# Patient Record
Sex: FEMALE | Race: WHITE | NOT HISPANIC OR LATINO | Employment: OTHER | URBAN - METROPOLITAN AREA
[De-identification: names, ages, dates, MRNs, and addresses within clinical notes are randomized per-mention and may not be internally consistent; named-entity substitution may affect disease eponyms.]

---

## 2017-10-25 ENCOUNTER — COMMUNICATION - HEALTHEAST (OUTPATIENT)
Dept: INTERNAL MEDICINE | Facility: CLINIC | Age: 77
End: 2017-10-25

## 2017-10-25 DIAGNOSIS — E87.6 HYPOPOTASSEMIA: ICD-10-CM

## 2017-10-25 DIAGNOSIS — I10 ESSENTIAL HYPERTENSION: ICD-10-CM

## 2017-11-02 ENCOUNTER — COMMUNICATION - HEALTHEAST (OUTPATIENT)
Dept: INTERNAL MEDICINE | Facility: CLINIC | Age: 77
End: 2017-11-02

## 2017-11-02 DIAGNOSIS — I10 ESSENTIAL HYPERTENSION: ICD-10-CM

## 2017-11-02 DIAGNOSIS — E87.6 HYPOPOTASSEMIA: ICD-10-CM

## 2017-11-02 DIAGNOSIS — K44.9 DIAPHRAGMATIC HERNIA: ICD-10-CM

## 2017-11-29 ENCOUNTER — OFFICE VISIT - HEALTHEAST (OUTPATIENT)
Dept: INTERNAL MEDICINE | Facility: CLINIC | Age: 77
End: 2017-11-29

## 2017-11-29 DIAGNOSIS — Z66 DNR (DO NOT RESUSCITATE): ICD-10-CM

## 2017-11-29 DIAGNOSIS — E87.6 HYPOPOTASSEMIA: ICD-10-CM

## 2017-11-29 DIAGNOSIS — Z12.31 VISIT FOR SCREENING MAMMOGRAM: ICD-10-CM

## 2017-11-29 DIAGNOSIS — Z23 NEED FOR IMMUNIZATION AGAINST INFLUENZA: ICD-10-CM

## 2017-11-29 DIAGNOSIS — I10 ESSENTIAL HYPERTENSION WITH GOAL BLOOD PRESSURE LESS THAN 140/90: ICD-10-CM

## 2017-11-29 DIAGNOSIS — D64.9 ANEMIA: ICD-10-CM

## 2017-11-29 DIAGNOSIS — E55.9 VITAMIN D DEFICIENCY: ICD-10-CM

## 2017-11-29 DIAGNOSIS — Z00.00 ANNUAL PHYSICAL EXAM: ICD-10-CM

## 2017-11-29 LAB
CHOLEST SERPL-MCNC: 213 MG/DL
FASTING STATUS PATIENT QL REPORTED: NO
HDLC SERPL-MCNC: 35 MG/DL
LDLC SERPL CALC-MCNC: 142 MG/DL
TRIGL SERPL-MCNC: 179 MG/DL

## 2017-11-29 ASSESSMENT — MIFFLIN-ST. JEOR: SCORE: 1429.88

## 2017-11-30 ENCOUNTER — COMMUNICATION - HEALTHEAST (OUTPATIENT)
Dept: INTERNAL MEDICINE | Facility: CLINIC | Age: 77
End: 2017-11-30

## 2018-11-30 ENCOUNTER — OFFICE VISIT - HEALTHEAST (OUTPATIENT)
Dept: INTERNAL MEDICINE | Facility: CLINIC | Age: 78
End: 2018-11-30

## 2018-11-30 DIAGNOSIS — I10 ESSENTIAL HYPERTENSION WITH GOAL BLOOD PRESSURE LESS THAN 140/90: ICD-10-CM

## 2018-11-30 DIAGNOSIS — Z12.31 VISIT FOR SCREENING MAMMOGRAM: ICD-10-CM

## 2018-11-30 DIAGNOSIS — E55.9 VITAMIN D DEFICIENCY: ICD-10-CM

## 2018-11-30 DIAGNOSIS — E87.6 HYPOPOTASSEMIA: ICD-10-CM

## 2018-11-30 DIAGNOSIS — Z00.00 MEDICARE ANNUAL WELLNESS VISIT, SUBSEQUENT: ICD-10-CM

## 2018-11-30 DIAGNOSIS — Z23 FLU VACCINE NEED: ICD-10-CM

## 2018-11-30 DIAGNOSIS — K51.00 ULCERATIVE PANCOLITIS WITHOUT COMPLICATION (H): ICD-10-CM

## 2018-11-30 DIAGNOSIS — Z78.0 ASYMPTOMATIC MENOPAUSAL STATE: ICD-10-CM

## 2018-11-30 LAB
ALBUMIN SERPL-MCNC: 3.4 G/DL (ref 3.5–5)
ALP SERPL-CCNC: 129 U/L (ref 45–120)
ALT SERPL W P-5'-P-CCNC: 12 U/L (ref 0–45)
ANION GAP SERPL CALCULATED.3IONS-SCNC: 12 MMOL/L (ref 5–18)
AST SERPL W P-5'-P-CCNC: 17 U/L (ref 0–40)
BILIRUB SERPL-MCNC: 0.6 MG/DL (ref 0–1)
BUN SERPL-MCNC: 26 MG/DL (ref 8–28)
CALCIUM SERPL-MCNC: 9.3 MG/DL (ref 8.5–10.5)
CHLORIDE BLD-SCNC: 112 MMOL/L (ref 98–107)
CHOLEST SERPL-MCNC: 202 MG/DL
CO2 SERPL-SCNC: 19 MMOL/L (ref 22–31)
CREAT SERPL-MCNC: 1.59 MG/DL (ref 0.6–1.1)
ERYTHROCYTE [DISTWIDTH] IN BLOOD BY AUTOMATED COUNT: 11.6 % (ref 11–14.5)
FASTING STATUS PATIENT QL REPORTED: NO
GFR SERPL CREATININE-BSD FRML MDRD: 31 ML/MIN/1.73M2
GLUCOSE BLD-MCNC: 86 MG/DL (ref 70–125)
HCT VFR BLD AUTO: 40.1 % (ref 35–47)
HDLC SERPL-MCNC: 36 MG/DL
HGB BLD-MCNC: 13.4 G/DL (ref 12–16)
LDLC SERPL CALC-MCNC: 133 MG/DL
MCH RBC QN AUTO: 29.3 PG (ref 27–34)
MCHC RBC AUTO-ENTMCNC: 33.5 G/DL (ref 32–36)
MCV RBC AUTO: 88 FL (ref 80–100)
PLATELET # BLD AUTO: 253 THOU/UL (ref 140–440)
PMV BLD AUTO: 6.7 FL (ref 7–10)
POTASSIUM BLD-SCNC: 4.1 MMOL/L (ref 3.5–5)
PROT SERPL-MCNC: 8 G/DL (ref 6–8)
RBC # BLD AUTO: 4.58 MILL/UL (ref 3.8–5.4)
SODIUM SERPL-SCNC: 143 MMOL/L (ref 136–145)
TRIGL SERPL-MCNC: 166 MG/DL
WBC: 10.4 THOU/UL (ref 4–11)

## 2018-11-30 ASSESSMENT — MIFFLIN-ST. JEOR: SCORE: 1418.66

## 2018-12-03 ENCOUNTER — COMMUNICATION - HEALTHEAST (OUTPATIENT)
Dept: INTERNAL MEDICINE | Facility: CLINIC | Age: 78
End: 2018-12-03

## 2018-12-03 LAB — 25(OH)D3 SERPL-MCNC: 30.5 NG/ML (ref 30–80)

## 2019-01-15 ENCOUNTER — COMMUNICATION - HEALTHEAST (OUTPATIENT)
Dept: INTERNAL MEDICINE | Facility: CLINIC | Age: 79
End: 2019-01-15

## 2019-01-15 DIAGNOSIS — I10 ESSENTIAL HYPERTENSION WITH GOAL BLOOD PRESSURE LESS THAN 140/90: ICD-10-CM

## 2019-02-04 ENCOUNTER — COMMUNICATION - HEALTHEAST (OUTPATIENT)
Dept: INTERNAL MEDICINE | Facility: CLINIC | Age: 79
End: 2019-02-04

## 2019-02-04 DIAGNOSIS — E87.6 HYPOPOTASSEMIA: ICD-10-CM

## 2019-06-24 ENCOUNTER — COMMUNICATION - HEALTHEAST (OUTPATIENT)
Dept: INTERNAL MEDICINE | Facility: CLINIC | Age: 79
End: 2019-06-24

## 2019-06-24 DIAGNOSIS — Z01.818 ENCOUNTER FOR PREOPERATIVE DENTAL EXAMINATION: ICD-10-CM

## 2019-10-18 ENCOUNTER — COMMUNICATION - HEALTHEAST (OUTPATIENT)
Dept: INTERNAL MEDICINE | Facility: CLINIC | Age: 79
End: 2019-10-18

## 2019-10-18 DIAGNOSIS — I10 ESSENTIAL HYPERTENSION WITH GOAL BLOOD PRESSURE LESS THAN 140/90: ICD-10-CM

## 2019-12-03 ENCOUNTER — OFFICE VISIT - HEALTHEAST (OUTPATIENT)
Dept: INTERNAL MEDICINE | Facility: CLINIC | Age: 79
End: 2019-12-03

## 2019-12-03 DIAGNOSIS — E55.9 VITAMIN D DEFICIENCY: ICD-10-CM

## 2019-12-03 DIAGNOSIS — M25.562 ACUTE PAIN OF LEFT KNEE: ICD-10-CM

## 2019-12-03 DIAGNOSIS — I10 ESSENTIAL HYPERTENSION WITH GOAL BLOOD PRESSURE LESS THAN 140/90: ICD-10-CM

## 2019-12-03 DIAGNOSIS — K51.00 ULCERATIVE PANCOLITIS WITHOUT COMPLICATION (H): ICD-10-CM

## 2019-12-03 DIAGNOSIS — K44.9 DIAPHRAGMATIC HERNIA WITHOUT OBSTRUCTION AND WITHOUT GANGRENE: ICD-10-CM

## 2019-12-03 DIAGNOSIS — Z23 FLU VACCINE NEED: ICD-10-CM

## 2019-12-03 DIAGNOSIS — E87.6 HYPOPOTASSEMIA: ICD-10-CM

## 2019-12-03 DIAGNOSIS — Z00.00 MEDICARE ANNUAL WELLNESS VISIT, SUBSEQUENT: ICD-10-CM

## 2019-12-03 LAB
ALBUMIN SERPL-MCNC: 3.5 G/DL (ref 3.5–5)
ALP SERPL-CCNC: 129 U/L (ref 45–120)
ALT SERPL W P-5'-P-CCNC: 12 U/L (ref 0–45)
ANION GAP SERPL CALCULATED.3IONS-SCNC: 14 MMOL/L (ref 5–18)
AST SERPL W P-5'-P-CCNC: 18 U/L (ref 0–40)
BILIRUB SERPL-MCNC: 0.5 MG/DL (ref 0–1)
BUN SERPL-MCNC: 33 MG/DL (ref 8–28)
CALCIUM SERPL-MCNC: 9.2 MG/DL (ref 8.5–10.5)
CHLORIDE BLD-SCNC: 111 MMOL/L (ref 98–107)
CHOLEST SERPL-MCNC: 214 MG/DL
CO2 SERPL-SCNC: 17 MMOL/L (ref 22–31)
CREAT SERPL-MCNC: 1.88 MG/DL (ref 0.6–1.1)
FASTING STATUS PATIENT QL REPORTED: YES
GFR SERPL CREATININE-BSD FRML MDRD: 26 ML/MIN/1.73M2
GLUCOSE BLD-MCNC: 85 MG/DL (ref 70–125)
HDLC SERPL-MCNC: 40 MG/DL
LDLC SERPL CALC-MCNC: 140 MG/DL
POTASSIUM BLD-SCNC: 3.8 MMOL/L (ref 3.5–5)
PROT SERPL-MCNC: 8.3 G/DL (ref 6–8)
SODIUM SERPL-SCNC: 142 MMOL/L (ref 136–145)
TRIGL SERPL-MCNC: 169 MG/DL
URATE SERPL-MCNC: 8.2 MG/DL (ref 2–7.5)

## 2019-12-03 ASSESSMENT — MIFFLIN-ST. JEOR: SCORE: 1423.25

## 2019-12-04 ENCOUNTER — COMMUNICATION - HEALTHEAST (OUTPATIENT)
Dept: INTERNAL MEDICINE | Facility: CLINIC | Age: 79
End: 2019-12-04

## 2019-12-04 DIAGNOSIS — E79.0 ELEVATED URIC ACID IN BLOOD: ICD-10-CM

## 2019-12-04 LAB — 25(OH)D3 SERPL-MCNC: 28 NG/ML (ref 30–80)

## 2020-01-31 ENCOUNTER — COMMUNICATION - HEALTHEAST (OUTPATIENT)
Dept: INTERNAL MEDICINE | Facility: CLINIC | Age: 80
End: 2020-01-31

## 2020-01-31 DIAGNOSIS — E87.6 HYPOPOTASSEMIA: ICD-10-CM

## 2020-01-31 DIAGNOSIS — K44.9 DIAPHRAGMATIC HERNIA WITHOUT OBSTRUCTION AND WITHOUT GANGRENE: ICD-10-CM

## 2020-10-13 ENCOUNTER — COMMUNICATION - HEALTHEAST (OUTPATIENT)
Dept: INTERNAL MEDICINE | Facility: CLINIC | Age: 80
End: 2020-10-13

## 2020-10-13 DIAGNOSIS — I10 ESSENTIAL HYPERTENSION WITH GOAL BLOOD PRESSURE LESS THAN 140/90: ICD-10-CM

## 2020-10-17 ENCOUNTER — COMMUNICATION - HEALTHEAST (OUTPATIENT)
Dept: INTERNAL MEDICINE | Facility: CLINIC | Age: 80
End: 2020-10-17

## 2020-10-17 DIAGNOSIS — I10 ESSENTIAL HYPERTENSION WITH GOAL BLOOD PRESSURE LESS THAN 140/90: ICD-10-CM

## 2020-12-03 ENCOUNTER — OFFICE VISIT - HEALTHEAST (OUTPATIENT)
Dept: INTERNAL MEDICINE | Facility: CLINIC | Age: 80
End: 2020-12-03

## 2020-12-03 DIAGNOSIS — K51.00 ULCERATIVE PANCOLITIS WITHOUT COMPLICATION (H): ICD-10-CM

## 2020-12-03 DIAGNOSIS — M25.562 CHRONIC PAIN OF LEFT KNEE: ICD-10-CM

## 2020-12-03 DIAGNOSIS — K44.9 DIAPHRAGMATIC HERNIA WITHOUT OBSTRUCTION AND WITHOUT GANGRENE: ICD-10-CM

## 2020-12-03 DIAGNOSIS — E55.9 VITAMIN D DEFICIENCY: ICD-10-CM

## 2020-12-03 DIAGNOSIS — N18.4 STAGE 4 CHRONIC KIDNEY DISEASE (H): ICD-10-CM

## 2020-12-03 DIAGNOSIS — Z01.818 ENCOUNTER FOR PREOPERATIVE DENTAL EXAMINATION: ICD-10-CM

## 2020-12-03 DIAGNOSIS — I10 ESSENTIAL HYPERTENSION WITH GOAL BLOOD PRESSURE LESS THAN 140/90: ICD-10-CM

## 2020-12-03 DIAGNOSIS — E87.6 HYPOPOTASSEMIA: ICD-10-CM

## 2020-12-03 DIAGNOSIS — G89.29 CHRONIC PAIN OF LEFT KNEE: ICD-10-CM

## 2020-12-03 DIAGNOSIS — E79.0 ELEVATED URIC ACID IN BLOOD: ICD-10-CM

## 2020-12-03 RX ORDER — HYDROCHLOROTHIAZIDE 25 MG/1
25 TABLET ORAL DAILY
Qty: 90 TABLET | Refills: 3 | Status: SHIPPED | OUTPATIENT
Start: 2020-12-03 | End: 2021-07-22

## 2020-12-03 RX ORDER — LOSARTAN POTASSIUM 100 MG/1
100 TABLET ORAL DAILY
Qty: 90 TABLET | Refills: 3 | Status: SHIPPED | OUTPATIENT
Start: 2020-12-03 | End: 2021-10-13

## 2020-12-03 RX ORDER — FEBUXOSTAT 80 MG/1
80 TABLET, FILM COATED ORAL DAILY
Qty: 90 TABLET | Refills: 3 | Status: SHIPPED | OUTPATIENT
Start: 2020-12-03 | End: 2021-07-22

## 2020-12-03 ASSESSMENT — PATIENT HEALTH QUESTIONNAIRE - PHQ9: SUM OF ALL RESPONSES TO PHQ QUESTIONS 1-9: 0

## 2020-12-10 ENCOUNTER — AMBULATORY - HEALTHEAST (OUTPATIENT)
Dept: NURSING | Facility: CLINIC | Age: 80
End: 2020-12-10

## 2020-12-10 ENCOUNTER — AMBULATORY - HEALTHEAST (OUTPATIENT)
Dept: LAB | Facility: CLINIC | Age: 80
End: 2020-12-10

## 2020-12-10 DIAGNOSIS — I10 ESSENTIAL HYPERTENSION WITH GOAL BLOOD PRESSURE LESS THAN 140/90: ICD-10-CM

## 2020-12-10 DIAGNOSIS — E55.9 VITAMIN D DEFICIENCY: ICD-10-CM

## 2020-12-10 LAB
ALBUMIN SERPL-MCNC: 3.6 G/DL (ref 3.5–5)
ALP SERPL-CCNC: 117 U/L (ref 45–120)
ALT SERPL W P-5'-P-CCNC: <9 U/L (ref 0–45)
ANION GAP SERPL CALCULATED.3IONS-SCNC: 13 MMOL/L (ref 5–18)
AST SERPL W P-5'-P-CCNC: 11 U/L (ref 0–40)
BILIRUB SERPL-MCNC: 0.5 MG/DL (ref 0–1)
BUN SERPL-MCNC: 35 MG/DL (ref 8–28)
CALCIUM SERPL-MCNC: 8.8 MG/DL (ref 8.5–10.5)
CHLORIDE BLD-SCNC: 111 MMOL/L (ref 98–107)
CHOLEST SERPL-MCNC: 184 MG/DL
CO2 SERPL-SCNC: 17 MMOL/L (ref 22–31)
CREAT SERPL-MCNC: 1.92 MG/DL (ref 0.6–1.1)
FASTING STATUS PATIENT QL REPORTED: ABNORMAL
GFR SERPL CREATININE-BSD FRML MDRD: 25 ML/MIN/1.73M2
GLUCOSE BLD-MCNC: 125 MG/DL (ref 70–125)
HDLC SERPL-MCNC: 44 MG/DL
LDLC SERPL CALC-MCNC: 116 MG/DL
POTASSIUM BLD-SCNC: 4.1 MMOL/L (ref 3.5–5)
PROT SERPL-MCNC: 8.1 G/DL (ref 6–8)
SODIUM SERPL-SCNC: 141 MMOL/L (ref 136–145)
TRIGL SERPL-MCNC: 119 MG/DL

## 2020-12-11 ENCOUNTER — COMMUNICATION - HEALTHEAST (OUTPATIENT)
Dept: INTERNAL MEDICINE | Facility: CLINIC | Age: 80
End: 2020-12-11

## 2020-12-11 LAB — 25(OH)D3 SERPL-MCNC: 25 NG/ML (ref 30–80)

## 2021-01-30 ENCOUNTER — COMMUNICATION - HEALTHEAST (OUTPATIENT)
Dept: INTERNAL MEDICINE | Facility: CLINIC | Age: 81
End: 2021-01-30

## 2021-01-30 DIAGNOSIS — K44.9 DIAPHRAGMATIC HERNIA WITHOUT OBSTRUCTION AND WITHOUT GANGRENE: ICD-10-CM

## 2021-01-30 DIAGNOSIS — E87.6 HYPOPOTASSEMIA: ICD-10-CM

## 2021-01-31 RX ORDER — POTASSIUM CHLORIDE 1500 MG/1
TABLET, EXTENDED RELEASE ORAL
Qty: 90 TABLET | Refills: 3 | Status: SHIPPED | OUTPATIENT
Start: 2021-01-31 | End: 2021-10-13

## 2021-05-26 ASSESSMENT — PATIENT HEALTH QUESTIONNAIRE - PHQ9: SUM OF ALL RESPONSES TO PHQ QUESTIONS 1-9: 0

## 2021-05-28 ENCOUNTER — RECORDS - HEALTHEAST (OUTPATIENT)
Dept: ADMINISTRATIVE | Facility: CLINIC | Age: 81
End: 2021-05-28

## 2021-05-29 NOTE — TELEPHONE ENCOUNTER
Medication Request  Medication name: Amoxicillin  500 mg   Pharmacy Name and Location: [id number] - [place] [address] [address] AT [facility] [address]  [phone]   Reason for request: Dental appointment 6/27/19 - requesting to antibiotic(patient had total hip replaced 8 years ago)  When did you use medication last ?:  Unsure    Okay to leave a detailed message: yes

## 2021-05-31 VITALS — HEIGHT: 62 IN | WEIGHT: 223.9 LBS | BODY MASS INDEX: 41.2 KG/M2

## 2021-06-02 VITALS — WEIGHT: 222.3 LBS | HEIGHT: 62 IN | BODY MASS INDEX: 40.91 KG/M2

## 2021-06-02 NOTE — TELEPHONE ENCOUNTER
Medication Question or Clarification  Who is calling: Patient   What medication are you calling about? (include dose and sig) losartan-hydrochlorothiazide 100-25 mg daily  Who prescribed the medication?: Oscar Hopper MD  What is your question/concern?: The pharmacy is unable to get this medication combo in at this time and are requesting separate prescriptions. The patient is completely fout of medication at this time.  Pharmacy: [facility] #[id number]  Okay to leave a detailed message?: No  Site CMT - Please call the pharmacy to obtain any additional needed information.

## 2021-06-03 NOTE — PROGRESS NOTES
Assessment and Plan:       1. Medicare annual wellness visit, subsequent  Doing very well.  Update Pneumovax  - Lipid Cascade  - Pneumococcal polysaccharide vaccine 23-valent greater than or equal to 3yo subcutaneous/IM    2. Essential hypertension with goal blood pressure less than 140/90  Stable  - Comprehensive Metabolic Panel    3. Ulcerative pancolitis without complication (H)  Stable without symptoms    4. Vitamin D deficiency  - Vitamin D, Total (25-Hydroxy)    5. Adult BMI 40.0-44.9 kg/sq m (H)  Encouraged to exercise    6. Acute pain of left knee  Evaluate possible uric acid component especially with hydrochlorothiazide  - Uric Acid    7. Hypokalemia  - potassium chloride (K-DUR,KLOR-CON) 20 MEQ tablet; TAKE ONE TABLET BY MOUTH EVERY DAY  Dispense: 90 tablet; Refill: 3    8. Diaphragmatic hernia without obstruction and without gangrene  Stable  - omeprazole (PRILOSEC) 20 MG capsule; TAKE ONE CAPSULE BY MOUTH EVERY DAY  Dispense: 90 capsule; Refill: 3    Check for a high uric acid level and treat if it is for a month.  If that helps the knee, continue    Update pneumonia booster    Update flu shot    New shingles shot better than the old one you had, you get at the pharmacy    The patient's current medical problems were reviewed.    The following health maintenance schedule was reviewed with the patient and provided in printed form in the after visit summary:   Health Maintenance   Topic Date Due     DXA SCAN  01/02/2005     ZOSTER VACCINES (2 of 3) 12/01/2010     MEDICARE ANNUAL WELLNESS VISIT  12/03/2020     FALL RISK ASSESSMENT  12/03/2020     TD 18+ HE  11/06/2024     LIPID  12/03/2024     ADVANCE CARE PLANNING  12/03/2024     PNEUMOCOCCAL IMMUNIZATION 65+ LOW/MEDIUM RISK  Completed     INFLUENZA VACCINE RULE BASED  Completed        Subjective:   HPI: Chief Complaint: Mely Alegria is an 79 y.o. female here for an Annual Wellness visit.     Mely was last seen in clinic 11/20/2018 for her annual  wellness visit. At that time Mely was healing from a case of Ulcerative colitis. She underwent colon resection surgery and used at colostomy bag for 11 months before it was reconnected. Then her bowels were moving fine and she denies changes within the year.     Hypertension: Mely states her blood pressures have been good though she does not check them at home. Within the year she missed one or two days of her Losartan due to a medication recall. During those days Mely states she felt fine and confirms she takes her Losartan and HCTZ separately.     Left Knee Pain: Mely has been experiencing pain in her left knee for some time. At this point she is debating having surgery and thinks it is a viable option because she has had a successful left hip surgery 8-10 years ago. At [facility] Mely has received cortisone shots with relief. The pain Mely experiences is noted to be intermittent and most felt in the night before bed or when the weather is changing.    Health Maintenance: Pnuemo 23 today.  Review of Systems:  Denies swelling, shortness of breath, and hearing loss.   Admits to knee pain.  Please see above.  The rest of the review of systems are negative for all systems.    Patient Care Team:  Oscar Hopper MD as PCP - General  Berhane Varghese MD as Physician (Orthopedic Surgery)  Oscar Hopper MD as Assigned PCP     Patient Active Problem List   Diagnosis     Vitamin D Deficiency     Hypokalemia     Anemia     Hyperlipidemia     Gastritis Due To H. Pylori     Diaphragmatic hernia without obstruction and without gangrene     Ulcerative pancolitis without complication (H)     Acute pain of left knee     Essential hypertension with goal blood pressure less than 140/90     DNR (do not resuscitate)     Adult BMI 40.0-44.9 kg/sq m (H)     No past medical history on file.   Past Surgical History:   Procedure Laterality Date     [place] COLOSTOMY      Description: Colostomy;  Recorded:  11/05/2012;     NC DILATION/CURETTAGE,DIAGNOSTIC      Description: Dilation And Curettage;  Recorded: 01/18/2010;     NC PART REMOVAL COLON W ANASTOMOSIS      Description: Partial Colectomy;  Recorded: 11/05/2012;  Comments: with colostomy     NC TOTAL HIP ARTHROPLASTY      Description: Total Hip Replacement;  Recorded: 01/18/2010;      Family History   Problem Relation Age of Onset     Diabetes Mother       Social History     Socioeconomic History     Marital status:      Spouse name: Not on file     Number of children: Not on file     Years of education: Not on file     Highest education level: Not on file   Occupational History     Not on file   Social Needs     Financial resource strain: Not on file     Food insecurity:     Worry: Not on file     Inability: Not on file     Transportation needs:     Medical: Not on file     Non-medical: Not on file   Tobacco Use     Smoking status: Never Smoker     Smokeless tobacco: Never Used   Substance and Sexual Activity     Alcohol use: Not on file     Drug use: Not on file     Sexual activity: Not on file   Lifestyle     Physical activity:     Days per week: Not on file     Minutes per session: Not on file     Stress: Not on file   Relationships     Social connections:     Talks on phone: Not on file     Gets together: Not on file     Attends Mandaeism service: Not on file     Active member of club or organization: Not on file     Attends meetings of clubs or organizations: Not on file     Relationship status: Not on file     Intimate partner violence:     Fear of current or ex partner: Not on file     Emotionally abused: Not on file     Physically abused: Not on file     Forced sexual activity: Not on file   Other Topics Concern     Not on file   Social History Narrative     Not on file      Current Outpatient Medications   Medication Sig Dispense Refill     amoxicillin (AMOXIL) 500 MG capsule Take 4 capsules (2,000 mg total) one hour prior to dental procedure 4  "capsule 3     CALCIUM CARBONATE (CALCIUM 500 ORAL) Take by mouth.       cholecalciferol, vitamin D3, 1,000 unit tablet Take 1,000 Units by mouth daily.       hydroCHLOROthiazide (HYDRODIURIL) 25 MG tablet Take 1 tablet (25 mg total) by mouth daily. 90 tablet 3     losartan (COZAAR) 100 MG tablet Take 1 tablet (100 mg total) by mouth daily. 90 tablet 3     omeprazole (PRILOSEC) 20 MG capsule TAKE ONE CAPSULE BY MOUTH EVERY DAY 90 capsule 3     potassium chloride (K-DUR,KLOR-CON) 20 MEQ tablet TAKE ONE TABLET BY MOUTH EVERY DAY 90 tablet 3     allopurinol (ZYLOPRIM) 300 MG tablet Take 1 tablet (300 mg total) by mouth daily. 90 tablet 3     No current facility-administered medications for this visit.       Objective:   Vital Signs:   Visit Vitals  /76 (Patient Site: Left Arm, Patient Position: Sitting, Cuff Size: Adult Large)   Pulse 66   Resp 16   Ht 5' 1.75\" (1.568 m)   Wt (!) 222 lb 7 oz (100.9 kg)   SpO2 98%   BMI 41.01 kg/m         VisionScreening:  No exam data present     PHYSICAL EXAM  Constitutional:  Reveals alert, talkative woman.  Vitals:  Per nursing notes.  Ears:  Clear.  Oropharynx:  Without posterior nasal drainage or thrush. Mallampati 3 score.   Neck:  Supple, thyroid not palpable.  Cardiac:  Regular rate and rhythm without murmurs, rubs, or gallops. Carotids without bruits. Legs without edema. Palpation of the radial pulse regular.  Lungs: Clear.  Respiratory effort normal.  Abdomen:   Bowel sounds positive, nontender, nondistended.  Neither liver nor spleen palpable.  Skin:   Without rash, bruise, or palpable lesions.  Psychiatric:  Mood appropriate, memory intact.      Assessment Results 12/3/2019   Activities of Daily Living No help needed   Instrumental Activities of Daily Living No help needed   Mini Cog Total Score 5   Some recent data might be hidden     A Mini-Cog score of 0-2 suggests the possibility of dementia, score of 3-5 suggests no dementia    Identified Health Risks: "     Patient's advanced directive was discussed and I am comfortable with the patient's wishes.    ADDITIONAL HISTORY SUMMARIZED (2): Reviewed 10/18/2019 and 6/24/2019 Telephone encounter for medication changes.  DECISION TO OBTAIN EXTRA INFORMATION (1): None.   RADIOLOGY TESTS (1): None.  LABS (1): ordered and reviewed.  MEDICINE TESTS (1): None.  INDEPENDENT REVIEW (2 each): None.     The visit lasted a total of 14 minutes face to face with the patient. Over 50% of the time was spent counseling and educating the patient about annual wellness.    I, Neville Jenkins, am scribing for and in the presence of, Dr. Hopper    I, Dr. Hopper, personally performed the services described in this documentation, as scribed by Neville Jenkins in my presence, and it is both accurate and complete.    Total data points: 3

## 2021-06-03 NOTE — PATIENT INSTRUCTIONS - HE
Advance Directive  Patient s advance directive was discussed and I am comfortable with the patient s wishes.  Patient Education   Personalized Prevention Plan  You are due for the preventive services outlined below.  Your care team is available to assist you in scheduling these services.  If you have already completed any of these items, please share that information with your care team to update in your medical record.  Health Maintenance   Topic Date Due     DXA SCAN  01/02/2005     ZOSTER VACCINES (2 of 3) 12/01/2010     INFLUENZA VACCINE RULE BASED (1) 08/01/2019     MEDICARE ANNUAL WELLNESS VISIT  11/30/2019     FALL RISK ASSESSMENT  12/03/2020     LIPID  11/30/2023     ADVANCE CARE PLANNING  11/30/2023     TD 18+ HE  11/06/2024     PNEUMOCOCCAL IMMUNIZATION 65+ LOW/MEDIUM RISK  Completed           Check for a high uric acid level and treat if it is for a month.  If that helps the knee, continue    Update pneumonia booster    Update flu shot    New shingles shot better than the old one you had, you get at the pharmacy

## 2021-06-04 VITALS
DIASTOLIC BLOOD PRESSURE: 76 MMHG | RESPIRATION RATE: 16 BRPM | OXYGEN SATURATION: 98 % | SYSTOLIC BLOOD PRESSURE: 136 MMHG | HEIGHT: 62 IN | BODY MASS INDEX: 40.93 KG/M2 | WEIGHT: 222.44 LBS | HEART RATE: 66 BPM

## 2021-06-05 NOTE — TELEPHONE ENCOUNTER
Refills Approved x 2     Rx renewed per Medication Renewal Policy. Medications were both last renewed on 12/3/2019 with 3 refills each..  Last office visit: 12/3/2019 with PCP Dr JULIÁN Hopper  Order retransmitted.   Millie Hill, [facility] Triage/Med Refill 2/1/2020     Requested Prescriptions   Pending Prescriptions Disp Refills     potassium chloride (K-DUR,KLOR-CON) 20 MEQ tablet [Pharmacy Med Name: POTASSIUM CL 20MEQ ER TABLETS] 90 tablet 3     Sig: TAKE 1 TABLET BY MOUTH EVERY DAY       Potassium Supplements Refill Protocol Passed - 1/31/2020 10:42 AM        Passed - PCP or prescribing provider visit in past 12 months       Last office visit with prescriber/PCP: Visit date not found OR same dept: Visit date not found OR same specialty: Visit date not found  Last physical: 12/3/2019 Last MTM visit: Visit date not found   Next visit within 3 mo: Visit date not found  Next physical within 3 mo: Visit date not found  Prescriber OR PCP: Oscar Hopper MD  Last diagnosis associated with med order: 1. Hypokalemia  - potassium chloride (K-DUR,KLOR-CON) 20 MEQ tablet [Pharmacy Med Name: POTASSIUM CL 20MEQ ER TABLETS]; TAKE 1 TABLET BY MOUTH EVERY DAY  Dispense: 90 tablet; Refill: 3    2. Diaphragmatic hernia without obstruction and without gangrene  - omeprazole (PRILOSEC) 20 MG capsule [Pharmacy Med Name: OMEPRAZOLE 20MG CAPSULES]; TAKE 1 CAPSULE BY MOUTH EVERY DAY  Dispense: 90 capsule; Refill: 3    If protocol passes may refill for 12 months if within 3 months of last provider visit (or a total of 15 months).             Passed - Potassium level in last 12 months     Lab Results   Component Value Date    Potassium 3.8 12/03/2019             omeprazole (PRILOSEC) 20 MG capsule [Pharmacy Med Name: OMEPRAZOLE 20MG CAPSULES] 90 capsule 3     Sig: TAKE 1 CAPSULE BY MOUTH EVERY DAY       GI Medications Refill Protocol Passed - 1/31/2020 10:42 AM        Passed - PCP or prescribing provider visit in last 12 or  next 3 months.     Last office visit with prescriber/PCP: Visit date not found OR same dept: Visit date not found OR same specialty: Visit date not found  Last physical: 12/3/2019 Last MTM visit: Visit date not found   Next visit within 3 mo: Visit date not found  Next physical within 3 mo: Visit date not found  Prescriber OR PCP: Oscar Hopper MD  Last diagnosis associated with med order: 1. Hypokalemia  - potassium chloride (K-DUR,KLOR-CON) 20 MEQ tablet [Pharmacy Med Name: POTASSIUM CL 20MEQ ER TABLETS]; TAKE 1 TABLET BY MOUTH EVERY DAY  Dispense: 90 tablet; Refill: 3    2. Diaphragmatic hernia without obstruction and without gangrene  - omeprazole (PRILOSEC) 20 MG capsule [Pharmacy Med Name: OMEPRAZOLE 20MG CAPSULES]; TAKE 1 CAPSULE BY MOUTH EVERY DAY  Dispense: 90 capsule; Refill: 3    If protocol passes may refill for 12 months if within 3 months of last provider visit (or a total of 15 months).

## 2021-06-09 ENCOUNTER — COMMUNICATION - HEALTHEAST (OUTPATIENT)
Dept: INTERNAL MEDICINE | Facility: CLINIC | Age: 81
End: 2021-06-09

## 2021-06-12 NOTE — TELEPHONE ENCOUNTER
Refill Approved    Rx renewed per Medication Renewal Policy. Medication was last renewed on 10/18/19.    Day Collins, [facility] Connection Triage/Med Refill 10/19/2020     Requested Prescriptions   Pending Prescriptions Disp Refills     hydroCHLOROthiazide (HYDRODIURIL) 25 MG tablet [Pharmacy Med Name: HYDROCHLOROTHIAZIDE 25MG TABLETS] 90 tablet 3     Sig: TAKE 1 TABLET(25 MG) BY MOUTH DAILY       Diuretics/Combination Diuretics Refill Protocol  Passed - 10/19/2020 11:09 AM        Passed - Visit with PCP or prescribing provider visit in past 12 months     Last office visit with prescriber/PCP: Visit date not found OR same dept: Visit date not found OR same specialty: Visit date not found  Last physical: 12/3/2019 Last MTM visit: Visit date not found   Next visit within 3 mo: Visit date not found  Next physical within 3 mo: Visit date not found  Prescriber OR PCP: Oscar Hopper MD  Last diagnosis associated with med order: 1. Essential hypertension with goal blood pressure less than 140/90  - hydroCHLOROthiazide (HYDRODIURIL) 25 MG tablet [Pharmacy Med Name: HYDROCHLOROTHIAZIDE 25MG TABLETS]; TAKE 1 TABLET(25 MG) BY MOUTH DAILY  Dispense: 90 tablet; Refill: 3    If protocol passes may refill for 12 months if within 3 months of last provider visit (or a total of 15 months).             Passed - Serum Potassium in past 12 months      Lab Results   Component Value Date    Potassium 3.8 12/03/2019             Passed - Serum Sodium in past 12 months      Lab Results   Component Value Date    Sodium 142 12/03/2019             Passed - Blood pressure on file in past 12 months     BP Readings from Last 1 Encounters:   12/03/19 136/76             Passed - Serum Creatinine in past 12 months      Creatinine   Date Value Ref Range Status   12/03/2019 1.88 (H) 0.60 - 1.10 mg/dL Final

## 2021-06-12 NOTE — TELEPHONE ENCOUNTER
Who is calling:  Patient   Reason for Call:  Caller stated that she is out and is needing refills sent over as soon as possible.   Date of last appointment with primary care:   Okay to leave a detailed message: Yes

## 2021-06-12 NOTE — TELEPHONE ENCOUNTER
Refill Approved    Rx renewed per Medication Renewal Policy. Medication was last renewed on 10/18/2019.  Last office visit was 12/03/2019 with PCP.    Francisca Flores, [facility] Triage/Med Refill 10/16/2020     Requested Prescriptions   Pending Prescriptions Disp Refills     losartan (COZAAR) 100 MG tablet [Pharmacy Med Name: LOSARTAN 100MG TABLETS] 90 tablet 3     Sig: TAKE 1 TABLET(100 MG) BY MOUTH DAILY       Angiotensin Receptor Blocker Protocol Passed - 10/16/2020  3:01 PM        Passed - PCP or prescribing provider visit in past 12 months       Last office visit with prescriber/PCP: Visit date not found OR same dept: Visit date not found OR same specialty: Visit date not found  Last physical: 12/3/2019 Last MTM visit: Visit date not found   Next visit within 3 mo: Visit date not found  Next physical within 3 mo: Visit date not found  Prescriber OR PCP: Oscar Hopper MD  Last diagnosis associated with med order: 1. Essential hypertension with goal blood pressure less than 140/90  - losartan (COZAAR) 100 MG tablet [Pharmacy Med Name: LOSARTAN 100MG TABLETS]; TAKE 1 TABLET(100 MG) BY MOUTH DAILY  Dispense: 90 tablet; Refill: 3    If protocol passes may refill for 12 months if within 3 months of last provider visit (or a total of 15 months).             Passed - Serum potassium within the past 12 months     Lab Results   Component Value Date    Potassium 3.8 12/03/2019             Passed - Blood pressure filed in past 12 months     BP Readings from Last 1 Encounters:   12/03/19 136/76             Passed - Serum creatinine within the past 12 months     Creatinine   Date Value Ref Range Status   12/03/2019 1.88 (H) 0.60 - 1.10 mg/dL Final

## 2021-06-12 NOTE — TELEPHONE ENCOUNTER
FYI - Status Update  Who is Calling: Patient  Update: Questioning if refill can be expedited due to being out of medication.  Okay to leave a detailed message?:  No return call needed

## 2021-06-13 NOTE — PATIENT INSTRUCTIONS - HE
Refill medicine    No further need for amoxicillin for joint prophylaxis    Update labs    Document intolerance to allopurinol    Uloric 80 mg daily    Consider memantine    Prednisone 20 mg daily to overlap Uloric for 5 days    Flu shot    Refuses mammograms and bone density

## 2021-06-13 NOTE — PROGRESS NOTES
Patient would like to be contacted via the following phone number      ASSESSMENT:  1. Chronic pain of left knee  Elevated uric acid last year but side effects on allopurinol.  Repeat trial of uric acid lowering with Uloric overlapped with prednisone.  Consider probenecid but less effective with renal insufficiency    If no effect consider memantine    2. Elevated uric acid in blood  Repeat trial of uric acid lowering.  Side effects on allopurinol  - febuxostat 80 mg Tab; Take 80 mg by mouth daily.  Dispense: 90 tablet; Refill: 3  - predniSONE (DELTASONE) 20 MG tablet; Take 20 mg by mouth daily for 5 days.  Dispense: 5 tablet; Refill: 0    3. Essential hypertension with goal blood pressure less than 140/90  Stable.  Refill meds.  Hydrochlorothiazide component of uric acid noted  - hydroCHLOROthiazide (HYDRODIURIL) 25 MG tablet; Take 1 tablet (25 mg total) by mouth daily.  Dispense: 90 tablet; Refill: 3  - losartan (COZAAR) 100 MG tablet; Take 1 tablet (100 mg total) by mouth daily.  Dispense: 90 tablet; Refill: 3  - Comprehensive Metabolic Panel; Future  - Lipid Cascade; Future    4. Diaphragmatic hernia without obstruction and without gangrene  Stable on omeprazole  - omeprazole (PRILOSEC) 20 MG capsule; TAKE 1 CAPSULE BY MOUTH EVERY DAY  Dispense: 90 capsule; Refill: 3    5. Hypokalemia  - potassium chloride (K-DUR,KLOR-CON) 20 MEQ tablet; TAKE 1 TABLET BY MOUTH EVERY DAY  Dispense: 90 tablet; Refill: 3    6. Encounter for preoperative dental examination  No longer needs to take amoxicillin by 2015 joint guidelines    7. Vitamin D deficiency  Advised again  - Vitamin D, Total (25-Hydroxy); Future  - cholecalciferol, vitamin D3, 50 mcg (2,000 unit) Tab; Take 1 tablet (2,000 Units total) by mouth daily.  Dispense: 100 each; Refill: 3    8. Adult BMI 40.0-44.9 kg/sq m (H)  Encouraged to exercise    9. Ulcerative pancolitis without complication (H)  Frequent diarrhea but otherwise untreated    10. Stage 4  chronic kidney disease (H)  Slightly worsened last year.  Recheck again this year    Preventive Health Care: Update labs and provide flu shot.  Refuses mammograms and bone density.  Aged out of other cancer screening    PLAN:  Patient Instructions   Refill medicine    No further need for amoxicillin for joint prophylaxis    Update labs    Document intolerance to allopurinol    Uloric 80 mg daily    Consider memantine    Prednisone 20 mg daily to overlap Uloric for 5 days    Flu shot    Refuses mammograms and bone density    Orders Placed This Encounter   Procedures     Comprehensive Metabolic Panel     Standing Status:   Future     Standing Expiration Date:   12/3/2021     Lipid Cascade     Standing Status:   Future     Standing Expiration Date:   12/3/2021     Order Specific Question:   Fasting is required?     Answer:   Unknown     Vitamin D, Total (25-Hydroxy)     Standing Status:   Future     Standing Expiration Date:   12/3/2021     Return in about 1 year (around 12/3/2021) for a phone/video follow up visit.      CHIEF COMPLAINT:  Chief Complaint   Patient presents with     Medication Refill     Knee Pain     left       HISTORY OF PRESENT ILLNESS:  Mely is a 80 y.o. female contacting the clinic today via phone for yearly review.  Her predominant complaint is left knee pain.  Last year uric acid level was elevated greater than 7 and she was given allopurinol to try.  She took this for approximately 2 weeks and does not recall of her pain improved.  She does recall that she had severe diarrhea and slight nausea.  She stopped it and did not contact us    Blood pressure has been stable and she otherwise feels well    Left knee pain fluctuates with the weather    REVIEW OF SYSTEMS:   No chest pain or shortness of breath.  Good mood.  Good energy.  Occasional toe pain.  All other systems are negative.    PFSH:  Social History     Social History Narrative     Not on file     Lives by herself    TOBACCO USE:  Social  History     Tobacco Use   Smoking Status Never Smoker   Smokeless Tobacco Never Used       VITALS:  There were no vitals filed for this visit.  Wt Readings from Last 3 Encounters:   12/03/19 (!) 222 lb 7 oz (100.9 kg)   11/30/18 (!) 222 lb 4.8 oz (100.8 kg)   11/29/17 (!) 223 lb 14.4 oz (101.6 kg)       PHYSICAL EXAM:  (observations via Phone)  Alert and oriented.  Good humor.  No shortness of breath      ADDITIONAL HISTORY SUMMARIZED (2): Reviewed physical exam last year  CARE EVERYWHERE/ EXTRA INFORMATION (1):   RADIOLOGY TESTS (1): Refuses mammograms  LABS (1): Old labs reviewed from last year and new labs ordered  CARDIOLOGY/MEDICINE TESTS (1):   INDEPENDENT REVIEW (2 each):     Total data points: 2    Phone Start time: 2:53 PM  Phone End time: 3:21 PM    The visit lasted a total of 28 minutes     CA Intake Time: 5 minutes    I have reviewed and updated the patient's Past Medical History, Social History, Family History as appropriate.    MEDICATIONS: Reviewed and updated per CA and MD  Current Outpatient Medications   Medication Sig Dispense Refill     CALCIUM CARBONATE (CALCIUM 500 ORAL) Take by mouth.       cholecalciferol, vitamin D3, 1,000 unit tablet Take 1,000 Units by mouth daily.       cholecalciferol, vitamin D3, 50 mcg (2,000 unit) Tab Take 1 tablet (2,000 Units total) by mouth daily. 100 each 3     febuxostat 80 mg Tab Take 80 mg by mouth daily. 90 tablet 3     hydroCHLOROthiazide (HYDRODIURIL) 25 MG tablet Take 1 tablet (25 mg total) by mouth daily. 90 tablet 3     losartan (COZAAR) 100 MG tablet Take 1 tablet (100 mg total) by mouth daily. 90 tablet 3     omeprazole (PRILOSEC) 20 MG capsule TAKE 1 CAPSULE BY MOUTH EVERY DAY 90 capsule 3     potassium chloride (K-DUR,KLOR-CON) 20 MEQ tablet TAKE 1 TABLET BY MOUTH EVERY DAY 90 tablet 3     predniSONE (DELTASONE) 20 MG tablet Take 20 mg by mouth daily for 5 days. 5 tablet 0     No current facility-administered medications for this visit.        The  "patient has been notified of following:     \"This telephone visit will be conducted via a call between you and your physician/provider. We have found that certain health care needs can be provided without the need for a physical exam.  This service lets us provide the care you need with a short phone conversation.  If a prescription is necessary we can send it directly to your pharmacy.  If lab work is needed we can place an order for that and you can then stop by our lab to have the test done at a later time.    Telephone visits are billed at different rates depending on your insurance coverage. During this emergency period, for some insurers they may be billed the same as an in-person visit.  Please reach out to your insurance provider with any questions.    If during the course of the call the physician/provider feels a telephone visit is not appropriate, you will not be charged for this service.\"    Patient has given verbal consent to a Telephone visit? Yes    Patient would like to receive their AVS by AVS Preference: Mail a copy.         "

## 2021-06-14 NOTE — TELEPHONE ENCOUNTER
Refill Approved    Rx renewed per Medication Renewal Policy. Medication was last renewed on 12/3/20, last OV 12/3/20.    [facility], [facility] Triage/Med Refill 1/31/2021     Requested Prescriptions   Pending Prescriptions Disp Refills     potassium chloride (K-DUR,KLOR-CON) 20 MEQ tablet [Pharmacy Med Name: POTASSIUM CL 20MEQ ER TABLETS] 90 tablet 3     Sig: TAKE 1 TABLET BY MOUTH EVERY DAY       Potassium Supplements Refill Protocol Passed - 1/30/2021  4:21 PM        Passed - PCP or prescribing provider visit in past 12 months       Last office visit with prescriber/PCP: Visit date not found OR same dept: Visit date not found OR same specialty: Visit date not found  Last physical: 12/3/2019 Last MTM visit: Visit date not found   Next visit within 3 mo: Visit date not found  Next physical within 3 mo: Visit date not found  Prescriber OR PCP: Oscar Hopper MD  Last diagnosis associated with med order: 1. Hypokalemia  - potassium chloride (K-DUR,KLOR-CON) 20 MEQ tablet [Pharmacy Med Name: POTASSIUM CL 20MEQ ER TABLETS]; TAKE 1 TABLET BY MOUTH EVERY DAY  Dispense: 90 tablet; Refill: 3    2. Diaphragmatic hernia without obstruction and without gangrene  - omeprazole (PRILOSEC) 20 MG capsule [Pharmacy Med Name: OMEPRAZOLE 20MG CAPSULES]; TAKE 1 CAPSULE BY MOUTH EVERY DAY  Dispense: 90 capsule; Refill: 3    If protocol passes may refill for 12 months if within 3 months of last provider visit (or a total of 15 months).             Passed - Potassium level in last 12 months     Lab Results   Component Value Date    Potassium 4.1 12/10/2020                omeprazole (PRILOSEC) 20 MG capsule [Pharmacy Med Name: OMEPRAZOLE 20MG CAPSULES] 90 capsule 3     Sig: TAKE 1 CAPSULE BY MOUTH EVERY DAY       GI Medications Refill Protocol Passed - 1/30/2021  4:21 PM        Passed - PCP or prescribing provider visit in last 12 or next 3 months.     Last office visit with prescriber/PCP: Visit date not found OR same  dept: Visit date not found OR same specialty: Visit date not found  Last physical: 12/3/2019 Last MTM visit: Visit date not found   Next visit within 3 mo: Visit date not found  Next physical within 3 mo: Visit date not found  Prescriber OR PCP: Oscar Hopper MD  Last diagnosis associated with med order: 1. Hypokalemia  - potassium chloride (K-DUR,KLOR-CON) 20 MEQ tablet [Pharmacy Med Name: POTASSIUM CL 20MEQ ER TABLETS]; TAKE 1 TABLET BY MOUTH EVERY DAY  Dispense: 90 tablet; Refill: 3    2. Diaphragmatic hernia without obstruction and without gangrene  - omeprazole (PRILOSEC) 20 MG capsule [Pharmacy Med Name: OMEPRAZOLE 20MG CAPSULES]; TAKE 1 CAPSULE BY MOUTH EVERY DAY  Dispense: 90 capsule; Refill: 3    If protocol passes may refill for 12 months if within 3 months of last provider visit (or a total of 15 months).

## 2021-06-14 NOTE — PROGRESS NOTES
Assessment and Plan:   ASSESSMENT:  1. Annual physical exam  Stable.  Shots up-to-date.  Refuses mammogram and colonoscopy    Living will discussed.  Son Tatyana primary decision maker.  She clearly expresses that she does not wish any extraordinary measures  - Lipid Cascade  - DNR (Do Not Resuscitate, Includes DNI)    2. Visit for screening mammogram  Refuses    3. Need for immunization against influenza  Agrees  - Influenza High Dose, Seasonal 65+ yrs    4. Hypokalemia  Continue chronic potassium  - potassium chloride SA (K-DUR,KLOR-CON) 20 MEQ tablet; TAKE ONE TABLET BY MOUTH EVERY DAY  Dispense: 90 tablet; Refill: 3    5. Vitamin D deficiency  Recheck on supplements  - Vitamin D, Total (25-Hydroxy)    6. Essential hypertension with goal blood pressure less than 140/90  Stable  - losartan-hydrochlorothiazide (HYZAAR) 100-25 mg per tablet; TAKE ONE TABLET BY MOUTH EVERY DAY  Dispense: 90 tablet; Refill: 3  - Comprehensive Metabolic Panel    7. Anemia  With history of ulcerative colitis.  Monitor  - HM2(CBC w/o Differential)    8. DNR (do not resuscitate)  Living well discussed    PLAN:    Patient Instructions     Refilled meds    Update labs    Recommend living will, but do not resusitate for now    Shots are up to date    You do not want mammograms or colon        The patient's current medical problems were reviewed.    I have had an Advance Directives discussion with the patient.  The following health maintenance schedule was reviewed with the patient and provided in printed form in the after visit summary:   Health Maintenance   Topic Date Due     ADVANCE DIRECTIVES DISCUSSED WITH PATIENT  01/02/1958     DXA SCAN  01/02/2005     FALL RISK ASSESSMENT  12/02/2016     INFLUENZA VACCINE RULE BASED (1) 08/01/2017     MAMMOGRAM  12/30/2017 (Originally 1940)     TD 18+ HE  11/06/2024     COLONOSCOPY  12/02/2025     PNEUMOCOCCAL POLYSACCHARIDE VACCINE AGE 65 AND OVER  Completed     PNEUMOCOCCAL CONJUGATE VACCINE FOR  ADULTS (PCV13 OR PREVNAR)  Completed     ZOSTER VACCINE  Completed        Subjective:   Chief Complaint: Mely Alegria is an 77 y.o. female here for an Annual Wellness visit.   HPI:  She does not have any acute concerns today.     Hypertension: She continues to take 100-25 mg of losartan-HCTZ daily. Her blood pressure is in a good range today.     Ulcerative Colitis: Her ulcerative colitis has not been bothering her. She is very careful about watching her diet.     GERD: She continues to take omeprazole for acid reflux and has managed well on this.     Health Maintenance: She got a flu shot today. She does not do mammograms or colonoscopies. She does not have a healthcare directive on file. Her son, Tatyana, would be the first to speak for her if she became unable. She would like to be DNR.     Review of Systems: She has been feeling well, in general. She takes vitamin D, vitamin B12, and calcium. She takes all of her medications in the morning. She has been evaluated at [facility] for her left knee pain and was given a cortisone injection. The injection has worked fairly well. She was told she could have surgery, but she does not think she needs it. She goes up and down stairs without a problem. She is not taking pain medications. She denies trouble breathing and sleeps well.   Please see above.  The rest of the review of systems are negative for all systems.    PFSH:  She finally retired this past year, but she does not like it so far. She has been somewhat bored. She did a lot of gardening this summer to stay busy. She is . She underwent partial colectomy and had a colostomy for eleven months in the past.     Patient Care Team:  Oscar Hopper MD as PCP - General  Berhane Varghese MD as Physician (Orthopedic Surgery)     Patient Active Problem List   Diagnosis     Vitamin D Deficiency     Hypokalemia     Anemia     Hyperlipidemia     Obesity     Gastritis Due To H. Pylori     Hiatal Hernia      Ulcerative Colitis     Acute pain of left knee     Essential hypertension with goal blood pressure less than 140/90     DNR (do not resuscitate)     No past medical history on file.   Past Surgical History:   Procedure Laterality Date     AR COLOSTOMY      Description: Colostomy;  Recorded: 11/05/2012;     AR DILATION/CURETTAGE,DIAGNOSTIC      Description: Dilation And Curettage;  Recorded: 01/18/2010;     AR PART REMOVAL COLON W ANASTOMOSIS      Description: Partial Colectomy;  Recorded: 11/05/2012;  Comments: with colostomy     AR TOTAL HIP ARTHROPLASTY      Description: Total Hip Replacement;  Recorded: 01/18/2010;      Family History   Problem Relation Age of Onset     Diabetes Mother       Social History     Social History     Marital status:      Spouse name: N/A     Number of children: N/A     Years of education: N/A     Occupational History     Not on file.     Social History Main Topics     Smoking status: Never Smoker     Smokeless tobacco: Not on file     Alcohol use Not on file     Drug use: Not on file     Sexual activity: Not on file     Other Topics Concern     Not on file     Social History Narrative      Current Outpatient Prescriptions   Medication Sig Dispense Refill     CALCIUM CARBONATE (CALCIUM 500 ORAL) Take by mouth.       cholecalciferol, vitamin D3, 1,000 unit tablet Take 1,000 Units by mouth daily.       cyanocobalamin 1000 MCG tablet Take 1,000 mcg by mouth daily.       losartan-hydrochlorothiazide (HYZAAR) 100-25 mg per tablet TAKE ONE TABLET BY MOUTH EVERY DAY 90 tablet 3     omeprazole (PRILOSEC) 20 MG capsule TAKE ONE CAPSULE BY MOUTH EVERY DAY 90 capsule 3     potassium chloride SA (K-DUR,KLOR-CON) 20 MEQ tablet TAKE ONE TABLET BY MOUTH EVERY DAY 90 tablet 3     No current facility-administered medications for this visit.       Objective:   Vital Signs:   Visit Vitals     /72 (Patient Site: Left Arm, Patient Position: Sitting, Cuff Size: Adult Regular)     Pulse 80      "Ht 5' 1.75\" (1.568 m)     Wt (!) 223 lb 14.4 oz (101.6 kg)     BMI 41.28 kg/m2        VisionScreening:  No exam data present     PHYSICAL EXAM  Constitutional:  Reveals an alert, pleasant, talkative woman. Affect appropriate.  Vitals:  Per nursing notes.  Ears:  Clear.  Oropharynx:  Without posterior nasal drainage or thrush.  Neck:  Supple, thyroid not palpable.  Cardiac:  Regular rate and rhythm without murmurs, rubs, or gallops. Carotids without bruits. Legs without edema. Palpation of the radial pulse regular.  Lungs: Clear.  Respiratory effort normal.  Abdomen:   Bowel sounds positive, nontender, nondistended.  Neither liver nor spleen palpable.  Skin:   Without rash, bruise, or palpable lesions.  Rheumatologic: Normal joints and nails of the hands.  Neurologic:  Cranial nerves II-XII intact.     Psychiatric:  Mood appropriate, memory intact.    Assessment Results 11/29/2017   Activities of Daily Living No help needed   Instrumental Activities of Daily Living No help needed   Mini Cog Total Score 5   Some recent data might be hidden     A Mini-Cog score of 0-2 suggests the possibility of dementia, score of 3-5 suggests no dementia    Identified Health Risks:     She is at risk for lack of exercise and has been provided with information to increase physical activity for the benefit of her well-being.  The patient was counseled and encouraged to consider modifying their diet and eating habits. She was provided with information on recommended healthy diet options.  The patient reports that she does not have all recommended working emergency equipment available. She was provided with information about emergency preparedness, including smoke detectors.  Information regarding advance directives (living white), including where she can download the appropriate form, was provided to the patient via the AVS.     ADDITIONAL HISTORY SUMMARIZED (2): None.  DECISION TO OBTAIN EXTRA INFORMATION (1): None.   RADIOLOGY TESTS " (1): None.  LABS (1): Labs from 11/10/2016 reviewed. Labs ordered.   MEDICINE TESTS (1): None.  INDEPENDENT REVIEW (2 each): None.     The visit lasted a total of 15 minutes face to face with the patient. Over 50% of the time was spent counseling and educating the patient about general health maintenance.    IDarvin, am scribing for and in the presence of, Dr. Hopper.    I, Dr. Hopper, personally performed the services described in this documentation, as scribed by Darvin Castano in my presence, and it is both accurate and complete.    Total data points: 1

## 2021-06-16 PROBLEM — N18.30 CHRONIC KIDNEY DISEASE, STAGE 3 (H): Status: ACTIVE | Noted: 2020-12-03

## 2021-06-16 PROBLEM — I10 ESSENTIAL HYPERTENSION WITH GOAL BLOOD PRESSURE LESS THAN 140/90: Status: ACTIVE | Noted: 2017-11-29

## 2021-06-16 PROBLEM — Z66 DNR (DO NOT RESUSCITATE): Status: ACTIVE | Noted: 2017-11-29

## 2021-06-19 NOTE — LETTER
Letter by Oscar Hopper MD at      Author: Oscar Hopper MD Service: -- Author Type: --    Filed:  Encounter Date: 12/4/2019 Status: Signed         Mely Alegria  [address] [facility] [address]             December 4, 2019         Dear Ms. Alegria,    Below are the results from your recent visit:    Resulted Orders   Comprehensive Metabolic Panel   Result Value Ref Range    Sodium 142 136 - 145 mmol/L    Potassium 3.8 3.5 - 5.0 mmol/L    Chloride 111 (H) 98 - 107 mmol/L    CO2 17 (L) 22 - 31 mmol/L    Anion Gap, Calculation 14 5 - 18 mmol/L    Glucose 85 70 - 125 mg/dL    BUN 33 (H) 8 - 28 mg/dL    Creatinine 1.88 (H) 0.60 - 1.10 mg/dL    GFR MDRD Af Amer 31 (L) >60 mL/min/1.73m2    GFR MDRD Non Af Amer 26 (L) >60 mL/min/1.73m2    Bilirubin, Total 0.5 0.0 - 1.0 mg/dL    Calcium 9.2 8.5 - 10.5 mg/dL    Protein, Total 8.3 (H) 6.0 - 8.0 g/dL    Albumin 3.5 3.5 - 5.0 g/dL    Alkaline Phosphatase 129 (H) 45 - 120 U/L    AST 18 0 - 40 U/L    ALT 12 0 - 45 U/L    Narrative    Fasting Glucose reference range is 70-99 mg/dL per  American Diabetes Association (ADA) guidelines.   Lipid Cascade   Result Value Ref Range    Cholesterol 214 (H) <=199 mg/dL    Triglycerides 169 (H) <=149 mg/dL    HDL Cholesterol 40 (L) >=50 mg/dL    LDL Calculated 140 (H) <=129 mg/dL    Patient Fasting > 8hrs? Yes    Vitamin D, Total (25-Hydroxy)   Result Value Ref Range    Vitamin D, Total (25-Hydroxy) 28.0 (L) 30.0 - 80.0 ng/mL    Narrative    Deficiency <10.0 ng/mL  Insufficiency 10.0-29.9 ng/mL  Sufficiency 30.0-80.0 ng/mL  Toxicity (possible) >100.0 ng/mL   Uric Acid   Result Value Ref Range    Uric Acid 8.2 (H) 2.0 - 7.5 mg/dL       Your uric acid level is high enough to cause knee pain.  I recommend a one-month trial of allopurinol 300 mg daily to lower the uric acid level.  If you feel that this helps your joint pain please continue.  If you do not feel the changes your pain at all you may stop it    Everything  else looks stable for you    Please call with questions or contact us using [employer].    Sincerely,        Electronically signed by Oscar Hopper MD

## 2021-06-21 NOTE — LETTER
Letter by Oscar Hopper MD at      Author: Oscar Hopper MD Service: -- Author Type: --    Filed:  Encounter Date: 12/11/2020 Status: (Other)         Mely Alegria  [address] [facility] [address]             December 11, 2020         Dear Ms. Alegria,    Below are the results from your recent visit:    Resulted Orders   Comprehensive Metabolic Panel   Result Value Ref Range    Sodium 141 136 - 145 mmol/L    Potassium 4.1 3.5 - 5.0 mmol/L    Chloride 111 (H) 98 - 107 mmol/L    CO2 17 (L) 22 - 31 mmol/L    Anion Gap, Calculation 13 5 - 18 mmol/L    Glucose 125 70 - 125 mg/dL    BUN 35 (H) 8 - 28 mg/dL    Creatinine 1.92 (H) 0.60 - 1.10 mg/dL    GFR MDRD Af Amer 30 (L) >60 mL/min/1.73m2    GFR MDRD Non Af Amer 25 (L) >60 mL/min/1.73m2    Bilirubin, Total 0.5 0.0 - 1.0 mg/dL    Calcium 8.8 8.5 - 10.5 mg/dL    Protein, Total 8.1 (H) 6.0 - 8.0 g/dL    Albumin 3.6 3.5 - 5.0 g/dL    Alkaline Phosphatase 117 45 - 120 U/L    AST 11 0 - 40 U/L    ALT <9 0 - 45 U/L    Narrative    Fasting Glucose reference range is 70-99 mg/dL per  American Diabetes Association (ADA) guidelines.   Lipid Cascade   Result Value Ref Range    Cholesterol 184 <=199 mg/dL    Triglycerides 119 <=149 mg/dL    HDL Cholesterol 44 (L) >=50 mg/dL    LDL Calculated 116 <=129 mg/dL    Patient Fasting > 8hrs? Unknown    Vitamin D, Total (25-Hydroxy)   Result Value Ref Range    Vitamin D, Total (25-Hydroxy) 25.0 (L) 30.0 - 80.0 ng/mL    Narrative    Deficiency <10.0 ng/mL  Insufficiency 10.0-29.9 ng/mL  Sufficiency 30.0-80.0 ng/mL  Toxicity (possible) >100.0 ng/mL       Your vitamin D level is a bit low.  Please add an additional 1000 units of vitamin D to your daily supplements    Everything else looks stable for you    Please call with questions or contact us using MyChart.    Sincerely,        Electronically signed by Oscar Hopper MD

## 2021-06-22 NOTE — PROGRESS NOTES
Assessment and Plan:     1. Medicare annual wellness visit, subsequent  Doing very well.  Minimal list.  Refuses mammograms and bone density  - Lipid Cascade  - Varicella Zoster, Recombinant Vaccine IM; Future    2. Hypokalemia  - potassium chloride (K-DUR,KLOR-CON) 20 MEQ tablet; TAKE ONE TABLET BY MOUTH EVERY DAY.  Dispense: 90 tablet; Refill: 3    3. Essential hypertension with goal blood pressure less than 140/90  Stable and doing well  - losartan-hydrochlorothiazide (HYZAAR) 100-25 mg per tablet; TAKE ONE TABLET BY MOUTH EVERY DAY.  Dispense: 90 tablet; Refill: 3    4. Flu vaccine need  - Influenza High Dose, Seasonal    5. Adult BMI 40.0-44.9 kg/sq m (H)  Encouraged to exercise.  Knee is stable but do bother her    6. Vitamin D deficiency  - Vitamin D, Total (25-Hydroxy)    7. Ulcerative pancolitis without complication (H)  Reviewed history of colitis, surgical resection, colostomy and takedown many years ago.  Monitor electrolytes  - HM2(CBC w/o Differential)  - Comprehensive Metabolic Panel  - DXA Bone Density Scan; Future    8. Visit for screening mammogram  She refuses  - Mammo Screening Bilateral; Future    9. Asymptomatic menopausal state   - DXA Bone Density Scan; Future      The patient's current medical problems were reviewed.    The following health maintenance schedule was reviewed with the patient and provided in printed form in the after visit summary:   Health Maintenance   Topic Date Due     MAMMOGRAM  1940     DXA SCAN  01/02/2005     ZOSTER VACCINES (2 of 3) 12/01/2010     FALL RISK ASSESSMENT  11/30/2019     ADVANCE DIRECTIVES DISCUSSED WITH PATIENT  11/30/2023     TD 18+ HE  11/06/2024     COLONOSCOPY  12/02/2025     PNEUMOCOCCAL POLYSACCHARIDE VACCINE AGE 65 AND OVER  Completed     INFLUENZA VACCINE RULE BASED  Completed     PNEUMOCOCCAL CONJUGATE VACCINE FOR ADULTS (PCV13 OR PREVNAR)  Completed        Subjective:   Chief Complaint: Mely Alegria is an 78 y.o. female here for an  Annual Wellness visit.   HPI:      Ulcerative Colitis: She has been diagnosed with ulcerative colitis and had half of her colon resected. She had an ostomy bag for eleven months before she had surgery to reconnect the colon. She does not think she had any diverticulitis. Her bowels move fine now, but she watches what she eats.     Hypertension: Her blood pressure has been in a good range lately. She continues to take losartan-HCTZ 100-25 mg daily.     GERD: Her acid reflux has been well controlled with omeprazole.     Health Maintenance: The Shingrix vaccine was discussed today. She has never had a DXA scan. She does not get mammograms. She would like a flu shot today. She has an up to date advanced care directive.     Review of Systems:    She has been healthy this past year. She continues to have intermittent pain in her left knee, and it seems to fluctuate with changes in the weather. She sleeps better now than she used to. Her breathing has been fine. Denies lower extremity swelling.   Please see above.  The rest of the review of systems are negative for all systems.    PFSH:  She is enjoying group home. She does not have a family history of osteoporosis.     Patient Care Team:  Oscar Hopper MD as PCP - General  Berhane Varghese MD as Physician (Orthopedic Surgery)     Patient Active Problem List   Diagnosis     Vitamin D Deficiency     Hypokalemia     Anemia     Hyperlipidemia     Gastritis Due To H. Pylori     Hiatal Hernia     Ulcerative pancolitis without complication (H)     Acute pain of left knee     Essential hypertension with goal blood pressure less than 140/90     DNR (do not resuscitate)     Adult BMI 40.0-44.9 kg/sq m (H)     No past medical history on file.   Past Surgical History:   Procedure Laterality Date     MT COLOSTOMY      Description: Colostomy;  Recorded: 11/05/2012;     MT DILATION/CURETTAGE,DIAGNOSTIC      Description: Dilation And Curettage;  Recorded: 01/18/2010;     MT PART  "REMOVAL COLON W ANASTOMOSIS      Description: Partial Colectomy;  Recorded: 11/05/2012;  Comments: with colostomy     MI TOTAL HIP ARTHROPLASTY      Description: Total Hip Replacement;  Recorded: 01/18/2010;      Family History   Problem Relation Age of Onset     Diabetes Mother       Social History     Socioeconomic History     Marital status:      Spouse name: Not on file     Number of children: Not on file     Years of education: Not on file     Highest education level: Not on file   Social Needs     Financial resource strain: Not on file     Food insecurity - worry: Not on file     Food insecurity - inability: Not on file     Transportation needs - medical: Not on file     Transportation needs - non-medical: Not on file   Occupational History     Not on file   Tobacco Use     Smoking status: Never Smoker     Smokeless tobacco: Never Used   Substance and Sexual Activity     Alcohol use: Not on file     Drug use: Not on file     Sexual activity: Not on file   Other Topics Concern     Not on file   Social History Narrative     Not on file      Current Outpatient Medications   Medication Sig Dispense Refill     CALCIUM CARBONATE (CALCIUM 500 ORAL) Take by mouth.       cholecalciferol, vitamin D3, 1,000 unit tablet Take 1,000 Units by mouth daily.       losartan-hydrochlorothiazide (HYZAAR) 100-25 mg per tablet TAKE ONE TABLET BY MOUTH EVERY DAY. 90 tablet 3     omeprazole (PRILOSEC) 20 MG capsule TAKE ONE CAPSULE BY MOUTH EVERY DAY. 90 capsule 3     potassium chloride (K-DUR,KLOR-CON) 20 MEQ tablet TAKE ONE TABLET BY MOUTH EVERY DAY. 90 tablet 3     No current facility-administered medications for this visit.       Objective:   Vital Signs:   Visit Vitals  /80 (Patient Site: Left Arm, Patient Position: Sitting, Cuff Size: Adult Large)   Pulse 78   Resp 16   Ht 5' 1.5\" (1.562 m)   Wt (!) 222 lb 4.8 oz (100.8 kg)   SpO2 97%   BMI 41.32 kg/m         VisionScreening:  No exam data present     PHYSICAL " EXAM  Constitutional:  Reveals an alert, pleasant, talkative, obese woman. Affect appropriate.  Vitals:  Per nursing notes.  Ears:  Clear.  Oropharynx:  Without posterior nasal drainage or thrush.  Neck:  Supple. Thyroid a littler diaz on the right side, no lumps.   Cardiac:  Regular rate and rhythm without murmurs, rubs, or gallops. Carotids without bruits. Legs without edema. Palpation of the radial pulse regular.  Lungs: Clear.  Respiratory effort normal.  Abdomen:   Bowel sounds positive, nontender, nondistended.  Neither liver nor spleen palpable.  Skin:   Without rash, bruise, or palpable lesions.  Rheumatologic: Normal joints and nails of the hands.  Neurologic:  Cranial nerves II-XII intact.     Psychiatric:  Mood appropriate, memory intact.    Assessment Results 11/30/2018   Activities of Daily Living No help needed   Instrumental Activities of Daily Living No help needed   Mini Cog Total Score 3   Some recent data might be hidden     A Mini-Cog score of 0-2 suggests the possibility of dementia, score of 3-5 suggests no dementia    Identified Health Risks:     She is at risk for lack of exercise and has been provided with information to increase physical activity for the benefit of her well-being.  The patient was counseled and encouraged to consider modifying their diet and eating habits. She was provided with information on recommended healthy diet options.  Patient's advanced directive was discussed and I am comfortable with the patient's wishes.    ADDITIONAL HISTORY SUMMARIZED (2): None.  DECISION TO OBTAIN EXTRA INFORMATION (1): None.   RADIOLOGY TESTS (1): DXA ordered.   LABS (1): Labs from 11/29/2017 reviewed. Labs ordered.   MEDICINE TESTS (1): None.  INDEPENDENT REVIEW (2 each): None.     The visit lasted a total of 15 minutes face to face with the patient. Over 50% of the time was spent counseling and educating the patient about general health maintenance.    IDarvin, am scribing for  and in the presence of, Dr. Hopper.    I, Dr. Hopper, personally performed the services described in this documentation, as scribed by Darvin Castano in my presence, and it is both accurate and complete.    Total data points: 2

## 2021-06-25 ENCOUNTER — OFFICE VISIT - HEALTHEAST (OUTPATIENT)
Dept: INTERNAL MEDICINE | Facility: CLINIC | Age: 81
End: 2021-06-25

## 2021-06-25 DIAGNOSIS — E55.9 VITAMIN D DEFICIENCY: ICD-10-CM

## 2021-06-25 DIAGNOSIS — I10 ESSENTIAL HYPERTENSION WITH GOAL BLOOD PRESSURE LESS THAN 140/90: ICD-10-CM

## 2021-06-25 DIAGNOSIS — K51.00 ULCERATIVE PANCOLITIS WITHOUT COMPLICATION (H): ICD-10-CM

## 2021-06-25 DIAGNOSIS — N18.4 STAGE 4 CHRONIC KIDNEY DISEASE (H): ICD-10-CM

## 2021-06-25 DIAGNOSIS — Z96.642 HISTORY OF LEFT HIP REPLACEMENT: ICD-10-CM

## 2021-06-25 DIAGNOSIS — I48.19 PERSISTENT ATRIAL FIBRILLATION (H): ICD-10-CM

## 2021-06-25 DIAGNOSIS — H25.9 AGE-RELATED CATARACT OF BOTH EYES, UNSPECIFIED AGE-RELATED CATARACT TYPE: ICD-10-CM

## 2021-06-25 DIAGNOSIS — R00.2 PALPITATIONS: ICD-10-CM

## 2021-06-25 DIAGNOSIS — Z01.818 PRE-OPERATIVE EXAMINATION FOR INTERNAL MEDICINE: ICD-10-CM

## 2021-06-25 LAB
ANION GAP SERPL CALCULATED.3IONS-SCNC: 13 MMOL/L (ref 5–18)
BUN SERPL-MCNC: 34 MG/DL (ref 8–28)
CALCIUM SERPL-MCNC: 9 MG/DL (ref 8.5–10.5)
CHLORIDE BLD-SCNC: 108 MMOL/L (ref 98–107)
CO2 SERPL-SCNC: 17 MMOL/L (ref 22–31)
CREAT SERPL-MCNC: 2 MG/DL (ref 0.6–1.1)
ERYTHROCYTE [DISTWIDTH] IN BLOOD BY AUTOMATED COUNT: 12.8 % (ref 11–14.5)
GFR SERPL CREATININE-BSD FRML MDRD: 24 ML/MIN/1.73M2
GLUCOSE BLD-MCNC: 90 MG/DL (ref 70–125)
HCT VFR BLD AUTO: 39.2 % (ref 35–47)
HGB BLD-MCNC: 12.9 G/DL (ref 12–16)
MCH RBC QN AUTO: 29.6 PG (ref 27–34)
MCHC RBC AUTO-ENTMCNC: 32.9 G/DL (ref 32–36)
MCV RBC AUTO: 90 FL (ref 80–100)
PLATELET # BLD AUTO: 232 THOU/UL (ref 140–440)
PMV BLD AUTO: 8.9 FL (ref 7–10)
POTASSIUM BLD-SCNC: 4 MMOL/L (ref 3.5–5)
RBC # BLD AUTO: 4.36 MILL/UL (ref 3.8–5.4)
SODIUM SERPL-SCNC: 138 MMOL/L (ref 136–145)
TSH SERPL DL<=0.005 MIU/L-ACNC: 0.48 UIU/ML (ref 0.3–5)
WBC: 12.2 THOU/UL (ref 4–11)

## 2021-06-25 RX ORDER — METOPROLOL SUCCINATE 25 MG/1
25 TABLET, EXTENDED RELEASE ORAL DAILY
Qty: 90 TABLET | Refills: 3 | Status: SHIPPED | OUTPATIENT
Start: 2021-06-25 | End: 2021-10-13

## 2021-06-25 ASSESSMENT — MIFFLIN-ST. JEOR: SCORE: 1395.86

## 2021-06-25 NOTE — TELEPHONE ENCOUNTER
New Appointment Needed  What is the reason for the visit:    Pre-Op Appt Request  When is the surgery? :  7/1  Where is the surgery?:   [facility]  Who is the surgeon? :  Dr. Vigil  What type of surgery is being done?: Eye surgery  Provider Preference: PCP only  How soon do you need to be seen?: Before 7/1  Waitlist offered?: No  Okay to leave a detailed message:  Yes, she is gone the rest of today

## 2021-06-26 LAB
ATRIAL RATE - MUSE: 131 BPM
DIASTOLIC BLOOD PRESSURE - MUSE: NORMAL
INTERPRETATION ECG - MUSE: NORMAL
P AXIS - MUSE: NORMAL
PR INTERVAL - MUSE: NORMAL
QRS DURATION - MUSE: 80 MS
QT - MUSE: 290 MS
QTC - MUSE: 418 MS
R AXIS - MUSE: 26 DEGREES
SYSTOLIC BLOOD PRESSURE - MUSE: NORMAL
T AXIS - MUSE: -38 DEGREES
VENTRICULAR RATE- MUSE: 125 BPM

## 2021-06-29 ENCOUNTER — COMMUNICATION - HEALTHEAST (OUTPATIENT)
Dept: INTERNAL MEDICINE | Facility: CLINIC | Age: 81
End: 2021-06-29

## 2021-06-29 LAB — 25(OH)D3 SERPL-MCNC: 29.8 NG/ML (ref 30–80)

## 2021-06-30 ENCOUNTER — COMMUNICATION - HEALTHEAST (OUTPATIENT)
Dept: INTERNAL MEDICINE | Facility: CLINIC | Age: 81
End: 2021-06-30

## 2021-07-04 NOTE — TELEPHONE ENCOUNTER
Telephone Encounter by Nikko José, PharmD at 6/30/2021  4:20 PM     Author: Nikko José, PharmD Service: -- Author Type: Pharmacist    Filed: 6/30/2021  4:20 PM Encounter Date: 6/30/2021 Status: Signed    : Nikko José, PharmD (Pharmacist)       I called the pharmacy and confirmed that she did  the 1 month supply of Eliquis with the voucher.  However for subsequent treatment she will need to switch to Xarelto if appropriate.

## 2021-07-04 NOTE — TELEPHONE ENCOUNTER
Telephone Encounter by Suma Nelson at 6/30/2021  2:30 PM     Author: Suma Nelson Service: -- Author Type: --    Filed: 6/30/2021  2:30 PM Encounter Date: 6/30/2021 Status: Signed    : Suma Nelson       [facility] PA team  [phone]  Pool: [facility] MED ([id number])          PA has been initiated.       PA form completed and faxed insurance via Cover My Meds     Key:  [id number]     Medication:  ELIQUIS 2.5MG    Insurance:  [employer] PART D        Response will be received via fax and may take up to 5-10 business days depending on plan

## 2021-07-04 NOTE — TELEPHONE ENCOUNTER
Telephone Encounter by Karen Oconnell LPN at 6/30/2021  2:22 PM     Author: Karen Oconnell LPN Service: -- Author Type: Licensed Nurse    Filed: 6/30/2021  2:23 PM Encounter Date: 6/30/2021 Status: Signed    : Karen Oconnell LPN (Licensed Nurse)       Eliquis requires PA     Plan # [phone]  ID [id number]

## 2021-07-04 NOTE — TELEPHONE ENCOUNTER
Telephone Encounter by Suma Nelson at 6/30/2021  4:12 PM     Author: Suma Nelson Service: -- Author Type: --    Filed: 6/30/2021  4:12 PM Encounter Date: 6/30/2021 Status: Signed    : Suma Nelson       PRIOR AUTHORIZATION DENIED    Denial Rational: Insurance requiring trial/failure of Xarelto          Appeal Information: This medication was denied. If physician would like to appeal because patient has contraindication or allergy to covered medication please write letter of medical necessity and route back to PA team to initiate.  If no further action is needed please close encounter thank you.

## 2021-07-04 NOTE — TELEPHONE ENCOUNTER
Telephone Encounter by Oscar Hopper MD at 6/30/2021  4:17 PM     Author: Oscar Hpoper MD Service: -- Author Type: Physician    Filed: 6/30/2021  4:17 PM Encounter Date: 6/30/2021 Status: Signed    : Oscar Hopper MD (Physician)       Does this mean that she did not use the coupon for the starter pack that we gave her on Friday during the office visit

## 2021-07-05 PROBLEM — N18.4 STAGE 4 CHRONIC KIDNEY DISEASE (H): Status: ACTIVE | Noted: 2021-06-25

## 2021-07-06 VITALS
SYSTOLIC BLOOD PRESSURE: 138 MMHG | OXYGEN SATURATION: 98 % | DIASTOLIC BLOOD PRESSURE: 80 MMHG | HEIGHT: 62 IN | WEIGHT: 216.4 LBS | BODY MASS INDEX: 39.82 KG/M2 | HEART RATE: 102 BPM

## 2021-07-07 NOTE — PATIENT INSTRUCTIONS - HE
Update labs    Update EKG    Hip surgery was 2006    Risk of blood clot and stroke is 2-4 % per year, and therefore I recommend blood thinners     At same time we try to get your heart back to normal with some medications    Add metoprolol 25 mgs once a day to slow and regulate the heart rhythm    Begin eliquis blood thinner 2.5 mgs twice a day, that is adjusted for your age and size and kidney function    Stop aspirin    After your surgeries, we will decide whether to continue or change to the cheaper blood thinner    Stop hydrochlorothiazide because it worsens your kidney function    If you develop leg swelling because we stopped the hydrochlorothiazide, go back on it for now

## 2021-07-07 NOTE — LETTER
Letter by Oscar Hopper MD at      Author: Oscar Hopper MD Service: -- Author Type: --    Filed:  Encounter Date: 6/29/2021 Status: (Other)         Mely Alegria  [address] [facility] [address]             June 29, 2021         Dear Ms. Alegria,    Below are the results from your recent visit:    Resulted Orders   Vitamin D, Total (25-Hydroxy)   Result Value Ref Range    Vitamin D, Total (25-Hydroxy) 29.8 (L) 30.0 - 80.0 ng/mL    Narrative    Deficiency <10.0 ng/mL  Insufficiency 10.0-29.9 ng/mL  Sufficiency 30.0-80.0 ng/mL  Toxicity (possible) >100.0 ng/mL   HM2(CBC w/o Differential)   Result Value Ref Range    WBC 12.2 (H) 4.0 - 11.0 thou/uL    RBC 4.36 3.80 - 5.40 mill/uL    Hemoglobin 12.9 12.0 - 16.0 g/dL    Hematocrit 39.2 35.0 - 47.0 %    MCV 90 80 - 100 fL    MCH 29.6 27.0 - 34.0 pg    MCHC 32.9 32.0 - 36.0 g/dL    RDW 12.8 11.0 - 14.5 %    Platelets 232 140 - 440 thou/uL    MPV 8.9 7.0 - 10.0 fL   Basic Metabolic Panel   Result Value Ref Range    Sodium 138 136 - 145 mmol/L    Potassium 4.0 3.5 - 5.0 mmol/L    Chloride 108 (H) 98 - 107 mmol/L    CO2 17 (L) 22 - 31 mmol/L    Anion Gap, Calculation 13 5 - 18 mmol/L    Glucose 90 70 - 125 mg/dL    Calcium 9.0 8.5 - 10.5 mg/dL    BUN 34 (H) 8 - 28 mg/dL    Creatinine 2.00 (H) 0.60 - 1.10 mg/dL    GFR MDRD Af Amer 29 (L) >60 mL/min/1.73m2    GFR MDRD Non Af Amer 24 (L) >60 mL/min/1.73m2    Narrative    Fasting Glucose reference range is 70-99 mg/dL per  American Diabetes Association (ADA) guidelines.   Thyroid Stimulating Hormone (TSH)   Result Value Ref Range    TSH 0.48 0.30 - 5.00 uIU/mL       Your kidney tests are abnormal but I think these will improve after we stopped the hydrochlorothiazide.  We should recheck them in approximately 1 month    Your vitamin D level is low.  Please take 1000 units of vitamin D every day    Please call with questions or contact us using [employer].    Sincerely,        Electronically signed by Oscar  JAVI Hopper MD

## 2021-07-07 NOTE — PROGRESS NOTES
Preoperative Exam    Scheduled Procedure: Cataract   Surgery Date:  7/1/21  Surgery Location: [facility], [place], fax [phone]    Surgeon:  Dr Vigil     Assessment/Plan:     1. Pre-operative examination for internal medicine  Stable for surgery    2. Age-related cataract of both eyes, unspecified age-related cataract type  Stable for cataract surgery    3. Ulcerative pancolitis without complication (H)  Flared while taking allopurinol and Uloric but now back to baseline    4. Stage 4 chronic kidney disease (H)  Stable.  Possible contribution of hydrochlorothiazide.  Discontinue hydrochlorothiazide  - HM2(CBC w/o Differential)  - Basic Metabolic Panel  - Electrocardiogram Perform - Clinic    5. Adult BMI 40.0-44.9 kg/sq m (H)  - Electrocardiogram Perform - Clinic    6. Vitamin D deficiency  - Vitamin D, Total (25-Hydroxy)    7. Essential hypertension with goal blood pressure less than 140/90  Stable.  Add metoprolol.  Discontinue hydrochlorothiazide    8. History of left hip replacement  [place] and 6.  Tolerated surgery    9.  Atrial fibrillation.  New today by EKG.  Chads 2 score 3-4 with yearly risk 2 to 4%.  Initiate anticoagulants.  Add metoprolol.  Check electrolytes.  Rule out thyroid disease.  Initiate Eliquis with coupon and consider warfarin versus Eliquis later.  Adjust for renal insufficiency but attempt to improve renal insufficiency at the same time    Patient Instructions   Update labs    Update EKG    Hip surgery was 2006    Risk of blood clot and stroke is 2-4 % per year, and therefore I recommend blood thinners     At same time we try to get your heart back to normal with some medications    Add metoprolol 25 mgs once a day to slow and regulate the heart rhythm    Begin eliquis blood thinner 2.5 mgs twice a day, that is adjusted for your age and size and kidney function    Stop aspirin    After your surgeries, we will decide whether to continue or change to the cheaper blood  thinner    Stop hydrochlorothiazide because it worsens your kidney function    If you develop leg swelling because we stopped the hydrochlorothiazide, go back on it for now      Surgical Procedure Risk: Low (reported cardiac risk generally < 1%)  Have you had prior anesthesia?: Yes  Have you or any family members had a previous anesthesia reaction:  No  Do you or any family members have a history of a clotting or bleeding disorder?: No  Cardiac Risk Assessment: no increased risk for major cardiac complications    APPROVAL GIVEN to proceed with proposed procedure, without further diagnostic evaluation    Functional Status: Independent  Patient plans to recover at home with family.     Subjective:      Mely Alegria is a 81 y.o. female who presents for a preoperative consultation.     HPI related to upcoming procedure: Slowly worsening cataracts now so severe that she is unable to drive    Poorly monitored blood pressure    No chest pain, shortness of breath, or palpitations.  Recent worsening of peripheral edema.  EKG today shows new atrial fibrillation.  May contribute to slightly worsening shortness of breath.  Anticoagulants to be initiated but should not interfere with plans for cataract surgery    Preop Questions 6/25/2021   Have you ever had a heart attack or stroke? No   Have you ever had surgery on your heart or blood vessels, such as a stent placement, a coronary artery bypass, or surgery on an artery in your head, neck, heart, or legs? No   Do you have chest pain with activity? No   Do you have a history of  heart failure? No   Do you currently have a cold, bronchitis or symptoms of other infection? No   Do you have a cough, shortness of breath, or wheezing? No   Do you or anyone in your family have previous history of blood clots? No   Do you or does anyone in your family have a serious bleeding problem such as prolonged bleeding following surgeries or cuts? No   Have you ever had problems with anemia  or been told to take iron pills? YES -    Have you had any abnormal blood loss such as black, tarry or bloody stools, or abnormal vaginal bleeding? No   Have you ever had a blood transfusion? No   Are you willing to have a blood transfusion if it is medically needed before, during, or after your surgery? NO -   Have you or any of your relatives ever had problems with anesthesia? No   Do you have sleep apnea, excessive snoring or daytime drowsiness? No   Do you have any artifical heart valves or other implanted medical devices like a pacemaker, defibrillator, or continuous glucose monitor? No   Do you have artificial joints? YES - left hip   Are you allergic to latex? No         Slightly worsened edema today.    ROS:  Good energy.  Chronic right knee pain      Current Outpatient Medications   Medication Sig Dispense Refill     CALCIUM CARBONATE (CALCIUM 500 ORAL) Take by mouth.       cholecalciferol, vitamin D3, 50 mcg (2,000 unit) Tab Take 1 tablet (2,000 Units total) by mouth daily. 100 each 3     losartan (COZAAR) 100 MG tablet Take 1 tablet (100 mg total) by mouth daily. 90 tablet 3     omeprazole (PRILOSEC) 20 MG capsule TAKE 1 CAPSULE BY MOUTH EVERY DAY 90 capsule 3     potassium chloride (K-DUR,KLOR-CON) 20 MEQ tablet TAKE 1 TABLET BY MOUTH EVERY DAY 90 tablet 3     apixaban ANTICOAGULANT (ELIQUIS) 2.5 mg Tab tablet Take 1 tablet (2.5 mg total) by mouth 2 (two) times a day. 60 tablet 11     metoprolol succinate (TOPROL-XL) 25 MG Take 1 tablet (25 mg total) by mouth daily. 90 tablet 3     No current facility-administered medications for this visit.         Allergies   Allergen Reactions     Allopurinol Diarrhea     Lisinopril Cough     initiated 5/14/1996 and stopped         Patient Active Problem List   Diagnosis     Vitamin D Deficiency     Hypokalemia     Pure hypercholesterolemia     History of Helicobacter pylori gastritis     Diaphragmatic hernia without obstruction and without gangrene     Ulcerative  pancolitis without complication (H)     Chronic pain of left knee     Essential hypertension with goal blood pressure less than 140/90     DNR (do not resuscitate)     Adult BMI 40.0-44.9 kg/sq m (H)     Chronic kidney disease, stage 3     Stage 4 chronic kidney disease (H)     History of left hip replacement       No past medical history on file.    Past Surgical History:   Procedure Laterality Date     MN COLOSTOMY      Description: Colostomy;  Recorded: 11/05/2012;     MN DILATION/CURETTAGE,DIAGNOSTIC      Description: Dilation And Curettage;  Recorded: 01/18/2010;     MN PART REMOVAL COLON W ANASTOMOSIS      Description: Partial Colectomy;  Recorded: 11/05/2012;  Comments: with colostomy     MN TOTAL HIP ARTHROPLASTY      Description: Total Hip Replacement;  Recorded: 01/18/2010;       Social History     Socioeconomic History     Marital status:      Spouse name: Not on file     Number of children: Not on file     Years of education: Not on file     Highest education level: Not on file   Occupational History     Not on file   Social Needs     Financial resource strain: Not on file     Food insecurity     Worry: Not on file     Inability: Not on file     Transportation needs     Medical: Not on file     Non-medical: Not on file   Tobacco Use     Smoking status: Never Smoker     Smokeless tobacco: Never Used   Substance and Sexual Activity     Alcohol use: Not on file     Drug use: Not on file     Sexual activity: Not on file   Lifestyle     Physical activity     Days per week: Not on file     Minutes per session: Not on file     Stress: Not on file   Relationships     Social connections     Talks on phone: Not on file     Gets together: Not on file     Attends Judaism service: Not on file     Active member of club or organization: Not on file     Attends meetings of clubs or organizations: Not on file     Relationship status: Not on file     Intimate partner violence     Fear of current or ex partner:  "Not on file     Emotionally abused: Not on file     Physically abused: Not on file     Forced sexual activity: Not on file   Other Topics Concern     Not on file   Social History Narrative     Not on file       Patient Care Team:  Oscar Hopper MD as PCP - Berhane Haynes MD as Physician (Orthopedic Surgery)  Oscar Hopper MD as Assigned PCP        Objective:     Vitals:    06/25/21 1527   BP: 138/80   Pulse: (!) 102   SpO2: 98%   Weight: 216 lb 6.4 oz (98.2 kg)   Height: 5' 1.75\" (1.568 m)         Physical Exam:  Thyroid not palpable.  Heart irregularly irregular.  Lungs clear.  Legs trace edema on the left    EKG: Atrial fibrillation with rate 120-new    Labs:  Labs pending at this time.  Results will be reviewed when available.    Start Time: 3:42 PM  End time:  3:55 PM  Conversation plus orders:  43 minutes  Dictation time:  3 minutes    The visit lasted a total of 46 minutes     Immunization History   Administered Date(s) Administered     COVID-19,PF,Moderna 03/18/2021, 04/15/2021     DT (pediatric) 11/30/2004     INFLUENZA,SEASONAL QUAD, PF, =/> 6months 10/19/2016     Influenza [id number], 12/04/2009     Influenza high dose,seasonal,PF, 65+ yrs 11/29/2017, 11/30/2018, 12/03/2019     Influenza, Seasonal, Inj PF IIV3 09/17/2013     Influenza, inj, historic,unspecified 09/01/2012, 10/06/2014, 10/07/2015, 10/11/2016     Influenza,quad,high Dose,PF, 65yr + 12/10/2020     Influenza,seasonal, Inj IIV3 09/01/2012, 10/06/2014     Pneumo Conj 13-V (2010&after) 12/02/2015     Pneumo Polysac 23-V 01/10/2006, 12/03/2019     Td, Adult, Absorbed 11/30/2004     Td,adult,historic,unspecified 11/30/2004     Tdap 11/06/2014     ZOSTER, LIVE 10/06/2010         Electronically signed by Oscar Hopper MD 06/25/21 3:32 PM  "

## 2021-07-22 ENCOUNTER — VIRTUAL VISIT (OUTPATIENT)
Dept: INTERNAL MEDICINE | Facility: CLINIC | Age: 81
End: 2021-07-22
Payer: MEDICARE

## 2021-07-22 DIAGNOSIS — I48.19 PERSISTENT ATRIAL FIBRILLATION (H): Primary | ICD-10-CM

## 2021-07-22 DIAGNOSIS — I10 ESSENTIAL HYPERTENSION WITH GOAL BLOOD PRESSURE LESS THAN 140/90: ICD-10-CM

## 2021-07-22 DIAGNOSIS — D72.829 LEUKOCYTOSIS, UNSPECIFIED TYPE: ICD-10-CM

## 2021-07-22 DIAGNOSIS — N18.4 STAGE 4 CHRONIC KIDNEY DISEASE (H): ICD-10-CM

## 2021-07-22 PROCEDURE — 99443 PR PHYSICIAN TELEPHONE EVALUATION 21-30 MIN: CPT | Mod: 95 | Performed by: INTERNAL MEDICINE

## 2021-07-22 NOTE — PROGRESS NOTES
Mely is a 81 year old female being evaluated via a billable phone visit, and would like to be contacted via the following  Home number on file [phone] (home)    ASSESSMENT:  1. Persistent atrial fibrillation (H)  Continue metoprolol.  Convert Eliquis to Xarelto due to insurance.  Resume long-term  - rivaroxaban ANTICOAGULANT (XARELTO ANTICOAGULANT) 15 MG TABS tablet; Take 1 tablet (15 mg) by mouth daily (with dinner)  Dispense: 90 tablet; Refill: 3    2. Stage 4 chronic kidney disease (H)  Recheck off hydrochlorothiazide  - Basic metabolic panel; Future    3. Essential hypertension with goal blood pressure less than 140/90  Advised increased monitoring  - Basic metabolic panel; Future    4. Leukocytosis, unspecified type  Unclear etiology.  Recheck  - CBC with platelets; Future    PLAN:  Patient Instructions   Patient Instructions   Complete Eliquis then stop in 4 days    Begin Xarelto 15 mg daily in 4 days    Update CBC recheck white count recheck renal function off hydrochlorothiazide    Okay for second cataract surgery without repeat preop pending results     Return in about 4 months (around 11/22/2021) for using a phone visit.      CHIEF COMPLAINT:  Chief Complaint   Patient presents with     Follow Up       HISTORY OF PRESENT ILLNESS:  Mely is a 81 year old female contacting the clinic today via phone for 1 month follow-up.  When seen in the clinic last month she had new atrial fibrillation.  She was placed on Eliquis with a coupon.  Toprol was also added for improved rate control.  In retrospect she does think she is having more trouble breathing compared to a year ago, but no change since the medicine adjustments last month    Hydrochlorothiazide was discontinued due to her kidney disease.  She has not noticed significant increase in edema but has not checked her blood pressure    Her cataract surgery has not gone well.  The problem was not due to the blood thinner.  She is scheduled to undergo her  second eye next month and we discussed whether she will need a repeat preop or not    REVIEW OF SYSTEMS:  Slight peripheral edema    PFSH:  Social History     Social History Narrative     Not on file     Lives by herself    TOBACCO USE:  History   Smoking Status     Never Smoker   Smokeless Tobacco     Never Used       VITALS:  There were no vitals filed for this visit.  Wt Readings from Last 3 Encounters:   06/25/21 98.2 kg (216 lb 6.4 oz)   12/03/19 100.9 kg (222 lb 7 oz)   11/30/18 100.8 kg (222 lb 4.8 oz)       PHYSICAL EXAM:  (observations via Phone)  Alert and oriented.  No shortness of breath    MEDICATIONS  Current Outpatient Medications   Medication Sig Dispense Refill     CALCIUM CARBONATE (CALCIUM 500 ORAL) [CALCIUM CARBONATE (CALCIUM 500 ORAL)] Take by mouth.       cholecalciferol, vitamin D3, 1,000 unit tablet [CHOLECALCIFEROL, VITAMIN D3, 1,000 UNIT TABLET] Take 1,000 Units by mouth daily.       cholecalciferol, vitamin D3, 50 mcg (2,000 unit) Tab [CHOLECALCIFEROL, VITAMIN D3, 50 MCG (2,000 UNIT) TAB] Take 1 tablet (2,000 Units total) by mouth daily. 100 each 3     losartan (COZAAR) 100 MG tablet [LOSARTAN (COZAAR) 100 MG TABLET] Take 1 tablet (100 mg total) by mouth daily. 90 tablet 3     metoprolol succinate (TOPROL-XL) 25 MG [METOPROLOL SUCCINATE (TOPROL-XL) 25 MG] Take 1 tablet (25 mg total) by mouth daily. 90 tablet 3     omeprazole (PRILOSEC) 20 MG capsule [OMEPRAZOLE (PRILOSEC) 20 MG CAPSULE] TAKE 1 CAPSULE BY MOUTH EVERY DAY 90 capsule 3     potassium chloride (K-DUR,KLOR-CON) 20 MEQ tablet [POTASSIUM CHLORIDE (K-DUR,KLOR-CON) 20 MEQ TABLET] TAKE 1 TABLET BY MOUTH EVERY DAY 90 tablet 3     rivaroxaban ANTICOAGULANT (XARELTO ANTICOAGULANT) 15 MG TABS tablet Take 1 tablet (15 mg) by mouth daily (with dinner) 90 tablet 3       Notes summarized:   Labs, x-rays, cardiology, GI tests reviewed: Labs from last month  New orders:   Orders Placed This Encounter   Procedures     Basic metabolic panel      CBC with platelets       Independent review of:  Supplemental history by:      Phone Start Time: 12:20 PM  Phone End time:  12:39 PM  Conversation plus orders: 19 minutes  Dictation time:  3 minutes    The visit lasted a total of 22 minutes     Patient would like to receive their AVS by Mail a copy    Oscar Hopper MD  [facility]

## 2021-07-22 NOTE — PATIENT INSTRUCTIONS
Complete Eliquis then stop in 4 days    Begin Xarelto 15 mg daily in 4 days    Update CBC recheck white count recheck renal function off hydrochlorothiazide    Okay for second cataract surgery without repeat preop pending results

## 2021-07-27 ENCOUNTER — LAB (OUTPATIENT)
Dept: LAB | Facility: CLINIC | Age: 81
End: 2021-07-27
Payer: MEDICARE

## 2021-07-27 DIAGNOSIS — D72.829 LEUKOCYTOSIS, UNSPECIFIED TYPE: ICD-10-CM

## 2021-07-27 DIAGNOSIS — N18.4 STAGE 4 CHRONIC KIDNEY DISEASE (H): ICD-10-CM

## 2021-07-27 DIAGNOSIS — I10 ESSENTIAL HYPERTENSION WITH GOAL BLOOD PRESSURE LESS THAN 140/90: ICD-10-CM

## 2021-07-27 LAB
ANION GAP SERPL CALCULATED.3IONS-SCNC: 12 MMOL/L (ref 5–18)
BUN SERPL-MCNC: 18 MG/DL (ref 8–28)
CALCIUM SERPL-MCNC: 9.5 MG/DL (ref 8.5–10.5)
CHLORIDE BLD-SCNC: 111 MMOL/L (ref 98–107)
CO2 SERPL-SCNC: 18 MMOL/L (ref 22–31)
CREAT SERPL-MCNC: 1.65 MG/DL (ref 0.6–1.1)
ERYTHROCYTE [DISTWIDTH] IN BLOOD BY AUTOMATED COUNT: 13.4 % (ref 10–15)
GFR SERPL CREATININE-BSD FRML MDRD: 29 ML/MIN/1.73M2
GLUCOSE BLD-MCNC: 93 MG/DL (ref 70–125)
HCT VFR BLD AUTO: 40.2 % (ref 35–47)
HGB BLD-MCNC: 12.8 G/DL (ref 11.7–15.7)
MCH RBC QN AUTO: 29.7 PG (ref 26.5–33)
MCHC RBC AUTO-ENTMCNC: 31.8 G/DL (ref 31.5–36.5)
MCV RBC AUTO: 93 FL (ref 78–100)
PLATELET # BLD AUTO: 197 10E3/UL (ref 150–450)
POTASSIUM BLD-SCNC: 4.4 MMOL/L (ref 3.5–5)
RBC # BLD AUTO: 4.31 10E6/UL (ref 3.8–5.2)
SODIUM SERPL-SCNC: 141 MMOL/L (ref 136–145)
WBC # BLD AUTO: 8.4 10E3/UL (ref 4–11)

## 2021-07-27 PROCEDURE — 85027 COMPLETE CBC AUTOMATED: CPT

## 2021-07-27 PROCEDURE — 80048 BASIC METABOLIC PNL TOTAL CA: CPT

## 2021-07-27 PROCEDURE — 36415 COLL VENOUS BLD VENIPUNCTURE: CPT

## 2021-10-12 ENCOUNTER — TELEPHONE (OUTPATIENT)
Dept: INTERNAL MEDICINE | Facility: CLINIC | Age: 81
End: 2021-10-12

## 2021-10-12 NOTE — TELEPHONE ENCOUNTER
Reason for Call:  Other appointment     Detailed comments: Son is calling in as his Mom is stubborn and wont call us..  He would like us to call her but NOT stating that he called in, rather than Dr Hopper would like to talk to her.  She can do video visit per son.    She has not been feeling well, wound not healing for a long time, a fib med, all her meds might be affecting her in the wrong way.    Son is very concerned.      Phone Number Patient can be reached at: Home number on file [phone] (home)    Best Time: any    Can we leave a detailed message on this number? NO    Call taken on 10/12/2021 at 12:49 PM by Pam J. Behr     normal... Well appearing, well nourished, awake, alert, oriented to person, place, time/situation and in no apparent distress.

## 2021-10-13 ENCOUNTER — VIRTUAL VISIT (OUTPATIENT)
Dept: INTERNAL MEDICINE | Facility: CLINIC | Age: 81
End: 2021-10-13
Payer: MEDICARE

## 2021-10-13 DIAGNOSIS — K44.9 DIAPHRAGMATIC HERNIA WITHOUT OBSTRUCTION AND WITHOUT GANGRENE: ICD-10-CM

## 2021-10-13 DIAGNOSIS — G89.29 CHRONIC PAIN OF LEFT KNEE: ICD-10-CM

## 2021-10-13 DIAGNOSIS — I10 ESSENTIAL HYPERTENSION WITH GOAL BLOOD PRESSURE LESS THAN 140/90: ICD-10-CM

## 2021-10-13 DIAGNOSIS — I48.19 PERSISTENT ATRIAL FIBRILLATION (H): Primary | ICD-10-CM

## 2021-10-13 DIAGNOSIS — M25.562 CHRONIC PAIN OF LEFT KNEE: ICD-10-CM

## 2021-10-13 DIAGNOSIS — E87.6 HYPOPOTASSEMIA: ICD-10-CM

## 2021-10-13 PROCEDURE — 99443 PR PHYSICIAN TELEPHONE EVALUATION 21-30 MIN: CPT | Mod: 95 | Performed by: INTERNAL MEDICINE

## 2021-10-13 RX ORDER — LOSARTAN POTASSIUM 100 MG/1
100 TABLET ORAL DAILY
Qty: 90 TABLET | Refills: 3 | Status: SHIPPED | OUTPATIENT
Start: 2021-10-13 | End: 2022-03-07

## 2021-10-13 RX ORDER — METOPROLOL SUCCINATE 25 MG/1
25 TABLET, EXTENDED RELEASE ORAL DAILY
Qty: 90 TABLET | Refills: 3 | Status: SHIPPED | OUTPATIENT
Start: 2021-10-13 | End: 2022-03-07

## 2021-10-13 RX ORDER — TRAMADOL HYDROCHLORIDE 50 MG/1
50 TABLET ORAL EVERY 8 HOURS PRN
Qty: 20 TABLET | Refills: 0 | Status: SHIPPED | OUTPATIENT
Start: 2021-10-13 | End: 2022-04-15

## 2021-10-13 RX ORDER — POTASSIUM CHLORIDE 1500 MG/1
TABLET, EXTENDED RELEASE ORAL
Qty: 90 TABLET | Refills: 3 | Status: SHIPPED | OUTPATIENT
Start: 2021-10-13 | End: 2022-03-07

## 2021-10-13 NOTE — PROGRESS NOTES
Mely is a 81 year old female being evaluated via a billable phone visit, and would like to be contacted via the following  Home number on file [phone] (home)     ASSESSMENT:  DX: 1. Persistent atrial fibrillation (H)  Pharm.D. evaluation to discuss lower dose or cheaper alternatives and cost comparison including visits for INR.  Definitive cure with watchman could be preferable long-term.  Would also establish that she remains in atrial fibrillation  - Med Therapy Management Referral  - Cardiology PARKER Occluder Referral; Future  - metoprolol succinate ER (TOPROL-XL) 25 MG 24 hr tablet; Take 1 tablet (25 mg) by mouth daily  Dispense: 90 tablet; Refill: 3  - rivaroxaban ANTICOAGULANT (XARELTO ANTICOAGULANT) 15 MG TABS tablet; Take 1 tablet (15 mg) by mouth daily (with dinner)  Dispense: 90 tablet; Refill: 3    2. Essential hypertension with goal blood pressure less than 140/90  Stable on current meds  - metoprolol succinate ER (TOPROL-XL) 25 MG 24 hr tablet; Take 1 tablet (25 mg) by mouth daily  Dispense: 90 tablet; Refill: 3  - losartan (COZAAR) 100 MG tablet; Take 1 tablet (100 mg) by mouth daily  Dispense: 90 tablet; Refill: 3    3. Chronic pain of left knee  Previous trials of uric acid lowering medication ineffective.  Provide tramadol to use as needed  - traMADol (ULTRAM) 50 MG tablet; Take 1 tablet (50 mg) by mouth every 8 hours as needed for severe pain (knee pain)  Dispense: 20 tablet; Refill: 0    4. Diaphragmatic hernia without obstruction and without gangrene  - omeprazole (PRILOSEC) 20 MG DR capsule; [OMEPRAZOLE (PRILOSEC) 20 MG CAPSULE] TAKE 1 CAPSULE BY MOUTH EVERY DAY  Dispense: 90 capsule; Refill: 3    5. Hypopotassemia  - potassium chloride ER (KLOR-CON M) 20 MEQ CR tablet; [POTASSIUM CHLORIDE (K-DUR,KLOR-CON) 20 MEQ TABLET] TAKE 1 TABLET BY MOUTH EVERY DAY  Dispense: 90 tablet; Refill: 3     Preventive Care Assessed: Up-to-date    PLAN:  Patient Instructions   MDM referral to discuss cost  "options such as 20 mg of Xarelto broken in half daily, or 10 mg daily, or 15 mg every other day, or alternative medicine versus warfarin    Referral for watchman procedure    Tramadol as needed for knee pain    Refill meds    Flu shot     Return in about 4 months (around 2/13/2022) for using a phone visit.    CHIEF COMPLAINT:  Chief Complaint   Patient presents with     Follow Up     Blood Thinners       HISTORY OF PRESENT ILLNESS:  Mely is a 81 year old female contacting the clinic today via phone for discussion of her anticoagulation.  Earlier this year she was noted to be in atrial fibrillation during a preoperative exam.  She was started on Xarelto adjusted for her renal insufficiency.  Hydrochlorothiazide was discontinued which resulted in improvement in her renal function.  She is having no bleeding or bruising but is horrified at the cost    She complains of knee pain.  Most of the time this does not hurt but every once in a while is quite severe.  She would like a stronger pain medication to take as needed    REVIEW OF SYSTEMS:  No peripheral edema    PFSH:  Social History     Social History Narrative     Not on file     Single and lives by herself    TOBACCO USE:  History   Smoking Status     Never Smoker   Smokeless Tobacco     Never Used       VITALS:  There were no vitals filed for this visit.  There were no vitals taken for this visit. Estimated body mass index is 39.9 kg/m  as calculated from the following:    Height as of 6/25/21: 1.568 m (5' 1.75\").    Weight as of 6/25/21: 98.2 kg (216 lb 6.4 oz).    PHYSICAL EXAM:  (observations via Phone)  Alert and oriented    MEDICATIONS  Current Outpatient Medications   Medication Sig Dispense Refill     CALCIUM CARBONATE (CALCIUM 500 ORAL) [CALCIUM CARBONATE (CALCIUM 500 ORAL)] Take by mouth.       cholecalciferol, vitamin D3, 50 mcg (2,000 unit) Tab [CHOLECALCIFEROL, VITAMIN D3, 50 MCG (2,000 UNIT) TAB] Take 1 tablet (2,000 Units total) by mouth daily. 100 " each 3     losartan (COZAAR) 100 MG tablet Take 1 tablet (100 mg) by mouth daily 90 tablet 3     metoprolol succinate ER (TOPROL-XL) 25 MG 24 hr tablet Take 1 tablet (25 mg) by mouth daily 90 tablet 3     omeprazole (PRILOSEC) 20 MG DR capsule [OMEPRAZOLE (PRILOSEC) 20 MG CAPSULE] TAKE 1 CAPSULE BY MOUTH EVERY DAY 90 capsule 3     potassium chloride ER (KLOR-CON M) 20 MEQ CR tablet [POTASSIUM CHLORIDE (K-DUR,KLOR-CON) 20 MEQ TABLET] TAKE 1 TABLET BY MOUTH EVERY DAY 90 tablet 3     rivaroxaban ANTICOAGULANT (XARELTO ANTICOAGULANT) 15 MG TABS tablet Take 1 tablet (15 mg) by mouth daily (with dinner) 90 tablet 3     traMADol (ULTRAM) 50 MG tablet Take 1 tablet (50 mg) by mouth every 8 hours as needed for severe pain (knee pain) 20 tablet 0       Notes summarized:   Labs, x-rays, cardiology, GI tests reviewed: Improved creatinine  Recent Labs   Lab Test 07/27/21  1420 06/25/21  1619 06/25/21  1619   HGB 12.8 12.9  --      --  138   POTASSIUM 4.4  --  4.0   CR 1.65*  --  2.00*   TSH  --   --  0.48     New orders:   Orders Placed This Encounter   Procedures     Med Therapy Management Referral     Cardiology PARKER Occluder Referral       Independent review of:  Supplemental history by:      Patient would like to receive their AVS by Mail a copy    Oscar Hopper MD     [facility]    Phone Start Time: 3:32 PM  Phone End time:  4:00 PM  Conversation plus orders: 28 minutes  Dictation time:  3 minutes    The visit lasted a total of 31 minutes

## 2021-10-13 NOTE — PATIENT INSTRUCTIONS
MDM referral to discuss cost options such as 20 mg of Xarelto broken in half daily, or 10 mg daily, or 15 mg every other day, or alternative medicine versus warfarin    Referral for watchman procedure    Tramadol as needed for knee pain    Refill meds    Flu shot

## 2021-10-19 ENCOUNTER — VIRTUAL VISIT (OUTPATIENT)
Dept: PHARMACY | Facility: CLINIC | Age: 81
End: 2021-10-19
Attending: INTERNAL MEDICINE

## 2021-10-19 DIAGNOSIS — I48.19 PERSISTENT ATRIAL FIBRILLATION (H): Primary | ICD-10-CM

## 2021-10-19 DIAGNOSIS — K44.9 DIAPHRAGMATIC HERNIA WITHOUT OBSTRUCTION AND WITHOUT GANGRENE: ICD-10-CM

## 2021-10-19 DIAGNOSIS — G89.29 CHRONIC PAIN OF LEFT KNEE: ICD-10-CM

## 2021-10-19 DIAGNOSIS — M25.562 CHRONIC PAIN OF LEFT KNEE: ICD-10-CM

## 2021-10-19 DIAGNOSIS — E87.6 HYPOPOTASSEMIA: ICD-10-CM

## 2021-10-19 DIAGNOSIS — I10 ESSENTIAL HYPERTENSION WITH GOAL BLOOD PRESSURE LESS THAN 140/90: ICD-10-CM

## 2021-10-19 DIAGNOSIS — E55.9 VITAMIN D DEFICIENCY: ICD-10-CM

## 2021-10-19 PROCEDURE — 99605 MTMS BY PHARM NP 15 MIN: CPT | Performed by: PHARMACIST

## 2021-10-19 PROCEDURE — 99607 MTMS BY PHARM ADDL 15 MIN: CPT | Performed by: PHARMACIST

## 2021-10-19 NOTE — PATIENT INSTRUCTIONS
PLAN:                            1.  1 month free trial coupon for Xarelto to offset cost, and education on deductible and how this impacts ongoing cost    Follow-up: Return in about 6 months (around 4/19/2022) for Follow up, with me, using a phone visit.

## 2021-10-19 NOTE — PROGRESS NOTES
Medication Therapy Management (MTM) Encounter    ASSESSMENT:                            Medication Adherence/Access: See below for considerations related to cost     1. Persistent atrial fibrillation (H)  She remains in atrial fibrillation as recently discussed with PCP.  Continues on beta-blocker and chronic anticoagulation with Xarelto.  Reviewed cost which will significantly improve once her deductible is paid down.  I will provide her with a 1 month trial coupon of Xarelto to offset her out-of-pocket cost.  She is awaiting contact from the cardiology clinic considering LAAO device.  Recommend to continue current regimen.    2. Essential hypertension with goal blood pressure less than 140/90  At goal blood pressure less than 140/90 per JNC 8, labs stable, recommend to continue current regimen.    3. Chronic pain of left knee  Reviewed recent suggestion by PCP, to utilize tramadol as needed for severe pain, provided additional education on potential adverse effects of tramadol, as well as benefits for pain control.  She will try this medication and upcoming storm, and follow-up on effects with PCP.    4. Diaphragmatic hernia without obstruction and without gangrene  Stable, without breakthrough reflux symptoms while on daily PPI, recommend to continue current regimen.    5. Hypokalemia  Stable potassium level, recommend to continue current regimen.    6. Vitamin D deficiency  Vitamin D level is slightly subtherapeutic, however continues on vitamin D daily between her vitamin D supplement and vitamin D and her calcium supplement.  She would likely benefit from slight increase in vitamin D, particularly now that winter season is here.  Not discussed in depth today, however consider doubling vitamin D supplement to 2 capsules daily, she may benefit from increased calcium intake as well.  She is appropriately taking calcium citrate to enhance absorption due to chronic PPI.  I do not see any bone scan on file.      PLAN:                            1.  1 month free trial coupon for Xarelto to offset cost, and education on deductible and how this impacts ongoing cost    Follow-up: Return in about 6 months (around 4/19/2022) for Follow up, with me, using a phone visit.    SUBJECTIVE/OBJECTIVE:                          Mely Alegria is a 81 year old female called for an initial visit. She was referred to me from Dr. Hopper.      Reason for visit: Medication management initial    Allergies/ADRs: Reviewed in chart  Past Medical History: Reviewed in chart  Tobacco: She reports that she has never smoked. She has never used smokeless tobacco.  Alcohol: Not discussed today, will reevaluate at follow-up.    Medication Adherence/Access: Medication barriers: affording medications, particularly Xarelto.     Persistent atrial fibrillation:  Discussed recent appointment with PCP, she continues on metoprolol daily in addition to rivaroxaban.  She is taking rivaroxaban 15 mg by mouth daily.  When she initially was diagnosed she had been placed on Eliquis with a 1 month free trial coupon and then transitioned to Xarelto thinking that her insurance would provide her with a better cost.  Unfortunately she had to pay almost $500 out-of-pocket for a 90-day supply of Xarelto.  She has about 12 days left of this medication and needs to find out if she will be able to afford this going forward.  We had initially discussed yesterday, and I encouraged her to call her insurance as it is likely that she was still spending down her annual deductible.  She did confirm today that that was her deductible, her next refill for Xarelto will be $120 for 3-month supply.  She understands that her deductible will start over at the beginning of the year, but the ongoing cost will be reasonable for her.  Denies signs or symptoms of adverse effects from her current regimen.  CrCl: 40.9ml/min     Hypertension:  She continues on losartan and metoprolol daily,  denies signs or symptoms of adverse effects.     Chronic left knee pain:  Notes that her pain is significantly worse when there are fluctuations in weather, such as a storm that will be coming tomorrow.  She has not yet tried tramadol but had failed previous medications in the past.  She notes that she will only use this for severe pain.    Diaphragmatic hernia without obstruction or gangrene:  Continues on daily PPI, notes that this is very effective in preventing reflux symptoms.     Hypopotassemia:  Continues on potassium supplement daily.  Potassium   Date Value Ref Range Status   07/27/2021 4.4 3.5 - 5.0 mmol/L Final     Vitamin D deficiency: She continues on 2000 units of vitamin D daily.  Notes that historically she has had a bone scan but does not remember when this occurred.  She is also taking calcium citrate 1 tablet daily.  Component      Latest Ref Rng & Units 6/25/2021   Vitamin D, Total (25-Hydroxy)      30.0 - 80.0 ng/mL 29.8 (L)       Today's Vitals:   BP Readings from Last 3 Encounters:   06/25/21 138/80   12/03/19 136/76   ----------------    I spent 20 minutes with this patient today. All changes were made via collaborative practice agreement with Oscar Hopper MD. A copy of the visit note was provided to the patient's primary care provider.    The patient declined a summary of these recommendations.     Nikko José, PharmD, BCACP  Medication Management (MTM) Pharmacist  [facility]    Telemedicine Visit Details  Type of service:  Telephone visit  Start Time: 2:30PM  End Time: 2:50PM  Originating Location (patient location): Home  Distant Location (provider location):  [facility]     Medication Therapy Recommendations  Persistent atrial fibrillation (H)    Current Medication: rivaroxaban ANTICOAGULANT (XARELTO ANTICOAGULANT) 15 MG TABS tablet   Rationale: Cannot afford medication product - Cost - Adherence   Recommendation: Referral to  Service  - Provided 1 month free trial coupon, and education on deductible   Status: Patient Agreed - Adherence/Education

## 2022-01-12 DIAGNOSIS — I10 ESSENTIAL HYPERTENSION WITH GOAL BLOOD PRESSURE LESS THAN 140/90: ICD-10-CM

## 2022-01-16 RX ORDER — LOSARTAN POTASSIUM 100 MG/1
100 TABLET ORAL DAILY
Qty: 90 TABLET | Refills: 3 | OUTPATIENT
Start: 2022-01-16

## 2022-01-27 ENCOUNTER — TELEPHONE (OUTPATIENT)
Dept: CARDIOLOGY | Facility: CLINIC | Age: 82
End: 2022-01-27
Payer: MEDICARE

## 2022-01-27 NOTE — TELEPHONE ENCOUNTER
Called pt to see if she should be interested in learning more about the LAAC procedure. Informed pt the next step would be having her meet with an LEROY to be evaluated to make sure she is a candidate and to learn more about options.     Pt would like to discuss with her children. Gave pt direct phone number to call if she would like to schedule a clinic visit. Patient verbalizes understanding and agrees with plan. No further questions or concerns at this time.

## 2022-01-27 NOTE — TELEPHONE ENCOUNTER
----- Message from Rica Jiang sent at 1/26/2022  3:41 PM CST -----  Dr. Ruchi Washington referred this patient in October 2021. It doesn't look like we got the referral.    Thanks,  Rica

## 2022-03-07 ENCOUNTER — VIRTUAL VISIT (OUTPATIENT)
Dept: INTERNAL MEDICINE | Facility: CLINIC | Age: 82
End: 2022-03-07
Payer: MEDICARE

## 2022-03-07 DIAGNOSIS — I48.19 PERSISTENT ATRIAL FIBRILLATION (H): ICD-10-CM

## 2022-03-07 DIAGNOSIS — Z00.00 PREVENTATIVE HEALTH CARE: ICD-10-CM

## 2022-03-07 DIAGNOSIS — K44.9 DIAPHRAGMATIC HERNIA WITHOUT OBSTRUCTION AND WITHOUT GANGRENE: ICD-10-CM

## 2022-03-07 DIAGNOSIS — R53.83 FATIGUE, UNSPECIFIED TYPE: ICD-10-CM

## 2022-03-07 DIAGNOSIS — R63.5 WEIGHT GAIN: ICD-10-CM

## 2022-03-07 DIAGNOSIS — R06.02 SOB (SHORTNESS OF BREATH): Primary | ICD-10-CM

## 2022-03-07 DIAGNOSIS — N18.4 STAGE 4 CHRONIC KIDNEY DISEASE (H): ICD-10-CM

## 2022-03-07 DIAGNOSIS — E87.6 HYPOPOTASSEMIA: ICD-10-CM

## 2022-03-07 DIAGNOSIS — I10 ESSENTIAL HYPERTENSION WITH GOAL BLOOD PRESSURE LESS THAN 140/90: ICD-10-CM

## 2022-03-07 PROCEDURE — 99443 PR PHYSICIAN TELEPHONE EVALUATION 21-30 MIN: CPT | Mod: 95 | Performed by: INTERNAL MEDICINE

## 2022-03-07 RX ORDER — LOSARTAN POTASSIUM 100 MG/1
100 TABLET ORAL DAILY
Qty: 90 TABLET | Refills: 3 | Status: SHIPPED | OUTPATIENT
Start: 2022-03-07 | End: 2023-03-01

## 2022-03-07 RX ORDER — FUROSEMIDE 40 MG
40 TABLET ORAL
Qty: 90 TABLET | Refills: 3 | Status: SHIPPED | OUTPATIENT
Start: 2022-03-07 | End: 2023-03-01

## 2022-03-07 RX ORDER — POTASSIUM CHLORIDE 1500 MG/1
TABLET, EXTENDED RELEASE ORAL
Qty: 90 TABLET | Refills: 3 | Status: SHIPPED | OUTPATIENT
Start: 2022-03-07 | End: 2023-03-01

## 2022-03-07 RX ORDER — METOPROLOL SUCCINATE 25 MG/1
25 TABLET, EXTENDED RELEASE ORAL DAILY
Qty: 90 TABLET | Refills: 3 | Status: SHIPPED | OUTPATIENT
Start: 2022-03-07 | End: 2022-03-17

## 2022-03-07 NOTE — PROGRESS NOTES
"Mely is a 82 year old who is being evaluated via a billable telephone visit.      What phone number would you like to be contacted at? [phone]  How would you like to obtain your AVS? MyChart    {PROVIDER CHARTING PREFERENCE:562060}    Subjective   Mely is a 82 year old who presents for the following health issues {ACCOMPANIED BY STATEMENT (Optional):867576}    HPI     {SUPERLIST (Optional):840039}  {additonal problems for provider to add (Optional):774036}    Review of Systems   {ROS COMP (Optional):456127}      Objective           Vitals:  No vitals were obtained today due to virtual visit.    Physical Exam   {GENERAL APPEARANCE:50::\"healthy\",\"alert\",\"no distress\"}  PSYCH: Alert and oriented times 3; coherent speech, normal   rate and volume, able to articulate logical thoughts, able   to abstract reason, no tangential thoughts, no hallucinations   or delusions  Her affect is { :0186384::\"normal\"}  RESP: No cough, no audible wheezing, able to talk in full sentences  Remainder of exam unable to be completed due to telephone visits    {Diagnostic Test Results (Optional):500329}    {AMBULATORY ATTESTATION (Optional):420491}        Phone call duration: *** minutes  "

## 2022-03-07 NOTE — PATIENT INSTRUCTIONS
Labs for fatigue and shortness of breath    EKG    Echocardiogram    Furosemide 40 mg each afternoon    Consider stopping metoprolol    Refill meds    Update routine labs    Follow-up based on results

## 2022-03-07 NOTE — PROGRESS NOTES
Mely is a 82 year old female being evaluated via a billable phone visit, and would like to be contacted via the following  Home number on file [phone] (home)     ASSESSMENT and PLAN:  1. SOB (shortness of breath)  Abrupt change 1 to 2 months after being in atrial fibrillation for 9 months.  Explore anemia, worsening heart failure.  Hydrochlorothiazide discontinued in June with concomitant weight increase.  Begin work-up and empiric addition of furosemide.  Consider stopping metoprolol  - CBC with Platelets & Differential; Future  - Comprehensive metabolic panel; Future  - CRP inflammation; Future  - Erythrocyte sedimentation rate auto; Future  - EKG 12-lead, tracing only; Future  - B-Type Natriuretic Peptide ( [facility] Only); Future  - Echocardiogram Complete; Future  - furosemide (LASIX) 40 MG tablet; Take 1 tablet (40 mg) by mouth daily (with dinner)  Dispense: 90 tablet; Refill: 3    2. Fatigue, unspecified type  Check labs.  Consider stopping metoprolol  - CBC with Platelets & Differential; Future  - Comprehensive metabolic panel; Future  - Vitamin D Deficiency; Future  - Vitamin B12; Future  - TSH; Future    3. Essential hypertension with goal blood pressure less than 140/90  Stable but poorly monitored.  Consider stopping metoprolol  - Lipid panel reflex to direct LDL Fasting; Future  - losartan (COZAAR) 100 MG tablet; Take 1 tablet (100 mg) by mouth daily  Dispense: 90 tablet; Refill: 3  - metoprolol succinate ER (TOPROL-XL) 25 MG 24 hr tablet; Take 1 tablet (25 mg) by mouth daily  Dispense: 90 tablet; Refill: 3    4. Persistent atrial fibrillation (H)  Update labs.  Continue anticoagulants  - EKG 12-lead, tracing only; Future  - B-Type Natriuretic Peptide ( [facility] Only); Future  - Echocardiogram Complete; Future  - rivaroxaban ANTICOAGULANT (XARELTO ANTICOAGULANT) 15 MG TABS tablet; Take 1 tablet (15 mg) by mouth daily (with dinner)  Dispense: 90 tablet; Refill: 3  - metoprolol succinate ER (TOPROL-XL)  25 MG 24 hr tablet; Take 1 tablet (25 mg) by mouth daily  Dispense: 90 tablet; Refill: 3    5. Weight gain  Possible fluid with cessation of hydrochlorothiazide in June  - CBC with Platelets & Differential; Future  - Comprehensive metabolic panel; Future  - TSH; Future  - B-Type Natriuretic Peptide ([facility] Only); Future  - furosemide (LASIX) 40 MG tablet; Take 1 tablet (40 mg) by mouth daily (with dinner)  Dispense: 90 tablet; Refill: 3    6. Diaphragmatic hernia without obstruction and without gangrene  - omeprazole (PRILOSEC) 20 MG DR capsule; [OMEPRAZOLE (PRILOSEC) 20 MG CAPSULE] TAKE 1 CAPSULE BY MOUTH EVERY DAY  Dispense: 90 capsule; Refill: 3    7. Hypopotassemia  - potassium chloride ER (KLOR-CON M) 20 MEQ CR tablet; [POTASSIUM CHLORIDE (K-DUR,KLOR-CON) 20 MEQ TABLET] TAKE 1 TABLET BY MOUTH EVERY DAY  Dispense: 90 tablet; Refill: 3    8. Stage 4 chronic kidney disease (H)  Improved after cessation of hydrochlorothiazide but still limiting.  Furosemide to replace hydrochlorothiazide.  Other labs as below  - Albumin Random Urine Quantitative with Creat Ratio; Future  - Phosphorus; Future    9. Preventative health care  Refuses bone density.  Aged out of colon and breast cancer screening  - REVIEW OF HEALTH MAINTENANCE PROTOCOL ORDERS    10. Adult BMI 40.0-44.9 kg/sq m (H)  - Vitamin D Deficiency; Future     Preventive Care Assessed: As above.  Shots up-to-date     Patient Instructions   Labs for fatigue and shortness of breath    EKG    Echocardiogram    Furosemide 40 mg each afternoon    Consider stopping metoprolol    Refill meds    Update routine labs    Follow-up based on results        Medication list carefully reviewed and corrected  Epic Merger Problem list addressed and updated     Return in about 4 months (around 7/7/2022) for using a video visit.    CHIEF COMPLAINT:  Chief Complaint   Patient presents with     Atrial Fib     diagnosed with afib       HISTORY OF PRESENT ILLNESS:  Mely is a 82 year  "old female contacting the clinic today via phone for complaints of fatigue and shortness of breath.  She was diagnosed with atrial fibrillation in June 2021.  Metoprolol was added.  Hydrochlorothiazide was discontinued.  She felt well for the next 6 months.  Approximately 6 weeks ago she experienced marked worsening of dyspnea with exertion and general fatigue.  She has gained 15 pounds and has increased swelling in her legs.  She is not short of breath lying supine nor sitting.  No fever or chills.  No cough.  Feels unable to complete her daily tasks.  Metoprolol added in June 2021.  Does not monitor weight, blood pressure or pulse    REVIEW OF SYSTEMS:  15 pound weight gain    PFSH:  Social History     Social History Narrative     Not on file     Lives by herself    TOBACCO USE:  History   Smoking Status     Never Smoker   Smokeless Tobacco     Never Used       VITALS:  There were no vitals filed for this visit.  There were no vitals taken for this visit. Estimated body mass index is 39.9 kg/m  as calculated from the following:    Height as of 6/25/21: 1.568 m (5' 1.75\").    Weight as of 6/25/21: 98.2 kg (216 lb 6.4 oz).    PHYSICAL EXAM:  (observations via Phone)  Alert and oriented    MEDICATIONS  Current Outpatient Medications   Medication Sig Dispense Refill     calcium citrate-vitamin D (CITRACAL) 315-200 MG-UNIT TABS per tablet Take 1 tablet by mouth daily       cholecalciferol, vitamin D3, 50 mcg (2,000 unit) Tab [CHOLECALCIFEROL, VITAMIN D3, 50 MCG (2,000 UNIT) TAB] Take 1 tablet (2,000 Units total) by mouth daily. 100 each 3     furosemide (LASIX) 40 MG tablet Take 1 tablet (40 mg) by mouth daily (with dinner) 90 tablet 3     losartan (COZAAR) 100 MG tablet Take 1 tablet (100 mg) by mouth daily 90 tablet 3     metoprolol succinate ER (TOPROL-XL) 25 MG 24 hr tablet Take 1 tablet (25 mg) by mouth daily 90 tablet 3     omeprazole (PRILOSEC) 20 MG DR capsule [OMEPRAZOLE (PRILOSEC) 20 MG CAPSULE] TAKE 1 " CAPSULE BY MOUTH EVERY DAY 90 capsule 3     potassium chloride ER (KLOR-CON M) 20 MEQ CR tablet [POTASSIUM CHLORIDE (K-DUR,KLOR-CON) 20 MEQ TABLET] TAKE 1 TABLET BY MOUTH EVERY DAY 90 tablet 3     rivaroxaban ANTICOAGULANT (XARELTO ANTICOAGULANT) 15 MG TABS tablet Take 1 tablet (15 mg) by mouth daily (with dinner) 90 tablet 3     traMADol (ULTRAM) 50 MG tablet Take 1 tablet (50 mg) by mouth every 8 hours as needed for severe pain (knee pain) 20 tablet 0       Notes summarized:   Labs, x-rays, cardiology, GI tests reviewed: Improved renal function.  New labs ordered  Recent Labs   Lab Test 07/27/21  1420 06/25/21  1619 06/25/21  1619   HGB 12.8 12.9  --      --  138   POTASSIUM 4.4  --  4.0   CR 1.65*  --  2.00*   TSH  --   --  0.48     No results found for: [id number]  No components found for: VITD  Lab Results   Component Value Date    CHOL 184 12/10/2020     New orders:   Orders Placed This Encounter   Procedures     REVIEW OF HEALTH MAINTENANCE PROTOCOL ORDERS     Comprehensive metabolic panel     CRP inflammation     Erythrocyte sedimentation rate auto     Vitamin D Deficiency     Vitamin B12     TSH     Lipid panel reflex to direct LDL Fasting     B-Type Natriuretic Peptide ( [facility] Only)     Albumin Random Urine Quantitative with Creat Ratio     Phosphorus     EKG 12-lead, tracing only     Echocardiogram Complete     CBC with Platelets & Differential       Independent review of:  Supplemental history by:      Patient would like to receive their AVS by Mail a copy    Oscar Hopper MD  [facility]    Phone Start Time: 11:38 AM  Phone End time:  12:09 PM  Conversation plus orders: 31 minutes  Dictation time:  3 minutes    The visit lasted a total of 33 minutes

## 2022-03-11 ENCOUNTER — ALLIED HEALTH/NURSE VISIT (OUTPATIENT)
Dept: FAMILY MEDICINE | Facility: CLINIC | Age: 82
End: 2022-03-11
Payer: MEDICARE

## 2022-03-11 ENCOUNTER — LAB (OUTPATIENT)
Dept: LAB | Facility: CLINIC | Age: 82
End: 2022-03-11

## 2022-03-11 DIAGNOSIS — R06.02 SOB (SHORTNESS OF BREATH): ICD-10-CM

## 2022-03-11 DIAGNOSIS — I10 ESSENTIAL HYPERTENSION WITH GOAL BLOOD PRESSURE LESS THAN 140/90: ICD-10-CM

## 2022-03-11 DIAGNOSIS — R53.83 FATIGUE, UNSPECIFIED TYPE: ICD-10-CM

## 2022-03-11 DIAGNOSIS — I48.19 PERSISTENT ATRIAL FIBRILLATION (H): ICD-10-CM

## 2022-03-11 DIAGNOSIS — R63.5 WEIGHT GAIN: ICD-10-CM

## 2022-03-11 DIAGNOSIS — N18.4 STAGE 4 CHRONIC KIDNEY DISEASE (H): Primary | ICD-10-CM

## 2022-03-11 LAB
ALBUMIN SERPL-MCNC: 3.5 G/DL (ref 3.5–5)
ALP SERPL-CCNC: 142 U/L (ref 45–120)
ALT SERPL W P-5'-P-CCNC: 13 U/L (ref 0–45)
ANION GAP SERPL CALCULATED.3IONS-SCNC: 14 MMOL/L (ref 5–18)
AST SERPL W P-5'-P-CCNC: 19 U/L (ref 0–40)
BASOPHILS # BLD AUTO: 0 10E3/UL (ref 0–0.2)
BASOPHILS NFR BLD AUTO: 0 %
BILIRUB SERPL-MCNC: 1.3 MG/DL (ref 0–1)
BNP SERPL-MCNC: 572 PG/ML (ref 0–163)
BUN SERPL-MCNC: 20 MG/DL (ref 8–28)
C REACTIVE PROTEIN LHE: 1.5 MG/DL (ref 0–0.8)
CALCIUM SERPL-MCNC: 10.3 MG/DL (ref 8.5–10.5)
CHLORIDE BLD-SCNC: 110 MMOL/L (ref 98–107)
CHOLEST SERPL-MCNC: 127 MG/DL
CO2 SERPL-SCNC: 19 MMOL/L (ref 22–31)
CREAT SERPL-MCNC: 1.7 MG/DL (ref 0.6–1.1)
CREAT UR-MCNC: 166 MG/DL
EOSINOPHIL # BLD AUTO: 0.1 10E3/UL (ref 0–0.7)
EOSINOPHIL NFR BLD AUTO: 1 %
ERYTHROCYTE [DISTWIDTH] IN BLOOD BY AUTOMATED COUNT: 14.8 % (ref 10–15)
ERYTHROCYTE [SEDIMENTATION RATE] IN BLOOD BY WESTERGREN METHOD: 14 MM/HR (ref 0–20)
FASTING STATUS PATIENT QL REPORTED: NO
GFR SERPL CREATININE-BSD FRML MDRD: 30 ML/MIN/1.73M2
GLUCOSE BLD-MCNC: 106 MG/DL (ref 70–125)
HCT VFR BLD AUTO: 44.8 % (ref 35–47)
HDLC SERPL-MCNC: 39 MG/DL
HGB BLD-MCNC: 13.5 G/DL (ref 11.7–15.7)
IMM GRANULOCYTES # BLD: 0 10E3/UL
IMM GRANULOCYTES NFR BLD: 0 %
LDLC SERPL CALC-MCNC: 68 MG/DL
LYMPHOCYTES # BLD AUTO: 1.8 10E3/UL (ref 0.8–5.3)
LYMPHOCYTES NFR BLD AUTO: 18 %
MCH RBC QN AUTO: 29.3 PG (ref 26.5–33)
MCHC RBC AUTO-ENTMCNC: 30.1 G/DL (ref 31.5–36.5)
MCV RBC AUTO: 97 FL (ref 78–100)
MICROALBUMIN UR-MCNC: 32.34 MG/DL (ref 0–1.99)
MICROALBUMIN/CREAT UR: 194.8 MG/G CR
MONOCYTES # BLD AUTO: 0.8 10E3/UL (ref 0–1.3)
MONOCYTES NFR BLD AUTO: 8 %
NEUTROPHILS # BLD AUTO: 7.4 10E3/UL (ref 1.6–8.3)
NEUTROPHILS NFR BLD AUTO: 74 %
PHOSPHATE SERPL-MCNC: 2.7 MG/DL (ref 2.5–4.5)
PLATELET # BLD AUTO: 218 10E3/UL (ref 150–450)
POTASSIUM BLD-SCNC: 4.8 MMOL/L (ref 3.5–5)
PROT SERPL-MCNC: 7.9 G/DL (ref 6–8)
PTH-INTACT SERPL-MCNC: 230 PG/ML (ref 10–86)
RBC # BLD AUTO: 4.61 10E6/UL (ref 3.8–5.2)
SODIUM SERPL-SCNC: 143 MMOL/L (ref 136–145)
TRIGL SERPL-MCNC: 102 MG/DL
TSH SERPL DL<=0.005 MIU/L-ACNC: 1.74 UIU/ML (ref 0.3–5)
VIT B12 SERPL-MCNC: 526 PG/ML (ref 213–816)
WBC # BLD AUTO: 10 10E3/UL (ref 4–11)

## 2022-03-11 PROCEDURE — 85025 COMPLETE CBC W/AUTO DIFF WBC: CPT

## 2022-03-11 PROCEDURE — 80061 LIPID PANEL: CPT

## 2022-03-11 PROCEDURE — 83880 ASSAY OF NATRIURETIC PEPTIDE: CPT

## 2022-03-11 PROCEDURE — 93005 ELECTROCARDIOGRAM TRACING: CPT

## 2022-03-11 PROCEDURE — 80053 COMPREHEN METABOLIC PANEL: CPT

## 2022-03-11 PROCEDURE — 82043 UR ALBUMIN QUANTITATIVE: CPT

## 2022-03-11 PROCEDURE — 86140 C-REACTIVE PROTEIN: CPT

## 2022-03-11 PROCEDURE — 84443 ASSAY THYROID STIM HORMONE: CPT

## 2022-03-11 PROCEDURE — 82607 VITAMIN B-12: CPT

## 2022-03-11 PROCEDURE — 82306 VITAMIN D 25 HYDROXY: CPT

## 2022-03-11 PROCEDURE — 83970 ASSAY OF PARATHORMONE: CPT

## 2022-03-11 PROCEDURE — 99207 PR NO CHARGE NURSE ONLY: CPT

## 2022-03-11 PROCEDURE — 36415 COLL VENOUS BLD VENIPUNCTURE: CPT

## 2022-03-11 PROCEDURE — 84100 ASSAY OF PHOSPHORUS: CPT

## 2022-03-11 PROCEDURE — 85652 RBC SED RATE AUTOMATED: CPT

## 2022-03-13 LAB — DEPRECATED CALCIDIOL+CALCIFEROL SERPL-MC: 39 UG/L (ref 30–80)

## 2022-03-16 ENCOUNTER — TELEPHONE (OUTPATIENT)
Dept: INTERNAL MEDICINE | Facility: CLINIC | Age: 82
End: 2022-03-16

## 2022-03-16 DIAGNOSIS — I48.19 PERSISTENT ATRIAL FIBRILLATION (H): ICD-10-CM

## 2022-03-16 DIAGNOSIS — I10 ESSENTIAL HYPERTENSION WITH GOAL BLOOD PRESSURE LESS THAN 140/90: ICD-10-CM

## 2022-03-17 RX ORDER — METOPROLOL SUCCINATE 50 MG/1
50 TABLET, EXTENDED RELEASE ORAL DAILY
Qty: 90 TABLET | Refills: 3 | Status: SHIPPED | OUTPATIENT
Start: 2022-03-17 | End: 2023-03-01

## 2022-03-18 NOTE — TELEPHONE ENCOUNTER
Please call back, and also note results of lab tests    Urine shows protein in the urine which suggest poorly controlled high blood pressure.  I recommend she monitor her blood pressure and let us know if it is persistently over 140    Parathyroid hormone is elevated, which can interfere with calcium metabolism but calcium level is normal.  This can be caused by kidney problems    CRP marker of inflammation is elevated, but ESR marker of inflammation is normal.  This is probably from arthritis    Vitamin B12, vitamin D and thyroid normal.  Cholesterol looks great    BNP test of excess fluid is elevated so I agree with stopping the hydrochlorothiazide and adding the furosemide to remove excess fluid.  Did this help?  I recommend we adjust this until she loses at least 10 pounds    My plan was to stop the metoprolol if her heart rate was low and increase the metoprolol if her heart rate was high.  Her heart rate was high so I do not recommend stopping the metoprolol.  I recommend increasing it from 25 mg daily 50 mg daily    I will also send this in a letter  
Reason for Call:  Other call back    Detailed comments: pt stating PCP mentioned the she could stop Metoprolol   Pt is calling to confirm    Phone Number Patient can be reached at: Home number on file [phone] (home)    Best Time: anytime    Can we leave a detailed message on this number? YES    Call taken on 3/16/2022 at 3:31 PM by Dee Dee Bates    
Spoke with patients son and relayed message below. CTC on file. Son verbalized understanding of message and stated he will relay the information to the patient.   
Statement Selected

## 2022-03-28 ENCOUNTER — HOSPITAL ENCOUNTER (OUTPATIENT)
Dept: CARDIOLOGY | Facility: CLINIC | Age: 82
Discharge: HOME | End: 2022-03-28
Attending: INTERNAL MEDICINE | Admitting: INTERNAL MEDICINE
Payer: MEDICARE

## 2022-03-28 DIAGNOSIS — R06.02 SOB (SHORTNESS OF BREATH): ICD-10-CM

## 2022-03-28 DIAGNOSIS — I48.19 PERSISTENT ATRIAL FIBRILLATION (H): ICD-10-CM

## 2022-03-28 LAB — LVEF ECHO: NORMAL

## 2022-03-28 PROCEDURE — 93306 TTE W/DOPPLER COMPLETE: CPT

## 2022-03-28 PROCEDURE — 93306 TTE W/DOPPLER COMPLETE: CPT | Mod: 26 | Performed by: INTERNAL MEDICINE

## 2022-04-08 ENCOUNTER — TELEPHONE (OUTPATIENT)
Dept: INTERNAL MEDICINE | Facility: CLINIC | Age: 82
End: 2022-04-08

## 2022-04-08 NOTE — TELEPHONE ENCOUNTER
Reason for Call:  Other call back    Detailed comments: Pt received letter regarding her cardiology test, asking for a call back to help her understand what it is saying    Please advise    Phone Number Patient can be reached at: Home number on file [phone] (home)    Best Time: anytime    Can we leave a detailed message on this number? YES    Call taken on 4/8/2022 at 9:47 AM by Dee Dee Bates

## 2022-04-15 DIAGNOSIS — M25.562 CHRONIC PAIN OF LEFT KNEE: ICD-10-CM

## 2022-04-15 DIAGNOSIS — G89.29 CHRONIC PAIN OF LEFT KNEE: ICD-10-CM

## 2022-04-15 RX ORDER — TRAMADOL HYDROCHLORIDE 50 MG/1
50 TABLET ORAL EVERY 8 HOURS PRN
Qty: 20 TABLET | Refills: 0 | Status: SHIPPED | OUTPATIENT
Start: 2022-04-15 | End: 2023-04-04

## 2022-04-15 NOTE — TELEPHONE ENCOUNTER
Medication: Pending Prescriptions:                       Disp   Refills    traMADol (ULTRAM) 50 MG tablet            20 tab*0            Sig: Take 1 tablet (50 mg) by mouth every 8 hours as           needed for severe pain (knee pain)      CSA in last year: NO    Random Utox in last year: NO  (if any of the above answer NO - schedule with PCP)     On opioids in addition to benzodiazepines? NO  (if the above answer YES - schedule with PCP every 6 months)           PROVIDER TO PULL THE FOLLOWING FROM  :    1. Last date filled?  2.   Only PCP Prescribing?  3.   Taken as prescribed from physician notes?

## 2022-08-10 LAB
ATRIAL RATE - MUSE: 357 BPM
DIASTOLIC BLOOD PRESSURE - MUSE: NORMAL MMHG
INTERPRETATION ECG - MUSE: NORMAL
P AXIS - MUSE: NORMAL DEGREES
PR INTERVAL - MUSE: NORMAL MS
QRS DURATION - MUSE: 86 MS
QT - MUSE: 318 MS
QTC - MUSE: 449 MS
R AXIS - MUSE: 74 DEGREES
SYSTOLIC BLOOD PRESSURE - MUSE: NORMAL MMHG
T AXIS - MUSE: 6 DEGREES
VENTRICULAR RATE- MUSE: 120 BPM

## 2022-10-21 DIAGNOSIS — K44.9 DIAPHRAGMATIC HERNIA WITHOUT OBSTRUCTION AND WITHOUT GANGRENE: ICD-10-CM

## 2022-10-21 DIAGNOSIS — E87.6 HYPOPOTASSEMIA: ICD-10-CM

## 2022-10-24 RX ORDER — POTASSIUM CHLORIDE 1500 MG/1
TABLET, EXTENDED RELEASE ORAL
Qty: 90 TABLET | Refills: 3 | OUTPATIENT
Start: 2022-10-24

## 2022-10-24 NOTE — TELEPHONE ENCOUNTER
"Should have refills through 03/07/2023  Same pharmacy.    Last Written Prescription Date:  03/07/2022  Last Fill Quantity: 90,  # refills: 3   Last office visit provider:   03/07/2022    Requested Prescriptions   Pending Prescriptions Disp Refills     omeprazole (PRILOSEC) 20 MG DR capsule 90 capsule 3     Sig: [OMEPRAZOLE (PRILOSEC) 20 MG CAPSULE] TAKE 1 CAPSULE BY MOUTH EVERY DAY       PPI Protocol Passed - 10/21/2022 11:42 AM        Passed - Not on Clopidogrel (unless Pantoprazole ordered)        Passed - No diagnosis of osteoporosis on record        Passed - Recent (12 mo) or future (30 days) visit within the authorizing provider's specialty     Patient has had an office visit with the authorizing provider or a provider within the authorizing providers department within the previous 12 mos or has a future within next 30 days. See \"Patient Info\" tab in inbasket, or \"Choose Columns\" in Meds & Orders section of the refill encounter.              Passed - Medication is active on med list        Passed - Patient is age 18 or older        Passed - No active pregnacy on record        Passed - No positive pregnancy test in past 12 months             Francisca Flores 10/24/22 9:44 AM  "

## 2023-01-01 ENCOUNTER — APPOINTMENT (OUTPATIENT)
Dept: CT IMAGING | Facility: HOSPITAL | Age: 83
DRG: 871 | End: 2023-01-01
Attending: INTERNAL MEDICINE
Payer: MEDICARE

## 2023-01-01 ENCOUNTER — APPOINTMENT (OUTPATIENT)
Dept: PHYSICAL THERAPY | Facility: HOSPITAL | Age: 83
DRG: 871 | End: 2023-01-01
Payer: MEDICARE

## 2023-01-01 ENCOUNTER — APPOINTMENT (OUTPATIENT)
Dept: PHYSICAL THERAPY | Facility: HOSPITAL | Age: 83
DRG: 871 | End: 2023-01-01
Attending: INTERNAL MEDICINE
Payer: MEDICARE

## 2023-01-01 ENCOUNTER — VIRTUAL VISIT (OUTPATIENT)
Dept: INTERNAL MEDICINE | Facility: CLINIC | Age: 83
End: 2023-01-01
Payer: MEDICARE

## 2023-01-01 ENCOUNTER — APPOINTMENT (OUTPATIENT)
Dept: OCCUPATIONAL THERAPY | Facility: HOSPITAL | Age: 83
DRG: 871 | End: 2023-01-01
Payer: MEDICARE

## 2023-01-01 ENCOUNTER — APPOINTMENT (OUTPATIENT)
Dept: ULTRASOUND IMAGING | Facility: HOSPITAL | Age: 83
DRG: 871 | End: 2023-01-01
Attending: INTERNAL MEDICINE
Payer: MEDICARE

## 2023-01-01 ENCOUNTER — HOSPITAL ENCOUNTER (INPATIENT)
Facility: HOSPITAL | Age: 83
LOS: 10 days | Discharge: NURSING, REHAB OR LONG-TERM CARE | DRG: 871 | End: 2023-11-09
Attending: EMERGENCY MEDICINE | Admitting: FAMILY MEDICINE
Payer: MEDICARE

## 2023-01-01 ENCOUNTER — TRANSITIONAL CARE UNIT VISIT (OUTPATIENT)
Dept: GERIATRICS | Facility: CLINIC | Age: 83
End: 2023-01-01
Payer: MEDICARE

## 2023-01-01 ENCOUNTER — APPOINTMENT (OUTPATIENT)
Dept: INTERVENTIONAL RADIOLOGY/VASCULAR | Facility: HOSPITAL | Age: 83
DRG: 871 | End: 2023-01-01
Attending: NURSE PRACTITIONER
Payer: MEDICARE

## 2023-01-01 ENCOUNTER — APPOINTMENT (OUTPATIENT)
Dept: RADIOLOGY | Facility: HOSPITAL | Age: 83
DRG: 871 | End: 2023-01-01
Attending: HOSPITALIST
Payer: MEDICARE

## 2023-01-01 ENCOUNTER — MYC MEDICAL ADVICE (OUTPATIENT)
Dept: INTERNAL MEDICINE | Facility: CLINIC | Age: 83
End: 2023-01-01
Payer: COMMERCIAL

## 2023-01-01 ENCOUNTER — MYC MEDICAL ADVICE (OUTPATIENT)
Dept: INTERNAL MEDICINE | Facility: CLINIC | Age: 83
End: 2023-01-01

## 2023-01-01 ENCOUNTER — APPOINTMENT (OUTPATIENT)
Dept: RADIOLOGY | Facility: HOSPITAL | Age: 83
DRG: 871 | End: 2023-01-01
Attending: INTERNAL MEDICINE
Payer: MEDICARE

## 2023-01-01 ENCOUNTER — APPOINTMENT (OUTPATIENT)
Dept: CARDIOLOGY | Facility: HOSPITAL | Age: 83
DRG: 871 | End: 2023-01-01
Attending: INTERNAL MEDICINE
Payer: MEDICARE

## 2023-01-01 ENCOUNTER — HOSPITAL ENCOUNTER (OUTPATIENT)
Dept: ULTRASOUND IMAGING | Facility: HOSPITAL | Age: 83
Discharge: HOME | End: 2023-10-12
Attending: INTERNAL MEDICINE
Payer: MEDICARE

## 2023-01-01 ENCOUNTER — TELEPHONE (OUTPATIENT)
Dept: GERIATRICS | Facility: CLINIC | Age: 83
End: 2023-01-01
Payer: MEDICARE

## 2023-01-01 ENCOUNTER — OFFICE VISIT (OUTPATIENT)
Dept: VASCULAR SURGERY | Facility: CLINIC | Age: 83
End: 2023-01-01
Attending: PODIATRIST
Payer: COMMERCIAL

## 2023-01-01 ENCOUNTER — APPOINTMENT (OUTPATIENT)
Dept: OCCUPATIONAL THERAPY | Facility: HOSPITAL | Age: 83
DRG: 871 | End: 2023-01-01
Attending: INTERNAL MEDICINE
Payer: MEDICARE

## 2023-01-01 ENCOUNTER — APPOINTMENT (OUTPATIENT)
Dept: CT IMAGING | Facility: HOSPITAL | Age: 83
DRG: 871 | End: 2023-01-01
Attending: EMERGENCY MEDICINE
Payer: MEDICARE

## 2023-01-01 ENCOUNTER — PATIENT OUTREACH (OUTPATIENT)
Dept: CARE COORDINATION | Facility: CLINIC | Age: 83
End: 2023-01-01
Payer: COMMERCIAL

## 2023-01-01 ENCOUNTER — TELEPHONE (OUTPATIENT)
Dept: INTERNAL MEDICINE | Facility: CLINIC | Age: 83
End: 2023-01-01
Payer: COMMERCIAL

## 2023-01-01 ENCOUNTER — APPOINTMENT (OUTPATIENT)
Dept: SPEECH THERAPY | Facility: HOSPITAL | Age: 83
DRG: 871 | End: 2023-01-01
Attending: INTERNAL MEDICINE
Payer: MEDICARE

## 2023-01-01 ENCOUNTER — LAB REQUISITION (OUTPATIENT)
Dept: LAB | Facility: CLINIC | Age: 83
End: 2023-01-01
Payer: MEDICARE

## 2023-01-01 ENCOUNTER — APPOINTMENT (OUTPATIENT)
Dept: ULTRASOUND IMAGING | Facility: HOSPITAL | Age: 83
DRG: 871 | End: 2023-01-01
Attending: FAMILY MEDICINE
Payer: MEDICARE

## 2023-01-01 ENCOUNTER — TELEPHONE (OUTPATIENT)
Dept: VASCULAR SURGERY | Facility: CLINIC | Age: 83
End: 2023-01-01
Payer: COMMERCIAL

## 2023-01-01 ENCOUNTER — VIRTUAL VISIT (OUTPATIENT)
Dept: INTERNAL MEDICINE | Facility: CLINIC | Age: 83
End: 2023-01-01
Payer: COMMERCIAL

## 2023-01-01 ENCOUNTER — APPOINTMENT (OUTPATIENT)
Dept: SPEECH THERAPY | Facility: HOSPITAL | Age: 83
DRG: 871 | End: 2023-01-01
Payer: MEDICARE

## 2023-01-01 ENCOUNTER — PATIENT OUTREACH (OUTPATIENT)
Dept: CARE COORDINATION | Facility: CLINIC | Age: 83
End: 2023-01-01

## 2023-01-01 ENCOUNTER — APPOINTMENT (OUTPATIENT)
Dept: RADIOLOGY | Facility: HOSPITAL | Age: 83
DRG: 871 | End: 2023-01-01
Attending: EMERGENCY MEDICINE
Payer: MEDICARE

## 2023-01-01 ENCOUNTER — HOSPITAL ENCOUNTER (OUTPATIENT)
Dept: MRI IMAGING | Facility: HOSPITAL | Age: 83
Discharge: HOME | End: 2023-10-12
Attending: INTERNAL MEDICINE
Payer: MEDICARE

## 2023-01-01 ENCOUNTER — DISCHARGE SUMMARY NURSING HOME (OUTPATIENT)
Dept: GERIATRICS | Facility: CLINIC | Age: 83
End: 2023-01-01
Payer: MEDICARE

## 2023-01-01 VITALS
TEMPERATURE: 97.7 F | DIASTOLIC BLOOD PRESSURE: 65 MMHG | RESPIRATION RATE: 18 BRPM | SYSTOLIC BLOOD PRESSURE: 100 MMHG | WEIGHT: 219 LBS | BODY MASS INDEX: 40.3 KG/M2 | HEART RATE: 82 BPM | HEIGHT: 62 IN | OXYGEN SATURATION: 97 %

## 2023-01-01 VITALS
HEART RATE: 82 BPM | BODY MASS INDEX: 40.3 KG/M2 | HEIGHT: 62 IN | DIASTOLIC BLOOD PRESSURE: 65 MMHG | SYSTOLIC BLOOD PRESSURE: 100 MMHG | TEMPERATURE: 97.7 F | OXYGEN SATURATION: 97 % | WEIGHT: 219 LBS | RESPIRATION RATE: 18 BRPM

## 2023-01-01 VITALS
RESPIRATION RATE: 18 BRPM | HEART RATE: 82 BPM | WEIGHT: 219.2 LBS | TEMPERATURE: 97.7 F | DIASTOLIC BLOOD PRESSURE: 65 MMHG | OXYGEN SATURATION: 97 % | SYSTOLIC BLOOD PRESSURE: 100 MMHG | BODY MASS INDEX: 40.34 KG/M2 | HEIGHT: 62 IN

## 2023-01-01 VITALS
DIASTOLIC BLOOD PRESSURE: 56 MMHG | RESPIRATION RATE: 18 BRPM | HEIGHT: 62 IN | OXYGEN SATURATION: 98 % | TEMPERATURE: 97.5 F | BODY MASS INDEX: 40.89 KG/M2 | WEIGHT: 222.22 LBS | SYSTOLIC BLOOD PRESSURE: 113 MMHG | HEART RATE: 80 BPM

## 2023-01-01 VITALS
SYSTOLIC BLOOD PRESSURE: 140 MMHG | OXYGEN SATURATION: 94 % | TEMPERATURE: 97.5 F | DIASTOLIC BLOOD PRESSURE: 70 MMHG | RESPIRATION RATE: 18 BRPM | HEART RATE: 52 BPM

## 2023-01-01 VITALS
DIASTOLIC BLOOD PRESSURE: 68 MMHG | HEIGHT: 62 IN | SYSTOLIC BLOOD PRESSURE: 130 MMHG | WEIGHT: 219 LBS | HEART RATE: 72 BPM | BODY MASS INDEX: 40.3 KG/M2

## 2023-01-01 DIAGNOSIS — R63.4 WEIGHT LOSS: ICD-10-CM

## 2023-01-01 DIAGNOSIS — I48.91 ATRIAL FIBRILLATION, UNSPECIFIED TYPE (H): ICD-10-CM

## 2023-01-01 DIAGNOSIS — L97.521 ULCER OF LEFT FOOT, LIMITED TO BREAKDOWN OF SKIN (H): Primary | ICD-10-CM

## 2023-01-01 DIAGNOSIS — M54.50 ACUTE MIDLINE LOW BACK PAIN WITHOUT SCIATICA: Primary | ICD-10-CM

## 2023-01-01 DIAGNOSIS — L97.521 ULCER OF LEFT FOOT, LIMITED TO BREAKDOWN OF SKIN (H): ICD-10-CM

## 2023-01-01 DIAGNOSIS — M54.42 ACUTE LEFT-SIDED LOW BACK PAIN WITH LEFT-SIDED SCIATICA: Primary | ICD-10-CM

## 2023-01-01 DIAGNOSIS — I10 ESSENTIAL HYPERTENSION WITH GOAL BLOOD PRESSURE LESS THAN 140/90: ICD-10-CM

## 2023-01-01 DIAGNOSIS — I48.19 PERSISTENT ATRIAL FIBRILLATION (H): ICD-10-CM

## 2023-01-01 DIAGNOSIS — Z99.2 DIALYSIS PATIENT (H): ICD-10-CM

## 2023-01-01 DIAGNOSIS — I73.9 PAD (PERIPHERAL ARTERY DISEASE) (H): ICD-10-CM

## 2023-01-01 DIAGNOSIS — G89.29 CHRONIC PAIN OF LEFT KNEE: ICD-10-CM

## 2023-01-01 DIAGNOSIS — T14.8XXA BLISTER: Primary | ICD-10-CM

## 2023-01-01 DIAGNOSIS — L03.116 CELLULITIS OF LEFT FOOT: ICD-10-CM

## 2023-01-01 DIAGNOSIS — M10.9 URIC ACID ARTHROPATHY: ICD-10-CM

## 2023-01-01 DIAGNOSIS — L03.90 CELLULITIS, UNSPECIFIED CELLULITIS SITE: ICD-10-CM

## 2023-01-01 DIAGNOSIS — E87.5 HYPERKALEMIA: ICD-10-CM

## 2023-01-01 DIAGNOSIS — D63.1 ANEMIA DUE TO CHRONIC KIDNEY DISEASE, ON CHRONIC DIALYSIS (H): ICD-10-CM

## 2023-01-01 DIAGNOSIS — N18.9 ACUTE RENAL FAILURE SUPERIMPOSED ON CHRONIC KIDNEY DISEASE, UNSPECIFIED ACUTE RENAL FAILURE TYPE, UNSPECIFIED CKD STAGE (H): ICD-10-CM

## 2023-01-01 DIAGNOSIS — Z99.2 ANEMIA DUE TO CHRONIC KIDNEY DISEASE, ON CHRONIC DIALYSIS (H): ICD-10-CM

## 2023-01-01 DIAGNOSIS — Z99.2 ESRD (END STAGE RENAL DISEASE) ON DIALYSIS (H): ICD-10-CM

## 2023-01-01 DIAGNOSIS — E03.4 HYPOTHYROIDISM DUE TO ACQUIRED ATROPHY OF THYROID: ICD-10-CM

## 2023-01-01 DIAGNOSIS — I50.9 CHRONIC CONGESTIVE HEART FAILURE, UNSPECIFIED HEART FAILURE TYPE (H): ICD-10-CM

## 2023-01-01 DIAGNOSIS — L03.90 CELLULITIS, UNSPECIFIED CELLULITIS SITE: Primary | ICD-10-CM

## 2023-01-01 DIAGNOSIS — N18.6 ESRD (END STAGE RENAL DISEASE) ON DIALYSIS (H): Primary | ICD-10-CM

## 2023-01-01 DIAGNOSIS — R53.81 PHYSICAL DECONDITIONING: ICD-10-CM

## 2023-01-01 DIAGNOSIS — I50.9 CHRONIC CONGESTIVE HEART FAILURE, UNSPECIFIED HEART FAILURE TYPE (H): Primary | ICD-10-CM

## 2023-01-01 DIAGNOSIS — N18.9 CHRONIC KIDNEY DISEASE, UNSPECIFIED: ICD-10-CM

## 2023-01-01 DIAGNOSIS — N18.6 ANEMIA DUE TO CHRONIC KIDNEY DISEASE, ON CHRONIC DIALYSIS (H): ICD-10-CM

## 2023-01-01 DIAGNOSIS — R06.02 SOB (SHORTNESS OF BREATH): ICD-10-CM

## 2023-01-01 DIAGNOSIS — R53.1 WEAKNESS: ICD-10-CM

## 2023-01-01 DIAGNOSIS — R41.89 COGNITIVE IMPAIRMENT: ICD-10-CM

## 2023-01-01 DIAGNOSIS — N18.6 ESRD (END STAGE RENAL DISEASE) ON DIALYSIS (H): ICD-10-CM

## 2023-01-01 DIAGNOSIS — M25.562 CHRONIC PAIN OF LEFT KNEE: ICD-10-CM

## 2023-01-01 DIAGNOSIS — D47.2 MGUS (MONOCLONAL GAMMOPATHY OF UNKNOWN SIGNIFICANCE): ICD-10-CM

## 2023-01-01 DIAGNOSIS — T14.8XXA BLISTER: ICD-10-CM

## 2023-01-01 DIAGNOSIS — S91.339A PENETRATING WOUND OF FOOT, UNSPECIFIED LATERALITY, INITIAL ENCOUNTER: Primary | ICD-10-CM

## 2023-01-01 DIAGNOSIS — M54.50 ACUTE MIDLINE LOW BACK PAIN WITHOUT SCIATICA: ICD-10-CM

## 2023-01-01 DIAGNOSIS — Z99.2 ESRD (END STAGE RENAL DISEASE) ON DIALYSIS (H): Primary | ICD-10-CM

## 2023-01-01 DIAGNOSIS — N17.9 ACUTE RENAL FAILURE SUPERIMPOSED ON CHRONIC KIDNEY DISEASE, UNSPECIFIED ACUTE RENAL FAILURE TYPE, UNSPECIFIED CKD STAGE (H): ICD-10-CM

## 2023-01-01 DIAGNOSIS — N18.4 STAGE 4 CHRONIC KIDNEY DISEASE (H): ICD-10-CM

## 2023-01-01 DIAGNOSIS — N39.0 URINARY TRACT INFECTION WITHOUT HEMATURIA, SITE UNSPECIFIED: ICD-10-CM

## 2023-01-01 DIAGNOSIS — M54.50 ACUTE RIGHT-SIDED LOW BACK PAIN WITHOUT SCIATICA: ICD-10-CM

## 2023-01-01 LAB
ALBUMIN SERPL BCG-MCNC: 2.9 G/DL (ref 3.5–5.2)
ALBUMIN SERPL BCG-MCNC: 3.1 G/DL (ref 3.5–5.2)
ALBUMIN SERPL BCG-MCNC: 3.3 G/DL (ref 3.5–5.2)
ALBUMIN SERPL BCG-MCNC: 3.4 G/DL (ref 3.5–5.2)
ALBUMIN SERPL BCG-MCNC: 3.4 G/DL (ref 3.5–5.2)
ALBUMIN SERPL ELPH-MCNC: 3.6 G/DL (ref 3.7–5.1)
ALBUMIN UR-MCNC: 200 MG/DL
ALBUMIN UR-MCNC: 300 MG/DL
ALP SERPL-CCNC: 100 U/L (ref 35–104)
ALP SERPL-CCNC: 110 U/L (ref 35–104)
ALP SERPL-CCNC: 114 U/L (ref 35–104)
ALP SERPL-CCNC: 118 U/L (ref 35–104)
ALP SERPL-CCNC: 123 U/L (ref 35–104)
ALP SERPL-CCNC: 124 U/L (ref 35–104)
ALP SERPL-CCNC: 129 U/L (ref 35–104)
ALP SERPL-CCNC: 130 U/L (ref 35–104)
ALPHA1 GLOB SERPL ELPH-MCNC: 0.4 G/DL (ref 0.2–0.4)
ALPHA2 GLOB SERPL ELPH-MCNC: 0.5 G/DL (ref 0.5–0.9)
ALT SERPL W P-5'-P-CCNC: 10 U/L (ref 0–50)
ALT SERPL W P-5'-P-CCNC: 10 U/L (ref 0–50)
ALT SERPL W P-5'-P-CCNC: 11 U/L (ref 0–50)
ALT SERPL W P-5'-P-CCNC: 12 U/L (ref 0–50)
ALT SERPL W P-5'-P-CCNC: 12 U/L (ref 0–50)
ALT SERPL W P-5'-P-CCNC: 13 U/L (ref 0–50)
ALT SERPL W P-5'-P-CCNC: 14 U/L (ref 0–50)
ALT SERPL W P-5'-P-CCNC: 9 U/L (ref 0–50)
AMMONIA PLAS-SCNC: 16 UMOL/L (ref 11–51)
ANA PAT SER IF-IMP: ABNORMAL
ANA SER QL IF: ABNORMAL
ANA TITR SER IF: ABNORMAL {TITER}
ANCA AB PATTERN SER IF-IMP: NORMAL
ANION GAP SERPL CALCULATED.3IONS-SCNC: 12 MMOL/L (ref 7–15)
ANION GAP SERPL CALCULATED.3IONS-SCNC: 12 MMOL/L (ref 7–15)
ANION GAP SERPL CALCULATED.3IONS-SCNC: 13 MMOL/L (ref 7–15)
ANION GAP SERPL CALCULATED.3IONS-SCNC: 14 MMOL/L (ref 7–15)
ANION GAP SERPL CALCULATED.3IONS-SCNC: 16 MMOL/L (ref 7–15)
ANION GAP SERPL CALCULATED.3IONS-SCNC: 16 MMOL/L (ref 7–15)
ANION GAP SERPL CALCULATED.3IONS-SCNC: 17 MMOL/L (ref 7–15)
ANION GAP SERPL CALCULATED.3IONS-SCNC: 18 MMOL/L (ref 7–15)
ANION GAP SERPL CALCULATED.3IONS-SCNC: 18 MMOL/L (ref 7–15)
ANION GAP SERPL CALCULATED.3IONS-SCNC: 20 MMOL/L (ref 7–15)
ANION GAP SERPL CALCULATED.3IONS-SCNC: 23 MMOL/L (ref 7–15)
ANION GAP SERPL CALCULATED.3IONS-SCNC: 24 MMOL/L (ref 7–15)
APPEARANCE UR: ABNORMAL
APPEARANCE UR: ABNORMAL
APTT PPP: 33 SECONDS (ref 22–38)
APTT PPP: 37 SECONDS (ref 22–38)
APTT PPP: >300 SECONDS (ref 22–38)
AST SERPL W P-5'-P-CCNC: 16 U/L (ref 0–45)
AST SERPL W P-5'-P-CCNC: 21 U/L (ref 0–45)
AST SERPL W P-5'-P-CCNC: 23 U/L (ref 0–45)
AST SERPL W P-5'-P-CCNC: 24 U/L (ref 0–45)
AST SERPL W P-5'-P-CCNC: 24 U/L (ref 0–45)
AST SERPL W P-5'-P-CCNC: 30 U/L (ref 0–45)
AST SERPL W P-5'-P-CCNC: 32 U/L (ref 0–45)
AST SERPL W P-5'-P-CCNC: 36 U/L (ref 0–45)
ATRIAL RATE - MUSE: 100 BPM
ATRIAL RATE - MUSE: 81 BPM
ATRIAL RATE - MUSE: 85 BPM
B-GLOBULIN SERPL ELPH-MCNC: 1.9 G/DL (ref 0.6–1)
BACTERIA #/AREA URNS HPF: ABNORMAL /HPF
BACTERIA #/AREA URNS HPF: ABNORMAL /HPF
BACTERIA BLD CULT: NO GROWTH
BACTERIA SPT CULT: ABNORMAL
BACTERIA SPT CULT: ABNORMAL
BACTERIA UR CULT: ABNORMAL
BACTERIA WND CULT: ABNORMAL
BACTERIA WND CULT: ABNORMAL
BASE EXCESS BLDA CALC-SCNC: -7.5 MMOL/L
BASE EXCESS BLDA CALC-SCNC: -9.9 MMOL/L
BASE EXCESS BLDV CALC-SCNC: 2.4 MMOL/L
BASOPHILS # BLD AUTO: 0 10E3/UL (ref 0–0.2)
BASOPHILS NFR BLD AUTO: 0 %
BILIRUB DIRECT SERPL-MCNC: 0.22 MG/DL (ref 0–0.3)
BILIRUB DIRECT SERPL-MCNC: 0.36 MG/DL (ref 0–0.3)
BILIRUB SERPL-MCNC: 0.3 MG/DL
BILIRUB SERPL-MCNC: 0.4 MG/DL
BILIRUB SERPL-MCNC: 0.5 MG/DL
BILIRUB SERPL-MCNC: 0.5 MG/DL
BILIRUB SERPL-MCNC: 0.6 MG/DL
BILIRUB SERPL-MCNC: 0.7 MG/DL
BILIRUB UR QL STRIP: NEGATIVE
BILIRUB UR QL STRIP: NEGATIVE
BM IGG SER IF-ACNC: 0 AU/ML
BUN SERPL-MCNC: 10.6 MG/DL (ref 8–23)
BUN SERPL-MCNC: 100.1 MG/DL (ref 8–23)
BUN SERPL-MCNC: 16.8 MG/DL (ref 8–23)
BUN SERPL-MCNC: 18.5 MG/DL (ref 8–23)
BUN SERPL-MCNC: 23.3 MG/DL (ref 8–23)
BUN SERPL-MCNC: 23.3 MG/DL (ref 8–23)
BUN SERPL-MCNC: 28.3 MG/DL (ref 8–23)
BUN SERPL-MCNC: 30.2 MG/DL (ref 8–23)
BUN SERPL-MCNC: 31.8 MG/DL (ref 8–23)
BUN SERPL-MCNC: 33.6 MG/DL (ref 8–23)
BUN SERPL-MCNC: 40.5 MG/DL (ref 8–23)
BUN SERPL-MCNC: 55 MG/DL (ref 8–23)
BUN SERPL-MCNC: 56.4 MG/DL (ref 8–23)
BUN SERPL-MCNC: 60.6 MG/DL (ref 8–23)
BUN SERPL-MCNC: 89.2 MG/DL (ref 8–23)
BUN SERPL-MCNC: 90.5 MG/DL (ref 8–23)
BUN SERPL-MCNC: 92.6 MG/DL (ref 8–23)
BUN SERPL-MCNC: 92.8 MG/DL (ref 8–23)
C DIFF TOX B STL QL: NEGATIVE
C-ANCA TITR SER IF: NORMAL {TITER}
C3 SERPL-MCNC: 87 MG/DL (ref 81–157)
C4 SERPL-MCNC: 22 MG/DL (ref 13–39)
CALCIUM SERPL-MCNC: 7.7 MG/DL (ref 8.8–10.2)
CALCIUM SERPL-MCNC: 8.1 MG/DL (ref 8.8–10.2)
CALCIUM SERPL-MCNC: 8.1 MG/DL (ref 8.8–10.2)
CALCIUM SERPL-MCNC: 8.6 MG/DL (ref 8.8–10.2)
CALCIUM SERPL-MCNC: 8.8 MG/DL (ref 8.8–10.2)
CALCIUM SERPL-MCNC: 8.9 MG/DL (ref 8.8–10.2)
CALCIUM SERPL-MCNC: 8.9 MG/DL (ref 8.8–10.2)
CALCIUM SERPL-MCNC: 9 MG/DL (ref 8.8–10.2)
CALCIUM SERPL-MCNC: 9 MG/DL (ref 8.8–10.2)
CALCIUM SERPL-MCNC: 9.2 MG/DL (ref 8.8–10.2)
CALCIUM SERPL-MCNC: 9.3 MG/DL (ref 8.8–10.2)
CALCIUM SERPL-MCNC: 9.3 MG/DL (ref 8.8–10.2)
CALCIUM SERPL-MCNC: 9.4 MG/DL (ref 8.8–10.2)
CH50 SERPL-ACNC: 54.4 U/ML
CHLORIDE SERPL-SCNC: 101 MMOL/L (ref 98–107)
CHLORIDE SERPL-SCNC: 102 MMOL/L (ref 98–107)
CHLORIDE SERPL-SCNC: 103 MMOL/L (ref 98–107)
CHLORIDE SERPL-SCNC: 104 MMOL/L (ref 98–107)
CHLORIDE SERPL-SCNC: 105 MMOL/L (ref 98–107)
CHLORIDE SERPL-SCNC: 105 MMOL/L (ref 98–107)
CHLORIDE SERPL-SCNC: 96 MMOL/L (ref 98–107)
CHLORIDE SERPL-SCNC: 96 MMOL/L (ref 98–107)
CHLORIDE SERPL-SCNC: 97 MMOL/L (ref 98–107)
CHLORIDE SERPL-SCNC: 97 MMOL/L (ref 98–107)
CHLORIDE SERPL-SCNC: 98 MMOL/L (ref 98–107)
CHLORIDE SERPL-SCNC: 99 MMOL/L (ref 98–107)
CHLORIDE SERPL-SCNC: 99 MMOL/L (ref 98–107)
CK SERPL-CCNC: 27 U/L (ref 26–192)
CK SERPL-CCNC: 30 U/L (ref 26–192)
COHGB MFR BLD: 96.8 % (ref 95–96)
COHGB MFR BLD: 98.3 % (ref 95–96)
COLOR UR AUTO: ABNORMAL
COLOR UR AUTO: YELLOW
COPPER SERPL-MCNC: 126.4 UG/DL
CORTIS SERPL-MCNC: 15.4 UG/DL
CORTIS SERPL-MCNC: 30.5 UG/DL
CREAT SERPL-MCNC: 2.38 MG/DL (ref 0.51–0.95)
CREAT SERPL-MCNC: 2.55 MG/DL (ref 0.51–0.95)
CREAT SERPL-MCNC: 2.62 MG/DL (ref 0.51–0.95)
CREAT SERPL-MCNC: 2.71 MG/DL (ref 0.51–0.95)
CREAT SERPL-MCNC: 2.71 MG/DL (ref 0.51–0.95)
CREAT SERPL-MCNC: 2.97 MG/DL (ref 0.51–0.95)
CREAT SERPL-MCNC: 3.18 MG/DL (ref 0.51–0.95)
CREAT SERPL-MCNC: 3.26 MG/DL (ref 0.51–0.95)
CREAT SERPL-MCNC: 3.62 MG/DL (ref 0.51–0.95)
CREAT SERPL-MCNC: 3.7 MG/DL (ref 0.51–0.95)
CREAT SERPL-MCNC: 3.86 MG/DL (ref 0.51–0.95)
CREAT SERPL-MCNC: 4.18 MG/DL (ref 0.51–0.95)
CREAT SERPL-MCNC: 4.42 MG/DL (ref 0.51–0.95)
CREAT SERPL-MCNC: 5.54 MG/DL (ref 0.51–0.95)
CREAT SERPL-MCNC: 5.75 MG/DL (ref 0.51–0.95)
CREAT SERPL-MCNC: 5.78 MG/DL (ref 0.51–0.95)
CREAT SERPL-MCNC: 6.07 MG/DL (ref 0.51–0.95)
CREAT SERPL-MCNC: 6.12 MG/DL (ref 0.51–0.95)
CRP SERPL-MCNC: 57.4 MG/L
CRP SERPL-MCNC: 58.4 MG/L
CRP SERPL-MCNC: 61 MG/L
D DIMER PPP FEU-MCNC: 1.19 UG/ML FEU (ref 0–0.5)
DAT POLY: NORMAL
DAT, ANTI-IGG: NORMAL
DEPRECATED HCO3 PLAS-SCNC: 11 MMOL/L (ref 22–29)
DEPRECATED HCO3 PLAS-SCNC: 11 MMOL/L (ref 22–29)
DEPRECATED HCO3 PLAS-SCNC: 18 MMOL/L (ref 22–29)
DEPRECATED HCO3 PLAS-SCNC: 18 MMOL/L (ref 22–29)
DEPRECATED HCO3 PLAS-SCNC: 19 MMOL/L (ref 22–29)
DEPRECATED HCO3 PLAS-SCNC: 20 MMOL/L (ref 22–29)
DEPRECATED HCO3 PLAS-SCNC: 22 MMOL/L (ref 22–29)
DEPRECATED HCO3 PLAS-SCNC: 23 MMOL/L (ref 22–29)
DEPRECATED HCO3 PLAS-SCNC: 24 MMOL/L (ref 22–29)
DEPRECATED HCO3 PLAS-SCNC: 25 MMOL/L (ref 22–29)
DEPRECATED HCO3 PLAS-SCNC: 27 MMOL/L (ref 22–29)
DEPRECATED HCO3 PLAS-SCNC: 28 MMOL/L (ref 22–29)
DEPRECATED HCO3 PLAS-SCNC: 29 MMOL/L (ref 22–29)
DIASTOLIC BLOOD PRESSURE - MUSE: 51 MMHG
DIASTOLIC BLOOD PRESSURE - MUSE: 74 MMHG
DIASTOLIC BLOOD PRESSURE - MUSE: NORMAL MMHG
EGFRCR SERPLBLD CKD-EPI 2021: 10 ML/MIN/1.73M2
EGFRCR SERPLBLD CKD-EPI 2021: 11 ML/MIN/1.73M2
EGFRCR SERPLBLD CKD-EPI 2021: 12 ML/MIN/1.73M2
EGFRCR SERPLBLD CKD-EPI 2021: 12 ML/MIN/1.73M2
EGFRCR SERPLBLD CKD-EPI 2021: 13 ML/MIN/1.73M2
EGFRCR SERPLBLD CKD-EPI 2021: 14 ML/MIN/1.73M2
EGFRCR SERPLBLD CKD-EPI 2021: 15 ML/MIN/1.73M2
EGFRCR SERPLBLD CKD-EPI 2021: 17 ML/MIN/1.73M2
EGFRCR SERPLBLD CKD-EPI 2021: 17 ML/MIN/1.73M2
EGFRCR SERPLBLD CKD-EPI 2021: 18 ML/MIN/1.73M2
EGFRCR SERPLBLD CKD-EPI 2021: 18 ML/MIN/1.73M2
EGFRCR SERPLBLD CKD-EPI 2021: 20 ML/MIN/1.73M2
EGFRCR SERPLBLD CKD-EPI 2021: 6 ML/MIN/1.73M2
EGFRCR SERPLBLD CKD-EPI 2021: 6 ML/MIN/1.73M2
EGFRCR SERPLBLD CKD-EPI 2021: 7 ML/MIN/1.73M2
EGFRCR SERPLBLD CKD-EPI 2021: 9 ML/MIN/1.73M2
EOSINOPHIL # BLD AUTO: 0 10E3/UL (ref 0–0.7)
EOSINOPHIL # BLD AUTO: 0.1 10E3/UL (ref 0–0.7)
EOSINOPHIL NFR BLD AUTO: 0 %
EOSINOPHIL NFR BLD AUTO: 1 %
ERYTHROCYTE [DISTWIDTH] IN BLOOD BY AUTOMATED COUNT: 19.7 % (ref 10–15)
ERYTHROCYTE [DISTWIDTH] IN BLOOD BY AUTOMATED COUNT: 19.9 % (ref 10–15)
ERYTHROCYTE [DISTWIDTH] IN BLOOD BY AUTOMATED COUNT: 20 % (ref 10–15)
ERYTHROCYTE [DISTWIDTH] IN BLOOD BY AUTOMATED COUNT: 20 % (ref 10–15)
ERYTHROCYTE [DISTWIDTH] IN BLOOD BY AUTOMATED COUNT: 20.2 % (ref 10–15)
ERYTHROCYTE [DISTWIDTH] IN BLOOD BY AUTOMATED COUNT: 20.2 % (ref 10–15)
ERYTHROCYTE [DISTWIDTH] IN BLOOD BY AUTOMATED COUNT: 20.3 % (ref 10–15)
ERYTHROCYTE [DISTWIDTH] IN BLOOD BY AUTOMATED COUNT: 20.8 % (ref 10–15)
ERYTHROCYTE [DISTWIDTH] IN BLOOD BY AUTOMATED COUNT: 21.3 % (ref 10–15)
ERYTHROCYTE [DISTWIDTH] IN BLOOD BY AUTOMATED COUNT: 21.3 % (ref 10–15)
ERYTHROCYTE [DISTWIDTH] IN BLOOD BY AUTOMATED COUNT: 21.8 % (ref 10–15)
FOLATE SERPL-MCNC: 3.4 NG/ML (ref 4.6–34.8)
GAMMA GLOB SERPL ELPH-MCNC: 0.3 G/DL (ref 0.7–1.6)
GAMMA INTERFERON BACKGROUND BLD IA-ACNC: 0 IU/ML
GLUCOSE BLDC GLUCOMTR-MCNC: 100 MG/DL (ref 70–99)
GLUCOSE BLDC GLUCOMTR-MCNC: 100 MG/DL (ref 70–99)
GLUCOSE BLDC GLUCOMTR-MCNC: 101 MG/DL (ref 70–99)
GLUCOSE BLDC GLUCOMTR-MCNC: 102 MG/DL (ref 70–99)
GLUCOSE BLDC GLUCOMTR-MCNC: 108 MG/DL (ref 70–99)
GLUCOSE BLDC GLUCOMTR-MCNC: 109 MG/DL (ref 70–99)
GLUCOSE BLDC GLUCOMTR-MCNC: 119 MG/DL (ref 70–99)
GLUCOSE BLDC GLUCOMTR-MCNC: 123 MG/DL (ref 70–99)
GLUCOSE BLDC GLUCOMTR-MCNC: 126 MG/DL (ref 70–99)
GLUCOSE BLDC GLUCOMTR-MCNC: 143 MG/DL (ref 70–99)
GLUCOSE BLDC GLUCOMTR-MCNC: 150 MG/DL (ref 70–99)
GLUCOSE BLDC GLUCOMTR-MCNC: 62 MG/DL (ref 70–99)
GLUCOSE BLDC GLUCOMTR-MCNC: 63 MG/DL (ref 70–99)
GLUCOSE BLDC GLUCOMTR-MCNC: 64 MG/DL (ref 70–99)
GLUCOSE BLDC GLUCOMTR-MCNC: 67 MG/DL (ref 70–99)
GLUCOSE BLDC GLUCOMTR-MCNC: 67 MG/DL (ref 70–99)
GLUCOSE BLDC GLUCOMTR-MCNC: 69 MG/DL (ref 70–99)
GLUCOSE BLDC GLUCOMTR-MCNC: 69 MG/DL (ref 70–99)
GLUCOSE BLDC GLUCOMTR-MCNC: 70 MG/DL (ref 70–99)
GLUCOSE BLDC GLUCOMTR-MCNC: 71 MG/DL (ref 70–99)
GLUCOSE BLDC GLUCOMTR-MCNC: 72 MG/DL (ref 70–99)
GLUCOSE BLDC GLUCOMTR-MCNC: 72 MG/DL (ref 70–99)
GLUCOSE BLDC GLUCOMTR-MCNC: 75 MG/DL (ref 70–99)
GLUCOSE BLDC GLUCOMTR-MCNC: 75 MG/DL (ref 70–99)
GLUCOSE BLDC GLUCOMTR-MCNC: 76 MG/DL (ref 70–99)
GLUCOSE BLDC GLUCOMTR-MCNC: 79 MG/DL (ref 70–99)
GLUCOSE BLDC GLUCOMTR-MCNC: 80 MG/DL (ref 70–99)
GLUCOSE BLDC GLUCOMTR-MCNC: 81 MG/DL (ref 70–99)
GLUCOSE BLDC GLUCOMTR-MCNC: 82 MG/DL (ref 70–99)
GLUCOSE BLDC GLUCOMTR-MCNC: 84 MG/DL (ref 70–99)
GLUCOSE BLDC GLUCOMTR-MCNC: 85 MG/DL (ref 70–99)
GLUCOSE BLDC GLUCOMTR-MCNC: 88 MG/DL (ref 70–99)
GLUCOSE BLDC GLUCOMTR-MCNC: 89 MG/DL (ref 70–99)
GLUCOSE BLDC GLUCOMTR-MCNC: 89 MG/DL (ref 70–99)
GLUCOSE BLDC GLUCOMTR-MCNC: 90 MG/DL (ref 70–99)
GLUCOSE BLDC GLUCOMTR-MCNC: 93 MG/DL (ref 70–99)
GLUCOSE BLDC GLUCOMTR-MCNC: 94 MG/DL (ref 70–99)
GLUCOSE BLDC GLUCOMTR-MCNC: 96 MG/DL (ref 70–99)
GLUCOSE BLDC GLUCOMTR-MCNC: 97 MG/DL (ref 70–99)
GLUCOSE BLDC GLUCOMTR-MCNC: 97 MG/DL (ref 70–99)
GLUCOSE BLDC GLUCOMTR-MCNC: 98 MG/DL (ref 70–99)
GLUCOSE BLDC GLUCOMTR-MCNC: 99 MG/DL (ref 70–99)
GLUCOSE BLDC GLUCOMTR-MCNC: 99 MG/DL (ref 70–99)
GLUCOSE SERPL-MCNC: 117 MG/DL (ref 70–99)
GLUCOSE SERPL-MCNC: 120 MG/DL (ref 70–99)
GLUCOSE SERPL-MCNC: 121 MG/DL (ref 70–99)
GLUCOSE SERPL-MCNC: 131 MG/DL (ref 70–99)
GLUCOSE SERPL-MCNC: 171 MG/DL (ref 70–99)
GLUCOSE SERPL-MCNC: 67 MG/DL (ref 70–99)
GLUCOSE SERPL-MCNC: 73 MG/DL (ref 70–99)
GLUCOSE SERPL-MCNC: 74 MG/DL (ref 70–99)
GLUCOSE SERPL-MCNC: 77 MG/DL (ref 70–99)
GLUCOSE SERPL-MCNC: 79 MG/DL (ref 70–99)
GLUCOSE SERPL-MCNC: 80 MG/DL (ref 70–99)
GLUCOSE SERPL-MCNC: 81 MG/DL (ref 70–99)
GLUCOSE SERPL-MCNC: 82 MG/DL (ref 70–99)
GLUCOSE SERPL-MCNC: 85 MG/DL (ref 70–99)
GLUCOSE SERPL-MCNC: 86 MG/DL (ref 70–99)
GLUCOSE SERPL-MCNC: 88 MG/DL (ref 70–99)
GLUCOSE SERPL-MCNC: 92 MG/DL (ref 70–99)
GLUCOSE SERPL-MCNC: 97 MG/DL (ref 70–99)
GLUCOSE UR STRIP-MCNC: NEGATIVE MG/DL
GLUCOSE UR STRIP-MCNC: NEGATIVE MG/DL
GRAM STAIN RESULT: ABNORMAL
GRAM STAIN RESULT: ABNORMAL
HAPTOGLOB SERPL-MCNC: 74 MG/DL (ref 32–197)
HBA1C MFR BLD: 5 %
HBV CORE AB SERPL QL IA: NONREACTIVE
HBV SURFACE AB SERPL IA-ACNC: 0.32 M[IU]/ML
HBV SURFACE AB SERPL IA-ACNC: NONREACTIVE M[IU]/ML
HBV SURFACE AG SERPL QL IA: NONREACTIVE
HCO3 BLD-SCNC: 17 MMOL/L (ref 23–29)
HCO3 BLD-SCNC: 19 MMOL/L (ref 23–29)
HCO3 BLDV-SCNC: 28 MMOL/L (ref 24–30)
HCT VFR BLD AUTO: 25.4 % (ref 35–47)
HCT VFR BLD AUTO: 26.3 % (ref 35–47)
HCT VFR BLD AUTO: 26.6 % (ref 35–47)
HCT VFR BLD AUTO: 27.3 % (ref 35–47)
HCT VFR BLD AUTO: 27.4 % (ref 35–47)
HCT VFR BLD AUTO: 27.7 % (ref 35–47)
HCT VFR BLD AUTO: 27.7 % (ref 35–47)
HCT VFR BLD AUTO: 27.9 % (ref 35–47)
HCT VFR BLD AUTO: 28 % (ref 35–47)
HCT VFR BLD AUTO: 28.1 % (ref 35–47)
HCT VFR BLD AUTO: 28.5 % (ref 35–47)
HCT VFR BLD AUTO: 28.9 % (ref 35–47)
HCT VFR BLD AUTO: 31.8 % (ref 35–47)
HCT VFR BLD AUTO: 33 % (ref 35–47)
HCV AB SERPL QL IA: NONREACTIVE
HGB BLD-MCNC: 10.1 G/DL (ref 11.7–15.7)
HGB BLD-MCNC: 11.6 G/DL (ref 11.7–15.7)
HGB BLD-MCNC: 8.6 G/DL (ref 11.7–15.7)
HGB BLD-MCNC: 8.8 G/DL (ref 11.7–15.7)
HGB BLD-MCNC: 9.4 G/DL (ref 11.7–15.7)
HGB BLD-MCNC: 9.5 G/DL (ref 11.7–15.7)
HGB BLD-MCNC: 9.6 G/DL (ref 11.7–15.7)
HGB BLD-MCNC: 9.6 G/DL (ref 11.7–15.7)
HGB BLD-MCNC: 9.7 G/DL (ref 11.7–15.7)
HGB BLD-MCNC: 9.7 G/DL (ref 11.7–15.7)
HGB BLD-MCNC: 9.8 G/DL (ref 11.7–15.7)
HGB BLD-MCNC: 9.9 G/DL (ref 11.7–15.7)
HGB UR QL STRIP: ABNORMAL
HGB UR QL STRIP: ABNORMAL
HOLD SPECIMEN: NORMAL
HYALINE CASTS: 61 /LPF
IMM GRANULOCYTES # BLD: 0 10E3/UL
IMM GRANULOCYTES # BLD: 0.1 10E3/UL
IMM GRANULOCYTES NFR BLD: 0 %
IMM GRANULOCYTES NFR BLD: 1 %
INTERPRETATION ECG - MUSE: NORMAL
IRON BINDING CAPACITY (ROCHE): 189 UG/DL (ref 240–430)
IRON BINDING CAPACITY (ROCHE): 193 UG/DL (ref 240–430)
IRON SATN MFR SERPL: 26 % (ref 15–46)
IRON SATN MFR SERPL: 27 % (ref 15–46)
IRON SERPL-MCNC: 49 UG/DL (ref 37–145)
IRON SERPL-MCNC: 52 UG/DL (ref 37–145)
KAPPA LC FREE SER-MCNC: 21.96 MG/DL (ref 0.33–1.94)
KAPPA LC FREE/LAMBDA FREE SER NEPH: 12.92 {RATIO} (ref 0.26–1.65)
KETONES UR STRIP-MCNC: NEGATIVE MG/DL
KETONES UR STRIP-MCNC: NEGATIVE MG/DL
LACTATE SERPL-SCNC: 1.1 MMOL/L (ref 0.7–2)
LACTATE SERPL-SCNC: 1.1 MMOL/L (ref 0.7–2)
LACTATE SERPL-SCNC: 1.2 MMOL/L (ref 0.7–2)
LACTATE SERPL-SCNC: 1.2 MMOL/L (ref 0.7–2)
LACTATE SERPL-SCNC: 1.3 MMOL/L (ref 0.7–2)
LACTATE SERPL-SCNC: 1.4 MMOL/L (ref 0.7–2)
LACTATE SERPL-SCNC: 1.4 MMOL/L (ref 0.7–2)
LACTATE SERPL-SCNC: 1.6 MMOL/L (ref 0.7–2)
LAMBDA LC FREE SERPL-MCNC: 1.7 MG/DL (ref 0.57–2.63)
LDH SERPL L TO P-CCNC: 219 U/L (ref 0–250)
LEUKOCYTE ESTERASE UR QL STRIP: ABNORMAL
LEUKOCYTE ESTERASE UR QL STRIP: ABNORMAL
LVEF ECHO: NORMAL
LYMPHOCYTES # BLD AUTO: 0.6 10E3/UL (ref 0.8–5.3)
LYMPHOCYTES # BLD AUTO: 0.6 10E3/UL (ref 0.8–5.3)
LYMPHOCYTES # BLD AUTO: 0.8 10E3/UL (ref 0.8–5.3)
LYMPHOCYTES # BLD AUTO: 0.9 10E3/UL (ref 0.8–5.3)
LYMPHOCYTES # BLD AUTO: 0.9 10E3/UL (ref 0.8–5.3)
LYMPHOCYTES # BLD AUTO: 1 10E3/UL (ref 0.8–5.3)
LYMPHOCYTES # BLD AUTO: 1.1 10E3/UL (ref 0.8–5.3)
LYMPHOCYTES # BLD AUTO: 1.1 10E3/UL (ref 0.8–5.3)
LYMPHOCYTES # BLD AUTO: 1.2 10E3/UL (ref 0.8–5.3)
LYMPHOCYTES # BLD AUTO: 1.3 10E3/UL (ref 0.8–5.3)
LYMPHOCYTES NFR BLD AUTO: 10 %
LYMPHOCYTES NFR BLD AUTO: 10 %
LYMPHOCYTES NFR BLD AUTO: 11 %
LYMPHOCYTES NFR BLD AUTO: 11 %
LYMPHOCYTES NFR BLD AUTO: 12 %
LYMPHOCYTES NFR BLD AUTO: 13 %
LYMPHOCYTES NFR BLD AUTO: 14 %
LYMPHOCYTES NFR BLD AUTO: 14 %
LYMPHOCYTES NFR BLD AUTO: 15 %
LYMPHOCYTES NFR BLD AUTO: 7 %
LYMPHOCYTES NFR BLD AUTO: 8 %
LYMPHOCYTES NFR BLD AUTO: 9 %
M PROTEIN SERPL ELPH-MCNC: 0.9 G/DL
M TB IFN-G BLD-IMP: NEGATIVE
M TB IFN-G CD4+ BCKGRND COR BLD-ACNC: 1.41 IU/ML
MAGNESIUM SERPL-MCNC: 1.4 MG/DL (ref 1.7–2.3)
MAGNESIUM SERPL-MCNC: 1.5 MG/DL (ref 1.7–2.3)
MAGNESIUM SERPL-MCNC: 1.6 MG/DL (ref 1.7–2.3)
MAGNESIUM SERPL-MCNC: 1.9 MG/DL (ref 1.7–2.3)
MAGNESIUM SERPL-MCNC: 2.3 MG/DL (ref 1.7–2.3)
MCH RBC QN AUTO: 31.4 PG (ref 26.5–33)
MCH RBC QN AUTO: 31.4 PG (ref 26.5–33)
MCH RBC QN AUTO: 31.5 PG (ref 26.5–33)
MCH RBC QN AUTO: 31.6 PG (ref 26.5–33)
MCH RBC QN AUTO: 31.6 PG (ref 26.5–33)
MCH RBC QN AUTO: 31.7 PG (ref 26.5–33)
MCH RBC QN AUTO: 31.9 PG (ref 26.5–33)
MCH RBC QN AUTO: 32 PG (ref 26.5–33)
MCH RBC QN AUTO: 32 PG (ref 26.5–33)
MCH RBC QN AUTO: 32.1 PG (ref 26.5–33)
MCH RBC QN AUTO: 32.1 PG (ref 26.5–33)
MCH RBC QN AUTO: 32.2 PG (ref 26.5–33)
MCHC RBC AUTO-ENTMCNC: 31.8 G/DL (ref 31.5–36.5)
MCHC RBC AUTO-ENTMCNC: 33.5 G/DL (ref 31.5–36.5)
MCHC RBC AUTO-ENTMCNC: 33.9 G/DL (ref 31.5–36.5)
MCHC RBC AUTO-ENTMCNC: 33.9 G/DL (ref 31.5–36.5)
MCHC RBC AUTO-ENTMCNC: 34 G/DL (ref 31.5–36.5)
MCHC RBC AUTO-ENTMCNC: 34.3 G/DL (ref 31.5–36.5)
MCHC RBC AUTO-ENTMCNC: 34.3 G/DL (ref 31.5–36.5)
MCHC RBC AUTO-ENTMCNC: 34.4 G/DL (ref 31.5–36.5)
MCHC RBC AUTO-ENTMCNC: 34.7 G/DL (ref 31.5–36.5)
MCHC RBC AUTO-ENTMCNC: 34.9 G/DL (ref 31.5–36.5)
MCHC RBC AUTO-ENTMCNC: 35 G/DL (ref 31.5–36.5)
MCHC RBC AUTO-ENTMCNC: 35.2 G/DL (ref 31.5–36.5)
MCHC RBC AUTO-ENTMCNC: 35.2 G/DL (ref 31.5–36.5)
MCHC RBC AUTO-ENTMCNC: 35.3 G/DL (ref 31.5–36.5)
MCV RBC AUTO: 101 FL (ref 78–100)
MCV RBC AUTO: 90 FL (ref 78–100)
MCV RBC AUTO: 90 FL (ref 78–100)
MCV RBC AUTO: 91 FL (ref 78–100)
MCV RBC AUTO: 91 FL (ref 78–100)
MCV RBC AUTO: 92 FL (ref 78–100)
MCV RBC AUTO: 93 FL (ref 78–100)
MCV RBC AUTO: 95 FL (ref 78–100)
MITOGEN IGNF BCKGRD COR BLD-ACNC: 0 IU/ML
MITOGEN IGNF BCKGRD COR BLD-ACNC: 0 IU/ML
MONOCYTES # BLD AUTO: 0.5 10E3/UL (ref 0–1.3)
MONOCYTES # BLD AUTO: 0.6 10E3/UL (ref 0–1.3)
MONOCYTES # BLD AUTO: 0.7 10E3/UL (ref 0–1.3)
MONOCYTES # BLD AUTO: 0.8 10E3/UL (ref 0–1.3)
MONOCYTES NFR BLD AUTO: 6 %
MONOCYTES NFR BLD AUTO: 6 %
MONOCYTES NFR BLD AUTO: 7 %
MONOCYTES NFR BLD AUTO: 7 %
MONOCYTES NFR BLD AUTO: 8 %
MONOCYTES NFR BLD AUTO: 9 %
MRSA DNA SPEC QL NAA+PROBE: NEGATIVE
NEUTROPHILS # BLD AUTO: 5.6 10E3/UL (ref 1.6–8.3)
NEUTROPHILS # BLD AUTO: 5.9 10E3/UL (ref 1.6–8.3)
NEUTROPHILS # BLD AUTO: 6.1 10E3/UL (ref 1.6–8.3)
NEUTROPHILS # BLD AUTO: 6.4 10E3/UL (ref 1.6–8.3)
NEUTROPHILS # BLD AUTO: 6.7 10E3/UL (ref 1.6–8.3)
NEUTROPHILS # BLD AUTO: 6.8 10E3/UL (ref 1.6–8.3)
NEUTROPHILS # BLD AUTO: 7.1 10E3/UL (ref 1.6–8.3)
NEUTROPHILS # BLD AUTO: 7.5 10E3/UL (ref 1.6–8.3)
NEUTROPHILS # BLD AUTO: 7.6 10E3/UL (ref 1.6–8.3)
NEUTROPHILS # BLD AUTO: 7.8 10E3/UL (ref 1.6–8.3)
NEUTROPHILS # BLD AUTO: 8 10E3/UL (ref 1.6–8.3)
NEUTROPHILS # BLD AUTO: 8.1 10E3/UL (ref 1.6–8.3)
NEUTROPHILS NFR BLD AUTO: 74 %
NEUTROPHILS NFR BLD AUTO: 75 %
NEUTROPHILS NFR BLD AUTO: 78 %
NEUTROPHILS NFR BLD AUTO: 80 %
NEUTROPHILS NFR BLD AUTO: 80 %
NEUTROPHILS NFR BLD AUTO: 81 %
NEUTROPHILS NFR BLD AUTO: 82 %
NEUTROPHILS NFR BLD AUTO: 82 %
NEUTROPHILS NFR BLD AUTO: 87 %
NITRATE UR QL: NEGATIVE
NITRATE UR QL: NEGATIVE
NRBC # BLD AUTO: 0 10E3/UL
NRBC BLD AUTO-RTO: 0 /100
NT-PROBNP SERPL-MCNC: ABNORMAL PG/ML (ref 0–1800)
OXYHGB MFR BLD: 95.5 % (ref 95–96)
OXYHGB MFR BLD: 96.9 % (ref 95–96)
OXYHGB MFR BLDV: 62.8 % (ref 70–75)
P AXIS - MUSE: NORMAL DEGREES
PATH REPORT.COMMENTS IMP SPEC: NORMAL
PATH REPORT.COMMENTS IMP SPEC: NORMAL
PATH REPORT.FINAL DX SPEC: NORMAL
PATH REPORT.MICROSCOPIC SPEC OTHER STN: NORMAL
PATH REPORT.MICROSCOPIC SPEC OTHER STN: NORMAL
PATH REPORT.RELEVANT HX SPEC: NORMAL
PCO2 BLD: 35 MM HG (ref 35–45)
PCO2 BLD: 39 MM HG (ref 35–45)
PCO2 BLDV: 49 MM HG (ref 35–50)
PH BLD: 7.29 [PH] (ref 7.37–7.44)
PH BLD: 7.3 [PH] (ref 7.37–7.44)
PH BLDV: 7.36 [PH] (ref 7.35–7.45)
PH UR STRIP: 5.5 [PH] (ref 5–7)
PH UR STRIP: 5.5 [PH] (ref 5–7)
PHOSPHATE SERPL-MCNC: 2.8 MG/DL (ref 2.5–4.5)
PHOSPHATE SERPL-MCNC: 3.2 MG/DL (ref 2.5–4.5)
PHOSPHATE SERPL-MCNC: 4.4 MG/DL (ref 2.5–4.5)
PLATELET # BLD AUTO: 101 10E3/UL (ref 150–450)
PLATELET # BLD AUTO: 109 10E3/UL (ref 150–450)
PLATELET # BLD AUTO: 110 10E3/UL (ref 150–450)
PLATELET # BLD AUTO: 110 10E3/UL (ref 150–450)
PLATELET # BLD AUTO: 111 10E3/UL (ref 150–450)
PLATELET # BLD AUTO: 111 10E3/UL (ref 150–450)
PLATELET # BLD AUTO: 112 10E3/UL (ref 150–450)
PLATELET # BLD AUTO: 114 10E3/UL (ref 150–450)
PLATELET # BLD AUTO: 119 10E3/UL (ref 150–450)
PLATELET # BLD AUTO: 127 10E3/UL (ref 150–450)
PLATELET # BLD AUTO: 133 10E3/UL (ref 150–450)
PLATELET # BLD AUTO: 182 10E3/UL (ref 150–450)
PLATELET # BLD AUTO: 205 10E3/UL (ref 150–450)
PLATELET # BLD AUTO: 96 10E3/UL (ref 150–450)
PO2 BLD: 80 MM HG (ref 75–85)
PO2 BLD: 96 MM HG (ref 75–85)
PO2 BLDV: 35 MM HG (ref 25–47)
POTASSIUM SERPL-SCNC: 3.2 MMOL/L (ref 3.4–5.3)
POTASSIUM SERPL-SCNC: 3.4 MMOL/L (ref 3.4–5.3)
POTASSIUM SERPL-SCNC: 3.5 MMOL/L (ref 3.4–5.3)
POTASSIUM SERPL-SCNC: 3.6 MMOL/L (ref 3.4–5.3)
POTASSIUM SERPL-SCNC: 3.7 MMOL/L (ref 3.4–5.3)
POTASSIUM SERPL-SCNC: 3.7 MMOL/L (ref 3.4–5.3)
POTASSIUM SERPL-SCNC: 3.8 MMOL/L (ref 3.4–5.3)
POTASSIUM SERPL-SCNC: 3.9 MMOL/L (ref 3.4–5.3)
POTASSIUM SERPL-SCNC: 3.9 MMOL/L (ref 3.4–5.3)
POTASSIUM SERPL-SCNC: 4.1 MMOL/L (ref 3.4–5.3)
POTASSIUM SERPL-SCNC: 4.6 MMOL/L (ref 3.4–5.3)
POTASSIUM SERPL-SCNC: 4.9 MMOL/L (ref 3.4–5.3)
POTASSIUM SERPL-SCNC: 5.2 MMOL/L (ref 3.4–5.3)
POTASSIUM SERPL-SCNC: 5.4 MMOL/L (ref 3.4–5.3)
POTASSIUM SERPL-SCNC: 5.6 MMOL/L (ref 3.4–5.3)
POTASSIUM SERPL-SCNC: 5.8 MMOL/L (ref 3.4–5.3)
POTASSIUM SERPL-SCNC: 5.8 MMOL/L (ref 3.4–5.3)
POTASSIUM SERPL-SCNC: 5.9 MMOL/L (ref 3.4–5.3)
POTASSIUM SERPL-SCNC: 6.2 MMOL/L (ref 3.4–5.3)
POTASSIUM SERPL-SCNC: 6.8 MMOL/L (ref 3.4–5.3)
POTASSIUM SERPL-SCNC: 6.9 MMOL/L (ref 3.4–5.3)
PR INTERVAL - MUSE: NORMAL MS
PROCALCITONIN SERPL IA-MCNC: 0.17 NG/ML
PROCALCITONIN SERPL IA-MCNC: 0.51 NG/ML
PROCALCITONIN SERPL IA-MCNC: 0.56 NG/ML
PROT PATTERN SERPL ELPH-IMP: ABNORMAL
PROT PATTERN SERPL IFE-IMP: NORMAL
PROT SERPL-MCNC: 6 G/DL (ref 6.4–8.3)
PROT SERPL-MCNC: 6.1 G/DL (ref 6.4–8.3)
PROT SERPL-MCNC: 6.4 G/DL (ref 6.4–8.3)
PROT SERPL-MCNC: 6.6 G/DL (ref 6.4–8.3)
PROT SERPL-MCNC: 6.6 G/DL (ref 6.4–8.3)
PROT SERPL-MCNC: 7 G/DL (ref 6.4–8.3)
PROT SERPL-MCNC: 7.1 G/DL (ref 6.4–8.3)
PROT SERPL-MCNC: 7.7 G/DL (ref 6.4–8.3)
QRS DURATION - MUSE: 100 MS
QRS DURATION - MUSE: 88 MS
QRS DURATION - MUSE: 88 MS
QT - MUSE: 340 MS
QT - MUSE: 354 MS
QT - MUSE: 392 MS
QTC - MUSE: 420 MS
QTC - MUSE: 437 MS
QTC - MUSE: 451 MS
QUANTIFERON MITOGEN: 1.41 IU/ML
QUANTIFERON NIL TUBE: 0 IU/ML
QUANTIFERON TB1 TUBE: 0 IU/ML
QUANTIFERON TB2 TUBE: 0
R AXIS - MUSE: 100 DEGREES
R AXIS - MUSE: 122 DEGREES
R AXIS - MUSE: 63 DEGREES
RBC # BLD AUTO: 2.72 10E6/UL (ref 3.8–5.2)
RBC # BLD AUTO: 2.78 10E6/UL (ref 3.8–5.2)
RBC # BLD AUTO: 2.93 10E6/UL (ref 3.8–5.2)
RBC # BLD AUTO: 2.98 10E6/UL (ref 3.8–5.2)
RBC # BLD AUTO: 3 10E6/UL (ref 3.8–5.2)
RBC # BLD AUTO: 3.02 10E6/UL (ref 3.8–5.2)
RBC # BLD AUTO: 3.03 10E6/UL (ref 3.8–5.2)
RBC # BLD AUTO: 3.03 10E6/UL (ref 3.8–5.2)
RBC # BLD AUTO: 3.06 10E6/UL (ref 3.8–5.2)
RBC # BLD AUTO: 3.06 10E6/UL (ref 3.8–5.2)
RBC # BLD AUTO: 3.08 10E6/UL (ref 3.8–5.2)
RBC # BLD AUTO: 3.13 10E6/UL (ref 3.8–5.2)
RBC # BLD AUTO: 3.16 10E6/UL (ref 3.8–5.2)
RBC # BLD AUTO: 3.6 10E6/UL (ref 3.8–5.2)
RBC URINE: 132 /HPF
RBC URINE: >182 /HPF
RETICS # AUTO: 0.05 10E6/UL (ref 0.01–0.11)
RETICS # AUTO: 0.06 10E6/UL (ref 0.01–0.11)
RETICS/RBC NFR AUTO: 1.6 % (ref 0.8–2.7)
RETICS/RBC NFR AUTO: 2.1 % (ref 0.8–2.7)
SA TARGET DNA: POSITIVE
SAO2 % BLDV: 64 % (ref 70–75)
SODIUM SERPL-SCNC: 135 MMOL/L (ref 135–145)
SODIUM SERPL-SCNC: 137 MMOL/L (ref 135–145)
SODIUM SERPL-SCNC: 138 MMOL/L (ref 135–145)
SODIUM SERPL-SCNC: 139 MMOL/L (ref 135–145)
SODIUM SERPL-SCNC: 140 MMOL/L (ref 135–145)
SODIUM SERPL-SCNC: 141 MMOL/L (ref 135–145)
SP GR UR STRIP: 1.01 (ref 1–1.03)
SP GR UR STRIP: 1.02 (ref 1–1.03)
SPECIMEN EXPIRATION DATE: NORMAL
SPECIMEN EXPIRATION DATE: NORMAL
SQUAMOUS EPITHELIAL: 4 /HPF
SYSTOLIC BLOOD PRESSURE - MUSE: 118 MMHG
SYSTOLIC BLOOD PRESSURE - MUSE: 91 MMHG
SYSTOLIC BLOOD PRESSURE - MUSE: NORMAL MMHG
T AXIS - MUSE: -1 DEGREES
T AXIS - MUSE: -2 DEGREES
T AXIS - MUSE: 241 DEGREES
T4 FREE SERPL-MCNC: 0.5 NG/DL (ref 0.9–1.7)
T4 FREE SERPL-MCNC: 0.59 NG/DL (ref 0.9–1.7)
TEMPERATURE: 37 DEGREES C
TEMPERATURE: 37 DEGREES C
THYROPEROXIDASE AB SERPL-ACNC: <10 IU/ML
TOTAL PROTEIN SERUM FOR ELP: 6.7 G/DL (ref 6.4–8.3)
TROPONIN T SERPL HS-MCNC: 79 NG/L
TROPONIN T SERPL HS-MCNC: 81 NG/L
TROPONIN T SERPL HS-MCNC: 82 NG/L
TROPONIN T SERPL HS-MCNC: 84 NG/L
TSH SERPL DL<=0.005 MIU/L-ACNC: 5.41 UIU/ML (ref 0.3–4.2)
TSH SERPL DL<=0.005 MIU/L-ACNC: 5.43 UIU/ML (ref 0.3–4.2)
UFH PPP CHRO-ACNC: 0.42 IU/ML
UROBILINOGEN UR STRIP-MCNC: <2 MG/DL
UROBILINOGEN UR STRIP-MCNC: <2 MG/DL
VANCOMYCIN SERPL-MCNC: 16.8 UG/ML
VANCOMYCIN SERPL-MCNC: 20.2 UG/ML
VANCOMYCIN SERPL-MCNC: 20.3 UG/ML
VANCOMYCIN SERPL-MCNC: 21.4 UG/ML
VENTRICULAR RATE- MUSE: 75 BPM
VENTRICULAR RATE- MUSE: 92 BPM
VENTRICULAR RATE- MUSE: 98 BPM
VIT B1 PYROPHOSHATE BLD-SCNC: 87 NMOL/L
VIT B12 SERPL-MCNC: 814 PG/ML (ref 232–1245)
WBC # BLD AUTO: 10.3 10E3/UL (ref 4–11)
WBC # BLD AUTO: 10.7 10E3/UL (ref 4–11)
WBC # BLD AUTO: 5.9 10E3/UL (ref 4–11)
WBC # BLD AUTO: 7.5 10E3/UL (ref 4–11)
WBC # BLD AUTO: 7.9 10E3/UL (ref 4–11)
WBC # BLD AUTO: 7.9 10E3/UL (ref 4–11)
WBC # BLD AUTO: 8.2 10E3/UL (ref 4–11)
WBC # BLD AUTO: 8.2 10E3/UL (ref 4–11)
WBC # BLD AUTO: 8.5 10E3/UL (ref 4–11)
WBC # BLD AUTO: 9 10E3/UL (ref 4–11)
WBC # BLD AUTO: 9 10E3/UL (ref 4–11)
WBC # BLD AUTO: 9.3 10E3/UL (ref 4–11)
WBC # BLD AUTO: 9.3 10E3/UL (ref 4–11)
WBC # BLD AUTO: 9.9 10E3/UL (ref 4–11)
WBC CLUMPS #/AREA URNS HPF: PRESENT /HPF
WBC URINE: >182 /HPF
WBC URINE: >182 /HPF
YEAST #/AREA URNS HPF: ABNORMAL /HPF

## 2023-01-01 PROCEDURE — 258N000003 HC RX IP 258 OP 636: Performed by: INTERNAL MEDICINE

## 2023-01-01 PROCEDURE — 250N000013 HC RX MED GY IP 250 OP 250 PS 637: Performed by: INTERNAL MEDICINE

## 2023-01-01 PROCEDURE — 200N000001 HC R&B ICU

## 2023-01-01 PROCEDURE — 250N000013 HC RX MED GY IP 250 OP 250 PS 637: Performed by: FAMILY MEDICINE

## 2023-01-01 PROCEDURE — 80202 ASSAY OF VANCOMYCIN: CPT | Performed by: INTERNAL MEDICINE

## 2023-01-01 PROCEDURE — 94660 CPAP INITIATION&MGMT: CPT

## 2023-01-01 PROCEDURE — 99207 PR NO BILLABLE SERVICE THIS VISIT: CPT | Performed by: INTERNAL MEDICINE

## 2023-01-01 PROCEDURE — 97530 THERAPEUTIC ACTIVITIES: CPT | Mod: GP

## 2023-01-01 PROCEDURE — 999N000156 HC STATISTIC RCP CONSULT EA 30 MIN

## 2023-01-01 PROCEDURE — 99291 CRITICAL CARE FIRST HOUR: CPT | Performed by: INTERNAL MEDICINE

## 2023-01-01 PROCEDURE — 999N000157 HC STATISTIC RCP TIME EA 10 MIN

## 2023-01-01 PROCEDURE — 86162 COMPLEMENT TOTAL (CH50): CPT | Performed by: INTERNAL MEDICINE

## 2023-01-01 PROCEDURE — 210N000001 HC R&B IMCU HEART CARE

## 2023-01-01 PROCEDURE — 36415 COLL VENOUS BLD VENIPUNCTURE: CPT | Performed by: FAMILY MEDICINE

## 2023-01-01 PROCEDURE — 93010 ELECTROCARDIOGRAM REPORT: CPT | Mod: HIP | Performed by: INTERNAL MEDICINE

## 2023-01-01 PROCEDURE — 97535 SELF CARE MNGMENT TRAINING: CPT | Mod: GO

## 2023-01-01 PROCEDURE — 86160 COMPLEMENT ANTIGEN: CPT | Performed by: INTERNAL MEDICINE

## 2023-01-01 PROCEDURE — G0463 HOSPITAL OUTPT CLINIC VISIT: HCPCS | Performed by: PODIATRIST

## 2023-01-01 PROCEDURE — 255N000002 HC RX 255 OP 636: Performed by: INTERNAL MEDICINE

## 2023-01-01 PROCEDURE — 99231 SBSQ HOSP IP/OBS SF/LOW 25: CPT | Performed by: INTERNAL MEDICINE

## 2023-01-01 PROCEDURE — 250N000011 HC RX IP 250 OP 636: Performed by: INTERNAL MEDICINE

## 2023-01-01 PROCEDURE — 258N000001 HC RX 258: Performed by: INTERNAL MEDICINE

## 2023-01-01 PROCEDURE — 250N000013 HC RX MED GY IP 250 OP 250 PS 637

## 2023-01-01 PROCEDURE — 85027 COMPLETE CBC AUTOMATED: CPT | Performed by: INTERNAL MEDICINE

## 2023-01-01 PROCEDURE — 85025 COMPLETE CBC W/AUTO DIFF WBC: CPT | Performed by: FAMILY MEDICINE

## 2023-01-01 PROCEDURE — 80069 RENAL FUNCTION PANEL: CPT | Performed by: INTERNAL MEDICINE

## 2023-01-01 PROCEDURE — P9604 ONE-WAY ALLOW PRORATED TRIP: HCPCS | Performed by: FAMILY MEDICINE

## 2023-01-01 PROCEDURE — 85730 THROMBOPLASTIN TIME PARTIAL: CPT | Performed by: INTERNAL MEDICINE

## 2023-01-01 PROCEDURE — 82805 BLOOD GASES W/O2 SATURATION: CPT | Performed by: INTERNAL MEDICINE

## 2023-01-01 PROCEDURE — 94640 AIRWAY INHALATION TREATMENT: CPT | Mod: 76

## 2023-01-01 PROCEDURE — 85045 AUTOMATED RETICULOCYTE COUNT: CPT | Performed by: INTERNAL MEDICINE

## 2023-01-01 PROCEDURE — G1010 CDSM STANSON: HCPCS

## 2023-01-01 PROCEDURE — C9113 INJ PANTOPRAZOLE SODIUM, VIA: HCPCS | Mod: JZ | Performed by: INTERNAL MEDICINE

## 2023-01-01 PROCEDURE — 5A1D70Z PERFORMANCE OF URINARY FILTRATION, INTERMITTENT, LESS THAN 6 HOURS PER DAY: ICD-10-PCS | Performed by: NURSE PRACTITIONER

## 2023-01-01 PROCEDURE — 86334 IMMUNOFIX E-PHORESIS SERUM: CPT | Mod: 26 | Performed by: PATHOLOGY

## 2023-01-01 PROCEDURE — 82533 TOTAL CORTISOL: CPT | Performed by: INTERNAL MEDICINE

## 2023-01-01 PROCEDURE — 86481 TB AG RESPONSE T-CELL SUSP: CPT

## 2023-01-01 PROCEDURE — 93005 ELECTROCARDIOGRAM TRACING: CPT

## 2023-01-01 PROCEDURE — 94640 AIRWAY INHALATION TREATMENT: CPT

## 2023-01-01 PROCEDURE — 87077 CULTURE AEROBIC IDENTIFY: CPT | Performed by: INTERNAL MEDICINE

## 2023-01-01 PROCEDURE — 11042 DBRDMT SUBQ TIS 1ST 20SQCM/<: CPT | Performed by: PODIATRIST

## 2023-01-01 PROCEDURE — 90935 HEMODIALYSIS ONE EVALUATION: CPT

## 2023-01-01 PROCEDURE — 83516 IMMUNOASSAY NONANTIBODY: CPT | Performed by: INTERNAL MEDICINE

## 2023-01-01 PROCEDURE — 80048 BASIC METABOLIC PNL TOTAL CA: CPT | Performed by: INTERNAL MEDICINE

## 2023-01-01 PROCEDURE — 99213 OFFICE O/P EST LOW 20 MIN: CPT | Mod: 25 | Performed by: PODIATRIST

## 2023-01-01 PROCEDURE — 87641 MR-STAPH DNA AMP PROBE: CPT | Performed by: INTERNAL MEDICINE

## 2023-01-01 PROCEDURE — 84132 ASSAY OF SERUM POTASSIUM: CPT | Performed by: FAMILY MEDICINE

## 2023-01-01 PROCEDURE — 250N000011 HC RX IP 250 OP 636: Mod: JZ | Performed by: INTERNAL MEDICINE

## 2023-01-01 PROCEDURE — 85520 HEPARIN ASSAY: CPT | Performed by: INTERNAL MEDICINE

## 2023-01-01 PROCEDURE — 93970 EXTREMITY STUDY: CPT | Mod: XS

## 2023-01-01 PROCEDURE — 272N000452 HC KIT SHRLOCK 5FR POWER PICC TRIPLE LUMEN

## 2023-01-01 PROCEDURE — 94799 UNLISTED PULMONARY SVC/PX: CPT

## 2023-01-01 PROCEDURE — 250N000009 HC RX 250: Performed by: INTERNAL MEDICINE

## 2023-01-01 PROCEDURE — 80053 COMPREHEN METABOLIC PANEL: CPT | Performed by: INTERNAL MEDICINE

## 2023-01-01 PROCEDURE — 84145 PROCALCITONIN (PCT): CPT | Performed by: INTERNAL MEDICINE

## 2023-01-01 PROCEDURE — 85025 COMPLETE CBC W/AUTO DIFF WBC: CPT | Performed by: INTERNAL MEDICINE

## 2023-01-01 PROCEDURE — 99222 1ST HOSP IP/OBS MODERATE 55: CPT | Performed by: FAMILY MEDICINE

## 2023-01-01 PROCEDURE — 83735 ASSAY OF MAGNESIUM: CPT | Performed by: INTERNAL MEDICINE

## 2023-01-01 PROCEDURE — 120N000004 HC R&B MS OVERFLOW

## 2023-01-01 PROCEDURE — 99214 OFFICE O/P EST MOD 30 MIN: CPT | Mod: VID | Performed by: INTERNAL MEDICINE

## 2023-01-01 PROCEDURE — 999N000287 HC ICU ADULT ROUNDING, EACH 10 MINS

## 2023-01-01 PROCEDURE — 258N000001 HC RX 258: Performed by: FAMILY MEDICINE

## 2023-01-01 PROCEDURE — 258N000001 HC RX 258: Performed by: EMERGENCY MEDICINE

## 2023-01-01 PROCEDURE — 99309 SBSQ NF CARE MODERATE MDM 30: CPT | Performed by: NURSE PRACTITIONER

## 2023-01-01 PROCEDURE — 82525 ASSAY OF COPPER: CPT | Performed by: INTERNAL MEDICINE

## 2023-01-01 PROCEDURE — 99232 SBSQ HOSP IP/OBS MODERATE 35: CPT | Performed by: INTERNAL MEDICINE

## 2023-01-01 PROCEDURE — 36558 INSERT TUNNELED CV CATH: CPT

## 2023-01-01 PROCEDURE — 71045 X-RAY EXAM CHEST 1 VIEW: CPT

## 2023-01-01 PROCEDURE — 99233 SBSQ HOSP IP/OBS HIGH 50: CPT | Performed by: INTERNAL MEDICINE

## 2023-01-01 PROCEDURE — 92526 ORAL FUNCTION THERAPY: CPT | Mod: GN

## 2023-01-01 PROCEDURE — 84132 ASSAY OF SERUM POTASSIUM: CPT | Performed by: EMERGENCY MEDICINE

## 2023-01-01 PROCEDURE — 82140 ASSAY OF AMMONIA: CPT | Performed by: EMERGENCY MEDICINE

## 2023-01-01 PROCEDURE — 83605 ASSAY OF LACTIC ACID: CPT | Performed by: INTERNAL MEDICINE

## 2023-01-01 PROCEDURE — 99215 OFFICE O/P EST HI 40 MIN: CPT | Mod: VID | Performed by: INTERNAL MEDICINE

## 2023-01-01 PROCEDURE — 90935 HEMODIALYSIS ONE EVALUATION: CPT | Performed by: INTERNAL MEDICINE

## 2023-01-01 PROCEDURE — 250N000013 HC RX MED GY IP 250 OP 250 PS 637: Performed by: HOSPITALIST

## 2023-01-01 PROCEDURE — 84100 ASSAY OF PHOSPHORUS: CPT | Performed by: INTERNAL MEDICINE

## 2023-01-01 PROCEDURE — 82248 BILIRUBIN DIRECT: CPT | Performed by: INTERNAL MEDICINE

## 2023-01-01 PROCEDURE — C1769 GUIDE WIRE: HCPCS

## 2023-01-01 PROCEDURE — 96365 THER/PROPH/DIAG IV INF INIT: CPT | Mod: 59

## 2023-01-01 PROCEDURE — 83615 LACTATE (LD) (LDH) ENZYME: CPT | Performed by: INTERNAL MEDICINE

## 2023-01-01 PROCEDURE — 36415 COLL VENOUS BLD VENIPUNCTURE: CPT | Performed by: INTERNAL MEDICINE

## 2023-01-01 PROCEDURE — 76536 US EXAM OF HEAD AND NECK: CPT

## 2023-01-01 PROCEDURE — 99207 PR NO CHARGE LOS: CPT | Performed by: INTERNAL MEDICINE

## 2023-01-01 PROCEDURE — 86140 C-REACTIVE PROTEIN: CPT | Performed by: EMERGENCY MEDICINE

## 2023-01-01 PROCEDURE — 84155 ASSAY OF PROTEIN SERUM: CPT | Performed by: INTERNAL MEDICINE

## 2023-01-01 PROCEDURE — 84145 PROCALCITONIN (PCT): CPT | Performed by: EMERGENCY MEDICINE

## 2023-01-01 PROCEDURE — C8924 2D TTE W OR W/O FOL W/CON,FU: HCPCS

## 2023-01-01 PROCEDURE — 83605 ASSAY OF LACTIC ACID: CPT | Performed by: HOSPITALIST

## 2023-01-01 PROCEDURE — 84484 ASSAY OF TROPONIN QUANT: CPT | Performed by: EMERGENCY MEDICINE

## 2023-01-01 PROCEDURE — 0JH63XZ INSERTION OF TUNNELED VASCULAR ACCESS DEVICE INTO CHEST SUBCUTANEOUS TISSUE AND FASCIA, PERCUTANEOUS APPROACH: ICD-10-PCS | Performed by: RADIOLOGY

## 2023-01-01 PROCEDURE — 85025 COMPLETE CBC W/AUTO DIFF WBC: CPT | Performed by: STUDENT IN AN ORGANIZED HEALTH CARE EDUCATION/TRAINING PROGRAM

## 2023-01-01 PROCEDURE — P9047 ALBUMIN (HUMAN), 25%, 50ML: HCPCS | Performed by: INTERNAL MEDICINE

## 2023-01-01 PROCEDURE — 84484 ASSAY OF TROPONIN QUANT: CPT | Performed by: FAMILY MEDICINE

## 2023-01-01 PROCEDURE — 86140 C-REACTIVE PROTEIN: CPT | Performed by: INTERNAL MEDICINE

## 2023-01-01 PROCEDURE — 85027 COMPLETE CBC AUTOMATED: CPT | Performed by: FAMILY MEDICINE

## 2023-01-01 PROCEDURE — 83010 ASSAY OF HAPTOGLOBIN QUANT: CPT | Performed by: INTERNAL MEDICINE

## 2023-01-01 PROCEDURE — 84443 ASSAY THYROID STIM HORMONE: CPT | Performed by: INTERNAL MEDICINE

## 2023-01-01 PROCEDURE — 81001 URINALYSIS AUTO W/SCOPE: CPT | Performed by: EMERGENCY MEDICINE

## 2023-01-01 PROCEDURE — 99232 SBSQ HOSP IP/OBS MODERATE 35: CPT

## 2023-01-01 PROCEDURE — 83605 ASSAY OF LACTIC ACID: CPT | Performed by: STUDENT IN AN ORGANIZED HEALTH CARE EDUCATION/TRAINING PROGRAM

## 2023-01-01 PROCEDURE — 97110 THERAPEUTIC EXERCISES: CPT | Mod: GP

## 2023-01-01 PROCEDURE — 86036 ANCA SCREEN EACH ANTIBODY: CPT | Performed by: INTERNAL MEDICINE

## 2023-01-01 PROCEDURE — 74176 CT ABD & PELVIS W/O CONTRAST: CPT | Mod: MA

## 2023-01-01 PROCEDURE — 83550 IRON BINDING TEST: CPT | Performed by: INTERNAL MEDICINE

## 2023-01-01 PROCEDURE — 84439 ASSAY OF FREE THYROXINE: CPT | Performed by: INTERNAL MEDICINE

## 2023-01-01 PROCEDURE — 250N000009 HC RX 250: Performed by: STUDENT IN AN ORGANIZED HEALTH CARE EDUCATION/TRAINING PROGRAM

## 2023-01-01 PROCEDURE — 71250 CT THORAX DX C-: CPT | Mod: MG

## 2023-01-01 PROCEDURE — 90937 HEMODIALYSIS REPEATED EVAL: CPT

## 2023-01-01 PROCEDURE — 82550 ASSAY OF CK (CPK): CPT | Performed by: INTERNAL MEDICINE

## 2023-01-01 PROCEDURE — 92610 EVALUATE SWALLOWING FUNCTION: CPT | Mod: GN

## 2023-01-01 PROCEDURE — 87493 C DIFF AMPLIFIED PROBE: CPT | Performed by: INTERNAL MEDICINE

## 2023-01-01 PROCEDURE — 86376 MICROSOMAL ANTIBODY EACH: CPT | Performed by: INTERNAL MEDICINE

## 2023-01-01 PROCEDURE — 86706 HEP B SURFACE ANTIBODY: CPT | Performed by: INTERNAL MEDICINE

## 2023-01-01 PROCEDURE — 93005 ELECTROCARDIOGRAM TRACING: CPT | Performed by: INTERNAL MEDICINE

## 2023-01-01 PROCEDURE — 84132 ASSAY OF SERUM POTASSIUM: CPT | Performed by: HOSPITALIST

## 2023-01-01 PROCEDURE — 76770 US EXAM ABDO BACK WALL COMP: CPT

## 2023-01-01 PROCEDURE — 87340 HEPATITIS B SURFACE AG IA: CPT | Performed by: INTERNAL MEDICINE

## 2023-01-01 PROCEDURE — 86880 COOMBS TEST DIRECT: CPT | Performed by: INTERNAL MEDICINE

## 2023-01-01 PROCEDURE — 84425 ASSAY OF VITAMIN B-1: CPT | Performed by: INTERNAL MEDICINE

## 2023-01-01 PROCEDURE — 250N000009 HC RX 250: Performed by: EMERGENCY MEDICINE

## 2023-01-01 PROCEDURE — 250N000011 HC RX IP 250 OP 636: Mod: JZ | Performed by: EMERGENCY MEDICINE

## 2023-01-01 PROCEDURE — 250N000011 HC RX IP 250 OP 636: Performed by: RADIOLOGY

## 2023-01-01 PROCEDURE — 85379 FIBRIN DEGRADATION QUANT: CPT | Performed by: INTERNAL MEDICINE

## 2023-01-01 PROCEDURE — 250N000012 HC RX MED GY IP 250 OP 636 PS 637: Performed by: EMERGENCY MEDICINE

## 2023-01-01 PROCEDURE — 87205 SMEAR GRAM STAIN: CPT | Performed by: INTERNAL MEDICINE

## 2023-01-01 PROCEDURE — 87106 FUNGI IDENTIFICATION YEAST: CPT | Performed by: INTERNAL MEDICINE

## 2023-01-01 PROCEDURE — 83880 ASSAY OF NATRIURETIC PEPTIDE: CPT | Performed by: HOSPITALIST

## 2023-01-01 PROCEDURE — 99291 CRITICAL CARE FIRST HOUR: CPT | Mod: 25

## 2023-01-01 PROCEDURE — 83735 ASSAY OF MAGNESIUM: CPT | Performed by: HOSPITALIST

## 2023-01-01 PROCEDURE — 82962 GLUCOSE BLOOD TEST: CPT

## 2023-01-01 PROCEDURE — G0463 HOSPITAL OUTPT CLINIC VISIT: HCPCS | Mod: 25

## 2023-01-01 PROCEDURE — 87077 CULTURE AEROBIC IDENTIFY: CPT | Performed by: PODIATRIST

## 2023-01-01 PROCEDURE — 258N000003 HC RX IP 258 OP 636: Performed by: EMERGENCY MEDICINE

## 2023-01-01 PROCEDURE — 36600 WITHDRAWAL OF ARTERIAL BLOOD: CPT

## 2023-01-01 PROCEDURE — 73560 X-RAY EXAM OF KNEE 1 OR 2: CPT | Mod: LT

## 2023-01-01 PROCEDURE — 85025 COMPLETE CBC W/AUTO DIFF WBC: CPT | Performed by: EMERGENCY MEDICINE

## 2023-01-01 PROCEDURE — 84165 PROTEIN E-PHORESIS SERUM: CPT | Mod: 26 | Performed by: PATHOLOGY

## 2023-01-01 PROCEDURE — 86038 ANTINUCLEAR ANTIBODIES: CPT | Performed by: INTERNAL MEDICINE

## 2023-01-01 PROCEDURE — 87040 BLOOD CULTURE FOR BACTERIA: CPT | Performed by: NURSE PRACTITIONER

## 2023-01-01 PROCEDURE — 36415 COLL VENOUS BLD VENIPUNCTURE: CPT | Performed by: HOSPITALIST

## 2023-01-01 PROCEDURE — 87040 BLOOD CULTURE FOR BACTERIA: CPT | Performed by: INTERNAL MEDICINE

## 2023-01-01 PROCEDURE — 83521 IG LIGHT CHAINS FREE EACH: CPT | Performed by: INTERNAL MEDICINE

## 2023-01-01 PROCEDURE — 90662 IIV NO PRSV INCREASED AG IM: CPT | Performed by: INTERNAL MEDICINE

## 2023-01-01 PROCEDURE — 73552 X-RAY EXAM OF FEMUR 2/>: CPT | Mod: LT

## 2023-01-01 PROCEDURE — 36569 INSJ PICC 5 YR+ W/O IMAGING: CPT

## 2023-01-01 PROCEDURE — 82310 ASSAY OF CALCIUM: CPT | Performed by: INTERNAL MEDICINE

## 2023-01-01 PROCEDURE — 02H633Z INSERTION OF INFUSION DEVICE INTO RIGHT ATRIUM, PERCUTANEOUS APPROACH: ICD-10-PCS | Performed by: RADIOLOGY

## 2023-01-01 PROCEDURE — C1750 CATH, HEMODIALYSIS,LONG-TERM: HCPCS

## 2023-01-01 PROCEDURE — 85060 BLOOD SMEAR INTERPRETATION: CPT | Performed by: PATHOLOGY

## 2023-01-01 PROCEDURE — 86803 HEPATITIS C AB TEST: CPT | Performed by: INTERNAL MEDICINE

## 2023-01-01 PROCEDURE — 84484 ASSAY OF TROPONIN QUANT: CPT | Performed by: HOSPITALIST

## 2023-01-01 PROCEDURE — 36415 COLL VENOUS BLD VENIPUNCTURE: CPT | Performed by: EMERGENCY MEDICINE

## 2023-01-01 PROCEDURE — 258N000003 HC RX IP 258 OP 636: Performed by: FAMILY MEDICINE

## 2023-01-01 PROCEDURE — 86334 IMMUNOFIX E-PHORESIS SERUM: CPT | Performed by: PATHOLOGY

## 2023-01-01 PROCEDURE — 82746 ASSAY OF FOLIC ACID SERUM: CPT | Performed by: INTERNAL MEDICINE

## 2023-01-01 PROCEDURE — 93321 DOPPLER ECHO F-UP/LMTD STD: CPT | Mod: 26 | Performed by: INTERNAL MEDICINE

## 2023-01-01 PROCEDURE — 84443 ASSAY THYROID STIM HORMONE: CPT | Performed by: EMERGENCY MEDICINE

## 2023-01-01 PROCEDURE — 999N000285 HC STATISTIC VASC ACCESS LAB DRAW WITH PIV START

## 2023-01-01 PROCEDURE — 93005 ELECTROCARDIOGRAM TRACING: CPT | Performed by: HOSPITALIST

## 2023-01-01 PROCEDURE — 87086 URINE CULTURE/COLONY COUNT: CPT | Performed by: INTERNAL MEDICINE

## 2023-01-01 PROCEDURE — 99203 OFFICE O/P NEW LOW 30 MIN: CPT | Mod: 25 | Performed by: PODIATRIST

## 2023-01-01 PROCEDURE — 999N000065 XR CHEST PORT 1 VIEW

## 2023-01-01 PROCEDURE — 82607 VITAMIN B-12: CPT | Performed by: INTERNAL MEDICINE

## 2023-01-01 PROCEDURE — 272N000500 HC NEEDLE CR2

## 2023-01-01 PROCEDURE — 99239 HOSP IP/OBS DSCHRG MGMT >30: CPT | Performed by: INTERNAL MEDICINE

## 2023-01-01 PROCEDURE — 86704 HEP B CORE ANTIBODY TOTAL: CPT | Performed by: INTERNAL MEDICINE

## 2023-01-01 PROCEDURE — 86037 ANCA TITER EACH ANTIBODY: CPT | Performed by: INTERNAL MEDICINE

## 2023-01-01 PROCEDURE — P9045 ALBUMIN (HUMAN), 5%, 250 ML: HCPCS | Mod: JZ | Performed by: EMERGENCY MEDICINE

## 2023-01-01 PROCEDURE — P9045 ALBUMIN (HUMAN), 5%, 250 ML: HCPCS | Mod: JZ | Performed by: INTERNAL MEDICINE

## 2023-01-01 PROCEDURE — 82248 BILIRUBIN DIRECT: CPT | Performed by: EMERGENCY MEDICINE

## 2023-01-01 PROCEDURE — 99223 1ST HOSP IP/OBS HIGH 75: CPT | Performed by: INTERNAL MEDICINE

## 2023-01-01 PROCEDURE — 3E043XZ INTRODUCTION OF VASOPRESSOR INTO CENTRAL VEIN, PERCUTANEOUS APPROACH: ICD-10-PCS | Performed by: INTERNAL MEDICINE

## 2023-01-01 PROCEDURE — 97110 THERAPEUTIC EXERCISES: CPT | Mod: GO

## 2023-01-01 PROCEDURE — 93005 ELECTROCARDIOGRAM TRACING: CPT | Performed by: EMERGENCY MEDICINE

## 2023-01-01 PROCEDURE — 74220 X-RAY XM ESOPHAGUS 1CNTRST: CPT

## 2023-01-01 PROCEDURE — 250N000011 HC RX IP 250 OP 636: Mod: JZ | Performed by: FAMILY MEDICINE

## 2023-01-01 PROCEDURE — 80048 BASIC METABOLIC PNL TOTAL CA: CPT | Performed by: FAMILY MEDICINE

## 2023-01-01 PROCEDURE — 999N000197 HC STATISTIC WOC PT EDUCATION, 0-15 MIN

## 2023-01-01 PROCEDURE — 97166 OT EVAL MOD COMPLEX 45 MIN: CPT | Mod: GO

## 2023-01-01 PROCEDURE — 84165 PROTEIN E-PHORESIS SERUM: CPT | Mod: TC | Performed by: PATHOLOGY

## 2023-01-01 PROCEDURE — 97162 PT EVAL MOD COMPLEX 30 MIN: CPT | Mod: GP

## 2023-01-01 PROCEDURE — 87086 URINE CULTURE/COLONY COUNT: CPT | Performed by: EMERGENCY MEDICINE

## 2023-01-01 PROCEDURE — 93325 DOPPLER ECHO COLOR FLOW MAPG: CPT | Mod: 26 | Performed by: INTERNAL MEDICINE

## 2023-01-01 PROCEDURE — 93306 TTE W/DOPPLER COMPLETE: CPT | Mod: 26 | Performed by: INTERNAL MEDICINE

## 2023-01-01 PROCEDURE — 99305 1ST NF CARE MODERATE MDM 35: CPT | Performed by: FAMILY MEDICINE

## 2023-01-01 PROCEDURE — 93308 TTE F-UP OR LMTD: CPT | Mod: 26 | Performed by: INTERNAL MEDICINE

## 2023-01-01 PROCEDURE — 83605 ASSAY OF LACTIC ACID: CPT | Performed by: EMERGENCY MEDICINE

## 2023-01-01 PROCEDURE — 93970 EXTREMITY STUDY: CPT

## 2023-01-01 PROCEDURE — 99222 1ST HOSP IP/OBS MODERATE 55: CPT | Mod: 25 | Performed by: INTERNAL MEDICINE

## 2023-01-01 PROCEDURE — 250N000012 HC RX MED GY IP 250 OP 636 PS 637: Performed by: FAMILY MEDICINE

## 2023-01-01 PROCEDURE — G0008 ADMIN INFLUENZA VIRUS VAC: HCPCS | Performed by: INTERNAL MEDICINE

## 2023-01-01 PROCEDURE — 99152 MOD SED SAME PHYS/QHP 5/>YRS: CPT

## 2023-01-01 PROCEDURE — 81001 URINALYSIS AUTO W/SCOPE: CPT | Performed by: INTERNAL MEDICINE

## 2023-01-01 PROCEDURE — 83036 HEMOGLOBIN GLYCOSYLATED A1C: CPT | Performed by: INTERNAL MEDICINE

## 2023-01-01 PROCEDURE — 36415 COLL VENOUS BLD VENIPUNCTURE: CPT | Performed by: NURSE PRACTITIONER

## 2023-01-01 PROCEDURE — 250N000011 HC RX IP 250 OP 636: Mod: JZ | Performed by: NURSE PRACTITIONER

## 2023-01-01 PROCEDURE — 84439 ASSAY OF FREE THYROXINE: CPT | Performed by: EMERGENCY MEDICINE

## 2023-01-01 RX ORDER — MIDODRINE HYDROCHLORIDE 10 MG/1
10 TABLET ORAL
DISCHARGE
Start: 2023-01-01 | End: 2023-01-01

## 2023-01-01 RX ORDER — FUROSEMIDE 40 MG
40 TABLET ORAL DAILY PRN
COMMUNITY
Start: 2023-01-01 | End: 2024-01-01

## 2023-01-01 RX ORDER — ACETAMINOPHEN 325 MG/1
650 TABLET ORAL EVERY 4 HOURS PRN
Status: DISCONTINUED | OUTPATIENT
Start: 2023-01-01 | End: 2023-01-01 | Stop reason: HOSPADM

## 2023-01-01 RX ORDER — NALOXONE HYDROCHLORIDE 0.4 MG/ML
0.2 INJECTION, SOLUTION INTRAMUSCULAR; INTRAVENOUS; SUBCUTANEOUS
Status: DISCONTINUED | OUTPATIENT
Start: 2023-01-01 | End: 2023-01-01 | Stop reason: HOSPADM

## 2023-01-01 RX ORDER — MIDODRINE HYDROCHLORIDE 5 MG/1
5 TABLET ORAL
Status: DISCONTINUED | OUTPATIENT
Start: 2023-01-01 | End: 2023-01-01 | Stop reason: HOSPADM

## 2023-01-01 RX ORDER — DEXTROSE MONOHYDRATE 100 MG/ML
INJECTION, SOLUTION INTRAVENOUS ONCE
Status: DISCONTINUED | OUTPATIENT
Start: 2023-01-01 | End: 2023-01-01

## 2023-01-01 RX ORDER — CEFTRIAXONE 1 G/1
1 INJECTION, POWDER, FOR SOLUTION INTRAMUSCULAR; INTRAVENOUS EVERY 24 HOURS
Status: DISCONTINUED | OUTPATIENT
Start: 2023-01-01 | End: 2023-01-01

## 2023-01-01 RX ORDER — NOREPINEPHRINE BITARTRATE 0.02 MG/ML
.01-.6 INJECTION, SOLUTION INTRAVENOUS CONTINUOUS
Status: DISCONTINUED | OUTPATIENT
Start: 2023-01-01 | End: 2023-01-01

## 2023-01-01 RX ORDER — MIDODRINE HYDROCHLORIDE 10 MG/1
10 TABLET ORAL
Qty: 36 TABLET | Refills: 3 | Status: SHIPPED | OUTPATIENT
Start: 2023-01-01 | End: 2024-01-01

## 2023-01-01 RX ORDER — PIPERACILLIN SODIUM, TAZOBACTAM SODIUM 3; .375 G/15ML; G/15ML
3.38 INJECTION, POWDER, LYOPHILIZED, FOR SOLUTION INTRAVENOUS ONCE
Status: COMPLETED | OUTPATIENT
Start: 2023-01-01 | End: 2023-01-01

## 2023-01-01 RX ORDER — ALBUMIN (HUMAN) 12.5 G/50ML
50 SOLUTION INTRAVENOUS
Status: DISCONTINUED | OUTPATIENT
Start: 2023-01-01 | End: 2023-01-01

## 2023-01-01 RX ORDER — IODINE/SODIUM IODIDE 2 %
TINCTURE TOPICAL
Qty: 177 ML | Refills: 11 | Status: SHIPPED | OUTPATIENT
Start: 2023-01-01 | End: 2023-01-01

## 2023-01-01 RX ORDER — PIPERACILLIN SODIUM, TAZOBACTAM SODIUM 3; .375 G/15ML; G/15ML
3.38 INJECTION, POWDER, LYOPHILIZED, FOR SOLUTION INTRAVENOUS ONCE
Qty: 15 ML | Refills: 0 | Status: COMPLETED | OUTPATIENT
Start: 2023-01-01 | End: 2023-01-01

## 2023-01-01 RX ORDER — NICOTINE POLACRILEX 4 MG
15-30 LOZENGE BUCCAL
Status: DISCONTINUED | OUTPATIENT
Start: 2023-01-01 | End: 2023-01-01

## 2023-01-01 RX ORDER — FOLIC ACID 1 MG/1
1 TABLET ORAL DAILY
Qty: 90 TABLET | Refills: 3 | Status: ON HOLD | OUTPATIENT
Start: 2023-01-01

## 2023-01-01 RX ORDER — MENTHOL AND ZINC OXIDE .44; 20.625 G/100G; G/100G
OINTMENT TOPICAL 4 TIMES DAILY PRN
Qty: 113 G | Refills: 11 | Status: SHIPPED | OUTPATIENT
Start: 2023-01-01 | End: 2024-01-01

## 2023-01-01 RX ORDER — DIGOXIN 0.25 MG/ML
125 INJECTION INTRAMUSCULAR; INTRAVENOUS ONCE
Status: COMPLETED | OUTPATIENT
Start: 2023-01-01 | End: 2023-01-01

## 2023-01-01 RX ORDER — BACLOFEN 10 MG/1
10 TABLET ORAL 3 TIMES DAILY PRN
Status: DISCONTINUED | OUTPATIENT
Start: 2023-01-01 | End: 2023-01-01

## 2023-01-01 RX ORDER — ASPIRIN 81 MG/1
324 TABLET, CHEWABLE ORAL ONCE
Status: COMPLETED | OUTPATIENT
Start: 2023-01-01 | End: 2023-01-01

## 2023-01-01 RX ORDER — FOLIC ACID 5 MG/ML
1 INJECTION, SOLUTION INTRAMUSCULAR; INTRAVENOUS; SUBCUTANEOUS DAILY
Status: DISCONTINUED | OUTPATIENT
Start: 2023-01-01 | End: 2023-01-01

## 2023-01-01 RX ORDER — FOLIC ACID 1 MG/1
1 TABLET ORAL DAILY
Qty: 90 TABLET | Refills: 3 | Status: SHIPPED | OUTPATIENT
Start: 2023-01-01 | End: 2023-01-01

## 2023-01-01 RX ORDER — LOPERAMIDE HCL 2 MG
2 CAPSULE ORAL 4 TIMES DAILY PRN
Status: DISCONTINUED | OUTPATIENT
Start: 2023-01-01 | End: 2023-01-01

## 2023-01-01 RX ORDER — FENTANYL CITRATE 50 UG/ML
25-50 INJECTION, SOLUTION INTRAMUSCULAR; INTRAVENOUS EVERY 5 MIN PRN
Status: DISCONTINUED | OUTPATIENT
Start: 2023-01-01 | End: 2023-01-01 | Stop reason: HOSPADM

## 2023-01-01 RX ORDER — FOLIC ACID 1 MG/1
1 TABLET ORAL DAILY
DISCHARGE
Start: 2023-01-01 | End: 2023-01-01

## 2023-01-01 RX ORDER — AMOXICILLIN AND CLAVULANATE POTASSIUM 500; 125 MG/1; MG/1
1 TABLET, FILM COATED ORAL EVERY 12 HOURS SCHEDULED
Qty: 8 TABLET | Refills: 0 | Status: DISCONTINUED | OUTPATIENT
Start: 2023-01-01 | End: 2023-01-01

## 2023-01-01 RX ORDER — IOPAMIDOL 755 MG/ML
80 INJECTION, SOLUTION INTRAVASCULAR ONCE
Status: DISCONTINUED | OUTPATIENT
Start: 2023-01-01 | End: 2023-01-01

## 2023-01-01 RX ORDER — HYDROMORPHONE HCL IN WATER/PF 6 MG/30 ML
0.2 PATIENT CONTROLLED ANALGESIA SYRINGE INTRAVENOUS
Status: DISCONTINUED | OUTPATIENT
Start: 2023-01-01 | End: 2023-01-01

## 2023-01-01 RX ORDER — FEBUXOSTAT 40 MG/1
40 TABLET, FILM COATED ORAL DAILY
Qty: 30 TABLET | Refills: 11 | Status: SHIPPED | OUTPATIENT
Start: 2023-01-01 | End: 2024-01-01

## 2023-01-01 RX ORDER — HEPARIN SODIUM 10000 [USP'U]/100ML
0-5000 INJECTION, SOLUTION INTRAVENOUS CONTINUOUS
Status: DISCONTINUED | OUTPATIENT
Start: 2023-01-01 | End: 2023-01-01

## 2023-01-01 RX ORDER — LOPERAMIDE HCL 2 MG
2 CAPSULE ORAL 4 TIMES DAILY PRN
Status: DISCONTINUED | OUTPATIENT
Start: 2023-01-01 | End: 2023-01-01 | Stop reason: HOSPADM

## 2023-01-01 RX ORDER — MIDODRINE HYDROCHLORIDE 5 MG/1
5 TABLET ORAL ONCE
Status: COMPLETED | OUTPATIENT
Start: 2023-01-01 | End: 2023-01-01

## 2023-01-01 RX ORDER — DEXTROSE MONOHYDRATE 100 MG/ML
INJECTION, SOLUTION INTRAVENOUS CONTINUOUS
Status: DISCONTINUED | OUTPATIENT
Start: 2023-01-01 | End: 2023-01-01

## 2023-01-01 RX ORDER — CALCIUM GLUCONATE 94 MG/ML
1 INJECTION, SOLUTION INTRAVENOUS ONCE
Status: COMPLETED | OUTPATIENT
Start: 2023-01-01 | End: 2023-01-01

## 2023-01-01 RX ORDER — AMOXICILLIN AND CLAVULANATE POTASSIUM 500; 125 MG/1; MG/1
1 TABLET, FILM COATED ORAL EVERY 24 HOURS
DISCHARGE
Start: 2023-01-01 | End: 2023-01-01

## 2023-01-01 RX ORDER — LANOLIN ALCOHOL/MO/W.PET/CERES
3 CREAM (GRAM) TOPICAL
Status: DISCONTINUED | OUTPATIENT
Start: 2023-01-01 | End: 2023-01-01 | Stop reason: HOSPADM

## 2023-01-01 RX ORDER — AMOXICILLIN AND CLAVULANATE POTASSIUM 500; 125 MG/1; MG/1
1 TABLET, FILM COATED ORAL EVERY 8 HOURS SCHEDULED
Status: DISCONTINUED | OUTPATIENT
Start: 2023-01-01 | End: 2023-01-01 | Stop reason: HOSPADM

## 2023-01-01 RX ORDER — COSYNTROPIN 0.25 MG/ML
250 INJECTION, POWDER, FOR SOLUTION INTRAMUSCULAR; INTRAVENOUS ONCE
Qty: 2 ML | Refills: 0 | Status: COMPLETED | OUTPATIENT
Start: 2023-01-01 | End: 2023-01-01

## 2023-01-01 RX ORDER — MAGNESIUM SULFATE HEPTAHYDRATE 40 MG/ML
2 INJECTION, SOLUTION INTRAVENOUS ONCE
Qty: 50 ML | Refills: 0 | Status: COMPLETED | OUTPATIENT
Start: 2023-01-01 | End: 2023-01-01

## 2023-01-01 RX ORDER — POTASSIUM CHLORIDE 1500 MG/1
40 TABLET, EXTENDED RELEASE ORAL ONCE
Status: COMPLETED | OUTPATIENT
Start: 2023-01-01 | End: 2023-01-01

## 2023-01-01 RX ORDER — MIRTAZAPINE 7.5 MG/1
15 TABLET, FILM COATED ORAL AT BEDTIME
COMMUNITY
Start: 2023-01-01 | End: 2024-01-01

## 2023-01-01 RX ORDER — DEXTROSE MONOHYDRATE 25 G/50ML
25 INJECTION, SOLUTION INTRAVENOUS ONCE
Status: DISCONTINUED | OUTPATIENT
Start: 2023-01-01 | End: 2023-01-01

## 2023-01-01 RX ORDER — FUROSEMIDE 40 MG
40 TABLET ORAL DAILY
Qty: 90 TABLET | Refills: 3 | Status: SHIPPED | OUTPATIENT
Start: 2023-01-01 | End: 2023-01-01

## 2023-01-01 RX ORDER — LEVOTHYROXINE SODIUM 25 UG/1
25 TABLET ORAL
Qty: 90 TABLET | Refills: 3 | Status: SHIPPED | OUTPATIENT
Start: 2023-01-01 | End: 2024-01-01

## 2023-01-01 RX ORDER — LEVOTHYROXINE SODIUM 25 UG/1
25 TABLET ORAL
Qty: 90 TABLET | Refills: 3 | Status: SHIPPED | OUTPATIENT
Start: 2023-01-01 | End: 2023-01-01

## 2023-01-01 RX ORDER — DEXTROSE MONOHYDRATE 25 G/50ML
25-50 INJECTION, SOLUTION INTRAVENOUS
Status: DISCONTINUED | OUTPATIENT
Start: 2023-01-01 | End: 2023-01-01 | Stop reason: HOSPADM

## 2023-01-01 RX ORDER — DEXTROSE MONOHYDRATE 25 G/50ML
25-50 INJECTION, SOLUTION INTRAVENOUS
Status: DISCONTINUED | OUTPATIENT
Start: 2023-01-01 | End: 2023-01-01

## 2023-01-01 RX ORDER — ONDANSETRON 2 MG/ML
4 INJECTION INTRAMUSCULAR; INTRAVENOUS EVERY 6 HOURS PRN
Status: DISCONTINUED | OUTPATIENT
Start: 2023-01-01 | End: 2023-01-01 | Stop reason: HOSPADM

## 2023-01-01 RX ORDER — NICOTINE POLACRILEX 4 MG
15-30 LOZENGE BUCCAL
Status: DISCONTINUED | OUTPATIENT
Start: 2023-01-01 | End: 2023-01-01 | Stop reason: HOSPADM

## 2023-01-01 RX ORDER — DEXTROSE MONOHYDRATE 25 G/50ML
25 INJECTION, SOLUTION INTRAVENOUS ONCE
Status: COMPLETED | OUTPATIENT
Start: 2023-01-01 | End: 2023-01-01

## 2023-01-01 RX ORDER — MIDODRINE HYDROCHLORIDE 5 MG/1
5 TABLET ORAL
DISCHARGE
Start: 2023-01-01 | End: 2024-01-01

## 2023-01-01 RX ORDER — HYDROCODONE BITARTRATE AND ACETAMINOPHEN 5; 325 MG/1; MG/1
1 TABLET ORAL EVERY 8 HOURS PRN
Qty: 15 TABLET | Refills: 0 | Status: ON HOLD | OUTPATIENT
Start: 2023-01-01 | End: 2023-01-01

## 2023-01-01 RX ORDER — APIXABAN 2.5 MG/1
2.5 TABLET, FILM COATED ORAL 2 TIMES DAILY
Status: ON HOLD | COMMUNITY
Start: 2023-01-01

## 2023-01-01 RX ORDER — PIPERACILLIN SODIUM, TAZOBACTAM SODIUM 3; .375 G/15ML; G/15ML
3.38 INJECTION, POWDER, LYOPHILIZED, FOR SOLUTION INTRAVENOUS EVERY 12 HOURS
Status: DISCONTINUED | OUTPATIENT
Start: 2023-01-01 | End: 2023-01-01

## 2023-01-01 RX ORDER — LACTOBACILLUS RHAMNOSUS GG 10B CELL
1 CAPSULE ORAL 2 TIMES DAILY
DISCHARGE
Start: 2023-01-01 | End: 2024-01-01

## 2023-01-01 RX ORDER — FUROSEMIDE 10 MG/ML
40 INJECTION INTRAMUSCULAR; INTRAVENOUS ONCE
Status: COMPLETED | OUTPATIENT
Start: 2023-01-01 | End: 2023-01-01

## 2023-01-01 RX ORDER — IODINE/SODIUM IODIDE 2 %
TINCTURE TOPICAL
Qty: 177 ML | Refills: 11 | Status: SHIPPED | OUTPATIENT
Start: 2023-01-01 | End: 2024-01-01

## 2023-01-01 RX ORDER — NALOXONE HYDROCHLORIDE 0.4 MG/ML
0.4 INJECTION, SOLUTION INTRAMUSCULAR; INTRAVENOUS; SUBCUTANEOUS
Status: DISCONTINUED | OUTPATIENT
Start: 2023-01-01 | End: 2023-01-01 | Stop reason: HOSPADM

## 2023-01-01 RX ORDER — FLUMAZENIL 0.1 MG/ML
0.2 INJECTION, SOLUTION INTRAVENOUS
Status: DISCONTINUED | OUTPATIENT
Start: 2023-01-01 | End: 2023-01-01 | Stop reason: HOSPADM

## 2023-01-01 RX ORDER — IODINE/SODIUM IODIDE 2 %
TINCTURE TOPICAL
DISCHARGE
Start: 2023-01-01 | End: 2023-01-01

## 2023-01-01 RX ORDER — LANOLIN ALCOHOL/MO/W.PET/CERES
100 CREAM (GRAM) TOPICAL DAILY
Qty: 30 TABLET | Refills: 0 | Status: SHIPPED | OUTPATIENT
Start: 2023-01-01 | End: 2023-01-01

## 2023-01-01 RX ORDER — CEFAZOLIN SODIUM 2 G/100ML
2 INJECTION, SOLUTION INTRAVENOUS ONCE
Status: DISCONTINUED | OUTPATIENT
Start: 2023-01-01 | End: 2023-01-01

## 2023-01-01 RX ORDER — AMOXICILLIN AND CLAVULANATE POTASSIUM 500; 125 MG/1; MG/1
1 TABLET, FILM COATED ORAL
Status: DISCONTINUED | OUTPATIENT
Start: 2023-01-01 | End: 2023-01-01 | Stop reason: HOSPADM

## 2023-01-01 RX ORDER — CEFAZOLIN SODIUM 1 G/50ML
2500 SOLUTION INTRAVENOUS ONCE
Status: COMPLETED | OUTPATIENT
Start: 2023-01-01 | End: 2023-01-01

## 2023-01-01 RX ORDER — FOLIC ACID 1 MG/1
1 TABLET ORAL DAILY
Status: DISCONTINUED | OUTPATIENT
Start: 2023-01-01 | End: 2023-01-01 | Stop reason: HOSPADM

## 2023-01-01 RX ORDER — PIPERACILLIN SODIUM, TAZOBACTAM SODIUM 2; .25 G/10ML; G/10ML
2.25 INJECTION, POWDER, LYOPHILIZED, FOR SOLUTION INTRAVENOUS EVERY 8 HOURS
Status: DISCONTINUED | OUTPATIENT
Start: 2023-01-01 | End: 2023-01-01

## 2023-01-01 RX ORDER — HEPARIN SODIUM 1000 [USP'U]/ML
4100 INJECTION, SOLUTION INTRAVENOUS; SUBCUTANEOUS ONCE
Status: COMPLETED | OUTPATIENT
Start: 2023-01-01 | End: 2023-01-01

## 2023-01-01 RX ORDER — PANTOPRAZOLE SODIUM 40 MG/1
40 TABLET, DELAYED RELEASE ORAL
Status: DISCONTINUED | OUTPATIENT
Start: 2023-01-01 | End: 2023-01-01 | Stop reason: HOSPADM

## 2023-01-01 RX ORDER — CEFTRIAXONE 1 G/1
1 INJECTION, POWDER, FOR SOLUTION INTRAMUSCULAR; INTRAVENOUS ONCE
Status: COMPLETED | OUTPATIENT
Start: 2023-01-01 | End: 2023-01-01

## 2023-01-01 RX ORDER — TOPIRAMATE 25 MG/1
25 TABLET, FILM COATED ORAL 2 TIMES DAILY
Status: DISCONTINUED | OUTPATIENT
Start: 2023-01-01 | End: 2023-01-01 | Stop reason: HOSPADM

## 2023-01-01 RX ORDER — ALBUMIN (HUMAN) 12.5 G/50ML
50 SOLUTION INTRAVENOUS
Status: DISCONTINUED | OUTPATIENT
Start: 2023-01-01 | End: 2023-01-01 | Stop reason: HOSPADM

## 2023-01-01 RX ORDER — IODINE/SODIUM IODIDE 2 %
TINCTURE TOPICAL
Status: DISCONTINUED | OUTPATIENT
Start: 2023-01-01 | End: 2023-01-01 | Stop reason: HOSPADM

## 2023-01-01 RX ORDER — SULFAMETHOXAZOLE/TRIMETHOPRIM 800-160 MG
1 TABLET ORAL 2 TIMES DAILY
Qty: 20 TABLET | Refills: 0 | Status: SHIPPED | OUTPATIENT
Start: 2023-01-01 | End: 2024-01-01

## 2023-01-01 RX ORDER — DEXTROSE MONOHYDRATE 100 MG/ML
INJECTION, SOLUTION INTRAVENOUS ONCE
Status: COMPLETED | OUTPATIENT
Start: 2023-01-01 | End: 2023-01-01

## 2023-01-01 RX ORDER — IPRATROPIUM BROMIDE AND ALBUTEROL SULFATE 2.5; .5 MG/3ML; MG/3ML
3 SOLUTION RESPIRATORY (INHALATION) EVERY 4 HOURS PRN
Status: DISCONTINUED | OUTPATIENT
Start: 2023-01-01 | End: 2023-01-01 | Stop reason: HOSPADM

## 2023-01-01 RX ORDER — CEFAZOLIN SODIUM 1 G/50ML
1250 SOLUTION INTRAVENOUS ONCE
Status: COMPLETED | OUTPATIENT
Start: 2023-01-01 | End: 2023-01-01

## 2023-01-01 RX ORDER — MIDODRINE HYDROCHLORIDE 5 MG/1
10 TABLET ORAL
Status: DISCONTINUED | OUTPATIENT
Start: 2023-01-01 | End: 2023-01-01 | Stop reason: HOSPADM

## 2023-01-01 RX ORDER — LOPERAMIDE HCL 2 MG
2 CAPSULE ORAL 4 TIMES DAILY PRN
DISCHARGE
Start: 2023-01-01 | End: 2024-01-01

## 2023-01-01 RX ORDER — BACLOFEN 10 MG/1
10 TABLET ORAL 3 TIMES DAILY PRN
Qty: 20 TABLET | Refills: 1 | Status: SHIPPED | OUTPATIENT
Start: 2023-01-01 | End: 2024-01-01

## 2023-01-01 RX ORDER — DEXTROSE MONOHYDRATE 50 MG/ML
INJECTION, SOLUTION INTRAVENOUS CONTINUOUS
Status: DISCONTINUED | OUTPATIENT
Start: 2023-01-01 | End: 2023-01-01

## 2023-01-01 RX ORDER — MENTHOL AND ZINC OXIDE .44; 20.625 G/100G; G/100G
OINTMENT TOPICAL 4 TIMES DAILY PRN
Qty: 113 G | Refills: 11 | Status: SHIPPED | OUTPATIENT
Start: 2023-01-01 | End: 2023-01-01

## 2023-01-01 RX ORDER — ONDANSETRON 4 MG/1
4 TABLET, ORALLY DISINTEGRATING ORAL EVERY 6 HOURS PRN
Status: DISCONTINUED | OUTPATIENT
Start: 2023-01-01 | End: 2023-01-01 | Stop reason: HOSPADM

## 2023-01-01 RX ORDER — LEVOTHYROXINE SODIUM 25 UG/1
25 TABLET ORAL
DISCHARGE
Start: 2023-01-01 | End: 2023-01-01

## 2023-01-01 RX ORDER — FUROSEMIDE 40 MG
60 TABLET ORAL DAILY
Status: ON HOLD | COMMUNITY
End: 2023-01-01

## 2023-01-01 RX ORDER — CHLOROTHIAZIDE SODIUM 500 MG/1
250 INJECTION INTRAVENOUS ONCE
Qty: 10 ML | Refills: 0 | Status: COMPLETED | OUTPATIENT
Start: 2023-01-01 | End: 2023-01-01

## 2023-01-01 RX ORDER — CEFAZOLIN SODIUM/WATER 2 G/20 ML
2 SYRINGE (ML) INTRAVENOUS ONCE
Status: COMPLETED | OUTPATIENT
Start: 2023-01-01 | End: 2023-01-01

## 2023-01-01 RX ORDER — SODIUM POLYSTYRENE SULFONATE 15 G/60ML
15 SUSPENSION ORAL; RECTAL ONCE
Status: COMPLETED | OUTPATIENT
Start: 2023-01-01 | End: 2023-01-01

## 2023-01-01 RX ORDER — TOPIRAMATE 25 MG/1
25 TABLET, FILM COATED ORAL 2 TIMES DAILY
Qty: 180 TABLET | Refills: 3 | Status: SHIPPED | OUTPATIENT
Start: 2023-01-01 | End: 2023-01-01

## 2023-01-01 RX ORDER — PANTOPRAZOLE SODIUM 40 MG/1
40 TABLET, DELAYED RELEASE ORAL
DISCHARGE
Start: 2023-01-01 | End: 2023-01-01

## 2023-01-01 RX ORDER — LACTOBACILLUS RHAMNOSUS GG 10B CELL
1 CAPSULE ORAL 2 TIMES DAILY
Status: DISCONTINUED | OUTPATIENT
Start: 2023-01-01 | End: 2023-01-01 | Stop reason: HOSPADM

## 2023-01-01 RX ORDER — PHENYLEPHRINE HCL IN 0.9% NACL 50MG/250ML
.1-6 PLASTIC BAG, INJECTION (ML) INTRAVENOUS CONTINUOUS
Status: DISCONTINUED | OUTPATIENT
Start: 2023-01-01 | End: 2023-01-01

## 2023-01-01 RX ORDER — GAUZE BANDAGE 2" X 2"
1 BANDAGE TOPICAL
COMMUNITY
Start: 2023-01-01 | End: 2024-01-01

## 2023-01-01 RX ORDER — BACLOFEN 10 MG/1
10 TABLET ORAL 3 TIMES DAILY PRN
Status: DISCONTINUED | OUTPATIENT
Start: 2023-01-01 | End: 2023-01-01 | Stop reason: HOSPADM

## 2023-01-01 RX ORDER — LIDOCAINE 40 MG/G
CREAM TOPICAL
Status: DISCONTINUED | OUTPATIENT
Start: 2023-01-01 | End: 2023-01-01

## 2023-01-01 RX ORDER — ALBUTEROL SULFATE 5 MG/ML
10 SOLUTION RESPIRATORY (INHALATION) ONCE
Status: COMPLETED | OUTPATIENT
Start: 2023-01-01 | End: 2023-01-01

## 2023-01-01 RX ORDER — LEVOTHYROXINE SODIUM 25 UG/1
25 TABLET ORAL
Status: DISCONTINUED | OUTPATIENT
Start: 2023-01-01 | End: 2023-01-01 | Stop reason: HOSPADM

## 2023-01-01 RX ORDER — VANCOMYCIN HYDROCHLORIDE 1 G/200ML
1000 INJECTION, SOLUTION INTRAVENOUS ONCE
Status: COMPLETED | OUTPATIENT
Start: 2023-01-01 | End: 2023-01-01

## 2023-01-01 RX ORDER — BISMUTH TRIBROMOPH/PETROLATUM 5"X9"
1 BANDAGE TOPICAL DAILY PRN
Qty: 50 EACH | Refills: 4 | Status: SHIPPED | OUTPATIENT
Start: 2023-01-01 | End: 2024-01-01

## 2023-01-01 RX ORDER — FAMOTIDINE 20 MG/1
20 TABLET, FILM COATED ORAL DAILY
Qty: 90 TABLET | Refills: 3 | Status: SHIPPED | OUTPATIENT
Start: 2023-01-01 | End: 2024-01-01

## 2023-01-01 RX ORDER — FEBUXOSTAT 40 MG/1
40 TABLET, FILM COATED ORAL DAILY
Status: DISCONTINUED | OUTPATIENT
Start: 2023-01-01 | End: 2023-01-01 | Stop reason: HOSPADM

## 2023-01-01 RX ADMIN — MIDAZOLAM HYDROCHLORIDE 0.5 MG: 1 INJECTION, SOLUTION INTRAMUSCULAR; INTRAVENOUS at 15:39

## 2023-01-01 RX ADMIN — APIXABAN 2.5 MG: 2.5 TABLET, FILM COATED ORAL at 12:14

## 2023-01-01 RX ADMIN — APIXABAN 2.5 MG: 2.5 TABLET, FILM COATED ORAL at 10:37

## 2023-01-01 RX ADMIN — DEXTROSE MONOHYDRATE 25 ML: 25 INJECTION, SOLUTION INTRAVENOUS at 21:41

## 2023-01-01 RX ADMIN — IPRATROPIUM BROMIDE AND ALBUTEROL SULFATE 3 ML: .5; 3 SOLUTION RESPIRATORY (INHALATION) at 09:02

## 2023-01-01 RX ADMIN — APIXABAN 2.5 MG: 2.5 TABLET, FILM COATED ORAL at 21:22

## 2023-01-01 RX ADMIN — FUROSEMIDE 10 MG/HR: 10 INJECTION, SOLUTION INTRAVENOUS at 06:06

## 2023-01-01 RX ADMIN — PIPERACILLIN AND TAZOBACTAM 3.38 G: 3; .375 INJECTION, POWDER, FOR SOLUTION INTRAVENOUS at 05:55

## 2023-01-01 RX ADMIN — DIGOXIN 125 MCG: 0.25 INJECTION INTRAMUSCULAR; INTRAVENOUS at 06:29

## 2023-01-01 RX ADMIN — IPRATROPIUM BROMIDE AND ALBUTEROL SULFATE 3 ML: .5; 3 SOLUTION RESPIRATORY (INHALATION) at 20:46

## 2023-01-01 RX ADMIN — IPRATROPIUM BROMIDE AND ALBUTEROL SULFATE 3 ML: .5; 3 SOLUTION RESPIRATORY (INHALATION) at 00:35

## 2023-01-01 RX ADMIN — FUROSEMIDE 10 MG/HR: 10 INJECTION, SOLUTION INTRAVENOUS at 20:23

## 2023-01-01 RX ADMIN — FERRIC OXIDE RED: 8; 8 LOTION TOPICAL at 18:31

## 2023-01-01 RX ADMIN — PIPERACILLIN AND TAZOBACTAM 3.38 G: 3; .375 INJECTION, POWDER, LYOPHILIZED, FOR SOLUTION INTRAVENOUS at 07:52

## 2023-01-01 RX ADMIN — PIPERACILLIN AND TAZOBACTAM 3.38 G: 3; .375 INJECTION, POWDER, LYOPHILIZED, FOR SOLUTION INTRAVENOUS at 14:59

## 2023-01-01 RX ADMIN — ALBUMIN HUMAN 50 ML: 0.25 SOLUTION INTRAVENOUS at 08:35

## 2023-01-01 RX ADMIN — HEPARIN SODIUM 1800 UNITS/HR: 10000 INJECTION, SOLUTION INTRAVENOUS at 07:06

## 2023-01-01 RX ADMIN — COSYNTROPIN 250 MCG: 0.25 INJECTION, POWDER, LYOPHILIZED, FOR SOLUTION INTRAVENOUS at 16:32

## 2023-01-01 RX ADMIN — PANTOPRAZOLE SODIUM 40 MG: 40 TABLET, DELAYED RELEASE ORAL at 06:43

## 2023-01-01 RX ADMIN — Medication 0.5 MCG/KG/MIN: at 08:02

## 2023-01-01 RX ADMIN — MICONAZOLE NITRATE: 20 POWDER TOPICAL at 09:31

## 2023-01-01 RX ADMIN — FEBUXOSTAT 40 MG: 40 TABLET, FILM COATED ORAL at 16:52

## 2023-01-01 RX ADMIN — Medication: at 12:45

## 2023-01-01 RX ADMIN — THIAMINE HCL TAB 100 MG 100 MG: 100 TAB at 08:43

## 2023-01-01 RX ADMIN — SODIUM CHLORIDE 250 ML: 9 INJECTION, SOLUTION INTRAVENOUS at 12:44

## 2023-01-01 RX ADMIN — VANCOMYCIN HYDROCHLORIDE 1000 MG: 1 INJECTION, SOLUTION INTRAVENOUS at 16:33

## 2023-01-01 RX ADMIN — LEVOTHYROXINE SODIUM 25 MCG: 0.03 TABLET ORAL at 06:12

## 2023-01-01 RX ADMIN — LEVOTHYROXINE SODIUM 25 MCG: 0.03 TABLET ORAL at 08:58

## 2023-01-01 RX ADMIN — LEVOTHYROXINE SODIUM 25 MCG: 0.03 TABLET ORAL at 06:19

## 2023-01-01 RX ADMIN — CEFTRIAXONE SODIUM 1 G: 1 INJECTION, POWDER, FOR SOLUTION INTRAMUSCULAR; INTRAVENOUS at 10:37

## 2023-01-01 RX ADMIN — Medication 0.7 MCG/KG/MIN: at 06:40

## 2023-01-01 RX ADMIN — PIPERACILLIN AND TAZOBACTAM 3.38 G: 3; .375 INJECTION, POWDER, FOR SOLUTION INTRAVENOUS at 18:17

## 2023-01-01 RX ADMIN — LEVOTHYROXINE SODIUM 25 MCG: 0.03 TABLET ORAL at 06:40

## 2023-01-01 RX ADMIN — PANTOPRAZOLE SODIUM 40 MG: 40 INJECTION, POWDER, FOR SOLUTION INTRAVENOUS at 12:42

## 2023-01-01 RX ADMIN — MICONAZOLE NITRATE 1 APPLICATOR: 20 POWDER TOPICAL at 19:28

## 2023-01-01 RX ADMIN — DEXTROSE MONOHYDRATE: 50 INJECTION, SOLUTION INTRAVENOUS at 20:55

## 2023-01-01 RX ADMIN — AMIODARONE HYDROCHLORIDE 0.5 MG/MIN: 1.8 INJECTION, SOLUTION INTRAVENOUS at 01:11

## 2023-01-01 RX ADMIN — PANTOPRAZOLE SODIUM 40 MG: 40 TABLET, DELAYED RELEASE ORAL at 06:05

## 2023-01-01 RX ADMIN — ANORECTAL OINTMENT: 15.7; .44; 24; 20.6 OINTMENT TOPICAL at 00:39

## 2023-01-01 RX ADMIN — FEBUXOSTAT 40 MG: 40 TABLET, FILM COATED ORAL at 08:30

## 2023-01-01 RX ADMIN — Medication 2 G: at 15:52

## 2023-01-01 RX ADMIN — FEBUXOSTAT 40 MG: 40 TABLET, FILM COATED ORAL at 12:30

## 2023-01-01 RX ADMIN — MICONAZOLE NITRATE: 20 POWDER TOPICAL at 20:00

## 2023-01-01 RX ADMIN — HEPARIN SODIUM 1600 UNITS/HR: 10000 INJECTION, SOLUTION INTRAVENOUS at 09:44

## 2023-01-01 RX ADMIN — APIXABAN 2.5 MG: 2.5 TABLET, FILM COATED ORAL at 20:17

## 2023-01-01 RX ADMIN — MICONAZOLE NITRATE: 20 POWDER TOPICAL at 20:33

## 2023-01-01 RX ADMIN — IPRATROPIUM BROMIDE AND ALBUTEROL SULFATE 3 ML: .5; 3 SOLUTION RESPIRATORY (INHALATION) at 15:56

## 2023-01-01 RX ADMIN — SODIUM BICARBONATE: 84 INJECTION, SOLUTION INTRAVENOUS at 10:43

## 2023-01-01 RX ADMIN — LOPERAMIDE HYDROCHLORIDE 2 MG: 2 CAPSULE ORAL at 11:53

## 2023-01-01 RX ADMIN — PANTOPRAZOLE SODIUM 40 MG: 40 INJECTION, POWDER, FOR SOLUTION INTRAVENOUS at 07:46

## 2023-01-01 RX ADMIN — AMIODARONE HYDROCHLORIDE 0.5 MG/MIN: 1.8 INJECTION, SOLUTION INTRAVENOUS at 14:17

## 2023-01-01 RX ADMIN — MICAFUNGIN SODIUM 100 MG: 50 INJECTION, POWDER, LYOPHILIZED, FOR SOLUTION INTRAVENOUS at 20:09

## 2023-01-01 RX ADMIN — FUROSEMIDE 10 MG/HR: 10 INJECTION, SOLUTION INTRAVENOUS at 03:46

## 2023-01-01 RX ADMIN — PANTOPRAZOLE SODIUM 40 MG: 40 INJECTION, POWDER, FOR SOLUTION INTRAVENOUS at 10:37

## 2023-01-01 RX ADMIN — FEBUXOSTAT 40 MG: 40 TABLET, FILM COATED ORAL at 08:16

## 2023-01-01 RX ADMIN — DEXTROSE 15 G: 15 GEL ORAL at 02:36

## 2023-01-01 RX ADMIN — Medication 1 CAPSULE: at 21:22

## 2023-01-01 RX ADMIN — ANORECTAL OINTMENT: 15.7; .44; 24; 20.6 OINTMENT TOPICAL at 07:03

## 2023-01-01 RX ADMIN — SODIUM BICARBONATE 50 MEQ: 84 INJECTION, SOLUTION INTRAVENOUS at 01:18

## 2023-01-01 RX ADMIN — MICONAZOLE NITRATE: 20 POWDER TOPICAL at 07:41

## 2023-01-01 RX ADMIN — PANTOPRAZOLE SODIUM 40 MG: 40 INJECTION, POWDER, FOR SOLUTION INTRAVENOUS at 08:15

## 2023-01-01 RX ADMIN — Medication: at 12:44

## 2023-01-01 RX ADMIN — ALBUMIN HUMAN 25 G: 0.05 INJECTION, SOLUTION INTRAVENOUS at 01:48

## 2023-01-01 RX ADMIN — ALBUTEROL SULFATE 10 MG: 2.5 SOLUTION RESPIRATORY (INHALATION) at 01:14

## 2023-01-01 RX ADMIN — MICONAZOLE NITRATE: 20 POWDER TOPICAL at 21:36

## 2023-01-01 RX ADMIN — DEXTROSE MONOHYDRATE 25 G: 25 INJECTION, SOLUTION INTRAVENOUS at 03:42

## 2023-01-01 RX ADMIN — INFLUENZA A VIRUS A/VICTORIA/4897/2022 IVR-238 (H1N1) ANTIGEN (FORMALDEHYDE INACTIVATED), INFLUENZA A VIRUS A/DARWIN/9/2021 SAN-010 (H3N2) ANTIGEN (FORMALDEHYDE INACTIVATED), INFLUENZA B VIRUS B/PHUKET/3073/2013 ANTIGEN (FORMALDEHYDE INACTIVATED), AND INFLUENZA B VIRUS B/MICHIGAN/01/2021 ANTIGEN (FORMALDEHYDE INACTIVATED) 0.7 ML: 60; 60; 60; 60 INJECTION, SUSPENSION INTRAMUSCULAR at 10:37

## 2023-01-01 RX ADMIN — SODIUM CHLORIDE 250 ML: 9 INJECTION, SOLUTION INTRAVENOUS at 14:56

## 2023-01-01 RX ADMIN — DEXTROSE MONOHYDRATE 25 ML: 25 INJECTION, SOLUTION INTRAVENOUS at 05:14

## 2023-01-01 RX ADMIN — MICONAZOLE NITRATE: 20 POWDER TOPICAL at 12:43

## 2023-01-01 RX ADMIN — DEXTROSE MONOHYDRATE 50 ML: 25 INJECTION, SOLUTION INTRAVENOUS at 06:50

## 2023-01-01 RX ADMIN — PIPERACILLIN AND TAZOBACTAM 3.38 G: 3; .375 INJECTION, POWDER, LYOPHILIZED, FOR SOLUTION INTRAVENOUS at 00:23

## 2023-01-01 RX ADMIN — DEXTROSE MONOHYDRATE 25 ML: 25 INJECTION, SOLUTION INTRAVENOUS at 07:52

## 2023-01-01 RX ADMIN — LEVOTHYROXINE SODIUM 25 MCG: 0.03 TABLET ORAL at 05:41

## 2023-01-01 RX ADMIN — HEPARIN SODIUM 2200 UNITS: 1000 INJECTION INTRAVENOUS; SUBCUTANEOUS at 12:43

## 2023-01-01 RX ADMIN — DEXTROSE MONOHYDRATE 25 ML: 25 INJECTION, SOLUTION INTRAVENOUS at 08:42

## 2023-01-01 RX ADMIN — THIAMINE HCL TAB 100 MG 100 MG: 100 TAB at 08:08

## 2023-01-01 RX ADMIN — MICONAZOLE NITRATE: 20 POWDER TOPICAL at 07:53

## 2023-01-01 RX ADMIN — FUROSEMIDE 10 MG/HR: 10 INJECTION, SOLUTION INTRAVENOUS at 16:33

## 2023-01-01 RX ADMIN — DEXTROSE MONOHYDRATE 300 ML: 100 INJECTION, SOLUTION INTRAVENOUS at 01:19

## 2023-01-01 RX ADMIN — FENTANYL CITRATE 25 MCG: 50 INJECTION, SOLUTION INTRAMUSCULAR; INTRAVENOUS at 15:36

## 2023-01-01 RX ADMIN — MICONAZOLE NITRATE: 20 POWDER TOPICAL at 08:16

## 2023-01-01 RX ADMIN — APIXABAN 2.5 MG: 2.5 TABLET, FILM COATED ORAL at 12:42

## 2023-01-01 RX ADMIN — APIXABAN 2.5 MG: 2.5 TABLET, FILM COATED ORAL at 08:16

## 2023-01-01 RX ADMIN — OLANZAPINE 2.5 MG: 5 TABLET, ORALLY DISINTEGRATING ORAL at 21:48

## 2023-01-01 RX ADMIN — IPRATROPIUM BROMIDE AND ALBUTEROL SULFATE 3 ML: .5; 3 SOLUTION RESPIRATORY (INHALATION) at 08:05

## 2023-01-01 RX ADMIN — PIPERACILLIN AND TAZOBACTAM 3.38 G: 3; .375 INJECTION, POWDER, LYOPHILIZED, FOR SOLUTION INTRAVENOUS at 14:56

## 2023-01-01 RX ADMIN — DEXTROSE MONOHYDRATE 25 ML: 25 INJECTION, SOLUTION INTRAVENOUS at 07:20

## 2023-01-01 RX ADMIN — APIXABAN 2.5 MG: 2.5 TABLET, FILM COATED ORAL at 23:02

## 2023-01-01 RX ADMIN — MICONAZOLE NITRATE: 20 POWDER TOPICAL at 07:46

## 2023-01-01 RX ADMIN — THIAMINE HCL TAB 100 MG 100 MG: 100 TAB at 08:30

## 2023-01-01 RX ADMIN — PIPERACILLIN AND TAZOBACTAM 3.38 G: 3; .375 INJECTION, POWDER, FOR SOLUTION INTRAVENOUS at 20:17

## 2023-01-01 RX ADMIN — FEBUXOSTAT 40 MG: 40 TABLET, FILM COATED ORAL at 08:08

## 2023-01-01 RX ADMIN — SODIUM POLYSTYRENE SULFONATE 15 G: 15 SUSPENSION ORAL; RECTAL at 06:26

## 2023-01-01 RX ADMIN — FOLIC ACID 1 MG: 1 TABLET ORAL at 08:08

## 2023-01-01 RX ADMIN — PIPERACILLIN AND TAZOBACTAM 3.38 G: 3; .375 INJECTION, POWDER, LYOPHILIZED, FOR SOLUTION INTRAVENOUS at 00:45

## 2023-01-01 RX ADMIN — OLANZAPINE 2.5 MG: 5 TABLET, ORALLY DISINTEGRATING ORAL at 09:46

## 2023-01-01 RX ADMIN — APIXABAN 2.5 MG: 2.5 TABLET, FILM COATED ORAL at 08:43

## 2023-01-01 RX ADMIN — PIPERACILLIN AND TAZOBACTAM 3.38 G: 3; .375 INJECTION, POWDER, FOR SOLUTION INTRAVENOUS at 18:16

## 2023-01-01 RX ADMIN — DEXTROSE MONOHYDRATE 25 ML: 25 INJECTION, SOLUTION INTRAVENOUS at 09:28

## 2023-01-01 RX ADMIN — DEXTROSE MONOHYDRATE: 100 INJECTION, SOLUTION INTRAVENOUS at 09:58

## 2023-01-01 RX ADMIN — HEPARIN SODIUM 2000 UNITS: 1000 INJECTION INTRAVENOUS; SUBCUTANEOUS at 14:57

## 2023-01-01 RX ADMIN — MAGNESIUM SULFATE HEPTAHYDRATE 2 G: 40 INJECTION, SOLUTION INTRAVENOUS at 17:12

## 2023-01-01 RX ADMIN — CEFTRIAXONE SODIUM 1 G: 1 INJECTION, POWDER, FOR SOLUTION INTRAMUSCULAR; INTRAVENOUS at 22:23

## 2023-01-01 RX ADMIN — AMOXICILLIN AND CLAVULANATE POTASSIUM 1 TABLET: 500; 125 TABLET, FILM COATED ORAL at 08:16

## 2023-01-01 RX ADMIN — MICONAZOLE NITRATE: 20 POWDER TOPICAL at 20:32

## 2023-01-01 RX ADMIN — MIDODRINE HYDROCHLORIDE 10 MG: 5 TABLET ORAL at 07:41

## 2023-01-01 RX ADMIN — Medication 0.08 MCG/KG/MIN: at 06:10

## 2023-01-01 RX ADMIN — ALBUMIN HUMAN 250 ML: 0.05 INJECTION, SOLUTION INTRAVENOUS at 12:41

## 2023-01-01 RX ADMIN — POTASSIUM CHLORIDE 40 MEQ: 1500 TABLET, EXTENDED RELEASE ORAL at 12:50

## 2023-01-01 RX ADMIN — LEVOTHYROXINE SODIUM 25 MCG: 0.03 TABLET ORAL at 06:05

## 2023-01-01 RX ADMIN — FEBUXOSTAT 40 MG: 40 TABLET, FILM COATED ORAL at 08:43

## 2023-01-01 RX ADMIN — SODIUM CHLORIDE 9.8 UNITS: 9 INJECTION, SOLUTION INTRAVENOUS at 01:22

## 2023-01-01 RX ADMIN — PANTOPRAZOLE SODIUM 40 MG: 40 TABLET, DELAYED RELEASE ORAL at 06:57

## 2023-01-01 RX ADMIN — IPRATROPIUM BROMIDE AND ALBUTEROL SULFATE 3 ML: .5; 3 SOLUTION RESPIRATORY (INHALATION) at 20:26

## 2023-01-01 RX ADMIN — FUROSEMIDE 40 MG: 10 INJECTION, SOLUTION INTRAMUSCULAR; INTRAVENOUS at 11:04

## 2023-01-01 RX ADMIN — MICONAZOLE NITRATE: 20 POWDER TOPICAL at 20:30

## 2023-01-01 RX ADMIN — MICONAZOLE NITRATE: 20 POWDER TOPICAL at 21:19

## 2023-01-01 RX ADMIN — CALCIUM GLUCONATE 1 G: 98 INJECTION, SOLUTION INTRAVENOUS at 01:10

## 2023-01-01 RX ADMIN — SODIUM BICARBONATE 100 MEQ: 84 INJECTION, SOLUTION INTRAVENOUS at 08:35

## 2023-01-01 RX ADMIN — Medication 0.03 MCG/KG/MIN: at 05:20

## 2023-01-01 RX ADMIN — SODIUM BICARBONATE: 84 INJECTION, SOLUTION INTRAVENOUS at 00:24

## 2023-01-01 RX ADMIN — PIPERACILLIN AND TAZOBACTAM 3.38 G: 3; .375 INJECTION, POWDER, FOR SOLUTION INTRAVENOUS at 17:15

## 2023-01-01 RX ADMIN — DIGOXIN 125 MCG: 0.25 INJECTION INTRAMUSCULAR; INTRAVENOUS at 03:07

## 2023-01-01 RX ADMIN — MICONAZOLE NITRATE: 20 POWDER TOPICAL at 08:38

## 2023-01-01 RX ADMIN — DEXTROSE MONOHYDRATE 25 G: 25 INJECTION, SOLUTION INTRAVENOUS at 01:14

## 2023-01-01 RX ADMIN — THIAMINE HCL TAB 100 MG 100 MG: 100 TAB at 08:16

## 2023-01-01 RX ADMIN — APIXABAN 2.5 MG: 2.5 TABLET, FILM COATED ORAL at 20:30

## 2023-01-01 RX ADMIN — MICONAZOLE NITRATE: 20 POWDER TOPICAL at 08:31

## 2023-01-01 RX ADMIN — DEXTROSE MONOHYDRATE 25 ML: 25 INJECTION, SOLUTION INTRAVENOUS at 10:23

## 2023-01-01 RX ADMIN — HEPARIN SODIUM 1000 UNITS/HR: 10000 INJECTION, SOLUTION INTRAVENOUS at 06:05

## 2023-01-01 RX ADMIN — LIDOCAINE HYDROCHLORIDE 1.5 ML: 10 INJECTION, SOLUTION EPIDURAL; INFILTRATION; INTRACAUDAL; PERINEURAL at 20:35

## 2023-01-01 RX ADMIN — IPRATROPIUM BROMIDE AND ALBUTEROL SULFATE 3 ML: .5; 3 SOLUTION RESPIRATORY (INHALATION) at 20:20

## 2023-01-01 RX ADMIN — FOLIC ACID 1 MG: 5 INJECTION, SOLUTION INTRAMUSCULAR; INTRAVENOUS; SUBCUTANEOUS at 08:43

## 2023-01-01 RX ADMIN — Medication 1 MCG/KG/MIN: at 20:57

## 2023-01-01 RX ADMIN — Medication 0.5 MCG/KG/MIN: at 01:08

## 2023-01-01 RX ADMIN — DEXTROSE 15 G: 15 GEL ORAL at 03:15

## 2023-01-01 RX ADMIN — DEXTROSE MONOHYDRATE: 100 INJECTION, SOLUTION INTRAVENOUS at 02:10

## 2023-01-01 RX ADMIN — AMIODARONE HYDROCHLORIDE 150 MG: 1.5 INJECTION, SOLUTION INTRAVENOUS at 07:49

## 2023-01-01 RX ADMIN — THIAMINE HCL TAB 100 MG 100 MG: 100 TAB at 09:27

## 2023-01-01 RX ADMIN — AMIODARONE HYDROCHLORIDE 1 MG/MIN: 1.8 INJECTION, SOLUTION INTRAVENOUS at 08:53

## 2023-01-01 RX ADMIN — Medication 1 CAPSULE: at 13:27

## 2023-01-01 RX ADMIN — MICAFUNGIN SODIUM 100 MG: 50 INJECTION, POWDER, LYOPHILIZED, FOR SOLUTION INTRAVENOUS at 19:26

## 2023-01-01 RX ADMIN — HEPARIN SODIUM 4100 UNITS: 1000 INJECTION INTRAVENOUS; SUBCUTANEOUS at 15:50

## 2023-01-01 RX ADMIN — ALBUMIN HUMAN 50 ML: 0.25 SOLUTION INTRAVENOUS at 11:38

## 2023-01-01 RX ADMIN — HEPARIN SODIUM 2300 UNITS: 1000 INJECTION INTRAVENOUS; SUBCUTANEOUS at 12:44

## 2023-01-01 RX ADMIN — SODIUM CHLORIDE 500 ML: 9 INJECTION, SOLUTION INTRAVENOUS at 01:03

## 2023-01-01 RX ADMIN — Medication 1 CAPSULE: at 08:16

## 2023-01-01 RX ADMIN — PERFLUTREN 2 ML: 6.52 INJECTION, SUSPENSION INTRAVENOUS at 08:56

## 2023-01-01 RX ADMIN — IPRATROPIUM BROMIDE AND ALBUTEROL SULFATE 3 ML: .5; 3 SOLUTION RESPIRATORY (INHALATION) at 11:38

## 2023-01-01 RX ADMIN — ANORECTAL OINTMENT: 15.7; .44; 24; 20.6 OINTMENT TOPICAL at 07:46

## 2023-01-01 RX ADMIN — AMIODARONE HYDROCHLORIDE 0.5 MG/MIN: 1.8 INJECTION, SOLUTION INTRAVENOUS at 13:20

## 2023-01-01 RX ADMIN — PANTOPRAZOLE SODIUM 40 MG: 40 TABLET, DELAYED RELEASE ORAL at 05:41

## 2023-01-01 RX ADMIN — DEXTROSE MONOHYDRATE 25 ML: 25 INJECTION, SOLUTION INTRAVENOUS at 11:34

## 2023-01-01 RX ADMIN — CEFTRIAXONE SODIUM 1 G: 1 INJECTION, POWDER, FOR SOLUTION INTRAMUSCULAR; INTRAVENOUS at 12:42

## 2023-01-01 RX ADMIN — ASPIRIN 81 MG CHEWABLE TABLET 324 MG: 81 TABLET CHEWABLE at 02:53

## 2023-01-01 RX ADMIN — SODIUM CHLORIDE 9.8 UNITS: 9 INJECTION, SOLUTION INTRAVENOUS at 03:51

## 2023-01-01 RX ADMIN — FOLIC ACID 1 MG: 5 INJECTION, SOLUTION INTRAMUSCULAR; INTRAVENOUS; SUBCUTANEOUS at 16:38

## 2023-01-01 RX ADMIN — CHLOROTHIAZIDE SODIUM 250 MG: 500 INJECTION, POWDER, LYOPHILIZED, FOR SOLUTION INTRAVENOUS at 10:54

## 2023-01-01 RX ADMIN — PANTOPRAZOLE SODIUM 40 MG: 40 INJECTION, POWDER, FOR SOLUTION INTRAVENOUS at 07:52

## 2023-01-01 RX ADMIN — MICONAZOLE NITRATE: 20 POWDER TOPICAL at 08:19

## 2023-01-01 RX ADMIN — FEBUXOSTAT 40 MG: 40 TABLET, FILM COATED ORAL at 08:39

## 2023-01-01 RX ADMIN — PANTOPRAZOLE SODIUM 40 MG: 40 TABLET, DELAYED RELEASE ORAL at 06:40

## 2023-01-01 RX ADMIN — VANCOMYCIN HYDROCHLORIDE 1250 MG: 5 INJECTION, POWDER, LYOPHILIZED, FOR SOLUTION INTRAVENOUS at 16:50

## 2023-01-01 RX ADMIN — MICONAZOLE NITRATE: 20 POWDER TOPICAL at 20:10

## 2023-01-01 RX ADMIN — SODIUM CHLORIDE 300 ML: 9 INJECTION, SOLUTION INTRAVENOUS at 14:55

## 2023-01-01 RX ADMIN — PIPERACILLIN AND TAZOBACTAM 3.38 G: 3; .375 INJECTION, POWDER, FOR SOLUTION INTRAVENOUS at 06:40

## 2023-01-01 RX ADMIN — FOLIC ACID 1 MG: 1 TABLET ORAL at 08:16

## 2023-01-01 RX ADMIN — MIDODRINE HYDROCHLORIDE 10 MG: 5 TABLET ORAL at 11:53

## 2023-01-01 RX ADMIN — IPRATROPIUM BROMIDE AND ALBUTEROL SULFATE 3 ML: .5; 3 SOLUTION RESPIRATORY (INHALATION) at 19:49

## 2023-01-01 RX ADMIN — DEXTROSE MONOHYDRATE: 50 INJECTION, SOLUTION INTRAVENOUS at 18:23

## 2023-01-01 RX ADMIN — MICONAZOLE NITRATE: 20 POWDER TOPICAL at 10:36

## 2023-01-01 RX ADMIN — MIDODRINE HYDROCHLORIDE 5 MG: 5 TABLET ORAL at 10:18

## 2023-01-01 RX ADMIN — MIDAZOLAM HYDROCHLORIDE 0.5 MG: 1 INJECTION, SOLUTION INTRAMUSCULAR; INTRAVENOUS at 15:31

## 2023-01-01 RX ADMIN — HEPARIN SODIUM 2100 UNITS: 1000 INJECTION INTRAVENOUS; SUBCUTANEOUS at 14:56

## 2023-01-01 RX ADMIN — DEXTROSE MONOHYDRATE 25 ML: 25 INJECTION, SOLUTION INTRAVENOUS at 05:41

## 2023-01-01 RX ADMIN — MICONAZOLE NITRATE: 20 POWDER TOPICAL at 19:15

## 2023-01-01 RX ADMIN — CEFTRIAXONE SODIUM 1 G: 1 INJECTION, POWDER, FOR SOLUTION INTRAMUSCULAR; INTRAVENOUS at 00:13

## 2023-01-01 RX ADMIN — AMIODARONE HYDROCHLORIDE 0.5 MG/MIN: 1.8 INJECTION, SOLUTION INTRAVENOUS at 00:28

## 2023-01-01 RX ADMIN — DEXTROSE MONOHYDRATE: 100 INJECTION, SOLUTION INTRAVENOUS at 11:24

## 2023-01-01 RX ADMIN — PERFLUTREN 3 ML: 6.52 INJECTION, SUSPENSION INTRAVENOUS at 11:23

## 2023-01-01 RX ADMIN — FUROSEMIDE 10 MG/HR: 10 INJECTION, SOLUTION INTRAVENOUS at 10:57

## 2023-01-01 RX ADMIN — LEVOTHYROXINE SODIUM 25 MCG: 0.03 TABLET ORAL at 06:43

## 2023-01-01 RX ADMIN — FOLIC ACID 1 MG: 5 INJECTION, SOLUTION INTRAMUSCULAR; INTRAVENOUS; SUBCUTANEOUS at 08:31

## 2023-01-01 RX ADMIN — LOPERAMIDE HYDROCHLORIDE 2 MG: 2 CAPSULE ORAL at 16:09

## 2023-01-01 RX ADMIN — VANCOMYCIN HYDROCHLORIDE 2500 MG: 5 INJECTION, POWDER, LYOPHILIZED, FOR SOLUTION INTRAVENOUS at 09:47

## 2023-01-01 RX ADMIN — ANORECTAL OINTMENT: 15.7; .44; 24; 20.6 OINTMENT TOPICAL at 05:20

## 2023-01-01 RX ADMIN — HEPARIN SODIUM 1500 UNITS/HR: 10000 INJECTION, SOLUTION INTRAVENOUS at 17:56

## 2023-01-01 RX ADMIN — Medication 1.5 MCG/KG/MIN: at 14:21

## 2023-01-01 RX ADMIN — LEVOTHYROXINE SODIUM 25 MCG: 0.03 TABLET ORAL at 06:44

## 2023-01-01 RX ADMIN — MICONAZOLE NITRATE: 20 POWDER TOPICAL at 04:49

## 2023-01-01 RX ADMIN — IPRATROPIUM BROMIDE AND ALBUTEROL SULFATE 3 ML: .5; 3 SOLUTION RESPIRATORY (INHALATION) at 08:21

## 2023-01-01 RX ADMIN — APIXABAN 2.5 MG: 2.5 TABLET, FILM COATED ORAL at 21:19

## 2023-01-01 RX ADMIN — ANORECTAL OINTMENT: 15.7; .44; 24; 20.6 OINTMENT TOPICAL at 01:24

## 2023-01-01 RX ADMIN — CEFTRIAXONE SODIUM 1 G: 1 INJECTION, POWDER, FOR SOLUTION INTRAMUSCULAR; INTRAVENOUS at 10:12

## 2023-01-01 RX ADMIN — FEBUXOSTAT 40 MG: 40 TABLET, FILM COATED ORAL at 10:37

## 2023-01-01 RX ADMIN — APIXABAN 2.5 MG: 2.5 TABLET, FILM COATED ORAL at 08:08

## 2023-01-01 RX ADMIN — TOPIRAMATE 25 MG: 25 TABLET, FILM COATED ORAL at 09:36

## 2023-01-01 RX ADMIN — AMOXICILLIN AND CLAVULANATE POTASSIUM 1 TABLET: 500; 125 TABLET, FILM COATED ORAL at 07:41

## 2023-01-01 RX ADMIN — MAGNESIUM SULFATE HEPTAHYDRATE 2 G: 40 INJECTION, SOLUTION INTRAVENOUS at 20:17

## 2023-01-01 RX ADMIN — THIAMINE HCL TAB 100 MG 100 MG: 100 TAB at 17:12

## 2023-01-01 RX ADMIN — MICAFUNGIN SODIUM 100 MG: 50 INJECTION, POWDER, LYOPHILIZED, FOR SOLUTION INTRAVENOUS at 19:15

## 2023-01-01 RX ADMIN — MIDODRINE HYDROCHLORIDE 10 MG: 5 TABLET ORAL at 08:13

## 2023-01-01 ASSESSMENT — ACTIVITIES OF DAILY LIVING (ADL)
ADLS_ACUITY_SCORE: 35
ADLS_ACUITY_SCORE: 42
ADLS_ACUITY_SCORE: 44
ADLS_ACUITY_SCORE: 37
ADLS_ACUITY_SCORE: 44
ADLS_ACUITY_SCORE: 49
ADLS_ACUITY_SCORE: 41
ADLS_ACUITY_SCORE: 47
ADLS_ACUITY_SCORE: 44
ADLS_ACUITY_SCORE: 46
ADLS_ACUITY_SCORE: 43
ADLS_ACUITY_SCORE: 47
ADLS_ACUITY_SCORE: 49
ADLS_ACUITY_SCORE: 35
ADLS_ACUITY_SCORE: 43
ADLS_ACUITY_SCORE: 49
ADLS_ACUITY_SCORE: 41
ADLS_ACUITY_SCORE: 44
ADLS_ACUITY_SCORE: 35
ADLS_ACUITY_SCORE: 44
ADLS_ACUITY_SCORE: 42
ADLS_ACUITY_SCORE: 41
ADLS_ACUITY_SCORE: 37
ADLS_ACUITY_SCORE: 44
ADLS_ACUITY_SCORE: 43
ADLS_ACUITY_SCORE: 37
ADLS_ACUITY_SCORE: 43
ADLS_ACUITY_SCORE: 49
ADLS_ACUITY_SCORE: 47
ADLS_ACUITY_SCORE: 49
ADLS_ACUITY_SCORE: 46
ADLS_ACUITY_SCORE: 40
ADLS_ACUITY_SCORE: 43
ADLS_ACUITY_SCORE: 45
ADLS_ACUITY_SCORE: 35
ADLS_ACUITY_SCORE: 45
ADLS_ACUITY_SCORE: 43
ADLS_ACUITY_SCORE: 44
ADLS_ACUITY_SCORE: 49
ADLS_ACUITY_SCORE: 46
ADLS_ACUITY_SCORE: 44
ADLS_ACUITY_SCORE: 46
ADLS_ACUITY_SCORE: 41
ADLS_ACUITY_SCORE: 49
ADLS_ACUITY_SCORE: 47
ADLS_ACUITY_SCORE: 40
ADLS_ACUITY_SCORE: 42
ADLS_ACUITY_SCORE: 40
ADLS_ACUITY_SCORE: 49
ADLS_ACUITY_SCORE: 49
ADLS_ACUITY_SCORE: 37
ADLS_ACUITY_SCORE: 46
ADLS_ACUITY_SCORE: 46
ADLS_ACUITY_SCORE: 49
ADLS_ACUITY_SCORE: 37
ADLS_ACUITY_SCORE: 46
ADLS_ACUITY_SCORE: 47
ADLS_ACUITY_SCORE: 46
ADLS_ACUITY_SCORE: 42
ADLS_ACUITY_SCORE: 46
ADLS_ACUITY_SCORE: 47
ADLS_ACUITY_SCORE: 41
ADLS_ACUITY_SCORE: 37
ADLS_ACUITY_SCORE: 37
ADLS_ACUITY_SCORE: 35
ADLS_ACUITY_SCORE: 43
ADLS_ACUITY_SCORE: 49
ADLS_ACUITY_SCORE: 42
ADLS_ACUITY_SCORE: 46
ADLS_ACUITY_SCORE: 49
ADLS_ACUITY_SCORE: 46
ADLS_ACUITY_SCORE: 42
ADLS_ACUITY_SCORE: 43
ADLS_ACUITY_SCORE: 43
ADLS_ACUITY_SCORE: 46
ADLS_ACUITY_SCORE: 46
ADLS_ACUITY_SCORE: 53
ADLS_ACUITY_SCORE: 46
ADLS_ACUITY_SCORE: 35
ADLS_ACUITY_SCORE: 49
ADLS_ACUITY_SCORE: 46
ADLS_ACUITY_SCORE: 43
ADLS_ACUITY_SCORE: 45
ADLS_ACUITY_SCORE: 44
ADLS_ACUITY_SCORE: 45
ADLS_ACUITY_SCORE: 35
ADLS_ACUITY_SCORE: 47
ADLS_ACUITY_SCORE: 42
ADLS_ACUITY_SCORE: 45
ADLS_ACUITY_SCORE: 43
ADLS_ACUITY_SCORE: 49
ADLS_ACUITY_SCORE: 37
ADLS_ACUITY_SCORE: 46
ADLS_ACUITY_SCORE: 49
ADLS_ACUITY_SCORE: 46
ADLS_ACUITY_SCORE: 37
ADLS_ACUITY_SCORE: 47
ADLS_ACUITY_SCORE: 46
ADLS_ACUITY_SCORE: 46
ADLS_ACUITY_SCORE: 49
ADLS_ACUITY_SCORE: 48
ADLS_ACUITY_SCORE: 46
ADLS_ACUITY_SCORE: 46
ADLS_ACUITY_SCORE: 41
ADLS_ACUITY_SCORE: 40
ADLS_ACUITY_SCORE: 46
ADLS_ACUITY_SCORE: 43
ADLS_ACUITY_SCORE: 44
ADLS_ACUITY_SCORE: 49
ADLS_ACUITY_SCORE: 37
ADLS_ACUITY_SCORE: 49
ADLS_ACUITY_SCORE: 42
ADLS_ACUITY_SCORE: 36
ADLS_ACUITY_SCORE: 46
ADLS_ACUITY_SCORE: 36
ADLS_ACUITY_SCORE: 37
ADLS_ACUITY_SCORE: 43
ADLS_ACUITY_SCORE: 40
ADLS_ACUITY_SCORE: 42
ADLS_ACUITY_SCORE: 46
ADLS_ACUITY_SCORE: 53
ADLS_ACUITY_SCORE: 53
ADLS_ACUITY_SCORE: 42
ADLS_ACUITY_SCORE: 47

## 2023-01-01 ASSESSMENT — PAIN SCALES - GENERAL: PAINLEVEL: EXTREME PAIN (9)

## 2023-01-01 ASSESSMENT — ENCOUNTER SYMPTOMS: BACK PAIN: 1

## 2023-02-28 DIAGNOSIS — E87.6 HYPOPOTASSEMIA: ICD-10-CM

## 2023-02-28 DIAGNOSIS — I10 ESSENTIAL HYPERTENSION WITH GOAL BLOOD PRESSURE LESS THAN 140/90: ICD-10-CM

## 2023-02-28 DIAGNOSIS — R63.5 WEIGHT GAIN: ICD-10-CM

## 2023-02-28 DIAGNOSIS — I48.19 PERSISTENT ATRIAL FIBRILLATION (H): ICD-10-CM

## 2023-02-28 DIAGNOSIS — R06.02 SOB (SHORTNESS OF BREATH): ICD-10-CM

## 2023-02-28 DIAGNOSIS — K44.9 DIAPHRAGMATIC HERNIA WITHOUT OBSTRUCTION AND WITHOUT GANGRENE: ICD-10-CM

## 2023-02-28 NOTE — TELEPHONE ENCOUNTER
Reason for Call:  Other prescription    Detailed comments: patient called and re scheduled her appointment for Annual Wellness in April at [facility]    Will need medications in 1 month or prior to this appointment.    1)  Xaralto  2)  Larsartan  3)  Metoprolol  4)  Omeprazole  5)  Potassium  6)  Water pill    Would like to pickup at [facility].    Please contact patient.  Thank you.    Phone Number Patient can be reached at: Home number on file [phone] (home)    Best Time: any    Can we leave a detailed message on this number? YES    Call taken on 2/28/2023 at 9:14 AM by Lisandra Man

## 2023-03-01 DIAGNOSIS — R06.02 SOB (SHORTNESS OF BREATH): ICD-10-CM

## 2023-03-01 DIAGNOSIS — I10 ESSENTIAL HYPERTENSION WITH GOAL BLOOD PRESSURE LESS THAN 140/90: ICD-10-CM

## 2023-03-01 DIAGNOSIS — R63.5 WEIGHT GAIN: ICD-10-CM

## 2023-03-01 DIAGNOSIS — E87.6 HYPOPOTASSEMIA: ICD-10-CM

## 2023-03-01 DIAGNOSIS — K44.9 DIAPHRAGMATIC HERNIA WITHOUT OBSTRUCTION AND WITHOUT GANGRENE: ICD-10-CM

## 2023-03-01 DIAGNOSIS — I48.19 PERSISTENT ATRIAL FIBRILLATION (H): ICD-10-CM

## 2023-03-01 RX ORDER — LOSARTAN POTASSIUM 100 MG/1
100 TABLET ORAL DAILY
Qty: 90 TABLET | Refills: 0 | Status: CANCELLED | OUTPATIENT
Start: 2023-03-01

## 2023-03-01 RX ORDER — POTASSIUM CHLORIDE 1500 MG/1
TABLET, EXTENDED RELEASE ORAL
Qty: 90 TABLET | Refills: 0 | Status: SHIPPED | OUTPATIENT
Start: 2023-03-01 | End: 2023-04-04

## 2023-03-01 RX ORDER — METOPROLOL SUCCINATE 50 MG/1
50 TABLET, EXTENDED RELEASE ORAL DAILY
Qty: 90 TABLET | Refills: 0 | Status: SHIPPED | OUTPATIENT
Start: 2023-03-01 | End: 2023-04-04

## 2023-03-01 RX ORDER — POTASSIUM CHLORIDE 1500 MG/1
TABLET, EXTENDED RELEASE ORAL
Qty: 90 TABLET | Refills: 0 | Status: CANCELLED | OUTPATIENT
Start: 2023-03-01

## 2023-03-01 RX ORDER — LOSARTAN POTASSIUM 100 MG/1
100 TABLET ORAL DAILY
Qty: 90 TABLET | Refills: 0 | Status: SHIPPED | OUTPATIENT
Start: 2023-03-01 | End: 2023-04-04

## 2023-03-01 RX ORDER — FUROSEMIDE 40 MG
40 TABLET ORAL
Qty: 90 TABLET | Refills: 0 | Status: SHIPPED | OUTPATIENT
Start: 2023-03-01 | End: 2023-04-04

## 2023-03-01 RX ORDER — FUROSEMIDE 40 MG
40 TABLET ORAL
Qty: 90 TABLET | Refills: 0 | Status: CANCELLED | OUTPATIENT
Start: 2023-03-01

## 2023-03-01 RX ORDER — METOPROLOL SUCCINATE 50 MG/1
50 TABLET, EXTENDED RELEASE ORAL DAILY
Qty: 90 TABLET | Refills: 0 | Status: CANCELLED | OUTPATIENT
Start: 2023-03-01

## 2023-03-01 NOTE — TELEPHONE ENCOUNTER
Patient calling stating she will run out of her meds before her teresa in March. She is requesting refills prior to her appointment.     I have pended the medications she is requesting for your approval.      mother, sister

## 2023-04-04 ENCOUNTER — OFFICE VISIT (OUTPATIENT)
Dept: INTERNAL MEDICINE | Facility: CLINIC | Age: 83
End: 2023-04-04
Payer: MEDICARE

## 2023-04-04 ENCOUNTER — DOCUMENTATION ONLY (OUTPATIENT)
Dept: OTHER | Facility: CLINIC | Age: 83
End: 2023-04-04

## 2023-04-04 VITALS
RESPIRATION RATE: 18 BRPM | WEIGHT: 206 LBS | OXYGEN SATURATION: 97 % | TEMPERATURE: 98.6 F | HEART RATE: 71 BPM | BODY MASS INDEX: 37.98 KG/M2 | SYSTOLIC BLOOD PRESSURE: 104 MMHG | DIASTOLIC BLOOD PRESSURE: 72 MMHG

## 2023-04-04 DIAGNOSIS — N18.4 STAGE 4 CHRONIC KIDNEY DISEASE (H): ICD-10-CM

## 2023-04-04 DIAGNOSIS — Z00.00 PREVENTATIVE HEALTH CARE: Primary | ICD-10-CM

## 2023-04-04 DIAGNOSIS — E55.9 VITAMIN D DEFICIENCY: ICD-10-CM

## 2023-04-04 DIAGNOSIS — Z78.0 ASYMPTOMATIC MENOPAUSAL STATE: ICD-10-CM

## 2023-04-04 DIAGNOSIS — M25.562 CHRONIC PAIN OF LEFT KNEE: ICD-10-CM

## 2023-04-04 DIAGNOSIS — I10 ESSENTIAL HYPERTENSION WITH GOAL BLOOD PRESSURE LESS THAN 140/90: ICD-10-CM

## 2023-04-04 DIAGNOSIS — R53.83 FATIGUE, UNSPECIFIED TYPE: ICD-10-CM

## 2023-04-04 DIAGNOSIS — K44.9 DIAPHRAGMATIC HERNIA WITHOUT OBSTRUCTION AND WITHOUT GANGRENE: ICD-10-CM

## 2023-04-04 DIAGNOSIS — R06.02 SOB (SHORTNESS OF BREATH): ICD-10-CM

## 2023-04-04 DIAGNOSIS — E78.00 PURE HYPERCHOLESTEROLEMIA: ICD-10-CM

## 2023-04-04 DIAGNOSIS — Z23 NEED FOR SHINGLES VACCINE: ICD-10-CM

## 2023-04-04 DIAGNOSIS — G89.29 CHRONIC PAIN OF LEFT KNEE: ICD-10-CM

## 2023-04-04 DIAGNOSIS — E87.6 HYPOPOTASSEMIA: ICD-10-CM

## 2023-04-04 DIAGNOSIS — K51.00 ULCERATIVE PANCOLITIS WITHOUT COMPLICATION (H): ICD-10-CM

## 2023-04-04 DIAGNOSIS — I48.19 PERSISTENT ATRIAL FIBRILLATION (H): ICD-10-CM

## 2023-04-04 LAB
ALBUMIN SERPL BCG-MCNC: 3.9 G/DL (ref 3.5–5.2)
ALP SERPL-CCNC: 131 U/L (ref 35–104)
ALT SERPL W P-5'-P-CCNC: 10 U/L (ref 10–35)
ANION GAP SERPL CALCULATED.3IONS-SCNC: 17 MMOL/L (ref 7–15)
AST SERPL W P-5'-P-CCNC: 22 U/L (ref 10–35)
BASOPHILS # BLD AUTO: 0 10E3/UL (ref 0–0.2)
BASOPHILS NFR BLD AUTO: 0 %
BILIRUB SERPL-MCNC: 0.4 MG/DL
BUN SERPL-MCNC: 72.6 MG/DL (ref 8–23)
CALCIUM SERPL-MCNC: 10.7 MG/DL (ref 8.8–10.2)
CHLORIDE SERPL-SCNC: 106 MMOL/L (ref 98–107)
CHOLEST SERPL-MCNC: 166 MG/DL
CREAT SERPL-MCNC: 3.73 MG/DL (ref 0.51–0.95)
CREAT UR-MCNC: 133 MG/DL
DEPRECATED HCO3 PLAS-SCNC: 20 MMOL/L (ref 22–29)
EOSINOPHIL # BLD AUTO: 0.2 10E3/UL (ref 0–0.7)
EOSINOPHIL NFR BLD AUTO: 2 %
ERYTHROCYTE [DISTWIDTH] IN BLOOD BY AUTOMATED COUNT: 14.2 % (ref 10–15)
GFR SERPL CREATININE-BSD FRML MDRD: 11 ML/MIN/1.73M2
GLUCOSE SERPL-MCNC: 90 MG/DL (ref 70–99)
HCT VFR BLD AUTO: 37.9 % (ref 35–47)
HDLC SERPL-MCNC: 41 MG/DL
HGB BLD-MCNC: 12.2 G/DL (ref 11.7–15.7)
IMM GRANULOCYTES # BLD: 0 10E3/UL
IMM GRANULOCYTES NFR BLD: 0 %
LDLC SERPL CALC-MCNC: 101 MG/DL
LYMPHOCYTES # BLD AUTO: 2.4 10E3/UL (ref 0.8–5.3)
LYMPHOCYTES NFR BLD AUTO: 22 %
MCH RBC QN AUTO: 30.3 PG (ref 26.5–33)
MCHC RBC AUTO-ENTMCNC: 32.2 G/DL (ref 31.5–36.5)
MCV RBC AUTO: 94 FL (ref 78–100)
MICROALBUMIN UR-MCNC: 183 MG/L
MICROALBUMIN/CREAT UR: 137.59 MG/G CR (ref 0–25)
MONOCYTES # BLD AUTO: 0.7 10E3/UL (ref 0–1.3)
MONOCYTES NFR BLD AUTO: 7 %
NEUTROPHILS # BLD AUTO: 7.4 10E3/UL (ref 1.6–8.3)
NEUTROPHILS NFR BLD AUTO: 69 %
NONHDLC SERPL-MCNC: 125 MG/DL
NT-PROBNP SERPL-MCNC: 9157 PG/ML (ref 0–1800)
PLATELET # BLD AUTO: 180 10E3/UL (ref 150–450)
POTASSIUM SERPL-SCNC: 5.6 MMOL/L (ref 3.4–5.3)
PROT SERPL-MCNC: 8.7 G/DL (ref 6.4–8.3)
RBC # BLD AUTO: 4.03 10E6/UL (ref 3.8–5.2)
SODIUM SERPL-SCNC: 143 MMOL/L (ref 136–145)
TRIGL SERPL-MCNC: 119 MG/DL
TSH SERPL DL<=0.005 MIU/L-ACNC: 1.44 UIU/ML (ref 0.3–4.2)
URATE SERPL-MCNC: 10.6 MG/DL (ref 2.4–5.7)
WBC # BLD AUTO: 10.7 10E3/UL (ref 4–11)

## 2023-04-04 PROCEDURE — 80061 LIPID PANEL: CPT | Performed by: INTERNAL MEDICINE

## 2023-04-04 PROCEDURE — G0439 PPPS, SUBSEQ VISIT: HCPCS | Performed by: INTERNAL MEDICINE

## 2023-04-04 PROCEDURE — 82570 ASSAY OF URINE CREATININE: CPT | Performed by: INTERNAL MEDICINE

## 2023-04-04 PROCEDURE — 80050 GENERAL HEALTH PANEL: CPT | Performed by: INTERNAL MEDICINE

## 2023-04-04 PROCEDURE — 99214 OFFICE O/P EST MOD 30 MIN: CPT | Mod: 25 | Performed by: INTERNAL MEDICINE

## 2023-04-04 PROCEDURE — 83880 ASSAY OF NATRIURETIC PEPTIDE: CPT | Performed by: INTERNAL MEDICINE

## 2023-04-04 PROCEDURE — 36415 COLL VENOUS BLD VENIPUNCTURE: CPT | Performed by: INTERNAL MEDICINE

## 2023-04-04 PROCEDURE — 82043 UR ALBUMIN QUANTITATIVE: CPT | Performed by: INTERNAL MEDICINE

## 2023-04-04 PROCEDURE — 84550 ASSAY OF BLOOD/URIC ACID: CPT | Performed by: INTERNAL MEDICINE

## 2023-04-04 RX ORDER — LOSARTAN POTASSIUM 100 MG/1
100 TABLET ORAL DAILY
Qty: 90 TABLET | Refills: 3 | Status: SHIPPED | OUTPATIENT
Start: 2023-04-04 | End: 2023-05-05 | Stop reason: SINTOL

## 2023-04-04 RX ORDER — FUROSEMIDE 40 MG
40 TABLET ORAL 2 TIMES DAILY
Qty: 180 TABLET | Refills: 3 | Status: SHIPPED | OUTPATIENT
Start: 2023-04-04 | End: 2023-05-05

## 2023-04-04 RX ORDER — POTASSIUM CHLORIDE 1500 MG/1
20 TABLET, EXTENDED RELEASE ORAL 2 TIMES DAILY
Qty: 180 TABLET | Refills: 3 | Status: SHIPPED | OUTPATIENT
Start: 2023-04-04 | End: 2023-05-05

## 2023-04-04 ASSESSMENT — ENCOUNTER SYMPTOMS
PALPITATIONS: 0
CONSTIPATION: 0
NAUSEA: 0
SHORTNESS OF BREATH: 1
ARTHRALGIAS: 0
DYSURIA: 0
PARESTHESIAS: 0
FEVER: 0
DIZZINESS: 0
NERVOUS/ANXIOUS: 1
EYE PAIN: 0
HEADACHES: 0
DIARRHEA: 0
ABDOMINAL PAIN: 0
CHILLS: 0
FREQUENCY: 1
HEMATURIA: 0
HEMATOCHEZIA: 0
COUGH: 0
WEAKNESS: 1
MYALGIAS: 0
JOINT SWELLING: 0
SORE THROAT: 0
HEARTBURN: 0

## 2023-04-04 ASSESSMENT — ACTIVITIES OF DAILY LIVING (ADL): CURRENT_FUNCTION: NO ASSISTANCE NEEDED

## 2023-04-04 NOTE — PATIENT INSTRUCTIONS
Shots are up to date    After age 80 no more mammograms or colon testing    Refill your meds to [facility]    Decrease metoprolol to one half pill daily for 4 days then stop metoprolol    Off metoprolol, see if breathing, energy, coldness improve    Increase furosemide and potassium to twice a day, morning on empty stomach and evening on empty stomach ideally before breakfast and before supper    Take the potassium with the furosemide    You can try a half pill of the furosemide but if the half dose does not work go back up    Weigh yourself first thing in the morning and last before bed, and let me know how much weight you gain during the day    Monitor pulse and bp    Bp goal is 120-145, no lower than 100, and only rarely above 150    Pulse between 60 and 100    Stable weight    Improved breathing and energy and warmth    Sign up for lupe

## 2023-04-04 NOTE — LETTER
April 6, 2023      Mely Alegria  [address]  [facility] [address]        Dear ,    We are writing to inform you of your test results.    Your lab results point in different directions    Your elevated BNP suggests you have too much fluid in your system and your elevated creatinine suggest you do not have enough    Your potassium is too high.  Stop the potassium supplement    Stay off the metoprolol    Decrease the furosemide back to just 1 tablet daily    Plan to repeat the labs after you have done these things for several days    We will address the elevated uric acid level later    Resulted Orders   Albumin Random Urine Quantitative with Creat Ratio   Result Value Ref Range    Creatinine Urine mg/dL 133.0 mg/dL      Comment:      The reference ranges have not been established in urine creatinine. The results should be integrated into the clinical context for interpretation.    Albumin Urine mg/L 183.0 mg/L      Comment:      The reference ranges have not been established in urine albumin. The results should be integrated into the clinical context for interpretation.    Albumin Urine mg/g Cr 137.59 (H) 0.00 - 25.00 mg/g Cr      Comment:      Microalbuminuria is defined as an albumin:creatinine ratio of 17 to 299 for males and 25 to 299 for females. A ratio of albumin:creatinine of 300 or higher is indicative of overt proteinuria.  Due to biologic variability, positive results should be confirmed by a second, first-morning random or 24-hour timed urine specimen. If there is discrepancy, a third specimen is recommended. When 2 out of 3 results are in the microalbuminuria range, this is evidence for incipient nephropathy and warrants increased efforts at glucose control, blood pressure control, and institution of therapy with an angiotensin-converting-enzyme (ACE) inhibitor (if the patient can tolerate it).     Lipid panel reflex to direct LDL Non-fasting   Result Value Ref Range    Cholesterol  166 <200 mg/dL    Triglycerides 119 <150 mg/dL    Direct Measure HDL 41 (L) >=50 mg/dL    LDL Cholesterol Calculated 101 (H) <=100 mg/dL    Non HDL Cholesterol 125 <130 mg/dL    Narrative    Cholesterol  Desirable:  <200 mg/dL    Triglycerides  Normal:  Less than 150 mg/dL  Borderline High:  150-199 mg/dL  High:  200-499 mg/dL  Very High:  Greater than or equal to 500 mg/dL    Direct Measure HDL  Female:  Greater than or equal to 50 mg/dL   Male:  Greater than or equal to 40 mg/dL    LDL Cholesterol  Desirable:  <100mg/dL  Above Desirable:  100-129 mg/dL   Borderline High:  130-159 mg/dL   High:  160-189 mg/dL   Very High:  >= 190 mg/dL    Non HDL Cholesterol  Desirable:  130 mg/dL  Above Desirable:  130-159 mg/dL  Borderline High:  160-189 mg/dL  High:  190-219 mg/dL  Very High:  Greater than or equal to 220 mg/dL   Comprehensive metabolic panel   Result Value Ref Range    Sodium 143 136 - 145 mmol/L    Potassium 5.6 (H) 3.4 - 5.3 mmol/L    Chloride 106 98 - 107 mmol/L    Carbon Dioxide (CO2) 20 (L) 22 - 29 mmol/L    Anion Gap 17 (H) 7 - 15 mmol/L    Urea Nitrogen 72.6 (H) 8.0 - 23.0 mg/dL    Creatinine 3.73 (H) 0.51 - 0.95 mg/dL    Calcium 10.7 (H) 8.8 - 10.2 mg/dL    Glucose 90 70 - 99 mg/dL    Alkaline Phosphatase 131 (H) 35 - 104 U/L    AST 22 10 - 35 U/L    ALT 10 10 - 35 U/L    Protein Total 8.7 (H) 6.4 - 8.3 g/dL    Albumin 3.9 3.5 - 5.2 g/dL    Bilirubin Total 0.4 <=1.2 mg/dL    GFR Estimate 11 (L) >60 mL/min/1.73m2      Comment:      eGFR calculated using 2021 CKD-EPI equation.   TSH   Result Value Ref Range    TSH 1.44 0.30 - 4.20 uIU/mL   Uric acid   Result Value Ref Range    Uric Acid 10.6 (H) 2.4 - 5.7 mg/dL   N terminal pro BNP outpatient   Result Value Ref Range    N Terminal Pro BNP Outpatient 9,157 (H) 0 - 1,800 pg/mL      Comment:      Reference range shown and results flagged as abnormal are for the outpatient, non acute settings. Establishing a baseline value for each individual patient is  useful for follow-up.    Suggested inpatient cut points for confirming diagnosis of CHF in an acute setting are:  >450 pg/mL (age 18 to less than 50)  >900 pg/mL (age 50 to less than 75)  >1800 pg/mL (75 yrs and older)    An inpatient or emergency department NT-proPBNP <300 pg/mL effectively rules out acute CHF, with 99% negative predictive value.       CBC with platelets and differential   Result Value Ref Range    WBC Count 10.7 4.0 - 11.0 10e3/uL    RBC Count 4.03 3.80 - 5.20 10e6/uL    Hemoglobin 12.2 11.7 - 15.7 g/dL    Hematocrit 37.9 35.0 - 47.0 %    MCV 94 78 - 100 fL    MCH 30.3 26.5 - 33.0 pg    MCHC 32.2 31.5 - 36.5 g/dL    RDW 14.2 10.0 - 15.0 %    Platelet Count 180 150 - 450 10e3/uL    % Neutrophils 69 %    % Lymphocytes 22 %    % Monocytes 7 %    % Eosinophils 2 %    % Basophils 0 %    % Immature Granulocytes 0 %    Absolute Neutrophils 7.4 1.6 - 8.3 10e3/uL    Absolute Lymphocytes 2.4 0.8 - 5.3 10e3/uL    Absolute Monocytes 0.7 0.0 - 1.3 10e3/uL    Absolute Eosinophils 0.2 0.0 - 0.7 10e3/uL    Absolute Basophils 0.0 0.0 - 0.2 10e3/uL    Absolute Immature Granulocytes 0.0 <=0.4 10e3/uL       If you have any questions or concerns, please call the clinic at the number listed above.       Sincerely,      Oscar Hopper MD

## 2023-04-04 NOTE — PROGRESS NOTES
ANNUAL WELLNESS VISIT--Face to Face    Assessment and Plan:     ASSESSMENT and PLAN:  1. Preventative health care  Update bone density.  Vaccinations reviewed.  Living well reviewed.  No longer needs colon cancer or breast cancer screening  - DEXA HIP/PELVIS/SPINE - Future; Future  - REVIEW OF HEALTH MAINTENANCE PROTOCOL ORDERS    2. Persistent atrial fibrillation (H)  Continue Xarelto.  Readjust medicines  - rivaroxaban ANTICOAGULANT (XARELTO ANTICOAGULANT) 15 MG TABS tablet; Take 1 tablet (15 mg) by mouth daily (with dinner)  Dispense: 90 tablet; Refill: 3  - N terminal pro BNP outpatient; Future  - N terminal pro BNP outpatient    3. SOB (shortness of breath)  Improved with addition of furosemide 1 year ago but encourage more frequent follow-up.  Possible side effect of metoprolol.  Increase furosemide and discussed dry weights  - furosemide (LASIX) 40 MG tablet; Take 1 tablet (40 mg) by mouth 2 times daily  Dispense: 180 tablet; Refill: 3  - CBC with Platelets & Differential; Future  - N terminal pro BNP outpatient; Future  - CBC with Platelets & Differential  - N terminal pro BNP outpatient    4. Need for shingles vaccine    5. Essential hypertension with goal blood pressure less than 140/90  Well-controlled.  Continue losartan.  Trial without metoprolol  - Lipid panel reflex to direct LDL Non-fasting; Future  - losartan (COZAAR) 100 MG tablet; Take 1 tablet (100 mg) by mouth daily  Dispense: 90 tablet; Refill: 3  - Comprehensive metabolic panel; Future  - Lipid panel reflex to direct LDL Non-fasting  - Comprehensive metabolic panel    6. Vitamin D deficiency  - DEXA HIP/PELVIS/SPINE - Future; Future    7. Stage 4 chronic kidney disease (H)  Monitor carefully especially with increased furosemide  - Albumin Random Urine Quantitative with Creat Ratio; Future  - Albumin Random Urine Quantitative with Creat Ratio    8. Pure hypercholesterolemia  - Lipid panel reflex to direct LDL Non-fasting; Future  - Lipid panel  reflex to direct LDL Non-fasting    9. Ulcerative pancolitis without complication (H)  No GI symptoms currently    10. Adult BMI 40.0-44.9 kg/sq m (H)  Weight decreasing possibly from fluid    11. Diaphragmatic hernia without obstruction and without gangrene  - omeprazole (PRILOSEC) 20 MG DR capsule; Take 1 capsule (20 mg) by mouth daily  Dispense: 90 capsule; Refill: 3    12. Hypopotassemia  - potassium chloride ER (KLOR-CON M) 20 MEQ CR tablet; Take 1 tablet (20 mEq) by mouth 2 times daily  Dispense: 180 tablet; Refill: 3    13. Fatigue, unspecified type  Possible side effect of metoprolol.  Check labs  - CBC with Platelets & Differential; Future  - Comprehensive metabolic panel; Future  - TSH; Future  - CBC with Platelets & Differential  - Comprehensive metabolic panel  - TSH    14. Chronic pain of left knee  - Uric acid; Future  - Uric acid    15. Asymptomatic menopausal state  - DEXA HIP/PELVIS/SPINE - Future; Future       Patient Instructions   Shots are up to date    After age 80 no more mammograms or colon testing    [facility]    Decrease metoprolol to one half pill daily for 4 days then stop metoprolol    Off metoprolol, see if breathing, energy, coldness improve    Increase furosemide and potassium to twice a day, morning on empty stomach and evening on empty stomach ideally before breakfast and before supper    Take the potassium with the furosemide    You can try a half pill of the furosemide but if the half dose does not work go back up    Weigh yourself first thing in the morning and last before bed, and let me know how much weight you gain during the day    Monitor pulse and bp    Bp goal is 120-145, no lower than 100, and only rarely above 150    Pulse between 60 and 100    Stable weight    Improved breathing and energy and warmth    Sign up for mychart            Return in about 4 months (around 8/4/2023) for using a video visit.         Subjective:   Mely is a 83 year old  female who presents to the clinic today for an Annual Wellness Visit.    CHIEF COMPLAINT:  Chief Complaint   Patient presents with     Wellness Visit     The patient present today for annual wellness physical.        HPI: Last seen 1 year ago by video at which time she had atrial fibrillation and was complaining of worsening shortness of breath.  Metoprolol was increased.  Labs showed slightly elevated BNP.  Furosemide was added which her son reports was immediately and dramatically effective.    She continues to complain of shortness of breath, fatigue, and feeling cold.  She has not contacted us in 1 year.  Echocardiogram showed slightly decreased LV function.  She monitors blood pressure erratically and cannot really say what her pattern is.  She does not weigh herself    Review of Systems   Constitutional: Negative for chills and fever.   HENT: Negative for congestion, ear pain, hearing loss and sore throat.    Eyes: Negative for pain and visual disturbance.   Respiratory: Positive for shortness of breath. Negative for cough.    Cardiovascular: Negative for chest pain, palpitations and peripheral edema.   Gastrointestinal: Negative for abdominal pain, constipation, diarrhea, heartburn, hematochezia and nausea.   Genitourinary: Positive for frequency and urgency. Negative for dysuria, genital sores and hematuria.   Musculoskeletal: Negative for arthralgias, joint swelling and myalgias.   Skin: Positive for rash.   Neurological: Positive for weakness. Negative for dizziness, headaches and paresthesias.   Psychiatric/Behavioral: Positive for mood changes. The patient is nervous/anxious.      Review of Systems: Feeling cold.  Feeling tired    Patient Care Team:  Oscar Hopper MD as PCP - General (Internal Medicine)  Oscar Hopper MD as Assigned PCP  Nikko José, PharmD as Pharmacist (Pharmacist)  Nikko José, PharmD as Assigned MTM Pharmacist     Patient Active Problem List   Diagnosis      Vitamin D Deficiency     Hypokalemia     Pure hypercholesterolemia     History of Helicobacter pylori gastritis     Diaphragmatic hernia without obstruction and without gangrene     Ulcerative pancolitis without complication (H)     Chronic pain of left knee     Essential hypertension with goal blood pressure less than 140/90     DNR (do not resuscitate)     Adult BMI 40.0-44.9 kg/sq m (H)     Chronic kidney disease, stage 3 (H)     Stage 4 chronic kidney disease (H)     History of left hip replacement     Persistent atrial fibrillation (H)     No past medical history on file.   Past Surgical History:   Procedure Laterality Date     HC DILATION/CURETTAGE DIAG/THER NON OB      Description: Dilation And Curettage;  Recorded: 01/18/2010;     ZZC COLOSTOMY      Description: Colostomy;  Recorded: 11/05/2012;     ZZC PART REMOVAL COLON W ANASTOMOSIS      Description: Partial Colectomy;  Recorded: 11/05/2012;  Comments: with colostomy     ZZC TOTAL HIP ARTHROPLASTY      Description: Total Hip Replacement;  Recorded: 01/18/2010;      Family History   Problem Relation Age of Onset     Diabetes Mother       Social History     Socioeconomic History     Marital status:      Spouse name: Not on file     Number of children: Not on file     Years of education: Not on file     Highest education level: Not on file   Occupational History     Not on file   Tobacco Use     Smoking status: Never     Smokeless tobacco: Never   Vaping Use     Vaping status: Not on file   Substance and Sexual Activity     Alcohol use: Not on file     Drug use: Not on file     Sexual activity: Not on file   Other Topics Concern     Not on file   Social History Narrative     Not on file     Social Determinants of Health     Financial Resource Strain: Not on file   Food Insecurity: Not on file   Transportation Needs: Not on file   Physical Activity: Not on file   Stress: Not on file   Social Connections: Not on file   Intimate Partner Violence: Not on  "file   Housing Stability: Not on file        Current Outpatient Medications   Medication Sig Dispense Refill     calcium citrate-vitamin D (CITRACAL) 315-200 MG-UNIT TABS per tablet Take 1 tablet by mouth daily       cholecalciferol, vitamin D3, 50 mcg (2,000 unit) Tab [CHOLECALCIFEROL, VITAMIN D3, 50 MCG (2,000 UNIT) TAB] Take 1 tablet (2,000 Units total) by mouth daily. 100 each 3     furosemide (LASIX) 40 MG tablet Take 1 tablet (40 mg) by mouth 2 times daily 180 tablet 3     losartan (COZAAR) 100 MG tablet Take 1 tablet (100 mg) by mouth daily 90 tablet 3     omeprazole (PRILOSEC) 20 MG DR capsule Take 1 capsule (20 mg) by mouth daily 90 capsule 3     potassium chloride ER (KLOR-CON M) 20 MEQ CR tablet Take 1 tablet (20 mEq) by mouth 2 times daily 180 tablet 3     rivaroxaban ANTICOAGULANT (XARELTO ANTICOAGULANT) 15 MG TABS tablet Take 1 tablet (15 mg) by mouth daily (with dinner) 90 tablet 3      Objective:   /72 (BP Location: Left arm, Patient Position: Sitting, Cuff Size: Adult Large)   Pulse 71   Temp 98.6  F (37  C) (Tympanic)   Resp 18   Wt 93.4 kg (206 lb)   SpO2 97%   BMI 37.98 kg/m   Estimated body mass index is 37.98 kg/m  as calculated from the following:    Height as of 6/25/21: 1.568 m (5' 1.75\").    Weight as of this encounter: 93.4 kg (206 lb).    Vision Screening:  No results found.     PHYSICAL EXAM:  Wearing mask when entering room?  Yes  Constitutional:  Reveals overweight pleasant woman who ambulates with a cane.  She is accompanied by her son  Palpation of radial pulse irregularly irregular and on the slow side  Ears:  Clear without wax   Thyroid:  Non palpable   Cardiac; irregularly irregular with no appreciable murmur  Lungs: Clear.  Respiratory effort normal.  Edema of Legs: Trace  Psychiatric:  Alert and oriented     Slightly hard of hearing.    Marked difficulty getting up on the exam bench    Recent Labs   Lab Test 03/11/22  1453 07/27/21  1420   CHOL 127  --     93 " "  VITDT 39  --             View : No data to display.              Cognitive Screening   Completely normal to conversational questioning      ROOMING STAFF:    Patient has been advised of split billing requirements and indicates understanding: Yes   Are you in the first 12 months of your Medicare coverage?  No      Do you feel safe in your environment? Yes      Have you ever done Advance Care Planning? (For example, a Health Directive, POLST, or a discussion with a medical provider or your loved ones about your wishes): Yes, advance care planning is on file.    Healthy Habits:     In general, how would you rate your overall health?  Fair    Frequency of exercise:  None    Do you usually eat at least 4 servings of fruit and vegetables a day, include whole grains    & fiber and avoid regularly eating high fat or \"junk\" foods?  No    Taking medications regularly:  Yes    Medication side effects:  None    Ability to successfully perform activities of daily living:  No assistance needed    Home Safety:  No safety concerns identified    Hearing Impairment:  No hearing concerns    In the past 6 months, have you been bothered by leaking of urine?  No    In general, how would you rate your overall mental or emotional health?  Fair      PHQ-2 Total Score: 2    Additional concerns today:  Yes      Do you have sleep apnea, excessive snoring or daytime drowsiness?: no    The patient's current medical problems were reviewed.    QUALITY MEASURES:  I have had an Advance Directives discussion with the patient.    Weight management plan: Discussed healthy diet and exercise guidelines    The following health maintenance items are reviewed in Epic and correct as of today:  Health Maintenance Due   Topic Date Due     DEXA  Never done     URINALYSIS  Never done     ZOSTER IMMUNIZATION (2 of 3) 12/01/2010     BMP  06/11/2022     MICROALBUMIN  06/11/2022     LIPID  03/11/2023       Appropriate preventive services were discussed with this " patient, including applicable screening as appropriate for cardiovascular disease, diabetes, osteopenia/osteoporosis, and glaucoma.  As appropriate for age/gender, discussed screening for colorectal cancer, prostate cancer, breast cancer, and cervical cancer. Checklist reviewing preventive services available has been given to the patient.    Reviewed patients plan of care and provided an AVS. The Basic Care Plan for Mely meets the Care Plan requirement. This Care Plan has been established and reviewed with the Patient and son, and printed in the AVS.    The following health maintenance schedule was reviewed with the patient and provided in printed form in the after visit summary:   Health Maintenance   Topic Date Due     DEXA  Never done     URINALYSIS  Never done     ZOSTER IMMUNIZATION (2 of 3) 12/01/2010     BMP  06/11/2022     MICROALBUMIN  06/11/2022     LIPID  03/11/2023     HEMOGLOBIN  10/04/2023     MEDICARE ANNUAL WELLNESS VISIT  04/04/2024     ANNUAL REVIEW OF HM ORDERS  04/04/2024     FALL RISK ASSESSMENT  04/04/2024     DTAP/TDAP/TD IMMUNIZATION (2 - Td or Tdap) 11/06/2024     ADVANCE CARE PLANNING  04/04/2028     PARATHYROID  Completed     PHOSPHORUS  Completed     PHQ-2 (once per calendar year)  Completed     INFLUENZA VACCINE  Completed     Pneumococcal Vaccine: 65+ Years  Completed     ALK PHOS  Completed     COVID-19 Vaccine  Completed     IPV IMMUNIZATION  Aged Out     MENINGITIS IMMUNIZATION  Aged Out     URINE DRUG SCREEN  Discontinued        Start Time:2:14 PM  End time:  2:55 PM  Face to face plus orders: 41 minutes  Documentation time:  3 minutes    The visit lasted a total of 44 minutes     Reviewed and updated as needed this visit by clinical staff   Tobacco  Allergies  Meds              Oscar Hopper MD  [facility]    Do you have a current opioid prescription? No  Do you use any other controlled substances or medications that are not prescribed by  a provider? None

## 2023-04-06 ENCOUNTER — TELEPHONE (OUTPATIENT)
Dept: INTERNAL MEDICINE | Facility: CLINIC | Age: 83
End: 2023-04-06
Payer: MEDICARE

## 2023-04-06 DIAGNOSIS — I10 ESSENTIAL HYPERTENSION WITH GOAL BLOOD PRESSURE LESS THAN 140/90: Primary | ICD-10-CM

## 2023-04-06 NOTE — TELEPHONE ENCOUNTER
"Spoke with patient and relayed information below from Dr Hopper. Patient verbalized understanding and has no further questions.     When asked how she feels she states \"some days my breathing is bad and I have no energy.\" Denies any current SOB or chest pain.  "

## 2023-04-10 DIAGNOSIS — R06.02 SOB (SHORTNESS OF BREATH): ICD-10-CM

## 2023-04-11 ENCOUNTER — LAB (OUTPATIENT)
Dept: LAB | Facility: CLINIC | Age: 83
End: 2023-04-11
Payer: MEDICARE

## 2023-04-11 DIAGNOSIS — I10 ESSENTIAL HYPERTENSION WITH GOAL BLOOD PRESSURE LESS THAN 140/90: ICD-10-CM

## 2023-04-11 DIAGNOSIS — K44.9 DIAPHRAGMATIC HERNIA WITHOUT OBSTRUCTION AND WITHOUT GANGRENE: ICD-10-CM

## 2023-04-11 LAB
ANION GAP SERPL CALCULATED.3IONS-SCNC: 17 MMOL/L (ref 7–15)
BUN SERPL-MCNC: 64.4 MG/DL (ref 8–23)
CALCIUM SERPL-MCNC: 10.1 MG/DL (ref 8.8–10.2)
CHLORIDE SERPL-SCNC: 106 MMOL/L (ref 98–107)
CREAT SERPL-MCNC: 3.61 MG/DL (ref 0.51–0.95)
DEPRECATED HCO3 PLAS-SCNC: 20 MMOL/L (ref 22–29)
GFR SERPL CREATININE-BSD FRML MDRD: 12 ML/MIN/1.73M2
GLUCOSE SERPL-MCNC: 94 MG/DL (ref 70–99)
POTASSIUM SERPL-SCNC: 4.3 MMOL/L (ref 3.4–5.3)
SODIUM SERPL-SCNC: 143 MMOL/L (ref 136–145)

## 2023-04-11 PROCEDURE — 80048 BASIC METABOLIC PNL TOTAL CA: CPT

## 2023-04-11 PROCEDURE — 36415 COLL VENOUS BLD VENIPUNCTURE: CPT

## 2023-04-11 RX ORDER — FUROSEMIDE 40 MG
TABLET ORAL
Qty: 90 TABLET | OUTPATIENT
Start: 2023-04-11

## 2023-04-12 RX ORDER — LOSARTAN POTASSIUM 100 MG/1
100 TABLET ORAL DAILY
Qty: 90 TABLET | Refills: 3 | OUTPATIENT
Start: 2023-04-12

## 2023-04-21 ENCOUNTER — TELEPHONE (OUTPATIENT)
Dept: INTERNAL MEDICINE | Facility: CLINIC | Age: 83
End: 2023-04-21
Payer: MEDICARE

## 2023-04-21 DIAGNOSIS — I10 ESSENTIAL HYPERTENSION WITH GOAL BLOOD PRESSURE LESS THAN 140/90: Primary | ICD-10-CM

## 2023-04-21 NOTE — TELEPHONE ENCOUNTER
Pt is calling back and would like the nurse who called to call her back please.    She will be home the rest of the day.

## 2023-04-21 NOTE — CONFIDENTIAL NOTE
Repeat renal profile slightly better but still much worse than 1 year ago    On April 4 I discontinued metoprolol.  Did that improve the breathing, energy, or feelings of cold nests?    If breathing improved then I would recommend stopping the furosemide completely and repeating the kidney tests 1 week later    If breathing did not improve, then I think she needs to see cardiology to discuss trying to convert her from atrial fibrillation back to normal rhythm    I also suggested she sign up for MyChart which she has not yet done

## 2023-04-21 NOTE — TELEPHONE ENCOUNTER
Spoke with patient and relayed information below from Dr Hopper. Patient states her breathing has improved. States she will stop furosemide as instructed and have her lab work repeated. Lab appointment scheduled per her request. She has no further questions.

## 2023-04-25 ENCOUNTER — LAB (OUTPATIENT)
Dept: LAB | Facility: CLINIC | Age: 83
End: 2023-04-25
Payer: MEDICARE

## 2023-04-25 DIAGNOSIS — I10 ESSENTIAL HYPERTENSION WITH GOAL BLOOD PRESSURE LESS THAN 140/90: ICD-10-CM

## 2023-04-25 LAB
ANION GAP SERPL CALCULATED.3IONS-SCNC: 15 MMOL/L (ref 7–15)
BUN SERPL-MCNC: 47.7 MG/DL (ref 8–23)
CALCIUM SERPL-MCNC: 9.8 MG/DL (ref 8.8–10.2)
CHLORIDE SERPL-SCNC: 104 MMOL/L (ref 98–107)
CREAT SERPL-MCNC: 3.1 MG/DL (ref 0.51–0.95)
DEPRECATED HCO3 PLAS-SCNC: 24 MMOL/L (ref 22–29)
GFR SERPL CREATININE-BSD FRML MDRD: 14 ML/MIN/1.73M2
GLUCOSE SERPL-MCNC: 107 MG/DL (ref 70–99)
POTASSIUM SERPL-SCNC: 4 MMOL/L (ref 3.4–5.3)
SODIUM SERPL-SCNC: 143 MMOL/L (ref 136–145)

## 2023-04-25 PROCEDURE — 80048 BASIC METABOLIC PNL TOTAL CA: CPT

## 2023-04-25 PROCEDURE — 36415 COLL VENOUS BLD VENIPUNCTURE: CPT

## 2023-04-26 ENCOUNTER — TELEPHONE (OUTPATIENT)
Dept: INTERNAL MEDICINE | Facility: CLINIC | Age: 83
End: 2023-04-26
Payer: MEDICARE

## 2023-04-26 DIAGNOSIS — M10.9 URIC ACID ARTHROPATHY: ICD-10-CM

## 2023-04-26 DIAGNOSIS — M54.50 ACUTE RIGHT-SIDED LOW BACK PAIN WITHOUT SCIATICA: Primary | ICD-10-CM

## 2023-04-27 NOTE — TELEPHONE ENCOUNTER
"Patient states her breathing is \"better\".  Patient states she is no longer out of breath during normal activity.  .  Patient states her only issue at this time is back pain.  Patient is having back pain especially in the morning when she get up, but it does improve as the day goes on.      Patient would like PCP to know she is now on MyChart.   "

## 2023-04-28 RX ORDER — PREDNISONE 20 MG/1
20 TABLET ORAL EVERY MORNING
Qty: 4 TABLET | Refills: 0 | Status: SHIPPED | OUTPATIENT
Start: 2023-04-28 | End: 2023-05-02

## 2023-05-05 DIAGNOSIS — R06.02 SOB (SHORTNESS OF BREATH): ICD-10-CM

## 2023-05-05 RX ORDER — FUROSEMIDE 40 MG
40 TABLET ORAL DAILY PRN
Qty: 180 TABLET | Refills: 3 | COMMUNITY
Start: 2023-05-05 | End: 2023-06-30

## 2023-05-09 DIAGNOSIS — M10.9 URIC ACID ARTHROPATHY: ICD-10-CM

## 2023-05-09 DIAGNOSIS — M54.50 ACUTE RIGHT-SIDED LOW BACK PAIN WITHOUT SCIATICA: Primary | ICD-10-CM

## 2023-05-09 RX ORDER — PROBENECID 500 MG/1
500 TABLET, FILM COATED ORAL 2 TIMES DAILY
Qty: 180 TABLET | Refills: 3 | Status: SHIPPED | OUTPATIENT
Start: 2023-05-09 | End: 2023-05-23

## 2023-05-09 RX ORDER — PREDNISONE 10 MG/1
10 TABLET ORAL EVERY MORNING
Qty: 10 TABLET | Refills: 0 | Status: SHIPPED | OUTPATIENT
Start: 2023-05-09 | End: 2023-05-19

## 2023-05-23 DIAGNOSIS — M10.9 URIC ACID ARTHROPATHY: ICD-10-CM

## 2023-05-23 RX ORDER — PROBENECID 500 MG/1
TABLET, FILM COATED ORAL
Qty: 270 TABLET | Refills: 3 | Status: ON HOLD | OUTPATIENT
Start: 2023-05-23 | End: 2023-01-01

## 2023-06-20 DIAGNOSIS — M54.50 ACUTE RIGHT-SIDED LOW BACK PAIN WITHOUT SCIATICA: Primary | ICD-10-CM

## 2023-06-20 RX ORDER — PREDNISONE 10 MG/1
10 TABLET ORAL EVERY MORNING
Qty: 10 TABLET | Refills: 0 | Status: SHIPPED | OUTPATIENT
Start: 2023-06-20 | End: 2023-06-30

## 2023-06-21 ENCOUNTER — TELEPHONE (OUTPATIENT)
Dept: INTERNAL MEDICINE | Facility: CLINIC | Age: 83
End: 2023-06-21
Payer: MEDICARE

## 2023-06-21 NOTE — TELEPHONE ENCOUNTER
General Call    Contacts       Type Contact Phone/Fax    06/21/2023 09:43 AM CDT Phone (Incoming) Tatyana Alegria (Emergency Contact) [phone]        Reason for Call:  Requesting for provider to call patient and let her know she needs to be see sooner    What are your questions or concerns:    Son is calling stating mother is in a lot of pain and refused to be see. Son would like it if provider will call patient to let her know to schedule for a sooner appointment as she will listen more to her provider.       Could we send this information to you in [facility] or would you prefer to receive a phone call?:   Patient would prefer a phone call   Okay to leave a detailed message?: Yes at Cell number on file:    Telephone Information:   Mobile [phone]

## 2023-06-22 NOTE — TELEPHONE ENCOUNTER
06/22 I called and left message on cell number to call and schedule a virtual appt for tomorrow with Ruchi Neavrez to discuss her pain

## 2023-06-30 ENCOUNTER — VIRTUAL VISIT (OUTPATIENT)
Dept: INTERNAL MEDICINE | Facility: CLINIC | Age: 83
End: 2023-06-30
Payer: MEDICARE

## 2023-06-30 DIAGNOSIS — N18.4 STAGE 4 CHRONIC KIDNEY DISEASE (H): ICD-10-CM

## 2023-06-30 DIAGNOSIS — I50.9 CHRONIC CONGESTIVE HEART FAILURE, UNSPECIFIED HEART FAILURE TYPE (H): ICD-10-CM

## 2023-06-30 DIAGNOSIS — G89.29 CHRONIC PAIN OF LEFT KNEE: ICD-10-CM

## 2023-06-30 DIAGNOSIS — M25.562 CHRONIC PAIN OF LEFT KNEE: ICD-10-CM

## 2023-06-30 DIAGNOSIS — M54.50 ACUTE RIGHT-SIDED LOW BACK PAIN WITHOUT SCIATICA: Primary | ICD-10-CM

## 2023-06-30 DIAGNOSIS — R06.02 SOB (SHORTNESS OF BREATH): ICD-10-CM

## 2023-06-30 DIAGNOSIS — I48.19 PERSISTENT ATRIAL FIBRILLATION (H): ICD-10-CM

## 2023-06-30 PROCEDURE — 99214 OFFICE O/P EST MOD 30 MIN: CPT | Mod: VID | Performed by: INTERNAL MEDICINE

## 2023-06-30 RX ORDER — LOSARTAN POTASSIUM 100 MG/1
TABLET ORAL
COMMUNITY
Start: 2023-06-26 | End: 2023-10-06

## 2023-06-30 RX ORDER — FUROSEMIDE 40 MG
40 TABLET ORAL EVERY OTHER DAY
Qty: 180 TABLET | Refills: 3 | COMMUNITY
Start: 2023-06-30 | End: 2023-01-01

## 2023-06-30 RX ORDER — TRAMADOL HYDROCHLORIDE 50 MG/1
50 TABLET ORAL EVERY 6 HOURS PRN
Qty: 10 TABLET | Refills: 0 | Status: SHIPPED | OUTPATIENT
Start: 2023-06-30 | End: 2023-07-03

## 2023-06-30 ASSESSMENT — ANXIETY QUESTIONNAIRES
3. WORRYING TOO MUCH ABOUT DIFFERENT THINGS: NOT AT ALL
5. BEING SO RESTLESS THAT IT IS HARD TO SIT STILL: NOT AT ALL
6. BECOMING EASILY ANNOYED OR IRRITABLE: NOT AT ALL
4. TROUBLE RELAXING: NOT AT ALL
1. FEELING NERVOUS, ANXIOUS, OR ON EDGE: NOT AT ALL
IF YOU CHECKED OFF ANY PROBLEMS ON THIS QUESTIONNAIRE, HOW DIFFICULT HAVE THESE PROBLEMS MADE IT FOR YOU TO DO YOUR WORK, TAKE CARE OF THINGS AT HOME, OR GET ALONG WITH OTHER PEOPLE: NOT DIFFICULT AT ALL
2. NOT BEING ABLE TO STOP OR CONTROL WORRYING: NOT AT ALL

## 2023-06-30 ASSESSMENT — ACTIVITIES OF DAILY LIVING (ADL)
TRANSFERRING: 0-->ASSISTANCE NEEDED (DEVELOPMENTALLY APPROPRIATE)
TRANSFERRING: 0-->INDEPENDENT
WALKING_OR_CLIMBING_STAIRS: AMBULATION DIFFICULTY, ASSISTANCE 1 PERSON
CHANGE_IN_FUNCTIONAL_STATUS_SINCE_ONSET_OF_CURRENT_ILLNESS/INJURY: NO
HEARING_DIFFICULTY_OR_DEAF: NO
FALL_HISTORY_WITHIN_LAST_SIX_MONTHS: NO
TOILETING_ISSUES: NO
WEAR_GLASSES_OR_BLIND: NO
WALKING_OR_CLIMBING_STAIRS_DIFFICULTY: YES
DIFFICULTY_EATING/SWALLOWING: NO
CONCENTRATING,_REMEMBERING_OR_MAKING_DECISIONS_DIFFICULTY: NO
DIFFICULTY_COMMUNICATING: NO
DRESSING/BATHING_DIFFICULTY: NO
DOING_ERRANDS_INDEPENDENTLY_DIFFICULTY: NO

## 2023-06-30 ASSESSMENT — PATIENT HEALTH QUESTIONNAIRE - PHQ9: SUM OF ALL RESPONSES TO PHQ QUESTIONS 1-9: 2

## 2023-06-30 ASSESSMENT — ENCOUNTER SYMPTOMS: BACK PAIN: 1

## 2023-06-30 NOTE — PROGRESS NOTES
"Mely is a 83 year old female contacting the clinic today via video, who will use the platform: [employer] for the visit.  Phone # for [employer], or if Amwell drops:   Telephone Information:   Mobile [phone]          ASSESSMENT and PLAN:  1. Acute right-sided low back pain without sciatica  \"Proceed to MRI given 8 months duration.  Initiate physical therapy.  Provide tramadol.  Avoid anti-inflammatories  - MR Lumbar Spine w/o Contrast; Future  - Physical Therapy Referral; Future  - traMADol (ULTRAM) 50 MG tablet; Take 1 tablet (50 mg) by mouth every 6 hours as needed for severe pain  Dispense: 10 tablet; Refill: 0    2. Persistent atrial fibrillation (H)  Stable on Xarelto.  Discussed Coumadin for cost    3. Adult BMI 40.0-44.9 kg/sq m (H)  Stable    4. Chronic congestive heart failure, unspecified heart failure type (H)  Volume controlled with furosemide every other day    5. Chronic pain of left knee  Leg discrepancy could be contributing to back pain    6. Stage 4 chronic kidney disease (H)  Check ultrasound.  Confirm losartan.  Consider addition of aldosterone inhibitor  - US Renal Complete Non-Vascular; Future    7. SOB (shortness of breath)  Stable and improved symptomatically  - furosemide (LASIX) 40 MG tablet; Take 1 tablet (40 mg) by mouth every other day  Dispense: 180 tablet; Refill: 3       Patient Instructions   Tramadol 50 mg every 8 hours as needed    Referral to physical therapy    MRI lumbar spine    Renal ultrasound    Consider referral to spine clinic    Consider renal referral    Consider eplerenone            Return in about 3 months (around 9/30/2023) for in person.       CHIEF COMPLAINT:  Chief Complaint   Patient presents with     Back Pain     This has been an on going issue for some time       HISTORY OF PRESENT ILLNESS:  Mely is a 83 year old female contacting the clinic today via video for complaints of back pain.  She reports the pain began approximately 8 months ago.  No trauma.  " "Course of prednisone then and last week have helped slightly.  Pain is continuous and not intermittent.  Treatment for uric acid has not significantly improved her back but has helped her knee.  Son notes that her left leg seems shorter due to the osteoarthritis of the knee.  No radiation    Breathing and wheezing controlled on furosemide every other day.  No peripheral edema    Reviewed marked worsening of renal function.  No previous CT scan or old stone.  No history of stones    Concerned about cost of anticoagulants    Back Pain     History of Present Illness       Back Pain:  She presents for follow up of back pain. Patient's back pain is a chronic problem.  Location of back pain:  Right middle of back  Description of back pain: dull ache  Back pain spreads: nowhere    Since patient first noticed back pain, pain is: always present, but gets better and worse  Does back pain interfere with her job:  Yes      She eats 2-3 servings of fruits and vegetables daily.She consumes 2 sweetened beverage(s) daily.She exercises with enough effort to increase her heart rate 9 or less minutes per day.  She exercises with enough effort to increase her heart rate 3 or less days per week.   She is taking medications regularly.      REVIEW OF SYSTEMS:   Stable mood.  No bruising bleeding or bruising    PFSH:  Social History     Social History Narrative     Not on file     Lives with son    TOBACCO USE:  History   Smoking Status     Never   Smokeless Tobacco     Never       VITALS:  There were no vitals filed for this visit.  LMP  (LMP Unknown)   Breastfeeding No  Estimated body mass index is 37.98 kg/m  as calculated from the following:    Height as of 6/25/21: 1.568 m (5' 1.75\").    Weight as of 4/4/23: 93.4 kg (206 lb).    PHYSICAL EXAM:  (observations via Video)  Alert and oriented.  No shortness of breath    MEDICATIONS:   Current Outpatient Medications   Medication Sig Dispense Refill     calcium citrate-vitamin D (CITRACAL) " 315-200 MG-UNIT TABS per tablet Take 1 tablet by mouth daily       cholecalciferol, vitamin D3, 50 mcg (2,000 unit) Tab [CHOLECALCIFEROL, VITAMIN D3, 50 MCG (2,000 UNIT) TAB] Take 1 tablet (2,000 Units total) by mouth daily. 100 each 3     furosemide (LASIX) 40 MG tablet Take 1 tablet (40 mg) by mouth every other day 180 tablet 3     omeprazole (PRILOSEC) 20 MG DR capsule Take 1 capsule (20 mg) by mouth daily 90 capsule 3     probenecid (BENEMID) 500 MG tablet Take 2 tablets (1,000 mg) by mouth every morning AND 1 tablet (500 mg) every evening. 270 tablet 3     rivaroxaban ANTICOAGULANT (XARELTO ANTICOAGULANT) 15 MG TABS tablet Take 1 tablet (15 mg) by mouth daily (with dinner) 90 tablet 3     traMADol (ULTRAM) 50 MG tablet Take 1 tablet (50 mg) by mouth every 6 hours as needed for severe pain 10 tablet 0     losartan (COZAAR) 100 MG tablet          Outside Notes summarized: MyChart notes x3  Labs, x-rays, cardiology, GI tests reviewed: Fluctuating creatinine.  Slight proteinuria  Recent Labs   Lab Test 04/25/23  1530 04/11/23  1407 04/04/23  1504 03/11/22  1453 03/11/22  1453   HGB  --   --  12.2  --  13.5   WBC  --   --  10.7  --  10.0    143 143  --  143   POTASSIUM 4.0 4.3 5.6*   < > 4.8   CR 3.10* 3.61* 3.73*  --  1.70*   URIC  --   --  10.6*  --   --    B12  --   --   --   --  526   TSH  --   --  1.44  --  1.74   VITDT  --   --   --   --  39   SED  --   --   --   --  14   CRP  --   --   --   --  1.5*    < > = values in this interval not displayed.     No results found for: [id number]  Lab Results   Component Value Date    CHOL 166 04/04/2023     New orders:   Orders Placed This Encounter   Procedures     US Renal Complete Non-Vascular     MR Lumbar Spine w/o Contrast     Physical Therapy Referral       Independent review of:     Oscar Hopper MD  [facility]    Video-Visit Details  Type of service:  Video Visit  Patient has given verbal consent to a Video visit?   Yes  How would you like to obtain your AVS?  MyChart  Will anyone else be joining your video visit, giving supplemental history?  Yes, son Tatyana  Originating location (pt location): Home    Distant Location (provider location):  Off-site    Video Start Time: 11:06 AM  Video End time:  11:34 AM  Face to face plus orders: 28 minutes  Documentation time:  3 minutes     The visit lasted a total of 31 minutes

## 2023-06-30 NOTE — PROGRESS NOTES
"Mely is a 83 year old who is being evaluated via a billable video visit.      How would you like to obtain your AVS? MyChart  If the video visit is dropped, the invitation should be resent by: Text to cell phone: [phone]  Will anyone else be joining your video visit? No  {If patient encounters technical issues they should call [phone] :846936}        {PROVIDER CHARTING PREFERENCE:596661}    Subjective   Mely is a 83 year old, presenting for the following health issues:  Back Pain (This has been an on going issue for some time)        4/4/2023     1:51 PM   Additional Questions   Roomed by Aury   Accompanied by Son     Back Pain          {SUPERLIST (Optional):331738}  Pain History:  When did you first notice your pain? Several years ago    Have you seen this provider for your pain in the past?   Yes   Where in your body do you have pain? Lower back   Are you seeing anyone else for your pain? No    {Rooming staff  Please complete PHQ assessment :600499}  {Rooming staff  Please complete GAD7 assessment :040196}  {Rooming staff  Please complete the PEG Assessment  Assess Pain, Function, and Quality of Life. Complete every pain visit :149219}        12/3/2020    11:39 AM 6/30/2023    10:45 AM   PHQ-9 SCORE   PHQ-9 Total Score 0 2          No data to display                  6/30/2023    10:44 AM   PEG Score   PEG Total Score 4.33       Chronic Pain Follow Up:    Location of pain: ***  Analgesia/pain control:    - Recent changes:  ***    - Overall control: {Pain control level :960959::\"Tolerable with discomfort\"}    - Current treatments: ***   Adherence:     - Do you ever take more pain medicine than prescribed? {Yes ***/No :650568}    - When did you take your last dose of pain medicine?  ***   Adverse effects: {Yes ***/No :225713}   PDMP Review     None        Last CSA Agreement:   CSA -- Patient Level:     [Media Unavailable] Controlled Substance Agreement - Opioid - Scan on 11/15/2016       Last " "UDS:   {If newly prescribing or considering opioid therapy, the following assessments must be completed :930359}  {Pull in ORT  RIOSORD results (Optional) :717800}    {Provider  Link to Pain Management SmartSet  Includes non-opioid pharmacological medications and referrals  :015516}      Review of Systems   Musculoskeletal: Positive for back pain.      {ROS COMP (Optional):386510}      Objective           Vitals:  No vitals were obtained today due to virtual visit.    Physical Exam   {video visit exam brief selected:051242::\"GENERAL: Healthy, alert and no distress\",\"EYES: Eyes grossly normal to inspection.  No discharge or erythema, or obvious scleral/conjunctival abnormalities.\",\"RESP: No audible wheeze, cough, or visible cyanosis.  No visible retractions or increased work of breathing.  \",\"SKIN: Visible skin clear. No significant rash, abnormal pigmentation or lesions.\",\"NEURO: Cranial nerves grossly intact.  Mentation and speech appropriate for age.\",\"PSYCH: Mentation appears normal, affect normal/bright, judgement and insight intact, normal speech and appearance well-groomed.\"}    {Diagnostic Test Results (Optional):308523}    {AMBULATORY ATTESTATION (Optional):866567}        Video-Visit Details    Type of service:  Video Visit   Video Start Time: {video visit start/end time for provider to select:432275}  Video End Time:{video visit start/end time for provider to select:634122}    Originating Location (pt. Location): Home  {PROVIDER LOCATION On-site should be selected for visits conducted from your clinic location or adjoining [facility] hospital, academic office, or other nearby [facility] building. Off-site should be selected for all other provider locations, including home:155487}  Distant Location (provider location):  Off-site  Platform used for Video Visit: OneMorePallet    "

## 2023-06-30 NOTE — PATIENT INSTRUCTIONS
Tramadol 50 mg every 8 hours as needed    Referral to physical therapy    MRI lumbar spine    Renal ultrasound    Consider referral to spine clinic    Consider renal referral    Consider eplerenone

## 2023-08-08 ENCOUNTER — VIRTUAL VISIT (OUTPATIENT)
Dept: INTERNAL MEDICINE | Facility: CLINIC | Age: 83
End: 2023-08-08
Payer: MEDICARE

## 2023-08-08 DIAGNOSIS — M25.562 CHRONIC PAIN OF LEFT KNEE: ICD-10-CM

## 2023-08-08 DIAGNOSIS — I48.19 PERSISTENT ATRIAL FIBRILLATION (H): ICD-10-CM

## 2023-08-08 DIAGNOSIS — M10.9 URIC ACID ARTHROPATHY: ICD-10-CM

## 2023-08-08 DIAGNOSIS — M54.50 ACUTE RIGHT-SIDED LOW BACK PAIN WITHOUT SCIATICA: Primary | ICD-10-CM

## 2023-08-08 DIAGNOSIS — I50.9 CHRONIC CONGESTIVE HEART FAILURE, UNSPECIFIED HEART FAILURE TYPE (H): ICD-10-CM

## 2023-08-08 DIAGNOSIS — G89.29 CHRONIC PAIN OF LEFT KNEE: ICD-10-CM

## 2023-08-08 DIAGNOSIS — N18.4 STAGE 4 CHRONIC KIDNEY DISEASE (H): ICD-10-CM

## 2023-08-08 PROCEDURE — 99214 OFFICE O/P EST MOD 30 MIN: CPT | Mod: VID | Performed by: INTERNAL MEDICINE

## 2023-08-08 RX ORDER — FEBUXOSTAT 40 MG/1
40 TABLET, FILM COATED ORAL DAILY
Qty: 30 TABLET | Refills: 11 | Status: SHIPPED | OUTPATIENT
Start: 2023-08-08 | End: 2023-01-01

## 2023-08-08 RX ORDER — PREDNISONE 10 MG/1
10 TABLET ORAL EVERY MORNING
Qty: 10 TABLET | Refills: 0 | Status: SHIPPED | OUTPATIENT
Start: 2023-08-08 | End: 2024-01-01

## 2023-08-08 ASSESSMENT — ENCOUNTER SYMPTOMS: BACK PAIN: 1

## 2023-08-08 NOTE — PROGRESS NOTES
Mely is a 83 year old who is being evaluated via a billable video visit.      How would you like to obtain your AVS? MyChart  If the video visit is dropped, the invitation should be resent by: Text to cell phone: [phone]  Will anyone else be joining your video visit? No  {If patient encounters technical issues they should call [phone] :390059}        {PROVIDER CHARTING PREFERENCE:715011}    Subjective   Mely is a 83 year old, presenting for the following health issues:  Follow Up, Recheck Medication ( Month ), and Back Pain      8/8/2023     1:57 PM   Additional Questions   Roomed by Aury GARCIA CMA       Back Pain     History of Present Illness       Back Pain:  She presents for follow up of back pain. Patient's back pain is a chronic problem.  Location of back pain:  Right side of waist and left side of waist  Description of back pain: sharp and shooting  Back pain spreads: nowhere    Since patient first noticed back pain, pain is: unchanged  Does back pain interfere with her job:  No       She eats 2-3 servings of fruits and vegetables daily.She consumes 0 sweetened beverage(s) daily.She exercises with enough effort to increase her heart rate 9 or less minutes per day.  She exercises with enough effort to increase her heart rate 3 or less days per week.   She is taking medications regularly.       {SUPERLIST (Optional):332484}  {additonal problems for provider to add (Optional):533639}      Review of Systems   Musculoskeletal:  Positive for back pain.      {ROS COMP (Optional):598788}      Objective           Vitals:  No vitals were obtained today due to virtual visit.    Physical Exam   {video visit exam brief selected:752303}    {Diagnostic Test Results (Optional):079371}    {AMBULATORY ATTESTATION (Optional):570284}        Video-Visit Details    Type of service:  Video Visit   Video Start Time: {video visit start/end time for provider to select:691108}  Video End Time:{video visit start/end time  for provider to select:614485}    Originating Location (pt. Location): Home  {PROVIDER LOCATION On-site should be selected for visits conducted from your clinic location or adjoining [facility] hospital, academic office, or other nearby [facility] building. Off-site should be selected for all other provider locations, including home:206315}  Distant Location (provider location):  On-site  Platform used for Video Visit: JayWell

## 2023-08-08 NOTE — PROGRESS NOTES
Mely is a 83 year old female contacting the clinic today via video, who will use the platform: [email or web address] for the visit.  Phone # for Doximity, or if Amwell drops:   Telephone Information:   Mobile [phone]          ASSESSMENT and PLAN:  1. Acute right-sided low back pain without sciatica  Trial of low-dose prednisone and physical therapy.  Continue to lower uric acid  - predniSONE (DELTASONE) 10 MG tablet; Take 1 tablet (10 mg) by mouth every morning for 10 days  Dispense: 10 tablet; Refill: 0  - Physical Therapy Referral; Future    2. Chronic pain of left knee  - Physical Therapy Referral; Future    3. Persistent atrial fibrillation (H)    4. Chronic congestive heart failure, unspecified heart failure type (H)  Trial of Jardiance.  Monitor carefully  - empagliflozin (JARDIANCE) 10 MG TABS tablet; Take 1 tablet (10 mg) by mouth daily  Dispense: 90 tablet; Refill: 1  - Basic metabolic panel; Future    5. Stage 4 chronic kidney disease (H)  Trial of Jardiance.  Monitor carefully  - empagliflozin (JARDIANCE) 10 MG TABS tablet; Take 1 tablet (10 mg) by mouth daily  Dispense: 90 tablet; Refill: 1  - Basic metabolic panel; Future    6. Uric acid arthropathy  Unclear what happened in 2021 with previous trial of Uloric  - Uric acid; Future  - febuxostat (ULORIC) 40 MG TABS tablet; Take 1 tablet (40 mg) by mouth daily  Dispense: 30 tablet; Refill: 11       Patient Instructions   Jardiance 10 mg daily    Uloric 40 mg daily    Prednisone 10 mg daily for 10 days    Referral to physical therapy for knee brace    Proceed with MRI and renal ultrasound    Follow-up visit 1 week after MRI and renal ultrasound            Return if symptoms worsen or fail to improve, for using a video visit.       CHIEF COMPLAINT:  Chief Complaint   Patient presents with    Follow Up    Recheck Medication      Month     Back Pain           8/8/2023     1:57 PM   Additional Questions   Roomed by Aury GARCIA CMA       HISTORY OF PRESENT  "ILLNESS:  Mely is a 83 year old female contacting the clinic today via video for routine follow-up.  At her last visit she was advised an MRI of her lumbar spine and a renal ultrasound.  She has not yet had these done.  Both are still a problem    We discussed the benefit of Jardiance for edema and renal function.  She does not have diabetes    Prednisone has helped back pain and knee pain.  Son wonders whether she can see physical therapy and get a brace    Probenecid was added for hyperuricemia.  She reports slight improvement and no side effects.  She has had side effects on allopurinol    History of Present Illness       Back Pain:  She presents for follow up of back pain. Patient's back pain is a chronic problem.  Location of back pain:  Right side of waist and left side of waist  Description of back pain: sharp and shooting  Back pain spreads: nowhere    Since patient first noticed back pain, pain is: unchanged  Does back pain interfere with her job:  No       She eats 2-3 servings of fruits and vegetables daily.She consumes 0 sweetened beverage(s) daily.She exercises with enough effort to increase her heart rate 9 or less minutes per day.  She exercises with enough effort to increase her heart rate 3 or less days per week.   She is taking medications regularly.      REVIEW OF SYSTEMS:   No trouble breathing    PFSH:  Social History     Social History Narrative    Not on file     Lives with son Tatyana    TOBACCO USE:  History   Smoking Status    Never   Smokeless Tobacco    Never       VITALS:  There were no vitals filed for this visit.  LMP  (LMP Unknown)   Breastfeeding No  Estimated body mass index is 37.98 kg/m  as calculated from the following:    Height as of 6/25/21: 1.568 m (5' 1.75\").    Weight as of 4/4/23: 93.4 kg (206 lb).    PHYSICAL EXAM:  (observations via Video)  Alert and oriented without shortness of breath    MEDICATIONS:   Current Outpatient Medications   Medication Sig Dispense Refill    " calcium citrate-vitamin D (CITRACAL) 315-200 MG-UNIT TABS per tablet Take 1 tablet by mouth daily      cholecalciferol, vitamin D3, 50 mcg (2,000 unit) Tab [CHOLECALCIFEROL, VITAMIN D3, 50 MCG (2,000 UNIT) TAB] Take 1 tablet (2,000 Units total) by mouth daily. 100 each 3    empagliflozin (JARDIANCE) 10 MG TABS tablet Take 1 tablet (10 mg) by mouth daily 90 tablet 1    febuxostat (ULORIC) 40 MG TABS tablet Take 1 tablet (40 mg) by mouth daily 30 tablet 11    furosemide (LASIX) 40 MG tablet Take 1 tablet (40 mg) by mouth every other day 180 tablet 3    losartan (COZAAR) 100 MG tablet       omeprazole (PRILOSEC) 20 MG DR capsule Take 1 capsule (20 mg) by mouth daily 90 capsule 3    predniSONE (DELTASONE) 10 MG tablet Take 1 tablet (10 mg) by mouth every morning for 10 days 10 tablet 0    probenecid (BENEMID) 500 MG tablet Take 2 tablets (1,000 mg) by mouth every morning AND 1 tablet (500 mg) every evening. 270 tablet 3    rivaroxaban ANTICOAGULANT (XARELTO ANTICOAGULANT) 15 MG TABS tablet Take 1 tablet (15 mg) by mouth daily (with dinner) 90 tablet 3       Outside Notes summarized:   Labs, x-rays, cardiology, GI tests reviewed:   Recent Labs   Lab Test 04/25/23  1530 04/11/23  1407 04/04/23  1504 03/11/22  1453 03/11/22  1453   HGB  --   --  12.2  --  13.5   WBC  --   --  10.7  --  10.0    143 143  --  143   POTASSIUM 4.0 4.3 5.6*   < > 4.8   CR 3.10* 3.61* 3.73*  --  1.70*   URIC  --   --  10.6*  --   --    B12  --   --   --   --  526   TSH  --   --  1.44  --  1.74   VITDT  --   --   --   --  39   SED  --   --   --   --  14   CRP  --   --   --   --  1.5*    < > = values in this interval not displayed.     No results found for: AABBQ26QLX  Lab Results   Component Value Date    CHOL 166 04/04/2023     New orders:   Orders Placed This Encounter   Procedures    Basic metabolic panel    Uric acid    Physical Therapy Referral       Independent review of:     Oscar Hopper MD  [facility]  [facility]    Video-Visit Details  Type of service:  Video Visit  Patient has given verbal consent to a Video visit?  Yes  How would you like to obtain your AVS?  MyChart  Will anyone else be joining your video visit, giving supplemental history?  Yes, son  Originating location (pt location): Home    Distant Location (provider location):  Off-site    Video Start Time: 2:54 PM  Video End time:  3:12 PM  Face to face plus orders:  18 minutes  Documentation time:  3 minutes     The visit lasted a total of 21 minutes

## 2023-08-08 NOTE — PATIENT INSTRUCTIONS
Jardiance 10 mg daily    Uloric 40 mg daily    Prednisone 10 mg daily for 10 days    Referral to physical therapy for knee brace    Proceed with MRI and renal ultrasound    Follow-up visit 1 week after MRI and renal ultrasound

## 2023-08-11 DIAGNOSIS — M10.9 URIC ACID ARTHROPATHY: ICD-10-CM

## 2023-08-11 RX ORDER — PROBENECID 500 MG/1
500 TABLET, FILM COATED ORAL 2 TIMES DAILY
Qty: 180 TABLET | OUTPATIENT
Start: 2023-08-11

## 2023-10-02 DIAGNOSIS — M54.50 ACUTE RIGHT-SIDED LOW BACK PAIN WITHOUT SCIATICA: Primary | ICD-10-CM

## 2023-10-02 RX ORDER — PREDNISONE 10 MG/1
10 TABLET ORAL EVERY MORNING
Qty: 14 TABLET | Refills: 0 | Status: SHIPPED | OUTPATIENT
Start: 2023-10-02 | End: 2023-01-01

## 2023-10-27 NOTE — PATIENT INSTRUCTIONS
Clarify no Jardiance per cost    Vicodin one half tab once or twice daily    Discontinue tramadol    Courses of prednisone done    Referral to spine clinic    MRI and renal ultrasound reviewed    Baclofen 10 mg every 8 hours as needed    Topiramate 25 mg twice daily    Physical therapy referral

## 2023-10-27 NOTE — PROGRESS NOTES
Mely is a 83 year old who is being evaluated via a billable video visit.      How would you like to obtain your AVS? MyChart  If the video visit is dropped, the invitation should be resent by: Text to cell phone: [phone]  Will anyone else be joining your video visit? No  {If patient encounters technical issues they should call [phone] :460726}        {PROVIDER CHARTING PREFERENCE:417013}    Subjective   Mely is a 83 year old, presenting for the following health issues:  Recheck Medication, Results, and Back Pain (Follow up)      10/27/2023    11:01 AM   Additional Questions   Roomed by juan manuel quinn       Back Pain     History of Present Illness       Back Pain:  She presents for follow up of back pain. Patient's back pain is a chronic problem.  Location of back pain:  Right lower back and left lower back  Description of back pain: fullness, sharp, shooting and stabbing  Back pain spreads: nowhere    Since patient first noticed back pain, pain is: rapidly worsening  Does back pain interfere with her job:  Yes       She eats 2-3 servings of fruits and vegetables daily.She consumes 1 sweetened beverage(s) daily.She exercises with enough effort to increase her heart rate 9 or less minutes per day.  She exercises with enough effort to increase her heart rate 3 or less days per week.   She is taking medications regularly.       {SUPERLIST (Optional):060028}  {additonal problems for provider to add (Optional):606905}      Review of Systems   Musculoskeletal:  Positive for back pain.      {ROS COMP (Optional):825114}      Objective    Vitals - Patient Reported  Pain Score: Extreme Pain (9)  Pain Loc: Low Back        Physical Exam   {video visit exam brief selected:562220}    {Diagnostic Test Results (Optional):777174}    {AMBULATORY ATTESTATION (Optional):588504}        Video-Visit Details    Type of service:  Video Visit   Video Start Time: {video visit start/end time for provider to select:813735}  Video End  "Time:{video visit start/end time for provider to select:621959}    Originating Location (pt. Location): {video visit patient location:782309::\"Home\"}  {PROVIDER LOCATION On-site should be selected for visits conducted from your clinic location or adjoining [facility] hospital, academic office, or other nearby [facility] building. Off-site should be selected for all other provider locations, including home:059846}  Distant Location (provider location):  {virtual location provider:702563}  Platform used for Video Visit: {Virtual Visit Platforms:220669::\"vogogo\"}      "

## 2023-10-27 NOTE — PROGRESS NOTES
Mely is a 83 year old female contacting the clinic today via video, who will use the platform: [employer] for the visit.  Phone # for Doximity, or if Amwell drops:   Telephone Information:   Mobile [phone]          ASSESSMENT and PLAN:  1. Acute midline low back pain without sciatica  Reviewed MRI.  Refractory to medication.  It increase intensity of pain medicines.  Referred to therapy and spine clinic.  Consider epidural and if no improvement surgery.  Trial of alternative medication  - Physical Therapy Referral; Future  - HYDROcodone-acetaminophen (NORCO) 5-325 MG tablet; Take 1 tablet by mouth every 8 hours as needed for severe pain  Dispense: 15 tablet; Refill: 0  - baclofen (LIORESAL) 10 MG tablet; Take 1 tablet (10 mg) by mouth 3 times daily as needed for muscle spasms  Dispense: 20 tablet; Refill: 1  - topiramate (TOPAMAX) 25 MG tablet; Take 1 tablet (25 mg) by mouth 2 times daily  Dispense: 180 tablet; Refill: 3    2. Chronic pain of left knee  Seems unrelated to back pain but consider    3. Chronic congestive heart failure, unspecified heart failure type (H)  Update x-ray and BNP with facial edema  - XR Chest 2 Views; Future  - BNP-N terminal pro; Future    4. Stage 4 chronic kidney disease (H)  Monitor carefully.  Renal ultrasound normal  - CBC with Platelets & Differential; Future  - Basic metabolic panel; Future    5. Uric acid arthropathy  - Uric acid; Future    6. SOB (shortness of breath)  - furosemide (LASIX) 40 MG tablet; Take 1 tablet (40 mg) by mouth daily  Dispense: 90 tablet; Refill: 3       Patient Instructions   Clarify no Jardiance per cost    Vicodin one half tab once or twice daily    Discontinue tramadol    Courses of prednisone done    Referral to spine clinic    MRI and renal ultrasound reviewed    Baclofen 10 mg every 8 hours as needed    Topiramate 25 mg twice daily    Physical therapy referral            Return in about 4 months (around 2/27/2024) for using a video visit.        CHIEF COMPLAINT:  Chief Complaint   Patient presents with    Recheck Medication    Results    Back Pain     Follow up           10/27/2023    11:01 AM   Additional Questions   Roomed by juan manuel quinn       HISTORY OF PRESENT ILLNESS:  Mely is a 83 year old female contacting the clinic today via video for review of MRI.  She reports back pain lasting 6 to 9 months.  This has slowly intensified.  It is midline, low back and does not seem to radiate.  She has severe chronic left knee pain but this seems to be unrelated.  High-dose prednisone on a few occasions has helped but low-dose has not.  Tramadol makes her feel dizzy and nauseated.  MRI showed ruptured lumbar 4 disc and she was referred to the spine clinic.  Appointment scheduled for next week.  Discussed therapy, alternative medications, and epidural steroid injection    She continues to be short of breath.  Back pain has limited her mobility.  She has facial swelling and edema worse in the morning after sleeping which improves as the day goes on.  She takes her furosemide with intermittent success, in the morning on a full stomach    History of Present Illness       Back Pain:  She presents for follow up of back pain. Patient's back pain is a chronic problem.  Location of back pain:  Right lower back and left lower back  Description of back pain: fullness, sharp, shooting and stabbing  Back pain spreads: nowhere    Since patient first noticed back pain, pain is: rapidly worsening  Does back pain interfere with her job:  Yes       She eats 2-3 servings of fruits and vegetables daily.She consumes 1 sweetened beverage(s) daily.She exercises with enough effort to increase her heart rate 9 or less minutes per day.  She exercises with enough effort to increase her heart rate 3 or less days per week.   She is taking medications regularly.      REVIEW OF SYSTEMS:   Borderline peripheral edema    PFSH:  Social History     Social History Narrative    Not on file  "    Lives with son    TOBACCO USE:  History   Smoking Status    Never   Smokeless Tobacco    Never       VITALS:  There were no vitals filed for this visit.  LMP  (LMP Unknown)  Estimated body mass index is 37.98 kg/m  as calculated from the following:    Height as of 6/25/21: 1.568 m (5' 1.75\").    Weight as of 4/4/23: 93.4 kg (206 lb).    PHYSICAL EXAM:  (observations via Video)  Facial swelling.  No shortness of breath    MEDICATIONS:   Current Outpatient Medications   Medication Sig Dispense Refill    baclofen (LIORESAL) 10 MG tablet Take 1 tablet (10 mg) by mouth 3 times daily as needed for muscle spasms 20 tablet 1    BETA BLOCKER NOT PRESCRIBED (INTENTIONAL) Please choose reason not prescribed from choices below.      calcium citrate-vitamin D (CITRACAL) 315-200 MG-UNIT TABS per tablet Take 1 tablet by mouth daily      cholecalciferol, vitamin D3, 50 mcg (2,000 unit) Tab [CHOLECALCIFEROL, VITAMIN D3, 50 MCG (2,000 UNIT) TAB] Take 1 tablet (2,000 Units total) by mouth daily. 100 each 3    febuxostat (ULORIC) 40 MG TABS tablet Take 1 tablet (40 mg) by mouth daily 30 tablet 11    furosemide (LASIX) 40 MG tablet Take 1 tablet (40 mg) by mouth daily 90 tablet 3    HYDROcodone-acetaminophen (NORCO) 5-325 MG tablet Take 1 tablet by mouth every 8 hours as needed for severe pain 15 tablet 0    losartan (COZAAR) 100 MG tablet Take 1 tablet (100 mg) by mouth daily 90 tablet 11    omeprazole (PRILOSEC) 20 MG DR capsule Take 1 capsule (20 mg) by mouth daily 90 capsule 3    probenecid (BENEMID) 500 MG tablet Take 2 tablets (1,000 mg) by mouth every morning AND 1 tablet (500 mg) every evening. 270 tablet 3    rivaroxaban ANTICOAGULANT (XARELTO ANTICOAGULANT) 15 MG TABS tablet Take 1 tablet (15 mg) by mouth daily (with dinner) 90 tablet 3    topiramate (TOPAMAX) 25 MG tablet Take 1 tablet (25 mg) by mouth 2 times daily 180 tablet 3       Outside Notes summarized:   Labs, x-rays, cardiology, GI tests reviewed: MRI lumbar " "spine.  Renal ultrasound showing no stenosis.  Elevated uric acid  Recent Labs   Lab Test 04/25/23  1530 04/11/23  1407 04/04/23  1504 03/11/22  1453 03/11/22  1453   HGB  --   --  12.2  --  13.5   WBC  --   --  10.7  --  10.0    143 143  --  143   POTASSIUM 4.0 4.3 5.6*   < > 4.8   CR 3.10* 3.61* 3.73*  --  1.70*   URIC  --   --  10.6*  --   --    B12  --   --   --   --  526   TSH  --   --  1.44  --  1.74   VITDT  --   --   --   --  39   SED  --   --   --   --  14   CRP  --   --   --   --  1.5*    < > = values in this interval not displayed.     No results found for: \"[id number]\"  Lab Results   Component Value Date    CHOL 166 04/04/2023     New orders:   Orders Placed This Encounter   Procedures    XR Chest 2 Views    Basic metabolic panel    Uric acid    BNP-N terminal pro    Physical Therapy Referral    CBC with Platelets & Differential       Independent review of:     Oscar Hopper MD  [facility]    Video-Visit Details  Type of service:  Video Visit  Patient has given verbal consent to a Video visit?  Yes  How would you like to obtain your AVS?  MyChart  Will anyone else be joining your video visit, giving supplemental history?  Yes, son Tatyana  Originating location (pt location): Home    Distant Location (provider location):  Off-site    Video Start Time: 11:20 AM  Video End time:   11:49 AM   Face to face plus orders: 29 minutes  Documentation time:  3 minutes     The visit lasted a total of 32 minutes       "

## 2023-10-30 PROBLEM — N39.0 URINARY TRACT INFECTION WITHOUT HEMATURIA, SITE UNSPECIFIED: Status: ACTIVE | Noted: 2023-01-01

## 2023-10-30 PROBLEM — N18.9 ACUTE RENAL FAILURE SUPERIMPOSED ON CHRONIC KIDNEY DISEASE, UNSPECIFIED ACUTE RENAL FAILURE TYPE, UNSPECIFIED CKD STAGE (H): Status: ACTIVE | Noted: 2023-01-01

## 2023-10-30 PROBLEM — N17.9 ACUTE RENAL FAILURE SUPERIMPOSED ON CHRONIC KIDNEY DISEASE, UNSPECIFIED ACUTE RENAL FAILURE TYPE, UNSPECIFIED CKD STAGE (H): Status: ACTIVE | Noted: 2023-01-01

## 2023-10-30 PROBLEM — E87.5 HYPERKALEMIA: Status: ACTIVE | Noted: 2023-01-01

## 2023-10-30 NOTE — PRE-PROCEDURE
GENERAL PRE-PROCEDURE:   Procedure:  Tunneled HD catheter placement  Date/Time:  10/30/2023 12:57 PM    Written consent obtained?: Yes    Risks and benefits: Risks, benefits and alternatives were discussed    Consent given by:  Patient  Patient states understanding of procedure being performed: Yes    Patient's understanding of procedure matches consent: Yes    Procedure consent matches procedure scheduled: Yes    Expected level of sedation:  Moderate  Appropriately NPO:  Yes  ASA Class:  3  Mallampati  :  Grade 3- soft palate visible, posterior pharyngeal wall not visible  Lungs:  Other (comment)  Lung exam comment:  Coarse expiratory bilaterally  Heart:  Normal heart sounds and rate  History & Physical reviewed:  History and physical reviewed and no updates needed  Statement of review:  I have reviewed the lab findings, diagnostic data, medications, and the plan for sedation

## 2023-10-30 NOTE — CONSULTS
Care Management Initial Consult    General Information  Assessment completed with: Patient, Children, Pt, children Rhoda and Tatyana  Type of CM/SW Visit: Initial Assessment    Primary Care Provider verified and updated as needed:     Readmission within the last 30 days: no previous admission in last 30 days      Reason for Consult: discharge planning  Advance Care Planning:            Communication Assessment  Patient's communication style:               Cognitive  Cognitive/Neuro/Behavioral: orientation, .WDL except        Orientation: disoriented to, place, time             Living Environment:   People in home: child(rosenda), adult  Tatyana  Current living Arrangements: house      Able to return to prior arrangements: yes       Family/Social Support:  Care provided by: self, child(rosenda)  Provides care for: no one, unable/limited ability to care for self     Children          Description of Support System: Supportive, Involved    Support Assessment: Adequate family and caregiver support, Adequate social supports    Current Resources:   Patient receiving home care services: No     Community Resources: None  Equipment currently used at home:    Supplies currently used at home:      Employment/Financial:  Employment Status:          Financial Concerns:     Referral to Financial Worker: No       Does the patient's insurance plan have a 3 day qualifying hospital stay waiver?  No    Lifestyle & Psychosocial Needs:  Social Determinants of Health     Food Insecurity: Low Risk  (10/27/2023)    Food Insecurity     Within the past 12 months, did you worry that your food would run out before you got money to buy more?: No     Within the past 12 months, did the food you bought just not last and you didn t have money to get more?: No   Depression: Not at risk (8/8/2023)    PHQ-2     PHQ-2 Score: 0   Housing Stability: Low Risk  (10/27/2023)    Housing Stability     Do you have housing? : Yes     Are you worried about losing your  housing?: No   Tobacco Use: Low Risk  (10/29/2023)    Patient History     Smoking Tobacco Use: Never     Smokeless Tobacco Use: Never     Passive Exposure: Not on file   Financial Resource Strain: Low Risk  (10/27/2023)    Financial Resource Strain     Within the past 12 months, have you or your family members you live with been unable to get utilities (heat, electricity) when it was really needed?: No   Alcohol Use: Not on file   Transportation Needs: Low Risk  (10/27/2023)    Transportation Needs     Within the past 12 months, has lack of transportation kept you from medical appointments, getting your medicines, non-medical meetings or appointments, work, or from getting things that you need?: No   Physical Activity: Not on file   Interpersonal Safety: Not on file   Stress: Not on file   Social Connections: Not on file             Additional Information:  SW met in pts room with pt, pts daughter, Rhoda, and pts son, Tatyana. Pt was intermittently alert to conversation. She provided SW with permission to speak with her children about her needs at home prior to admission. Tatyana reports that he and the pt live together in a single family home. He reports that up until the last week or so that pt was able to get around the house on her own using a cane but that she has needed increased help over the last week. He reports that in the last week the pt has needed help for all transfers out of bed and chair and with all tasks at home. Tatyana became tearful in discussing this change. He reports that he typically works full-time and pt was able to manage at home while he was at work. Over the last week Rhoda has been coming to be with the pt while he was at work due to her increased needs. Both Rhoda and Tatyana report that pt has been generally healthy and has not been hospitalized since a hip replacement maybe 20 years ago. SW provided support. Discussed SW role to assist with discharge planning as pt progresses. Discussed the  possibility that pt would need TCU or home care depending on how she is moving when she is closer to discharge. They report understanding of this. Discussed that we can assist with resources to get them additional longer term support at home as well. Rhoda asks if she can be a PCA for the pt. She reports that pt only has Medicare insurance currently. Discussed [place] [employer] process through the [facility] and the need to apply for MA for pt to qualify for paid PCA care. Rhoda reports understanding. SW offered to provide information on this and Rhoda agreeable. SW will follow.    TEVIN Prather

## 2023-10-30 NOTE — CONSULTS
Cardiology Consult Note    Thank you, Dr. Avila, for asking the [facility] team to see Mely Alegria in consultation at [facility] to evaluate elevated troponin.      Assessment:   1.  Elevated troponin, likely demand ischemia in the setting of chronic atrial fibrillation with RVR, urinary tract infection and acute on chronic renal insufficiency.  Troponin trending downward.  ECG without acute ischemic changes.  At this point we will obtain echocardiogram to assess for any regional wall motion abnormality.  2.  Urinary tract infection, initiated on IV antibiotics  3.  Acute on chronic renal insufficiency, likely secondary to acute infection.  Agree with gentle hydration and nephrology consult  4.  Chronic atrial fibrillation with RVR, likely due to increased demand from infection and acute renal insufficiency, as well as withdrawal of beta-blocker 6 months ago due to shortness of breath and feeling cold.  No subsequent evaluation of rate response following discontinuation of beta-blocker.  Agree with IV digoxin for rate control now.  We will likely reinstitute beta-blocker once blood pressure better controlled.  Suspect heart rate will come down with treatment of her infection and medication.     Plan:   1.  Trend troponins  2.  Echocardiogram to evaluate for regional wall motion abnormality  3.  Utilize digoxin as needed to keep heart rate below 120.  We will likely reinstitute beta-blocker once blood pressure improved    Primary cardiologist: None     Current History:   Ms. Mely Alegria is a 83 year old female with history of chronic atrial fibrillation, chronic diastolic heart failure, CKD stage IV, hypertension, morbid obesity and chronic back pain who presented to the ED due to altered mental status.  In the ED, she was noted to have acute on chronic renal insufficiency, atrial fibrillation with rapid ventricular response and urinary tract infection.  Laboratory studies  remarkable for elevated troponin with ECG showing no acute ST depression or elevation.  Also noted to have elevated TSH with probable mild hypothyroidism.    Past Medical History:   -Hypertension  -Chronic atrial fibrillation on Eliquis anticoagulation  -Chronic diastolic heart failure  -CKD stage IV  -Morbid obesity  -Chronic back pain    Past Surgical History:     Past Surgical History:   Procedure Laterality Date    HC DILATION/CURETTAGE DIAG/THER NON OB      Description: Dilation And Curettage;  Recorded: 01/18/2010;    ZC COLOSTOMY      Description: Colostomy;  Recorded: 11/05/2012;    Z PART REMOVAL COLON W ANASTOMOSIS      Description: Partial Colectomy;  Recorded: 11/05/2012;  Comments: with colostomy    [facility] TOTAL HIP ARTHROPLASTY      Description: Total Hip Replacement;  Recorded: 01/18/2010;       Family History:     Family History   Problem Relation Age of Onset    Diabetes Mother        Social History:    reports that she has never smoked. She has never used smokeless tobacco.    Meds:     Current Facility-Administered Medications:     cefTRIAXone (ROCEPHIN) 1 g vial to attach to  mL bag for ADULTS or NS 50 mL bag for PEDS, 1 g, Intravenous, Q24H, Yissel Avila MD    glucose gel 15-30 g, 15-30 g, Oral, Q15 Min PRN **OR** dextrose 50 % injection 25-50 mL, 25-50 mL, Intravenous, Q15 Min PRN, 50 mL at 10/30/23 0650 **OR** glucagon injection 1 mg, 1 mg, Subcutaneous, Q15 Min PRN, Danis Quinteros DO    glucose gel 15-30 g, 15-30 g, Oral, Q15 Min PRN **OR** dextrose 50 % injection 25-50 mL, 25-50 mL, Intravenous, Q15 Min PRN, 50 mL at 10/30/23 0720 **OR** glucagon injection 1 mg, 1 mg, Subcutaneous, Q15 Min PRN, Yissel Avila MD    levothyroxine (SYNTHROID/LEVOTHROID) tablet 25 mcg, 25 mcg, Oral, QAM AC, Yissel Avila MD    miconazole (MICATIN) 2 % powder, , Topical, BID, Yissel Avila MD, Given at 10/30/23 0449    ondansetron (ZOFRAN ODT) ODT tab 4 mg, 4 mg, Oral, Q6H PRN **OR**  ondansetron (ZOFRAN) injection 4 mg, 4 mg, Intravenous, Q6H PRN, Yissel Avila MD    Patient is already receiving anticoagulation with heparin, enoxaparin (LOVENOX), warfarin (COUMADIN)  or other anticoagulant medication, , Does not apply, Continuous PRN, Yissel Avila MD    Current Outpatient Medications:     baclofen (LIORESAL) 10 MG tablet, Take 1 tablet (10 mg) by mouth 3 times daily as needed for muscle spasms, Disp: 20 tablet, Rfl: 1    calcium citrate-vitamin D (CITRACAL) 315-200 MG-UNIT TABS per tablet, Take 1 tablet by mouth daily, Disp: , Rfl:     cholecalciferol, vitamin D3, 50 mcg (2,000 unit) Tab, [CHOLECALCIFEROL, VITAMIN D3, 50 MCG (2,000 UNIT) TAB] Take 1 tablet (2,000 Units total) by mouth daily., Disp: 100 each, Rfl: 3    febuxostat (ULORIC) 40 MG TABS tablet, Take 1 tablet (40 mg) by mouth daily, Disp: 30 tablet, Rfl: 11    furosemide (LASIX) 40 MG tablet, Take 60 mg by mouth daily Take one and half tablet (60 mg ) daily, Disp: , Rfl:     HYDROcodone-acetaminophen (NORCO) 5-325 MG tablet, Take 1 tablet by mouth every 8 hours as needed for severe pain, Disp: 15 tablet, Rfl: 0    losartan (COZAAR) 100 MG tablet, Take 1 tablet (100 mg) by mouth daily, Disp: 90 tablet, Rfl: 11    omeprazole (PRILOSEC) 20 MG DR capsule, Take 1 capsule (20 mg) by mouth daily, Disp: 90 capsule, Rfl: 3    probenecid (BENEMID) 500 MG tablet, Take 2 tablets (1,000 mg) by mouth every morning AND 1 tablet (500 mg) every evening., Disp: 270 tablet, Rfl: 3    rivaroxaban ANTICOAGULANT (XARELTO ANTICOAGULANT) 15 MG TABS tablet, Take 1 tablet (15 mg) by mouth daily (with dinner), Disp: 90 tablet, Rfl: 3    topiramate (TOPAMAX) 25 MG tablet, Take 1 tablet (25 mg) by mouth 2 times daily, Disp: 180 tablet, Rfl: 3    BETA BLOCKER NOT PRESCRIBED (INTENTIONAL), Please choose reason not prescribed from choices below., Disp: , Rfl:    cefTRIAXone  1 g Intravenous Q24H    levothyroxine  25 mcg Oral QAM AC    miconazole   Topical BID        Allergies:   Allopurinol and Lisinopril    Review of Systems:   A 12 point comprehensive review of systems was negative except as noted in the current history.    Objective:      Physical Exam  Body mass index is 40.01 kg/m .  BP 96/48   Pulse 112   Temp (!) 96.4  F (35.8  C) (Temporal)   Resp 20   Wt 98.4 kg (217 lb)   LMP  (LMP Unknown)   SpO2 97%   BMI 40.01 kg/m      General Appearance:   Well developed, obese elderly female in no acute distress.  Appears somewhat lethargic and confused   HEENT:  Normocephalic, atraumatic.  Sclera nonicteric.  Mucous membranes moist.   Neck: No appreciable jugular venous distention   Chest: Symmetric with normal AP diameter   Lungs:   Clear to auscultation bilaterally   Cardiovascular:   Irregularly irregular rhythm.  S1, S2 normal.  No murmur or gallop   Abdomen:  Obese, soft, nondistended, nontender.  No organomegaly or mass   Extremities: 2+ pitting edema to the mid to upper calves bilaterally.  Weeping sores noted.   Skin: No rash or lesions   Neurologic: Lethargic but arousable.  Oriented x2.  No gross focal deficits.             Cardiographics (personally reviewed)  ECG demonstrates atrial fibrillation with mildly rapid ventricular response.  Nonspecific ST-T wave abnormality.    Imaging (personally reviewed)  No cardiac imaging currently.  Chest x-ray demonstrated cardiomegaly.  No acute pulmonary process.  No evidence of heart failure.    Lab Review (personally reviewed all results)  Recent Labs   Lab Test 04/04/23  1504   CHOL 166   HDL 41*   *   TRIG 119     Recent Labs   Lab Test 04/04/23  1504 03/11/22  1453 12/10/20  1419   * 68 116     Recent Labs   Lab Test 10/30/23  0746 10/30/23  0458 10/30/23  0447 10/30/23  0101 10/29/23  2328   NA  --   --   --   --  138   POTASSIUM  --   --  5.8*   < > 6.9*   CHLORIDE  --   --   --   --  104   CO2  --   --   --   --  11*   GLC 79   < >  --    < > 85   BUN  --   --   --   --  100.1*   CR  --   --    "--   --  6.07*   GFRESTIMATED  --   --   --   --  6*   SANDIP  --   --   --   --  9.2    < > = values in this interval not displayed.     Recent Labs   Lab Test 10/29/23  2328 04/25/23  1530 04/11/23  1407   CR 6.07* 3.10* 3.61*     No results for input(s): \"A1C\" in the last 94556 hours.       Recent Labs   Lab Test 10/30/23  0743   WBC 8.2   HGB 9.5*   HCT 27.7*   MCV 93   *     Recent Labs   Lab Test 10/30/23  0743 10/29/23  2211 04/04/23  1504   HGB 9.5* 11.6* 12.2    No results for input(s): \"TROPONINI\" in the last 07549 hours.  Recent Labs   Lab Test 04/04/23  1504 03/11/22  1453   BNP  --  572*   NTBNP 9,157*  --      Recent Labs   Lab Test 10/29/23  2211   TSH 5.41*     No results for input(s): \"INR\" in the last 62385 hours.              "

## 2023-10-30 NOTE — ED TRIAGE NOTES
Family noticed increased weakness and confusion around 630pm. Pt comes from home and live with her son. Ambulance informed writer that pt's urine smell is malodorous. Pt alert to self, place and the year. Pt's on Eliquis for A Fib.     Triage Assessment (Adult)       Row Name 10/29/23 3462          Triage Assessment    Airway WDL WDL        Respiratory WDL    Respiratory WDL WDL        Skin Circulation/Temperature WDL    Skin Circulation/Temperature WDL WDL        Cardiac WDL    Cardiac WDL WDL     Cardiac Rhythm Atrial fibrillation        Peripheral/Neurovascular WDL    Peripheral Neurovascular WDL WDL        Cognitive/Neuro/Behavioral WDL    Cognitive/Neuro/Behavioral WDL orientation     Orientation person;place;time

## 2023-10-30 NOTE — ED NOTES
Bed: JNEDH-  Expected date: 10/29/23  Expected time: 9:25 PM  Means of arrival: Ambulance  Comments:  WBL - confusion + weakness

## 2023-10-30 NOTE — PROGRESS NOTES
Pt disoriented to time and situation. Sleeping between cares. She is forgetful. She has generalized edema and pitting edema to bilateral lower extremities. Diminished lung sounds, right lower lobe is more diminished than left lower lobe. Abdominal fold, perineal area, and buttocks are very yeasty and denuded with skin breakdown. Pt is very tender to touch with cares. Large blister to left foot open to air, skin tear to right heel also open to air. She has had at least four loose bowel movements due to kayexalate. Frequent blood sugar checks this morning with multiple doses of D50 given. Blood sugar has finally stabilized above 100. Currently has D10 running and sodium bicarb. Diurel and lasix given per nephrology with no increase in urine output. Lerma catheter with minimal output, approx. 20 ml this shift and urine is now a milky tan color. Plan for dialysis. Pt left for IR for dialysis catheter placement, they will attempt to draw labs during procedure that lab was unable to draw in room. Pt transferring to P3 room 305. Will be brought up after IR procedure.  Family has been at bedside.

## 2023-10-30 NOTE — MEDICATION SCRIBE - ADMISSION MEDICATION HISTORY
Medication Scribe Admission Medication History    Admission medication history is complete. The information provided in this note is only as accurate as the sources available at the time of the update.    Information Source(s): Family member via in-person    Pertinent Information: Patient's family at bed side and unsure of the administration of some of Rx's   Patient's family to bring all of patient's home medications tomorrow.    Changes made to PTA medication list:  Added: None  Deleted: None  Changed: Furosemide 40 mg to 60 mg(per family)    Medication Affordability:  Not including over the counter (OTC) medications, was there a time in the past 3 months when you did not take your medications as prescribed because of cost?: Yes (Jardiance)    Allergies reviewed with patient and updates made in EHR: yes    Medication History Completed By: Brad Vila 10/30/2023 12:54 AM    PTA Med List   Medication Sig Last Dose    baclofen (LIORESAL) 10 MG tablet Take 1 tablet (10 mg) by mouth 3 times daily as needed for muscle spasms Unknown at prn    calcium citrate-vitamin D (CITRACAL) 315-200 MG-UNIT TABS per tablet Take 1 tablet by mouth daily Unknown at unknown    cholecalciferol, vitamin D3, 50 mcg (2,000 unit) Tab [CHOLECALCIFEROL, VITAMIN D3, 50 MCG (2,000 UNIT) TAB] Take 1 tablet (2,000 Units total) by mouth daily. Unknown at unknown    febuxostat (ULORIC) 40 MG TABS tablet Take 1 tablet (40 mg) by mouth daily Unknown at unknown    furosemide (LASIX) 40 MG tablet Take 60 mg by mouth daily Take one and half tablet (60 mg ) daily 10/28/2023 at am    HYDROcodone-acetaminophen (NORCO) 5-325 MG tablet Take 1 tablet by mouth every 8 hours as needed for severe pain Unknown at prn    losartan (COZAAR) 100 MG tablet Take 1 tablet (100 mg) by mouth daily 10/29/2023 at am    omeprazole (PRILOSEC) 20 MG DR capsule Take 1 capsule (20 mg) by mouth daily 10/29/2023 at am    probenecid (BENEMID) 500 MG tablet Take 2 tablets  (1,000 mg) by mouth every morning AND 1 tablet (500 mg) every evening. Unknown at unknown    rivaroxaban ANTICOAGULANT (XARELTO ANTICOAGULANT) 15 MG TABS tablet Take 1 tablet (15 mg) by mouth daily (with dinner) 10/28/2023 at am    topiramate (TOPAMAX) 25 MG tablet Take 1 tablet (25 mg) by mouth 2 times daily 10/29/2023 at am

## 2023-10-30 NOTE — ED PROVIDER NOTES
EMERGENCY DEPARTMENT NOTE     Name: Mely Alegria    Age/Sex: 83 year old female   MRN: [id number]   Evaluation Date & Time:  10/29/2023  9:34 PM    PCP:    Oscar Hopper   ED Provider: Danis Quinteros D.O.       CHIEF COMPLAINT    Generalized Weakness and Altered Mental Status       DIAGNOSIS & DISPOSITION/MEDICAL DECISION MAKING     1. Acute renal failure superimposed on chronic kidney disease, unspecified acute renal failure type, unspecified CKD stage     2. Hyperkalemia    3. Urinary tract infection without hematuria, site unspecified        Mely Alegria is a 83 year old female with relevant past history of hypertension, stage 4 CKD, atrial fibrillation, and congestive heart failure who presents to the emergency department for evaluation of generalized weakness and decreased urine output.    Differential  diagnosis considered included but not limited to this for multiple sources, ACS, electrolyte derangement, acute renal failure, endocrine process including hypothyroidism, medication side effect    Medical Decision Making  Patient on exam had no complaints, was afebrile and normotensive.  Exam significant for maceration of the skin folds of her lower abdomen and groin area but did not appear to be cellulitic.  Laboratory evaluation revealed acute renal failure with hyperkalemia.  Urinalysis consistent with urinary tract infection, TSH 5.4 with T40.6 WBC 10.3, lactic acid nonelevated, chest x-ray without pulmonary vascular congestion, EKG atrial fibrillation with controlled ventricular response nonischemic, troponin 84 and on repeat 9 without significant delta change.  Patient received 500 cc normal saline before bolus with borderline hypotension, IV ceftriaxone and hyperkalemia protocol with IV bicarbonate, albuterol nebulizer, calcium gluconate, insulin and D50.   Discussed case with  for nephrology.  Patient has acute renal failure superimposed on chronic kidney disease.  Recent increase  in IV diuretics with decreased urine output.  Patient on exam is volume overloaded.  She agrees with initial management using hyperkalemic protocol including sodium bicarb.  She recommended loading dose of albumin for possible  intravascular depletion but hold on further diuretics with monitoring blood pressure, discontinuing ARB.    Current findings and plan for admission discussed with the family they were in agreement.  Case discussed with the hospitalist service Dr. Avila  Patient will be admitted to cardiac telemetry with continued monitoring of hyperkalemia, holding of ARB and nephrology consultation in a.m.  Pending at the time of admission was noncontrast CT scan of the abdomen and will be followed by Dr. Fletcher prior to admission  Interventions: IV ceftriaxone, IV fluids, calcium gluconate, bicarbonate, D50 with subcutaneous insulin, albuterol nebulizer  Discharge Vital Signs:/57   Pulse 118   Temp (!) 96.4  F (35.8  C) (Temporal)   Resp 16   Wt 98.4 kg (217 lb)   LMP  (LMP Unknown)   SpO2 97%   BMI 40.01 kg/m       DISPOSITION: Admit to cardiac telemetry/[facility] [place] [facility]    Diagnostic studies:  Imaging:  XR Chest Port 1 View   Final Result   IMPRESSION: No acute abnormality.      CT Abdomen Pelvis w/o Contrast    (Results Pending)   US Thyroid    (Results Pending)      Lab:  Labs Ordered and Resulted from Time of ED Arrival to Time of ED Departure   BASIC METABOLIC PANEL - Abnormal       Result Value    Sodium 138      Potassium 6.9 (*)     Chloride 104      Carbon Dioxide (CO2) 11 (*)     Anion Gap 23 (*)     Urea Nitrogen 100.1 (*)     Creatinine 6.07 (*)     GFR Estimate 6 (*)     Calcium 9.2      Glucose 85     HEPATIC FUNCTION PANEL - Abnormal    Protein Total 7.7      Albumin 3.4 (*)     Bilirubin Total 0.5      Alkaline Phosphatase 130 (*)     AST 21      ALT 14      Bilirubin Direct 0.36 (*)    PROCALCITONIN - Abnormal    Procalcitonin 0.17 (*)    TROPONIN T, HIGH SENSITIVITY - Abnormal     Troponin T, High Sensitivity 84 (*)    TSH WITH FREE T4 REFLEX - Abnormal    TSH 5.41 (*)    CRP INFLAMMATION - Abnormal    CRP Inflammation 58.40 (*)    ROUTINE UA WITH MICROSCOPIC REFLEX TO CULTURE - Abnormal    Color Urine Orange (*)     Appearance Urine Cloudy (*)     Glucose Urine Negative      Bilirubin Urine Negative      Ketones Urine Negative      Specific Gravity Urine 1.014      Blood Urine 1.0 mg/dL (*)     pH Urine 5.5      Protein Albumin Urine 300 (*)     Urobilinogen Urine <2.0      Nitrite Urine Negative      Leukocyte Esterase Urine 500 Demetria/uL (*)     Bacteria Urine Many (*)     WBC Clumps Urine Present (*)     RBC Urine 132 (*)     WBC Urine >182 (*)     Hyaline Casts Urine 61 (*)    CBC WITH PLATELETS AND DIFFERENTIAL - Abnormal    WBC Count 10.3      RBC Count 3.60 (*)     Hemoglobin 11.6 (*)     Hematocrit 33.0 (*)     MCV 92      MCH 32.2      MCHC 35.2      RDW 21.8 (*)     Platelet Count 182      % Neutrophils 78      % Lymphocytes 13      % Monocytes 7      % Eosinophils 1      % Basophils 0      % Immature Granulocytes 1      NRBCs per 100 WBC 0      Absolute Neutrophils 8.1      Absolute Lymphocytes 1.3      Absolute Monocytes 0.7      Absolute Eosinophils 0.1      Absolute Basophils 0.0      Absolute Immature Granulocytes 0.1      Absolute NRBCs 0.0     T4 FREE - Abnormal    Free T4 0.59 (*)    POTASSIUM - Abnormal    Potassium 6.8 (*)    TROPONIN T, HIGH SENSITIVITY - Abnormal    Troponin T, High Sensitivity 79 (*)    GLUCOSE BY METER - Abnormal    GLUCOSE BY METER POCT 101 (*)    POTASSIUM - Abnormal    Potassium 6.2 (*)    GLUCOSE BY METER - Abnormal    GLUCOSE BY METER POCT 102 (*)    GLUCOSE BY METER - Abnormal    GLUCOSE BY METER POCT 108 (*)    LACTIC ACID WHOLE BLOOD - Normal    Lactic Acid 1.4     AMMONIA - Normal    Ammonia 16     GLUCOSE BY METER - Normal    GLUCOSE BY METER POCT 79     GLUCOSE MONITOR NURSING POCT   GLUCOSE MONITOR NURSING POCT   GLUCOSE MONITOR NURSING POCT    COMPREHENSIVE METABOLIC PANEL   TROPONIN T, HIGH SENSITIVITY   POTASSIUM   GLUCOSE MONITOR NURSING POCT   URINE CULTURE        EKG: Atrial fibrillation with controlled ventricular response. Compared to ECG of 11-MAR-2022 heart rate has decreased by 22 bpm.     ED Course as of 10/30/23 0325   Mon Oct 30, 2023   0031 T4 Free(!): 0.59       Triage note reviewed:     History:  Supplemental history from: Documented in chart, if applicable and Family Member/Significant Other  External Record(s) reviewed: Documented in chart, if applicable. and Outpatient Record: 10/27/23 primary care visit    Work Up:  Chart documentation includes differential considered and any EKGs or imaging independently interpreted by provider, where specified.  In additional to work up documented, I considered the following work up: N/A    External consultation:  Discussion of management with another provider: Hospitalist and Other: Nephrology Dr. Trejo    Complicating factors:  Care impacted by chronic illness: Chronic Kidney Disease, Heart Disease, and Hypertension  Care affected by social determinants of health: N/A    Disposition considerations: Admit.    At the conclusion of the encounter I discussed the results of all of the tests and the disposition. The questions were answered. The patient or family acknowledged understanding and was agreeable with the care plan.    TOTAL CRITICAL CARE TIME (EXCLUDING PROCEDURES): 60 minutes for management of hyperkalemia, reassessment of the patient, discussion with consultants    PROCEDURES:   None    EMERGENCY DEPARTMENT COURSE   9:50 PM I met with the patient to gather history and to perform my initial exam.  We discussed treatment options and the plan for care while in the Emergency Department.    ED INTERVENTIONS     Medications   glucose gel 15-30 g (has no administration in time range)     Or   dextrose 50 % injection 25-50 mL (has no administration in time range)     Or   glucagon injection 1 mg  (has no administration in time range)   dextrose 10% BOLUS 300 mL (300 mLs Intravenous $New Bag 10/30/23 0119)   Patient is already receiving anticoagulation with heparin, enoxaparin (LOVENOX), warfarin (COUMADIN)  or other anticoagulant medication (has no administration in time range)   ondansetron (ZOFRAN ODT) ODT tab 4 mg (has no administration in time range)     Or   ondansetron (ZOFRAN) injection 4 mg (has no administration in time range)   miconazole (MICATIN) 2 % powder (has no administration in time range)   cefTRIAXone (ROCEPHIN) 1 g vial to attach to  mL bag for ADULTS or NS 50 mL bag for PEDS (has no administration in time range)   levothyroxine (SYNTHROID/LEVOTHROID) tablet 25 mcg (has no administration in time range)   digoxin (LANOXIN) injection 125 mcg (125 mcg Intravenous Incomplete 10/30/23 0307)   glucose gel 15-30 g (has no administration in time range)     Or   dextrose 50 % injection 25-50 mL (has no administration in time range)     Or   glucagon injection 1 mg (has no administration in time range)   dextrose 10% BOLUS 300 mL (has no administration in time range)   dextrose 50 % injection 25 g (has no administration in time range)   insulin regular 1 unit/mL injection 9.8 Units (has no administration in time range)   cefTRIAXone (ROCEPHIN) 1 g vial to attach to  mL bag for ADULTS or NS 50 mL bag for PEDS (0 g Intravenous Stopped 10/30/23 0102)   sodium chloride 0.9% BOLUS 500 mL (0 mLs Intravenous Stopped 10/30/23 0149)   calcium gluconate 10 % injection 1 g (0 g Intravenous Stopped 10/30/23 0130)   albuterol (PROVENTIL) neb solution 10 mg (10 mg Nebulization $Given 10/30/23 0114)   sodium bicarbonate 8.4 % injection 50 mEq (50 mEq Intravenous $Given 10/30/23 0118)   dextrose 50 % injection 25 g (25 g Intravenous $Given 10/30/23 0114)   insulin regular 1 unit/mL injection 9.8 Units (9.8 Units Intravenous $Given 10/30/23 0122)   albumin human 5 % injection 25 g (25 g Intravenous $New  Bag 10/30/23 0148)   aspirin (ASA) chewable tablet 324 mg (324 mg Oral $Given 10/30/23 1463)       DISCHARGE MEDICATIONS        Review of your medicines        UNREVIEWED medicines. Ask your doctor about these medicines        Dose / Directions   baclofen 10 MG tablet  Commonly known as: LIORESAL  Used for: Acute midline low back pain without sciatica      Dose: 10 mg  Take 1 tablet (10 mg) by mouth 3 times daily as needed for muscle spasms  Quantity: 20 tablet  Refills: 1     calcium citrate-vitamin D 315-200 MG-UNIT Tabs per tablet  Commonly known as: CITRACAL      Dose: 1 tablet  Take 1 tablet by mouth daily  Refills: 0     cholecalciferol 50 MCG (2000 UT) tablet  Used for: Vitamin D deficiency      Dose: 2,000 Units  [CHOLECALCIFEROL, VITAMIN D3, 50 MCG (2,000 UNIT) TAB] Take 1 tablet (2,000 Units total) by mouth daily.  Quantity: 100 each  Refills: 3     febuxostat 40 MG Tabs tablet  Commonly known as: ULORIC  Used for: Uric acid arthropathy      Dose: 40 mg  Take 1 tablet (40 mg) by mouth daily  Quantity: 30 tablet  Refills: 11     furosemide 40 MG tablet  Commonly known as: LASIX  Ask about: Which instructions should I use?      Dose: 60 mg  Take 60 mg by mouth daily Take one and half tablet (60 mg ) daily  Refills: 0     HYDROcodone-acetaminophen 5-325 MG tablet  Commonly known as: NORCO  Used for: Acute midline low back pain without sciatica      Dose: 1 tablet  Take 1 tablet by mouth every 8 hours as needed for severe pain  Quantity: 15 tablet  Refills: 0     losartan 100 MG tablet  Commonly known as: COZAAR  Used for: Chronic congestive heart failure, unspecified heart failure type (H), Stage 4 chronic kidney disease (H)      Dose: 100 mg  Take 1 tablet (100 mg) by mouth daily  Quantity: 90 tablet  Refills: 11     omeprazole 20 MG DR capsule  Commonly known as: PriLOSEC  Used for: Diaphragmatic hernia without obstruction and without gangrene      Dose: 20 mg  Take 1 capsule (20 mg) by mouth  daily  Quantity: 90 capsule  Refills: 3     probenecid 500 MG tablet  Commonly known as: BENEMID  Used for: Uric acid arthropathy      Take 2 tablets (1,000 mg) by mouth every morning AND 1 tablet (500 mg) every evening.  Quantity: 270 tablet  Refills: 3     rivaroxaban ANTICOAGULANT 15 MG Tabs tablet  Commonly known as: XARELTO ANTICOAGULANT  Used for: Persistent atrial fibrillation (H)      Dose: 15 mg  Take 1 tablet (15 mg) by mouth daily (with dinner)  Quantity: 90 tablet  Refills: 3     topiramate 25 MG tablet  Commonly known as: TOPAMAX  Used for: Acute midline low back pain without sciatica      Dose: 25 mg  Take 1 tablet (25 mg) by mouth 2 times daily  Quantity: 180 tablet  Refills: 3            CONTINUE these medicines which have NOT CHANGED        Dose / Directions   BETA BLOCKER NOT PRESCRIBED  Commonly known as: INTENTIONAL      Please choose reason not prescribed from choices below.  Refills: 0                INFORMATION SOURCE AND LIMITATIONS    History/Exam limitations: none  Patient information was obtained from: the patient's son and the patient   Use of : N/A    HISTORY OF PRESENT ILLNESS   Mely Alegria is a 83 year old year old female with a relevant past history of hypertension, stage 4 CKD, atrial fibrillation, and congestive heart failure, who presents to this ED via EMS for evaluation of generalized weakness.    Per patient's son: This evening when he arrived home the patient was sleeping and very difficult to rouse. When they took the patient's compression socks off this evening her legs were very swollen, and her left lower leg began leaking fluid. The patient is generally weak, and has had decreased urine output despite taking her lasix as prescribed.     The patient reports that she is feeling alright. She denies generalized weakness, chest pain, shortness of breath, back pain, abdominal pain, urinary changes, and any other symptoms or complaints at this time.     ScHx: The  patient lives at home with her son.     REVIEW OF SYSTEMS:   All other systems reviewed and are negative except as noted above in HPI.    PATIENT HISTORY   History reviewed. No pertinent past medical history.  Patient Active Problem List   Diagnosis    Vitamin D Deficiency    Hypokalemia    Pure hypercholesterolemia    History of Helicobacter pylori gastritis    Diaphragmatic hernia without obstruction and without gangrene    Ulcerative pancolitis without complication (H)    Chronic pain of left knee    Essential hypertension with goal blood pressure less than 140/90    DNR (do not resuscitate)    Adult BMI 40.0-44.9 kg/sq m (H)    Chronic kidney disease, stage 3 (H)    Stage 4 chronic kidney disease (H)    History of left hip replacement    Persistent atrial fibrillation (H)    CHF (congestive heart failure) (H)    Hyperkalemia    Urinary tract infection without hematuria, site unspecified    Acute renal failure superimposed on chronic kidney disease, unspecified acute renal failure type, unspecified CKD stage      Past Surgical History:   Procedure Laterality Date    HC DILATION/CURETTAGE DIAG/THER NON OB      Description: Dilation And Curettage;  Recorded: 01/18/2010;    [facility] COLOSTOMY      Description: Colostomy;  Recorded: 11/05/2012;    [facility] PART REMOVAL COLON W ANASTOMOSIS      Description: Partial Colectomy;  Recorded: 11/05/2012;  Comments: with colostomy    [facility] TOTAL HIP ARTHROPLASTY      Description: Total Hip Replacement;  Recorded: 01/18/2010;       Allergies   Allergen Reactions    Allopurinol Diarrhea    Lisinopril Cough     initiated 5/14/1996 and stopped         OUTPATIENT MEDICATIONS     New Prescriptions    No medications on file      Vitals:    10/29/23 2346 10/29/23 2356 10/30/23 0018 10/30/23 0238   BP: 103/56 93/54 91/55    Pulse:  106 99    Resp:       Temp:    (!) 96.4  F (35.8  C)   TempSrc:    Temporal   SpO2:  94% 91%    Weight:           Physical Exam   Constitutional: Oriented to person,  place, and time. Appears well-developed and well-nourished.   HEENT:    Head: Atraumatic.   Neck: Normal range of motion. Neck supple.   Cardiovascular: Normal rate, regular rhythm and normal heart sounds.    Pulmonary/Chest: Normal effort  and breath sounds normal.   Abdominal: Soft. Bowel sounds are normal.   Musculoskeletal: Normal range of motion. 2+ pitting edema to bilateral lower extremities.   Neurological: Alert and oriented to person, place, and time. Normal strength. No sensory deficit. No cranial nerve deficit.  Skin: Skin is warm and dry. Fluid filled blister to the dorsum of the left foot. Maceration to skin folds of lower abdomen. Left buttock with stage 1 decubitus but no cellulitis.   Psychiatric: Normal mood and affect. Behavior is normal. Thought content normal.     DIAGNOSTICS    LABORATORY FINDINGS (REVIEWED AND INTERPRETED):  Labs Ordered and Resulted from Time of ED Arrival to Time of ED Departure   BASIC METABOLIC PANEL - Abnormal       Result Value    Sodium 138      Potassium 6.9 (*)     Chloride 104      Carbon Dioxide (CO2) 11 (*)     Anion Gap 23 (*)     Urea Nitrogen 100.1 (*)     Creatinine 6.07 (*)     GFR Estimate 6 (*)     Calcium 9.2      Glucose 85     HEPATIC FUNCTION PANEL - Abnormal    Protein Total 7.7      Albumin 3.4 (*)     Bilirubin Total 0.5      Alkaline Phosphatase 130 (*)     AST 21      ALT 14      Bilirubin Direct 0.36 (*)    PROCALCITONIN - Abnormal    Procalcitonin 0.17 (*)    TROPONIN T, HIGH SENSITIVITY - Abnormal    Troponin T, High Sensitivity 84 (*)    TSH WITH FREE T4 REFLEX - Abnormal    TSH 5.41 (*)    CRP INFLAMMATION - Abnormal    CRP Inflammation 58.40 (*)    ROUTINE UA WITH MICROSCOPIC REFLEX TO CULTURE - Abnormal    Color Urine Orange (*)     Appearance Urine Cloudy (*)     Glucose Urine Negative      Bilirubin Urine Negative      Ketones Urine Negative      Specific Gravity Urine 1.014      Blood Urine 1.0 mg/dL (*)     pH Urine 5.5      Protein  Albumin Urine 300 (*)     Urobilinogen Urine <2.0      Nitrite Urine Negative      Leukocyte Esterase Urine 500 Demetria/uL (*)     Bacteria Urine Many (*)     WBC Clumps Urine Present (*)     RBC Urine 132 (*)     WBC Urine >182 (*)     Hyaline Casts Urine 61 (*)    CBC WITH PLATELETS AND DIFFERENTIAL - Abnormal    WBC Count 10.3      RBC Count 3.60 (*)     Hemoglobin 11.6 (*)     Hematocrit 33.0 (*)     MCV 92      MCH 32.2      MCHC 35.2      RDW 21.8 (*)     Platelet Count 182      % Neutrophils 78      % Lymphocytes 13      % Monocytes 7      % Eosinophils 1      % Basophils 0      % Immature Granulocytes 1      NRBCs per 100 WBC 0      Absolute Neutrophils 8.1      Absolute Lymphocytes 1.3      Absolute Monocytes 0.7      Absolute Eosinophils 0.1      Absolute Basophils 0.0      Absolute Immature Granulocytes 0.1      Absolute NRBCs 0.0     T4 FREE - Abnormal    Free T4 0.59 (*)    POTASSIUM - Abnormal    Potassium 6.8 (*)    TROPONIN T, HIGH SENSITIVITY - Abnormal    Troponin T, High Sensitivity 79 (*)    GLUCOSE BY METER - Abnormal    GLUCOSE BY METER POCT 101 (*)    POTASSIUM - Abnormal    Potassium 6.2 (*)    GLUCOSE BY METER - Abnormal    GLUCOSE BY METER POCT 102 (*)    GLUCOSE BY METER - Abnormal    GLUCOSE BY METER POCT 108 (*)    LACTIC ACID WHOLE BLOOD - Normal    Lactic Acid 1.4     AMMONIA - Normal    Ammonia 16     GLUCOSE BY METER - Normal    GLUCOSE BY METER POCT 79     GLUCOSE MONITOR NURSING POCT   GLUCOSE MONITOR NURSING POCT   GLUCOSE MONITOR NURSING POCT   COMPREHENSIVE METABOLIC PANEL   TROPONIN T, HIGH SENSITIVITY   POTASSIUM   GLUCOSE MONITOR NURSING POCT   URINE CULTURE         IMAGING (REVIEWED AND INTERPRETED):  XR Chest Port 1 View   Final Result   IMPRESSION: No acute abnormality.      CT Abdomen Pelvis w/o Contrast    (Results Pending)   US Thyroid    (Results Pending)         ECG (REVIEWED AND INTERPRETED):   ECG:   Performed at: 22:22  HR:  92 bpm  Rhythm: atrial fibrillation  Axis:  122  QRS duration: 88  QTC: 420  ST changes: No ST segment elevation or depression, no T wave inversion,No Q wave  Interpretation: Atrial fibrillation with controlled ventricular response  Compared to most recent ECG from: 11-MAR-2022 his heart rate has decreased by 22 bpm.     I have reviewed the patient's ECG, with comments made as listed above. Please see scanned image for full interpretation.         I, Mary Ann Arti, am serving as a scribe to document services personally performed by Danis Quinteros D.O., based on my observation and the provider s statements to me.    I, Danis Quinteros D.O., attest that Mary Ann Chi is acting in a scribe capacity, has observed my performance of the services and has documented them in accordance with my direction.    Danis Quinteros D.O.  EMERGENCY MEDICINE   10/29/23  [facility] EMERGENCY DEPARTMENT  [address] [place] [id number]  [phone]  Dept: [phone]      Danis Quinteros DO  10/30/23 0328

## 2023-10-30 NOTE — CONSULTS
"  Interventional Radiology - Pre-Procedure Note:  [facility]  10/30/2023     Procedure Requested: tunneled hemodialysis catheter placement  Requested by: Dr. Finn    History and Physical Reviewed: H&P documented within 30 days (by Yissel Avila MD on 10/30/23).  I have personally reviewed the patient's medical history and have updated the medical record as necessary.    Brief HPI: Mely Alegria is a 83 year old female with PMHx a-fib on AC, CKD stage IV, diastolic HF, HTN, morbid obesity and chronic back pain admitted 10/30/23 with UTI, HAMMAD/CKD, hyperkalemia, elevated troponin, AMS and generalized weakness.     Nephrology following who notes patient is now hyperkalemic and HAMMAD with acidosis. In need of dialysis initiation.    IR consulted for tunneled hemodialysis catheter placement.        NPO: ordered at 0900 -- had juice and crackers somewhere between [id number] per patient's son  ANTICOAGULANTS:   ASA 81mg LD 10/30 0253 - no hold required for IR procedure  Xarelto LD 10/28 in AM -- no hold required for IR procedure   ANTIBIOTICS: Ancef 2g ordered for IR procedure (sign and held)    ALLERGIES  Allergies   Allergen Reactions    Allopurinol Diarrhea    Lisinopril Cough     initiated 5/14/1996 and stopped           LABS:  No results found for: \"INR\"   Hemoglobin   Date Value Ref Range Status   10/30/2023 9.5 (L) 11.7 - 15.7 g/dL Final     Platelet Count   Date Value Ref Range Status   10/30/2023 110 (L) 150 - 450 10e3/uL Final     Creatinine   Date Value Ref Range Status   10/30/2023 6.12 (H) 0.51 - 0.95 mg/dL Final     Potassium   Date Value Ref Range Status   10/30/2023 5.9 (H) 3.4 - 5.3 mmol/L Final   03/11/2022 4.8 3.5 - 5.0 mmol/L Final         EXAM:  /66   Pulse 113   Temp (!) 96.4  F (35.8  C) (Temporal)   Resp 13   Ht 1.575 m (5' 2\")   Wt 98.4 kg (217 lb)   LMP  (LMP Unknown)   SpO2 97%   BMI 39.69 kg/m    General:  Stable.  In no acute distress.  "   Neuro:  Alert, oriented to self. Moves all extremities equally.  Resp:  Lungs with coarse expiratory BS bilaterally  Cardio:  S1S2 and reg, without murmur, clicks or rubs  Skin:  Without excoriations, ecchymosis, erythema, lesions or open sores on bilateral upper chest and neck      Pre-Sedation Assessment:  Mallampati Airway Classification:  III - Only soft palate is visible. Intubation is predicted to be difficult  Previous reaction to anesthesia/sedation:  No  Sedation plan based on assessment: Moderate (conscious) sedation  ASA Classification: Class 3 - SEVERE SYSTEMIC DISEASE, DEFINITE FUNCTIONAL LIMITATIONS.   Code Status: Full Code intra procedure, per discussion with patient's son      ASSESSMENT/PLAN:   -Ok for sedation 1400 or later given NPO status -- intraprocedure sedation medication in sign and held  -No AC hold required for IR procedure    Tunneled hemodialysis catheter placement with sedation.     Procedural education reviewed with patient/family in detail including, but not limited to risks, benefits and alternatives with understanding verbalized by patient/family.    Total time spent on the date of the encounter: 40 minutes.      RUPERT Jack CNP  Interventional Radiology

## 2023-10-30 NOTE — PROGRESS NOTES
Patient seen and examined. With continued hypoglycemia and receiving d5 injections. Nephrology saw patient and felt d10 infusion would be ok so that was started. Otherwise case discussed with family at bedside. Case discussed with RN. Patient with some chest pain so troponin and EKG ordered which were reassuring. Otherwise agree with assessment and plan as outlined by admitting physician.

## 2023-10-30 NOTE — H&P
[facility]    History and Physical - Hospitalist Service       Date of Admission:  10/30/2023    Assessment & Plan      Mely Alegria is a 83 year old female with PMH significant for Chronic A.fib on Eliquis, CKD stage IV, Chronic Diastolic HF, HTN, morbid obesity and chronic back pain who is admitted on 10/30/2023 with UTI, HAMMAD/CKD, Hyperkalemia, elevated troponin, AMS and generalized weakness.    # UTI- Admit patient. Continue Rocephin. Follow up urine culture.    # HAMMAD/CKD stage IV- Hold off on IV fluids due to history of diastolic HF. Patient appears volume overloaded. ED Physician spoke with Nephrologist on call and Albumin infusion pending. Avoid nephrotoxic agents. Plan per Nephrology. Repeat renal function in a.m.    # Hyperkalemia- K=6.9. Patient has received calcium gluconate, D50 and insulin in ED. S/p Sodium Bicarbonate. Follow up repeat potassium level. ED Physician spoke with Nephrologist on call and patient will be seen in consultation.    # Altered Mental Status- 2/2 UTI. Lethargy improving. Patient answered questions appropriately while I was at bedside.    # AG Metabolic Acidosis- S/p Sodium bicarbonate in ED. Monitor electrolytes. Plan per Nephrology.    # A.fib with RVR- Chronically anticoagulated with Eliquis. Digoxin 125mcg x 1 dose ordered. Serial troponin. Continue telemetry monitoring. Cardiology consult ordered.    # Elevated Troponin- Chest pain free. EKG show A.fib and nonspecific T-wave abnormality. Trop elevation is possibly 2/2 demand ischemia from A.fib with RVR vs HAMMAD. Control HR with Digoxin. Serial troponin. Supplemental O2. Telemetry monitoring. Consult Cardiology.    # Elevated CRP- Likely multifactorial- UTI and extensive cutaneous candidiasis. Awaiting CT abd/pel. Continue IV Rocephin and antifungal powder.    # Hypothyroidism- New diagnosed. TSH elevated. T4 low. Levothyroxine 25mg daily ordered.  Check Thyroid ultrasound. Follow up with PCP.    #  Cutaneous Candidiasis- Miconazole powder ordered.    # Left Foot Blister- Rupture. Recommend Dry dressing change daily.        Diet:  Cardiac/ Renal  DVT Prophylaxis: Chronically on Eliquis  Lerma Catheter: Not present  Lines: None     Cardiac Monitoring: None  Code Status:  Full Code per patient.    Clinically Significant Risk Factors Present on Admission        # Hyperkalemia: Highest K = 6.9 mmol/L in last 2 days, will monitor as appropriate      # Anion Gap Metabolic Acidosis: Highest Anion Gap = 23 mmol/L in last 2 days, will monitor and treat as appropriate  # Hypoalbuminemia: Lowest albumin = 3.4 g/dL at 10/29/2023 11:28 PM, will monitor as appropriate  # Drug Induced Coagulation Defect: home medication list includes an anticoagulant medication    # Hypertension: Noted on problem list  # Chronic heart failure with preserved ejection fraction: heart failure noted on problem list and last echo with EF >50%                Disposition Plan Anticipate >2 midnight Inpatient Hospital stay.     Expected Discharge Date: 11/01/2023                  Yissel Maria MD  Hospitalist Service  [facility]  Securely message with [employer] (more info)  Text page via [employer] Paging/Directory     ______________________________________________________________________    Chief Complaint   Weakness and confusion    History is obtained from the patient, her son & daughter (both at bedside), ED Physician and chart review.    History of Present Illness   Mely Alegria is a 83 year old female with PMH significant for Chronic A.fib on Eliquis, CKD stage IV, Chronic Diastolic HF, HTN, morbid obesity and chronic back pain who presents to ED due to AMS. Daughter was with patient all day and says that she noticed patient was slightly more tired than usual. However, when son arrived home around 6:30 pm yesterday he noticed that patient was difficult to wake up. Patient is more alert in ED and discovered to have UTI,  AKD/CKD 4, Hyperkalemia, newly diagnosed Hypothyroidism. Elevated troponin. Patient denies dysuria, hematuria, urinary frequency or urgency, abdominal pain, chest pain, shortness of breath, fever or chills. She complains of chronic back pain at this time and is uncomfortable on the stretcher.      Past Medical History    A.fib  CKD 4  HTN  Diastolic HF  Chronic back pain  Morbid Obesity    Past Surgical History   Past Surgical History:   Procedure Laterality Date    HC DILATION/CURETTAGE DIAG/THER NON OB      Description: Dilation And Curettage;  Recorded: 01/18/2010;    ZZC COLOSTOMY      Description: Colostomy;  Recorded: 11/05/2012;    ZZC PART REMOVAL COLON W ANASTOMOSIS      Description: Partial Colectomy;  Recorded: 11/05/2012;  Comments: with colostomy    [facility] TOTAL HIP ARTHROPLASTY      Description: Total Hip Replacement;  Recorded: 01/18/2010;       Prior to Admission Medications   Prior to Admission Medications   Prescriptions Last Dose Informant Patient Reported? Taking?   BETA BLOCKER NOT PRESCRIBED (INTENTIONAL)  Son, Daughter Yes No   Sig: Please choose reason not prescribed from choices below.   HYDROcodone-acetaminophen (NORCO) 5-325 MG tablet Unknown at prn Son, Daughter No Yes   Sig: Take 1 tablet by mouth every 8 hours as needed for severe pain   baclofen (LIORESAL) 10 MG tablet Unknown at prn Son, Daughter No Yes   Sig: Take 1 tablet (10 mg) by mouth 3 times daily as needed for muscle spasms   calcium citrate-vitamin D (CITRACAL) 315-200 MG-UNIT TABS per tablet Unknown at unknown Son, Daughter Yes Yes   Sig: Take 1 tablet by mouth daily   cholecalciferol, vitamin D3, 50 mcg (2,000 unit) Tab Unknown at unknown Son, Daughter No Yes   Sig: [CHOLECALCIFEROL, VITAMIN D3, 50 MCG (2,000 UNIT) TAB] Take 1 tablet (2,000 Units total) by mouth daily.   febuxostat (ULORIC) 40 MG TABS tablet Unknown at unknown Son, Daughter No Yes   Sig: Take 1 tablet (40 mg) by mouth daily   furosemide (LASIX) 40 MG tablet  10/28/2023 at am Son, Daughter Yes Yes   Sig: Take 60 mg by mouth daily Take one and half tablet (60 mg ) daily   losartan (COZAAR) 100 MG tablet 10/29/2023 at am Son, Daughter No Yes   Sig: Take 1 tablet (100 mg) by mouth daily   omeprazole (PRILOSEC) 20 MG DR capsule 10/29/2023 at am Son, Daughter No Yes   Sig: Take 1 capsule (20 mg) by mouth daily   probenecid (BENEMID) 500 MG tablet Unknown at unknown Son, Daughter No Yes   Sig: Take 2 tablets (1,000 mg) by mouth every morning AND 1 tablet (500 mg) every evening.   rivaroxaban ANTICOAGULANT (XARELTO ANTICOAGULANT) 15 MG TABS tablet 10/28/2023 at am Son, Daughter No Yes   Sig: Take 1 tablet (15 mg) by mouth daily (with dinner)   topiramate (TOPAMAX) 25 MG tablet 10/29/2023 at am Son, Daughter No Yes   Sig: Take 1 tablet (25 mg) by mouth 2 times daily      Facility-Administered Medications: None        Review of Systems    The 10 point Review of Systems is negative other than noted in the HPI.    Social History   I have reviewed this patient's social history and updated it with pertinent information if needed.  Social History     Tobacco Use    Smoking status: Never    Smokeless tobacco: Never   Vaping Use    Vaping Use: Never used     Lives with son.    Family History   I have reviewed this patient's family history and updated it with pertinent information if needed.  Family History   Problem Relation Age of Onset    Diabetes Mother         Physical Exam   Vital Signs:     BP: 91/55 Pulse: 99   Resp: 28 SpO2: 91 %      Weight: 217 lbs 0 oz    General Appearance: Lethargic but easily arousable. Oriented x 3. NAD  Respiratory: Poor inspiratory effort, Clear, diminished breath sounds in bases  Cardiovascular: Irregular, S1S2  GI: +BS, soft, NT, central obesity with large pannus  Skin: + erythema along folds of skin under pannus and bilateral inguinal region; Large partially ruptured bullous lesion distal anterior left foot  Other: No pedal edema     Medical Decision  Making       65 MINUTES SPENT BY ME on the date of service doing chart review, history, exam, documentation & further activities per the note.      Data     I have personally reviewed the following data over the past 24 hrs:    10.3  \   11.6 (L)   / 182     138 104 100.1 (H) /  79   6.9 (HH) 11 (L) 6.07 (H) \     ALT: 14 AST: 21 AP: 130 (H) TBILI: 0.5   ALB: 3.4 (L) TOT PROTEIN: 7.7 LIPASE: N/A     Trop: 84 (H) BNP: N/A     TSH: 5.41 (H) T4: 0.59 (L) A1C: N/A     Procal: 0.17 (H) CRP: 58.40 (H) Lactic Acid: 1.4         Imaging results reviewed over the past 24 hrs:   Recent Results (from the past 24 hour(s))   XR Chest Port 1 View    Narrative    EXAM: XR CHEST PORT 1 VIEW  LOCATION: [facility]  DATE: 10/30/2023    INDICATION: weakness evaluate for pneumonia  COMPARISON: None.    FINDINGS: The heart is enlarged. There is no pulmonary edema. The right hemidiaphragm is elevated. Lung volumes are low. No focal lung consolidation. No pneumothorax.      Impression    IMPRESSION: No acute abnormality.

## 2023-10-30 NOTE — CONSULTS
NEPHROLOGY CONSULTATION      ASSESSMENT/PLAN:  83 year old female with a hx of CKD 4, progressed to stage 5 in labs 4/2023 , no prior nephrology follow ups , Diastolic HF, HTN , Chronic Atrial fib with RVR , back pain and morbid obesity, admitted with AMS, with possible UTI , Hyperkalemia , acidosis , weakness and hypervolemia with anuria. She received lasix IV wo any response. Nephrology consulted for HAMMAD and anuria    CKD 4/5  HAMMAD with anuria  Baseline with some progressive disease was at CKD 4 with creat 1.6-2 and GFR 25-<30 in 2022 labs and this yr labs in 4/2023 with GFR 11-12 and Creatinine in 3.1-3.7  CT 10/30/23 and renal US 10/12/2023 with rt kidney 6.5cm and left 8.2cm and cortical thinning with likely advanced renal disease, simple cysts  UA with dirty appearing urine , Cx pending  -- CKD with unclear etiology, has had a progressive course with significant nephrosclerosis on imaging, unclear if a kidney biopsy will provide any additional information besides scarring  -- Serological work-up including paraproteins ordered  -- Requested IR to place a tunneled line with the patient likely needing dialysis with hyperkalemia acidemia and and urea despite diuretic challenge  -- We will follow on BMP every 8  -- Plan for dialysis based on the next lab  -- Over the next few days if tolerates dialysis well we will follow improvement on mental status.  May need to consider palliative involvement if overall condition has not improved despite dialysis.  At this time based on my discussion with the family they would like to trial dialysis before deciding any change in goals of care    Hypervolemia  CHF exacerbation  Likely secondary to poor renal functions  Attempted diuresis with Lasix followed by Lasix infusion and Diuril 250x1  And uric remains with 10 to 15 cc of urine output  --> Continue Lasix infusion  --> Plan for UF with HD as tolerated    Hyperkalemia  Likely secondary to renal failure and severe  acidosis, full dose ARB at home  Bl potassium is stable    Acidemia  Likely hyperchloremic metabolic acidosis suggestive of possible underlying RTA  Unclear etiology  We will start on isotonic bicarb at 100 mL/h  --> Bicarb drip corrected with dialysis follow BMP q. 8    Anemia  Baseline around 12-13  Hemoglobin down to 9.5 with a drop in platelet  Keep hemoglobin above 8 transfuse as needed  We will follow on iron studies  Likely ACD/inflammatory anemia    Possible UTI  Cultures pending  Diffuse skin edema noted on CT possibly in the right flank and left flank area questioning for cellulitis like finding,   On ceftriaxone for possible UTI  Follow on cultures to direct antibiotic therapy    Acute metabolic encephalopathy  Likely UTI/ uremia  Follow on ABG    History of atrial fibrillation with RVR  Started on digoxin per cardiology  Will need to monitor levels especially with severe renal failure  AC with Xarelto chronically    Gout on probenecid    Chronic back pain on baclofen and Norco  Was due to get a cortisone shot in her back  Plans per primary    Thank you for the consultation  We will follow  Keri Finn MD  [facility]  [phone]      CC: HAMMAD hyperkalemia acidemia    REASON FOR CONSULTATION: We are asked to see pt by Dr. Avila    HISTORY OF PRESENT ILLNESS:83 year old female  83 year old female with a hx of CKD 4, progressed to stage 5 in labs 4/2023 , no prior nephrology follow ups , Diastolic HF, HTN , Chronic Atrial fib with RVR , back pain and morbid obesity, admitted with AMS, with possible UTI , Hyperkalemia , acidosis , weakness and hypervolemia with anuria. She received lasix IV wo any response. Nephrology consulted for HAMMAD and anuria  Patient is very confused unable to provide any history  History mostly obtained from daughter and during my multiple visits with his son were primarily responsible for her care.  Per them she has been independent of her ADLs except that she  has been noted to have increased sleeping during daytime for the past few months.  They have not noticed any edema except for recently when she was changing her socks patient does change her clothes and everything on her own.  The patient is unable to provide any information about edema swelling or breathing difficulty saturating fine  Per her family they are aware of her having atrial fibrillation and whenever she has uncontrollable rate she does have significant shortness of breath with it denies any chest pain  The patient was brought in by her son via EMS secondary to increased confusion and unable to assess orientation questions  On admission the patient was noted to have an acutely elevated creatinine about 6.7 with a potassium of 6.9 and acute renal failure corrected overnight with a slow correction down to 5.9 with persistent acidosis  Despite Lasix challenge and Diuril the patient has not made much urine  Extensive discussion with the family regarding ongoing progression and poor baseline renal functions the patient would likely benefit from dialysis at this point.  Family does understand progression unaware of patient's CKD severity  Would like to have a trial of treatment prior to making any other decisions    REVIEW OF SYSTEMS:  ROS was completely reviewed and otherwise negative and non-contributory    History reviewed. No pertinent past medical history.    Social History     Socioeconomic History    Marital status:      Spouse name: Not on file    Number of children: Not on file    Years of education: Not on file    Highest education level: Not on file   Occupational History    Not on file   Tobacco Use    Smoking status: Never    Smokeless tobacco: Never   Vaping Use    Vaping Use: Never used   Substance and Sexual Activity    Alcohol use: Not on file    Drug use: Not on file    Sexual activity: Not on file   Other Topics Concern    Not on file   Social History Narrative    Not on file  "    Social Determinants of Health     Financial Resource Strain: Low Risk  (10/27/2023)    Financial Resource Strain     Within the past 12 months, have you or your family members you live with been unable to get utilities (heat, electricity) when it was really needed?: No   Food Insecurity: Low Risk  (10/27/2023)    Food Insecurity     Within the past 12 months, did you worry that your food would run out before you got money to buy more?: No     Within the past 12 months, did the food you bought just not last and you didn t have money to get more?: No   Transportation Needs: Low Risk  (10/27/2023)    Transportation Needs     Within the past 12 months, has lack of transportation kept you from medical appointments, getting your medicines, non-medical meetings or appointments, work, or from getting things that you need?: No   Physical Activity: Not on file   Stress: Not on file   Social Connections: Not on file   Interpersonal Safety: Not on file   Housing Stability: Low Risk  (10/27/2023)    Housing Stability     Do you have housing? : Yes     Are you worried about losing your housing?: No       Family History   Problem Relation Age of Onset    Diabetes Mother        Allergies   Allergen Reactions    Allopurinol Diarrhea    Lisinopril Cough     initiated 5/14/1996 and stopped         MEDICATIONS:   cefTRIAXone  1 g Intravenous Q24H    chlorothiazide  250 mg Intravenous Once    furosemide  40 mg Intravenous Once    levothyroxine  25 mcg Oral QAM AC    miconazole   Topical BID         PHYSICAL EXAM    /66   Pulse 113   Temp (!) 96.4  F (35.8  C) (Temporal)   Resp 13   Ht 1.575 m (5' 2\")   Wt 98.4 kg (217 lb)   LMP  (LMP Unknown)   SpO2 97%   BMI 39.69 kg/m        Intake/Output Summary (Last 24 hours) at 10/30/2023 1023  Last data filed at 10/30/2023 0459  Gross per 24 hour   Intake 1386 ml   Output --   Net 1386 ml       awake and NAD  HEENT NC/AT; perrla; OP clear without lesions; mmm  Neck supple " "without LAD, TM  CV; RRR without rub or murmur  Lung: clear and equal; no extra sounds  Ab: soft and NT; not distended; normal bs  Ext: no edema and well perfused  Skin; no rash  Neuro; grossly intact    LABORATORIES    Recent Labs   Lab 10/30/23  0743 10/29/23  2211   WBC 8.2 10.3   HGB 9.5* 11.6*   HCT 27.7* 33.0*   * 182     Recent Labs   Lab 10/30/23  0743 10/29/23  2328    138   CO2 11* 11*   BUN 92.6* 100.1*   ALKPHOS 110* 130*   ALT 12 14   AST 16 21     No results for input(s): \"INR\", \"PTT\" in the last 168 hours.    Invalid input(s): \"APTT\"  Invalid input(s): \"FERRITIN\"  No results for input(s): \"IRON\" in the last 168 hours.    Invalid input(s): \"TIBC\"    I reviewed all labs and imaging    Thank you for the consultation we will follow    Keri Finn MD  [facility]  [phone]     "

## 2023-10-31 NOTE — PROGRESS NOTES
"O2 4 lpm/NC, SpO2 97 %. BS mild congestion RUL, diminished bases. Duoneb HHN tx given with Volara CPEP 12 cm x 2 minutes, CHFO 20 cm x 6 minutes with face mask, pt slightly uncomfortable with face mask, BS unchanged after tx.     BP 99/52   Pulse 120   Temp 98.4  F (36.9  C)   Resp 14   Ht 1.575 m (5' 2\")   Wt 113.1 kg (249 lb 6.4 oz)   LMP  (LMP Unknown)   SpO2 95%   BMI 45.62 kg/m      RT to follow.   "

## 2023-10-31 NOTE — PLAN OF CARE
"  Problem: Adult Inpatient Plan of Care  Goal: Plan of Care Review  Description: The Plan of Care Review/Shift note should be completed every shift.  The Outcome Evaluation is a brief statement about your assessment that the patient is improving, declining, or no change.  This information will be displayed automatically on your shift  note.  Outcome: Progressing     Problem: Adult Inpatient Plan of Care  Goal: Patient-Specific Goal (Individualized)  Description: You can add care plan individualizations to a care plan. Examples of Individualization might be:  \"Parent requests to be called daily at 9am for status\", \"I have a hard time hearing out of my right ear\", or \"Do not touch me to wake me up as it startles  me\".  Outcome: Progressing     Problem: Adult Inpatient Plan of Care  Goal: Optimal Comfort and Wellbeing  Outcome: Progressing   Goal Outcome Evaluation:       Report given to Tanika NICOLE all questions answered, lasix infusing at 10mg/hr and heparin infusing at 1800U/hr. Patient alert to person, family at bedside.                 "

## 2023-10-31 NOTE — PROGRESS NOTES
Dr. Plascencia at bedside. Spoke with Intensivist about transfer to ICU. Patient still hypotensive, last BP 75/47. Patient only responding to painful stimuli. Not opening eyes or responding verbally.     SWAT RN at bedside and starting levophed per Dr. Ortiz's order. Plan of care ongoing.

## 2023-10-31 NOTE — PROCEDURES
First Hemodialysis for 2 hours today on a 2K bath. Started treatment with prime of Albumin 5%. 250 ml    Access: Right CVC, worked well. OK to use per note from IR. Heparin dwell to ports    Fluid removed:  0 ml per Dr Finn, on pressors.     Hepatitis B surface antigen unknown, labs sent today. Machine bleached per protocol

## 2023-10-31 NOTE — PHARMACY-VANCOMYCIN DOSING SERVICE
"Pharmacy Vancomycin Initial Note  Date of Service 2023  Patient's  1940  83 year old, female    Indication: Sepsis    Current estimated CrCl = Estimated Creatinine Clearance: 8.8 mL/min (A) (based on SCr of 5.78 mg/dL (H)).    Creatinine for last 3 days  10/29/2023: 11:28 PM Creatinine 6.07 mg/dL  10/30/2023:  7:43 AM Creatinine 6.12 mg/dL;  3:35 PM Creatinine 5.75 mg/dL  10/31/2023: 12:20 AM Creatinine 5.54 mg/dL;  5:40 AM Creatinine 5.78 mg/dL    Recent Vancomycin Level(s) for last 3 days  No results found for requested labs within last 3 days.      Vancomycin IV Administrations (past 72 hours)        No vancomycin orders with administrations in past 72 hours.                    Nephrotoxins and other renal medications (From now, onward)      Start     Dose/Rate Route Frequency Ordered Stop    10/31/23 1300  piperacillin-tazobactam (ZOSYN) 3.375 g vial to attach to  mL bag        Note to Pharmacy: For [facility], [facility] and [facility]: For Zosyn-naive patients, use the \"Zosyn initial dose + extended infusion\" order panel.    3.375 g  over 240 Minutes Intravenous EVERY 12 HOURS 10/31/23 0621      10/31/23 0730  vancomycin (VANCOCIN) 2,500 mg in sodium chloride 0.9 % 500 mL intermittent infusion         2,500 mg  over 2 Hours Intravenous ONCE 10/31/23 0712      10/31/23 0711  vancomycin place taylor - receiving intermittent dosing         1 each Intravenous SEE ADMIN INSTRUCTIONS 10/31/23 0712      10/31/23 0700  piperacillin-tazobactam (ZOSYN) 3.375 g vial to attach to  mL bag         3.375 g  over 30 Minutes Intravenous ONCE 10/31/23 0621      10/30/23 1100  furosemide (LASIX) 100 mg in sodium chloride 0.9 % 100 mL infusion         10 mg/hr  10 mL/hr  Intravenous CONTINUOUS 10/30/23 1030              Contrast Orders - past 72 hours (72h ago, onward)      Start     Dose/Rate Route Frequency Stop    10/30/23 0900  perflutren lipid microsphere (DEFINITY) injection SUSP 2 mL         2 mL Intravenous ONCE " 10/30/23 0856    10/30/23 0030  iopamidol (ISOVUE-370) solution 80 mL  Status:  Discontinued         80 mL Intravenous ONCE 10/30/23 0027                Plan:  Start vancomycin 2500 mg IV once, followed by intermittent dosing based on levels and HD timing.   Vancomycin monitoring method: Trough (Method 2 = manual dose calculation)  Vancomycin therapeutic monitoring goal: 15-20 mg/L  Pharmacy will check vancomycin levels as appropriate in 1-3 Days.    Serum creatinine levels will be ordered daily for the first week of therapy and at least twice weekly for subsequent weeks.      Renetta Nguyen, [facility]

## 2023-10-31 NOTE — H&P
CRITICAL CARE CONSULT:    Assessment/Plan:  83F with morbid obesity, afib on eliquis, CKD4, HFpEF, chronic back pain, admitted 10/30 due to AMS. Transferred to ICU with worsening hypoxemic respiratory failure and hypotension requiring vasopressors.     RESP:  Acute resp failure with hypoxemia. Very congested sounding and weak cough, unable to bring up secretions. NT suction x3 with some improvement. CXR shows probable atelectasis on right. Lung sliding present bilaterally on US. No sig pleural fluid on R. ABG stable.   Titrate FiO2 for goal SPO2 92% or greater  Try metaneb to help with pulmonary hygiene.  High risk for intubation if she remains full code  Would not use BiPAP at this point given secretion burden and mental status    CV:  Shock, likely septic vs. Decompensated HFpEF. Hypothermic and hypoglycemic, supporting sepsis. Bedside POCUS with limited acoustic windows but grossly normal LV size/function, no pericardial effusion, dilated IVC >2.5cm with minimal resp variation, correlating with estimated RAP 15-20 mm Hg. Elevated PASP based on TR regurg jet velocity, estp PASP is 42 mm Hg + RAP. EF 68% by EPSS. No diastolic assessment done as patient in afib.   MAP >65, use Russ if needed given afib  Start UFH drip for afib/RVR. Hold PTA DOAC  Cardiology following.  Tele monitoring  Lactate normal, no need to trend further  Amio bolus and drip    NEURO:  Encephalopathy, likely toxic/metabolic and septic.   Tylenol prn pain  Avoid benzo's  Cautious use of narcotics    GI:  At risk for aspiration.  NPO for now  IV PPI (PTA med)  Bowel regimen prn    RENAL:  Progressively worsening HAMMAD on CKD. Needing HD today.   Nephrology following, appreciate input  Maintain lua  Avoid nephrotoxins  Follow BUN/Scr, lytes  Hold bicarb drip for now    ID:  Concern for worsening septic shock with pneumonia. Hypothermic and hypoglycemic c/w worsening sepsis/septic shock.   Broaden to vanc + pip/tazo  Stop ceftriaxone  Follow up  culture data    HEMATOLOGIC:  Worsening thrombocytopenia due to sepsis, acute critical illness.  Follow counts  Hgb >7    ENDOCRINE:  Wason D10 drip for hypoglycemia.  Stop D10 drip  FSBG checks, insulin sliding scale/drip per ICU protocol    ICU PROPHYLAXIS:  UFH drip ordered    Lines/Drains/Tubes:  R subclavian tunneled HD catheter 10/30  PICC 10/30  Lerma 10/30    CODE STATUS, DISPOSITION, FAMILY COMMUNICATION: full code  Discussed situation over the phone with her son Tatyana. I recommended DNR/DNI code status given chronic medical issues and poor prognosis if she were to end up on life support. He is coming in to see her now.     Tong (Asad) MD Angel  [facility] Pulmonary & Critical Care  Pager [phone]  Clinic [phone]  Fax [phone]     Restraints  Not needed        CCx: acute respiratory failure with hypoxemia. Hypotension.     HPI: 83F with morbid obesity, afib on eliquis, CKD4, HFpEF, chronic back pain, admitted 10/30 due to AMS. Brought in to ER by her daughter. Diagnosed with UTI and started on abx. Found to have progressive HAMMAD on CKD and dialysis catheter was placed, with plans for HD run this afternoon. Early this morning, she became more lethargic and hypotensive requiring transfer to ICU for pressors.  On arrival to ICU, NE was running @0.08.  NT suctioned x3 for some thick secretions that she was unable to cough up. She was only responding in 1 word answers.  Bedside POCUS done.   Nephrology, cardiology following.   On 6Lpm O2 with SpO2 mid 90s.   HR afib in 120s-130s.     Clinically Significant Risk Factors        # Hyperkalemia: Highest K = 6.9 mmol/L in last 2 days, will monitor as appropriate      # Anion Gap Metabolic Acidosis: Highest Anion Gap = 24 mmol/L in last 2 days, will monitor and treat as appropriate  # Hypoalbuminemia: Lowest albumin = 3.4 g/dL at 10/30/2023  7:43 AM, will monitor as appropriate   # Thrombocytopenia: Lowest platelets = 110 in last 2  "days, will monitor for bleeding   # Hypertension: Noted on problem list  # Acute heart failure with preserved ejection fraction: heart failure noted on problem list, last echo with EF >50%, and receiving IV diuretics       # Severe Obesity: Estimated body mass index is 45.62 kg/m  as calculated from the following:    Height as of this encounter: 1.575 m (5' 2\").    Weight as of this encounter: 113.1 kg (249 lb 6.4 oz)., PRESENT ON ADMISSION                    Past Medical History:  History reviewed. No pertinent past medical history.     Past Surgical History:  Past Surgical History:   Procedure Laterality Date    HC DILATION/CURETTAGE DIAG/THER NON OB      Description: Dilation And Curettage;  Recorded: 01/18/2010;    IR CVC TUNNEL PLACEMENT > 5 YRS OF AGE  10/30/2023    PICC TRIPLE LUMEN PLACEMENT  10/30/2023    ZZC COLOSTOMY      Description: Colostomy;  Recorded: 11/05/2012;    ZZC PART REMOVAL COLON W ANASTOMOSIS      Description: Partial Colectomy;  Recorded: 11/05/2012;  Comments: with colostomy    ZZC TOTAL HIP ARTHROPLASTY      Description: Total Hip Replacement;  Recorded: 01/18/2010;       Social History:  Social History     Socioeconomic History    Marital status:      Spouse name: Not on file    Number of children: Not on file    Years of education: Not on file    Highest education level: Not on file   Occupational History    Not on file   Tobacco Use    Smoking status: Never    Smokeless tobacco: Never   Vaping Use    Vaping Use: Never used   Substance and Sexual Activity    Alcohol use: Not on file    Drug use: Not on file    Sexual activity: Not on file   Other Topics Concern    Not on file   Social History Narrative    Not on file     Social Determinants of Health     Financial Resource Strain: Low Risk  (10/27/2023)    Financial Resource Strain     Within the past 12 months, have you or your family members you live with been unable to get utilities (heat, electricity) when it was really " "needed?: No   Food Insecurity: Low Risk  (10/27/2023)    Food Insecurity     Within the past 12 months, did you worry that your food would run out before you got money to buy more?: No     Within the past 12 months, did the food you bought just not last and you didn t have money to get more?: No   Transportation Needs: Low Risk  (10/27/2023)    Transportation Needs     Within the past 12 months, has lack of transportation kept you from medical appointments, getting your medicines, non-medical meetings or appointments, work, or from getting things that you need?: No   Physical Activity: Not on file   Stress: Not on file   Social Connections: Not on file   Interpersonal Safety: Not on file   Housing Stability: Low Risk  (10/27/2023)    Housing Stability     Do you have housing? : Yes     Are you worried about losing your housing?: No       Family History:  Family History   Problem Relation Age of Onset    Diabetes Mother        Allergies:  Allergies   Allergen Reactions    Allopurinol Diarrhea    Lisinopril Cough     initiated 5/14/1996 and stopped         MAR Reviewed      Physical Exam:  Vent settings for last 24 hours:     BP (!) 148/89   Pulse (!) 125   Temp (!) 94.8  F (34.9  C)   Resp 30   Ht 1.575 m (5' 2\")   Wt 113.1 kg (249 lb 6.4 oz)   LMP  (LMP Unknown)   SpO2 94%   BMI 45.62 kg/m      Intake/output data:    Intake/Output Summary (Last 24 hours) at 10/31/2023 0645  Last data filed at 10/31/2023 0300  Gross per 24 hour   Intake 1749.17 ml   Output 115 ml   Net 1634.17 ml       Physical Exam  Gen: lethargic but arousable.   HEENT: NT, no MADHURI  CV: RRR, no m/g/r  Resp: very diminished on the right. Left ant mostly clear.   Abd: soft, nontender, BS+  Skin: no rashes or lesions  Ext: no edema  Neuro: PERRL, nonfocal exam    LAB:  @24HOURRESULTS@    IMAGING:  [unfilled]    Critical care attestation: 45 minutes spent managing the following issues: acute respiratory failure requiring intubation/IMV, " circulatory shock requiring continuous vasopressor infusions, acute kidney injury, on CKD, toxic/metabolic encephalopathy.  High risk for organ deterioration and death requiring ICU level care.

## 2023-10-31 NOTE — PROGRESS NOTES
"O2 6 lpm/NC, SpO2 99 %, BS diminished bilat, Duoneb tx given.mask. tolerated well, BS unchanged after, Volara not given at this time, not available, will start with treatment. O2 titrated to 4 lpm/NC. RT to monitor    /55   Pulse (!) 123   Temp (!) 96.6  F (35.9  C)   Resp 18   Ht 1.575 m (5' 2\")   Wt 113.1 kg (249 lb 6.4 oz)   LMP  (LMP Unknown)   SpO2 97%   BMI 45.62 kg/m      0934 SpO2 97 %.    "

## 2023-10-31 NOTE — SIGNIFICANT EVENT
Significant Event Note    Time of event: 5:10 AM October 31, 2023    Description of event:  Initially paged around 11pm regarding patient hypoxia. Attended bedside, reviewed recent CXR (2100). Appearance most consistent with pulmonary edema. Triple lumen PICC had been placed earlier for HD initiation. Patient was sitting up in bed, speaking full sentences. Ordered DuoNeb with relative improvement in supplemental oxygen demands.    Was paged regarding hypothermia and hypotension. MAP was tolerable (calculated as 68) and bear hugger external warmer was placed.    Paged again regarding progressive hypotension. MAP continued to be above 60, however, given the progressive nature and hypothermia combined, suspect progression of sepsis. Discussed with intensivist, and transfer to ICU was planned/agreed. During discussion, hypotension further progressed, and plans for initiation of pressor medications was discussed.    I evaluated patient at bedside, she has multiple IV medications flowing including lasix drip, however, urine output is minimal (appeared to be 20cc). Appears relatively stable at time of evaluation, however, risk of decompensation appears imminent.      Blake Plascencia MD

## 2023-10-31 NOTE — PROGRESS NOTES
Renal progress note  CC:HAMMAD, Advanced CKD , likely ESRD  Assessment and Plan:  83 year old female with a hx of CKD 4, progressed to stage 5 in labs 4/2023 , no prior nephrology follow ups , Diastolic HF, HTN , Chronic Atrial fib with RVR , back pain and morbid obesity, admitted with AMS, with possible UTI , Hyperkalemia , acidosis , weakness and hypervolemia with anuria. She received lasix IV wo any response. Nephrology consulted for HAMMAD and anuria     CKD 5, likely ESRD  HAMMAD with anuria  Baseline with some progressive disease was at CKD 4 with creat 1.6-2 and GFR 25-<30 in 2022 labs and this yr labs in 4/2023 with GFR 11-12 and Creatinine in 3.1-3.7  CT 10/30/23 and renal US 10/12/2023 with rt kidney 6.5cm and left 8.2cm and cortical thinning with likely advanced renal disease, simple cysts  UA with dirty appearing urine , Cx pending  -- CKD with unclear etiology, has had a progressive course with significant nephrosclerosis on imaging, unclear if a kidney biopsy will provide any additional information besides scarring  -- Serological work-up including paraproteins ordered  -- Tunneled CVC placed 10/30/23; needing dialysis with hyperkalemia acidemia and and urea despite diuretic challenge  -- We will follow on BMP every 8  -- HD plans today , no UF , just rinse back pull, albumin prime 5%,  and   -- Over the next few days if tolerates dialysis well we will follow improvement on mental status.  May need to consider palliative involvement if overall condition has not improved despite dialysis.  At this time based on my discussion with the family they would like to trial dialysis before deciding any change in goals of care     Hypervolemia  CHF exacerbation  Likely secondary to poor renal functions  Attempted diuresis with Lasix followed by Lasix infusion and Diuril 250x1  And uric remains with 10 to 15 cc of urine output  --> Continue Lasix infusion  --> Plan for UF with HD as  tolerated    Hypotension with shock state  On Phenyleph  Fluid up with mostly edema and now on 6L NC O2   --> plans per primary     Hyperkalemia  Likely secondary to renal failure and severe acidosis, full dose ARB at home  Bl potassium is stable     Acidemia  Likely hyperchloremic metabolic acidosis suggestive of possible underlying RTA  Unclear etiology  Discontinue isotonic bicarb  -- correct with HD     Anemia  Baseline around 12-13  Hemoglobin down to 9.5 with a drop in platelet  Keep hemoglobin above 8 transfuse as needed  We will follow on iron studies  Likely ACD/inflammatory anemia     Possible UTI  Cultures pending  Diffuse skin edema noted on CT possibly in the right flank and left flank area questioning for cellulitis like finding,   On Vanco, Zosyn  for possible sepsis  Follow on cultures to direct antibiotic therapy    Shock state, likely circulatory shock with sepsis  Management per ICU     Acute metabolic encephalopathy  Likely UTI/ uremia  Follow on ABG     History of atrial fibrillation with RVR  on digoxin per cardiology  Will need to monitor levels especially with severe renal failure  AC with Xarelto chronically     Gout on probenecid     Chronic back pain on baclofen and Norco  Was due to get a cortisone shot in her back  Plans per primary       Thank you for the consultation we will follow  Keri Finn MD  [facility]  [phone]      Subjective  Significant decline in last 24hrs now in ICU on pressors  Mentation remains poor  Minimal to no urine output with cloudy urine  Plans for dialysis this morning complicated by hypothermia hypotension with possible septic shock  Plans were reviewed with dialysis RN as well as detailed discussion in the presence of cardiology team with the family about worsening overall clinical status and decline in the last 24 hours  Will attempt dialysis today reviewed plans with family again regarding goals of care  Continues to be full code  at this time  Objective    Vital signs in last 24 hours  Temp:  [92.2  F (33.4  C)-96.6  F (35.9  C)] 96.6  F (35.9  C)  Pulse:  [] 123  Resp:  [11-41] 18  BP: ()/(43-89) 113/55  SpO2:  [84 %-99 %] 97 %  Weight:   [unfilled]    Intake/Output last 3 shifts  I/O last 3 completed shifts:  In: 1749.17 [I.V.:1749.17]  Out: 115 [Urine:115]  Intake/Output this shift:  I/O this shift:  In: 166.2 [I.V.:166.2]  Out: -     Physical Exam   Obtunded , NAD  CV: irregular tachy without murmur or rub  Lung: clear and equal; no extra sounds  Ab: soft and NT; not distended; normal bs  Ext: + edema and well perfused  Skin; no rash    Pertinent Labs   Lab Results   Component Value Date    WBC 10.7 10/31/2023    HGB 9.6 (L) 10/31/2023    HCT 27.3 (L) 10/31/2023    MCV 90 10/31/2023     (L) 10/31/2023     Lab Results   Component Value Date    BUN 90.5 (H) 10/31/2023     10/31/2023    CO2 20 (L) 10/31/2023       Lab Results   Component Value Date    ALBUMIN 3.4 (L) 10/30/2023     Lab Results   Component Value Date    PHOS 2.7 03/11/2022     I reviewed all lab results  Keri Finn MD

## 2023-10-31 NOTE — PLAN OF CARE
"  Problem: Adult Inpatient Plan of Care  Goal: Absence of Hospital-Acquired Illness or Injury  Intervention: Prevent Skin Injury  Recent Flowsheet Documentation  Taken 10/30/2023 2130 by Koki Suggs, RN  Body Position: weight shifting  Taken 10/30/2023 1630 by Koki Suggs, RN  Body Position:   left   right   turned     Problem: Adult Inpatient Plan of Care  Goal: Plan of Care Review  Description: The Plan of Care Review/Shift note should be completed every shift.  The Outcome Evaluation is a brief statement about your assessment that the patient is improving, declining, or no change.  This information will be displayed automatically on your shift  note.  Outcome: Progressing     Problem: Adult Inpatient Plan of Care  Goal: Optimal Comfort and Wellbeing  Outcome: Progressing   Goal Outcome Evaluation:    Oriented to self only, somnolent since arrival to floor but arouses with gentle touch. Able to answer some yes and no questions only, but is confused. Pt moans whenever pt is touched and yells out \"help\" . When asked about pain, pt answers yes to every question about location of pain. IV dilaudid PRN was ordered for one time dose, but pt still somnolent and seems comfortable. Pt's sons were at bedside and very anxious. HD catheter placed today by IR, will do dialysis in the AM. Triple lumen PICC line also placed today. Writer called store to send up a rectal tube because pt has extensive skin breakdown in perineal area. Store room had to contact another facility to send us one and it arrived to unit at 2300 H. Pt had a BG of 63 after D10 was paused for an hour because her PIV was leaking and while awaiting PICC line placement. 25 ml of D50 given with BG recheck of 143; MD notified and aware. Soft restraint ordered for PICC line placement because pt was pulling at lines; restraint discontinued after completion of PICC line. VSS, on 2 L NC, afib HR controlled. Lerma having small amount of output. Lasix gtt, D10, " and sodium bicarb drips still running.

## 2023-10-31 NOTE — PROCEDURES
"PICC Line Insertion Procedure Note     Pt. Name:   Mely Alegria     MRN:          [id number]     Procedure: Insertion of a  Triple Lumen  5 fr  Bard SOLO (valved) Power PICC, Lot number [id number]     Indications: poor access, progressing towards dialysis, multiple failed PIV's in both arms     Contraindications : save left arm for future fistula per nephrologist, OK for PICC in right arm per Dr Tonya Finn.     Procedure Details:     Patient identified with 2 identifiers and \"Time Out\" conducted.     Central line insertion bundle followed: hand hygiene performed prior to procedure, site cleansed with Cholraprep (CHG), hat, mask, sterile gloves, sterile gown worn, patient draped with maximum barrier head to toe drape, sterile field maintained.     The vein was assessed and found to be compressible and of adequate size.      Lidocaine 1% 1.5 ml administered SQ to the insertion site.      Modified Seldinger Technique (MST) used for insertion, one attempt(s) required to access vein.      A 5 Fr PICC was inserted into the brachial vein of the right upper arm.        Catheter threaded without difficulty. Good blood return noted.     Catheter was flushed with 10 cc normal saline.      Catheter secured with Statlock, Biopatch, and Tegaderm dressing applied.     The sharps that are included in the PICC insertion kit were accounted for and disposed of in the sharps container prior to breakdown of the sterile field.     CLABSI prevention brochure left at bedside.     Patient tolerated procedure well.      Patient's primary RN notified PICC is ready for use.       Findings:     Total catheter length  38 cm, with 0 cm exposed. Mid upper arm circumference is 45 cm.      Tip placement verified in the low SVC by PCXR due to afib.    Comments:  The PICC is properly positioned and ready for use.         Francisca Julio RN BSN  Vascular Access - [facility]   "

## 2023-10-31 NOTE — PROVIDER NOTIFICATION
Rectal temperature re-check 93.2. Patient now hypotensive at 89/57. Re-check BP 78/56. Dr. Plascencia notified. New labs for 0600 ordered. PO midodrine ordered.    Patient too lethargic to take PO medication safely. Patient unable to keep eyes opens. Provider notified.

## 2023-10-31 NOTE — PROVIDER NOTIFICATION
Unable to get oral and axillary temperature. Rectal temperature 92.2. Dr. Plascencia notified. Bear hugger placed on patient.

## 2023-10-31 NOTE — PROVIDER NOTIFICATION
"Patient having increased coughing. Cough is congested and nonproductive. Patient unable to cough up secretions. Patient restless. Patient anxious and short of breath. Patient reported \"I cannot breathe\". Patient is confused, only oriented to self but able to answer questions. RT and hospitalist at bedside to assess patient. Labs drawn and nebulizer ordered.    Patient currently on dextrose 10%, lasix, and sodium bicarbonate gtt.  "

## 2023-10-31 NOTE — PROGRESS NOTES
"O2 4 lpm/NC, SpO2 97 %, BS scattered mild rhonchi, Duoneb HHN tx given inline with Volara mask tx, CPEP 12 cm x 3 min, CHFO 20 cm x 8 min. Tolerated well    BP 93/53   Pulse (!) 129   Temp 99  F (37.2  C)   Resp 19   Ht 1.575 m (5' 2\")   Wt 113.1 kg (249 lb 6.4 oz)   LMP  (LMP Unknown)   SpO2 97%   BMI 45.62 kg/m      BS after slight improvement, less congestion. O2 decreased to 3 lpm. RT to  monitor    1615 pre-oxygenate for NTS for sputum culture. Passed 14 fr suction catheter down Lt nares into trachea with moderate amount dark yellow secretions.Sterile specimen obtained and sent to lab. Pt tolerated procedure fair with some discomfort, SpO2 remained > = 97 % during and after procedure. O2 decreased to 3 lpm/NC after. RT to monitor.  "

## 2023-10-31 NOTE — PROGRESS NOTES
Care Management Follow Up    Length of Stay (days): 1    Expected Discharge Date: 11/03/2023     Concerns to be Addressed:       Patient plan of care discussed at interdisciplinary rounds: Yes    Anticipated Discharge Disposition:       Anticipated Discharge Services:    Anticipated Discharge DME:      Patient/family educated on Medicare website which has current facility and service quality ratings:    Education Provided on the Discharge Plan:    Patient/Family in Agreement with the Plan:      Referrals Placed by CM/SW:    Private pay costs discussed: Not applicable    Additional Information:  Chart reviewed. Discussed in rounds. Neph and cards following.     Baseline lives with son Tatyana and has been needing increased help recently     Mary Ann Saenz RN

## 2023-10-31 NOTE — PROGRESS NOTES
Cardiology Progress Note    Assessment/Plan:  1.  Chronic atrial fibrillation with rapid ventricular response.  Rapid rate likely due to septic shock.  No significant improvement following IV digoxin yesterday and initiation of IV antibiotic therapy.  Because of hypotension this morning, was initiated on amiodarone infusion following a bolus.  Heart rates currently running 90s to 120.  2.  Mild global cardiomyopathy, possibly related to sepsis but also could be due to persistently high heart rates following discontinuation of beta-blocker in the spring 2023.  At this point, she is too sick to evaluate for occult coronary artery disease.  3.  Acute on chronic renal failure, necessitating institution of dialysis.  4.  Septic shock, on broad-spectrum IV antibiotics.  She is currently on norepinephrine to help support blood pressure.  Long discussion with her children regarding CODE STATUS.  They are again and discussed amongst themselves and let us know.    Primary cardiologist: None    Subjective:  Patient currently requiring vasopressor support following development of more profound hypotension during the nighttime.  Heart rates also increased necessitating institution of IV amiodarone to help with rate control.  She is currently minimally responsive.     sodium chloride (PF) 0.9%  10 mL Intracatheter Once in dialysis/CRRT    Followed by    heparin  1.3-2.6 mL Intracatheter Once in dialysis/CRRT    sodium chloride (PF) 0.9%  10 mL Intracatheter Once in dialysis/CRRT    Followed by    heparin  1.3-2.6 mL Intracatheter Once in dialysis/CRRT    levothyroxine  25 mcg Oral QAM AC    miconazole   Topical BID    - MEDICATION INSTRUCTIONS -   Does not apply Once    pantoprazole  40 mg Intravenous Daily with breakfast    piperacillin-tazobactam  3.375 g Intravenous Q12H    sodium chloride (PF)  9 mL Intracatheter During Dialysis/CRRT (from stock)    sodium chloride (PF)  9 mL Intracatheter During Dialysis/CRRT (from  stock)    sodium chloride 0.9%  250 mL Intravenous Once in dialysis/CRRT    sodium chloride 0.9%  300 mL Hemodialysis Machine Once    vancomycin  2,500 mg Intravenous Once    vancomycin place taylor - receiving intermittent dosing  1 each Intravenous See Admin Instructions         Objective:   Vital signs in last 24 hours:  Temp:  [92.2  F (33.4  C)-96.6  F (35.9  C)] 96.6  F (35.9  C)  Pulse:  [] 123  Resp:  [11-41] 18  BP: ()/(43-89) 113/55  SpO2:  [84 %-99 %] 97 %  Weight:        Review of Systems:  Unobtainable given current mental state    Physical Exam:  General appearance: Unresponsive, intermittently moaning  Head: Normocephalic, without obvious abnormality, atraumatic  Neck: no JVD evident  Lungs: clear to auscultation bilaterally with few crackles at the bases  Heart: Irregularly irregular and mildly rapid rhythm.  S1, S2 normal.  No significant murmur  Extremities: 2+ pitting edema below the calves bilaterally    Cardiographics (personally reviewed):   Telemetry demonstrates atrial fibrillation with rapid ventricular response    Imaging (personally reviewed):   Procedure  Complete Echo Adult. Definity ([facility] #14089-819) given intravenously.  ______________________________________________________________________________  Interpretation Summary     1. The left ventricle is normal in size. Left ventricular systolic performance  is at the lower limits of normal. The ejection fraction is estimated to be  50%.  2. There is mild to moderate tricuspid insufficiency.  3. Normal right ventricular size with low normal right ventricular systolic  performance.  4. There is severe left atrial enlargement. There is mild to moderate right  atrial enlargement.     When compared to the prior real-time echocardiogram dated 28 March 2022, the  findings are felt to be fairly similar on both examinations.    Lab Results (personally reviewed):     Recent Labs     Lab Test 04/04/23  1504   CHOL 166   HDL 41*   *  "  TRIG 119     Recent Labs   Lab Test 04/04/23  1504 03/11/22  1453 12/10/20  1419   * 68 116     Recent Labs   Lab Test 10/31/23  0540      POTASSIUM 5.6*   CHLORIDE 102   CO2 20*   *   BUN 90.5*   CR 5.78*   GFRESTIMATED 7*   SANDIP 8.8     Recent Labs   Lab Test 10/31/23  0540 10/31/23  0020 10/30/23  1535   CR 5.78* 5.54* 5.75*     Recent Labs   Lab Test 10/30/23  0743   A1C 5.0          Recent Labs   Lab Test 10/31/23  0902   WBC 10.7   HGB 9.6*   HCT 27.3*   MCV 90   *     Recent Labs   Lab Test 10/31/23  0902 10/31/23  0540 10/30/23  0743   HGB 9.6* 9.4* 9.5*    No results for input(s): \"TROPONINI\" in the last 20197 hours.  Recent Labs   Lab Test 10/30/23  1535 04/04/23  1504 03/11/22  1453   BNP  --   --  572*   NTBNPI 12,726*  --   --    NTBNP  --  9,157*  --      Recent Labs   Lab Test 10/29/23  2211   TSH 5.41*     No results for input(s): \"INR\" in the last 20180 hours.       Ana Reeder MD          "

## 2023-11-01 NOTE — PROGRESS NOTES
"Critical Care Progress Note      11/01/2023    Name: Mely Alegria MRN#: [id number]   Age: 83 year old YOB: 1940     Hsptl Day# 2  ICU DAY #    MV DAY #             Problem List:   Principal Problem:    Urinary tract infection without hematuria, site unspecified  Active Problems:    Hyperkalemia    Acute renal failure superimposed on chronic kidney disease, unspecified acute renal failure type, unspecified CKD stage     Clinically Significant Risk Factors        # Hyperkalemia: Highest K = 5.6 mmol/L in last 2 days, will monitor as appropriate      # Anion Gap Metabolic Acidosis: Highest Anion Gap = 20 mmol/L in last 2 days, will monitor and treat as appropriate  # Hypoalbuminemia: Lowest albumin = 3.4 g/dL at 10/30/2023  7:43 AM, will monitor as appropriate   # Thrombocytopenia: Lowest platelets = 111 in last 2 days, will monitor for bleeding   # Hypertension: Noted on problem list  # Acute heart failure with preserved ejection fraction: heart failure noted on problem list, last echo with EF >50%, and receiving IV diuretics       # Severe Obesity: Estimated body mass index is 45.62 kg/m  as calculated from the following:    Height as of this encounter: 1.575 m (5' 2\").    Weight as of this encounter: 113.1 kg (249 lb 6.4 oz)., PRESENT ON ADMISSION           Code Status: No CPR- Do NOT Intubate                    Summary/Hospital Course:     83F with morbid obesity, afib on eliquis, CKD4, HFpEF, chronic back pain, admitted 10/30 due to AMS. Transferred to ICU with worsening hypoxemic respiratory failure and hypotension requiring vasopressors.        Assessment and plan :       I have personally reviewed the daily labs, imaging studies, cultures and discussed the case with referring physician and consulting physicians.     My assessment and plan by system for this patient is as follows:    Neurology/Psychiatry:   Remains encephalopathic.  Little bit more awake comparing to yesterday.  Presumed cause " for encephalopathy is toxic metabolic in a patient with renal failure, uremia and urinary tract infection/sepsis  ABG showed normal CO2      Cardiovascular:   Septic shock requiring vasopressors secondary to UTI.  Initially on norepinephrine but that caused A-fib with RVR so she was switched to Russ-Synephrine.    Atrial fibrillation with RVR.  Started on amiodarone, now rate controlled.  Volume overloaded undergoing hemodialysis with volume removal.  Bedside ultrasound showed dilated and collapsible IVC    On heparin drip      Pulmonary/Ventilator Management:   2 L nasal cannula    GI and Nutrition :   N.p.o. due to poor mental status      Renal/Fluids/Electrolytes:   Appears to be in end-stage renal disease.  Started on hemodialysis yesterday  First round when okay.  4 L removed.  Getting dialysis again today  Nephrology following and managing    - monitor function and electrolytes as needed with replacement per ICU protocols.  - generally avoid nephrotoxic agents such as NSAID, IV contrast unless specifically required  - adjust medications as needed for renal clearance  - follow I/O's as appropriate.    Infectious Disease:   E. coli UTI  Waiting for sensitivities.      DC vancomycin after today's dose    Endocrine:   Hypothyroidism on levothyroxine.  Currently on hold since she cannot take oral and I am think we need to worry about giving her IV at this point.  We can wait to see how she wakes up in a day or 2    Hypoglycemic overnight.  Started on D5W  Plan  - ICU insulin protocol, goal sugar <180        IV/Access:   1. Venous access -   2. Arterial access -   3.  Plan  - central access required and necessary      ICU Prophylaxis:   1. DVT: Hep gtt  3. Stress Ulcer: PPI           Key Medications:      levothyroxine  25 mcg Oral QAM AC    miconazole   Topical BID    pantoprazole  40 mg Intravenous Daily with breakfast    piperacillin-tazobactam  3.375 g Intravenous Q12H    sodium chloride (PF)  9 mL Intracatheter  During Dialysis/CRRT (from stock)    sodium chloride (PF)  9 mL Intracatheter During Dialysis/CRRT (from stock)    vancomycin  1,250 mg Intravenous Once    vancomycin place taylor - receiving intermittent dosing  1 each Intravenous See Admin Instructions      amiodarone 0.5 mg/min (11/01/23 1600)    D5W 50 mL/hr at 11/01/23 1600    heparin 1,600 Units/hr (11/01/23 1600)    - MEDICATION INSTRUCTIONS -      phenylephrine 0.5 mcg/kg/min (11/01/23 1625)               Physical Examination:   Temp:  [97.2  F (36.2  C)-99  F (37.2  C)] 97.2  F (36.2  C)  Pulse:  [] 96  Resp:  [6-27] 17  BP: ()/(46-87) 133/87  SpO2:  [92 %-100 %] 96 %    Intake/Output Summary (Last 24 hours) at 11/1/2023 1635  Last data filed at 11/1/2023 1600  Gross per 24 hour   Intake 2829.44 ml   Output 1792 ml   Net 1037.44 ml     Wt Readings from Last 4 Encounters:   10/31/23 113.1 kg (249 lb 6.4 oz)   04/04/23 93.4 kg (206 lb)   06/25/21 98.2 kg (216 lb 6.4 oz)   12/03/19 100.9 kg (222 lb 7 oz)     BP - Mean:  [] 106  Resp: 17    Recent Labs   Lab 10/31/23  0630 10/30/23  1409   PH 7.30* 7.29*   PCO2 39 35   PO2 80 96*   HCO3 19* 17*       GEN: no acute distress   HEENT: head ncat, sclera anicteric, OP patent, trachea midline   PULM: clear anteriorly    CV/COR: Irregularly irregular  ABD: soft nontender, hypoactive bowel sounds, no mass  EXT: Diffuse edema  NEURO: A little bit more awake today.  Opens her eyes and looks around.    SKIN: no obvious rash  LINES: clean, dry intact         Data:   All data and imaging reviewed     ROUTINE ICU LABS (Last four results)  CMP  Recent Labs   Lab 11/01/23  1459 11/01/23  0544 10/31/23  2144 10/31/23  2009 10/31/23  1503 10/30/23  0746 10/30/23  0743 10/30/23  0101 10/29/23  2328    138 139  --  140   < > 140  --  138   POTASSIUM 3.8 5.2 4.9  --  4.6   < > 5.9*   < > 6.9*   CHLORIDE 98 99 101  --  101   < > 105  --  104   CO2 25 22 18*  --  23   < > 11*  --  11*   ANIONGAP 14 17* 20*  --  " 16*   < > 24*  --  23*   GLC 92 97 117* 75 82   < > 88   < > 85   BUN 30.2* 60.6* 56.4*  --  55.0*   < > 92.6*  --  100.1*   CR 2.55* 4.42* 4.18*  --  3.86*   < > 6.12*  --  6.07*   GFRESTIMATED 18* 9* 10*  --  11*   < > 6*  --  6*   SANDIP 8.6* 8.6* 7.7*  --  8.9   < > 9.0  --  9.2   PROTTOTAL  --   --   --   --   --   --  7.1  --  7.7   ALBUMIN  --   --   --   --   --   --  3.4*  --  3.4*   BILITOTAL  --   --   --   --   --   --  0.4  --  0.5   ALKPHOS  --   --   --   --   --   --  110*  --  130*   AST  --   --   --   --   --   --  16  --  21   ALT  --   --   --   --   --   --  12  --  14    < > = values in this interval not displayed.     CBC  Recent Labs   Lab 10/31/23  0902 10/31/23  0540 10/30/23  0743 10/29/23  2211   WBC 10.7 9.0 8.2 10.3   RBC 3.03* 2.93* 2.98* 3.60*   HGB 9.6* 9.4* 9.5* 11.6*   HCT 27.3* 26.6* 27.7* 33.0*   MCV 90 91 93 92   MCH 31.7 32.1 31.9 32.2   MCHC 35.2 35.3 34.3 35.2   RDW 20.8* 20.2* 21.3* 21.8*   * 111* 110* 182     INRNo lab results found in last 7 days.  Arterial Blood Gas  Recent Labs   Lab 10/31/23  0630 10/30/23  1409   PH 7.30* 7.29*   PCO2 39 35   PO2 80 96*   HCO3 19* 17*       All cultures:  No results for input(s): \"CULT\" in the last 168 hours.  No results found for this or any previous visit (from the past 24 hour(s)).      Billing: This patient is critically ill: Yes. Total critical care time today 32 min exclusive of procedures or teaching.  Managing septic shock requiring vasopressors         "

## 2023-11-01 NOTE — PROGRESS NOTES
"Potassium   Date Value Ref Range Status   11/01/2023 5.2 3.4 - 5.3 mmol/L Final   03/11/2022 4.8 3.5 - 5.0 mmol/L Final     Hemoglobin   Date Value Ref Range Status   10/31/2023 9.6 (L) 11.7 - 15.7 g/dL Final     Creatinine   Date Value Ref Range Status   11/01/2023 4.42 (H) 0.51 - 0.95 mg/dL Final     Urea Nitrogen   Date Value Ref Range Status   11/01/2023 60.6 (H) 8.0 - 23.0 mg/dL Final   03/11/2022 20 8 - 28 mg/dL Final     Sodium   Date Value Ref Range Status   11/01/2023 138 135 - 145 mmol/L Final     Comment:     Reference intervals for this test were updated on 09/26/2023 to more accurately reflect our healthy population. There may be differences in the flagging of prior results with similar values performed with this method. Interpretation of those prior results can be made in the context of the updated reference intervals.      No results found for: \"INR\"   Latest Reference Range & Units 10/31/23 05:40 10/31/23 09:02   Hep B Surface Agn Nonreactive   Nonreactive   Hepatitis B Surface Antibody Instrument Value <8.00 m[IU]/mL 0.32    Hepatitis B Surface Antibody  Nonreactive      DIALYSIS PROCEDURE NOTE  Hepatitis status of previous patient on machine log was checked and verified ok to use with this patients hepatitis status.  Patient dialyzed for 3 hrs. on a K2 bath with a net fluid removal of  1.5L.  A BFR of 350 ml/min was obtained via a Right CVC.      The treatment plan was discussed with Dr. Finn during the treatment.    Total heparin received during the treatment: 0 units.     Line flushed, clamped and capped with heparin 1:1000 2 mL and 2.1 mL (2000 unitsand 2100 units) per lumen    Meds  given: none   Complications: none      Person educated: family. Knowledge base minimal. Barriers to learning: none. Educated on procedure via verbal mode.      ICEBOAT? Timeout performed pre-treatment  I: Patient was identified using 2 identifiers  C:  Consent Signed Yes  E: Equipment preventative maintenance is " current and dialysis delivery system OK to use  B: See note above  O: Dialysis orders present and complete prior to treatment  A: Vascular access verified and assessed prior to treatment  T: Treatment was performed at a clinically appropriate time  ?: Patient was allowed to ask questions and address concerns prior to treatment  See Adult Hemodialysis flowsheet in EPIC for further details and post assessment.  Machine water alarm in place and functioning. Transducer pods intact and checked every 15min.   Pt received treatment in   Chlorine/Chloramine water system checked every 4 hours.  Outpatient Dialysis at D    Patient repositioned every 2 hours during the treatment.  Post treatment report given to BLADE Johns RN regarding 1.5L of fluid removed, last BP of 123/66, and patient pain rating of 0/10.

## 2023-11-01 NOTE — PROGRESS NOTES
"Care Management Follow Up    Length of Stay (days): 2    Expected Discharge Date: 11/07/2023     Concerns to be Addressed:     Care Progression  Patient plan of care discussed at interdisciplinary rounds: Yes    Anticipated Discharge Disposition:  TBD - will need PT/OT consults when medically ready     Anticipated Discharge Services:  NA  Anticipated Discharge DME:  NA    Patient/family educated on Medicare website which has current facility and service quality ratings:  NA  Education Provided on the Discharge Plan:  NA  Patient/Family in Agreement with the Plan:  NA    Referrals Placed by CM/SW:  BRAD  Private pay costs discussed: Not applicable    Additional Information:  Discussed patient in ICU rounds. Patient weaned to 2L oxygen, per RN not following commands appropriately. Plan for HD today, receiving IV abx for UTI. NPO.    Social HX: \"Tatyana reports that he and the pt live together in a single family home. He reports that up until the last week or so that pt was able to get around the house on her own using a cane but that she has needed increased help over the last week. He reports that in the last week the pt has needed help for all transfers out of bed and chair and with all tasks at home. Tatyana became tearful in discussing this change. He reports that he typically works full-time and pt was able to manage at home while he was at work. Over the last week Rhoda has been coming to be with the pt while he was at work due to her increased needs. Both Rhoda and Tatyana report that pt has been generally healthy and has not been hospitalized since a hip replacement maybe 20 years ago.\"    CM will continue to follow care progression and aide in discharge planning as needed.     Arabella Palacio RN      "

## 2023-11-01 NOTE — PHARMACY-VANCOMYCIN DOSING SERVICE
"Pharmacy Vancomycin Note  Date of Service 2023  Patient's  1940   83 year old, female    Indication: Sepsis  Day of Therapy: 2  Current vancomycin regimen:  intermittent  Current vancomycin monitoring method: Renal Replacement Therapy  Current vancomycin therapeutic monitoring goal: 15-20 mg/L      Current estimated CrCl = Estimated Creatinine Clearance: 11.5 mL/min (A) (based on SCr of 4.42 mg/dL (H)).    Creatinine for last 3 days  10/29/2023: 11:28 PM Creatinine 6.07 mg/dL  10/30/2023:  7:43 AM Creatinine 6.12 mg/dL;  3:35 PM Creatinine 5.75 mg/dL  10/31/2023: 12:20 AM Creatinine 5.54 mg/dL;  5:40 AM Creatinine 5.78 mg/dL;  3:03 PM Creatinine 3.86 mg/dL;  9:44 PM Creatinine 4.18 mg/dL  2023:  5:44 AM Creatinine 4.42 mg/dL    Recent Vancomycin Levels (past 3 days)  2023:  5:44 AM Vancomycin 16.8 ug/mL    Vancomycin IV Administrations (past 72 hours)                     vancomycin (VANCOCIN) 2,500 mg in sodium chloride 0.9 % 500 mL intermittent infusion (mg) 2,500 mg New Bag 10/31/23 0947                    Nephrotoxins and other renal medications (From now, onward)      Start     Dose/Rate Route Frequency Ordered Stop    23 1700  vancomycin (VANCOCIN) 1,250 mg in sodium chloride 0.9 % 250 mL intermittent infusion         1,250 mg  over 90 Minutes Intravenous ONCE 23 1338      10/31/23 1300  piperacillin-tazobactam (ZOSYN) 3.375 g vial to attach to  mL bag        Note to Pharmacy: For [facility], [facility] and [facility]: For Zosyn-naive patients, use the \"Zosyn initial dose + extended infusion\" order panel.    3.375 g  over 240 Minutes Intravenous EVERY 12 HOURS 10/31/23 0621      10/31/23 0711  vancomycin place taylor - receiving intermittent dosing         1 each Intravenous SEE ADMIN INSTRUCTIONS 10/31/23 0712                 Contrast Orders - past 72 hours (72h ago, onward)      Start     Dose/Rate Route Frequency Stop    10/30/23 0900  perflutren lipid microsphere (DEFINITY) " injection SUSP 2 mL         2 mL Intravenous ONCE 10/30/23 0856    10/30/23 0030  iopamidol (ISOVUE-370) solution 80 mL  Status:  Discontinued         80 mL Intravenous ONCE 10/30/23 0027            Interpretation of levels and current regimen:  Vancomycin level is reflective of therapeutic level      Plan:  Give Vancomycin 1250mg IV once after HD  Vancomycin monitoring method: Renal Replacement Therapy  Vancomycin therapeutic monitoring goal: 15-20 mg/L  Pharmacy will check vancomycin levels as appropriate prior to next HD.  Serum creatinine levels will be ordered daily for the first week of therapy and at least twice weekly for subsequent weeks.    Renetta Nguyen, [facility]

## 2023-11-01 NOTE — PLAN OF CARE
"  Problem: Adult Inpatient Plan of Care  Goal: Plan of Care Review  Description: The Plan of Care Review/Shift note should be completed every shift.  The Outcome Evaluation is a brief statement about your assessment that the patient is improving, declining, or no change.  This information will be displayed automatically on your shift  note.  Outcome: Progressing  Goal: Patient-Specific Goal (Individualized)  Description: You can add care plan individualizations to a care plan. Examples of Individualization might be:  \"Parent requests to be called daily at 9am for status\", \"I have a hard time hearing out of my right ear\", or \"Do not touch me to wake me up as it startles  me\".  Outcome: Progressing  Goal: Absence of Hospital-Acquired Illness or Injury  Outcome: Progressing  Intervention: Identify and Manage Fall Risk  Recent Flowsheet Documentation  Taken 11/1/2023 0400 by Tabby Wyman, RN  Safety Promotion/Fall Prevention:   clutter free environment maintained   increased rounding and observation   lighting adjusted   patient and family education   room door open   room near nurse's station   room organization consistent   safety round/check completed  Taken 11/1/2023 0000 by Tabby Wyman, RN  Safety Promotion/Fall Prevention:   clutter free environment maintained   increased rounding and observation   lighting adjusted   patient and family education   room door open   room near nurse's station   room organization consistent   safety round/check completed  Taken 10/31/2023 2000 by Tabby Wyman, RN  Safety Promotion/Fall Prevention:   clutter free environment maintained   increased rounding and observation   lighting adjusted   patient and family education   room door open   room near nurse's station   room organization consistent   safety round/check completed  Intervention: Prevent Skin Injury  Recent Flowsheet Documentation  Taken 11/1/2023 0400 by Tabby Wyman, RN  Body Position:   turned   left   " heels elevated  Taken 11/1/2023 0230 by Tabby Wyman RN  Body Position:   turned   right   heels elevated  Taken 11/1/2023 0000 by Tabby Wyman RN  Body Position:   turned   left   heels elevated  Taken 10/31/2023 2200 by Tabby Wyman RN  Body Position:   turned   right   heels elevated  Taken 10/31/2023 2000 by Tabby Wyman RN  Body Position:   turned   heels elevated   left  Intervention: Prevent Infection  Recent Flowsheet Documentation  Taken 11/1/2023 0400 by Tabby Wmyan RN  Infection Prevention:   hand hygiene promoted   rest/sleep promoted   single patient room provided  Taken 11/1/2023 0000 by Tabby Wyman RN  Infection Prevention:   hand hygiene promoted   rest/sleep promoted   single patient room provided  Taken 10/31/2023 2000 by Tabby Wyman RN  Infection Prevention:   hand hygiene promoted   rest/sleep promoted   single patient room provided  Goal: Optimal Comfort and Wellbeing  Outcome: Progressing  Intervention: Provide Person-Centered Care  Recent Flowsheet Documentation  Taken 11/1/2023 0400 by Tabby Wyman RN  Trust Relationship/Rapport:   care explained   choices provided  Taken 11/1/2023 0000 by Tabby Wyman RN  Trust Relationship/Rapport:   care explained   choices provided  Taken 10/31/2023 2000 by Tabby Wyman RN  Trust Relationship/Rapport:   care explained   choices provided  Goal: Readiness for Transition of Care  Outcome: Progressing     Problem: Risk for Delirium  Goal: Optimal Coping  Outcome: Progressing  Goal: Improved Behavioral Control  Outcome: Progressing  Intervention: Minimize Safety Risk  Recent Flowsheet Documentation  Taken 11/1/2023 0400 by Tabby Wyman RN  Enhanced Safety Measures: room near unit station  Trust Relationship/Rapport:   care explained   choices provided  Taken 11/1/2023 0000 by Tabby Wyman RN  Enhanced Safety Measures: room near unit station  Trust Relationship/Rapport:   care explained   choices  provided  Taken 10/31/2023 2000 by Tabby Wyman, RN  Enhanced Safety Measures: room near unit station  Trust Relationship/Rapport:   care explained   choices provided  Goal: Improved Attention and Thought Clarity  Outcome: Progressing  Goal: Improved Sleep  Outcome: Progressing     Problem: Suicide Risk  Goal: Absence of Self-Harm  Outcome: Progressing  Intervention: Assess Risk to Self and Maintain Safety  Recent Flowsheet Documentation  Taken 11/1/2023 0400 by Tabby Wyman, RN  Enhanced Safety Measures: room near unit station  Taken 11/1/2023 0000 by Tabby Wyman, RN  Enhanced Safety Measures: room near unit station  Taken 10/31/2023 2000 by Tabby Wyman, RN  Enhanced Safety Measures: room near unit station   Goal Outcome Evaluation:

## 2023-11-01 NOTE — PROGRESS NOTES
Cardiology Progress Note    Assessment/Plan:  1.  Chronic atrial fibrillation with rapid ventricular response likely due to septic shock.  Was treated initially with some IV digoxin and subsequently started on IV amiodarone which she remains on now at 0.5 mg/min.  Currently on heparin infusion.  Because of borderline to mildly low blood pressures, will continue amiodarone infusion today.  Hopefully can transition to beta-blocker therapy tomorrow.  2.  Borderline to mild global cardiomyopathy, possibly related to sepsis but also could be due to persistently high heart rates following discontinuation of beta-blocker therapy in the spring 2023.  3.  Acute on chronic renal failure, necessitating institution of hemodialysis  4.  Septic shock on broad-spectrum IV antibiotics.    Primary cardiologist: None    Subjective:  Patient awake, responsive to questions.  Denies chest discomfort or shortness of breath.     sodium chloride (PF) 0.9%  10 mL Intracatheter Once in dialysis/CRRT    Followed by    heparin  1.3-2.6 mL Intracatheter Once in dialysis/CRRT    sodium chloride (PF) 0.9%  10 mL Intracatheter Once in dialysis/CRRT    Followed by    heparin  1.3-2.6 mL Intracatheter Once in dialysis/CRRT    levothyroxine  25 mcg Oral QAM AC    miconazole   Topical BID    - MEDICATION INSTRUCTIONS -   Does not apply Once    pantoprazole  40 mg Intravenous Daily with breakfast    piperacillin-tazobactam  3.375 g Intravenous Q12H    sodium chloride (PF)  9 mL Intracatheter During Dialysis/CRRT (from stock)    sodium chloride (PF)  9 mL Intracatheter During Dialysis/CRRT (from stock)    sodium chloride 0.9%  250 mL Intravenous Once in dialysis/CRRT    sodium chloride 0.9%  300 mL Hemodialysis Machine Once    vancomycin place taylor - receiving intermittent dosing  1 each Intravenous See Admin Instructions         Objective:   Vital signs in last 24 hours:  Temp:  [95.9  F (35.5  C)-99.1  F (37.3  C)] 97.5  F (36.4  C)  Pulse:   "[] 105  Resp:  [6-27] 17  BP: ()/(43-80) 86/57  SpO2:  [92 %-100 %] 98 %  Weight:        Review of Systems:  Negative    Physical Exam:  General appearance: alert, cooperative, no distress   Head: Normocephalic, without obvious abnormality, atraumatic  Neck: no JVD   Lungs: clear to auscultation bilaterally   Heart: Irregularly irregular rhythm.  S1, S2 normal.  No murmur or gallop  Extremities: No peripheral edema    Cardiographics (personally reviewed):   Telemetry demonstrates atrial fibrillation with mildly rapid ventricular response    Imaging (personally reviewed):   No additional cardiac imaging    Lab Results (personally reviewed):     Recent Labs   Lab Test 04/04/23  1504   CHOL 166   HDL 41*   *   TRIG 119     Recent Labs   Lab Test 04/04/23  1504 03/11/22  1453 12/10/20  1419   * 68 116     Recent Labs   Lab Test 11/01/23  0544      POTASSIUM 5.2   CHLORIDE 99   CO2 22   GLC 97   BUN 60.6*   CR 4.42*   GFRESTIMATED 9*   SANDIP 8.6*     Recent Labs   Lab Test 11/01/23  0544 10/31/23  2144 10/31/23  1503   CR 4.42* 4.18* 3.86*     Recent Labs   Lab Test 10/30/23  0743   A1C 5.0          Recent Labs   Lab Test 10/31/23  0902   WBC 10.7   HGB 9.6*   HCT 27.3*   MCV 90   *     Recent Labs   Lab Test 10/31/23  0902 10/31/23  0540 10/30/23  0743   HGB 9.6* 9.4* 9.5*    No results for input(s): \"TROPONINI\" in the last 83654 hours.  Recent Labs   Lab Test 10/30/23  1535 04/04/23  1504 03/11/22  1453   BNP  --   --  572*   NTBNPI 12,726*  --   --    NTBNP  --  9,157*  --      Recent Labs   Lab Test 10/29/23  2211   TSH 5.41*     No results for input(s): \"INR\" in the last 67329 hours.       Ana Reeder MD          "

## 2023-11-01 NOTE — PROGRESS NOTES
Patient remains on 3L nasal cannula. Volara treatment done as scheduled. Patient tolerated well. RT will continue to monitor.

## 2023-11-01 NOTE — PROGRESS NOTES
Renal progress note  CC:HAMMAD, Advanced CKD , likely ESRD  Assessment and Plan:  83 year old female with a hx of CKD 4, progressed to stage 5 in labs 4/2023 , no prior nephrology follow ups , Diastolic HF, HTN , Chronic Atrial fib with RVR , back pain and morbid obesity, admitted with AMS, with possible UTI , Hyperkalemia , acidosis , weakness and hypervolemia with anuria. She received lasix IV wo any response. Nephrology consulted for HAMMAD and anuria     CKD 5, likely ESRD  HAMMAD with anuria  Baseline with some progressive disease was at CKD 4 with creat 1.6-2 and GFR 25-<30 in 2022 labs and this yr labs in 4/2023 with GFR 11-12 and Creatinine in 3.1-3.7  CT 10/30/23 and renal US 10/12/2023 with rt kidney 6.5cm and left 8.2cm and cortical thinning with likely advanced renal disease, simple cysts  UA with dirty appearing urine , Cx Ecoli  -- CKD with unclear etiology, has had a progressive course with significant nephrosclerosis on imaging, unclear if a kidney biopsy will provide any additional information besides scarring  -- Serological work-up including paraproteins ordered  -- Tunneled CVC placed 10/30/23; needing dialysis with hyperkalemia acidemia and and urea despite diuretic challenge  -- We will follow on BMP daily with HD now  -- 2-hour hemodialysis initiated on 10/31/2023 tolerated well second session today with a 3-hour treatment with  over   -- We will follow on improvement with hemodialysis and UF over the next few days and decide on future goals of care patient is likely to be dialysis dependent at this time and will need outpatient placement once stability on dialysis is determined     Hypervolemia  CHF exacerbation  Likely secondary to poor renal functions  Attempted diuresis with Lasix followed by Lasix infusion and Diuril 250x1  And uric remains with 10 to 15 cc of urine output  --> dc Lasix infusion  --> Plan for UF with HD as tolerated    Hypotension with shock state  On  Phenyleph  Fluid up with mostly edema and now on NC O2   --> plans per primary     Hyperkalemia  Likely secondary to renal failure and severe acidosis, full dose ARB at home  Bl potassium is stable     Acidemia  Likely hyperchloremic metabolic acidosis suggestive of possible underlying RTA  Unclear etiology  Discontinue isotonic bicarb  -- correct with HD     Anemia  Baseline around 12-13  Hemoglobin down to 9.5 with a drop in platelet  Keep hemoglobin above 8 transfuse as needed  We will follow on iron studies  Likely ACD/inflammatory anemia     Ecoli  UTI  Cultures pending  Diffuse skin edema noted on CT possibly in the right flank and left flank area questioning for cellulitis like finding,   On Vanco, Zosyn  for possible sepsis  Follow on cultures to direct antibiotic therapy    Shock state, likely circulatory shock with sepsis  Management per ICU     Acute metabolic encephalopathy  Likely UTI/ uremia  Follow on ABG     History of atrial fibrillation with RVR  on digoxin per cardiology  Will need to monitor levels especially with severe renal failure  AC with Xarelto chronically     Gout on probenecid     Chronic back pain on baclofen and Norco  Was due to get a cortisone shot in her back  Plans per primary       Thank you for the consultation we will follow  Keri Finn MD  [facility]  [phone]      Subjective  Mentation appears a lot better after dialysis session today able to answer yes and no questions   Tolerated hemodialysis well second session today discussed prescription with bedside RN will be aiming for 0.5 to 1 L of UF during treatment  Hemodynamics have been stable patient remains on low-dose phenylephrine and amiodarone for heart rate control heart rate has been better around 80s to 100  No additional events overnight  Urine output remains negligible    objective    Vital signs in last 24 hours  Temp:  [97.3  F (36.3  C)-99  F (37.2  C)] 97.3  F (36.3  C)  Pulse:  []  83  Resp:  [6-27] 16  BP: ()/(46-79) 103/64  SpO2:  [92 %-100 %] 98 %  Weight:   [unfilled]    Intake/Output last 3 shifts  I/O last 3 completed shifts:  In: 3247.76 [I.V.:3247.76]  Out: 302 [Urine:302]  Intake/Output this shift:  I/O this shift:  In: 670.6 [I.V.:670.6]  Out: 75 [Urine:75]    Physical Exam   arousable , NAD  CV: irregular tachy without murmur or rub  Lung: clear and equal; no extra sounds  Ab: soft and NT; not distended; normal bs  Ext: + edema and well perfused  Skin; no rash    Pertinent Labs   Lab Results   Component Value Date    WBC 10.7 10/31/2023    HGB 9.6 (L) 10/31/2023    HCT 27.3 (L) 10/31/2023    MCV 90 10/31/2023     (L) 10/31/2023     Lab Results   Component Value Date    BUN 60.6 (H) 11/01/2023     11/01/2023    CO2 22 11/01/2023       Lab Results   Component Value Date    ALBUMIN 3.4 (L) 10/30/2023     Lab Results   Component Value Date    PHOS 2.7 03/11/2022     I reviewed all lab results  Keri Finn MD

## 2023-11-02 NOTE — PROGRESS NOTES
"Care Management Follow Up    Length of Stay (days): 3    Expected Discharge Date: 11/07/2023     Concerns to be Addressed:     Care Progression  Patient plan of care discussed at interdisciplinary rounds: Yes    Anticipated Discharge Disposition:  TBD - will need PT/OT consults when medically ready     Anticipated Discharge Services:  NA  Anticipated Discharge DME:  NA    Patient/family educated on Medicare website which has current facility and service quality ratings:  NA  Education Provided on the Discharge Plan:  NA  Patient/Family in Agreement with the Plan:  NA    Referrals Placed by CM/SW:  [facility]  Private pay costs discussed: Not applicable    Additional Information:  Discussed patient in ICU rounds. Patient weaned to 2L oxygen, per RN not following commands appropriately. Possible downgrade from ICU status.     Social HX: \"Tatyana reports that he and the pt live together in a single family home. He reports that up until the last week or so that pt was able to get around the house on her own using a cane but that she has needed increased help over the last week. He reports that in the last week the pt has needed help for all transfers out of bed and chair and with all tasks at home. Tatyana became tearful in discussing this change. He reports that he typically works full-time and pt was able to manage at home while he was at work. Over the last week Rhoda has been coming to be with the pt while he was at work due to her increased needs. Both Rhoda and Tatyana report that pt has been generally healthy and has not been hospitalized since a hip replacement maybe 20 years ago.\"    CM will continue to follow care progression and aide in discharge planning as needed.     Arabella Palacio RN      "

## 2023-11-02 NOTE — PHARMACY-VANCOMYCIN DOSING SERVICE
Re-consulted to dose vancomycin for S.aureus pneumonia. Continue vancomycin by intermittent dosing - see the pharmacy note on 11/1.   Plan for next HD on 11/3 so will order vancomycin level pre-HD with am labs.  Renetta Nguyen PharmD November 2, 2023 11:13 AM

## 2023-11-02 NOTE — PLAN OF CARE
Goal Outcome Evaluation:       [facility] - ICU    RN Progress Note:            Pertinent Assessments:      Please refer to flowsheet rows for full assessment     BP labile ON, restarted Russ and turned off around 1am, HR decreased to the 50's, stopped amiodarone drip around midnight. Neuro wise patient is progressing and started following command around 2am, able to assist with turns, communicate needs with son/daughter this morning. Patient had several bowel movement ON and this morning. Passed bed side swallow screen, advanced to thickened liquid and renal diet as tolerated. Patient denies pain but grimaces with turns, remains Aneuric.           Key Events - This Shift:       Refer to note above.                     Barriers to Discharge / Downgrade:

## 2023-11-02 NOTE — PROGRESS NOTES
CRITICAL CARE PROGRESS NOTE:    Assessment/Plan:  83 year old female with a history of severe obesity, afib on eliquis, CKD4, GERD, HFpEF, chronic back pain, gout, admitted 10/30 due to acute encephalopathy, found to have anasarca, E coli UTI, acute respiratory failure with hypoxia, acute on chronic kidney injury requiring initiation of hemodialysis, and shock requiring vasopressors.    RESP:  Acute respiratory failure with hypoxia.  Staph aureus bronchitis/pneumonia.  Severely hypervolemic, combination of acute on chronic kidney injury with pulmonary edema (was CKD4 at baseline, now needing dialysis started this hospitalization), possible contribution of atrial fibrillation. Baseline weight is 217 lbs per son, currently 258 lbs, anasarca on exam, low lung volumes. 2+ staph aureus in sputum, cultures in process.  Titrate oxygen, currently 1 L/min  UF with dialysis  PT, IS  Antibiotics as below    CV:  Shock: Likely primarily septic, possible contribution of HFpEF. Off pressors.  AF with RVR.  Appreciate cardiology consultation  Remains off pressors  Amio drip was stopped d/t HR 60s overnight  No beta blocker now, likely would tolerate low dose and I worry about recurrent RVR; cardiology will monitor, maybe start tomorrow  If recurrent AF RVR, would consider trying beta blocker rather than restarting amio  UF as tolerated on HD  Loop diuretic on hold with shock and starting HD  Heparin drip; will start apixaban 2.5 mg BID (age, dialysis) rather than her previous rivaroxaban due to better data on apixaban in dialysis patients    NEURO:  Acute encephalopathy.  Septic-metabolic. Improving, appears chronically ill but alert, oriented to year, place.  Monitor  Restart prn baclofen  Hold topiramate for now; restart if remains alert/stable    GI:  No acute issues.  monitor    RENAL:  Acute on chronic kidney injury. CKD4 at baseline, now oliguric, ATN from sepsis/shock. Started on intermittent HD this admission. Continues to  "have marked anasarca. Baseline weight 217 lbs per son, currently 258 lbs.  HD with UF as tolerated via tunneled right internal jugular vein dialysis catheter  Appreciate nephrology consultation    ID:  E coli UTI.  Staph aureus pneumonia.  E coli pan-sensitive. Staph aureus susceptibilities in process.  Change to ceftriaxone  Keep vancomycin pending Staph susceptibilities    HEMATOLOGIC:  Anemia.  Acute and chronic illness. No evidence of active hemorrhage.  Monitor with restrictive transfusion threshold    ENDOCRINE:  Hyperglycemia.  Hypothyroidism.  Gout.  Start FSBG with sliding scale aspart  Levothyroxine  Restart home febuxostat    ICU PROPHYLAXIS:  Apixaban  PPI (on at home)    Lines/Drains/Tubes:  Central line (tunneled right internal jugular vein dialysis catheter) placed on 10/30  Central line (RUE PICC) placed on 10/30  Lerma catheter placed on 10/30    CODE STATUS, DISPOSITION, FAMILY COMMUNICATION: DNR/DNI. Clinically stable to transfer out of ICU. Updated patient's son, Tatyana, at the bedside. Will transfer patient to cardiac telemetry status. Placed consult to hospitalist service and case discussed with Dr. Leslie; appreciated. Critical care will sign off but please call if needed. Also ordered PT/OT, patient may need SNF. Normally walks with cane, recently son had to support her while walking, lives with him.    Restraints  Not indicated    Carlos Jones MD  Pulmonary and Critical Care Medicine  [facility]  Office [phone]  Pager [phone]  he/him    Overnight events:  No new events. Off pressors. Amio off apparently due to HR 60s. More alert today.    Subjective:  Denies pain. Endorses mild dyspnea.    Objective:  Physical Exam:  Vent settings for last 24 hours:  Resp: 28      /79   Pulse 80   Temp 97.8  F (36.6  C) (Oral)   Resp 28   Ht 1.575 m (5' 2\")   Wt 117.3 kg (258 lb 11.2 oz)   LMP  (LMP Unknown)   SpO2 96%   BMI 47.32 kg/m      Intake/Output last 3 " shifts:  I/O last 3 completed shifts:  In: 1894.2 [I.V.:1894.2]  Out: 1770 [Urine:270; Other:1500]  Intake/Output this shift:  No intake/output data recorded.    Physical Exam  Gen: alert, oriented, appears chronically ill  HEENT: no OP lesions, no MADHURI  CV: RRR, no m/g/r  Resp: diminished at bases  Abd: soft, nontender, BS+  Neuro: PERRL, nonfocal, mildly somnolent  Ext: moderate pitting anasarca, worse on right arm    LAB:  Recent Labs   Lab 10/31/23  0902   WBC 10.7   HGB 9.6*   HCT 27.3*   *     Recent Labs   Lab 11/01/23  2106 11/01/23  1459 11/01/23  0544 10/30/23  1535 10/30/23  0743 10/29/23  2328    137 138   < > 140 138   CO2 25 25 22   < > 11* 11*   BUN 33.6* 30.2* 60.6*   < > 92.6* 100.1*   ALKPHOS  --   --   --   --  110* 130*   ALT  --   --   --   --  12 14   AST  --   --   --   --  16 21    < > = values in this interval not displayed.       No current outpatient medications on file.       Care Time: 1 Hour

## 2023-11-02 NOTE — PROGRESS NOTES
Respiratory Therapy Note    Patient completed Volara treatment w/Duoneb inline. Used mask to provide treatment. Pt tolerated treatment well, no changes post-treatment. Breath sounds diminished throughout. Pt states having a NP cough. RT will continue to follow.    Shirley Hernandez, RT

## 2023-11-02 NOTE — PROGRESS NOTES
Chart reviewed in detail.  Reviewed all consultation notes from today.  Also reviewed detailed daily progress note by Dr. Jones, intensivist.  Continue with current plan of care as implemented.  [facility] will now assume role as primary while inpatient now that ICU team is signed off.

## 2023-11-02 NOTE — PROGRESS NOTES
Renal progress note  CC:HAMMAD, Advanced CKD , likely ESRD  Assessment and Plan:  83 year old female with a hx of CKD 4, progressed to stage 5 in labs 4/2023 , no prior nephrology follow ups , Diastolic HF, HTN , Chronic Atrial fib with RVR , back pain and morbid obesity, admitted with AMS, with possible UTI , Hyperkalemia , acidosis , weakness and hypervolemia with anuria. She received lasix IV wo any response. Nephrology consulted for HAMMAD and anuria     CKD 5, likely ESRD  HAMMAD with anuria  Baseline with some progressive disease was at CKD 4 with creat 1.6-2 and GFR 25-<30 in 2022 labs and this yr labs in 4/2023 with GFR 11-12 and Creatinine in 3.1-3.7  CT 10/30/23 and renal US 10/12/2023 with rt kidney 6.5cm and left 8.2cm and cortical thinning with likely advanced renal disease, simple cysts  UA with dirty appearing urine , Cx Ecoli  -- CKD with unclear etiology, has had a progressive course with significant nephrosclerosis on imaging, unclear if a kidney biopsy will provide any additional information besides scarring  -- Serological work-up including paraproteins ordered  -- Tunneled CVC placed 10/30/23; needing dialysis with hyperkalemia acidemia and and urea despite diuretic challenge  -- We will follow on BMP daily with HD now  -- initiated HD 10/31 and tolerated well , will continue on MWF schedule  -- We will follow on improvement with hemodialysis and UF over the next few days and decide on future goals of care patient is likely to be dialysis dependent at this time and will need outpatient placement once stability on dialysis is determined  --> case management referral for dialysis placement , will aim for [facility]     Hypervolemia  CHF exacerbation  Likely secondary to poor renal functions  Attempted diuresis with Lasix followed by Lasix infusion and Diuril 250x1  Near anuric with UO ~200/day at best  --> dc Lasix infusion  --> Plan for UF with HD as tolerated    Hypotension with shock  state  On Phenyleph  Fluid up with mostly edema and now on NC O2   --> plans per primary     Hyperkalemia  Likely secondary to renal failure and severe acidosis, full dose ARB at home  Bl potassium is stable     Acidemia  Likely hyperchloremic metabolic acidosis suggestive of possible underlying RTA  Unclear etiology  Discontinue isotonic bicarb  -- correct with HD     Anemia  Baseline around 12-13  Hemoglobin down to 9.5 with a drop in platelet  Keep hemoglobin above 8 transfuse as needed  We will follow on iron studies  Likely ACD/inflammatory anemia     Ecoli  UTI  Cultures pending  Diffuse skin edema noted on CT possibly in the right flank and left flank area questioning for cellulitis like finding,   On Vanco, Zosyn  for possible sepsis  Follow on cultures to direct antibiotic therapy    Shock state, likely circulatory shock with sepsis  Management per ICU     Acute metabolic encephalopathy  Likely UTI/ uremia  Follow on ABG     History of atrial fibrillation with RVR  on digoxin per cardiology  Will need to monitor levels especially with severe renal failure  AC with Xarelto chronically     Gout on probenecid     Chronic back pain on baclofen and Norco  Was due to get a cortisone shot in her back  Plans per primary       Thank you for the consultation we will follow  Keri Finn MD  [facility]  [phone]      Subjective  Mentation appears a lot better after dialysis   Significant for hemodialysis initiation  Blood pressure remains on the lower side although has been able to be titrated off of pressors  Next Hd tomorrow we will plan for UF on Saturday  No additional events overnight  Significant edema and about 40lb + bl wts ~210 at home  Urine output remains negligible    objective    Vital signs in last 24 hours  Temp:  [96.8  F (36  C)-97.9  F (36.6  C)] 97.8  F (36.6  C)  Pulse:  [60-96] 80  Resp:  [12-35] 16  BP: ()/(45-87) 101/55  SpO2:  [93 %-100 %] 97 %  Weight:    [unfilled]    Intake/Output last 3 shifts  I/O last 3 completed shifts:  In: 1994.2 [I.V.:1994.2]  Out: 1770 [Urine:270; Other:1500]  Intake/Output this shift:  I/O this shift:  In: 200 [I.V.:200]  Out: 100 [Urine:100]    Physical Exam   arousable , NAD  CV: irregular tachy without murmur or rub  Lung: clear and equal; no extra sounds  Ab: soft and NT; not distended; normal bs  Ext: + edema and well perfused  Skin; no rash    Pertinent Labs   Lab Results   Component Value Date    WBC 10.7 10/31/2023    HGB 9.6 (L) 10/31/2023    HCT 27.3 (L) 10/31/2023    MCV 90 10/31/2023     (L) 10/31/2023     Lab Results   Component Value Date    BUN 33.6 (H) 11/01/2023     11/01/2023    CO2 25 11/01/2023       Lab Results   Component Value Date    ALBUMIN 3.4 (L) 10/30/2023     Lab Results   Component Value Date    PHOS 2.7 03/11/2022     I reviewed all lab results  Keri Finn MD

## 2023-11-02 NOTE — PROGRESS NOTES
Cardiology Progress Note    Assessment/Plan:  1.  Chronic atrial fibrillation with rapid ventricular response likely due to septic shock.  Rate has improved over the past 48 hours initially with IV digoxin and IV amiodarone although this has since been discontinued.  Rates currently in the 80s.  Consider restarting metoprolol succinate if heart rates begin to pick back up.  2.  Borderline to mild global cardiomyopathy, possibly related to sepsis although cannot exclude persistently elevated heart rates following discontinuation of beta-blocker therapy.  Will reassess heart rates with amiodarone discontinued.  3.  Acute on chronic renal failure, necessitating institution of hemodialysis.  Renal function improving.  4.  Septic shock on broad-spectrum antibiotics    Primary cardiologist: None    Subjective:  Patient without complaints this morning.  Much brighter than yesterday.  Asking appropriate questions.     cefTRIAXone  1 g Intravenous Q24H    levothyroxine  25 mcg Oral QAM AC    miconazole   Topical BID    pantoprazole  40 mg Intravenous Daily with breakfast    sodium chloride (PF)  9 mL Intracatheter During Dialysis/CRRT (from stock)    sodium chloride (PF)  9 mL Intracatheter During Dialysis/CRRT (from stock)    vancomycin place taylor - receiving intermittent dosing  1 each Intravenous See Admin Instructions         Objective:   Vital signs in last 24 hours:  Temp:  [96.8  F (36  C)-97.9  F (36.6  C)] 97.8  F (36.6  C)  Pulse:  [60-96] 80  Resp:  [12-35] 28  BP: ()/(45-87) 125/79  SpO2:  [93 %-100 %] 96 %  Weight:        Review of Systems:  Negative    Physical Exam:  General appearance: alert, cooperative, no distress   Head: Normocephalic, without obvious abnormality, atraumatic  Neck: no JVD   Lungs: clear to auscultation bilaterally anteriorly  Heart: Irregularly irregular rhythm.  S1, S2 normal.  No murmur or gallop  Extremities: No peripheral edema    Cardiographics (personally reviewed):  "  Telemetry demonstrates atrial fibrillation with controlled ventricular response    Imaging (personally reviewed):   No new cardiac imaging    Lab Results (personally reviewed):     Recent Labs   Lab Test 04/04/23  1504   CHOL 166   HDL 41*   *   TRIG 119     Recent Labs   Lab Test 04/04/23  1504 03/11/22  1453 12/10/20  1419   * 68 116     Recent Labs   Lab Test 11/01/23  2106      POTASSIUM 3.9   CHLORIDE 98   CO2 25   *   BUN 33.6*   CR 2.97*   GFRESTIMATED 15*   SANDIP 8.1*     Recent Labs   Lab Test 11/01/23  2106 11/01/23  1459 11/01/23  0544   CR 2.97* 2.55* 4.42*     Recent Labs   Lab Test 10/30/23  0743   A1C 5.0          Recent Labs   Lab Test 10/31/23  0902   WBC 10.7   HGB 9.6*   HCT 27.3*   MCV 90   *     Recent Labs   Lab Test 10/31/23  0902 10/31/23  0540 10/30/23  0743   HGB 9.6* 9.4* 9.5*    No results for input(s): \"TROPONINI\" in the last 83623 hours.  Recent Labs   Lab Test 10/30/23  1535 04/04/23  1504 03/11/22  1453   BNP  --   --  572*   NTBNPI 12,726*  --   --    NTBNP  --  9,157*  --      Recent Labs   Lab Test 10/29/23  2211   TSH 5.41*     No results for input(s): \"INR\" in the last 70522 hours.       Ana Reeder MD          "

## 2023-11-03 NOTE — PROGRESS NOTES
Physical Therapy        11/03/23 1510   Appointment Info   Signing Clinician's Name / Credentials (PT) Flor Godfrey DPT   Living Environment   People in Home child(rosenda), adult   Current Living Arrangements house   Home Accessibility stairs to enter home;stairs within home   Number of Stairs, Main Entrance 2   Stair Railings, Main Entrance none   Self-Care   Equipment Currently Used at Home cane, straight;shower chair   Activity/Exercise/Self-Care Comment Pt typically ind with all ADLs, son completes most IADLs, recent back injury requiring increased assist with bed mobility and some transfers   General Information   Onset of Illness/Injury or Date of Surgery 10/29/23   Referring Physician Carlos Jones MD   Patient/Family Therapy Goals Statement (PT) none stated   Pertinent History of Current Problem (include personal factors and/or comorbidities that impact the POC) 83 year old female with a history of severe obesity, afib on eliquis, CKD4, GERD, HFpEF, chronic back pain, gout, admitted 10/30 due to acute encephalopathy, found to have anasarca, E coli UTI, acute respiratory failure with hypoxia, acute on chronic kidney injury requiring initiation of hemodialysis, and shock requiring vasopressors.   Existing Precautions/Restrictions fall   Cognition   Cognitive Status Comments cooperative, grossly oriented, blunted affect   Pain Assessment   Patient Currently in Pain No   Integumentary/Edema   Integumentary/Edema Comments all extremities edematous, particularly right UE and left LE   Range of Motion (ROM)   Range of Motion ROM deficits secondary to weakness;ROM deficits secondary to swelling   Strength (Manual Muscle Testing)   Strength (Manual Muscle Testing) Deficits observed during functional mobility   Strength Comments significant diffuse weakness   Bed Mobility   Comment, (Bed Mobility) maxAx2 to roll left from supine to sidelying   Transfers   Comment, (Transfers) minAx2 sit to stand, required HHA    Gait/Stairs (Locomotion)   Assistive Device (Gait) walker, front-wheeled   Distance in Feet (Gait) 3'   Pattern (Gait) step-to   Deviations/Abnormal Patterns (Gait) base of support, wide;tanner decreased;weight shifting decreased   Balance   Balance Comments typically independent with cane; currently requires walker to mainain   Clinical Impression   Criteria for Skilled Therapeutic Intervention Yes, treatment indicated   PT Diagnosis (PT) difficulty walking   Influenced by the following impairments weakness and deconditioning   Functional limitations due to impairments limited household mobility   Clinical Presentation (PT Evaluation Complexity) stable   Clinical Presentation Rationale presents as medically diagnosed   Clinical Decision Making (Complexity) moderate complexity   Planned Therapy Interventions (PT) balance training;bed mobility training;gait training;home exercise program;neuromuscular re-education;patient/family education;stair training;strengthening;transfer training;progressive activity/exercise   PT Total Evaluation Time   PT Eval, Moderate Complexity Minutes (12933) 10   Physical Therapy Goals   PT Frequency Daily   PT Predicted Duration/Target Date for Goal Attainment 11/10/23   PT Goals Bed Mobility;Transfers;Gait;Stairs   PT: Bed Mobility Supervision/stand-by assist;Supine to/from sit   PT: Transfers Supervision/stand-by assist   PT: Gait Supervision/stand-by assist;Rolling walker;50 feet   PT: Stairs Minimal assist;2 stairs   Interventions   Interventions Quick Adds Therapeutic Activity;Therapeutic Procedure   Therapeutic Procedure/Exercise   Ther. Procedure: strength, endurance, ROM, flexibillity Minutes (36948) 9   Symptoms Noted During/After Treatment fatigue   Treatment Detail/Skilled Intervention Seated LE TE, pt fatigues quickly and requires rest breaks between each set. Able to perform 5 reps of each ex with PT demo and VCs for technique / encouragement   Therapeutic Activity    Therapeutic Activities: dynamic activities to improve functional performance Minutes (14936) 8   Treatment Detail/Skilled Intervention maxAx2 to complete sidelying to sit with cues for sequencing and anterior weight shift. Required assistance with foot placement. Once oriented to midline A/P, pt was able to remain sitting with SBA. min-modA sit<>stand with assist of 2, transfer to chair with minAx2, manual and verbal cues for sequencing and hand placement   PT Discharge Planning   PT Plan transfer training, GT as able with chair follow, TE   PT Discharge Recommendation (DC Rec) Transitional Care Facility   PT Rationale for DC Rec considerable deconditioning, requires assistance with all mobility and cares (is typically alone all day at home)   PT Brief overview of current status transfer bed>chair with assist of 2 and FWW   Total Session Time   Timed Code Treatment Minutes 17   Total Session Time (sum of timed and untimed services) 27       Pt was educated on the role of physical therapy in their care, and indicated understanding.      Flor Godfrey, DPT 11/3/2023

## 2023-11-03 NOTE — PLAN OF CARE
Problem: Adult Inpatient Plan of Care  Goal: Plan of Care Review  Outcome: Progressing  Flowsheets (Taken 11/3/2023 1530)  Plan of Care Reviewed With:   patient   family  Patient and family updated at bedside, questions answered, verbalized understanding. Patient A&O, knew month and year, stated that she's in the hospital because her heart and kidneys aren't doing well, knew she was at [facility]. Family report she has a ruptured disc in her low back. Discussed with therapy face-to-face, encourage log rolling. Up to chair with assist x 2, gait belt and FWW.   Reposition q2h. Very fragile skin. Generalized edema +3. Note was left by NOC RN to get WOC  on board.   PT/OT.  Hem/Onc consult.      Problem: Fluid Volume Excess  Goal: Fluid Balance  Outcome: Progressing  Wt Readings from Last 3 Encounters:   11/03/23 115.2 kg (253 lb 15.5 oz)   04/04/23 93.4 kg (206 lb)   06/25/21 98.2 kg (216 lb 6.4 oz)   HD done today. 2.8L removed. Extra HD run tomorrow.   Oliguric. Lerma in place for strict I&O's.   Lg watery BM this afternoon, incontinent.      Problem: Sepsis/Septic Shock  Goal: Blood Glucose Level Within Targeted Range  Outcome: Progressing  BG 62 prior to lunch. She did not have breakfast d/t early HD run. BG corrected with 4 oz of apple juice. Recheck was 71. On clear liquid diet. Very poor appetite. Family fed her and encouraged her to eat/drink, however, patient only had a few bites of jello, a few sips of chicken broth and 25% of soda. No issues with swallowing observed at the bedside.    Goal: Absence of Infection Signs and Symptoms  Outcome: Progressing  A-febrile.   Continue with IV Ceftriaxone.   BP's soft, asymptomatic. Did receive scheduled Midodrine prior to HD and 5mg PRN dose during HD. 1 bag of Albumin was administered during HD.   Temp WNL 97.4  SpO2 92% RA. Denies sob, cp, dizziness. Resting resp shallow, unlabored.  Tele: A-fib, HR 90's. Continue to hold Metoprolol, per cards. On Eliquis. Denies  feeling palpitations.       Goal Outcome Evaluation:      Plan of Care Reviewed With: patient, family

## 2023-11-03 NOTE — PLAN OF CARE
"Goal Outcome Evaluation:             Patient is alert and oriented.  She denied pain while at rest.  Patient was alert to self, though confused to place, time, and situation.  Forgetful and frequently repeats questions.  Opens eyes spontaneously.  Patient was turned every 2 hours, Calmoseptine cream applied to bottom due to redness and bleeding with each cleanup.  Patient had several loose and watery stools over night.  Sacral Mepilex in place, pillows used to keep off bottom and elevate extremities.    Patient had low temperature of 96.3 rectally.  Warm blankets were not helpful, so harsha hugger applied at 0200, removed at 0600 when temperature improved to 97.5.    Lerma output was 100 ml.  Lerma cares completed due to incontinence of stool.    Patient had low blood sugar of 64.  Patient denied  symptoms.  Glucose gel given twice to correct blood sugar up to 99.  Patient only drank sips of apple juice over night.  Able to swallow without difficulty, but had poor intake over all.      Problem: Adult Inpatient Plan of Care  Goal: Plan of Care Review  Description: The Plan of Care Review/Shift note should be completed every shift.  The Outcome Evaluation is a brief statement about your assessment that the patient is improving, declining, or no change.  This information will be displayed automatically on your shift  note.  Outcome: Progressing  Goal: Patient-Specific Goal (Individualized)  Description: You can add care plan individualizations to a care plan. Examples of Individualization might be:  \"Parent requests to be called daily at 9am for status\", \"I have a hard time hearing out of my right ear\", or \"Do not touch me to wake me up as it startles  me\".  Outcome: Progressing  Goal: Absence of Hospital-Acquired Illness or Injury  Outcome: Progressing  Intervention: Identify and Manage Fall Risk  Recent Flowsheet Documentation  Taken 11/3/2023 0001 by Rosa Elena Ching RN  Safety Promotion/Fall Prevention: activity " supervised  Intervention: Prevent Skin Injury  Recent Flowsheet Documentation  Taken 11/3/2023 0600 by Rosa Elena Ching RN  Body Position:   turned   right  Taken 11/3/2023 0400 by Rosa Elena Ching RN  Body Position:   turned   left  Taken 11/3/2023 0200 by Rosa Elena Ching RN  Body Position:   turned   left  Taken 11/3/2023 0001 by Rosa Elena Ching RN  Body Position:   supine   supine, legs elevated  Goal: Optimal Comfort and Wellbeing  Outcome: Progressing  Intervention: Provide Person-Centered Care  Recent Flowsheet Documentation  Taken 11/3/2023 0400 by Rosa Elena Ching RN  Trust Relationship/Rapport:   care explained   reassurance provided  Taken 11/3/2023 0001 by Rosa Elena Ching RN  Trust Relationship/Rapport:   care explained   choices provided  Goal: Readiness for Transition of Care  Outcome: Progressing

## 2023-11-03 NOTE — PROGRESS NOTES
Telemetry check only.  Patient remains in chronic atrial fibrillation with controlled ventricular response.  No high heart rates identified.  At this point continue to hold on starting beta-blocker therapy.  Has not received digoxin since admission.  No further recommendations at this time.  We will sign off.  Feel free to call if further questions.

## 2023-11-03 NOTE — CONSULTS
Consultation - Hematology/Oncology  Mely Alegria,  1940, MRN [id number]    Admitting Dx: Hyperkalemia [E87.5]  Urinary tract infection without hematuria, site unspecified [N39.0]  Acute renal failure superimposed on chronic kidney disease, unspecified acute renal failure type, unspecified CKD stage  [N17.9, N18.9]    PCP: Oscar Hopper, [phone]   Code status:  No CPR- Do NOT Intubate       Extended Emergency Contact Information  Primary Emergency Contact: Gen Airamo  Address: [address] [place] [address] [facility]  Home Phone: [phone]  Mobile Phone: [phone]  Relation: Son  Secondary Emergency Contact: Leta Mckeon  Address: [address] [facility]  Mobile Phone: [phone]  Relation: Daughter       Assessment and Plan     1.  Monoclonal paraproteinemia: Labs reviewed showing a kappa free light chain of 22, protein electrophoresis showing a monoclonal peak of 0.9 g/dL with a serum immunofixation showing an IgG kappa.  Numbers would suggest MGUS.  Generally do not start to consider clinically significant myeloma until the monoclonal peak is around 1.5 g/dL or higher.  She had a CT abdomen and pelvis on  with no evidence of any lytic bone lesions.  Chest x-ray on the same day with no mention of lytic lesions.  She does not have hypercalcemia.  Review of her CBC shows normal hemoglobin's within the 12 range until her admission on .  Hemoglobin was 12.2 on .  I do not think a bone marrow would be likely to show any evidence of myeloma.  If concern for myeloma or light chain related kidney disease (monoclonal gammopathy of renal significance) then would favor of kidney biopsy.  We will follow-up with her after discharge from the hospital.    2.  Anemia and thrombocytopenia: Hypoproliferative with normal reticulocyte count.  Hemoglobin generally in the mid nines since admission.  It has not been  trending down.  Mild, stable thrombocytopenia.  3+ NADEGE from is noted however the LDH is normal which argues against hemolysis.  Haptoglobin, peripheral blood morphology, is pending.  She also has folate deficiency and hypothyroidism which are also contributing. Chronic kidney disease could also be playing a role.  Chronic inflammation also contributing.  Markedly elevated CRP is noted.  At this point I do not think she needs to be started on steroids.  We will check a copper.   Folate and thyroid hormone replacement.       Chief Complaint Urinary tract infection without hematuria, site unspecified       HPI      We have been requested by Dr. Finn to evaluate Mely Alegria who is a 83 year old year old female for Monoclonal paraproteinemia.  The patient is admitted to the hospital UTI with acute on chronic kidney disease.  Also generalized weakness.  She has been seen by cardiology and nephrology.  Nephrology ordered paraprotein evaluation of the serum as apparently the patient's chronic kidney disease does not have a clear etiology.  This is abnormal and hematology was consulted.  This evening she is feeling okay.  She is hoping that she can return home.  Not having any bleeding.  She does have some bruising.  No acute complaints.       Medical History  History reviewed. No pertinent past medical history.   Surgical History  She  has a past surgical history that includes TOTAL HIP ARTHROPLASTY; DILATION/CURETTAGE DIAG/THER NON OB; PART REMOVAL COLON W ANASTOMOSIS; COLOSTOMY; IR CVC Tunnel Placement > 5 Yrs of Age (10/30/2023); and PICC/Midline Placement (10/30/2023).   Social History  Reviewed, and she  reports that she has never smoked. She has never used smokeless tobacco.   Allergies  Allergies   Allergen Reactions    Allopurinol Diarrhea    Lisinopril Cough     initiated 5/14/1996 and stopped      Family History  Reviewed, and family history includes Diabetes in her mother.   Psychosocial Needs  Social History      Social History Narrative    Not on file     Additional psychosocial needs reviewed per nursing assessment.     Prior to Admission Medications   Medications Prior to Admission   Medication Sig Dispense Refill Last Dose    baclofen (LIORESAL) 10 MG tablet Take 1 tablet (10 mg) by mouth 3 times daily as needed for muscle spasms 20 tablet 1 Unknown at prn    calcium citrate-vitamin D (CITRACAL) 315-200 MG-UNIT TABS per tablet Take 1 tablet by mouth daily   Unknown at unknown    cholecalciferol, vitamin D3, 50 mcg (2,000 unit) Tab [CHOLECALCIFEROL, VITAMIN D3, 50 MCG (2,000 UNIT) TAB] Take 1 tablet (2,000 Units total) by mouth daily. 100 each 3 Unknown at unknown    febuxostat (ULORIC) 40 MG TABS tablet Take 1 tablet (40 mg) by mouth daily 30 tablet 11 Unknown at unknown    furosemide (LASIX) 40 MG tablet Take 60 mg by mouth daily Take one and half tablet (60 mg ) daily   10/28/2023 at am    [] HYDROcodone-acetaminophen (NORCO) 5-325 MG tablet Take 1 tablet by mouth every 8 hours as needed for severe pain 15 tablet 0 Unknown at prn    losartan (COZAAR) 100 MG tablet Take 1 tablet (100 mg) by mouth daily 90 tablet 11 10/29/2023 at am    omeprazole (PRILOSEC) 20 MG DR capsule Take 1 capsule (20 mg) by mouth daily 90 capsule 3 10/29/2023 at am    probenecid (BENEMID) 500 MG tablet Take 2 tablets (1,000 mg) by mouth every morning AND 1 tablet (500 mg) every evening. 270 tablet 3 Unknown at unknown    rivaroxaban ANTICOAGULANT (XARELTO ANTICOAGULANT) 15 MG TABS tablet Take 1 tablet (15 mg) by mouth daily (with dinner) 90 tablet 3 10/28/2023 at am    topiramate (TOPAMAX) 25 MG tablet Take 1 tablet (25 mg) by mouth 2 times daily 180 tablet 3 10/29/2023 at am    BETA BLOCKER NOT PRESCRIBED (INTENTIONAL) Please choose reason not prescribed from choices below.             Review of Systems:    As above in the history.     Review of Systems otherwise Negative for:  General: chills, fever or night sweats  Psychological:  anxiety or depression  Ophthalmic: blurry vision, double vision or loss of vision, vision change  ENT: epistaxis, oral lesions, hearing changes  Hematological and Lymphatic: bleeding, aundice, swollen lymph nodes  Endocrine: hot flashes, unexpected weight changes  Respiratory: cough, hemoptysis, orthopnea  Cardiovascular: chest pain, palpitations or PND  Gastrointestinal: abdominal pain, blood in stools, change in bowel habits, constipation, diarrhea or nausea/vomiting  Genito-Urinary: change in urinary stream, incontinence, frequency/urgency  Musculoskeletal: joint pain, stiffness, swelling, muscle pain or weakness  Neurological: dizziness, headaches, numbness/tingling  Dermatological: lumps and rash    ECOG performance status is 3    Physical Exam:    Vitals:    11/03/23 1145 11/03/23 1150 11/03/23 1200 11/03/23 1530   BP: 91/54 90/58 90/57 131/70   Pulse: 97 96 88    Resp:   18 19   Temp:   97.4  F (36.3  C) 97.3  F (36.3  C)   TempSrc:   Oral Oral   SpO2: 92% 92% 91%    Weight:       Height:         General: patient appears stated age of 83 year old. Nontoxic and in no distress.   HEENT: Head: atraumatic, normocephalic. Sclerae anicteric.  Chest:  Normal respiratory effort  Cardiac: She has significant edema in her arms and legs.  Abdomen: abdomen is non-distended  Extremities: normal tone and muscle bulk.  Skin: no lesions or rash. Warm and dry.   CNS: alert and oriented. Grossly non-focal.   Psychiatric: normal mood and affect.        Pertinent Labs:    Lab Results: personally reviewed.   Lab Results   Component Value Date     11/03/2023    CO2 25 11/03/2023    CO2 19 03/11/2022    BUN 40.5 11/03/2023    BUN 20 03/11/2022     Lab Results   Component Value Date    WBC 8.5 11/03/2023    HGB 9.5 11/03/2023    HCT 27.4 11/03/2023    MCV 90 11/03/2023    PLT 96 11/03/2023            Pertinent Radiology  Radiology Results: Personally reviewed image/s and Personally reviewed impression/s    No results  found.

## 2023-11-03 NOTE — PROGRESS NOTES
HEMODIALYSIS TREATMENT SUMMARY:    Treatment Time: 3.5 hrs    Fluid Removed: 2.8 L    K Bath: 3     Access: Right Chest CVC    Access Complications: No access flow issues. No signs or symptoms of infection.    Treatment Complications: Pt tolerated treatment well. Given Midodrine and Albumin as ordered for hypotension with good effect.    Report Given To: KIKA Martin RN    Dialysis Nurse: LIZANDRO Peck RN

## 2023-11-03 NOTE — PROGRESS NOTES
11/03/23 1500   Appointment Info   Signing Clinician's Name / Credentials (OT) Magalis Brand OTR/L OTD   Living Environment   People in Home child(rosenda), adult   Current Living Arrangements house   Home Accessibility stairs to enter home;stairs within home   Living Environment Comments Tub/shower with shower chair, lives on main level, does not typically go to basement   Self-Care   Equipment Currently Used at Home cane, straight;shower chair   Activity/Exercise/Self-Care Comment Pt typically ind with all ADLs, son completes most IADLs, recent back injury requiring increased assist with bed mobility and some transfers   General Information   Onset of Illness/Injury or Date of Surgery 10/29/23   Referring Physician August Brothers DO   Patient/Family Therapy Goal Statement (OT) To get stronger   Additional Occupational Profile Info/Pertinent History of Current Problem 83F with morbid obesity, afib on eliquis, CKD4, HFpEF, chronic back pain, admitted 10/30 due to AMS. Transferred to ICU with worsening hypoxemic respiratory failure and hypotension requiring vasopressors.   Existing Precautions/Restrictions fall   General Observations and Info Family reports recent disc/back injury, has been getting cortisone shots   Cognitive Status Examination   Orientation Status person;place;time   Affect/Mental Status (Cognitive)   (min confusion)   Follows Commands WFL;increased processing time needed;repetition of directions required   Visual Perception   Visual Impairment/Limitations WFL   Posture   Posture not impaired   Range of Motion Comprehensive   General Range of Motion bilateral upper extremity ROM WFL   Strength Comprehensive (MMT)   Comment, General Manual Muscle Testing (MMT) Assessment Min generalized weakness from prolonged bedrest   Bed Mobility   Bed Mobility supine-sit   Supine-Sit [place] (Bed Mobility) maximum assist (25% patient effort);2 person assist   Assistive Device (Bed Mobility) bed rails;draw  sheet   Transfers   Transfers sit-stand transfer;toilet transfer   Sit-Stand Transfer   Sit-Stand [place] (Transfers) moderate assist (50% patient effort);2 person assist   Toilet Transfer   [place] Level (Toilet Transfer) unable to assess   Toilet Transfer Comments Per clinical judgement mod A   Balance   Balance Assessment standing dynamic balance   Standing Balance: Dynamic minimal assist   Activities of Daily Living   BADL Assessment/Intervention lower body dressing;toileting   Lower Body Dressing Assessment/Training   [place] Level (Lower Body Dressing) maximum assist (25% patient effort)   Toileting   [place] Level (Toileting) maximum assist (25% patient effort)   Clinical Impression   Criteria for Skilled Therapeutic Interventions Met (OT) Yes, treatment indicated   OT Diagnosis Decreased ind with ADLs and safety   Influenced by the following impairments UTI, acute renal failure   OT Problem List-Impairments impacting ADL activity tolerance impaired;cognition;balance;strength;mobility   Assessment of Occupational Performance 3-5 Performance Deficits   Identified Performance Deficits dressing, toileting, bathing, fxl mobility/transfers, cognition   Planned Therapy Interventions (OT) ADL retraining;balance training;bed mobility training;cognition;strengthening;transfer training;progressive activity/exercise   Clinical Decision Making Complexity (OT) detailed assessment/moderate complexity   Risk & Benefits of therapy have been explained evaluation/treatment results reviewed;care plan/treatment goals reviewed;risks/benefits reviewed;current/potential barriers reviewed;patient   OT Total Evaluation Time   OT Eval, Moderate Complexity Minutes (71151) 10   OT Goals   Therapy Frequency (OT) Daily   OT Predicted Duration/Target Date for Goal Attainment 11/10/23   OT Goals Hygiene/Grooming;Toilet Transfer/Toileting;Lower Body Dressing;Cognition   OT: Hygiene/Grooming supervision/stand-by  assist;while standing   OT: Lower Body Dressing Modified independent;using adaptive equipment   OT: Toilet Transfer/Toileting Modified independent;toilet transfer;cleaning and garment management   OT: Cognitive Patient/caregiver will verbalize understanding of cognitive assessment results/recommendations as needed for safe discharge planning   Self-Care/Home Management   Self-Care/Home Mgmt/ADL, Compensatory, Meal Prep Minutes (95452) 8   Symptoms Noted During/After Treatment (Meal Preparation/Planning Training) fatigue   Treatment Detail/Skilled Intervention Evaluation completed, treatment initated. Pt tolerated sitting EOB ~ 5 minutes, progressed to SBA with cueing for hand placement and repositioning. Max A to don socks, difficulty with forward flexion. STS mod A x 2, once standing min A-CGA x 2. Mod cueing for walker and LE advancement to chair, increased time required for processing of cues and directions. Left with PT in bedside chair, alarm on.   OT Discharge Planning   OT Plan Spinal precautions for disc injury (prior to hospitalization), toileting, LB dressing with AE as needed, monitor cognition   OT Discharge Recommendation (DC Rec) Transitional Care Facility   OT Rationale for DC Rec Pt currently requires A x 1-2 for ADLs and mobility, recommend TCU to progress ind with ADLs, strength, and safety   OT Brief overview of current status Min A x 2 transfer to chair   Total Session Time   Timed Code Treatment Minutes 8   Total Session Time (sum of timed and untimed services) 18

## 2023-11-03 NOTE — PHARMACY-VANCOMYCIN DOSING SERVICE
Pharmacy Vancomycin Note  Date of Service November 3, 2023  Patient's  1940   83 year old, female    Indication: Sepsis  Day of Therapy: 4  Current vancomycin regimen: vancomycin intermittent dosing strategy  Current vancomycin monitoring method: Renal Replacement Therapy  Current vancomycin therapeutic monitoring goal: 15-20 mg/L    Current estimated CrCl = Estimated Creatinine Clearance: 14.1 mL/min (A) (based on SCr of 3.62 mg/dL (H)).    Creatinine for last 3 days  10/31/2023:  3:03 PM Creatinine 3.86 mg/dL;  9:44 PM Creatinine 4.18 mg/dL  2023:  5:44 AM Creatinine 4.42 mg/dL;  2:59 PM Creatinine 2.55 mg/dL;  9:06 PM Creatinine 2.97 mg/dL  11/3/2023:  5:57 AM Creatinine 3.62 mg/dL    Recent Vancomycin Levels (past 3 days)  2023:  5:44 AM Vancomycin 16.8 ug/mL  11/3/2023:  5:57 AM Vancomycin 20.3 ug/mL    Vancomycin IV Administrations (past 72 hours)                     vancomycin (VANCOCIN) 1,250 mg in sodium chloride 0.9 % 250 mL intermittent infusion (mg) 1,250 mg New Bag 23 1650                    Nephrotoxins and other renal medications (From now, onward)      Start     Dose/Rate Route Frequency Ordered Stop    23 1700  vancomycin (VANCOCIN) 1,000 mg in 200 mL dextrose intermittent infusion         1,000 mg  200 mL/hr over 1 Hours Intravenous ONCE 23 1119      23 1111  vancomycin place taylor - receiving intermittent dosing         1 each Intravenous SEE ADMIN INSTRUCTIONS 23 1112                 Contrast Orders - past 72 hours (72h ago, onward)      None            Interpretation of levels and current regimen:  Vancomycin level is reflective of therapeutic level    Plan:  Plan to give Vancomycin 1000 mg IV once after dialysis today.  Vancomycin monitoring method: Renal Replacement Therapy  Vancomycin therapeutic monitoring goal: 15-20 mg/L  Pharmacy will check vancomycin levels as appropriate in 1-3 Days.  Serum creatinine levels will be ordered daily for the  first week of therapy and at least twice weekly for subsequent weeks.    Keely Nguyen, [facility]

## 2023-11-03 NOTE — PROGRESS NOTES
Renal progress note  CC:HAMMAD, Advanced CKD , likely ESRD  Assessment and Plan:  83 year old female with a hx of CKD 4, progressed to stage 5 in labs 4/2023 , no prior nephrology follow ups , Diastolic HF, HTN , Chronic Atrial fib with RVR , back pain and morbid obesity, admitted with AMS, with possible UTI , Hyperkalemia , acidosis , weakness and hypervolemia with anuria. She received lasix IV wo any response. Nephrology consulted for HAMMAD and anuria     CKD 5, likely ESRD  HAMMAD with anuria  Baseline with some progressive disease was at CKD 4 with creat 1.6-2 and GFR 25-<30 in 2022 labs and this yr labs in 4/2023 with GFR 11-12 and Creatinine in 3.1-3.7  CT 10/30/23 and renal US 10/12/2023 with rt kidney 6.5cm and left 8.2cm and cortical thinning with likely advanced renal disease, simple cysts  UA with dirty appearing urine , Cx Ecoli  -- CKD with unclear etiology, has had a progressive course with significant nephrosclerosis on imaging, unclear if a kidney biopsy will provide any additional information besides scarring  -- Serological work-up including paraproteins ordered; has a IgG Kappa LC , with a small Mspike as well, FLC acceptable for ESRD  Will get oncology input,  -- Tunneled CVC placed 10/30/23; needing dialysis with hyperkalemia acidemia and and urea despite diuretic challenge  -- We will follow on BMP daily with HD now  -- initiated HD 10/31 and tolerated well , will continue on MWF schedule  Plan for UF treatment tomorrow  -- We will follow on improvement with hemodialysis and UF over the next few days and decide on future goals of care patient is likely to be dialysis dependent at this time and will need outpatient placement once stability on dialysis is determined  --> case management referral for dialysis placement , will [facility] for [facility]     Hypervolemia  CHF exacerbation  Likely secondary to poor renal functions  Attempted diuresis with Lasix followed by Lasix infusion and Diuril  250x1  Near anuric with UO ~200/day at best  --> dc Lasix infusion  --> Plan for UF with HD as tolerated    Hypotension with shock state  On Phenyleph  Fluid up with mostly edema and now on NC O2   --> plans per primary     Hyperkalemia  Likely secondary to renal failure and severe acidosis, full dose ARB at home  Bl potassium is stable     Acidemia  Likely hyperchloremic metabolic acidosis suggestive of possible underlying RTA  Unclear etiology  Discontinue isotonic bicarb  -- correct with HD     Anemia  Baseline around 12-13  Hemoglobin down to 9.5 with a drop in platelet  Keep hemoglobin above 8 transfuse as needed  We will follow on iron studies  Likely ACD/inflammatory anemia    MGUS  Monoclonal peak 0.9 , with IgG Kappa LC elevated  Will have oncology input     Ecoli  UTI  Cultures pending  Diffuse skin edema noted on CT possibly in the right flank and left flank area questioning for cellulitis like finding,   On Vanco, Zosyn  for possible sepsis  Follow on cultures to direct antibiotic therapy    Shock state, likely circulatory shock with sepsis  Management per ICU     Acute metabolic encephalopathy  Likely UTI/ uremia  Follow on ABG     History of atrial fibrillation with RVR  on digoxin per cardiology  Will need to monitor levels especially with severe renal failure  AC with Xarelto chronically     Gout on probenecid     Chronic back pain on baclofen and Norco  Was due to get a cortisone shot in her back  Plans per primary       Thank you for the consultation we will follow  Keri Finn MD  [facility]  [phone]      Subjective  Mentation appears a lot better after dialysis   Seen on dialysis today initially on dialysis blood pressures were a little low UF goal was reduced at that time improved after albumin dose now stable around 110  Increase UF goals again after discussion with dialysis RN aim for about 3 L net negative advised to use midodrine as needed during treatment as  well  Will get a UF treatment tomorrow  Urine output remains negligible    objective    Vital signs in last 24 hours  Temp:  [96.1  F (35.6  C)-98.1  F (36.7  C)] 97.4  F (36.3  C)  Pulse:  [] 97  Resp:  [14-20] 20  BP: ()/(52-66) 91/54  SpO2:  [92 %-97 %] 92 %  Weight:   [unfilled]    Intake/Output last 3 shifts  I/O last 3 completed shifts:  In: 410 [P.O.:210; I.V.:200]  Out: 245 [Urine:245]  Intake/Output this shift:  I/O this shift:  In: -   Out: 100 [Urine:100]    Physical Exam   arousable , NAD  CV: irregular tachy without murmur or rub  Lung: clear and equal; no extra sounds  Ab: soft and NT; not distended; normal bs  Ext: + edema and well perfused  Skin; no rash    Pertinent Labs   Lab Results   Component Value Date    WBC 8.5 11/03/2023    HGB 9.5 (L) 11/03/2023    HCT 27.4 (L) 11/03/2023    MCV 90 11/03/2023    PLT 96 (L) 11/03/2023     Lab Results   Component Value Date    BUN 40.5 (H) 11/03/2023     11/03/2023    CO2 25 11/03/2023       Lab Results   Component Value Date    ALBUMIN 2.9 (L) 11/03/2023     Lab Results   Component Value Date    PHOS 4.4 11/03/2023     I reviewed all lab results  Keri Finn MD

## 2023-11-03 NOTE — PROGRESS NOTES
"Care Management Follow Up    Length of Stay (days): 4    Expected Discharge Date: 11/07/2023     Concerns to be Addressed:     Care progression  Patient plan of care discussed at interdisciplinary rounds: Yes    Anticipated Discharge Disposition:  TBD     Anticipated Discharge Services:  TBD  Anticipated Discharge DME:  [facility]    Patient/family educated on Medicare website which has current facility and service quality ratings:  NA  Education Provided on the Discharge Plan:  Yes per team  Patient/Family in Agreement with the Plan:  NA    Referrals Placed by CM/SW:  NA  Private pay costs discussed: Not applicable    Additional Information:  Met patient at bedside to discuss [facility] placement.     Per provider, Dr. Saavedra with [facility] [phone], she has put a hold for a chair at [facility] for MWF at 8:20 AM with start date Fri 11/10/23.  Patient will be here until Wed 11/8 and can leave after dialysis.    Started [facility] placement portal. Vilma Bradley has been assigned to the referral request.    Met patient's son Tatyana and gave update.    Social Hx: \"Lives with son, Tatyana. Tatyana works full-time. Initiated dialysis on 10/30. Started [facility] placement for [facility]. PT/OT orders placed for when pt more medically stable.\"    RNCM to follow for medical progression, recommendations, and final discharge plan.     Jennifer Westfall RN     "

## 2023-11-04 NOTE — PROGRESS NOTES
Renal progress note  CC:HAMMAD, Advanced CKD , likely ESRD  Assessment and Plan:  83 year old female with a hx of CKD 4, progressed to stage 5 in labs 4/2023 , no prior nephrology follow ups , Diastolic HF, HTN , Chronic Atrial fib with RVR , back pain and morbid obesity, admitted with AMS, with possible UTI , Hyperkalemia , acidosis , weakness and hypervolemia with anuria. She received lasix IV wo any response. Nephrology consulted for HAMMAD and anuria     CKD 5, likely ESRD  HAMMAD with anuria  Baseline with some progressive disease was at CKD 4 with creat 1.6-2 and GFR 25-<30 in 2022 labs and this yr labs in 4/2023 with GFR 11-12 and Creatinine in 3.1-3.7  CT 10/30/23 and renal US 10/12/2023 with rt kidney 6.5cm and left 8.2cm and cortical thinning with likely advanced renal disease, simple cysts  UA with dirty appearing urine , Cx Ecoli  -- CKD with unclear etiology, has had a progressive course with significant nephrosclerosis on imaging, unclear if a kidney biopsy will provide any additional information besides scarring  -- Serological work-up including paraproteins ordered; has a IgG Kappa LC , with a small Mspike as well, FLC acceptable for ESRD  Will get oncology input, very low if any suspicion for MGRS  -- Tunneled CVC placed 10/30/23; needing dialysis with hyperkalemia acidemia and and urea despite diuretic challenge  -- We will follow on BMP daily with HD now  -- initiated HD 10/31 and tolerated well , will continue on MWF schedule  Seen on UF treatment today tolerating 3 L UF pretty well with stable hemodynamics  We will continue dialysis on Monday Wednesday Friday schedule  Will have placement at [facility] at the time of discharge  We will discuss with primary team regarding discharge disposition based on PT Eligio looks like the patient will need a TCU placement  Has great family support the family has been contacted by home [facility] unit, will follow after education for possible PD catheter  placement and switching to home modalities    -- We will follow on improvement with hemodialysis and UF over the next few days and decide on future goals of care patient is likely to be dialysis dependent at this time and will need outpatient placement once stability on dialysis is determined     Hypervolemia  CHF exacerbation  Likely secondary to poor renal functions  Attempted diuresis with Lasix followed by Lasix infusion and Diuril 250x1  Near anuric with UO ~200/day at best  --> dc Lasix infusion  --> Plan for UF with HD as tolerated    Hypotension with shock state  On Phenyleph  Fluid up with mostly edema and now on NC O2   --> plans per primary     Hyperkalemia  Likely secondary to renal failure and severe acidosis, full dose ARB at home  Bl potassium is stable     Acidemia  Likely hyperchloremic metabolic acidosis suggestive of possible underlying RTA  Unclear etiology  Discontinue isotonic bicarb  -- correct with HD     Anemia  Baseline around 12-13  Hemoglobin down to 9.5 with a drop in platelet  Keep hemoglobin above 8 transfuse as needed  We will follow on iron studies  Likely ACD/inflammatory anemia    MGUS  Monoclonal peak 0.9 , with IgG Kappa LC elevated  Will have oncology input     Ecoli  UTI  Cultures pending  Diffuse skin edema noted on CT possibly in the right flank and left flank area questioning for cellulitis like finding,   On Vanco, Zosyn  for possible sepsis  Follow on cultures to direct antibiotic therapy    Shock state, likely circulatory shock with sepsis  Management per ICU     Acute metabolic encephalopathy  Likely UTI/ uremia  Follow on ABG     History of atrial fibrillation with RVR  on digoxin per cardiology  Will need to monitor levels especially with severe renal failure  AC with Xarelto chronically     Gout on probenecid     Chronic back pain on baclofen and Norco  Was due to get a cortisone shot in her back  Plans per primary       Thank you for the consultation we will  follow  Keri Finn MD  [facility]  [phone]      Subjective  Mentation appears a lot better after dialysis   Seen on the UF treatment on hemodialysis tolerating well hemodynamics appear a lot stable now  Patient reporting her appetite is improving she would like to eat a pizza with sausage  Diet currently appears to be thick, will see if with improving alertness if the patient can actually have solid food and can eat the requested pizza  Next Alysis treatment on Monday the patient appears pretty stable and has been shown remarkable improvement in her hemodynamics and overall clinical picture  Urine output remains negligible    objective    Vital signs in last 24 hours  Temp:  [97.3  F (36.3  C)-97.8  F (36.6  C)] 97.8  F (36.6  C)  Pulse:  [] 79  Resp:  [16-37] 18  BP: ()/(52-78) 130/70  SpO2:  [91 %-95 %] 95 %  Weight:   [unfilled]    Intake/Output last 3 shifts  I/O last 3 completed shifts:  In: 678 [P.O.:560; I.V.:118]  Out: 2950 [Urine:150; Other:2800]  Intake/Output this shift:  I/O this shift:  In: -   Out: 75 [Urine:75]    Physical Exam   arousable , NAD  CV: irregular tachy without murmur or rub  Lung: clear and equal; no extra sounds  Ab: soft and NT; not distended; normal bs  Ext: + edema and well perfused  Skin; no rash    Pertinent Labs   Lab Results   Component Value Date    WBC 7.5 11/04/2023    HGB 9.6 (L) 11/04/2023    HCT 27.9 (L) 11/04/2023    MCV 91 11/04/2023     (L) 11/04/2023     Lab Results   Component Value Date    BUN 23.3 (H) 11/04/2023     11/04/2023    CO2 27 11/04/2023       Lab Results   Component Value Date    ALBUMIN 3.3 (L) 11/04/2023     Lab Results   Component Value Date    PHOS 4.4 11/03/2023     I reviewed all lab results  Keri Finn MD

## 2023-11-04 NOTE — PROGRESS NOTES
"Speech-Language Pathology: Clinical Swallow Evaluation     11/04/23 1500   Appointment Info   Signing Clinician's Name / Credentials (SLP) Tina Valladares MA, CCC-SLP   General Information   Onset of Illness/Injury or Date of Surgery 10/29/23   Referring Physician Dr. Leslie   Patient/Family Therapy Goal Statement (SLP) To eat pizza   Pertinent History of Current Problem Per ordering provider \"83 year old female with a history of severe obesity, afib on eliquis, CKD4, GERD, HFpEF, chronic back pain, gout, admitted 10/30 due to acute encephalopathy, found to have anasarca, E coli UTI, acute respiratory failure with hypoxia, acute on chronic kidney injury requiring initiation of hemodialysis, and shock requiring vasopressors. \".   General Observations Patient is alert and cooperative, family present.   Type of Evaluation   Type of Evaluation Swallow Evaluation   Oral Motor   Oral Musculature generally intact   Mucosal Quality good   Dentition (Oral Motor)   Dentition (Oral Motor) dental appliance/dentures   Dental Appliance/Denture (Oral Motor) upper  (Has lower but does not wear when eating)   Facial Symmetry (Oral Motor)   Facial Symmetry (Oral Motor) WNL   Lip Function (Oral Motor)   Lip Range of Motion (Oral Motor) WNL   Lip Strength (Oral Motor) WNL   Tongue Function (Oral Motor)   Tongue Strength (Oral Motor) WNL   Tongue Coordination/Speed (Oral Motor) WNL   Tongue ROM (Oral Motor) WNL   Jaw Function (Oral Motor)   Jaw Function (Oral Motor) WNL   Cough/Swallow/Gag Reflex (Oral Motor)   Soft Palate/Velum (Oral Motor) WNL   Volitional Throat Clear/Cough (Oral Motor) WNL   Volitional Swallow (Oral Motor) WNL   Vocal Quality/Secretion Management (Oral Motor)   Vocal Quality (Oral Motor) WNL   Secretion Management (Oral Motor) WNL   General Swallowing Observations   Past History of Dysphagia None per EMR.   Comment, General Swallowing Observations Patient denies coughing or choking when eating and drinking. See " "esophageal phase for more info.   Current Diet/Method of Nutritional Intake (General Swallowing Observations, NIS) slightly thick liquids (level 1);soft and bite-sized (dysphagia advanced) (level 6)   Swallowing Evaluation Clinical swallow evaluation   Clinical Swallow Evaluation   Clinical Swallow Evaluation Textures Trialed thin liquids;solid foods   Clinical Swallow Eval: Thin Liquid Texture Trial   Mode of Presentation, Thin Liquids cup;straw;self-fed   Volume of Liquid or Food Presented 4oz   Oral Phase of Swallow WFL   Pharyngeal Phase of Swallow intact   Diagnostic Statement Sensation of fullness after 2 sips. Pt took a break until it went away then started again.   Clinical Swallow Evaluation: Solid Food Texture Trial   Mode of Presentation self-fed   Volume Presented x3 crackers   Oral Phase WFL   Pharyngeal Phase intact   Diagnostic Statement Sensation of fullness returned after eating crackers, pt requested a break.   Esophageal Phase of Swallow   Patient reports or presents with symptoms of esophageal dysphagia Yes   Esophageal comments Pt reports difficulty swallowing d/t sensation of \"fullness\" in her chest. She has GERD and takes a PPI at home. She reports feeling like she needs to belch to improve sensation. Patient's son reports that she frequently drinks soda before bed to help her belch.   Swallowing Recommendations   Diet Consistency Recommendations thin liquids (level 0);regular diet   Supervision Level for Intake patient independent   Mode of Delivery Recommendations bolus size, small;slow rate of intake   Recommended Feeding/Eating Techniques (Swallow Eval) maintain upright posture during/after eating for 30 minutes   Medication Administration Recommendations, Swallowing (SLP) One at a time   Instrumental Assessment Recommendations   (Esophagram)   Comment, Swallowing Recommendations Patient is okay for regular and thin diet with GERD precautions.   General Therapy Interventions   Planned " Therapy Interventions Dysphagia Treatment   Dysphagia treatment Instruction of safe swallow strategies   Clinical Impression   Criteria for Skilled Therapeutic Interventions Met (SLP Eval) Yes, treatment indicated   Risks & Benefits of therapy have been explained evaluation/treatment results reviewed;care plan/treatment goals reviewed;current/potential barriers reviewed;participants voiced agreement with care plan;participants included;patient;daughter;son   Clinical Impression Comments Clinical Swallow Evaluation completed. Patient had no s/s aspiration and no signs of dysphagia. Oral motor function was WNL. Mastication was WNL. Hyolaryngeal elevation appears intact. Patient's symptoms appear to be esophageal, recommend esophagram. Recommend diet of regular and thin with GERD precautions. Pt should be up in the chair for all meals, eat at a slow rate, and take small bites/sips.   SLP Total Evaluation Time   Eval: oral/pharyngeal swallow function, clinical swallow Minutes (83077) 25   SLP Goals   Therapy Frequency (SLP Eval) 3 times/week   SLP Predicted Duration/Target Date for Goal Attainment 11/10/23   SLP Goals Swallow   SLP: Safely tolerate diet without signs/symptoms of aspiration Regular diet;Thin liquids;With use of swallow precautions;Independently   SLP Discharge Planning   SLP Plan f/u after esophagram, provide GERD handout.   SLP Discharge Recommendation   (Per treatment team)   SLP Rationale for DC Rec No anticipated SLP services at d/c   SLP Brief overview of current status  Recommend diet of regular and thin with GERD precautions. Pt should be up in the chair for all meals, eat at a slow rate, and take small bites/sips.

## 2023-11-04 NOTE — PROGRESS NOTES
[facility]    Medicine Progress Note - Hospitalist Service    Date of Admission:  10/29/2023    Assessment & Plan   83 year old female with a history of severe obesity, afib on eliquis, CKD4, GERD, HFpEF, chronic back pain, gout, admitted 10/30 due to acute encephalopathy, found to have anasarca, E coli UTI, acute respiratory failure with hypoxia, acute on chronic kidney injury requiring initiation of hemodialysis, and shock requiring vasopressors.     Acute hypoxic respiratory failure, improved  Staph aureus bronchitis/pneumonia  -  - Titrate FiO2  - On ceftriaxone/vancomycin.  - Follow cultures     HAMMAD with anuria on superimposed CKD stage V with progression to end-stage renal disease:  - Status post tunneled dialysis catheter placement on 10/30/2023.  - Hemodialysis initiated 10/31.  - On Monday Wednesday Friday regimen.  - Nephrology assistance appreciated  -Monitor labs    Circulatory shock: Possible sepsis versus severe acute decompensated heart failure in the setting of atrial fibrillation with rapid ventricular response  - Continue current antimicrobials.  - Shock resolved and remains off pressors  - Monitor hemodynamics    HFp EF: With ADHF  - Volume being managed with hemodialysis.  - Cardiology following.  - Strict intake and output and daily weights.  - TTE on 10/30/2023 showed LVEF 50-55%, no significant valvular heart disease.  Biatrial enlargement.    Chronic atrial fibrillation with rapid ventricular response: Rate controlled currently on no AV rajni blocking drugs.  Once hemodynamically stabilized from above noted issues did receive brief amiodarone and digoxin which cardiology no longer has continued.  Patient remains off all AV node blocking agents at this time and heart rates remain controlled.  Continue apixaban.  Cardiology signed off.  - Telemetry  Eliquis    Acute encephalopathy:  Suspect toxic/metabolic and appears slightly to be clearing.  -If still persist by  "tomorrow obtain brain MRI without contrast    thrombocytopenia:  CBC a.m.    Normocytic anemia: No evidence of acute blood loss.  Hemoglobin 9.5.    DM 2: Hypoglycemia this AM.  -Monitor Accu-Cheks  -On sliding scale insulin per glucose parameter.  - Hypoglycemic protocol    MGUS:  - Hematology/oncology consult appreciated    Severe deconditioning and weakness  - PT/OT.  - We will need TCU        Diet: Soft & Bite Sized Diet (level 6) Slightly Thick (level 1)  Room Service    DVT Prophylaxis: DOAC  Lerma Catheter: PRESENT, indication: Retention;Strict 1-2 Hour I&O  Lines: PRESENT      PICC 10/30/23 Triple Lumen Right Brachial vein medial-Site Assessment: WDL  CVC Right Subclavian Tunneled-Site Assessment: WDL      Cardiac Monitoring: ACTIVE order. Indication: Electrolyte Imbalance (24 hours)- Magnesium <1.3 mg/ml; Potassium < =2.8 or > 5.5 mg/ml  Code Status: No CPR- Do NOT Intubate      Clinically Significant Risk Factors            # Hypomagnesemia: Lowest Mg = 1.5 mg/dL in last 2 days, will replace as needed   # Hypoalbuminemia: Lowest albumin = 2.9 g/dL at 11/3/2023  5:57 AM, will monitor as appropriate   # Thrombocytopenia: Lowest platelets = 96 in last 2 days, will monitor for bleeding   # Hypertension: Noted on problem list  # Acute heart failure with preserved ejection fraction: heart failure noted on problem list, last echo with EF >50%, and receiving IV diuretics       # Severe Obesity: Estimated body mass index is 46.45 kg/m  as calculated from the following:    Height as of this encounter: 1.575 m (5' 2\").    Weight as of this encounter: 115.2 kg (253 lb 15.5 oz).             Disposition Plan      Expected Discharge Date: 11/08/2023        Discharge Comments: DC after HD            August Leslie DO, DO  Hospitalist Service  [facility]  Securely message with Tweet Category (more info)  Text page via [employer] Paging/Directory "   ______________________________________________________________________    Interval History   Reviewed    Physical Exam   Vital Signs: Temp: 97.3  F (36.3  C) Temp src: Oral BP: 131/70 Pulse: 88   Resp: 19 SpO2: 91 % O2 Device: None (Room air)    Weight: 253 lbs 15.52 oz  General: Nontoxic  CV: RRR, mild tachycardia with slight irregularity at times  Lungs decreased bases, no wheezing  Abdomen bowel sounds positive, nondistended  Neuro alert and oriented    Labs/imaging studies reviewed.

## 2023-11-04 NOTE — PLAN OF CARE
Problem: Adult Inpatient Plan of Care  Goal: Plan of Care Review  Outcome: Progressing  Flowsheets (Taken 11/4/2023 1329)  Plan of Care Reviewed With:   patient   family  Overall Patient Progress: improving  PT/OT/ST following. Writer does not see any signs of difficulty swallowing. Discussed with ST this morning. Able to feed herself today with set up assist. Tolerating whole meds 1 at a time with thin liquids. HOB >90 degrees with PO intake, encourage up to chair for meals.   Extra HD run done this morning, tolerated removal of 3L. BP remained stable.  Family at bedside attentive and supportive. Questions answered.   Anticipated discharge to TCU in a few days. Awaiting set up with [facility] for OP HD MWF. CM following, see note.   Reposition q2h. WOC consult order in place, will likely not be seen until Mon or Tues.   Incontinent with BM, stool remains watery.   Flu shot administered this morning. No reactions.    Problem: Risk for Delirium  Goal: Improved Attention and Thought Clarity  Outcome: Progressing  Intervention: Maximize Cognitive Function  Recent Flowsheet Documentation  Taken 11/4/2023 1100 by Gini Martin RN  Sensory Stimulation Regulation: care clustered  Reorientation Measures:   calendar in view   clock in view   familiar social contact encouraged   reorientation provided  Improving. Much more alert and conversive today. O x 4 with occasional forgetfulness, easily redirected.       Problem: Fluid Volume Excess  Goal: Fluid Balance  Outcome: Progressing  Intervention: Monitor and Manage Hypervolemia  Recent Flowsheet Documentation  Taken 11/4/2023 1100 by Gini Martin, RN  Skin Protection:   adhesive use limited   drying agents applied   incontinence pads utilized   silicone foam dressing in place   skin sealant/moisture barrier applied   tubing/devices free from skin contact  Wt Readings from Last 3 Encounters:   11/04/23 107.5 kg (236 lb 15.9 oz)   04/04/23 93.4 kg (206 lb)   06/25/21 98.2 kg  (216 lb 6.4 oz)   SUAREZ. SpO2 >90% RA. LS clear.   Edema improved today. Was +3 generalized edema, now more like +2 edema today.      Problem: Sepsis/Septic Shock  Goal: Absence of Infection Signs and Symptoms  Outcome: Progressing  Intervention: Promote Recovery  Recent Flowsheet Documentation  Taken 11/4/2023 1100 by Gini Martin RN  Activity Management: up in chair   Continue IV Ceftriaxone.   Tele: A-fib, HR 80-90's bpm.  BP this afternoon 93/70, MAP 78. Denied dizziness at rest, however, did c/o some lightheadedness when standing from chair. Back in bed.       Goal Outcome Evaluation:      Plan of Care Reviewed With: patient, family    Overall Patient Progress: improvingOverall Patient Progress: improving

## 2023-11-04 NOTE — PROGRESS NOTES
Mely Alegria dialyzed UF only for   2 hours on a Revaclear 300 dialyzer with a net fluid removal of 3L. A BFR of 350 ml/min was obtained via a  CVC  Complications: none. VSS post run. Verbal report given to Yani Martin RN. Pt education provided concerning dialysis procedure and all questions answered. Consent on file. See flowsheet in epic for further details. Water alarm in place  During treatment. Hepatitis B Antigen neg . Date drawn 10/31/23 . Hepatitis B Antibody susceptible . Date drawn: 10/31/23

## 2023-11-04 NOTE — PROGRESS NOTES
"Care Management Follow Up    Length of Stay (days): 5    Expected Discharge Date: 11/08/2023     Concerns to be Addressed:     Care progression  Patient plan of care discussed at interdisciplinary rounds: Yes    Anticipated Discharge Disposition:  TCU and outpatient hemo dialysis     Anticipated Discharge Services:  TCU and outpatient hemo dialysis  Anticipated Discharge DME:  NA    Patient/family educated on Medicare website which has current facility and service quality ratings:  Yes  Education Provided on the Discharge Plan:  Yes per team  Patient/Family in Agreement with the Plan:  Yes    Referrals Placed by CM/SW:  Yes  Private pay costs discussed: Not applicable    Additional Information:  Patient's sonTatyana, requested update. Update given.  No approval from [facility] yet  Informed him, patient will need TCU placement and if he can assist to select 5-6 TCUs to place referrals. He will discuss with his siblings and let RNCM know later today.    Checked [facility] portal and no approval yet. Called [facility] [phone] and they need the Hep B Core Antibody result.    Sent message to provider, Dr. Leslie, to order the Hep B Core Antibody lab    Social Hx: \"Lives with sonTatyana. Tatyana works full-time. Initiated dialysis on 10/30. Started [facility] placement for [facility]. PT/OT orders placed for when pt more medically stable.\"    RNCM to follow for medical progression, recommendations, and final discharge plan.     Jennifer Westfall RN     Rec'd message from provider, Hep B Core Antibody already ordered.  "

## 2023-11-04 NOTE — PLAN OF CARE
"Goal Outcome Evaluation:               Patient is alert and oriented to person, place, and time.  Patient had some forgetfulness and confusion regarding situation.   Denied pain at rest.  Turned side to side every 2 hours.  She had once incontinent stool at midnight, otherwise occasionally had smear bowel movement.  Calmoseptine cream applied and pericare completed each time patient turned.  Pillows placed to promote healthy skin.  Interdry applied to folds in groin and pannus. Occasional snack given over night to prevent hypoglycemia.  Vital signs stable.  Patient brought down to dialysis in bed once breakfast delivered.  Ultrasound of lower legs completed over night.  Patient pleasant and cooperative with cares.  Roberto Carlos continue to monitor.      Problem: Adult Inpatient Plan of Care  Goal: Plan of Care Review  Description: The Plan of Care Review/Shift note should be completed every shift.  The Outcome Evaluation is a brief statement about your assessment that the patient is improving, declining, or no change.  This information will be displayed automatically on your shift  note.  Outcome: Progressing  Goal: Patient-Specific Goal (Individualized)  Description: You can add care plan individualizations to a care plan. Examples of Individualization might be:  \"Parent requests to be called daily at 9am for status\", \"I have a hard time hearing out of my right ear\", or \"Do not touch me to wake me up as it startles  me\".  Outcome: Progressing  Goal: Absence of Hospital-Acquired Illness or Injury  Outcome: Progressing  Intervention: Identify and Manage Fall Risk  Recent Flowsheet Documentation  Taken 11/4/2023 0035 by Rosa Elena Ching, RN  Safety Promotion/Fall Prevention:   activity supervised   safety round/check completed   clutter free environment maintained   lighting adjusted  Intervention: Prevent Skin Injury  Recent Flowsheet Documentation  Taken 11/4/2023 0700 by Rosa Elena Ching, RN  Body Position:   turned   " supine   legs elevated  Taken 11/4/2023 0459 by Rosa Elena Ching, RN  Body Position:   turned   right  Taken 11/4/2023 0238 by Rosa Elena Ching RN  Body Position:   turned   left  Taken 11/4/2023 0035 by Rosa Elena Ching RN  Body Position:   turned   right  Intervention: Prevent Infection  Recent Flowsheet Documentation  Taken 11/4/2023 0035 by Rosa Elena Ching RN  Infection Prevention: equipment surfaces disinfected  Goal: Optimal Comfort and Wellbeing  Outcome: Progressing  Goal: Readiness for Transition of Care  Outcome: Progressing

## 2023-11-04 NOTE — PLAN OF CARE
Problem: Risk for Delirium  Goal: Optimal Coping  Intervention: Optimize Psychosocial Adjustment to Delirium  Recent Flowsheet Documentation  Taken 11/3/2023 2000 by Olga Toro RN  Family/Support System Care:   caregiver stress acknowledged   involvement promoted   self-care encouraged  Taken 11/3/2023 1600 by Olga Toro RN  Family/Support System Care:   caregiver stress acknowledged   involvement promoted   self-care encouraged  Goal: Improved Behavioral Control  Intervention: Minimize Safety Risk  Recent Flowsheet Documentation  Taken 11/3/2023 2000 by Olga Toro RN  Communication Enhancement Strategies:   call light answered in person   extra time allowed for response   one-step directions provided   repetition utilized  Enhanced Safety Measures:   family to remain at bedside   room near unit station  Trust Relationship/Rapport:   care explained   reassurance provided  Taken 11/3/2023 1600 by Olga Toro RN  Communication Enhancement Strategies:   call light answered in person   extra time allowed for response   one-step directions provided   repetition utilized  Enhanced Safety Measures:   family to remain at bedside   room near unit station  Trust Relationship/Rapport:   care explained   reassurance provided  Goal: Improved Attention and Thought Clarity  Intervention: Maximize Cognitive Function  Recent Flowsheet Documentation  Taken 11/3/2023 2000 by Olga Toro RN  Sensory Stimulation Regulation: care clustered  Reorientation Measures:   calendar in view   clock in view   reorientation provided   familiar social contact encouraged  Taken 11/3/2023 1600 by Olga Toro RN  Sensory Stimulation Regulation: care clustered  Reorientation Measures:   calendar in view   clock in view   reorientation provided   familiar social contact encouraged     Problem: Adult Inpatient Plan of Care  Goal: Optimal Comfort and Wellbeing  Intervention:  Provide Person-Centered Care  Recent Flowsheet Documentation  Taken 11/3/2023 2000 by Olga Toro RN  Trust Relationship/Rapport:   care explained   reassurance provided  Taken 11/3/2023 1600 by Olga Toro RN  Trust Relationship/Rapport:   care explained   reassurance provided     Problem: Risk for Delirium  Goal: Improved Attention and Thought Clarity  Intervention: Maximize Cognitive Function  Recent Flowsheet Documentation  Taken 11/3/2023 2000 by Olga Toro RN  Sensory Stimulation Regulation: care clustered  Reorientation Measures:   calendar in view   clock in view   reorientation provided   familiar social contact encouraged  Taken 11/3/2023 1600 by Olga Toro RN  Sensory Stimulation Regulation: care clustered  Reorientation Measures:   calendar in view   clock in view   reorientation provided   familiar social contact encouraged   Goal Outcome Evaluation:       Pt is alert and  oriented x 4, forgetful at times. Lung sounds diminished, on RA. HR in the 70's, 80's, Afib with inverted TW. Started renal dialysis diet this evening, per family's request ordered easy to chew food. Pt ate 75 % of the meal at dinner time.BG at HS was 69, Orange juice  with 3 packets of sugar given but able to tolerate about 75 ml only, and ate 1/4 of the chocolate pudding. BG went up to 93. She was in the recliner at the start of the shift, went back to bed around 1900. Assist of 2 with gait belt and a walker. She is incontinent with bowel. Lerma in place, HS cares done. Turned and repositioned q 2 hours. Lactic acid protocol flagged, lab draw done, blood gas venous also drawn per MD's order.

## 2023-11-04 NOTE — PROGRESS NOTES
[facility]    Medicine Progress Note - Hospitalist Service    Date of Admission:  10/29/2023    Assessment & Plan   83 year old female with a history of severe obesity, afib on eliquis, CKD4, GERD, HFpEF, chronic back pain, gout, admitted 10/30 due to acute encephalopathy, found to have anasarca, E coli UTI, acute respiratory failure with hypoxia, acute on chronic kidney injury requiring initiation of hemodialysis, and shock requiring vasopressors.     Acute hypoxic respiratory failure, resolved  Staph aureus bronchitis/pneumonia  Possible aspiration pneumonitis  - CT chest without IV contrast  - Vancomycin/Zosyn  -Hypoxia resolved  -Bronchial hygiene  -Element of hypervolemia and heart failure with pulmonary edema contributory which is now being managed with hemodialysis.  Still remains significant volume of.    HAMMAD with anuria on superimposed CKD stage V with progression to end-stage renal disease:  - Status post tunneled dialysis catheter placement on 10/30/2023.  - Hemodialysis initiated 10/31.  - On Monday Wednesday Friday regimen.  - Nephrology assistance appreciated  -Monitor labs    E. coli UTI:  - IV zosyn  -Repeat UA/UC    Circulatory shock: Resolved.      Hypotension: Persistent labile low blood pressures.  Hypoglycemia.  Progressive worsening anemia and thrombocytopenia.  Extensive bruising upper and lower extremities.  Initial TTE on presentation on 10/30 LVEF 50%.  -Repeat limited TTE to reassess LV function and for any new wall motion abnormality.  - Repeat blood culture x2, procalcitonin  -Obtain bilateral upper extremity ultrasound especially given the swelling.  Ongoing issues with hypotension and high risk for DVT as well as bacteremia given both PICC line and tunneled catheter on the right.  -Start vancomycin/Zosyn  - CT chest, abdomen, pelvis  -Repeat UA/UC.    HFp EF: With ADHF  - Volume being managed with hemodialysis.  Remains significantly hypervolemic  - Strict  intake and output and daily weights.  - TTE on 10/30/2023 showed LVEF 50-55%, no significant valvular heart disease.  Biatrial enlargement.  -Repeat limited TTE to reassess LV function and for any wall motion abnormality given ongoing hypotension    Chronic atrial fibrillation with rapid ventricular response: Rate controlled currently on no AV rajni blocking drugs    -Continue renally dosed apixaban. Appears she was on Xarelto PTA which given the level of renal dysfunction not a candidate for.  Cardiology signed off.  - Telemetry  -Eliquis  -TTE as above    Acute encephalopathy improved on superimposed progressive chronic cognitive impairment  -Obtain CT head now.  Prior to admission she was extremely high fall risk and has been confused and altered on Xarelto so we need to make sure there is no element of subdural hematoma or other issue but again overall slowly improved mental status  -If still issues with encephalopathy and CT head negative obtain noncontrast MRI brain.    Scattered area of ecchymosis:  - Multiple upper and lower extremity bruises.  High fall risk prior to admission.  Confused and altered on admission after being brought in from home where she could have easily had a fall and injured herself somewhere.  She was too unstable on initial presentation for imaging.  - Obtain CT head and cervical spine.  - CT chest, abdomen and pelvis  - Bilateral shoulder/humerus x-ray, femur x-ray bilateral    Folic acid deficiency:  - Start folic acid 1 mg daily  -B12 level normal 814.    Thrombocytopenia:  CBC a.m.  Laboratory work-up as ordered in process  Hematology/oncology following.  Assistance appreciated.  -Add on hemolysis labs in addition to other work-up.  Rule out some type of autoimmune hemolytic anemia type process.  -Also due to blood consumption related to DVT or clot    Normocytic anemia: No evidence of acute blood loss.  Hemoglobin 9.5.  -Lab work-up as implemented currently.  Appreciate  hematology/oncology assistance.  -Trend labs    Hypothyroidism:  - Initiated on levothyroxine 25 mcg daily.  -Repeat TFTs outpatient in 4 to 6 weeks.  -Thyroid ultrasound showedHeterogeneous thyroid gland suggestive of sequelae of thyroiditis.     Candidiasis, severe:  - Topical Mycostatin  - Micafungin IV to initiate given severity.    Hypoglycemia: Hemoglobin A1c 5% due to insufficient oral intake.   -Monitor Accu-Cheks  -Discontinue sliding scale insulin as she is nondiabetic  - Hypoglycemic protocol  -Check TSH, cortisol rule out adrenal insufficiency    MGUS:  - Hematology/oncology consult appreciated.  Assistance appreciated.  -Multiple labs in process    Bilateral upper extremity, bilateral lower extremity edema:  - Venous duplex ultrasound negative of the lower extremities.  - Obtain venous duplex ultrasound of bilateral upper extremities also given the significant edema.    Severe deconditioning and weakness  - PT/OT.  - TCU        Diet: Room Service  Combination Diet Regular Diet; Thin Liquids (level 0)    DVT Prophylaxis: DOAC  Lerma Catheter: PRESENT, indication: Retention;Strict 1-2 Hour I&O  Lines: PRESENT      PICC 10/30/23 Triple Lumen Right Brachial vein medial-Site Assessment: WDL  CVC Right Subclavian Tunneled-Site Assessment: WDL      Cardiac Monitoring: ACTIVE order. Indication: Electrolyte Imbalance (24 hours)- Magnesium <1.3 mg/ml; Potassium < =2.8 or > 5.5 mg/ml  Code Status: No CPR- Do NOT Intubate      Clinically Significant Risk Factors            # Hypomagnesemia: Lowest Mg = 1.4 mg/dL in last 2 days, will replace as needed   # Hypoalbuminemia: Lowest albumin = 2.9 g/dL at 11/3/2023  5:57 AM, will monitor as appropriate   # Thrombocytopenia: Lowest platelets = 96 in last 2 days, will monitor for bleeding   # Hypertension: Noted on problem list    # Chronic heart failure with preserved ejection fraction: heart failure noted on problem list and last echo with EF >50%       # Severe Obesity:  "Estimated body mass index is 43.35 kg/m  as calculated from the following:    Height as of this encounter: 1.575 m (5' 2\").    Weight as of this encounter: 107.5 kg (236 lb 15.9 oz).             Disposition Plan     Expected Discharge Date: 11/08/2023        Discharge Comments: DC after HD            August Leslie DO, DO  Hospitalist Service  [facility]  Securely message with advisorCONNECT (more info)  Text page via [employer] Paging/Directory   ______________________________________________________________________    Interval History   Reviewed    Physical Exam   Vital Signs: Temp: 97.7  F (36.5  C) Temp src: Oral BP: 96/56 (MAP 70) Pulse: 81   Resp: 20 SpO2: 95 % O2 Device: None (Room air)    Weight: 236 lbs 15.91 oz  General: Nontoxic  CV: RRR, anasarca   Lungs decreased bases, no wheezing  Abdomen bowel sounds positive, liquid brown stool present, nontender to palpation, area of right flank region on initial CT suggesting either panniculitis or cellulitis on exam does not appear to be erythematous but there is clear skin breakdown in this region as well as an area of ecchymosis.  Lerma catheter in place.  Skin anasarca, bruising upper and lower extremities, limited range of motion, slight decrease in strength left greater than right.  Follows commands.  Significant skin fold maceration in right flank area, inguinal folds with maceration.      Labs/imaging studies reviewed.  "

## 2023-11-05 NOTE — PLAN OF CARE
Problem: Adult Inpatient Plan of Care  Goal: Absence of Hospital-Acquired Illness or Injury  Intervention: Prevent Skin Injury  Recent Flowsheet Documentation  Taken 11/5/2023 1300 by Brittni Nieto RN  Body Position: refuses positioning  Taken 11/5/2023 1058 by Brittni Nieto RN  Body Position: refuses positioning  Taken 11/5/2023 0844 by Brittni Nieto RN  Body Position: refuses positioning     Problem: Adult Inpatient Plan of Care  Goal: Optimal Comfort and Wellbeing  Outcome: Progressing  Intervention: Provide Person-Centered Care  Recent Flowsheet Documentation  Taken 11/5/2023 0902 by Brittni Nieto RN  Trust Relationship/Rapport:   care explained   reassurance provided     Problem: Risk for Delirium  Goal: Optimal Coping  Intervention: Optimize Psychosocial Adjustment to Delirium  Recent Flowsheet Documentation  Taken 11/5/2023 1300 by Brittni Nieto RN  Family/Support System Care:   involvement promoted   presence promoted  Taken 11/5/2023 1006 by Brittni Nieto RN  Family/Support System Care:   involvement promoted   presence promoted   Goal Outcome Evaluation:  Patient very defiant to cares. Refused most cares this AM and refused repositioning.  Refused letting us place tele leads so tele order discontinued by MD.   Noticed patient tug on lines a couple times, easily redirected.  Was able to clean her up, perform CHG and cath cares.  Went to CT x2 and xray x1.  Ate breakfast well but is now NPO  One small loose stool  Appears oriented x4 with periods of confusion  Lung sounds are diminished and occasionally with crackles  Skin is very fragile. She has much bruising on her forearms and areas on her back. Her perineal area is bleeding with cares - cream and powder applied as ordered.   Dressings on her foot wounds are c/d/I  Son and daughter have been at bedside most of the day and very helpful getting patient to let us get wipes done.

## 2023-11-05 NOTE — PLAN OF CARE
Goal Outcome Evaluation:               Patient alert to self, and that she was in hospital, and able to say time.  When asked about why she was in hospital, patient said she knew why, but then refused to say why to nurse.  Patient was irritable and uncooperative with turning every 2 hours.  Staff tried to  patient on why she needed to turn and that she was incontinent of stool, but patient was not receptive to teaching and threatened staff that she would call 911.  Staff assured  patient they would help her call 911, but first they had to clean up the stool and turn her.  Staff made patient turn, and once she was changed and cleaned up, cream was applied as skin in kesha area and rectum is red and excoriated and bleeding, and patient kept on side to avoid pressure on bottom.  Legs elevated on pillows.    PRN imodium ordered due to skin breakdown and frequent diarrhea, but patient refused to take it.    Open blister on left foot was weeping large amount of serous fluid, site cleansed with saline and mepilex changed.  Abrasion on left arm was cleansed and mepilex changed.      Patient removed  telemetry leads and refused to allow staff to replace.  At some point, dressing on HD catheter port was removed, so staff cleaned site and redressed.    Hospitalist was updated regarding X-ray results of left femur.  Ortho consult placed, patient is bedrest, and orders for knee X-ray placed.  Patient updated regarding new orders, and said she said her left leg felt fine and she had no pain.  Staff educated patient that this was for safety prior to her increasing her activity.      Problem: Adult Inpatient Plan of Care  Goal: Plan of Care Review  Description: The Plan of Care Review/Shift note should be completed every shift.  The Outcome Evaluation is a brief statement about your assessment that the patient is improving, declining, or no change.  This information will be displayed automatically on your  "shift  note.  Outcome: Progressing  Goal: Patient-Specific Goal (Individualized)  Description: You can add care plan individualizations to a care plan. Examples of Individualization might be:  \"Parent requests to be called daily at 9am for status\", \"I have a hard time hearing out of my right ear\", or \"Do not touch me to wake me up as it startles  me\".  Outcome: Progressing  Goal: Absence of Hospital-Acquired Illness or Injury  Outcome: Progressing  Intervention: Identify and Manage Fall Risk  Recent Flowsheet Documentation  Taken 11/5/2023 0100 by Rosa Elena Ching RN  Safety Promotion/Fall Prevention:   activity supervised   safety round/check completed   clutter free environment maintained   supervised activity  Intervention: Prevent Skin Injury  Recent Flowsheet Documentation  Taken 11/5/2023 0631 by Rosa Elena Ching RN  Body Position:   turned   left  Taken 11/5/2023 0400 by Rosa Elena Ching RN  Body Position:   turned   left  Taken 11/5/2023 0244 by Rosa Elena Ching RN  Body Position:   turned   right  Taken 11/5/2023 0100 by Rosa Elena Ching RN  Body Position:   turned   left  Goal: Optimal Comfort and Wellbeing  Outcome: Progressing  Goal: Readiness for Transition of Care  Outcome: Progressing            "

## 2023-11-05 NOTE — SIGNIFICANT EVENT
Significant Event Note    Time of event: 3:47 AM November 5, 2023    Description of event:  -RN reported abnormal x-ray of left femur showing acute nondisplaced medial tibial plateau fracture, probable medial femoral condyle fracture  -Admitted on 10/29/2023 to ICU secondary to circulatory shock, encephalopathy, anasarca, hypoxia, renal failure requiring vasopressors and dialysis  -No falls or trauma in the hospital, however noted scattered areas of ecchymosis on admission but too unstable for imaging at that time  -CT head, cervical spine, chest, abdomen and pelvis ordered, however patient was agitated and unable to complete    Plan:  Bedrest  Left knee x-ray to evaluate for femoral condyle fracture  Ortho consult    Discussed with: bedside nurse    Jude eZlaya MD

## 2023-11-05 NOTE — CONSULTS
CLINICAL NUTRITION SERVICES - ASSESSMENT NOTE     Nutrition Prescription    RECOMMENDATIONS FOR MDs/PROVIDERS TO ORDER:  Consider renal MVI daily.    Malnutrition Status:    Severe in acute    Recommendations already ordered by Registered Dietitian (RD):  Ensure Enlive daily    Future/Additional Recommendations:       REASON FOR ASSESSMENT  Mely [place] is a/an 83 year old female assessed by the dietitian for Provider Order - Malnutrition    NUTRITION HISTORY  Patient admitted with UTI, HAMMAD/CKD, Hyperkalemia, elevated troponin, generalized weakness, AMS.  Patient started HD this admission.  Patient in process of work-up for fracture.    Patient currently NPO, patient's son and daughter want to order patient some food, RN aware.  Patient does not participate in conversation.        CURRENT NUTRITION ORDERS  Diet: NPO  Pt NPO 10/29-11/3.  Started renal diet 11/3.  Per chart documentation pt eating 25-50% at meals.      LABS  Creatinine 3.26 (H)  CRP inflammation 57.4 (H)  Labs reviewed    MEDICATIONS   [Held by provider] apixaban ANTICOAGULANT  2.5 mg Oral BID    febuxostat  40 mg Oral Daily    folic acid  1 mg Intravenous Daily    levothyroxine  25 mcg Oral QAM AC    micafungin  100 mg Intravenous Q24H    miconazole   Topical BID    midodrine  10 mg Oral Q Mon Wed Fri AM    pantoprazole  40 mg Oral QAM AC    piperacillin-tazobactam  3.375 g Intravenous Q12H    sodium chloride (PF)  9 mL Intracatheter During Dialysis/CRRT (from stock)    sodium chloride (PF)  9 mL Intracatheter During Dialysis/CRRT (from stock)    thiamine  100 mg Oral Daily    vancomycin place taylor - receiving intermittent dosing  1 each Intravenous See Admin Instructions       - MEDICATION INSTRUCTIONS -        albumin human, alteplase, alteplase, calamine, glucose **OR** dextrose **OR** glucagon, ipratropium - albuterol 0.5 mg/2.5 mg/3 mL, loperamide, menthol-zinc oxide, midodrine, ondansetron **OR** ondansetron, - MEDICATION INSTRUCTIONS -,  "sodium chloride (PF), sodium chloride (PF), sodium chloride 0.9%   Medications reviewed    ANTHROPOMETRICS  Height: 157.5 cm (5' 2\")  Admit wt 113.1 kg (249 lb 6.4 oz)  10/31/23.  Most Recent Weight: 108.1 kg (238 lb 5.1 oz)    BMI: Obesity Grade III BMI >40  Weight History:   11/05/23 : 108.1 kg (238 lb 5.1 oz)  04/04/23 : 93.4 kg (206 lb)  06/25/21 : 98.2 kg (216 lb 6.4 oz)   Wt down 11 lb since admit corresponds to start of HD on 11/3 and 11/4.    Dosing Weight: 64.5 kg, adjusted     ASSESSED NUTRITION NEEDS  Estimated Energy Needs: 1610+ kcals/day (25+ kcals/kg)  Justification: Maintenance  Estimated Protein Needs: 64-77 grams protein/day (1 - 1.2 grams of pro/kg)  Justification: Dialysis  Estimated Fluid Needs: 1500 mL/day (or per MD with renal status.)       PHYSICAL FINDINGS  See malnutrition section below.  Last BM x4 today.  Low urine output.  New-HD catheter.   Foot and heel wound per chart      MALNUTRITION:  % Weight Loss:  > 2% in 1 week -though wt loss corresponds to start of HD.    % Intake:  </= 50% for >/= 5 days (severe malnutrition)  Subcutaneous Fat Loss:  unable at this time, patient not participating  Muscle Loss:  unable at this time  Fluid Retention:  Moderate in reuben arms    Malnutrition Diagnosis: Severe malnutrition  In Context of:  Acute illness or injury    NUTRITION DIAGNOSIS  Malnutrition related to acute illness as evidenced by wt loss, decreased intake.      INTERVENTIONS  Implementation  Nutrition Education: No education needs assessed at this time   Medical food supplement therapy -Ensure Enlive daily.  Consider Renal MVI daily.    Goals  Electrolytes WNL  Patient to consume % of nutritionally adequate meals three times per day, or the equivalent with supplements/snacks.     Monitoring/Evaluation  Progress toward goals will be monitored and evaluated per protocol.    "

## 2023-11-05 NOTE — PROGRESS NOTES
Renal progress note  CC:HAMMAD, Advanced CKD , likely ESRD  Assessment and Plan:  83 year old female with a hx of CKD 4, progressed to stage 5 in labs 4/2023 , no prior nephrology follow ups , Diastolic HF, HTN , Chronic Atrial fib with RVR , back pain and morbid obesity, admitted with AMS, with possible UTI , Hyperkalemia , acidosis , weakness and hypervolemia with anuria. She received lasix IV wo any response. Nephrology consulted for HAMMAD and anuria     CKD 5, likely ESRD  HAMMAD with anuria  Baseline with some progressive disease was at CKD 4 with creat 1.6-2 and GFR 25-<30 in 2022 labs and this yr labs in 4/2023 with GFR 11-12 and Creatinine in 3.1-3.7  CT 10/30/23 and renal US 10/12/2023 with rt kidney 6.5cm and left 8.2cm and cortical thinning with likely advanced renal disease, simple cysts  UA with dirty appearing urine , Cx Ecoli  -- CKD with unclear etiology, has had a progressive course with significant nephrosclerosis on imaging, unclear if a kidney biopsy will provide any additional information besides scarring  -- Serological work-up including paraproteins ordered; has a IgG Kappa LC , with a small Mspike as well, FLC acceptable for ESRD  Will get oncology input, very low if any suspicion for MGRS  -- Tunneled CVC placed 10/30/23; needing dialysis with hyperkalemia acidemia and and uremia despite diuretic challenge  -- We will follow on BMP daily with HD now  -- initiated HD 10/31 and tolerated well , will continue on MWF schedule  UF treatment 11/4 ; 3 L UF pretty well with stable hemodynamics  We will continue dialysis on Monday Wednesday Friday schedule  Plans for placement at [facility] at the time of discharge  We will discuss with primary team regarding discharge disposition based on PT recs looks like the patient will need a TCU placement  Has great family support the family has been contacted by home [facility] unit, will follow after education for possible PD catheter placement and  switching to home modalities as able  --> follow on renal labs on MWF      Hypervolemia  CHF exacerbation  Likely secondary to poor renal functions  Attempted diuresis with Lasix followed by Lasix infusion and Diuril 250x1  Near anuric with UO ~200/day at best  --> dc Lasix   --> Plan for UF with HD as tolerated    Hypotension with shock state  Off Phenyleph  Fluid up with mostly edema and now on NC O2   --> plans per primary     Hyperkalemia  Likely secondary to renal failure and severe acidosis, full dose ARB at home  Bl potassium is stable     Acidemia  Likely hyperchloremic metabolic acidosis suggestive of possible underlying RTA  Unclear etiology  Discontinue isotonic bicarb  -- correct with HD     Anemia  Baseline around 12-13  Hemoglobin down to 9.5 with a drop in platelet  Keep hemoglobin above 8 transfuse as needed  We will follow on iron studies  Likely ACD/inflammatory anemia    MGUS  Monoclonal peak 0.9 , with IgG Kappa LC elevated  Will have oncology input     Ecoli  UTI  Cultures urine Ecoli , blood Cx -ve , resp Cx staph  Diffuse skin edema noted on CT possibly in the right flank and left flank area questioning for cellulitis like finding,   On Vanco, Zosyn  and micofungin   Follow on cultures to direct antibiotic therapy    Shock state, likely circulatory shock with sepsis  Management per ICU     Acute metabolic encephalopathy, possible delirium  Likely UTI/ uremia  --> plans per primary     History of atrial fibrillation with RVR  on digoxin per cardiology  Will need to monitor levels especially with severe renal failure  AC with Xarelto chronically     Gout on probenecid     Chronic back pain on baclofen and Norco  Was due to get a cortisone shot in her back  Plans per primary       Thank you for the consultation we will follow  Keri Finn MD  [facility]  [phone]      Subjective  Patient was seen at bedside in the last 24 hours the patient has been agitated   Concerns  for hospital-acquired delirium   Overnight the patient did have imaging for her lower extremities that ecchymosis to rule out any fractures with intense pain was noted to have a possible tibial plateau fracture  is due for a CT of her knee spine head and chest and abdomen to rule out any other abnormalities patient's limited mobility and unable to comply with directions makes it hard to do detailed physical exam at this point   Patient has been tolerating dialysis well and was able to tolerate a UF treatment yesterday with 3 L removed   urine output remains negligible  Plan of care was reviewed with hospitalist as well as the family at bedside which remain very concerned with the patient's inability to follow basic instructions and visual hallucinations as well as significant agitation    objective    Vital signs in last 24 hours  Temp:  [97.3  F (36.3  C)-97.8  F (36.6  C)] 97.4  F (36.3  C)  Pulse:  [80-96] 93  Resp:  [18-20] 18  BP: ()/(56-87) 126/79  SpO2:  [94 %-95 %] 95 %  Weight:   [unfilled]    Intake/Output last 3 shifts  I/O last 3 completed shifts:  In: 650 [P.O.:510; I.V.:140]  Out: 3195 [Urine:195; Other:3000]  Intake/Output this shift:  No intake/output data recorded.    Physical Exam   arousable , NAD  CV: irregular tachy without murmur or rub  Lung: clear and equal; no extra sounds  Ab: soft and NT; not distended; normal bs  Ext: + edema and well perfused  Skin; no rash    Pertinent Labs   Lab Results   Component Value Date    WBC 9.3 11/05/2023    HGB 9.9 (L) 11/05/2023    HCT 28.9 (L) 11/05/2023    MCV 92 11/05/2023     (L) 11/05/2023     Lab Results   Component Value Date    BUN 28.3 (H) 11/05/2023     11/05/2023    CO2 27 11/05/2023       Lab Results   Component Value Date    ALBUMIN 3.3 (L) 11/05/2023     Lab Results   Component Value Date    PHOS 3.2 11/04/2023     I reviewed all lab results  Keri Finn MD

## 2023-11-05 NOTE — PLAN OF CARE
"  Problem: Adult Inpatient Plan of Care  Goal: Optimal Comfort and Wellbeing  Outcome: Progressing     Problem: Adult Inpatient Plan of Care  Goal: Readiness for Transition of Care  Outcome: Progressing     Problem: Adult Inpatient Plan of Care  Goal: Optimal Comfort and Wellbeing  Outcome: Progressing   Goal Outcome Evaluation:      Plan of Care Reviewed With: patient, family    Overall Patient Progress: improvingOverall Patient Progress: improving    /89 (BP Location: Left arm)   Pulse 92   Temp 97.9  F (36.6  C) (Oral)   Resp 20   Ht 1.575 m (5' 2\")   Wt 108.1 kg (238 lb 5.1 oz)   LMP  (LMP Unknown)   SpO2 92%   BMI 43.59 kg/m        Pt is A&OX4 but forgetful at times, on RA, and assistx2 with cares. Pt c/o pain with movement. Lerma in place.   Pt c/o diarrhea, PRN imodium given. Family at bedside.   Results from scans available, paged [facility], Dr. Villalba came to see the pt, confirmed NPO was not needed. Renal diet ordered.     Pt's family member came to the nursing station demanding the name of the MD that put in the NPO order, staff spoke with family member and gave the patient advocate contact information. Paged Dr. Leslie regarding the diet order, reply pending.     Sepsis protocol triggered, lactic 1.2    Family and patient declined glucose monitoring.     Patito Renee RN       "

## 2023-11-05 NOTE — PLAN OF CARE
Problem: Risk for Delirium  Goal: Optimal Coping  Outcome: Progressing  Intervention: Optimize Psychosocial Adjustment to Delirium  Recent Flowsheet Documentation  Taken 11/4/2023 1700 by Olga Toro RN  Supportive Measures: active listening utilized  Family/Support System Care:   caregiver stress acknowledged   involvement promoted   self-care encouraged  Goal: Improved Behavioral Control  Outcome: Progressing  Intervention: Minimize Safety Risk  Recent Flowsheet Documentation  Taken 11/4/2023 1700 by Olga Toro RN  Communication Enhancement Strategies:   call light answered in person   extra time allowed for response   family involved in communication plan   one-step directions provided   repetition utilized  Enhanced Safety Measures: family to remain at bedside  Trust Relationship/Rapport:   care explained   reassurance provided  Goal: Improved Attention and Thought Clarity  Outcome: Progressing  Intervention: Maximize Cognitive Function  Recent Flowsheet Documentation  Taken 11/4/2023 1700 by Olga Toro RN  Sensory Stimulation Regulation: care clustered  Reorientation Measures:   calendar in view   clock in view   familiar social contact encouraged   reorientation provided  Goal: Improved Sleep  Outcome: Progressing   Goal Outcome Evaluation:  Pt is alert and oriented x 4, denies pain, has SOB with activity. Lung sounds diminished, on RA. HR in the 80's, Afib with inverted TW. Magnesium replacement given, recheck Mg in AM. US of the upper extremities done XR of the left femur done tonight. Pt refused CT scan this evening. Started Zosyn and Micafungin this evening. Pt has 3 loose BM, one large and 2 small. Bleeding in the rectal area noted. Applied Calmoseptine cream in the buttocks and perineal area. She is assist of 2 with gait belt and a walker. Needs urine specimen for UA.Lerma output is only 25 ml. Orthostatic BP done, Dr. Garcia notified of the result.

## 2023-11-05 NOTE — PHARMACY-VANCOMYCIN DOSING SERVICE
"Pharmacy Vancomycin Initial Note  Date of Service 2023  Patient's  1940  83 year old, female    Indication: Sepsis    Current estimated CrCl = Estimated Creatinine Clearance: 18.2 mL/min (A) (based on SCr of 2.71 mg/dL (H)).    Creatinine for last 3 days  2023:  9:06 PM Creatinine 2.97 mg/dL  11/3/2023:  5:57 AM Creatinine 3.62 mg/dL  2023:  5:14 AM Creatinine 2.71 mg/dL    Recent Vancomycin Level(s) for last 3 days  11/3/2023:  5:57 AM Vancomycin 20.3 ug/mL      Vancomycin IV Administrations (past 72 hours)                     vancomycin (VANCOCIN) 1,000 mg in 200 mL dextrose intermittent infusion (mg) 1,000 mg New Bag 23                    Nephrotoxins and other renal medications (From now, onward)      Start     Dose/Rate Route Frequency Ordered Stop    23 0600  piperacillin-tazobactam (ZOSYN) 3.375 g vial to attach to  mL bag        Note to Pharmacy: For [facility], [facility] and [facility]: For Zosyn-naive patients, use the \"Zosyn initial dose + extended infusion\" order panel.    3.375 g  over 240 Minutes Intravenous EVERY 12 HOURS 23 1930  piperacillin-tazobactam (ZOSYN) 3.375 g vial to attach to  mL bag         3.375 g  over 30 Minutes Intravenous ONCE 23 190  vancomycin place taylor - receiving intermittent dosing         1 each Intravenous SEE ADMIN INSTRUCTIONS 23 1900              Contrast Orders - past 72 hours (72h ago, onward)      None            [employer] Prediction of Planned Initial Vancomycin Regimen  Vanco reordered - should still be ok from 11/3 1633 dose of 1000 mg so did not order a dose tonight with the new consult.    Regimen: 1000 mg IV every 48 hours.  Start time: 15:33 on 2023  Exposure target: AUC24 (range)400-600 mg/L.hr   AUC24,ss: 582 mg/L.hr  Probability of AUC24 > 400: 97 %  Ctrough,ss: 20.8 mg/L  Probability of Ctrough,ss > 20: 56 %  Probability of nephrotoxicity (Lodise DANIEL 2009): " 19 %          Plan:  Level 11/5 am then adjust  dosing from there.    Ora Aragon RPH

## 2023-11-05 NOTE — CONSULTS
ORTHOPEDIC CONSULTATION    Consultation  Mely Alegria,  1940, MRN [id number]    Hyperkalemia [E87.5]  Urinary tract infection without hematuria, site unspecified [N39.0]  Acute renal failure superimposed on chronic kidney disease, unspecified acute renal failure type, unspecified CKD stage  [N17.9, N18.9]    PCP: Oscar Hopper, [phone]   Code status:  No CPR- Do NOT Intubate       Extended Emergency Contact Information  Primary Emergency Contact: Gen Airamo  Address: [address] [place] [address] [facility]  Home Phone: [phone]  Mobile Phone: [phone]  Relation: Son  Secondary Emergency Contact: Leta Mckeon  Address: [address]  Mobile Phone: [phone]  Relation: Daughter         IMPRESSION:  Left knee x-ray revealing possible non displaced tibial plateau fracture, exam not suspicious for fracture      PLAN:  This patient was discussed with Dr. Tristen Melo , on-call surgeon for [facility] and they are in agreement with the following plan.     -area of concern on left knee xray over lateral tibial plateau is difficult to differentiate due to soft tissue fold appreciated on xray  -patient is not exquisitely tender over the lateral tibial plateau  -severe degenerative changes can explain her occassional buckling in the left knee  -no surgical intervention  -patient may be weightbearing as tolerated as of now.     -ct left knee pending. Should results reveal plateau injury then would recommend NWB and knee immobilizer.     Thank you for including [facility] in the care of Mely Alegria. It has been a pleasure participating in Mely's care.         CHIEF COMPLAINT: Urinary tract infection without hematuria, site unspecified    HISTORY OF PRESENT ILLNESS:  The patient is seen in orthopedic consultation at the request of Dr. Leslie. Patient was admitted to hospital service due to circulatory shock,  encephalopathy, anasarca, hypoxia, renal failure requiring pressors and dialysis. The patient is a poor historian during our visit. Family at bedside assist with history.    Patient denies any falls or trauma recently. However, patient was noted to have some scattered bruising on her body. Today, there is no bruising around the knee. Patient denies any knee pain today nor significant pain at baseline. States she will occasionally have some buckling and catching in the left knee. Walks with a cane at baseline. Lives with her son. Does not have any new numbness or tingling in the left leg. Denies any prior surgeries or injuries to the left knee. History of left hip arthroplasty many years ago.       ALLERGIES:   Review of patient's allergies indicates   Allergies   Allergen Reactions    Allopurinol Diarrhea    Lisinopril Cough     initiated 1996 and stopped           MEDICATIONS UPON ADMISSION:  Medications were reviewed.  They include:   Medications Prior to Admission   Medication Sig Dispense Refill Last Dose    baclofen (LIORESAL) 10 MG tablet Take 1 tablet (10 mg) by mouth 3 times daily as needed for muscle spasms 20 tablet 1 Unknown at prn    calcium citrate-vitamin D (CITRACAL) 315-200 MG-UNIT TABS per tablet Take 1 tablet by mouth daily   Unknown at unknown    cholecalciferol, vitamin D3, 50 mcg (2,000 unit) Tab [CHOLECALCIFEROL, VITAMIN D3, 50 MCG (2,000 UNIT) TAB] Take 1 tablet (2,000 Units total) by mouth daily. 100 each 3 Unknown at unknown    febuxostat (ULORIC) 40 MG TABS tablet Take 1 tablet (40 mg) by mouth daily 30 tablet 11 Unknown at unknown    furosemide (LASIX) 40 MG tablet Take 60 mg by mouth daily Take one and half tablet (60 mg ) daily   10/28/2023 at am    [] HYDROcodone-acetaminophen (NORCO) 5-325 MG tablet Take 1 tablet by mouth every 8 hours as needed for severe pain 15 tablet 0 Unknown at prn    losartan (COZAAR) 100 MG tablet Take 1 tablet (100 mg) by mouth daily 90 tablet 11  10/29/2023 at am    omeprazole (PRILOSEC) 20 MG DR capsule Take 1 capsule (20 mg) by mouth daily 90 capsule 3 10/29/2023 at am    probenecid (BENEMID) 500 MG tablet Take 2 tablets (1,000 mg) by mouth every morning AND 1 tablet (500 mg) every evening. 270 tablet 3 Unknown at unknown    rivaroxaban ANTICOAGULANT (XARELTO ANTICOAGULANT) 15 MG TABS tablet Take 1 tablet (15 mg) by mouth daily (with dinner) 90 tablet 3 10/28/2023 at am    topiramate (TOPAMAX) 25 MG tablet Take 1 tablet (25 mg) by mouth 2 times daily 180 tablet 3 10/29/2023 at am    BETA BLOCKER NOT PRESCRIBED (INTENTIONAL) Please choose reason not prescribed from choices below.            SOCIAL HISTORY:   she  reports that she has never smoked. She has never used smokeless tobacco.      FAMILY HISTORY:  family history includes Diabetes in her mother.      REVIEW OF SYSTEMS:   Reviewed with patient. See HPI, otherwise negative       PHYSICAL EXAMINATION:  Vitals: Temp:  [97.3  F (36.3  C)-97.8  F (36.6  C)] 97.5  F (36.4  C)  Pulse:  [89-96] 89  Resp:  [18-20] 18  BP: ()/(56-87) 127/81  SpO2:  [94 %-97 %] 97 %  Focused examination of the right knee:    Patient is A&Ox3. Appears comfortable.    No ecchymosis, erythema, edema to the left knee. NO effusion.  Mildly tender over the medial joint line. Nontender over lateral joint line, anterior or posterior knee. No pain with patellofemoral compression. Nontender about the thigh and femur area.   ROM: 0-90 without pain, assisted  Stable to varus and valgus stress testing  Distal pulses are 2+. Sensation is intact to light touch.  Compartments soft and nontender.     Patient is able to perform passive hip flexion to about 45 degrees without pain. No pain with gentle log rolling of the left leg. No pain with palpation over the greater trochanter area.         RADIOGRAPHIC EVALUATION:  Personally reviewed    EXAM: XR FEMUR LEFT 2 VIEWS  LOCATION: [facility]  DATE: 11/4/2023      INDICATION: pain, fall  COMPARISON: None.                                                                      IMPRESSION: Acute nondisplaced medial tibial plateau fracture with subtle cortical lucency involving the medial femoral condyle which may reflect an additional fracture. Dedicated left knee radiographs could be obtained for further characterization.     Long stem left total hip arthroplasty without evidence of loosening or periprosthetic fracture. Degenerative arthritis left knee.     Vascular calcifications.     EXAM: XR KNEE LEFT 1/2 VIEWS  LOCATION: [facility]  DATE: 11/5/2023     INDICATION: eval for femoral condyle fracture  COMPARISON: 11/04/2023                                                                      IMPRESSION: There is irregularity to the lateral tibial plateau worrisome for a nondisplaced and nondepressed fracture. The chronicity of this finding is uncertain as there is no evidence for lipohemarthrosis and there is superimposed degenerative   change. There is a tiny effusion. No loose body. Chronic enthesitis along the superior pole of the patella.      Fara Rene PA-C  Date: 11/5/2023  Time: 11:24 AM    CC1:   August Leslie DO    CC2:   Oscar Hopper

## 2023-11-05 NOTE — PROGRESS NOTES
"Care Management Follow Up    Length of Stay (days): 6    Expected Discharge Date: 11/08/2023     Concerns to be Addressed:     Care progression  Patient plan of care discussed at interdisciplinary rounds: Yes    Anticipated Discharge Disposition:  TCU and outpatient hemo dialysis     Anticipated Discharge Services:  TCU and outpatient hemo dialysis  Anticipated Discharge DME:  NA    Patient/family educated on Medicare website which has current facility and service quality ratings:  Yes  Education Provided on the Discharge Plan:  Yes per team  Patient/Family in Agreement with the Plan:  Yes    Referrals Placed by CM/SW:  Yes  Private pay costs discussed: Not applicable    Additional Information:  Called patient's son, Tatyana, to request TCU choices. He said he and his sister looked through the list yesterday, but have not made any choices yet.  Informed Tatyana, it's important to get the TCU referrals sent soon, so patient will have an accepting TCU for when she's ready.  Tatyana requested another copy of the TCU list  RNCM provided another list of local skilled nursing facilities [facility] (which includes the medicare.gov website) for patient and family to review.     Checked lab results, no Hep B Core Antibody result yet.    Social Hx: \"Lives with sonTatyana. Tatyana works full-time. Initiated dialysis on 10/30. Started [facility] portal for placement at [facility] 11/3, waiting for Hep B Core Antibody result. TCU referrals sent. Transportation TBD.\"    RNCM to follow for medical progression, recommendations, and final discharge plan.     Jennifer Westfall RN     10:19 AM Tatyana selected 5 TCUs and referrals sent.  "

## 2023-11-06 NOTE — PROGRESS NOTES
[facility]    Medicine Progress Note - Hospitalist Service    Date of Admission:  10/29/2023    Assessment & Plan   83 year old female with a history of severe obesity, afib on eliquis, CKD4, GERD, HFpEF, chronic back pain, gout, admitted 10/30 due to acute encephalopathy, found to have anasarca, E coli UTI, acute respiratory failure with hypoxia, acute on chronic kidney injury requiring initiation of hemodialysis, and shock requiring vasopressors.     Acute hypoxic respiratory failure, resolved  Staph aureus bronchitis/pneumonia  Possible aspiration pneumonitis  Complete collapse of the right lower lobe with underlying pneumonia possible.   Groundglass opacities in the right upper lobe  - Stopped vancomycin and continue Zosyn  -Bronchial hygiene  -Volume removal with dialysis  - Flutter valve/bronchial hygiene  -On Zosyn    HAMMAD with anuria on superimposed CKD stage V with progression to end-stage renal disease:  - Status post tunneled dialysis catheter placement on 10/30/2023.  - Hemodialysis initiated 10/31.  - On Monday Wednesday Friday regimen.  - Nephrology assistance appreciated  -Monitor labs    E. coli UTI:  - IV zosyn    Circulatory shock: Resolved but ongoing hypotension/low normotensive blood pressures improved  -Blood cultures negative  - Discontinued vancomycin  - Continue Zosyn  - Monitor hemodynamics closely  -TTE reviewed.  Concerned if increased RV dysfunction present as extremely high risk for pulmonary embolism.  Will review echo and RV function which is worse will need to consider VQ scan    HFp EF: With ADHF  - Volume being managed with hemodialysis.  Remains significantly hypervolemic with evidence of RV dysfunction  - TTE on 10/30/2023 showed LVEF 50-55%, no significant valvular heart disease.  Biatrial enlargement.  -Repeat limited TTE reviewed and will see if element of RV dysfunction is worse  - Strict intake and output and daily weights.    Chronic atrial  fibrillation with rapid ventricular response: Rate controlled currently on no AV rajni blocking drugs    -holding apixaban. Appeared she was on Xarelto PTA which given the level of renal dysfunction not a candidate for.  Cardiology signed off.  - Telemetry  -TTE as above    Acute encephalopathy improved on superimposed progressive chronic cognitive impairment, fluctuating  -CT head negative for acute process  -CT cervical spine negative for fracture.    Multiple upper and lower extremity bruises.  High fall risk prior to admission.  Confused and altered on admission after being brought in from home where she could have easily had a fall and injured herself somewhere.  She was too unstable on initial presentation for imaging.  - Extensive imaging obtained and has been reviewed.    Folic acid deficiency:  - Start folic acid 1 mg daily  -B12 level normal 814.    Thrombocytopenia:  -Continue to monitor.  Extensive work-up in process.  - Hematology assistance appreciated    Normocytic anemia: No evidence of acute blood loss.   -Lab work-up as implemented currently.    -Appreciate hematology/oncology assistance.  -Trend labs  -Transfuse for hemoglobin less than 7 or if symptomatic    Hypothyroidism:  - Initiated on levothyroxine 25 mcg daily.  -Repeat TFTs outpatient in 4 to 6 weeks.  -Thyroid ultrasound showedHeterogeneous thyroid gland suggestive of sequelae of thyroiditis.   -CT chest showed multinodular goiter with extension into the mediastinum    Candidiasis, severe:  - Topical Mycostatin  - Micafungin IV then likely stop tomorrow and continue topical    Hypoglycemia: Hemoglobin A1c 5% due to insufficient oral intake.   -Monitor Accu-Cheks  -Discontinued sliding scale insulin as she is nondiabetic  - Hypoglycemic protocol  -Cortisol 30.5.  TSH/T4 as above.  - Close monitoring for any recurrence    MGUS:  - Hematology/oncology consult appreciated.  Assistance appreciated.  -monior count and test when  "resulted    Bilateral upper extremity, bilateral lower extremity edema:  - Venous duplex ultrasound negative of the lower extremities.  - Negative duplex ultrasound bilateral upper extremities for DVT  -Since right PICC line is functional and is needed will keep until d/c    Nonocclusive superficial thrombophlebitis left cephalic vein  -Supportive care    Severe deconditioning and weakness  - PT/OT.  - TCU    Mildly displaced intra-articular fracture posterior medial tibial plateau:  - Orthopedic surgery evaluated.  No surgical intervention indicated based on their evaluation.  - Activity and weightbearing status as directed  -Supportive cares    Discharge 1-2d        Diet: Room Service  Snacks/Supplements Adult: Ensure Enlive; With Meals  Regular Diet Adult    DVT Prophylaxis: Eliquis held  Lerma Catheter: PRESENT, indication: Strict 1-2 Hour I&O  Lines: PRESENT      PICC 10/30/23 Triple Lumen Right Brachial vein medial-Site Assessment: WDL  CVC Right Subclavian Tunneled-Site Assessment: WDL      Cardiac Monitoring: None  Code Status: No CPR- Do NOT Intubate      Clinically Significant Risk Factors              # Hypoalbuminemia: Lowest albumin = 2.9 g/dL at 11/3/2023  5:57 AM, will monitor as appropriate   # Thrombocytopenia: Lowest platelets = 109 in last 2 days, will monitor for bleeding   # Hypertension: Noted on problem list    # Chronic heart failure with preserved ejection fraction: heart failure noted on problem list and last echo with EF >50%       # Severe Obesity: Estimated body mass index is 43.63 kg/m  as calculated from the following:    Height as of this encounter: 1.575 m (5' 2\").    Weight as of this encounter: 108.2 kg (238 lb 8.6 oz).   # Severe Malnutrition: based on nutrition assessment           Disposition Plan     Expected Discharge Date: 11/08/2023        Discharge Comments: TCU referrals sent  DC after HD            August Leslie DO, DO  Hospitalist Service  [facility]" [facility]  Securely message with [employer] (more info)  Text page via [employer] Paging/Directory   ______________________________________________________________________    Interval History   Reviewed    Physical Exam   Vital Signs: Temp: 97.3  F (36.3  C) Temp src: Oral BP: (!) 146/72 Pulse: 83   Resp: (!) 34 SpO2: 92 % O2 Device: None (Room air)    Weight: 238 lbs 8.6 oz    General: Nontoxic  CV: RRR, anasarca   Lungs decreased bases, no wheezing  Abdomen: bowel sounds positive, liquid brown stool present, nontender to palpation, area of right flank region on initial CT suggesting either panniculitis or cellulitis on exam does not appear to be erythematous but there is clear skin breakdown in this region as well as an area of ecchymosis.    : Lerma catheter in place.   Neuro a&ox3    Labs/imaging studies reviewed.    D/w son at bedside

## 2023-11-06 NOTE — PROGRESS NOTES
"Care Management Follow Up    Length of Stay (days): 7    Expected Discharge Date: 11/08/2023     Concerns to be Addressed:     Care progression  Patient plan of care discussed at interdisciplinary rounds: Yes    Anticipated Discharge Disposition:  TCU and outpatient hemo dialysis     Anticipated Discharge Services:  TCU and outpatient hemo dialysis  Anticipated Discharge DME:  NA    Patient/family educated on Medicare website which has current facility and service quality ratings:  Yes  Education Provided on the Discharge Plan:  Yes per team  Patient/Family in Agreement with the Plan:  Yes    Referrals Placed by CM/SW:  Yes  Private pay costs discussed: Not applicable    Additional Information:  Checked lab results, no Hep B Core Antibody result yet.    Patient's sonTatyana, stopped by to get updates on TCU placement.  No accepting TCU yet.    Social Hx: \"Lives with sonTatyana. Tatyana works full-time. Initiated dialysis on 10/30. Started [facility] portal for placement at [facility] 11/3, waiting for Hep B Core Antibody result. TCU referrals sent. Transportation TBD.\"    RNCM to follow for medical progression, recommendations, and final discharge plan.     Jennifer Westfall RN     2:57 PM Rec'd Hep B core Reny and uploaded to [employer]    Updated the sonTatyana, no accepting TCU yet and request he select 2-3 more TCUs to send referrals. More TCU referrals sent.    3:43 PM Patient accepted through [employer].  Called  [facility] [phone] and spoke with Marianne to make sure the start date/seat time is accurate per provider, Dr. Saavedra with [facility] [phone], she has put a reservation for a chair at [facility] for MWF at 8:20 AM with start date Fri 11/10/23.   Marianne said to call [facility] directly  Called [facility] [phone] (FA ext [phone]) and left a message for SHAHBAZ Meier.  "

## 2023-11-06 NOTE — PHARMACY-VANCOMYCIN DOSING SERVICE
"Pharmacy Vancomycin Note  Date of Service 2023  Patient's  1940   83 year old, female    Indication: Sepsis  Day of Therapy: 7  Current vancomycin regimen:  intermittent dosing on HD  Current vancomycin monitoring method: Renal Replacement Therapy  Current vancomycin therapeutic monitoring goal: 15-20 mg/L      Current estimated CrCl = Estimated Creatinine Clearance: 13.3 mL/min (A) (based on SCr of 3.7 mg/dL (H)).    Creatinine for last 3 days  2023:  5:14 AM Creatinine 2.71 mg/dL  2023:  6:31 AM Creatinine 3.26 mg/dL  2023:  5:51 AM Creatinine 3.70 mg/dL    Recent Vancomycin Levels (past 3 days)  2023:  6:31 AM Vancomycin 21.4 ug/mL  2023:  5:51 AM Vancomycin 20.2 ug/mL    Vancomycin IV Administrations (past 72 hours)                     vancomycin (VANCOCIN) 1,000 mg in 200 mL dextrose intermittent infusion (mg) 1,000 mg New Bag 23 1633                    Nephrotoxins and other renal medications (From now, onward)      Start     Dose/Rate Route Frequency Ordered Stop    23 0600  piperacillin-tazobactam (ZOSYN) 3.375 g vial to attach to  mL bag        Note to Pharmacy: For [facility], [facility] and [facility]: For Zosyn-naive patients, use the \"Zosyn initial dose + extended infusion\" order panel.    3.375 g  over 240 Minutes Intravenous EVERY 12 HOURS 23 1857      23 190  vancomycin place taylor - receiving intermittent dosing         1 each Intravenous SEE ADMIN INSTRUCTIONS 23 190                 Contrast Orders - past 72 hours (72h ago, onward)      None            Interpretation of levels and current regimen:  Vancomycin level is reflective of supratherapeutic level    Has serum creatinine changed greater than 50% in last 72 hours: No    Urine output:  anuric    Renal Function: ESRD on Dialysis      Plan:  Vanco will likely be discontinued today per MD note, however if it were to continue plan to do 1000 mg IV x1 after dialysis this afternoon. (10 " mg/kg based on max weight of 100 kg per dosing guidelines.   Vancomycin monitoring method: Renal Replacement Therapy  Vancomycin therapeutic monitoring goal: 15-20 mg/L  Pharmacy will check vancomycin levels as appropriate in 1-3 Days.  Serum creatinine levels will be ordered daily for the first week of therapy and at least twice weekly for subsequent weeks.    MONICA PAZ RPH

## 2023-11-06 NOTE — PROGRESS NOTES
"Orthopedic Progress Note      Assessment:    Severe left knee OA with tibial plateau fracture but likely just osteophytic fracture    Plan:   - Continue PT/OT  - Weightbearing status: WBAT LLE  - Pain Management; continue current regimen  - Discharge: Follow up outpatient in 1-2 weeks for further management    Ortho will sign off. Please feel free to reach out with any further questions or concerns.         Subjective:    Patient reports feeling well today. Notes pain with weightbearing to left knee.  All questions/concerns answered.      Objective:  /61 (BP Location: Left arm)   Pulse 79   Temp 97.6  F (36.4  C) (Oral)   Resp 20   Ht 1.575 m (5' 2\")   Wt 108.2 kg (238 lb 8.6 oz)   LMP  (LMP Unknown)   SpO2 96%   BMI 43.63 kg/m      Patient is A&Ox3, sitting up in bed  Calves without tenderness, neg Brittany's  Brisk capillary refill in the toes.   Palpable Left dorsalis pedis pulse. Left foot warm & well-perfused.  Sensation is intact to light touch & equal bilaterally in the femoral, DP, SP & tibial nerve distributions.  NTTP of knee  Full knee extension/flexion without pain.  Ankle DF/PF/inversion/eversion without pain.  TIB ANT, PF, ELH, QUAD, HF 5/5    Contralateral side= Full range of motion, Negative joint instability findings, 5/5 motor groups about the joint, Non-tender.       Imaging  EXAM: CT KNEE LEFT W/O CONTRAST  LOCATION: [facility]  DATE: 11/5/2023     INDICATION: Left knee pain. Possible tibial plateau fracture on x-ray.  COMPARISON: Same day knee radiographs.  TECHNIQUE: Noncontrast. Axial, sagittal and coronal thin-section reconstruction. Dose reduction techniques were used.      FINDINGS:      BONES:  -Mildly displaced fracture of the posteromedial tibial plateau (series 6 image 34 and series 3 image 66).  -Tricompartmental degenerative arthritis with patchy subchondral sclerosis, subchondral cysts, and marginal osteophytes. Advanced narrowing of the medial " "compartment of the left knee. Moderate knee joint effusion and/or synovitis.     SOFT TISSUES:  -Scattered vascular calcifications. Nonspecific subcutaneous soft tissue edema and mild skin thickening. Advanced fatty atrophy of the proximal soleus which may be sequela of remote trauma (series 4 image 89).                                                                      IMPRESSION:  1.  Mildly displaced intra-articular fracture of the posteromedial tibial plateau.  2.  Tricompartmental degenerative arthritis with hypertrophic changes and advanced narrowing of the medial compartment.  3.  Moderate knee joint effusion and/or synovitis.    Pertinent Labs   Lab Results: personally reviewed.   No results found for: \"INR\", \"PROTIME\"  Lab Results   Component Value Date    WBC 9.3 11/06/2023    HGB 9.8 (L) 11/06/2023    HCT 28.1 (L) 11/06/2023    MCV 92 11/06/2023     (L) 11/06/2023     Lab Results   Component Value Date     11/06/2023    CO2 25 11/06/2023         Report completed by:  MARGY VELAZQUEZ PA-C  Date: 11/06/2023  [facility]              "

## 2023-11-06 NOTE — PLAN OF CARE
Goal Outcome Evaluation:               Patient is alert and oriented X 4, knows she is in hospital with UTI.  Despite this, she has difficulty understanding plan of care.  Staff explained why antibiotics needed to be given at regular intervals to work, and why staff needed to turn and check her skin frequently for it to heal and improve.  Patient would occasionally be cooperative with cares, but then demonstrate paranoia, thinking she is hearing people in the bathroom, and that there is a woman sitting in there, despite staff reassuring her it is empty.    Patient refused to take melatonin or any sleeping medication to help her sleep, despite not being able to sleep all night.  Patient also  refused to take imodium after she had a loose incontinent bowel movement at 0300.  Patient also would not take levothyroxine from nurse this morning until she had read sheet containing side effects and discuss with nurse for 15 minutes.      Patient turned and repositioned and Calmoseptine cream applied.  At midnight skin around rectum was bleeding a little, but later in shift there was no bleeding noted when staff cleaned patient, and site was less painful.  Request for air mattress left for MD.  Staff was unable to find pump to attach to mattress which could help wick moisture despite looking throughout hospital.      Mepilex over left foot open blister was changed, and had large amount of serous fluid.  PICC dressing changed, as well as mepilex next to PICC dressing that was covering intact blister.  Pillows used to elevate extremities, but patient refused to have pillows under legs, and grew agitated when staff attempted to place.  Mepilex intact on right heel.  Chux under legs exchanged due to weeping in extremities.      Staff provided reassurance, education on plan of cares, and active listening and encouragement to patient, as she was extremely distrustful with staff.  Said she wasn't sure she would have dialysis today  "because she was going to [place] instead.  Staff encouraged patient to participate in therapies to gain strength.      Problem: Adult Inpatient Plan of Care  Goal: Plan of Care Review  Description: The Plan of Care Review/Shift note should be completed every shift.  The Outcome Evaluation is a brief statement about your assessment that the patient is improving, declining, or no change.  This information will be displayed automatically on your shift  note.  Outcome: Progressing  Goal: Patient-Specific Goal (Individualized)  Description: You can add care plan individualizations to a care plan. Examples of Individualization might be:  \"Parent requests to be called daily at 9am for status\", \"I have a hard time hearing out of my right ear\", or \"Do not touch me to wake me up as it startles  me\".  Outcome: Progressing  Goal: Absence of Hospital-Acquired Illness or Injury  Outcome: Progressing  Intervention: Identify and Manage Fall Risk  Recent Flowsheet Documentation  Taken 11/6/2023 0011 by Rosa Elena Ching RN  Safety Promotion/Fall Prevention: activity supervised  Intervention: Prevent Skin Injury  Recent Flowsheet Documentation  Taken 11/6/2023 0550 by Rosa Elena Ching RN  Body Position:   turned   right  Taken 11/6/2023 0300 by Rosa Elena Ching RN  Body Position:   turned   left  Taken 11/6/2023 0011 by Rosa Elena Ching RN  Body Position: (had pillows in and was lying on left)   turned   right  Intervention: Prevent Infection  Recent Flowsheet Documentation  Taken 11/6/2023 0011 by Rosa Elena Ching RN  Infection Prevention:   hand hygiene promoted   rest/sleep promoted   single patient room provided  Goal: Optimal Comfort and Wellbeing  Outcome: Progressing  Intervention: Provide Person-Centered Care  Recent Flowsheet Documentation  Taken 11/6/2023 0011 by Rosa Elena Ching RN  Trust Relationship/Rapport:   care explained   choices provided   empathic listening provided  Goal: Readiness for Transition of " Care  Outcome: Progressing       Patient allowed staff to check blood sugar at 0700, and it was 69.  Breakfast tray brought to patient and patient was encouraged to eat, recheck of blood sugar was 89.

## 2023-11-06 NOTE — PLAN OF CARE
"  Problem: Adult Inpatient Plan of Care  Goal: Plan of Care Review  Description: The Plan of Care Review/Shift note should be completed every shift.  The Outcome Evaluation is a brief statement about your assessment that the patient is improving, declining, or no change.  This information will be displayed automatically on your shift  note.  Outcome: Progressing  Flowsheets (Taken 11/6/2023 1021)  Plan of Care Reviewed With: patient  Goal: Patient-Specific Goal (Individualized)  Description: You can add care plan individualizations to a care plan. Examples of Individualization might be:  \"Parent requests to be called daily at 9am for status\", \"I have a hard time hearing out of my right ear\", or \"Do not touch me to wake me up as it startles  me\".  Outcome: Progressing  Goal: Absence of Hospital-Acquired Illness or Injury  Outcome: Progressing  Intervention: Prevent Skin Injury  Recent Flowsheet Documentation  Taken 11/6/2023 0800 by Maulik Delong RN  Body Position: turned  Intervention: Prevent Infection  Recent Flowsheet Documentation  Taken 11/6/2023 0800 by Maulik Delong RN  Infection Prevention:   hand hygiene promoted   rest/sleep promoted  Goal: Optimal Comfort and Wellbeing  Outcome: Progressing  Goal: Readiness for Transition of Care  Outcome: Progressing     Problem: Risk for Delirium  Goal: Optimal Coping  Outcome: Progressing  Intervention: Optimize Psychosocial Adjustment to Delirium  Recent Flowsheet Documentation  Taken 11/6/2023 0800 by Maulik Delong RN  Family/Support System Care:   involvement promoted   presence promoted  Goal: Improved Behavioral Control  Outcome: Progressing  Goal: Improved Attention and Thought Clarity  Outcome: Progressing  Goal: Improved Sleep  Outcome: Progressing     Problem: Suicide Risk  Goal: Absence of Self-Harm  Outcome: Progressing  Intervention: Promote Psychosocial Wellbeing  Recent Flowsheet Documentation  Taken 11/6/2023 0800 by Maulik Delong" RN  Family/Support System Care:   involvement promoted   presence promoted     Problem: Fluid Volume Excess  Goal: Fluid Balance  Outcome: Progressing     Problem: Fall Injury Risk  Goal: Absence of Fall and Fall-Related Injury  Outcome: Progressing     Problem: Sepsis/Septic Shock  Goal: Optimal Coping  Outcome: Progressing  Intervention: Optimize Psychosocial Adjustment to Illness  Recent Flowsheet Documentation  Taken 11/6/2023 0800 by Maulik Delong RN  Family/Support System Care:   involvement promoted   presence promoted  Goal: Absence of Bleeding  Outcome: Progressing  Goal: Blood Glucose Level Within Targeted Range  Outcome: Progressing  Goal: Absence of Infection Signs and Symptoms  Outcome: Progressing  Intervention: Initiate Sepsis Management  Recent Flowsheet Documentation  Taken 11/6/2023 0800 by Maulik Delong RN  Infection Prevention:   hand hygiene promoted   rest/sleep promoted  Intervention: Promote Recovery  Recent Flowsheet Documentation  Taken 11/6/2023 0800 by Maulik Delong RN  Activity Management: activity adjusted per tolerance  Goal: Optimal Nutrition Intake  Outcome: Progressing     Problem: Malnutrition  Goal: Improved Nutritional Intake  Outcome: Progressing   Goal Outcome Evaluation:      Plan of Care Reviewed With: patient           Pt is alert and oriented x3. Pt is med complaint.pt denies pain. Pt's v/s is stable. Pt is going for dialysis this morning. Pt has generalized edema. Continue to monitor pt.

## 2023-11-06 NOTE — PROGRESS NOTES
"When writer was explaining about volara therapy, patient said \"I hate that thing.\" Patient requested to be repositioned/transfer to the chair for breakfast instead. When writer returned around 9am, patient was unavailable.     Jennifer Suggs, RT     "

## 2023-11-06 NOTE — DISCHARGE INSTRUCTIONS
Wound Care  L dorsal foot and R heel (may use this on leg blisters which rupture as well) - every 3 days  Cleanse open areas with saline, gently dry.  Cover with Mepilex dressing.  May change sooner if needed due to excessive drainage.        Lymphedema Care: Pt fit with elasticized compression. Can purchase on [employer]: [employer] Tubular Elastic Support Bandage Size F. Comes in roll in order to cut for size.  Bring bandage up to knee avoiding the crease of the knee.  Can fold over at the foot for extra compression for foot.  Take off daily for skin care, apply lotion daily.

## 2023-11-06 NOTE — CONSULTS
[facility]  WOC Nurse Inpatient Assessment     Consulted for: R heel, L dorsal foot    Summary: Per family, patient with confusion and awaiting HD; BLE edema noted    Patient History (according to provider note(s):        Assessment:    Skin Injury Location: B feet    Skin injury due to:  Edema  Skin history and plan of care: New areas of injury  Affected area:      Skin assessment: Blistering     Measurements (length x width x depth, in cm) 1 cm x 1cm on R heel and 6 cm  x  6 cm on L dorsal foot -  both with pink, viable wound beds     Color: normal and consistent with surrounding tissue     Temperature  normal      Drainage: small .      Color: serous      Odor: none  Pain: denies , none  Pain interventions prior to dressing change: patient tolerated well and slow and gentle cares   Treatment goal: Heal   STATUS: initial assessment  Supplies ordered: gathered    Treatment Plan:     L dorsal foot and R heel (may use this on leg blisters which rupture as well) - every 3 days  Cleanse open areas with saline, gently dry.  Cover with Mepilex dressing.  May change sooner if needed due to excessive drainage.    Orders: Written    RECOMMEND PRIMARY TEAM ORDER: Lymphedema consult  Education provided: plan of care  Discussed plan of care with: Patient and Family  WOC nurse follow-up plan: weekly  Notify WOC if wound(s) deteriorate.  Nursing to notify the Provider(s) and re-consult the WOC Nurse if new skin concern.    DATA:     Current support surface: Standard  Standard gel/foam mattress (IsoFlex, Atmos air, etc)  Containment of urine/stool: Incontinence Protocol  BMI: Body mass index is 43.63 kg/m .   Active diet order: Orders Placed This Encounter      Renal Diet (dialysis)     Output: I/O last 3 completed shifts:  In: 60 [P.O.:60]  Out: 50 [Urine:50]     Labs:   Recent Labs   Lab 11/06/23  0551   ALBUMIN 3.3*   HGB 9.8*   WBC 9.3     Pressure injury risk assessment:   Sensory Perception:  3-->slightly limited  Moisture: 2-->very moist  Activity: 1-->bedfast  Mobility: 2-->very limited  Nutrition: 3-->adequate  Friction and Shear: 2-->potential problem  Juan Score: 13    Bhavani Quinonez MSN RN CWOCN  Pager no longer is use, please contact through [employer]: [facility]

## 2023-11-06 NOTE — PROGRESS NOTES
[facility]    Medicine Progress Note - Hospitalist Service    Date of Admission:  10/29/2023    Assessment & Plan   83 year old female with a history of severe obesity, afib on eliquis, CKD4, GERD, HFpEF, chronic back pain, gout, admitted 10/30 due to acute encephalopathy, found to have anasarca, E coli UTI, acute respiratory failure with hypoxia, acute on chronic kidney injury requiring initiation of hemodialysis, and shock requiring vasopressors.     Acute hypoxic respiratory failure, resolved  Staph aureus bronchitis/pneumonia  Possible aspiration pneumonitis  - CT chest without IV contrast reviewed.  - Stop vancomycin and continue Zosyn  -Bronchial hygiene  -Volume removal with dialysis  - Flutter valve/bronchial hygiene    HAMMAD with anuria on superimposed CKD stage V with progression to end-stage renal disease:  - Status post tunneled dialysis catheter placement on 10/30/2023.  - Hemodialysis initiated 10/31.  - On Monday Wednesday Friday regimen.  - Nephrology assistance appreciated  -Monitor labs    E. coli UTI:  - IV zosyn    Circulatory shock: Resolved.      Hypotension: Resolved today.  - Discontinue vancomycin  - Continue Zosyn  - Follow-up on CT imaging studies ordered  - Monitor hemodynamics closely    HFp EF: With ADHF  - Volume being managed with hemodialysis.  Remains significantly hypervolemic  - Strict intake and output and daily weights.  - TTE on 10/30/2023 showed LVEF 50-55%, no significant valvular heart disease.  Biatrial enlargement.  -Repeat limited TTE to reassess LV function and for any wall motion abnormality given ongoing hypotension    Chronic atrial fibrillation with rapid ventricular response: Rate controlled currently on no AV rajni blocking drugs    -Continue renally dosed apixaban. Appears she was on Xarelto PTA which given the level of renal dysfunction not a candidate for.  Cardiology signed off.  - Telemetry  -Eliquis  -TTE as above    Acute  encephalopathy improved on superimposed progressive chronic cognitive impairment  -CT head negative for acute process  -CT cervical spine negative for fracture.  - Mental status appears improved.  But very well.    Multiple upper and lower extremity bruises.  High fall risk prior to admission.  Confused and altered on admission after being brought in from home where she could have easily had a fall and injured herself somewhere.  She was too unstable on initial presentation for imaging.  - Extensive imaging obtained and has been reviewed.    Folic acid deficiency:  - Start folic acid 1 mg daily  -B12 level normal 814.    Thrombocytopenia:  -Continue to monitor.  Extensive work-up in process.  - Hematology assistance appreciated    Normocytic anemia: No evidence of acute blood loss.   -Lab work-up as implemented currently.    -Appreciate hematology/oncology assistance.  -Trend labs  -Transfuse for hemoglobin less than 7 or if symptomatic    Hypothyroidism:  - Initiated on levothyroxine 25 mcg daily.  -Repeat TFTs outpatient in 4 to 6 weeks.  -Thyroid ultrasound showedHeterogeneous thyroid gland suggestive of sequelae of thyroiditis.   -CT chest showed multinodular goiter with extension into the mediastinum    Candidiasis, severe:  - Topical Mycostatin  - Micafungin IV to initiate given severity.    Hypoglycemia: Hemoglobin A1c 5% due to insufficient oral intake.   -Monitor Accu-Cheks  -Discontinue sliding scale insulin as she is nondiabetic  - Hypoglycemic protocol  -Cortisol 30.5.  TSH/T4 as above.  - Close monitoring for any recurrence    MGUS:  - Hematology/oncology consult appreciated.  Assistance appreciated.  -Multiple labs in process    Bilateral upper extremity, bilateral lower extremity edema:  - Venous duplex ultrasound negative of the lower extremities.  - Negative duplex ultrasound bilateral upper extremities for DVT  -Since right PICC line is functional and is on  Available we will continue to keep it  "for now    Nonocclusive superficial thrombophlebitis left cephalic vein  -Supportive care    Severe deconditioning and weakness  - PT/OT.  - TCU    Mildly displaced intra-articular fracture posterior medial tibial plateau:  - Orthopedic surgery evaluated.  No surgical intervention indicated based on their evaluation.  - Activity and weightbearing status as directed  -Supportive cares          Diet: Room Service  Snacks/Supplements Adult: Ensure Enlive; With Meals  Renal Diet (dialysis)    DVT Prophylaxis: Eliquis held  Lerma Catheter: PRESENT, indication: Strict 1-2 Hour I&O  Lines: PRESENT      PICC 10/30/23 Triple Lumen Right Brachial vein medial-Site Assessment: WDL  CVC Right Subclavian Tunneled-Site Assessment: WDL      Cardiac Monitoring: None  Code Status: No CPR- Do NOT Intubate      Clinically Significant Risk Factors            # Hypomagnesemia: Lowest Mg = 1.4 mg/dL in last 2 days, will replace as needed   # Hypoalbuminemia: Lowest albumin = 2.9 g/dL at 11/3/2023  5:57 AM, will monitor as appropriate   # Thrombocytopenia: Lowest platelets = 101 in last 2 days, will monitor for bleeding   # Hypertension: Noted on problem list  # Chronic heart failure with preserved ejection fraction: heart failure noted on problem list and last echo with EF >50%       # Severe Obesity: Estimated body mass index is 43.59 kg/m  as calculated from the following:    Height as of this encounter: 1.575 m (5' 2\").    Weight as of this encounter: 108.1 kg (238 lb 5.1 oz).   # Severe Malnutrition: based on nutrition assessment           Disposition Plan      Expected Discharge Date: 11/08/2023        Discharge Comments: TCU referrals sent  DC after HD            August Leslie DO, DO  Hospitalist Service  [facility]  Securely message with [employer] (more info)  Text page via [facility] Paging/Directory   ______________________________________________________________________    Interval History "   Reviewed    Physical Exam   Vital Signs: Temp: 97.7  F (36.5  C) Temp src: Oral BP: 126/89 Pulse: 86   Resp: 18 SpO2: 97 % O2 Device: None (Room air)    Weight: 238 lbs 5.08 oz    General: Nontoxic  CV: RRR, anasarca   Lungs decreased bases, no wheezing  Abdomen bowel sounds positive, liquid brown stool present, nontender to palpation, area of right flank region on initial CT suggesting either panniculitis or cellulitis on exam does not appear to be erythematous but there is clear skin breakdown in this region as well as an area of ecchymosis.  Lerma catheter in place.  Skin anasarca, bruising upper and lower extremities, limited range of motion, slight decrease in strength left greater than right.  Follows commands.  Significant skin fold maceration in right flank area, inguinal folds with maceration.        Labs/imaging studies reviewed.    Family at bedside

## 2023-11-07 NOTE — PLAN OF CARE
Goal Outcome Evaluation:      Plan of Care Reviewed With: patient, child    Patient oriented to self only. Combative and agitated early in the morning. Calmed down after son arrived. Multiple loose stools. Kesha area very excoriated and raw. Cream and powder applied after each kesha cleaning. Refusing blood sugar checks. Plan for discharge to TCU tomorrow after dialysis.

## 2023-11-07 NOTE — PLAN OF CARE
Speech Language Therapy Discharge Summary    Reason for therapy discharge:    All goals and outcomes met, no further needs identified.    Progress towards therapy goal(s). See goals on Care Plan in Epic electronic health record for goal details.  Goals met    Therapy recommendation(s):    No further therapy is recommended. She reports she feels her swallow is going well. Esophagram was not scheduled and she tells me she thought the doctor changed his mind about doing it. She verbalizes and demosntrates slow rate of intake, appropriate bolus size, no overt s/s aspiration and currently without complaint of fullness. She is working on her breathing and breath support with RT and overall strength with PT. At this time no further SLP is needed. Symptoms had appeared esophageal in nature and may benefit from further workup in the future. e.

## 2023-11-07 NOTE — PROGRESS NOTES
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 Nephrology consulted for HAMMAD and anuria.     ASSESSMENT & PLAN:     HAMMAD on CKD 5/ESRD needing dialysis: Likely ESRD. Baseline with progressive disease. Prior baseline Cr 1.6-2.0 (eGFR ~25%), on  4/2023 Cr 3.1-3.7, with eGFR 11-12. Pt reports no prior nephrology follow ups. Renal imaging 10/12/23 R Kidney 6.5, L 8.2, with cortical thinning and likely advanced renal disease, simple cysts. UA active/Cx Ecoli.  CKD with unclear etiology, appears progressive with nephrosclerosis on imaging, Bx likely not beneficial with such advanced disease. Serologies: + IgG kappa LC, with small Mspike, FLC Ratio acceptable with ESRD. Tunneled CVC placed 10/30/23 and initiated HD. MWF HD scheduled while here. HEp B and CXR/QGOLD collected as part of [facility] Admission process. SW consulted for out patient dialysis Unit coordination upon discharge.     Hypervolemia: s/p CHF exacerbation and poor renal function. S/P aggressive Diuresis with little UO. Now UF with HD, monitor.     Hyperkalemia: 2/2 renal failure. On renal diet. Follow with HD.     Metabolic acidosis: Resolved. Follow with HD.     Acute Metabolic encephalopathy: ?Possible delirium likely 2/2 UTI/Uremia.   Management per Primary. Follow with HD.     Hypoalbuminemia: ONS, follow.     Anemia: Baseline Hg ~12-13. HG 9.5 with drop in PLTs. Iron okay.     MGUS: Monoclonal peak 0.9, with IgG Kappa LC elevation. ONC following.     UTI: +Ecoli UC, BC -ve, Resp Cx staph. On ABX.     H/O A-fib with RVR: on digoxin per Cardiology. Will need to monitor levels , especially with renal failure. On Chronic AC/Xarelto.       SUBJECTIVE:    Pt laying in bed, resting comfortable.  Tolerating HD therapy  well, with 4L UF yesterday  Per son and brother generalized edema looks much better after initiating HD, I agree edema improving  I discussed labs, level of renal disease, and likely ESRD status with pt and family.   Denies n, v, c, sob, fever, rash or CP  Denies pain  + lua  VSS, Labs stable  Wt down trending with dialysis  Plan is for TCU Wednesday after dialysis.   Plan is to initiate HD MWF at [facility] under the care of Dr. Finn. First OP HD will be 11/10 at 820 AM. I called orders to Sarah RN at [facility] today.   Ongoing loose stool per pt. Some slight improvement after imodium   Answered all questions, discussed plan with pt and son at bedside.     OBJECTIVE:  Physical Exam   Temp: 97.6  F (36.4  C) Temp src: Axillary BP: 138/77 Pulse: 70   Resp: 22 SpO2: 93 % O2 Device: None (Room air)    Vitals:    11/05/23 0631 11/06/23 0323 11/07/23 0348   Weight: 108.1 kg (238 lb 5.1 oz) 108.2 kg (238 lb 8.6 oz) 102.2 kg (225 lb 5 oz)     Vital Signs with Ranges  Temp:  [97.3  F (36.3  C)-97.7  F (36.5  C)] 97.6  F (36.4  C)  Pulse:  [70-90] 70  Resp:  [16-39] 22  BP: (138-187)/() 138/77  SpO2:  [55 %-96 %] 93 %  I/O last 3 completed shifts:  In: 440 [P.O.:440]  Out: 4200 [Urine:200; Other:4000]    Patient Vitals for the past 72 hrs:   Weight   11/07/23 0348 102.2 kg (225 lb 5 oz)   11/06/23 0323 108.2 kg (238 lb 8.6 oz)   11/05/23 0631 108.1 kg (238 lb 5.1 oz)   11/04/23 1033 107.5 kg (236 lb 15.9 oz)     Intake/Output Summary (Last 24 hours) at 11/6/2023 0935  Last data filed at 11/6/2023 0700  Gross per 24 hour   Intake 60 ml   Output 150 ml   Net -90 ml       PHYSICAL EXAM:  GEN: NAD, elderly   CV: RRR   Lung: clear and equal, on RA  Ab: soft and NT  Ext: +2 LLE, + Upper extremity edema BL R>L  and well perfused  Skin: no rash  : + lua  Psych: mood WNL  Neuro: grossly intact  Access:tunneled CVC C/D/I.      LABORATORY STUDIES:     Recent Labs   Lab 11/07/23  0600 11/06/23  4522  11/05/23  0631 11/04/23  1512 11/04/23  0514 11/03/23  0557   WBC 9.0 9.3 9.3 8.2 7.5 8.5   RBC 3.00* 3.06* 3.13* 3.02* 3.06* 3.03*   HGB 9.5* 9.8* 9.9* 9.7* 9.6* 9.5*   HCT 28.0* 28.1* 28.9* 27.7* 27.9* 27.4*   * 109* 114* 110* 101* 96*       Basic Metabolic Panel:  Recent Labs   Lab 11/07/23  0600 11/06/23  2058 11/06/23  1158 11/06/23  0720 11/06/23  0702 11/06/23  0551 11/05/23  1510 11/05/23  0631 11/04/23  0703 11/04/23  0514 11/03/23  0705 11/03/23  0557 11/02/23  1228 11/01/23  2106     --   --   --   --  140  --  137  --  137  --  135  --  137   POTASSIUM 3.4  --   --   --   --  3.7  --  3.7  --  3.9  --  4.1  --  3.9   CHLORIDE 98  --   --   --   --  97*  --  96*  --  97*  --  96*  --  98   CO2 28  --   --   --   --  25  --  27  --  27  --  25  --  25   BUN 18.5  --   --   --   --  31.8*  --  28.3*  --  23.3*  --  40.5*  --  33.6*   CR 2.71*  --   --   --   --  3.70*  --  3.26*  --  2.71*  --  3.62*  --  2.97*   GLC 77 96 97 89 69* 79   < > 86   < > 74   < > 67*   < > 171*   SANDIP 8.6*  --   --   --   --  9.4  --  9.0  --  8.8  --  8.1*  --  8.1*    < > = values in this interval not displayed.       INRNo lab results found in last 7 days.     Recent Labs   Lab Test 11/07/23  0600 11/06/23  0551   WBC 9.0 9.3   HGB 9.5* 9.8*   * 109*       Personally reviewed current labs    Ros Oliveira [facility]  [facility]  [phone]

## 2023-11-07 NOTE — PROGRESS NOTES
Care Management Follow Up    Length of Stay (days): 8    Expected Discharge Date: 11/08/2023     Concerns to be Addressed:     Care progression  Patient plan of care discussed at interdisciplinary rounds: Yes    Anticipated Discharge Disposition:  U - [facility] and outpatient hemo dialysis - [facility]     Anticipated Discharge Services:  U - [facility] and outpatient hemo dialysis - [facility]  Anticipated Discharge DME:  NA    Patient/family educated on Medicare website which has current facility and service quality ratings:  Yes  Education Provided on the Discharge Plan:  Yes per team  Patient/Family in Agreement with the Plan:  Yes    Referrals Placed by CM/SW:  Yes  Private pay costs discussed: Transportation costs    Additional Information:  Rec'd a call from Felicitas at [facility], questioned who will transport patient to dialysis? Patient has Medicare, no supplemental. Able to accept patient for the first 20 days, then will have co-pay of $200/day.    Updated Felicitas, children will transport to [facility] and will let the son know about the coverage.    Called patient's son Tatyana, no answer  Tatyana came in to CM office and above message given. He accepted the TCU. He verified patient has no supplemental insurance.     Called [facility] [phone] (FA ext [phone]) and left a message for SHAHBAZ Meier. Renea confirmed reservation for a chair at [facility] for MWF at 8:20 AM with start date Fri 11/10/23. Per Renea, on the first day, patient needs to come at 7:30 AM for paperwork and then going forward patient will need to be there at 8:00 AM.    Called [facility] [phone] and let a message to make sure the dialysis orders have been called into [facility]  Ros Oliveira returned call and said she called in the orders to [facility]     Sent message to provider, Dr. Leslie, to make sure patient is ready for discharge tomorrow,  "11/8.    Social Hx: \"Lives with sonTatyana. Initiated dialysis on 10/30. Inderjit  accepted for [facility] for MWF at 8:20 AM with start date Fri 11/10/23. [facility]  for 11/8 between 2:38 - 3:23 PM. PAS completed.\"    RNCM to follow for medical progression, recommendations, and final discharge plan.     Jennifer Westfall RN     11:45 AM Dr. Leslie responded and said \"Tomorrow.\"  Called [facility] and spoke with Felicitas to secure the bed for tomorrow 11/8. Felicitas request patient to arrive before 4:00 PM.   Informed Felicitas patient will come after dialysis (unknown dialysis time).   Felicitas said the lastest arrival would be 6:00 PM.    Called bedside nurse, Elaine DIETRICH to see if patient can get dialysis early.    Updated the patient and family    The sonTatyana requested a [employer] [facility].  Discussed out of pocket cost of [facility] [facility] medical transportation by wheelchair with patient and or family member, Tatyana. Patient/family member agreed with the plan to have transportation arranged by [employer] transport.      Called and set up a [employer] [facility]  for 11/8 between 2:38 - 3:23 PM    PAS completed           "

## 2023-11-07 NOTE — PLAN OF CARE
Pt resistant with cares, educated about skin breakdown and need to do kesha-care.  Denies pain or discomfort unless being touched during cares.  Left foot dressing changed.  Indwelling lua catheter output 100cc of dark lisseth urine.    Tamar Price RN

## 2023-11-07 NOTE — PROGRESS NOTES
[facility]    Medicine Progress Note - Hospitalist Service    Date of Admission:  10/29/2023    Assessment & Plan   83 year old female with a history of severe obesity, afib on eliquis, CKD4, GERD, HFpEF, chronic back pain, gout, admitted 10/30 due to acute encephalopathy, found to have anasarca, E coli UTI, acute respiratory failure with hypoxia, acute on chronic kidney injury requiring initiation of hemodialysis, and shock requiring vasopressors.     Acute hypoxic respiratory failure, resolved  Staph aureus bronchitis/pneumonia  Probable aspiration pneumonitis in setting of large multinodular goiter with mediastinum extension  Complete collapse of the right lower lobe with underlying pneumonia possible.   Groundglass opacities in the right upper lobe  -Bronchial hygiene  -Volume removal with dialysis  - Flutter valve/bronchial hygiene  -trial of Metaneb to aid in right lung clearance/expansion  -Nebs  -Zosyn -> augmentin renal dosed start 11/7  -esophagram    HAMMAD with anuria on superimposed CKD stage V with progression to end-stage renal disease:  - Status post tunneled dialysis catheter placement on 10/30/2023.  - Hemodialysis initiated 10/31.  - On Monday Wednesday Friday regimen.  - Nephrology assistance appreciated  -Monitor labs    E. coli UTI: 10/31 UC  - resolved    Circulatory shock: Resolved.  -Blood cultures negative  - Discontinued vancomycin  - Monitor hemodynamics closely  -TTE reviewed and showed mildly decreased right ventricular  systolic function. Hold on VQ scan at this time as restarting Eliquis and will check a D Dimer.     HFp EF: With ADHF  - Volume being managed with hemodialysis.  Remains significantly hypervolemic with evidence of RV dysfunction  - TTE on 10/30/2023 showed LVEF 50-55%, no significant valvular heart disease.  Biatrial enlargement.  -Repeat limited TTE on 11/6/23 LVEF 50-55%, mild (1+) tricuspid regurgitation, The right ventricle is normal size.  Mildly decreased right ventricular systolic function.  - Strict intake and output and daily weights.    Chronic atrial fibrillation with rapid ventricular response: Rate controlled currently on no AV rajni blocking drugs    -holding apixaban. Appeared she was on Xarelto PTA which given the level of renal dysfunction not a candidate for.  Cardiology signed off.  - stop Telemetry  -TTE as above  -restart renal dosed Eliquis 2.5mg BID    Acute encephalopathy improved on superimposed progressive chronic cognitive impairment, fluctuating  -CT head negative for acute process  -CT cervical spine negative for fracture.  -improving but fluctuates    Multiple upper and lower extremity bruises.  High fall risk prior to admission.  Confused and altered on admission after being brought in from home where she could have easily had a fall and injured herself somewhere.  She was too unstable on initial presentation for imaging.  - Extensive imaging obtained and has been reviewed.    Folic acid deficiency:  -folic acid 1 mg daily  -B12 level normal 814.    Thrombocytopenia, normocytic anemia, mild thrombocytopenia, MGUS, acute on chronic disease/inflammation, etc:  - Hematology assistance appreciated  -follow-up in clinic with Dr. Christensen regarding MGUS  -copper level pending  -stated on Levothyroxine for hypothyroidism  -Hgb remains stable w/o ABLA    Hypothyroidism, multinodular goiter with extension into the mediastinum:  - Initiated on levothyroxine 25 mcg daily.  -Repeat TFTs outpatient in 4 to 6 weeks.  -Thyroid ultrasound showed Heterogeneous thyroid gland suggestive of sequelae of thyroiditis.   -CT chest showed multinodular goiter with extension into the mediastinum    Candidiasis, severe:  - Topical Mycostatin  - Micafungin IV stop today    Hypoglycemia: Hemoglobin A1c 5% due to insufficient oral intake.   -Monitor Accu-Cheks  -Discontinued sliding scale insulin as she is nondiabetic  - Hypoglycemic protocol  -Cortisol  30.5.    - resolved    MGUS:  - Hematology/oncology consult appreciated.  Assistance appreciated.  -outpt follow-up    Bilateral upper extremity, bilateral lower extremity edema:  - Venous duplex ultrasound negative of the lower extremities.  - Negative duplex ultrasound bilateral upper extremities for DVT  -Since right PICC line is functional and is needed will keep until d/c    Nonocclusive superficial thrombophlebitis left cephalic vein  -Supportive care  -eliquis    Dysphagia, intermittent:  -SLP swallow study reviewed. Tolerating current diet.  Given the very large  multinodular goiter with extension into the mediastinum check XR esophagram    Severe deconditioning and weakness  - PT/OT.  - TCU    Mildly displaced intra-articular fracture posterior medial tibial plateau:  - Orthopedic surgery evaluated.  No surgical intervention indicated based on their evaluation.  - Activity and weightbearing status as directed  -Supportive cares    Discharge TCU 1-2d    Remove lua, anuric, on HD        Diet: Room Service  Regular Diet Adult  Snacks/Supplements Adult: Magic Cup; With Meals    DVT Prophylaxis: Eliquis held  Lua Catheter: PRESENT, indication: Retention  Lines: PRESENT      PICC 10/30/23 Triple Lumen Right Brachial vein medial-Site Assessment: WDL  CVC Right Subclavian Tunneled-Site Assessment: Ecchymotic      Cardiac Monitoring: None  Code Status: No CPR- Do NOT Intubate      Clinically Significant Risk Factors           # Hypercalcemia: corrected calcium is >10.1, will monitor as appropriate    # Hypoalbuminemia: Lowest albumin = 2.9 g/dL at 11/3/2023  5:57 AM, will monitor as appropriate   # Thrombocytopenia: Lowest platelets = 109 in last 2 days, will monitor for bleeding   # Hypertension: Noted on problem list    # Chronic heart failure with preserved ejection fraction: heart failure noted on problem list and last echo with EF >50%       # Severe Obesity: Estimated body mass index is 41.21 kg/m  as  "calculated from the following:    Height as of this encounter: 1.575 m (5' 2\").    Weight as of this encounter: 102.2 kg (225 lb 5 oz).   # Severe Malnutrition: based on nutrition assessment           Disposition Plan     Expected Discharge Date: 11/08/2023        Discharge Comments: TCU referrals sent  DC after HD            August Leslie DO, DO  Hospitalist Service  [facility]  Securely message with [employer] (more info)  Text page via [employer] Paging/Directory   ______________________________________________________________________    Interval History   Reviewed    Physical Exam   Vital Signs: Temp: 97.6  F (36.4  C) Temp src: Axillary BP: 138/77 Pulse: 70   Resp: 22 SpO2: 93 % O2 Device: None (Room air)    Weight: 225 lbs 4.96 oz    General: Nontoxic  CV: RRR, anasarca   Lungs decreased bases, no wheezing  Abdomen: bowel sounds positive, liquid brown stool present, nontender to palpation, area of right flank region on initial CT suggesting either panniculitis or cellulitis on exam does not appear to be erythematous but there is clear skin breakdown in this region as well as an area of ecchymosis.    : Lerma catheter in place.   Neuro a&ox3    Labs/imaging studies reviewed.    D/w son at bedside     "

## 2023-11-07 NOTE — PROCEDURES
Mely Alegria dialyzed for 3.5 hours on a Revaclear 300 dialyzer with a net fluid removal of 4L. A BFR of 350 ml/min was obtained via a tunneled RIJ CVC. Pt on K3 bath. Complications: none. VSS post run. Verbal report given to LAVELLE Mancini RN. Pt education provided concerning dialysis procedure and all questions answered. Consent on file. See flowsheet in epic for further details. Water alarm in place during treatment. Hepatitis B Antigen negative. Date drawn 10/31/23. Hepatitis B Antibody susceptible . Date drawn: 10/31/23.      Ramona Nevarez RN [facility]

## 2023-11-07 NOTE — PROGRESS NOTES
"CLINICAL NUTRITION SERVICES - REASSESSMENT NOTE     Nutrition Prescription    RECOMMENDATIONS FOR MDs/PROVIDERS TO ORDER:    Malnutrition Status:    Severe in acute illness    Recommendations already ordered by Registered Dietitian (RD):  Discontinue ensure enlive and add magic cups daily    Future/Additional Recommendations:  Monitor po intake, labs, plan of care     EVALUATION OF THE PROGRESS TOWARD GOALS   Diet: Regular  Nutrition Supplements: ensure enlive daily with supper meal  Intake: pt states she does not want the ensure.  She is asking about people who are not in her room. She states her appetite is not big. She has eaten 25% breakfast on 11/7, 50% supper on 11/6, 75% breakfast on 11/5 and 25% all 3 meals on 11/4 ( poor po intake)       ANTHROPOMETRICS  Height: 157.5 cm (5' 2\")  Most Recent Weight: 102.2 kg (225 lb 5 oz)    Weight History:   Wt Readings from Last 10 Encounters:   11/07/23 102.2 kg (225 lb 5 oz)   04/04/23 93.4 kg (206 lb)   06/25/21 98.2 kg (216 lb 6.4 oz)   12/03/19 100.9 kg (222 lb 7 oz)   11/30/18 100.8 kg (222 lb 4.8 oz)   11/29/17 101.6 kg (223 lb 14.4 oz)   11/10/16 98.9 kg (218 lb)   12/02/15 99.8 kg (220 lb)     GI CONCERNS  Fecal incontinence  Last BM 11/7/2023 ( loose brown)  Audible BS    + foot and heel wound per chart    LABS  Reviewed: Na 140, K 3.4, Cr 2.71 (H), Ca 8.6 (L), Glu 77    MEDICATIONS  Reviewed: febuxostat, folic acid, levothyroxine, mycamine, midodrine, pantoprazole, zosyn, thiamine 100 g    CURRENT NUTRITION DIAGNOSIS  Inadequate oral intake related to poor appetite as evidenced by eating mostly < 75% of meals      INTERVENTIONS  Implementation  Discontinue ensure enlive  Add vanilla/orange magic cup with lunch and supper meals    Goals  Patient to consume % of nutritionally adequate meals three times per day, or the equivalent with supplements/snacks.not met  Electrolytes WNL-progressing    Monitoring/Evaluation  Progress toward goals will be monitored " and evaluated per protocol.

## 2023-11-07 NOTE — PLAN OF CARE
Problem: Risk for Delirium  Goal: Optimal Coping  Outcome: Progressing  Intervention: Optimize Psychosocial Adjustment to Delirium  Recent Flowsheet Documentation  Taken 11/6/2023 1930 by Mariana Mancini RN  Family/Support System Care:   involvement promoted   presence promoted  Taken 11/6/2023 1630 by Mariana Mancini, RN  Family/Support System Care:   involvement promoted   presence promoted  Goal: Improved Behavioral Control  Outcome: Progressing  Goal: Improved Attention and Thought Clarity  Outcome: Progressing  Goal: Improved Sleep  Outcome: Progressing     Problem: Fluid Volume Excess  Goal: Fluid Balance  Outcome: Progressing     Problem: Fall Injury Risk  Goal: Absence of Fall and Fall-Related Injury  Outcome: Progressing      Goal Outcome Evaluation:       Pt is confused to time and situation, keeps asking to go home, trying to get up from bed, and asking for a wheel chair to go home. Son keeps at the bedside with her. Paged doctor and gave her PRN Zyprexa 2.5 mg. VSS. Had dialysis today with 4L output. Had a medium loose BM. She is on IV antibiotics. Lerma catheter in place with small output. Plan is to discharge to possible TCU on Wednesday after dialysis.                    None

## 2023-11-07 NOTE — TREATMENT PLAN
RCAT Treatment Plan    Patient Score: 7  Patient Acuity: 4    Clinical Indication for Therapy: atelectasis    Therapy Ordered: VEST BID    Assessment Summary: Patient is diminished/clear to auscultation with no O2 needs. Patient cannot tolerate vest treatment but can benefit from flutter valve. RT will follow.    Mike Schaeffer, RT  11/6/2023

## 2023-11-08 NOTE — PLAN OF CARE
Problem: Fluid Volume Excess  Goal: Fluid Balance  Outcome: Progressing   Goal Outcome Evaluation:    Patient called for RN stating that she had to pee. Tried to place Purewick and found patient to be full of BM. RN & NA cleaned up patient and changed bedding and gown. Placed brief on patient so that she could go down to dialysis.  After completing dialysis patient went to X-Ray for Esophogram. Patient returned to room @ ~1230. Son came to nursing desk asking for RN. I went to the room with medication and lab draw items. Another son that was in the room immediately started yelling about how staff is always doing tests on her and no ever lets her finish her meal. I left and reported to management.  Dialysis was able to remove 2.5 L of fluids. Stopped pulling when BP dropped.

## 2023-11-08 NOTE — PROGRESS NOTES
RENAL PROGRESS NOTE    HPI:82 y/o F with a PMH of Progressive CKD 4, progressed to stage 5 in labs on 4/2023,  Diastolic HF, HTN , Chronic A- fib with RVR on chronic AC , back pain and morbid obesity, admitted with AMS/UTI , Hyperkalemia , acidosis , weakness and hypervolemia with anuria. Nephrology consulted for HAMMAD and anuria.     ASSESSMENT & PLAN:     HAMMAD on CKD 5/ESRD needing dialysis: Likely ESRD. Baseline with progressive disease. Prior baseline Cr 1.6-2.0 (eGFR ~25%), on  4/2023 Cr 3.1-3.7, with eGFR 11-12. Pt reports no prior nephrology follow ups. Renal imaging 10/12/23 R Kidney 6.5, L 8.2, with cortical thinning and likely advanced renal disease, simple cysts. UA active/Cx Ecoli.  CKD with unclear etiology, appears progressive with nephrosclerosis on imaging, Bx likely not beneficial with such advanced disease. Serologies: + IgG kappa LC, with small Mspike, FLC Ratio acceptable with ESRD. Tunneled CVC placed 10/30/23 and initiated HD. MWF HD scheduled while here. HEp B and CXR/QGOLD collected as part of [facility] Admission process.   Recs:   - dialysis today  - okay with discharge with outpatient dialysis starting on Friday    Hypervolemia: s/p CHF exacerbation and poor renal function. S/P aggressive Diuresis with little UO. Now UF with HD, monitor.     Hyperkalemia: controlled      Acute Metabolic encephalopathy: ?Possible delirium likely 2/2 UTI/Uremia. Now getting good clearance but still some waxing and waning confusion    Hypoalbuminemia: ONS, follow.     Anemia: Baseline Hg ~12-13. HG 9.5 with drop in PLTs. Iron okay.     MGUS: Monoclonal peak 0.9, with IgG Kappa LC elevation. ONC following.     UTI: +Ecoli UC, BC -ve, Resp Cx staph. On ABX.     H/O A-fib with RVR: on apixaban now, not on rate controlling agents    Hypothyroidism - on replacement          SUBJECTIVE:    Seen on dialysis.  Still quite a bit of fluid on her legs but overall better.  Mildly confused. Thinks she's hearing a staplegun.       Appears that she may be discharging today, truncating run slightly to get her esophagram done and then discharge.      OBJECTIVE:  Physical Exam   Temp: 98.2  F (36.8  C) Temp src: Tympanic BP: 108/59 Pulse: 71   Resp: 16 SpO2: 91 % O2 Device: None (Room air)    Vitals:    11/06/23 0323 11/07/23 0348 11/08/23 0351   Weight: 108.2 kg (238 lb 8.6 oz) 102.2 kg (225 lb 5 oz) 102.7 kg (226 lb 6.6 oz)     Vital Signs with Ranges  Temp:  [96.6  F (35.9  C)-98.2  F (36.8  C)] 98.2  F (36.8  C)  Pulse:  [63-89] 71  Resp:  [15-21] 16  BP: (100-132)/(53-76) 108/59  SpO2:  [91 %-98 %] 91 %  I/O last 3 completed shifts:  In: 315 [P.O.:315]  Out: 100 [Urine:100]    Patient Vitals for the past 72 hrs:   Weight   11/08/23 0351 102.7 kg (226 lb 6.6 oz)   11/07/23 0348 102.2 kg (225 lb 5 oz)   11/06/23 0323 108.2 kg (238 lb 8.6 oz)     Intake/Output Summary (Last 24 hours) at 11/6/2023 0935  Last data filed at 11/6/2023 0700  Gross per 24 hour   Intake 60 ml   Output 150 ml   Net -90 ml       PHYSICAL EXAM:  GEN: NAD, elderly   CV: RRR   Lung: clear and equal, on RA  Ab: soft and NT  Ext: +2 LLE, + Upper extremity edema BL R>L  and well perfused  Skin: no rash  : + lua  Psych: mood WNL  Neuro: grossly intact  Access:tunneled CVC C/D/I.      LABORATORY STUDIES:     Recent Labs   Lab 11/08/23  0512 11/07/23  0600 11/06/23  0551 11/05/23  0631 11/04/23  1512 11/04/23  0514   WBC 7.9 9.0 9.3 9.3 8.2 7.5   RBC 2.72* 3.00* 3.06* 3.13* 3.02* 3.06*   HGB 8.6* 9.5* 9.8* 9.9* 9.7* 9.6*   HCT 25.4* 28.0* 28.1* 28.9* 27.7* 27.9*   * 119* 109* 114* 110* 101*       Basic Metabolic Panel:  Recent Labs   Lab 11/08/23  0815 11/08/23  0512 11/07/23  2243 11/07/23  0600 11/06/23  2058 11/06/23  1158 11/06/23  0702 11/06/23  0551 11/05/23  1510 11/05/23  0631 11/04/23  0703 11/04/23  0514 11/03/23  0705 11/03/23  0557   NA  --  140  --  140  --   --   --  140  --  137  --  137  --  135   POTASSIUM  --  3.5  --  3.4  --   --   --  3.7  --   3.7  --  3.9  --  4.1   CHLORIDE  --  99  --  98  --   --   --  97*  --  96*  --  97*  --  96*   CO2  --  29  --  28  --   --   --  25  --  27  --  27  --  25   BUN  --  23.3*  --  18.5  --   --   --  31.8*  --  28.3*  --  23.3*  --  40.5*   CR  --  3.18*  --  2.71*  --   --   --  3.70*  --  3.26*  --  2.71*  --  3.62*   GLC 75 80 119* 77 96 97   < > 79   < > 86   < > 74   < > 67*   SANDIP  --  8.6*  --  8.6*  --   --   --  9.4  --  9.0  --  8.8  --  8.1*    < > = values in this interval not displayed.       INRNo lab results found in last 7 days.     Recent Labs   Lab Test 11/08/23  0512 11/07/23  0600   WBC 7.9 9.0   HGB 8.6* 9.5*   * 119*       Personally reviewed current labs    Jad Holden  [facility]  [phone]

## 2023-11-08 NOTE — PROGRESS NOTES
[facility]    Medicine Progress Note - Hospitalist Service    Date of Admission:  10/29/2023    Assessment & Plan   83 year old female with a history of severe obesity, afib on eliquis, CKD4, GERD, HFpEF, chronic back pain, gout, admitted 10/30 due to acute encephalopathy, found to have anasarca, E coli UTI, acute respiratory failure with hypoxia, acute on chronic kidney injury requiring initiation of hemodialysis, and shock requiring vasopressors.     Acute hypoxic respiratory failure, resolved  Staph aureus bronchitis/pneumonia, treated  Probable aspiration pneumonitis in setting of large multinodular goiter with mediastinum extension  Complete collapse of the right lower lobe with underlying pneumonia possible.   Groundglass opacities in the right upper lobe  -Bronchial hygiene  -Volume removal with dialysis  - Flutter valve/bronchial hygiene  -Nebs as ordered  -Zosyn -> augmentin renal dosed start 11/7 complete as prescribed  -Follow-up on esophagram result    HAMMAD with anuria on superimposed CKD stage V with progression to end-stage renal disease:  - Status post tunneled dialysis catheter placement on 10/30/2023.  - Hemodialysis initiated 10/31.  - On Monday Wednesday Friday regimen.  - Nephrology assistance appreciated  -Monitor labs  -Outpatient dialysis has been arranged    E. coli UTI: 10/31 UC  - resolved    Circulatory shock: Resolved.  -Blood cultures negative  - Discontinued vancomycin  - Monitor hemodynamics closely  -TTE reviewed and showed mildly decreased right ventricular  systolic function. Hold on VQ scan at this time as Eliquis and will check a D Dimer.     HFp EF: With ADHF  - Volume being managed with hemodialysis.  Remains significantly hypervolemic with evidence of RV dysfunction  - TTE on 10/30/2023 showed LVEF 50-55%, no significant valvular heart disease.  Biatrial enlargement.  -Repeat limited TTE on 11/6/23 LVEF 50-55%, mild (1+) tricuspid regurgitation, The  right ventricle is normal size. Mildly decreased right ventricular systolic function.  - Strict intake and output and daily weights.    Chronic atrial fibrillation with rapid ventricular response: Rate controlled currently on no AV rajni blocking drugs    -holding apixaban. Appeared she was on Xarelto PTA which given the level of renal dysfunction not a candidate for.  Cardiology signed off.  - stop Telemetry  -TTE as above  -restarted renal dosed Eliquis 2.5mg BID    Acute encephalopathy, fluctuating, improved  -CT head negative for acute process  -CT cervical spine negative for fracture.  -improving but fluctuates    Multiple upper and lower extremity bruises.  High fall risk prior to admission.  Confused and altered on admission after being brought in from home where she could have easily had a fall and injured herself somewhere.  She was too unstable on initial presentation for imaging.  - Extensive imaging obtained and has been reviewed.    Folic acid deficiency:  -folic acid 1 mg daily  -B12 level normal 814.    Thrombocytopenia, normocytic anemia, MGUS, acute on chronic disease/inflammation, etc:  - Hematology assistance appreciated  -follow-up in clinic with Dr. Christensen regarding MGUS  -copper level pending  -stated on Levothyroxine for hypothyroidism  -Hgb this morning was a 1 g drop lower hemoglobin prior to dialysis compared to days prior.    -CBC post dialysis showed hemoglobin stable postdialysis    Hypothyroidism, multinodular goiter with extension into the mediastinum:  - Initiated on levothyroxine 25 mcg daily.  -Repeat TFTs outpatient in 4 to 6 weeks.  -Thyroid ultrasound showed Heterogeneous thyroid gland suggestive of sequelae of thyroiditis.   -CT chest showed multinodular goiter with extension into the mediastinum  -Follow-up on esophagram    Candidiasis, severe:  improved  - Topical Mycostatin  - WOC     Ccutaneous Left foot blister  -WOF    Hypoglycemia: Hemoglobin A1c 5% due to insufficient  oral intake.   - resolved    Bilateral upper extremity, bilateral lower extremity edema:  - Venous duplex ultrasound negative of the lower extremities.  - Negative duplex ultrasound bilateral upper extremities for DVT  -Significant third spacing which should gradually improve with volume removal and ultrafiltration over time and improvement in nutritional status  - Mobilization  -discharge picc    Nonocclusive superficial thrombophlebitis left cephalic vein  -Supportive care  -eliquis    Dysphagia, intermittent:  -SLP swallow study reviewed. Tolerating current diet.  Given the very large  multinodular goiter with extension into the mediastinum we will follow-up on XR esophagram    Diarrhea: On no stool softeners and has had multiple episodes of loose brown stool over the past 24 hours.  Exceeding more than 10-12.  - Probiotic, check C. difficile toxin, if C. difficile toxin is negative start Imodium as needed    Severe deconditioning and weakness  - PT/OT.  - TCU    Mildly displaced intra-articular fracture posterior medial tibial plateau:  - Orthopedic surgery evaluated.  No surgical intervention indicated based on their evaluation.  - Activity and weightbearing status as directed  -Supportive cares    Discharge TCU 1-2d        Diet: Room Service  Regular Diet Adult  Snacks/Supplements Adult: Magic Cup; With Meals    DVT Prophylaxis: Eliquis held  Lerma Catheter: Not present  Lines: PRESENT      PICC 10/30/23 Triple Lumen Right Brachial vein medial-Site Assessment: WDL  CVC Right Subclavian Tunneled-Site Assessment: WDL;Ecchymotic      Cardiac Monitoring: None  Code Status: No CPR- Do NOT Intubate      Clinically Significant Risk Factors              # Hypoalbuminemia: Lowest albumin = 2.9 g/dL at 11/8/2023  5:12 AM, will monitor as appropriate   # Thrombocytopenia: Lowest platelets = 112 in last 2 days, will monitor for bleeding   # Hypertension: Noted on problem list    # Chronic heart failure with preserved  "ejection fraction: heart failure noted on problem list and last echo with EF >50%       # Severe Obesity: Estimated body mass index is 41.41 kg/m  as calculated from the following:    Height as of this encounter: 1.575 m (5' 2\").    Weight as of this encounter: 102.7 kg (226 lb 6.6 oz).   # Severe Malnutrition: based on nutrition assessment           Disposition Plan      Expected Discharge Date: 11/09/2023        Discharge Comments: TCU at [facility] after HD            August Leslie DO, DO  Hospitalist Service  [facility]  Securely message with [employer] (more info)  Text page via [employer] Paging/Directory   ______________________________________________________________________    Interval History   Reviewed    Physical Exam   Vital Signs: Temp: 97.2  F (36.2  C) Temp src: Temporal BP: 110/66 Pulse: 73   Resp: 16 SpO2: (!) 86 % O2 Device: None (Room air)    Weight: 226 lbs 6.6 oz    General: Nontoxic  CV: RRR, anasarca   Lungs decreased bases, no wheezing  Abdomen: bowel sounds positive, liquid brown stool present, nontender to palpation, area of right flank region on initial CT suggesting either panniculitis or cellulitis on exam does not appear to be erythematous but there is clear skin breakdown in this region as well as an area of ecchymosis.    : Lerma catheter in place.   Neuro a&ox3    Labs/imaging studies reviewed.    D/w son at bedside     "

## 2023-11-08 NOTE — PROGRESS NOTES
Patient was too confused to do flutter exercise during 2000 rounds even with family encouragement. Patient was complaint and putting in good effort until midday. RT will follow.    Mike Schaeffer, RT

## 2023-11-08 NOTE — PROCEDURES
Mely Alegria dialyzed for 3 hours on a Revaclear 300 dialyzer with a net fluid removal of 2.5L. A BFR of 350 ml/min was obtained via a tunneled R CVC. Pt on K3 bath. Complications: pt had a hypotensive episode and UF goal was reduced. BP normalized. VSS post run. Verbal report given to ROSY Awad RN. Pt education provided concerning dialysis procedure and all questions answered. Consent on file. See flowsheet in epic for further details. Water alarm in place during treatment. Hepatitis B Antigen negative. Date drawn 10/31/23. Hepatitis B Antibody susceptible. Date drawn: 10/31/23.      Ramona Nevarez RN [facility]

## 2023-11-08 NOTE — TREATMENT PLAN
RCAT Treatment Plan    Patient Score: 5   Patient Acuity: 5    Clinical Indication for Therapy: atelectasis, Pneumonia,     Therapy Ordered: Flutter valve.    Assessment Summary: Patient seen on RA, spo2 92% Patient demonstrates good effort with flutter valve.     Svetlana Cheney, RT  11/8/2023

## 2023-11-08 NOTE — PLAN OF CARE
Problem: Adult Inpatient Plan of Care  Goal: Absence of Hospital-Acquired Illness or Injury  Outcome: Progressing  Intervention: Identify and Manage Fall Risk  Recent Flowsheet Documentation  Taken 11/7/2023 2015 by Mary Ann Kidd RN  Safety Promotion/Fall Prevention:   activity supervised   clutter free environment maintained   increased rounding and observation   nonskid shoes/slippers when out of bed   patient and family education   room door open   room organization consistent   safety round/check completed  Intervention: Prevent Skin Injury  Recent Flowsheet Documentation  Taken 11/7/2023 2029 by Mary Ann Kidd RN  Body Position: (except needs 2 assist to boost in bed) position changed independently  Goal: Optimal Comfort and Wellbeing  Outcome: Progressing  Goal: Readiness for Transition of Care  Outcome: Progressing     Problem: Risk for Delirium  Goal: Optimal Coping  Outcome: Progressing  Goal: Improved Behavioral Control  Outcome: Progressing  Intervention: Minimize Safety Risk  Recent Flowsheet Documentation  Taken 11/7/2023 2015 by Mary Ann Kidd RN  Communication Enhancement Strategies:   call light answered in person   communication board used  Goal: Improved Sleep  Outcome: Progressing     Problem: Fluid Volume Excess  Goal: Fluid Balance  Outcome: Progressing     Problem: Fall Injury Risk  Goal: Absence of Fall and Fall-Related Injury  Outcome: Progressing  Intervention: Identify and Manage Contributors  Recent Flowsheet Documentation  Taken 11/7/2023 2015 by Mary Ann Kidd, RN  Medication Review/Management: medications reviewed  Intervention: Promote Injury-Free Environment  Recent Flowsheet Documentation  Taken 11/7/2023 2015 by Mary Ann Kidd RN  Safety Promotion/Fall Prevention:   activity supervised   clutter free environment maintained   increased rounding and observation   nonskid shoes/slippers when out of bed   patient and family education   room door open   room organization  consistent   safety round/check completed   Goal Outcome Evaluation:         Writer assumed care of pt at approx 1930. Pt a/o to self, confused.denies pain. Up to commode w/ 2 assist. Incont of small bm, watery, loose brown. Cooperative. Son was visiting at bedside until visiting hrs ended. Bed alarm on. Pt resting w/ eyes closed at end of shift. Report given to Maritza NICOLE.

## 2023-11-08 NOTE — PROGRESS NOTES
"Care Management Follow Up    Length of Stay (days): 9    Expected Discharge Date: 11/08/2023     Concerns to be Addressed:     Care progression  Patient plan of care discussed at interdisciplinary rounds: Yes    Anticipated Discharge Disposition:  TCU - [facility] and outpatient hemo dialysis - [facility]     Anticipated Discharge Services:  U - [facility] and outpatient hemo dialysis - [facility]  Anticipated Discharge DME:  NA    Patient/family educated on Medicare website which has current facility and service quality ratings:  Yes  Education Provided on the Discharge Plan:  Yes per team  Patient/Family in Agreement with the Plan:  Yes    Referrals Placed by CM/SW:  Yes  Private pay costs discussed: Transportation costs    Additional Information:  Called [facility] and spoke with Felicitas to confirm arrival today via [employer] [facility].    Patient's son, Tatyana, stopped by. He is applying for wmbly.     Called  [facility] [phone] and spoke with Sarah. Patient approved and welcome letter available to print for patient.    Social Hx: \"Lives with son, Tatyana. Initiated dialysis on 10/30. Inderjit  accepted for [facility] for MWF at 8:20 AM with start date Fri 11/10/23. [facility] WC  for 11/8 between 2:38 - 3:23 PM. PAS completed.\"    RNCM to follow for medical progression, recommendations, and final discharge plan.     Jennifer Westfall RN     12:15 PM per provider, Dr. Leslie, cancel discharge today. Patient hgb low, 8.5 and patient reported stooling 14-15x/day. Will check for C-diff. Patient will stay to monitor vitals. Maybe discharge tomorrow if hgb improve and C-diff negative.    Called [facility] and spoke to Felicitas and cancel discharge today. Maybe discharge tomorrow.   Called to cancel transportation        "

## 2023-11-08 NOTE — PLAN OF CARE
Problem: Risk for Delirium  Goal: Optimal Coping  Outcome: Progressing  Goal: Improved Behavioral Control  Outcome: Progressing  Intervention: Minimize Safety Risk  Recent Flowsheet Documentation  Taken 11/7/2023 2354 by Maritza Doyle, RN  Communication Enhancement Strategies:   call light answered in person   extra time allowed for response   repetition utilized   one-step directions provided  Trust Relationship/Rapport:   care explained   choices provided   emotional support provided  Goal: Improved Attention and Thought Clarity  Outcome: Progressing  Intervention: Maximize Cognitive Function  Recent Flowsheet Documentation  Taken 11/7/2023 2354 by Maritza Doyle, RN  Sensory Stimulation Regulation: care clustered  Reorientation Measures: clock in view  Goal: Improved Sleep  Outcome: Progressing   Goal Outcome Evaluation:       Pt is fairly sleep upon assessment. Pt is alert but confused. Disoriented to time, place, & situation. Pt on RA. Lung sounds diminished. Vitals stable. Had incontinent loose stoolx2, assist w/ 2 providers. Cooperative but irritated w/ cares. Skin is redness blanchable, creams applied post care. Reposition provided to prevent skin ulcer. Care cluster to promote rest.      Plan on HD this AM prior to discharge to TCU

## 2023-11-09 NOTE — PROGRESS NOTES
"Care Management Follow Up    Length of Stay (days): 10    Expected Discharge Date: 11/09/2023     Concerns to be Addressed:     Care progression  Patient plan of care discussed at interdisciplinary rounds: Yes    Anticipated Discharge Disposition:  U - [facility] and outpatient hemo dialysis - [facility]     Anticipated Discharge Services:  [facility] and outpatient hemo dialysis - [facility]  Anticipated Discharge DME:  NA    Patient/family educated on Medicare website which has current facility and service quality ratings:  Yes  Education Provided on the Discharge Plan:  Yes per team  Patient/Family in Agreement with the Plan:  Yes    Referrals Placed by CM/SW:  Yes  Private pay costs discussed: Transportation costs    Additional Information:  Check on labs. C-diff negative, but hgb is 8.8  Sent message to provider regarding discharge status    Social Hx: \"Lives with sonTatyana. Initiated dialysis on 10/30. Inderjit  accepted for [facility] for MWF at 8:20 AM with start date Fri 11/10/23. [facility] WC  for 11/8 between 2:38 - 3:23 PM. PAS completed.\"    RNCM to follow for medical progression, recommendations, and final discharge plan.     Jennifer Westfall RN     8:30 AM per provider, Dr. Corey, working on the discharge orders now.    Called [facility] and spoke with Felicitas. Patient can come anytime a ride can be scheduled.    Sent orders  Called [facility] transport  11:10 - 11:50 AM    Called and updated patient's son, Tatyana  "

## 2023-11-09 NOTE — PROGRESS NOTES
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 Nephrology consulted for HAMMAD and anuria.     ASSESSMENT & PLAN:     HAMMAD on CKD 5/ESRD needing dialysis: Likely ESRD. Baseline with progressive disease. Prior baseline Cr 1.6-2.0 (eGFR ~25%), on  4/2023 Cr 3.1-3.7, with eGFR 11-12. Pt reports no prior nephrology follow ups. Renal imaging 10/12/23 R Kidney 6.5, L 8.2, with cortical thinning and likely advanced renal disease, simple cysts. UA active/Cx Ecoli.  CKD with unclear etiology, appears progressive with nephrosclerosis on imaging, Bx likely not beneficial with such advanced disease. Serologies: + IgG kappa LC, with small Mspike, FLC Ratio acceptable with ESRD. Tunneled CVC placed 10/30/23 and initiated HD. MWF HD scheduled while here. HEp B and CXR/QGOLD collected as part of [facility] Admission process.   Recs:   - okay with discharge with outpatient dialysis starting on Friday    Hypervolemia: s/p CHF exacerbation and poor renal function. S/P aggressive Diuresis with little UO. Now UF with HD, monitor.     Hyperkalemia: controlled    Acute Metabolic encephalopathy: ?Possible delirium likely 2/2 UTI/Uremia. Now getting good clearance but still some waxing and waning confusion    Hypoalbuminemia: ONS, follow.     Anemia: Baseline Hg ~12-13. HG 9.5 with drop in PLTs. Iron okay.     MGUS: Monoclonal peak 0.9, with IgG Kappa LC elevation. ONC saw.     UTI: s/p treatment    H/O A-fib with RVR: on apixaban now, not on rate controlling agents    Hypothyroidism - on replacement          SUBJECTIVE:    Seen in room prior to discharge.  Feeling overall better.  Family happy to see her weight coming down.  They're hoping she'll fair better with more dialysis and rehab.  Updated  [place].    OBJECTIVE:  Physical Exam   Temp: 97.5  F (36.4  C) Temp src: Oral BP: 113/56 Pulse: 80   Resp: 18 SpO2: 98 % O2 Device: None (Room air)    Vitals:    11/07/23 0348 11/08/23 0351 11/09/23 0432   Weight: 102.2 kg (225 lb 5 oz) 102.7 kg (226 lb 6.6 oz) 100.8 kg (222 lb 3.6 oz)     Vital Signs with Ranges  Temp:  [97.5  F (36.4  C)-97.7  F (36.5  C)] 97.5  F (36.4  C)  Pulse:  [79-80] 80  Resp:  [16-18] 18  BP: (113-127)/(56-67) 113/56  SpO2:  [96 %-98 %] 98 %  I/O last 3 completed shifts:  In: 160 [P.O.:160]  Out: 2500 [Other:2500]    Patient Vitals for the past 72 hrs:   Weight   11/09/23 0432 100.8 kg (222 lb 3.6 oz)   11/08/23 0351 102.7 kg (226 lb 6.6 oz)   11/07/23 0348 102.2 kg (225 lb 5 oz)     Intake/Output Summary (Last 24 hours) at 11/6/2023 0935  Last data filed at 11/6/2023 0700  Gross per 24 hour   Intake 60 ml   Output 150 ml   Net -90 ml       PHYSICAL EXAM:  GEN: NAD, elderly   CV: RRR   Lung: clear and equal, on RA  Ab: soft and NT  Ext: +2 LLE, + Upper extremity edema BL R>L  and well perfused  Skin: no rash  : + lua  Psych: mood WNL  Neuro: grossly intact  Access:tunneled CVC C/D/I.      LABORATORY STUDIES:     Recent Labs   Lab 11/09/23  0532 11/08/23  1331 11/08/23  0512 11/07/23  0600 11/06/23  0551 11/05/23  0631   WBC 7.9 9.9 7.9 9.0 9.3 9.3   RBC 2.78* 3.08* 2.72* 3.00* 3.06* 3.13*   HGB 8.8* 9.7* 8.6* 9.5* 9.8* 9.9*   HCT 26.3* 28.5* 25.4* 28.0* 28.1* 28.9*   * 127* 112* 119* 109* 114*       Basic Metabolic Panel:  Recent Labs   Lab 11/09/23  0532 11/08/23  2103 11/08/23  1718 11/08/23  0815 11/08/23  0512 11/07/23  2243 11/07/23  0600 11/06/23  0702 11/06/23  0551 11/05/23  1510 11/05/23  0631 11/04/23  0703 11/04/23  0514     --   --   --  140  --  140  --  140  --  137  --  137   POTASSIUM 3.6  --   --   --  3.5  --  3.4  --  3.7  --  3.7  --  3.9   CHLORIDE 101  --   --   --  99  --  98  --  97*  --  96*  --  97*   CO2 27  --   --   --  29  --  28  --  25   --  27  --  27   BUN 16.8  --   --   --  23.3*  --  18.5  --  31.8*  --  28.3*  --  23.3*   CR 2.62*  --   --   --  3.18*  --  2.71*  --  3.70*  --  3.26*  --  2.71*   GLC 73 96 90 75 80 119* 77   < > 79   < > 86   < > 74   SANDIP 8.8  --   --   --  8.6*  --  8.6*  --  9.4  --  9.0  --  8.8    < > = values in this interval not displayed.       INRNo lab results found in last 7 days.     Recent Labs   Lab Test 11/09/23  0532 11/08/23  1331   WBC 7.9 9.9   HGB 8.8* 9.7*   * 127*       Personally reviewed current labs    Jad Holden  [facility]  [phone]

## 2023-11-09 NOTE — PROGRESS NOTES
RCAT Treatment Plan    Patient Score: 5  Patient Acuity: 5    Clinical Indication for Therapy: unable to deep breath, PNA    Therapy Ordered: flutter QID, IS     Assessment Summary: Patient is in room air with oxygen saturation of 98%. BBS diminished. Patient is shallow breathing, RR 18. IS (500 ml) was initiated due for volume expansion. Patient reports she has been using flutter valve on her own. Cough is dry. Patient is anticipating discharge to TCU today. Will change flutter valve to PRN per RCAT guideline. Continue to encourage deep breathing and coughing techniques.     Jennifer Suggs, RT  11/9/2023

## 2023-11-09 NOTE — PLAN OF CARE
Lymphedema Discharge Summary    Reason for therapy discharge:    Discharged to transitional care facility.    Progress towards therapy goal(s). See goals on Care Plan in [facility] electronic health record for goal details.  Goals met    Therapy recommendation(s):    No further therapy is recommended.

## 2023-11-09 NOTE — PLAN OF CARE
Problem: Adult Inpatient Plan of Care  Goal: Readiness for Transition of Care  Outcome: Adequate for Care Transition   Goal Outcome Evaluation:     Patient received discharge order.  Patient to be transported to TCU via medical transport service.  Son and daughter at bedside, all questions answered.

## 2023-11-09 NOTE — PLAN OF CARE
Physical Therapy Discharge Summary    Reason for therapy discharge:    Discharged to transitional care facility.    Progress towards therapy goal(s). See goals on Care Plan in [facility] electronic health record for goal details.  Goals not met due to she is still working on the goals yet.      Therapy recommendation(s):    Continued therapy is recommended.  Rationale/Recommendations:  to improve mobility and strength. .

## 2023-11-09 NOTE — PLAN OF CARE
A&O x : Pt intermittently confused, noted to be forgetful.  Disoriented to time.     Activity: Assist of 2 with walker and gait belt.  Incontinent of bowel- smears this evening. Pt on dialysis, no urine this evening.      Pain: The pt reports intermittent burning sensation on the L-dorsal pedal.  There is a wound there; appears to be a popped blister.  Area is reddened; no drainage noted.  Wound cleansed with saline and new foam applied.     Plan: Pt to discharge to TCU tomorrow.

## 2023-11-09 NOTE — PLAN OF CARE
"  Problem: Malnutrition  Goal: Improved Nutritional Intake  Outcome: Progressing   Goal Outcome Evaluation:    On a regular diet, variable intake, ate sumit crackers for breakfast and 75% of dinner meal per flowsheets    Receives magic cup with lunch and dinner    Met with likes, likes magic cup and is eating, not sure if she wants today because it is \"too cold out\". Note plans for discharge. Will continue to send.     "

## 2023-11-09 NOTE — PLAN OF CARE
Problem: Adult Inpatient Plan of Care  Goal: Optimal Comfort and Wellbeing  Outcome: Progressing     Pt comfortable overnight. No pain reported.   VSS, room air. No loose stools overnight.   Makes needs known.

## 2023-11-09 NOTE — DISCHARGE SUMMARY
"[facility]  Hospitalist Discharge Summary      Date of Admission:  10/29/2023  Date of Discharge:  11/9/2023 11:37 AM  Discharging Provider: Ramona Corey DO  Discharge Service: Hospitalist Service    Discharge Diagnoses   Acute hypoxic respiratory failure  Pneumonia  E. coli UTI  HAMMAD  New onset dialysis  Circulatory shock  Acute heart failure    Clinically Significant Risk Factors     # Severe Obesity: Estimated body mass index is 40.65 kg/m  as calculated from the following:    Height as of this encounter: 1.575 m (5' 2\").    Weight as of this encounter: 100.8 kg (222 lb 3.6 oz).  # Severe Malnutrition: based on nutrition assessment      Follow-ups Needed After Discharge   Follow-up Appointments     Follow Up Care      Please follow-up with Dr. Melo or Abimael's team in 2 weeks at [facility]. Call our scheduling line at [phone] to make an   appointment, if you do not already have one scheduled.        Follow Up and recommended labs and tests      Follow up with Nursing home physician.  Follow-up in clinic with Dr. Christensen regarding MGUS            Unresulted Labs Ordered in the Past 30 Days of this Admission       Date and Time Order Name Status Description    11/4/2023  6:13 PM Blood Culture Peripheral Blood Preliminary     11/4/2023  6:13 PM Blood Culture Peripheral Blood Preliminary         These results will be followed up by [facility]    Discharge Disposition   Discharged to short-term care facility  Condition at discharge: Stable    Hospital Course   83 year old female with a history of severe obesity, afib on eliquis, CKD4, GERD, HFpEF, chronic back pain, gout, admitted 10/30 due to acute encephalopathy, found to have anasarca, E coli UTI, acute respiratory failure with hypoxia, acute on chronic kidney injury requiring initiation of hemodialysis, and shock requiring vasopressors.      Acute hypoxic respiratory failure, resolved  Staph aureus bronchitis/pneumonia, " treated  Probable aspiration pneumonitis in setting of large multinodular goiter with mediastinum extension  Complete collapse of the right lower lobe with underlying pneumonia possible.   Groundglass opacities in the right upper lobe  -Bronchial hygiene  -Volume removal with dialysis  - Flutter valve/bronchial hygiene  -Nebs as ordered  -Zosyn -> augmentin renal dosed start 11/7 complete as prescribed  -Follow-up on esophagram result     HAMMAD with anuria on superimposed CKD stage V with progression to end-stage renal disease:  - Status post tunneled dialysis catheter placement on 10/30/2023.  - Hemodialysis initiated 10/31.  - On Monday Wednesday Friday regimen.  - Nephrology assistance appreciated  -Monitor labs  -Outpatient dialysis has been arranged     E. coli UTI: 10/31 UC  - resolved     Circulatory shock: Resolved.  -Blood cultures negative  - Discontinued vancomycin  - Monitor hemodynamics closely  -TTE reviewed and showed mildly decreased right ventricular  systolic function. Hold on VQ scan at this time as Eliquis and will check a D Dimer.      HFp EF: With ADHF  - Volume being managed with hemodialysis.  Remains significantly hypervolemic with evidence of RV dysfunction  - TTE on 10/30/2023 showed LVEF 50-55%, no significant valvular heart disease.  Biatrial enlargement.  -Repeat limited TTE on 11/6/23 LVEF 50-55%, mild (1+) tricuspid regurgitation, The right ventricle is normal size. Mildly decreased right ventricular systolic function.  - Strict intake and output and daily weights.     Chronic atrial fibrillation with rapid ventricular response: Rate controlled currently on no AV rajni blocking drugs    -holding apixaban. Appeared she was on Xarelto PTA which given the level of renal dysfunction not a candidate for.  Cardiology signed off.  - stop Telemetry  -TTE as above  -restarted renal dosed Eliquis 2.5mg BID     Acute encephalopathy, fluctuating, improved  -CT head negative for acute process  -CT  cervical spine negative for fracture.  -improving but fluctuates     Multiple upper and lower extremity bruises.  High fall risk prior to admission.  Confused and altered on admission after being brought in from home where she could have easily had a fall and injured herself somewhere.  She was too unstable on initial presentation for imaging.  - Extensive imaging obtained and has been reviewed.     Folic acid deficiency:  -folic acid 1 mg daily  -B12 level normal 814.     Thrombocytopenia, normocytic anemia, MGUS, acute on chronic disease/inflammation, etc:  - Hematology assistance appreciated  -follow-up in clinic with Dr. Christensen regarding MGUS  -copper level pending  -stated on Levothyroxine for hypothyroidism  -Hgb this morning was a 1 g drop lower hemoglobin prior to dialysis compared to days prior.    -CBC post dialysis showed hemoglobin stable postdialysis     Hypothyroidism, multinodular goiter with extension into the mediastinum:  - Initiated on levothyroxine 25 mcg daily.  -Repeat TFTs outpatient in 4 to 6 weeks.  -Thyroid ultrasound showed Heterogeneous thyroid gland suggestive of sequelae of thyroiditis.   -CT chest showed multinodular goiter with extension into the mediastinum  -Follow-up on esophagram     Candidiasis, severe:  improved  - Topical Mycostatin  - WOC      Ccutaneous Left foot blister  -WOF     Hypoglycemia: Hemoglobin A1c 5% due to insufficient oral intake.   - resolved     Bilateral upper extremity, bilateral lower extremity edema:  - Venous duplex ultrasound negative of the lower extremities.  - Negative duplex ultrasound bilateral upper extremities for DVT  -Significant third spacing which should gradually improve with volume removal and ultrafiltration over time and improvement in nutritional status  - Mobilization  -discharge picc     Nonocclusive superficial thrombophlebitis left cephalic vein  -Supportive care  -eliquis     Dysphagia, intermittent:  -SLP swallow study reviewed.  Tolerating current diet.  Given the very large  multinodular goiter with extension into the mediastinum we will follow-up on XR esophagram     Diarrhea: On no stool softeners and has had multiple episodes of loose brown stool over the past 24 hours.  Exceeding more than 10-12.  - Probiotic, check C. difficile toxin, if C. difficile toxin is negative start Imodium as needed     Severe deconditioning and weakness  - PT/OT.  - TCU     Mildly displaced intra-articular fracture posterior medial tibial plateau:  - Orthopedic surgery evaluated.  No surgical intervention indicated based on their evaluation.  - Activity and weightbearing status as directed  -Supportive cares    Consultations This Hospital Stay   CARDIOLOGY IP CONSULT  NEPHROLOGY IP CONSULT  INTERVENTIONAL RADIOLOGY ADULT/PEDS IP CONSULT  CARE MANAGEMENT / SOCIAL WORK IP CONSULT  VASCULAR ACCESS ADULT IP CONSULT  PHARMACY TO DOSE VANCO  PHARMACY IP CONSULT  PHARMACY IP CONSULT  PHYSICAL THERAPY ADULT IP CONSULT  OCCUPATIONAL THERAPY ADULT IP CONSULT  PHARMACY TO DOSE VANCO  HOSPITALIST IP CONSULT  CARE MANAGEMENT / SOCIAL WORK IP CONSULT  HEMATOLOGY & ONCOLOGY IP CONSULT  HEMATOLOGY & ONCOLOGY IP CONSULT  SPEECH LANGUAGE PATH ADULT IP CONSULT  WOUND OSTOMY CONTINENCE NURSE  IP CONSULT  NUTRITION SERVICES ADULT IP CONSULT  PHARMACY TO DOSE VANCO  ORTHOPEDIC SURGERY IP CONSULT  CARE MANAGEMENT / SOCIAL WORK IP CONSULT  LYMPHEDEMA THERAPY IP CONSULT  WOUND OSTOMY CONTINENCE NURSE  IP CONSULT  PHYSICAL THERAPY ADULT IP CONSULT  OCCUPATIONAL THERAPY ADULT IP CONSULT    Code Status   No CPR- Do NOT Intubate    Time Spent on this Encounter   I, Ramona Corey DO, personally saw the patient today and spent greater than 30 minutes discharging this patient.       Ramona Corey DO  [facility]  [address] [place] [id number]  Phone: [phone]  Fax:  [phone]  ______________________________________________________________________    Physical Exam   Vital Signs: Temp: 97.5  F (36.4  C) Temp src: Oral BP: 113/56 Pulse: 80   Resp: 18 SpO2: 98 % O2 Device: None (Room air)    Weight: 222 lbs 3.58 oz         Primary Care Physician   Oscar Hopper    Discharge Orders      Primary Care - Care Coordination Referral      Follow Up Care    Please follow-up with Dr. Melo or Abimael's team in 2 weeks at [facility]. Call our scheduling line at [phone] to make an appointment, if you do not already have one scheduled.     Activity     Weight bearing as tolerated    Weight bearing as tolerated on your operative extremity.     General info for SNF    Length of Stay Estimate: Short Term Care: Estimated # of Days <30  Condition at Discharge: Improving  Level of care:skilled   Rehabilitation Potential: Excellent  Admission H&P remains valid and up-to-date: Yes  Recent Chemotherapy: N/A  Use Nursing Home Standing Orders: Yes     Mantoux instructions    Give two-step Mantoux (PPD) Per Facility Policy Yes     Follow Up and recommended labs and tests    Follow up with Nursing home physician.  Follow-up in clinic with Dr. Christensen regarding MGUS     Reason for your hospital stay    Circulatory shock, Pneumonia, Acute hypoxic respiratory failure, HAMMAD on CKD requiring initiation of Hemodialysis, E coli UTI     Activity - Up with nursing assistance     No CPR- Do NOT Intubate     Physical Therapy Adult Consult    Evaluate and treat as clinically indicated.    Reason:  Eval and treat     Occupational Therapy Adult Consult    Evaluate and treat as clinically indicated.    Reason:  Eval and treat     Diet    Follow this diet upon discharge: Orders Placed This Encounter      Room Service      Snacks/Supplements Adult: Magic Cup; With Meals      Regular Diet Adult       Significant Results and Procedures   Most Recent 3 CBC's:  Recent Labs   Lab Test 11/09/23  0532  11/08/23  1331 11/08/23  0512   WBC 7.9 9.9 7.9   HGB 8.8* 9.7* 8.6*   MCV 95 93 93   * 127* 112*     Most Recent 3 BMP's:  Recent Labs   Lab Test 11/09/23  0532 11/08/23  2103 11/08/23  1718 11/08/23  0815 11/08/23  0512 11/07/23  2243 11/07/23  0600     --   --   --  140  --  140   POTASSIUM 3.6  --   --   --  3.5  --  3.4   CHLORIDE 101  --   --   --  99  --  98   CO2 27  --   --   --  29  --  28   BUN 16.8  --   --   --  23.3*  --  18.5   CR 2.62*  --   --   --  3.18*  --  2.71*   ANIONGAP 12  --   --   --  12  --  14   SANDIP 8.8  --   --   --  8.6*  --  8.6*   GLC 73 96 90   < > 80   < > 77    < > = values in this interval not displayed.     Most Recent 2 LFT's:  Recent Labs   Lab Test 11/08/23  0512 11/07/23  0600   AST 23 24   ALT 10 9   ALKPHOS 100 114*   BILITOTAL 0.7 0.6   ,   Results for orders placed or performed during the hospital encounter of 10/29/23   XR Chest Port 1 View    Narrative    EXAM: XR CHEST PORT 1 VIEW  LOCATION: [facility]  DATE: 10/30/2023    INDICATION: weakness evaluate for pneumonia  COMPARISON: None.    FINDINGS: The heart is enlarged. There is no pulmonary edema. The right hemidiaphragm is elevated. Lung volumes are low. No focal lung consolidation. No pneumothorax.      Impression    IMPRESSION: No acute abnormality.   CT Abdomen Pelvis w/o Contrast    Narrative    EXAM: CT ABDOMEN PELVIS W/O CONTRAST  LOCATION: [facility]  DATE: 10/30/2023    INDICATION: Weakness lower abdomen perineal inflammation evaluate for evidence of fasciitis or acute intra-abdominal process.  COMPARISON: None.  TECHNIQUE: CT scan of the abdomen and pelvis was performed without IV contrast. Multiplanar reformats were obtained. Dose reduction techniques were used.  CONTRAST: None.    FINDINGS:   LOWER CHEST: Cardiac enlargement with small bilateral pleural effusions. Mild patchy opacities in both lung bases with some subpleural consolidation and  air bronchograms right greater than left. Findings may reflect atelectasis but cannot exclude   underlying pneumonitis and clinical correlation needed.    HEPATOBILIARY: Liver is negative. Cholelithiasis. No biliary dilatation.    PANCREAS: Normal.    SPLEEN: Normal.    ADRENAL GLANDS: Normal.    KIDNEYS/BLADDER: Benign right renal cyst with no follow-up needed. No urinary calculi or hydronephrosis. Bladder is unremarkable.    BOWEL: Bowel is normal in caliber with no evidence for obstruction. Moderate amounts of stool in the colon. No definite acute bowel findings.    LYMPH NODES: Few scattered mildly prominent retroperitoneal and iliac chain lymph nodes likely reactive in nature.    VASCULATURE: Focal ectasia of the infrarenal abdominal aorta measuring up to 2.7 cm.    PELVIC ORGANS: Normal.    ADDITIONAL FINDINGS: Small amount of ascites in the right upper quadrant.    MUSCULOSKELETAL: Moderate degenerative changes in the spine. Osteopenia. Left hip arthroplasty.    Generalized subcutaneous edema. Prominent areas of skin thickening in the right lateral flank region and to a lesser extent left lower quadrant/left lateral flank. No discrete subcutaneous drainable fluid collections or abscess formation seen. No   evidence for gas producing infection.      Impression    IMPRESSION:   1.  Generalized subcutaneous edema with areas of prominent skin thickening most pronounced in the right lateral flank region. Clinical correlation for any signs of a nonspecific superficial infectious or inflammatory process such as cellulitis or   panniculitis. No evidence for abscess or drainable fluid collection. No evidence for gas-producing infection.     2.  Cholelithiasis. No biliary dilatation.    3.  Moderate amounts of stool in the colon. No evidence for obstruction or acute bowel findings.    4.  Small amount of ascites in the right upper quadrant.    5.  Ectasia of the abdominal aorta measuring up to 2.7 cm.    6.  Cardiac  enlargement with small bilateral pleural effusions.    7.  Areas of subpleural consolidation and patchy opacities in both lower lungs may be due to atelectasis but cannot exclude underlying pneumonitis. Clinical correlation.             US Thyroid    Narrative    EXAM: US THYROID  LOCATION: [facility]  DATE: 10/30/2023    INDICATION: newly diagnosed hypothyroidism  COMPARISON: None.  TECHNIQUE: Thyroid ultrasound.     FINDINGS:  RIGHT lobe: 5.7 x 3.9 x 4.2 cm. Heterogenous echotexture.  Isthmus: 1.3 mm.  LEFT lobe: 5.3 x 3.6 x 2.7 cm. Heterogenous echotexture.    NECK: No cervical lymphadenopathy.    NODULES:  None      Impression    IMPRESSION:  Heterogeneous thyroid gland suggestive of sequelae of thyroiditis. No definitive suspicious thyroid nodules.      Nodules are characterized per  ACR Thyroid Imaging, Reporting and Data System (TI-RADS): White Paper of the ACR TI-RADS Committee  Jong Palomino et al. Journal of the American College of Radiology 2017. Volume 14 (2017), Issue 5, 330-204.      IR CVC Tunnel Placement > 5 Yrs of Age    Narrative    LOCATION: [facility]  DATE: 10/30/2023    PROCEDURE: TUNNELED DIALYSIS CATHETER PLACEMENT  1.  Insertion of a tunneled central venous catheter.  2.  Ultrasound guidance for vascular access. A permanent image was stored.  3.  Fluoroscopic guidance for central venous access device placement.    INTERVENTIONAL RADIOLOGIST: Lane Christian MD    INDICATION: Renal insufficiency requiring renal replacement therapy, plan for tunneled dialysis catheter placement..    CONSENT: The risks, benefits and alternatives of the procedure were discussed with the patient or representative in detail. All questions were answered. Informed consent was given to proceed with the procedure.    MODERATE SEDATION: Versed 1 mg IV; Fentanyl 25 mcg IV. During the time out, immediately prior to the administration of medications, the patient was  reassessed for adequacy to receive conscious sedation.  Under physician supervision, Versed and fentanyl   were administered for moderate sedation. Pulse oximetry, heart rate and blood pressure were continuously monitored by an independent trained observer. The physician spent 15 minutes of face-to-face sedation time with the patient.    FLUOROSCOPIC TIME: 0.2 minutes.  RADIATION DOSE: Air Kerma: 11 mGy.    COMPLICATIONS: No immediate complications.    UNIVERSAL PROTOCOL: The operative site was marked and any prior imaging was reviewed. Required items including blood products, implants, devices and special equipment was made available. Patient identity was confirmed either verbally, with demographic   information, hospital assigned identification or other identification markers. A timeout was performed immediately prior to the procedure.    STERILE BARRIER TECHNIQUE: Maximum sterile barrier technique was used. Cutaneous antisepsis was performed at the operative site with application of 2% chlorhexidine and large sterile drape. Prior to the procedure, the  and assistant performed   hand hygiene and wore hat, mask, sterile gown, and sterile gloves during the entire procedure.    PROCEDURE:    Using local anesthesia and real-time ultrasound guidance the right internal jugular vein was accessed. Ultrasound shows an anechoic and compressible vein. Permanent images were stored to the patient's medical record. A subcutaneous tunnel was created   requiring a second incision. Using this access, a 19 cm tip to cuff tunneled dialysis catheter was advanced under fluoroscopic guidance until the tip was in the proximal right atrium. Final position was confirmed with a spot radiograph from the   fluoroscopic unit. The catheter was tested and found to flush and aspirate appropriately.    FINDINGS:  At the completion of the study, a spot radiograph was performed utilizing the in room fluoroscopic unit. Imaging demonstrates  the tunneled dialysis catheter tip positioned in the proximal right atrium.      Impression    IMPRESSION:    1.  Successful tunneled dialysis catheter placement.  2.  The catheter is ready for use.   XR Chest Port 1 View    Narrative    EXAM: XR CHEST PORT 1 VIEW  LOCATION: [facility]  DATE: 10/30/2023    INDICATION: RN placed PICC   verify tip placement  COMPARISON: 10/29/2023      Impression    IMPRESSION: Right IJ line terminates at the cavoatrial junction. Right upper extremity PICC terminates in the mid to distal SVC. No pneumothorax. The lungs are hypoinflated with patchy bibasilar atelectasis. No effusions or pneumothorax. Heart size is   enlarged. No pulmonary edema. No acute osseous findings.   POC US Echo Limited    Impression    Limited Bedside Cardiac Ultrasound, performed and interpreted by me.   Indication: Weakness, Elevated Troponin, and Hypotension/shock.  Parasternal long axis, parasternal short axis, apical 4 chamber, and subcostal views were acquired.   Image quality was satisfactory.    Findings:    Global left ventricular function appears intact.  RV appears dilated.   Dilated RA and LA.  There is no evidence of free fluid within the pericardium.  EF 64% by EPSS    TR max anmol 327.2cm/sec, estimated PASP 42.8 mm HG + estimated RAP consistent with moderate pulmonary HTN    Dilated IVC 2.81cm with minimal resp variation, correlating with estimated RAP of 15-20 mm Hg    IMPRESSION: Grossly normal left ventricular function and chamber size.  No pericardial effusion.  Moderate pulmonary HTN, estimated PASP ~62 mm Hg  Dilated RV  Dilated LA and RA by visual inspection.    Bilateral lung sliding present on lung US  No significant right pleural effusion noted.      XR Chest Port 1 View    Narrative    EXAM: XR CHEST PORT 1 VIEW  LOCATION: [facility]  DATE: 10/31/2023    INDICATION: sob  COMPARISON: Yesterday      Impression    IMPRESSION: Right dialysis  catheter tip in the SVC right atrial junction. Right PICC line tip in the SVC. Moderate low lung volumes accentuate heart size and pulmonary vasculature. No pneumothorax seen. Patchy bilateral mid and lower lung infiltrates or   atelectasis appears similar to prior exam. No pleural effusions. Mild cardiomegaly. Chest otherwise unremarkable.   US Lower Extremity Venous Duplex Bilateral    Narrative    EXAM: US LOWER EXTREMITY VENOUS DUPLEX BILATERAL  LOCATION: [facility]  DATE: 11/4/2023    INDICATION: edema,, soft tissue swelling, pain r o dvt  COMPARISON: None.  TECHNIQUE: Venous Duplex ultrasound of bilateral lower extremities with and without compression, augmentation and duplex. Color flow and spectral Doppler with waveform analysis performed.    FINDINGS: Exam includes the common femoral, femoral, popliteal veins as well as segmentally visualized deep calf veins and greater saphenous vein. Limited examination secondary to patient body habitus. Bilateral calf veins and the mid and distal femoral   veins were not well seen.    RIGHT: No deep vein thrombosis. No superficial thrombophlebitis. No popliteal cyst.    LEFT: No deep vein thrombosis. No superficial thrombophlebitis. No popliteal cyst.      Impression    IMPRESSION:  1.  Limited examination secondary to patient body habitus. Bilateral calf veins and the mid and distal femoral veins were not well seen. No deep venous thrombosis in the bilateral lower extremities.   CT Head w/o Contrast    Narrative    EXAM: CT HEAD W/O CONTRAST  LOCATION: [facility]  DATE: 11/5/2023    INDICATION: recent fall, trauma, confusion  COMPARISON: None.  TECHNIQUE: Routine CT Head without IV contrast. Multiplanar reformats. Dose reduction techniques were used.    FINDINGS:  INTRACRANIAL CONTENTS: Mild to moderate chronic small vessel ischemic changes. No acute intracranial hemorrhage. No hydrocephalus or extra-axial hemorrhage. No  acute loss of gray-white differentiation.        VISUALIZED ORBITS/SINUSES/MASTOIDS: No intraorbital abnormality. No paranasal sinus mucosal disease. No middle ear or mastoid effusion.    BONES/SOFT TISSUES: No acute calvarial fracture.      Impression    IMPRESSION:  1.  No acute findings. Chronic changes as above.   CT Cervical Spine w/o Contrast    Narrative    EXAM: CT CERVICAL SPINE W/O CONTRAST  LOCATION: [facility]  DATE: 11/5/2023    INDICATION: fall, trauma  COMPARISON: None.  TECHNIQUE: Routine CT Cervical Spine without IV contrast. Multiplanar reformats. Dose reduction techniques were used.    FINDINGS:  Thyromegaly.    Grade 1 anterolisthesis of C4 on C5. Multilevel facet disease. Multilevel loss of disc height. No prevertebral edema. Dens interval and craniocervical junction are unremarkable.    The craniocervical junction is unremarkable. Lateral masses are symmetric. Multilevel degenerative changes in the cervical spine most notably in the inferior cervical spine.      Impression    IMPRESSION:  1.  No acute cervical spine fracture.    2.  Thyromegaly.   US Upper Extremity Venous Duplex Bilat    Narrative    EXAM: US UPPER EXTREMITY VENOUS DUPLEX BILATERAL  LOCATION: [facility]  DATE: 11/4/2023    INDICATION: edema, r o dvt  COMPARISON: None.  TECHNIQUE: Venous Duplex ultrasound of both upper extremities with (when possible) and without compression, augmentation, and duplex. Color flow and spectral Doppler with waveform analysis performed.    FINDINGS: Ultrasound includes evaluation of the internal jugular veins, innominate veins, subclavian veins, axillary veins, and brachial veins. The superficial cephalic and basilic veins were also evaluated where seen.    RIGHT: No deep venous thrombosis. No superficial thrombophlebitis.    LEFT: No deep venous thrombosis. The left cephalic vein is partially compressible at the antecubital fossa consistent with  nonocclusive thrombophlebitis. In the soft tissues of the left lateral wrist, there is a 6 x 6 mm spherical hypoechoic, avascular   focus which is about 3 mm deep to the skin surface.       Impression    IMPRESSION:  1.  No deep venous thrombosis in the bilateral upper extremities.    2.  Nonocclusive superficial thrombophlebitis of the left cephalic vein.    3.  Indeterminant 6 mm spherical hypoechoic focus in the superficial soft tissues of the left lateral wrist, not definitely connecting with adjacent vascular structures. This could represent a tiny focal fluid collection or less likely a thrombosed   superficial vein. This could be followed sonographically.   CT Chest Abdomen Pelvis w/o Contrast    Narrative    EXAM: CT CHEST ABDOMEN PELVIS W/O CONTRAST  LOCATION: [facility]  DATE: 11/5/2023    INDICATION: sepsis  COMPARISON: CT abdomen pelvis 10/30/2023.  TECHNIQUE: CT scan of the chest, abdomen, and pelvis was performed without IV contrast. Multiplanar reformats were obtained. Dose reduction techniques were used.   CONTRAST: None.    LIMITATIONS: Exam limited by respiratory motion artifact.    FINDINGS:   LUNGS AND PLEURA: Small bilateral pleural effusions, right greater than left, with overlying atelectasis. There is complete collapse of the right lower lobe. Few scattered groundglass opacities in the right upper lobe. Additional scattered subsegmental   atelectasis of the right upper lobe and lingula.    MEDIASTINUM/AXILLAE: Multinodular goiter with extension into the mediastinum. Right IJ approach vascular catheter with distal tip terminating at the superior cavoatrial junction. Right upper extremity PICC with distal tip terminating in the superior vena   cava. Mild multichamber cardiac enlargement. No aneurysmal dilatation of the thoracic aorta.    CORONARY ARTERY CALCIFICATION: Mild.    HEPATOBILIARY: There is a 1.4 cm hypoattenuating lesion in the left hepatic lobe with  indeterminate attenuation values. Cholelithiasis. Evaluation of the gallbladder is limited by respiratory motion artifact.    PANCREAS: Normal.    SPLEEN: Normal.    ADRENAL GLANDS: Normal.    KIDNEYS/BLADDER: Atrophy of the bilateral native kidneys. Similar right renal cysts, not requiring dedicated follow-up. The urinary bladder is decompressed and obscured by streak artifact. Lerma catheter.    BOWEL: No obstruction or inflammatory change.    LYMPH NODES: Normal.    VASCULATURE: Mild burden of vascular calcifications. Focal infrarenal aortic ectasia measuring up to 2.7 cm, similar.    PELVIC ORGANS: Unremarkable, partially obscured by streak artifact.    MUSCULOSKELETAL: Left hip arthroplasty. Osteopenia. Multilevel degenerative changes of the spine. Body wall edema. There is asymmetric skin thickening involving the right flank and lower ventral abdominal wall which appears similar. Asymmetric   subcutaneous stranding involving the left lower ventral abdominal wall/mons pubis which appears increased. No soft tissue gas.      Impression    IMPRESSION:  1.  Small bilateral pleural effusions with overlying atelectasis. There is complete collapse of the right lower lobe with underlying pneumonia possible. Additional few groundglass opacities in the right upper lobe are favored infectious/inflammatory.  2.  Similar asymmetric skin thickening involving the right flank and lower ventral abdominal wall. Asymmetric subcutaneous stranding of the left lower ventral abdominal wall/mons pubis is increased. Recommend correlation with physical exam as soft tissue   infections are possible. No soft tissue gas.  3.  Indeterminate hypoattenuating lesion of the left hepatic lobe which may represent a hepatic cyst, focal fatty infiltration, or other lesion. This could be further assessed on nonemergent hepatic MRI.  4.  Additional ancillary findings as above including multinodular goiter.   XR Femur Left 2 Views    Narrative     EXAM: XR FEMUR LEFT 2 VIEWS  LOCATION: [facility]  DATE: 11/4/2023    INDICATION: pain, fall  COMPARISON: None.      Impression    IMPRESSION: Acute nondisplaced medial tibial plateau fracture with subtle cortical lucency involving the medial femoral condyle which may reflect an additional fracture. Dedicated left knee radiographs could be obtained for further characterization.    Long stem left total hip arthroplasty without evidence of loosening or periprosthetic fracture. Degenerative arthritis left knee.    Vascular calcifications.     NOTE: ABNORMAL REPORT    THE DICTATION ABOVE DESCRIBES AN ABNORMALITY FOR WHICH FOLLOW-UP IS NEEDED.    XR Knee Left 1/2 Views    Narrative    EXAM: XR KNEE LEFT 1/2 VIEWS  LOCATION: [facility]  DATE: 11/5/2023    INDICATION: eval for femoral condyle fracture  COMPARISON: 11/04/2023      Impression    IMPRESSION: There is irregularity to the lateral tibial plateau worrisome for a nondisplaced and nondepressed fracture. The chronicity of this finding is uncertain as there is no evidence for lipohemarthrosis and there is superimposed degenerative   change. There is a tiny effusion. No loose body. Chronic enthesitis along the superior pole of the patella.   CT Knee Left w/o Contrast    Narrative    EXAM: CT KNEE LEFT W/O CONTRAST  LOCATION: [facility]  DATE: 11/5/2023    INDICATION: Left knee pain. Possible tibial plateau fracture on x-ray.  COMPARISON: Same day knee radiographs.  TECHNIQUE: Noncontrast. Axial, sagittal and coronal thin-section reconstruction. Dose reduction techniques were used.     FINDINGS:     BONES:  -Mildly displaced fracture of the posteromedial tibial plateau (series 6 image 34 and series 3 image 66).  -Tricompartmental degenerative arthritis with patchy subchondral sclerosis, subchondral cysts, and marginal osteophytes. Advanced narrowing of the medial compartment of the left knee.  Moderate knee joint effusion and/or synovitis.    SOFT TISSUES:  -Scattered vascular calcifications. Nonspecific subcutaneous soft tissue edema and mild skin thickening. Advanced fatty atrophy of the proximal soleus which may be sequela of remote trauma (series 4 image 89).      Impression    IMPRESSION:  1.  Mildly displaced intra-articular fracture of the posteromedial tibial plateau.  2.  Tricompartmental degenerative arthritis with hypertrophic changes and advanced narrowing of the medial compartment.  3.  Moderate knee joint effusion and/or synovitis.     XR Esophagram    Narrative    EXAM: XR ESOPHAGRAM SINGLE CONTRAST  LOCATION: [facility]  DATE: 2023    INDICATION: Intermittent dysphagia, has a very large multinodular goiter that extend into mediastinum. Assess for any extrinsic compression evident of esophagus when swallowing.  COMPARISON: Chest CT 2023.  TECHNIQUE: Routine.    FINDINGS:   FLUOROSCOPIC TIME: 0.7  NUMBER OF IMAGES: 2    ESOPHAGUS: Modified esophagram with semiupright position. Patient was only able to tolerate 2 drinks. Mild wasting at the level of the large multinodular thyroid goiter along the neck, though no severe narrowing. No other regions of narrowing. Mildly   irregular esophageal contractions suggesting dysmotility. Right IJ catheter.       Impression    IMPRESSION:     1.  Mild narrowing at the level of the large multinodular thyroid goiter.    2.  Mildly irregular esophageal contractions suggesting dysmotility.      Echocardiogram Complete    Narrative    [id number]  [id number]  [id number]  [id number]^FANTASMA^TOI^[place] [address]     Name: LUKE AGUILERA  MRN: [id number]  : 1940  Study Date: 10/30/2023 08:06 AM  Age: 83 yrs  Gender: Female  Patient Location: [facility]  Reason For Study: MI - Subendocardial  Ordering Physician: TOI MEEK  Performed By: CHRISTA/MB     BSA: 2.0 m2  Height: 61 in  Weight:  217 lb  ______________________________________________________________________________  Procedure  Complete Echo Adult. Definity (NDC #32064-774) given intravenously.  ______________________________________________________________________________  Interpretation Summary     1. The left ventricle is normal in size. Left ventricular systolic performance  is at the lower limits of normal. The ejection fraction is estimated to be  50%.  2. There is mild to moderate tricuspid insufficiency.  3. Normal right ventricular size with low normal right ventricular systolic  performance.  4. There is severe left atrial enlargement. There is mild to moderate right  atrial enlargement.     When compared to the prior real-time echocardiogram dated 28 March 2022, the  findings are felt to be fairly similar on both examinations.  ______________________________________________________________________________  Left ventricle:  The left ventricle is normal in size. Left ventricular systolic performance is  at the lower limits of normal. The ejection fraction is estimated to be 50%.  There is normal regional wall motion. Left ventricular wall thickness is  normal.     Assessment of LV Diastolic Function: The evaluation of diastolic filling is  hampered by the presence of severe mitral annular calcification.     Right ventricle:  Normal right ventricular size with low normal right ventricular systolic  performance.     Left atrium:  There is severe left atrial enlargement.     Right atrium:  There is mild to moderate right atrial enlargement.     IVC:  The IVC is moderately dilated.     Aortic valve:  The aortic valve is comprised of three cusps. No significant aortic stenosis  or aortic insufficiency is detected on this study.     Mitral valve:  The mitral valve appears morphologically normal. There is mild mitral  insufficiency.     Tricuspid valve:  The tricuspid valve is grossly morphologically normal. There is mild to  moderate  tricuspid insufficiency.     Pulmonic valve:  The pulmonic valve is grossly morphologically normal. There is mild pulmonic  insufficiency.     Thoracic aorta:  The aortic root and proximal ascending aorta are of normal dimension.     Pericardium:  There is no significant pericardial effusion.  ______________________________________________________________________________  ______________________________________________________________________________  MMode/2D Measurements & Calculations  IVSd: 0.89 cm  LVIDd: 4.3 cm  LVIDs: 3.2 cm  LVPWd: 0.71 cm  FS: 25.0 %  LV mass(C)d: 104.0 grams  LV mass(C)dI: 53.2 grams/m2  Ao root diam: 2.6 cm  asc Aorta Diam: 3.4 cm  LVOT diam: 1.9 cm  LVOT area: 2.7 cm2  Ao root diam index Ht(cm/m): 1.7  Ao root diam index BSA (cm/m2): 1.3  Asc Ao diam index BSA (cm/m2): 1.7  Asc Ao diam index Ht(cm/m): 2.2     LA Volume Indexed (AL/bp): 49.6 ml/m2  RV Base: 5.0 cm  RWT: 0.33  TAPSE: 1.6 cm     Doppler Measurements & Calculations  MV E max herb: 90.4 cm/sec  MV max P.4 mmHg  MV mean P.9 mmHg  MV V2 VTI: 21.4 cm  MVA(VTI): 2.3 cm2  MV dec slope: 579.5 cm/sec2  MV dec time: 0.16 sec  Ao V2 max: 176.3 cm/sec  Ao max P.0 mmHg  Ao V2 mean: 111.3 cm/sec  Ao mean P.2 mmHg  Ao V2 VTI: 29.7 cm  GABRIEL(I,D): 1.6 cm2  GABRIEL(V,D): 1.6 cm2  LV V1 max P.3 mmHg  LV V1 max: 103.7 cm/sec  LV V1 VTI: 17.8 cm     SV(LVOT): 48.9 ml  SI(LVOT): 25.0 ml/m2  PA acc time: 0.08 sec  PI end-d herb: 120.8 cm/sec  TR max herb: 279.9 cm/sec  TR max P.3 mmHg  AV Herb Ratio (DI): 0.59  GABRIEL Index (cm2/m2): 0.84  E/E': 13.1  E/E' av.3  Lateral E/e': 11.6  Medial E/e': 13.1  Peak E' Herb: 6.9 cm/sec  RV S Herb: 8.9 cm/sec     ______________________________________________________________________________  Report approved by: Brown Flores 10/30/2023 12:15 PM         Echocardiogram Limited     Value    LVEF  50-55%    [facility]    [id number]  [id number]  [id number]  [id number]^IVETT^CONG^SHIV     [facility] [place] [address]     Name: LUKE AGUILERA  MRN: [id number]  : 1940  Study Date: 2023 10:36 AM  Age: 83 yrs  Gender: Female  Patient Location: [facility]  Reason For Study: CHF  Ordering Physician: CONG ROOT  Performed By: TING     BSA: 2.1 m2  Height: 62 in  Weight: 238 lb  BP: 146/93 mmHg  ______________________________________________________________________________  Procedure  Limited Portable Echo Adult. Definity (NDC #50466-482) given intravenously.  ______________________________________________________________________________  Interpretation Summary     The visual ejection fraction is 50-55%. No regional wall motion abnormalities  noted.  Mildly decreased right ventricular systolic function  There is mild (1+) tricuspid regurgitation.  The right ventricular systolic pressure is approximated at 46mmHg plus the  right atrial pressure.  ______________________________________________________________________________  Left Ventricle  The left ventricle is normal in size. There is normal left ventricular wall  thickness. The visual ejection fraction is 50-55%. No regional wall motion  abnormalities noted.     Right Ventricle  The right ventricle is normal size. Mildly decreased right ventricular  systolic function.     Mitral Valve  Mitral valve leaflets appear normal. There is trace mitral regurgitation.     Tricuspid Valve  Tricuspid valve leaflets appear normal. There is mild (1+) tricuspid  regurgitation. The right ventricular systolic pressure is approximated at  46mmHg plus the right atrial pressure.     Pericardium  There is no pericardial effusion.  ______________________________________________________________________________  MMode/2D Measurements & Calculations  IVSd: 0.68 cm  LVIDd: 4.9 cm  LVIDs: 3.6 cm  LVPWd: 0.85 cm  FS: 26.4 %  LV mass(C)d: 121.6 grams  LV mass(C)dI: 59.1 grams/m2  RWT: 0.35     Time Measurements  MM HR: 68.0 BPM     Doppler  Measurements & Calculations  TR max anmol: 340.0 cm/sec  TR max P.2 mmHg     ______________________________________________________________________________  Report approved by: Brown Adorno 2023 12:26 PM             Discharge Medications   Discharge Medication List as of 2023  9:40 AM        START taking these medications    Details   amoxicillin-clavulanate (AUGMENTIN) 500-125 MG tablet Take 1 tablet by mouth every 24 hours for 3 days, Transitional      apixaban ANTICOAGULANT (ELIQUIS) 2.5 MG tablet Take 1 tablet (2.5 mg) by mouth 2 times daily, Transitional      calamine 8-8 % lotion Apply topically every hour as needed for itchingTransitional      folic acid (FOLVITE) 1 MG tablet Take 1 tablet (1 mg) by mouth daily, Transitional      lactobacillus rhamnosus, GG, (CULTURELL) capsule Take 1 capsule by mouth 2 times daily, Transitional      levothyroxine (SYNTHROID/LEVOTHROID) 25 MCG tablet Take 1 tablet (25 mcg) by mouth every morning (before breakfast), Transitional      loperamide (IMODIUM) 2 MG capsule Take 1 capsule (2 mg) by mouth 4 times daily as needed for diarrhea, Transitional      menthol-zinc oxide (CALMOSEPTINE) 0.44-20.6 % OINT ointment Apply topically 4 times daily as needed for skin protection or irritationTransitional      miconazole (MICATIN) 2 % external powder Apply topically 2 times dailyTransitional      !! midodrine (PROAMATINE) 10 MG tablet Take 1 tablet (10 mg) by mouth Every Mon, Wed, Fri Morning, Transitional      !! midodrine (PROAMATINE) 5 MG tablet Take 1 tablet (5 mg) by mouth every hour as needed (on HD for SBP <90), Transitional      pantoprazole (PROTONIX) 40 MG EC tablet Take 1 tablet (40 mg) by mouth every morning (before breakfast), Transitional      thiamine (B-1) 100 MG tablet Take 1 tablet (100 mg) by mouth daily for 30 days, Disp-30 tablet, R-0, E-Prescribe       !! - Potential duplicate medications found. Please discuss with provider.        CONTINUE these  medications which have NOT CHANGED    Details   baclofen (LIORESAL) 10 MG tablet Take 1 tablet (10 mg) by mouth 3 times daily as needed for muscle spasms, Disp-20 tablet, R-1, E-Prescribe      calcium citrate-vitamin D (CITRACAL) 315-200 MG-UNIT TABS per tablet Take 1 tablet by mouth daily, Historical      cholecalciferol, vitamin D3, 50 mcg (2,000 unit) Tab [CHOLECALCIFEROL, VITAMIN D3, 50 MCG (2,000 UNIT) TAB] Take 1 tablet (2,000 Units total) by mouth daily., Disp-100 each, R-3, Normal      febuxostat (ULORIC) 40 MG TABS tablet Take 1 tablet (40 mg) by mouth daily, Disp-30 tablet, R-11, E-PrescribeAllergic reaction to allopurinol      topiramate (TOPAMAX) 25 MG tablet Take 1 tablet (25 mg) by mouth 2 times daily, Disp-180 tablet, R-3, E-Prescribe      BETA BLOCKER NOT PRESCRIBED (INTENTIONAL) Historical           STOP taking these medications       furosemide (LASIX) 40 MG tablet Comments:   Reason for Stopping:         HYDROcodone-acetaminophen (NORCO) 5-325 MG tablet Comments:   Reason for Stopping:         losartan (COZAAR) 100 MG tablet Comments:   Reason for Stopping:         omeprazole (PRILOSEC) 20 MG DR capsule Comments:   Reason for Stopping:         probenecid (BENEMID) 500 MG tablet Comments:   Reason for Stopping:         rivaroxaban ANTICOAGULANT (XARELTO ANTICOAGULANT) 15 MG TABS tablet Comments:   Reason for Stopping:             Allergies   Allergies   Allergen Reactions    Allopurinol Diarrhea    Lisinopril Cough     initiated 5/14/1996 and stopped

## 2023-11-09 NOTE — PLAN OF CARE
Occupational Therapy Discharge Summary    Reason for therapy discharge:    Discharged to transitional care facility.    Progress towards therapy goal(s). See goals on Care Plan in [facility] electronic health record for goal details.  Goals partially met.  Barriers to achieving goals:   discharge from facility.    Therapy recommendation(s):    Continued therapy is recommended.  Rationale/Recommendations:  to maximize ADL independence.

## 2023-11-09 NOTE — PROGRESS NOTES
[facility]  WO Nurse Inpatient Assessment     Consulted for: R heel, L dorsal foot    Summary: 11.9.23 patient with new area on foot per consult - patient discharged prior to assessment of new wound; however, orders in place for current wounds and any new wounds which would open on BLE.  Bhavani Quinonez, MSN RN CWOCN  Pager no longer is use, please contact through [employer] group: [facility] [employer] [facility]    Per family, patient with confusion and awaiting HD; BLE edema noted    Patient History (according to provider note(s):        Assessment:    Skin Injury Location: B feet    Skin injury due to:  Edema  Skin history and plan of care: New areas of injury  Affected area:      Skin assessment: Blistering     Measurements (length x width x depth, in cm) 1 cm x 1cm on R heel and 6 cm  x  6 cm on L dorsal foot -  both with pink, viable wound beds     Color: normal and consistent with surrounding tissue     Temperature  normal      Drainage: small .      Color: serous      Odor: none  Pain: denies , none  Pain interventions prior to dressing change: patient tolerated well and slow and gentle cares   Treatment goal: Heal   STATUS: initial assessment  Supplies ordered: gathered    Treatment Plan:     L dorsal foot and R heel (may use this on leg blisters which rupture as well) - every 3 days  Cleanse open areas with saline, gently dry.  Cover with Mepilex dressing.  May change sooner if needed due to excessive drainage.    Orders: Written    RECOMMEND PRIMARY TEAM ORDER: Lymphedema consult  Education provided: plan of care  Discussed plan of care with: Patient and Family  WOC nurse follow-up plan: weekly  Notify WOC if wound(s) deteriorate.  Nursing to notify the Provider(s) and re-consult the WOC Nurse if new skin concern.    DATA:     Current support surface: Standard  Standard gel/foam mattress (IsoFlex, Atmos air, etc)  Containment of urine/stool: Incontinence  Protocol  BMI: Body mass index is 40.65 kg/m .   Active diet order: Orders Placed This Encounter      Diet     Output: I/O last 3 completed shifts:  In: 160 [P.O.:160]  Out: 0      Labs:   Recent Labs   Lab 11/09/23  0532   ALBUMIN 2.9*   HGB 8.8*   WBC 7.9     Pressure injury risk assessment:   Sensory Perception: 3-->slightly limited  Moisture: 3-->occasionally moist  Activity: 2-->chairfast  Mobility: 2-->very limited  Nutrition: 3-->adequate  Friction and Shear: 1-->problem  Juan Score: 14    Bhavani Quinonez MSN RN CWOCN  Pager no longer is use, please contact through [employer] group: [facility] [employer] Group

## 2023-11-09 NOTE — PROGRESS NOTES
Care Management Discharge Note    Discharge Date: 11/09/2023       Discharge Disposition: [facility] and outpatient hemo dialysis - [facility]    Discharge Services: None [facility] and outpatient hemo dialysis - [facility]     Discharge DME: None    Discharge Transportation:   [facility]    Private pay costs discussed: transportation costs    Does the patient's insurance plan have a 3 day qualifying hospital stay waiver?  Yes     Which insurance plan 3 day waiver is available? [employer]    Will the waiver be used for post-acute placement? Yes    PAS Confirmation Code: [id number]  Patient/family educated on Medicare website which has current facility and service quality ratings: yes    Education Provided on the Discharge Plan: Yes per team  Persons Notified of Discharge Plans: Patient per team  Patient/Family in Agreement with the Plan: yes    Handoff Referral Completed: Yes    Additional Information:  Patient discharge to [facility] and outpatient hemo dialysis - [facility] via [facility]  11:10 - 11:50 AM.     Jennifer Westfall RN      yes

## 2023-11-14 NOTE — PROGRESS NOTES
Clinic Care Coordination Contact  Care Coordination Transition Communication    Clinical Data: Patient was hospitalized at [facility] from 10/29/23 to 11/9/23 with diagnosis of acute respiratory failure.     Assessment: Patient has transitioned to TCU/ARU for short term rehabilitation:    Facility Name: [facility]  Transition Communication:  Notified facility of Primary Care- Care Coordination support via  telephone.  Sejal [phone]    Plan: Care Coordinator will await notification from facility staff informing of patient's discharge plans/needs. Care Coordinator will review chart and outreach to facility staff every 4 weeks and as needed.     David Myhre, RN   CCC RN

## 2023-11-15 NOTE — LETTER
11/15/2023        RE: Mely Alegria  [address] [place] [address]        [facility] GERIATRIC SERVICES  Chief Complaint   Patient presents with     [facility] F/U     [facility] Medical Record Number:  [id number]  Place of Service where encounter took place:  [facility]) [[id number]]  Code Status:  No CPR- Do NOT Intubate     HISTORY:      HPI:  Mely Alegria  is 83 year old (1940) undergoing physical and occupational therapy.  She is with with a history of severe obesity, afib on eliquis, CKD4, GERD, HFpEF, chronic back pain, gout, admitted 10/30 due to acute encephalopathy, found to have anasarca, E coli UTI, acute respiratory failure with hypoxia, acute on chronic kidney injury requiring initiation of hemodialysis, and shock requiring vasopressors.     Today she is seen to review vital signs, labs, routine visit and to establish care.  She denied chest pain she does have shortness of breath with activity and sometimes at rest due to her atrial fibrillation. She does have a dry cough, denies congestion constipation or diarrhea.  She is a new dialysis patient Monday Wednesday Friday.  She tells writer she has had approximately 6-7 treatments so far.  Per records dialysis was initiated on 10/31/2023.  She completed antibiotics for pneumonia she lives with her son who assists with cares.  She is noted to have 3-4+ lower extremity edema however she tells writer that it has significantly decreased.  She is with a large popped blister left foot and wound care orders written.  She will follow-up with orthopedics in 2 weeks for a mildly displaced medial tibial plateau fracture currently nonoperative.    ALLERGIES:Allopurinol and Lisinopril    PAST MEDICAL HISTORY: History reviewed. No pertinent past medical history.    PAST SURGICAL HISTORY:   has a past surgical history that includes TOTAL HIP ARTHROPLASTY; DILATION/CURETTAGE DIAG/THER NON OB; PART REMOVAL COLON W ANASTOMOSIS;  COLOSTOMY; IR CVC Tunnel Placement > 5 Yrs of Age (10/30/2023); and PICC/Midline Placement (10/30/2023).    FAMILY HISTORY: family history includes Diabetes in her mother.    SOCIAL HISTORY:  reports that she has never smoked. She has never used smokeless tobacco.    ROS:  Constitutional: Positive for activity change, appetite change, fatigue and fever.   HENT: Negative for congestion.    Respiratory: Negative for cough, positive shortness of breath and wheezing.    Cardiovascular: Negative for chest pain and positive leg swelling.   Gastrointestinal: Negative for abdominal distention, abdominal pain, constipation, diarrhea and nausea.   Genitourinary: Negative for dysuria.   Musculoskeletal: Negative for arthralgia. Negative for back pain.   Skin: Negative for color change and wound.  Large popped blister ventral part of left foot  Neurological: Negative for dizziness.   Psychiatric/Behavioral: Negative for agitation, behavioral problems and confusion.     Physical Exam:  Constitutional:       Appearance: Patient is well-developed.   HENT:      Head: Normocephalic.   Eyes:      Conjunctiva/sclera: Conjunctivae normal.   Neck:      Musculoskeletal: Normal range of motion.   Cardiovascular:      Rate and Rhythm: Normal rate and regular rhythm.      Heart sounds: Normal heart sounds. No murmur.  Port-A-Cath of right upper chest  Pulmonary:      Effort: No respiratory distress.      Breath sounds: Normal breath sounds. No wheezing positive crackles right lower lobe   Abdominal:      General: Bowel sounds are normal. There is no distension.      Palpations: Abdomen is soft.      Tenderness: There is no abdominal tenderness.   Musculoskeletal:       Normal range of motion.     Skin:General:        Skin is warm.   Neurological:         Mental Status: Patient is alert and oriented to person, place, and time.   Psychiatric:         Behavior: Behavior normal.     Vitals:/65   Pulse 82   Temp 97.7  F (36.5  C)   Resp  "18   Ht 1.575 m (5' 2\")   Wt 99.4 kg (219 lb 3.2 oz)   LMP  (LMP Unknown)   SpO2 97%   BMI 40.09 kg/m   and Body mass index is 40.09 kg/m .    Lab/Diagnostic data:   Recent Results (from the past 240 hour(s))   Glucose by meter    Collection Time: 11/05/23  3:10 PM   Result Value Ref Range    GLUCOSE BY METER POCT 88 70 - 99 mg/dL   Lactic Acid STAT    Collection Time: 11/05/23  6:19 PM   Result Value Ref Range    Lactic Acid 1.2 0.7 - 2.0 mmol/L   Vancomycin level    Collection Time: 11/06/23  5:51 AM   Result Value Ref Range    Vancomycin 20.2   ug/mL   Comprehensive metabolic panel    Collection Time: 11/06/23  5:51 AM   Result Value Ref Range    Sodium 140 135 - 145 mmol/L    Potassium 3.7 3.4 - 5.3 mmol/L    Carbon Dioxide (CO2) 25 22 - 29 mmol/L    Anion Gap 18 (H) 7 - 15 mmol/L    Urea Nitrogen 31.8 (H) 8.0 - 23.0 mg/dL    Creatinine 3.70 (H) 0.51 - 0.95 mg/dL    GFR Estimate 12 (L) >60 mL/min/1.73m2    Calcium 9.4 8.8 - 10.2 mg/dL    Chloride 97 (L) 98 - 107 mmol/L    Glucose 79 70 - 99 mg/dL    Alkaline Phosphatase 118 (H) 35 - 104 U/L    AST 24 0 - 45 U/L    ALT 11 0 - 50 U/L    Protein Total 6.4 6.4 - 8.3 g/dL    Albumin 3.3 (L) 3.5 - 5.2 g/dL    Bilirubin Total 0.5 <=1.2 mg/dL   CK total    Collection Time: 11/06/23  5:51 AM   Result Value Ref Range    CK 27 26 - 192 U/L   CBC with platelets and differential    Collection Time: 11/06/23  5:51 AM   Result Value Ref Range    WBC Count 9.3 4.0 - 11.0 10e3/uL    RBC Count 3.06 (L) 3.80 - 5.20 10e6/uL    Hemoglobin 9.8 (L) 11.7 - 15.7 g/dL    Hematocrit 28.1 (L) 35.0 - 47.0 %    MCV 92 78 - 100 fL    MCH 32.0 26.5 - 33.0 pg    MCHC 34.9 31.5 - 36.5 g/dL    RDW 19.9 (H) 10.0 - 15.0 %    Platelet Count 109 (L) 150 - 450 10e3/uL    % Neutrophils 80 %    % Lymphocytes 10 %    % Monocytes 8 %    % Eosinophils 1 %    % Basophils 0 %    % Immature Granulocytes 1 %    NRBCs per 100 WBC 0 <1 /100    Absolute Neutrophils 7.5 1.6 - 8.3 10e3/uL    Absolute " Lymphocytes 0.9 0.8 - 5.3 10e3/uL    Absolute Monocytes 0.8 0.0 - 1.3 10e3/uL    Absolute Eosinophils 0.1 0.0 - 0.7 10e3/uL    Absolute Basophils 0.0 0.0 - 0.2 10e3/uL    Absolute Immature Granulocytes 0.1 <=0.4 10e3/uL    Absolute NRBCs 0.0 10e3/uL   Glucose by meter    Collection Time: 11/06/23  7:02 AM   Result Value Ref Range    GLUCOSE BY METER POCT 69 (L) 70 - 99 mg/dL   Glucose by meter    Collection Time: 11/06/23  7:20 AM   Result Value Ref Range    GLUCOSE BY METER POCT 89 70 - 99 mg/dL   Echocardiogram Limited    Collection Time: 11/06/23 11:21 AM   Result Value Ref Range    LVEF  50-55%    Glucose by meter    Collection Time: 11/06/23 11:58 AM   Result Value Ref Range    GLUCOSE BY METER POCT 97 70 - 99 mg/dL   Glucose by meter    Collection Time: 11/06/23  8:58 PM   Result Value Ref Range    GLUCOSE BY METER POCT 96 70 - 99 mg/dL   Comprehensive metabolic panel    Collection Time: 11/07/23  6:00 AM   Result Value Ref Range    Sodium 140 135 - 145 mmol/L    Potassium 3.4 3.4 - 5.3 mmol/L    Carbon Dioxide (CO2) 28 22 - 29 mmol/L    Anion Gap 14 7 - 15 mmol/L    Urea Nitrogen 18.5 8.0 - 23.0 mg/dL    Creatinine 2.71 (H) 0.51 - 0.95 mg/dL    GFR Estimate 17 (L) >60 mL/min/1.73m2    Calcium 8.6 (L) 8.8 - 10.2 mg/dL    Chloride 98 98 - 107 mmol/L    Glucose 77 70 - 99 mg/dL    Alkaline Phosphatase 114 (H) 35 - 104 U/L    AST 24 0 - 45 U/L    ALT 9 0 - 50 U/L    Protein Total 6.6 6.4 - 8.3 g/dL    Albumin 3.1 (L) 3.5 - 5.2 g/dL    Bilirubin Total 0.6 <=1.2 mg/dL   CBC with platelets and differential    Collection Time: 11/07/23  6:00 AM   Result Value Ref Range    WBC Count 9.0 4.0 - 11.0 10e3/uL    RBC Count 3.00 (L) 3.80 - 5.20 10e6/uL    Hemoglobin 9.5 (L) 11.7 - 15.7 g/dL    Hematocrit 28.0 (L) 35.0 - 47.0 %    MCV 93 78 - 100 fL    MCH 31.7 26.5 - 33.0 pg    MCHC 33.9 31.5 - 36.5 g/dL    RDW 19.9 (H) 10.0 - 15.0 %    Platelet Count 119 (L) 150 - 450 10e3/uL    % Neutrophils 80 %    % Lymphocytes 11 %     % Monocytes 8 %    % Eosinophils 1 %    % Basophils 0 %    % Immature Granulocytes 0 %    NRBCs per 100 WBC 0 <1 /100    Absolute Neutrophils 7.1 1.6 - 8.3 10e3/uL    Absolute Lymphocytes 1.0 0.8 - 5.3 10e3/uL    Absolute Monocytes 0.8 0.0 - 1.3 10e3/uL    Absolute Eosinophils 0.1 0.0 - 0.7 10e3/uL    Absolute Basophils 0.0 0.0 - 0.2 10e3/uL    Absolute Immature Granulocytes 0.0 <=0.4 10e3/uL    Absolute NRBCs 0.0 10e3/uL   Quantiferon TB Gold Plus Grey Tube    Collection Time: 11/07/23  3:33 PM    Specimen: Arm, Right; Blood   Result Value Ref Range    Quantiferon Nil Tube 0.00 IU/mL   Quantiferon TB Gold Plus Green Tube    Collection Time: 11/07/23  3:33 PM    Specimen: Arm, Right; Blood   Result Value Ref Range    Quantiferon TB1 Tube 0.00 IU/mL   Quantiferon TB Gold Plus Purple Tube    Collection Time: 11/07/23  3:33 PM    Specimen: Arm, Right; Blood   Result Value Ref Range    Quantiferon Mitogen 1.41 IU/mL   Quantiferon TB Gold Plus    Collection Time: 11/07/23  3:33 PM    Specimen: Arm, Right; Blood   Result Value Ref Range    Quantiferon-TB Gold Plus Negative Negative    TB1 Ag minus Nil Value 0.00 IU/mL    TB2 Ag minus Nil Value 0.00 IU/mL    Mitogen minus Nil Result 1.41 IU/mL    Nil Result 0.00 IU/mL   Quantiferon TB Gold Plus Yellow Tube    Collection Time: 11/07/23  3:34 PM    Specimen: Arm, Right; Blood   Result Value Ref Range    Quantiferon TB2 Tube 0.00    Glucose by meter    Collection Time: 11/07/23 10:43 PM   Result Value Ref Range    GLUCOSE BY METER POCT 119 (H) 70 - 99 mg/dL   D dimer quantitative    Collection Time: 11/07/23 11:14 PM   Result Value Ref Range    D-Dimer Quantitative 1.19 (H) 0.00 - 0.50 ug/mL FEU   Lactic Acid STAT    Collection Time: 11/07/23 11:14 PM   Result Value Ref Range    Lactic Acid 1.6 0.7 - 2.0 mmol/L   Comprehensive metabolic panel    Collection Time: 11/08/23  5:12 AM   Result Value Ref Range    Sodium 140 135 - 145 mmol/L    Potassium 3.5 3.4 - 5.3 mmol/L     Carbon Dioxide (CO2) 29 22 - 29 mmol/L    Anion Gap 12 7 - 15 mmol/L    Urea Nitrogen 23.3 (H) 8.0 - 23.0 mg/dL    Creatinine 3.18 (H) 0.51 - 0.95 mg/dL    GFR Estimate 14 (L) >60 mL/min/1.73m2    Calcium 8.6 (L) 8.8 - 10.2 mg/dL    Chloride 99 98 - 107 mmol/L    Glucose 80 70 - 99 mg/dL    Alkaline Phosphatase 100 35 - 104 U/L    AST 23 0 - 45 U/L    ALT 10 0 - 50 U/L    Protein Total 6.0 (L) 6.4 - 8.3 g/dL    Albumin 2.9 (L) 3.5 - 5.2 g/dL    Bilirubin Total 0.7 <=1.2 mg/dL   CBC with platelets and differential    Collection Time: 11/08/23  5:12 AM   Result Value Ref Range    WBC Count 7.9 4.0 - 11.0 10e3/uL    RBC Count 2.72 (L) 3.80 - 5.20 10e6/uL    Hemoglobin 8.6 (L) 11.7 - 15.7 g/dL    Hematocrit 25.4 (L) 35.0 - 47.0 %    MCV 93 78 - 100 fL    MCH 31.6 26.5 - 33.0 pg    MCHC 33.9 31.5 - 36.5 g/dL    RDW 20.2 (H) 10.0 - 15.0 %    Platelet Count 112 (L) 150 - 450 10e3/uL    % Neutrophils 78 %    % Lymphocytes 11 %    % Monocytes 9 %    % Eosinophils 1 %    % Basophils 0 %    % Immature Granulocytes 1 %    NRBCs per 100 WBC 0 <1 /100    Absolute Neutrophils 6.1 1.6 - 8.3 10e3/uL    Absolute Lymphocytes 0.9 0.8 - 5.3 10e3/uL    Absolute Monocytes 0.7 0.0 - 1.3 10e3/uL    Absolute Eosinophils 0.1 0.0 - 0.7 10e3/uL    Absolute Basophils 0.0 0.0 - 0.2 10e3/uL    Absolute Immature Granulocytes 0.0 <=0.4 10e3/uL    Absolute NRBCs 0.0 10e3/uL   Glucose by meter    Collection Time: 11/08/23  8:15 AM   Result Value Ref Range    GLUCOSE BY METER POCT 75 70 - 99 mg/dL   CBC with platelets and differential    Collection Time: 11/08/23  1:31 PM   Result Value Ref Range    WBC Count 9.9 4.0 - 11.0 10e3/uL    RBC Count 3.08 (L) 3.80 - 5.20 10e6/uL    Hemoglobin 9.7 (L) 11.7 - 15.7 g/dL    Hematocrit 28.5 (L) 35.0 - 47.0 %    MCV 93 78 - 100 fL    MCH 31.5 26.5 - 33.0 pg    MCHC 34.0 31.5 - 36.5 g/dL    RDW 20.0 (H) 10.0 - 15.0 %    Platelet Count 127 (L) 150 - 450 10e3/uL    % Neutrophils 81 %    % Lymphocytes 10 %    %  Monocytes 7 %    % Eosinophils 1 %    % Basophils 0 %    % Immature Granulocytes 1 %    NRBCs per 100 WBC 0 <1 /100    Absolute Neutrophils 8.0 1.6 - 8.3 10e3/uL    Absolute Lymphocytes 1.0 0.8 - 5.3 10e3/uL    Absolute Monocytes 0.7 0.0 - 1.3 10e3/uL    Absolute Eosinophils 0.1 0.0 - 0.7 10e3/uL    Absolute Basophils 0.0 0.0 - 0.2 10e3/uL    Absolute Immature Granulocytes 0.1 <=0.4 10e3/uL    Absolute NRBCs 0.0 10e3/uL   C. difficile Toxin B PCR with reflex to C. difficile Antigen and Toxins A/B EIA    Collection Time: 11/08/23  3:16 PM    Specimen: Per Rectum; Stool   Result Value Ref Range    C Difficile Toxin B by PCR Negative Negative   Glucose by meter    Collection Time: 11/08/23  5:18 PM   Result Value Ref Range    GLUCOSE BY METER POCT 90 70 - 99 mg/dL   Glucose by meter    Collection Time: 11/08/23  9:03 PM   Result Value Ref Range    GLUCOSE BY METER POCT 96 70 - 99 mg/dL   Renal panel    Collection Time: 11/09/23  5:32 AM   Result Value Ref Range    Sodium 140 135 - 145 mmol/L    Potassium 3.6 3.4 - 5.3 mmol/L    Chloride 101 98 - 107 mmol/L    Carbon Dioxide (CO2) 27 22 - 29 mmol/L    Anion Gap 12 7 - 15 mmol/L    Glucose 73 70 - 99 mg/dL    Urea Nitrogen 16.8 8.0 - 23.0 mg/dL    Creatinine 2.62 (H) 0.51 - 0.95 mg/dL    GFR Estimate 18 (L) >60 mL/min/1.73m2    Calcium 8.8 8.8 - 10.2 mg/dL    Albumin 2.9 (L) 3.5 - 5.2 g/dL    Phosphorus 2.8 2.5 - 4.5 mg/dL   Magnesium    Collection Time: 11/09/23  5:32 AM   Result Value Ref Range    Magnesium 1.6 (L) 1.7 - 2.3 mg/dL   CBC with platelets and differential    Collection Time: 11/09/23  5:32 AM   Result Value Ref Range    WBC Count 7.9 4.0 - 11.0 10e3/uL    RBC Count 2.78 (L) 3.80 - 5.20 10e6/uL    Hemoglobin 8.8 (L) 11.7 - 15.7 g/dL    Hematocrit 26.3 (L) 35.0 - 47.0 %    MCV 95 78 - 100 fL    MCH 31.7 26.5 - 33.0 pg    MCHC 33.5 31.5 - 36.5 g/dL    RDW 20.3 (H) 10.0 - 15.0 %    Platelet Count 133 (L) 150 - 450 10e3/uL    % Neutrophils 75 %    % Lymphocytes  14 %    % Monocytes 9 %    % Eosinophils 1 %    % Basophils 0 %    % Immature Granulocytes 1 %    NRBCs per 100 WBC 0 <1 /100    Absolute Neutrophils 5.9 1.6 - 8.3 10e3/uL    Absolute Lymphocytes 1.1 0.8 - 5.3 10e3/uL    Absolute Monocytes 0.7 0.0 - 1.3 10e3/uL    Absolute Eosinophils 0.1 0.0 - 0.7 10e3/uL    Absolute Basophils 0.0 0.0 - 0.2 10e3/uL    Absolute Immature Granulocytes 0.1 <=0.4 10e3/uL    Absolute NRBCs 0.0 10e3/uL        MEDICATIONS:     Review of your medicines            Accurate as of November 15, 2023 10:59 AM. If you have any questions, ask your nurse or doctor.                CONTINUE these medicines which have NOT CHANGED        Dose / Directions   apixaban ANTICOAGULANT 2.5 MG tablet  Commonly known as: ELIQUIS  Indication: Atrial Fibrillation Not Caused By A Heart Valve Problem  Used for: ESRD (end stage renal disease) on dialysis (H)      Dose: 2.5 mg  Take 1 tablet (2.5 mg) by mouth 2 times daily  Refills: 0     baclofen 10 MG tablet  Commonly known as: LIORESAL  Used for: Acute midline low back pain without sciatica      Dose: 10 mg  Take 1 tablet (10 mg) by mouth 3 times daily as needed for muscle spasms  Quantity: 20 tablet  Refills: 1     calamine 8-8 % lotion  Used for: ESRD (end stage renal disease) on dialysis (H)      Apply topically every hour as needed for itching  Refills: 0     calcium citrate-vitamin D 315-200 MG-UNIT Tabs per tablet  Commonly known as: CITRACAL      Dose: 1 tablet  Take 1 tablet by mouth daily  Refills: 0     cholecalciferol 50 MCG (2000 UT) tablet  Used for: Vitamin D deficiency      Dose: 2,000 Units  [CHOLECALCIFEROL, VITAMIN D3, 50 MCG (2,000 UNIT) TAB] Take 1 tablet (2,000 Units total) by mouth daily.  Quantity: 100 each  Refills: 3     febuxostat 40 MG Tabs tablet  Commonly known as: ULORIC  Used for: Uric acid arthropathy      Dose: 40 mg  Take 1 tablet (40 mg) by mouth daily  Quantity: 30 tablet  Refills: 11     folic acid 1 MG tablet  Commonly known  as: FOLVITE  Used for: ESRD (end stage renal disease) on dialysis (H)      Dose: 1 mg  Take 1 tablet (1 mg) by mouth daily  Refills: 0     lactobacillus rhamnosus (GG) capsule  Used for: ESRD (end stage renal disease) on dialysis (H)      Dose: 1 capsule  Take 1 capsule by mouth 2 times daily  Refills: 0     levothyroxine 25 MCG tablet  Commonly known as: SYNTHROID/LEVOTHROID  Used for: ESRD (end stage renal disease) on dialysis (H)      Dose: 25 mcg  Take 1 tablet (25 mcg) by mouth every morning (before breakfast)  Refills: 0     loperamide 2 MG capsule  Commonly known as: IMODIUM  Used for: ESRD (end stage renal disease) on dialysis (H)      Dose: 2 mg  Take 1 capsule (2 mg) by mouth 4 times daily as needed for diarrhea  Refills: 0     menthol-zinc oxide 0.44-20.6 % Oint ointment  Commonly known as: CALMOSEPTINE  Used for: ESRD (end stage renal disease) on dialysis (H)      Apply topically 4 times daily as needed for skin protection or irritation  Refills: 0     miconazole 2 % external powder  Commonly known as: MICATIN  Used for: ESRD (end stage renal disease) on dialysis (H)      Apply topically 2 times daily  Refills: 0     * midodrine 5 MG tablet  Commonly known as: PROAMATINE  Used for: ESRD (end stage renal disease) on dialysis (H)      Dose: 5 mg  Take 1 tablet (5 mg) by mouth every hour as needed (on HD for SBP <90)  Refills: 0     * midodrine 10 MG tablet  Commonly known as: PROAMATINE  Used for: ESRD (end stage renal disease) on dialysis (H)      Dose: 10 mg  Take 1 tablet (10 mg) by mouth Every Mon, Wed, Fri Morning  Refills: 0     pantoprazole 40 MG EC tablet  Commonly known as: PROTONIX  Used for: ESRD (end stage renal disease) on dialysis (H)      Dose: 40 mg  Take 1 tablet (40 mg) by mouth every morning (before breakfast)  Refills: 0     thiamine 100 MG tablet  Commonly known as: B-1  Used for: ESRD (end stage renal disease) on dialysis (H)      Dose: 100 mg  Take 1 tablet (100 mg) by mouth daily  for 30 days  Quantity: 30 tablet  Refills: 0     topiramate 25 MG tablet  Commonly known as: TOPAMAX  Used for: Acute midline low back pain without sciatica      Dose: 25 mg  Take 1 tablet (25 mg) by mouth 2 times daily  Quantity: 180 tablet  Refills: 3           * This list has 2 medication(s) that are the same as other medications prescribed for you. Read the directions carefully, and ask your doctor or other care provider to review them with you.                STOP taking      BETA BLOCKER NOT PRESCRIBED  Commonly known as: INTENTIONAL  Stopped by: Nicole Valverde CNP                 ASSESSMENT/PLAN  Encounter Diagnoses   Name Primary?     Chronic congestive heart failure, unspecified heart failure type (H) Yes     Dialysis patient (H24)      Physical deconditioning      Blister      Lower extremity edema Tubigrip's on during the day off at night, elevate legs while in bed    Large blister ventral part of left foot cleanse foot with normal saline or wound cleanser apply Vaseline gauze followed by Kerlix change daily    Dialysis patient Monday Wednesday Friday    Congestive heart failure Daily weights currently not on diuretics    Atrial fibrillation continue Eliquis 2.5 mg twice daily    Acute back pain on baclofen and topiramate    Hypothyroidism continue levothyroxine    End-stage renal disease on midodrine scheduled and as needed    GERD continue Protonix    Pneumonia completed antibiotics    Electronically signed by: Nicole Valverde CNP       Sincerely,        Nicole Valverde CNP

## 2023-11-15 NOTE — PROGRESS NOTES
[facility] GERIATRIC SERVICES  Chief Complaint   Patient presents with    Hospital F/U     [facility] Medical Record Number:  [id number]  Place of Service where encounter took place:  [facility] ([facility]) [[id number]]  Code Status:  No CPR- Do NOT Intubate     HISTORY:      HPI:  Mely Alegria  is 83 year old (1940) undergoing physical and occupational therapy.  She is with with a history of severe obesity, afib on eliquis, CKD4, GERD, HFpEF, chronic back pain, gout, admitted 10/30 due to acute encephalopathy, found to have anasarca, E coli UTI, acute respiratory failure with hypoxia, acute on chronic kidney injury requiring initiation of hemodialysis, and shock requiring vasopressors.     Today she is seen to review vital signs, labs, routine visit and to establish care.  She denied chest pain she does have shortness of breath with activity and sometimes at rest due to her atrial fibrillation. She does have a dry cough, denies congestion constipation or diarrhea.  She is a new dialysis patient Monday Wednesday Friday.  She tells writer she has had approximately 6-7 treatments so far.  Per records dialysis was initiated on 10/31/2023.  She completed antibiotics for pneumonia she lives with her son who assists with cares.  She is noted to have 3-4+ lower extremity edema however she tells writer that it has significantly decreased.  She is with a large popped blister left foot and wound care orders written.  She will follow-up with orthopedics in 2 weeks for a mildly displaced medial tibial plateau fracture currently nonoperative.    ALLERGIES:Allopurinol and Lisinopril    PAST MEDICAL HISTORY: History reviewed. No pertinent past medical history.    PAST SURGICAL HISTORY:   has a past surgical history that includes TOTAL HIP ARTHROPLASTY; DILATION/CURETTAGE DIAG/THER NON OB; PART REMOVAL COLON W ANASTOMOSIS; COLOSTOMY; IR CVC Tunnel Placement > 5 Yrs of Age (10/30/2023); and PICC/Midline Placement  "(10/30/2023).    FAMILY HISTORY: family history includes Diabetes in her mother.    SOCIAL HISTORY:  reports that she has never smoked. She has never used smokeless tobacco.    ROS:  Constitutional: Positive for activity change, appetite change, fatigue and fever.   HENT: Negative for congestion.    Respiratory: Negative for cough, positive shortness of breath and wheezing.    Cardiovascular: Negative for chest pain and positive leg swelling.   Gastrointestinal: Negative for abdominal distention, abdominal pain, constipation, diarrhea and nausea.   Genitourinary: Negative for dysuria.   Musculoskeletal: Negative for arthralgia. Negative for back pain.   Skin: Negative for color change and wound.  Large popped blister ventral part of left foot  Neurological: Negative for dizziness.   Psychiatric/Behavioral: Negative for agitation, behavioral problems and confusion.     Physical Exam:  Constitutional:       Appearance: Patient is well-developed.   HENT:      Head: Normocephalic.   Eyes:      Conjunctiva/sclera: Conjunctivae normal.   Neck:      Musculoskeletal: Normal range of motion.   Cardiovascular:      Rate and Rhythm: Normal rate and regular rhythm.      Heart sounds: Normal heart sounds. No murmur.  Port-A-Cath of right upper chest  Pulmonary:      Effort: No respiratory distress.      Breath sounds: Normal breath sounds. No wheezing positive crackles right lower lobe   Abdominal:      General: Bowel sounds are normal. There is no distension.      Palpations: Abdomen is soft.      Tenderness: There is no abdominal tenderness.   Musculoskeletal:       Normal range of motion.     Skin:General:        Skin is warm.   Neurological:         Mental Status: Patient is alert and oriented to person, place, and time.   Psychiatric:         Behavior: Behavior normal.     Vitals:/65   Pulse 82   Temp 97.7  F (36.5  C)   Resp 18   Ht 1.575 m (5' 2\")   Wt 99.4 kg (219 lb 3.2 oz)   LMP  (LMP Unknown)   SpO2 97%  "  BMI 40.09 kg/m   and Body mass index is 40.09 kg/m .    Lab/Diagnostic data:   Recent Results (from the past 240 hour(s))   Glucose by meter    Collection Time: 11/05/23  3:10 PM   Result Value Ref Range    GLUCOSE BY METER POCT 88 70 - 99 mg/dL   Lactic Acid STAT    Collection Time: 11/05/23  6:19 PM   Result Value Ref Range    Lactic Acid 1.2 0.7 - 2.0 mmol/L   Vancomycin level    Collection Time: 11/06/23  5:51 AM   Result Value Ref Range    Vancomycin 20.2   ug/mL   Comprehensive metabolic panel    Collection Time: 11/06/23  5:51 AM   Result Value Ref Range    Sodium 140 135 - 145 mmol/L    Potassium 3.7 3.4 - 5.3 mmol/L    Carbon Dioxide (CO2) 25 22 - 29 mmol/L    Anion Gap 18 (H) 7 - 15 mmol/L    Urea Nitrogen 31.8 (H) 8.0 - 23.0 mg/dL    Creatinine 3.70 (H) 0.51 - 0.95 mg/dL    GFR Estimate 12 (L) >60 mL/min/1.73m2    Calcium 9.4 8.8 - 10.2 mg/dL    Chloride 97 (L) 98 - 107 mmol/L    Glucose 79 70 - 99 mg/dL    Alkaline Phosphatase 118 (H) 35 - 104 U/L    AST 24 0 - 45 U/L    ALT 11 0 - 50 U/L    Protein Total 6.4 6.4 - 8.3 g/dL    Albumin 3.3 (L) 3.5 - 5.2 g/dL    Bilirubin Total 0.5 <=1.2 mg/dL   CK total    Collection Time: 11/06/23  5:51 AM   Result Value Ref Range    CK 27 26 - 192 U/L   CBC with platelets and differential    Collection Time: 11/06/23  5:51 AM   Result Value Ref Range    WBC Count 9.3 4.0 - 11.0 10e3/uL    RBC Count 3.06 (L) 3.80 - 5.20 10e6/uL    Hemoglobin 9.8 (L) 11.7 - 15.7 g/dL    Hematocrit 28.1 (L) 35.0 - 47.0 %    MCV 92 78 - 100 fL    MCH 32.0 26.5 - 33.0 pg    MCHC 34.9 31.5 - 36.5 g/dL    RDW 19.9 (H) 10.0 - 15.0 %    Platelet Count 109 (L) 150 - 450 10e3/uL    % Neutrophils 80 %    % Lymphocytes 10 %    % Monocytes 8 %    % Eosinophils 1 %    % Basophils 0 %    % Immature Granulocytes 1 %    NRBCs per 100 WBC 0 <1 /100    Absolute Neutrophils 7.5 1.6 - 8.3 10e3/uL    Absolute Lymphocytes 0.9 0.8 - 5.3 10e3/uL    Absolute Monocytes 0.8 0.0 - 1.3 10e3/uL    Absolute  Eosinophils 0.1 0.0 - 0.7 10e3/uL    Absolute Basophils 0.0 0.0 - 0.2 10e3/uL    Absolute Immature Granulocytes 0.1 <=0.4 10e3/uL    Absolute NRBCs 0.0 10e3/uL   Glucose by meter    Collection Time: 11/06/23  7:02 AM   Result Value Ref Range    GLUCOSE BY METER POCT 69 (L) 70 - 99 mg/dL   Glucose by meter    Collection Time: 11/06/23  7:20 AM   Result Value Ref Range    GLUCOSE BY METER POCT 89 70 - 99 mg/dL   Echocardiogram Limited    Collection Time: 11/06/23 11:21 AM   Result Value Ref Range    LVEF  50-55%    Glucose by meter    Collection Time: 11/06/23 11:58 AM   Result Value Ref Range    GLUCOSE BY METER POCT 97 70 - 99 mg/dL   Glucose by meter    Collection Time: 11/06/23  8:58 PM   Result Value Ref Range    GLUCOSE BY METER POCT 96 70 - 99 mg/dL   Comprehensive metabolic panel    Collection Time: 11/07/23  6:00 AM   Result Value Ref Range    Sodium 140 135 - 145 mmol/L    Potassium 3.4 3.4 - 5.3 mmol/L    Carbon Dioxide (CO2) 28 22 - 29 mmol/L    Anion Gap 14 7 - 15 mmol/L    Urea Nitrogen 18.5 8.0 - 23.0 mg/dL    Creatinine 2.71 (H) 0.51 - 0.95 mg/dL    GFR Estimate 17 (L) >60 mL/min/1.73m2    Calcium 8.6 (L) 8.8 - 10.2 mg/dL    Chloride 98 98 - 107 mmol/L    Glucose 77 70 - 99 mg/dL    Alkaline Phosphatase 114 (H) 35 - 104 U/L    AST 24 0 - 45 U/L    ALT 9 0 - 50 U/L    Protein Total 6.6 6.4 - 8.3 g/dL    Albumin 3.1 (L) 3.5 - 5.2 g/dL    Bilirubin Total 0.6 <=1.2 mg/dL   CBC with platelets and differential    Collection Time: 11/07/23  6:00 AM   Result Value Ref Range    WBC Count 9.0 4.0 - 11.0 10e3/uL    RBC Count 3.00 (L) 3.80 - 5.20 10e6/uL    Hemoglobin 9.5 (L) 11.7 - 15.7 g/dL    Hematocrit 28.0 (L) 35.0 - 47.0 %    MCV 93 78 - 100 fL    MCH 31.7 26.5 - 33.0 pg    MCHC 33.9 31.5 - 36.5 g/dL    RDW 19.9 (H) 10.0 - 15.0 %    Platelet Count 119 (L) 150 - 450 10e3/uL    % Neutrophils 80 %    % Lymphocytes 11 %    % Monocytes 8 %    % Eosinophils 1 %    % Basophils 0 %    % Immature Granulocytes 0 %     NRBCs per 100 WBC 0 <1 /100    Absolute Neutrophils 7.1 1.6 - 8.3 10e3/uL    Absolute Lymphocytes 1.0 0.8 - 5.3 10e3/uL    Absolute Monocytes 0.8 0.0 - 1.3 10e3/uL    Absolute Eosinophils 0.1 0.0 - 0.7 10e3/uL    Absolute Basophils 0.0 0.0 - 0.2 10e3/uL    Absolute Immature Granulocytes 0.0 <=0.4 10e3/uL    Absolute NRBCs 0.0 10e3/uL   Quantiferon TB Gold Plus Grey Tube    Collection Time: 11/07/23  3:33 PM    Specimen: Arm, Right; Blood   Result Value Ref Range    Quantiferon Nil Tube 0.00 IU/mL   Quantiferon TB Gold Plus Green Tube    Collection Time: 11/07/23  3:33 PM    Specimen: Arm, Right; Blood   Result Value Ref Range    Quantiferon TB1 Tube 0.00 IU/mL   Quantiferon TB Gold Plus Purple Tube    Collection Time: 11/07/23  3:33 PM    Specimen: Arm, Right; Blood   Result Value Ref Range    Quantiferon Mitogen 1.41 IU/mL   Quantiferon TB Gold Plus    Collection Time: 11/07/23  3:33 PM    Specimen: Arm, Right; Blood   Result Value Ref Range    Quantiferon-TB Gold Plus Negative Negative    TB1 Ag minus Nil Value 0.00 IU/mL    TB2 Ag minus Nil Value 0.00 IU/mL    Mitogen minus Nil Result 1.41 IU/mL    Nil Result 0.00 IU/mL   Quantiferon TB Gold Plus Yellow Tube    Collection Time: 11/07/23  3:34 PM    Specimen: Arm, Right; Blood   Result Value Ref Range    Quantiferon TB2 Tube 0.00    Glucose by meter    Collection Time: 11/07/23 10:43 PM   Result Value Ref Range    GLUCOSE BY METER POCT 119 (H) 70 - 99 mg/dL   D dimer quantitative    Collection Time: 11/07/23 11:14 PM   Result Value Ref Range    D-Dimer Quantitative 1.19 (H) 0.00 - 0.50 ug/mL FEU   Lactic Acid STAT    Collection Time: 11/07/23 11:14 PM   Result Value Ref Range    Lactic Acid 1.6 0.7 - 2.0 mmol/L   Comprehensive metabolic panel    Collection Time: 11/08/23  5:12 AM   Result Value Ref Range    Sodium 140 135 - 145 mmol/L    Potassium 3.5 3.4 - 5.3 mmol/L    Carbon Dioxide (CO2) 29 22 - 29 mmol/L    Anion Gap 12 7 - 15 mmol/L    Urea Nitrogen 23.3 (H)  8.0 - 23.0 mg/dL    Creatinine 3.18 (H) 0.51 - 0.95 mg/dL    GFR Estimate 14 (L) >60 mL/min/1.73m2    Calcium 8.6 (L) 8.8 - 10.2 mg/dL    Chloride 99 98 - 107 mmol/L    Glucose 80 70 - 99 mg/dL    Alkaline Phosphatase 100 35 - 104 U/L    AST 23 0 - 45 U/L    ALT 10 0 - 50 U/L    Protein Total 6.0 (L) 6.4 - 8.3 g/dL    Albumin 2.9 (L) 3.5 - 5.2 g/dL    Bilirubin Total 0.7 <=1.2 mg/dL   CBC with platelets and differential    Collection Time: 11/08/23  5:12 AM   Result Value Ref Range    WBC Count 7.9 4.0 - 11.0 10e3/uL    RBC Count 2.72 (L) 3.80 - 5.20 10e6/uL    Hemoglobin 8.6 (L) 11.7 - 15.7 g/dL    Hematocrit 25.4 (L) 35.0 - 47.0 %    MCV 93 78 - 100 fL    MCH 31.6 26.5 - 33.0 pg    MCHC 33.9 31.5 - 36.5 g/dL    RDW 20.2 (H) 10.0 - 15.0 %    Platelet Count 112 (L) 150 - 450 10e3/uL    % Neutrophils 78 %    % Lymphocytes 11 %    % Monocytes 9 %    % Eosinophils 1 %    % Basophils 0 %    % Immature Granulocytes 1 %    NRBCs per 100 WBC 0 <1 /100    Absolute Neutrophils 6.1 1.6 - 8.3 10e3/uL    Absolute Lymphocytes 0.9 0.8 - 5.3 10e3/uL    Absolute Monocytes 0.7 0.0 - 1.3 10e3/uL    Absolute Eosinophils 0.1 0.0 - 0.7 10e3/uL    Absolute Basophils 0.0 0.0 - 0.2 10e3/uL    Absolute Immature Granulocytes 0.0 <=0.4 10e3/uL    Absolute NRBCs 0.0 10e3/uL   Glucose by meter    Collection Time: 11/08/23  8:15 AM   Result Value Ref Range    GLUCOSE BY METER POCT 75 70 - 99 mg/dL   CBC with platelets and differential    Collection Time: 11/08/23  1:31 PM   Result Value Ref Range    WBC Count 9.9 4.0 - 11.0 10e3/uL    RBC Count 3.08 (L) 3.80 - 5.20 10e6/uL    Hemoglobin 9.7 (L) 11.7 - 15.7 g/dL    Hematocrit 28.5 (L) 35.0 - 47.0 %    MCV 93 78 - 100 fL    MCH 31.5 26.5 - 33.0 pg    MCHC 34.0 31.5 - 36.5 g/dL    RDW 20.0 (H) 10.0 - 15.0 %    Platelet Count 127 (L) 150 - 450 10e3/uL    % Neutrophils 81 %    % Lymphocytes 10 %    % Monocytes 7 %    % Eosinophils 1 %    % Basophils 0 %    % Immature Granulocytes 1 %    NRBCs per 100  WBC 0 <1 /100    Absolute Neutrophils 8.0 1.6 - 8.3 10e3/uL    Absolute Lymphocytes 1.0 0.8 - 5.3 10e3/uL    Absolute Monocytes 0.7 0.0 - 1.3 10e3/uL    Absolute Eosinophils 0.1 0.0 - 0.7 10e3/uL    Absolute Basophils 0.0 0.0 - 0.2 10e3/uL    Absolute Immature Granulocytes 0.1 <=0.4 10e3/uL    Absolute NRBCs 0.0 10e3/uL   C. difficile Toxin B PCR with reflex to C. difficile Antigen and Toxins A/B EIA    Collection Time: 11/08/23  3:16 PM    Specimen: Per Rectum; Stool   Result Value Ref Range    C Difficile Toxin B by PCR Negative Negative   Glucose by meter    Collection Time: 11/08/23  5:18 PM   Result Value Ref Range    GLUCOSE BY METER POCT 90 70 - 99 mg/dL   Glucose by meter    Collection Time: 11/08/23  9:03 PM   Result Value Ref Range    GLUCOSE BY METER POCT 96 70 - 99 mg/dL   Renal panel    Collection Time: 11/09/23  5:32 AM   Result Value Ref Range    Sodium 140 135 - 145 mmol/L    Potassium 3.6 3.4 - 5.3 mmol/L    Chloride 101 98 - 107 mmol/L    Carbon Dioxide (CO2) 27 22 - 29 mmol/L    Anion Gap 12 7 - 15 mmol/L    Glucose 73 70 - 99 mg/dL    Urea Nitrogen 16.8 8.0 - 23.0 mg/dL    Creatinine 2.62 (H) 0.51 - 0.95 mg/dL    GFR Estimate 18 (L) >60 mL/min/1.73m2    Calcium 8.8 8.8 - 10.2 mg/dL    Albumin 2.9 (L) 3.5 - 5.2 g/dL    Phosphorus 2.8 2.5 - 4.5 mg/dL   Magnesium    Collection Time: 11/09/23  5:32 AM   Result Value Ref Range    Magnesium 1.6 (L) 1.7 - 2.3 mg/dL   CBC with platelets and differential    Collection Time: 11/09/23  5:32 AM   Result Value Ref Range    WBC Count 7.9 4.0 - 11.0 10e3/uL    RBC Count 2.78 (L) 3.80 - 5.20 10e6/uL    Hemoglobin 8.8 (L) 11.7 - 15.7 g/dL    Hematocrit 26.3 (L) 35.0 - 47.0 %    MCV 95 78 - 100 fL    MCH 31.7 26.5 - 33.0 pg    MCHC 33.5 31.5 - 36.5 g/dL    RDW 20.3 (H) 10.0 - 15.0 %    Platelet Count 133 (L) 150 - 450 10e3/uL    % Neutrophils 75 %    % Lymphocytes 14 %    % Monocytes 9 %    % Eosinophils 1 %    % Basophils 0 %    % Immature Granulocytes 1 %     NRBCs per 100 WBC 0 <1 /100    Absolute Neutrophils 5.9 1.6 - 8.3 10e3/uL    Absolute Lymphocytes 1.1 0.8 - 5.3 10e3/uL    Absolute Monocytes 0.7 0.0 - 1.3 10e3/uL    Absolute Eosinophils 0.1 0.0 - 0.7 10e3/uL    Absolute Basophils 0.0 0.0 - 0.2 10e3/uL    Absolute Immature Granulocytes 0.1 <=0.4 10e3/uL    Absolute NRBCs 0.0 10e3/uL        MEDICATIONS:     Review of your medicines            Accurate as of November 15, 2023 10:59 AM. If you have any questions, ask your nurse or doctor.                CONTINUE these medicines which have NOT CHANGED        Dose / Directions   apixaban ANTICOAGULANT 2.5 MG tablet  Commonly known as: ELIQUIS  Indication: Atrial Fibrillation Not Caused By A Heart Valve Problem  Used for: ESRD (end stage renal disease) on dialysis (H)      Dose: 2.5 mg  Take 1 tablet (2.5 mg) by mouth 2 times daily  Refills: 0     baclofen 10 MG tablet  Commonly known as: LIORESAL  Used for: Acute midline low back pain without sciatica      Dose: 10 mg  Take 1 tablet (10 mg) by mouth 3 times daily as needed for muscle spasms  Quantity: 20 tablet  Refills: 1     calamine 8-8 % lotion  Used for: ESRD (end stage renal disease) on dialysis (H)      Apply topically every hour as needed for itching  Refills: 0     calcium citrate-vitamin D 315-200 MG-UNIT Tabs per tablet  Commonly known as: CITRACAL      Dose: 1 tablet  Take 1 tablet by mouth daily  Refills: 0     cholecalciferol 50 MCG (2000 UT) tablet  Used for: Vitamin D deficiency      Dose: 2,000 Units  [CHOLECALCIFEROL, VITAMIN D3, 50 MCG (2,000 UNIT) TAB] Take 1 tablet (2,000 Units total) by mouth daily.  Quantity: 100 each  Refills: 3     febuxostat 40 MG Tabs tablet  Commonly known as: ULORIC  Used for: Uric acid arthropathy      Dose: 40 mg  Take 1 tablet (40 mg) by mouth daily  Quantity: 30 tablet  Refills: 11     folic acid 1 MG tablet  Commonly known as: FOLVITE  Used for: ESRD (end stage renal disease) on dialysis (H)      Dose: 1 mg  Take 1  tablet (1 mg) by mouth daily  Refills: 0     lactobacillus rhamnosus (GG) capsule  Used for: ESRD (end stage renal disease) on dialysis (H)      Dose: 1 capsule  Take 1 capsule by mouth 2 times daily  Refills: 0     levothyroxine 25 MCG tablet  Commonly known as: SYNTHROID/LEVOTHROID  Used for: ESRD (end stage renal disease) on dialysis (H)      Dose: 25 mcg  Take 1 tablet (25 mcg) by mouth every morning (before breakfast)  Refills: 0     loperamide 2 MG capsule  Commonly known as: IMODIUM  Used for: ESRD (end stage renal disease) on dialysis (H)      Dose: 2 mg  Take 1 capsule (2 mg) by mouth 4 times daily as needed for diarrhea  Refills: 0     menthol-zinc oxide 0.44-20.6 % Oint ointment  Commonly known as: CALMOSEPTINE  Used for: ESRD (end stage renal disease) on dialysis (H)      Apply topically 4 times daily as needed for skin protection or irritation  Refills: 0     miconazole 2 % external powder  Commonly known as: MICATIN  Used for: ESRD (end stage renal disease) on dialysis (H)      Apply topically 2 times daily  Refills: 0     * midodrine 5 MG tablet  Commonly known as: PROAMATINE  Used for: ESRD (end stage renal disease) on dialysis (H)      Dose: 5 mg  Take 1 tablet (5 mg) by mouth every hour as needed (on HD for SBP <90)  Refills: 0     * midodrine 10 MG tablet  Commonly known as: PROAMATINE  Used for: ESRD (end stage renal disease) on dialysis (H)      Dose: 10 mg  Take 1 tablet (10 mg) by mouth Every Mon, Wed, Fri Morning  Refills: 0     pantoprazole 40 MG EC tablet  Commonly known as: PROTONIX  Used for: ESRD (end stage renal disease) on dialysis (H)      Dose: 40 mg  Take 1 tablet (40 mg) by mouth every morning (before breakfast)  Refills: 0     thiamine 100 MG tablet  Commonly known as: B-1  Used for: ESRD (end stage renal disease) on dialysis (H)      Dose: 100 mg  Take 1 tablet (100 mg) by mouth daily for 30 days  Quantity: 30 tablet  Refills: 0     topiramate 25 MG tablet  Commonly known as:  TOPAMAX  Used for: Acute midline low back pain without sciatica      Dose: 25 mg  Take 1 tablet (25 mg) by mouth 2 times daily  Quantity: 180 tablet  Refills: 3           * This list has 2 medication(s) that are the same as other medications prescribed for you. Read the directions carefully, and ask your doctor or other care provider to review them with you.                STOP taking      BETA BLOCKER NOT PRESCRIBED  Commonly known as: INTENTIONAL  Stopped by: Nicole Valverde CNP                 ASSESSMENT/PLAN  Encounter Diagnoses   Name Primary?    Chronic congestive heart failure, unspecified heart failure type (H) Yes    Dialysis patient (H24)     Physical deconditioning     Blister      Lower extremity edema Tubigrip's on during the day off at night, elevate legs while in bed    Large blister ventral part of left foot cleanse foot with normal saline or wound cleanser apply Vaseline gauze followed by Kerlix change daily    Dialysis patient Monday Wednesday Friday    Congestive heart failure Daily weights currently not on diuretics    Atrial fibrillation continue Eliquis 2.5 mg twice daily    Acute back pain on baclofen and topiramate    Hypothyroidism continue levothyroxine    End-stage renal disease on midodrine scheduled and as needed    GERD continue Protonix    Pneumonia completed antibiotics    Electronically signed by: Nicole Valverde CNP

## 2023-11-16 NOTE — TELEPHONE ENCOUNTER
[facility] Geriatrics Lab Note     Provider: ABDIAS Mondragon  Facility: [facility]  Facility Type:  TCU    Allergies   Allergen Reactions    Allopurinol Diarrhea    Lisinopril Cough     initiated 5/14/1996 and stopped         Labs Reviewed by provider: Heme 2, BMP     Verbal Order/Direction given by Provider: Call results to nephrology and dialysis.      Provider giving Order:  ABDIAS Mondragon    Verbal Order given to: Danis([phone])    August Salazar RN

## 2023-11-21 NOTE — LETTER
11/21/2023        RE: Mely Algeria  [address] [place] [address]                                                                                      [facility]     Code Status:  DNR/DNI  Visit Type: RECHECK     Facility:   [facility] ([facility]) [[id number]]        History of Present Illness:   Hospital Admission Date: 10/29/2023    Hospital Discharge Date: 11/9/2023      Mely Alegria is a 83 year old female with past medical history for obesity, atrial fib, CKD4, GERD, HFpEF, chronic back pain, gout.  She was recently hospitalized for acute encephalopathy and found to have anasarca and Uti.  She did have hypoxia needing O2 and was started on hemodialysis.  Fluid removed and O2 was removed.  She was treated with IV abx for pneumonia.  Nephrology reports she has now progressed to ESRD and will need chronic hemodialysis on MWF.  Her Eliquis was changed to renal dose of 2.5mg BID.    She was found to have hypothyroidism with a multinodular goiter with extension into the mediastinum.  She w;as started on 25mcg of levothyroxine.  She will need a follow up esophagram.     She did have a nonocclusive superficial thrombophlebitis of left UE which resolved with support care and eliquis.     She was also found to have mildly displaced intra-articular fracture of posterior medial tibial plateau.  She was seen by ortho and no surgical intervention was indicated.  She will need to follow up with ortho in 1 month.     Today, she reports feeling generally well.  She feels dialysis is going well.  She continues to have shallow open area on top of left foot where a blister unroofed.  Drainage is minimal and pain is improving.     History reviewed. No pertinent past medical history.  Past Surgical History:   Procedure Laterality Date     HC DILATION/CURETTAGE DIAG/THER NON OB      Description: Dilation And Curettage;  Recorded: 01/18/2010;     IR CVC TUNNEL PLACEMENT > 5 YRS OF AGE   10/30/2023     PICC TRIPLE LUMEN PLACEMENT  10/30/2023     ZZC COLOSTOMY      Description: Colostomy;  Recorded: 11/05/2012;     ZZC PART REMOVAL COLON W ANASTOMOSIS      Description: Partial Colectomy;  Recorded: 11/05/2012;  Comments: with colostomy     ZZC TOTAL HIP ARTHROPLASTY      Description: Total Hip Replacement;  Recorded: 01/18/2010;     Family History   Problem Relation Age of Onset     Diabetes Mother      Social History     Socioeconomic History     Marital status:      Spouse name: Not on file     Number of children: Not on file     Years of education: Not on file     Highest education level: Not on file   Occupational History     Not on file   Tobacco Use     Smoking status: Never     Smokeless tobacco: Never   Vaping Use     Vaping Use: Never used   Substance and Sexual Activity     Alcohol use: Not on file     Drug use: Not on file     Sexual activity: Not on file   Other Topics Concern     Not on file   Social History Narrative     Not on file     Social Determinants of Health     Financial Resource Strain: Low Risk  (10/27/2023)    Financial Resource Strain      Within the past 12 months, have you or your family members you live with been unable to get utilities (heat, electricity) when it was really needed?: No   Food Insecurity: Low Risk  (10/27/2023)    Food Insecurity      Within the past 12 months, did you worry that your food would run out before you got money to buy more?: No      Within the past 12 months, did the food you bought just not last and you didn t have money to get more?: No   Transportation Needs: Low Risk  (10/27/2023)    Transportation Needs      Within the past 12 months, has lack of transportation kept you from medical appointments, getting your medicines, non-medical meetings or appointments, work, or from getting things that you need?: No   Physical Activity: Not on file   Stress: Not on file   Social Connections: Not on file   Interpersonal Safety: Not on file    Housing Stability: Low Risk  (10/27/2023)    Housing Stability      Do you have housing? : Yes      Are you worried about losing your housing?: No       Current Outpatient Medications   Medication Sig Dispense Refill     apixaban ANTICOAGULANT (ELIQUIS) 2.5 MG tablet Take 1 tablet (2.5 mg) by mouth 2 times daily       baclofen (LIORESAL) 10 MG tablet Take 1 tablet (10 mg) by mouth 3 times daily as needed for muscle spasms 20 tablet 1     calamine 8-8 % lotion Apply topically every hour as needed for itching       cholecalciferol, vitamin D3, 50 mcg (2,000 unit) Tab [CHOLECALCIFEROL, VITAMIN D3, 50 MCG (2,000 UNIT) TAB] Take 1 tablet (2,000 Units total) by mouth daily. 100 each 3     febuxostat (ULORIC) 40 MG TABS tablet Take 1 tablet (40 mg) by mouth daily 30 tablet 11     folic acid (FOLVITE) 1 MG tablet Take 1 tablet (1 mg) by mouth daily       lactobacillus rhamnosus, GG, (CULTURELL) capsule Take 1 capsule by mouth 2 times daily       levothyroxine (SYNTHROID/LEVOTHROID) 25 MCG tablet Take 1 tablet (25 mcg) by mouth every morning (before breakfast)       loperamide (IMODIUM) 2 MG capsule Take 1 capsule (2 mg) by mouth 4 times daily as needed for diarrhea       menthol-zinc oxide (CALMOSEPTINE) 0.44-20.6 % OINT ointment Apply topically 4 times daily as needed for skin protection or irritation       miconazole (MICATIN) 2 % external powder Apply topically 2 times daily       midodrine (PROAMATINE) 10 MG tablet Take 1 tablet (10 mg) by mouth Every Mon, Wed, Fri Morning       midodrine (PROAMATINE) 5 MG tablet Take 1 tablet (5 mg) by mouth every hour as needed (on HD for SBP <90)       pantoprazole (PROTONIX) 40 MG EC tablet Take 1 tablet (40 mg) by mouth every morning (before breakfast)       thiamine (B-1) 100 MG tablet Take 1 tablet (100 mg) by mouth daily for 30 days 30 tablet 0     topiramate (TOPAMAX) 25 MG tablet Take 1 tablet (25 mg) by mouth 2 times daily 180 tablet 3     calcium citrate-vitamin D  "(CITRACAL) 315-200 MG-UNIT TABS per tablet Take 1 tablet by mouth daily (Patient not taking: Reported on 11/21/2023)       Allergies   Allergen Reactions     Allopurinol Diarrhea     Lisinopril Cough     initiated 5/14/1996 and stopped       Immunization History   Administered Date(s) Administered     COVID-19 Bivalent 18+ (Moderna) 12/01/2022     COVID-19 Monovalent 18+ (Moderna) 03/18/2021, 04/15/2021, 11/30/2021, 06/21/2022     DT (PEDS <7y) 11/30/2004     Flu, Unspecified 09/01/2012, 10/06/2014, 10/07/2015, 10/11/2016     Influenza (H1N1) 12/04/2009     Influenza (High Dose) 3 valent vaccine 11/29/2017, 11/30/2018, 12/03/2019     Influenza (IIV3) PF 09/01/2012, 10/06/2014     Influenza Vaccine 65+ (Fluzone HD) 12/10/2020, 10/22/2021, 12/01/2022, 11/04/2023     Influenza Vaccine >6 months,quad, PF 10/19/2016     Influenza, seasonal, injectable, PF 09/17/2013     Pneumo Conj 13-V (2010&after) 12/02/2015     Pneumococcal 23 valent 01/10/2006, 12/03/2019     TDAP (Adacel,Boostrix) 11/06/2014     Td (Adult), Adsorbed 11/30/2004     Td,adult,historic,unspecified 11/30/2004     Zoster vaccine, live 10/06/2010         Post Discharge Medication Reconciliation Status: discharge medications reconciled and changed, per note/orders.    Medications list and allergies in the facility chart have been reviewed.  Please see facility EMR for most up to date list.         Review of Systems   Patient denies fever, chills, headache, lightheadedness, dizziness, rhinorrhea, cough, congestion, shortness of breath, chest pain, palpitations, abdominal pain, n/v, diarrhea, constipation, change in appetite, change in sleep pattern, dysuria, frequency, burning or pain with urination.  Other than stated in HPI all other review of systems is negative.       Physical Exam  Vital signs:/65   Pulse 82   Temp 97.7  F (36.5  C)   Resp 18   Ht 1.575 m (5' 2\")   Wt 99.3 kg (219 lb)   LMP  (LMP Unknown)   SpO2 97%   BMI 40.06 kg/m   "   GENERAL APPEARANCE: Well developed, well nourished, in no acute distress.  HEENT: normocephalic, atraumatic   sclerae anicteric, conjunctivae clear and moist, EOM intact  LUNGS: Lung sounds CTA, no adventitious sounds, respiratory effort normal.  CARD: RRR, S1, S2, without murmurs, gallops, rubs  MSK: Muscle strength and tone were equal bilaterally. Moves all extremities easily and intentionally.   EXTREMITIES: No cyanosis, clubbing or edema.  NEURO: Alert with cognitive impairment. Face is symmetric.  SKIN: left dorsal foot with shallow skin peeled away, minimal serous drainage and 100% granular tissue.   PSYCH: euthymic        Labs:    Last Comprehensive Metabolic Panel:  Lab Results   Component Value Date     11/16/2023    POTASSIUM 3.2 (L) 11/16/2023    CHLORIDE 98 11/16/2023    CO2 24 11/16/2023    ANIONGAP 16 (H) 11/16/2023    GLC 81 11/16/2023    BUN 10.6 11/16/2023    CR 2.38 (H) 11/16/2023    GFRESTIMATED 20 (L) 11/16/2023    SANDIP 9.3 11/16/2023       Lab Results   Component Value Date    WBC 5.9 11/16/2023     Lab Results   Component Value Date    RBC 3.16 11/16/2023     Lab Results   Component Value Date    HGB 10.1 11/16/2023     Lab Results   Component Value Date    HCT 31.8 11/16/2023     Lab Results   Component Value Date     11/16/2023     Lab Results   Component Value Date    MCH 32.0 11/16/2023     Lab Results   Component Value Date    MCHC 31.8 11/16/2023     Lab Results   Component Value Date    RDW 21.3 11/16/2023     Lab Results   Component Value Date     11/16/2023           Assessment/plan:   Chronic congestive heart failure, unspecified heart failure type (H)  Fluid managed by dialysis.  Compensated, weights stable.     Dialysis patient (H24)  ESRD (end stage renal disease) on dialysis (H)  MWF and follow up with nephrology as indicated.  Continue with midodrine on dialysis days.     Essential hypertension with goal blood pressure less than 140/90  Bps soft to low  however asymptomatic.  On midodrine on dialysis days.  Will leave for now.     Atrial fibrillation, unspecified type (H)  Controlled without rate controlling medications.  Continue with Eliquis for AC    Cognitive impairment  Lives with children who are her caregivers.  Will discharge to home.           Electronically signed by: Kimi Maldonado NP      Sincerely,        Kimi Maldonado, NP

## 2023-11-21 NOTE — PROGRESS NOTES
[facility]     Code Status:  DNR/DNI  Visit Type: RECHECK     Facility:   [facility] ([facility]) [[id number]]        History of Present Illness:   Hospital Admission Date: 10/29/2023    Hospital Discharge Date: 11/9/2023      Mely Alegria is a 83 year old female with past medical history for obesity, atrial fib, CKD4, GERD, HFpEF, chronic back pain, gout.  She was recently hospitalized for acute encephalopathy and found to have anasarca and Uti.  She did have hypoxia needing O2 and was started on hemodialysis.  Fluid removed and O2 was removed.  She was treated with IV abx for pneumonia.  Nephrology reports she has now progressed to ESRD and will need chronic hemodialysis on MWF.  Her Eliquis was changed to renal dose of 2.5mg BID.    She was found to have hypothyroidism with a multinodular goiter with extension into the mediastinum.  She w;as started on 25mcg of levothyroxine.  She will need a follow up esophagram.     She did have a nonocclusive superficial thrombophlebitis of left UE which resolved with support care and eliquis.     She was also found to have mildly displaced intra-articular fracture of posterior medial tibial plateau.  She was seen by ortho and no surgical intervention was indicated.  She will need to follow up with ortho in 1 month.     Today, she reports feeling generally well.  She feels dialysis is going well.  She continues to have shallow open area on top of left foot where a blister unroofed.  Drainage is minimal and pain is improving.     History reviewed. No pertinent past medical history.  Past Surgical History:   Procedure Laterality Date    HC DILATION/CURETTAGE DIAG/THER NON OB      Description: Dilation And Curettage;  Recorded: 01/18/2010;    IR CVC TUNNEL PLACEMENT > 5 YRS OF AGE  10/30/2023    PICC TRIPLE LUMEN PLACEMENT  10/30/2023    ZZC COLOSTOMY      Description:  Colostomy;  Recorded: 11/05/2012;    ZZC PART REMOVAL COLON W ANASTOMOSIS      Description: Partial Colectomy;  Recorded: 11/05/2012;  Comments: with colostomy    ZZC TOTAL HIP ARTHROPLASTY      Description: Total Hip Replacement;  Recorded: 01/18/2010;     Family History   Problem Relation Age of Onset    Diabetes Mother      Social History     Socioeconomic History    Marital status:      Spouse name: Not on file    Number of children: Not on file    Years of education: Not on file    Highest education level: Not on file   Occupational History    Not on file   Tobacco Use    Smoking status: Never    Smokeless tobacco: Never   Vaping Use    Vaping Use: Never used   Substance and Sexual Activity    Alcohol use: Not on file    Drug use: Not on file    Sexual activity: Not on file   Other Topics Concern    Not on file   Social History Narrative    Not on file     Social Determinants of Health     Financial Resource Strain: Low Risk  (10/27/2023)    Financial Resource Strain     Within the past 12 months, have you or your family members you live with been unable to get utilities (heat, electricity) when it was really needed?: No   Food Insecurity: Low Risk  (10/27/2023)    Food Insecurity     Within the past 12 months, did you worry that your food would run out before you got money to buy more?: No     Within the past 12 months, did the food you bought just not last and you didn t have money to get more?: No   Transportation Needs: Low Risk  (10/27/2023)    Transportation Needs     Within the past 12 months, has lack of transportation kept you from medical appointments, getting your medicines, non-medical meetings or appointments, work, or from getting things that you need?: No   Physical Activity: Not on file   Stress: Not on file   Social Connections: Not on file   Interpersonal Safety: Not on file   Housing Stability: Low Risk  (10/27/2023)    Housing Stability     Do you have housing? : Yes     Are you  worried about losing your housing?: No       Current Outpatient Medications   Medication Sig Dispense Refill    apixaban ANTICOAGULANT (ELIQUIS) 2.5 MG tablet Take 1 tablet (2.5 mg) by mouth 2 times daily      baclofen (LIORESAL) 10 MG tablet Take 1 tablet (10 mg) by mouth 3 times daily as needed for muscle spasms 20 tablet 1    calamine 8-8 % lotion Apply topically every hour as needed for itching      cholecalciferol, vitamin D3, 50 mcg (2,000 unit) Tab [CHOLECALCIFEROL, VITAMIN D3, 50 MCG (2,000 UNIT) TAB] Take 1 tablet (2,000 Units total) by mouth daily. 100 each 3    febuxostat (ULORIC) 40 MG TABS tablet Take 1 tablet (40 mg) by mouth daily 30 tablet 11    folic acid (FOLVITE) 1 MG tablet Take 1 tablet (1 mg) by mouth daily      lactobacillus rhamnosus, GG, (CULTURELL) capsule Take 1 capsule by mouth 2 times daily      levothyroxine (SYNTHROID/LEVOTHROID) 25 MCG tablet Take 1 tablet (25 mcg) by mouth every morning (before breakfast)      loperamide (IMODIUM) 2 MG capsule Take 1 capsule (2 mg) by mouth 4 times daily as needed for diarrhea      menthol-zinc oxide (CALMOSEPTINE) 0.44-20.6 % OINT ointment Apply topically 4 times daily as needed for skin protection or irritation      miconazole (MICATIN) 2 % external powder Apply topically 2 times daily      midodrine (PROAMATINE) 10 MG tablet Take 1 tablet (10 mg) by mouth Every Mon, Wed, Fri Morning      midodrine (PROAMATINE) 5 MG tablet Take 1 tablet (5 mg) by mouth every hour as needed (on HD for SBP <90)      pantoprazole (PROTONIX) 40 MG EC tablet Take 1 tablet (40 mg) by mouth every morning (before breakfast)      thiamine (B-1) 100 MG tablet Take 1 tablet (100 mg) by mouth daily for 30 days 30 tablet 0    topiramate (TOPAMAX) 25 MG tablet Take 1 tablet (25 mg) by mouth 2 times daily 180 tablet 3    calcium citrate-vitamin D (CITRACAL) 315-200 MG-UNIT TABS per tablet Take 1 tablet by mouth daily (Patient not taking: Reported on 11/21/2023)       Allergies  "  Allergen Reactions    Allopurinol Diarrhea    Lisinopril Cough     initiated 5/14/1996 and stopped       Immunization History   Administered Date(s) Administered    COVID-19 Bivalent 18+ (Moderna) 12/01/2022    COVID-19 Monovalent 18+ (Moderna) 03/18/2021, 04/15/2021, 11/30/2021, 06/21/2022    DT (PEDS <7y) 11/30/2004    Flu, Unspecified 09/01/2012, 10/06/2014, 10/07/2015, 10/11/2016    Influenza (H1N1) 12/04/2009    Influenza (High Dose) 3 valent vaccine 11/29/2017, 11/30/2018, 12/03/2019    Influenza (IIV3) PF 09/01/2012, 10/06/2014    Influenza Vaccine 65+ (Fluzone HD) 12/10/2020, 10/22/2021, 12/01/2022, 11/04/2023    Influenza Vaccine >6 months,quad, PF 10/19/2016    Influenza, seasonal, injectable, PF 09/17/2013    Pneumo Conj 13-V (2010&after) 12/02/2015    Pneumococcal 23 valent 01/10/2006, 12/03/2019    TDAP (Adacel,Boostrix) 11/06/2014    Td (Adult), Adsorbed 11/30/2004    Td,adult,historic,unspecified 11/30/2004    Zoster vaccine, live 10/06/2010         Post Discharge Medication Reconciliation Status: discharge medications reconciled and changed, per note/orders.    Medications list and allergies in the facility chart have been reviewed.  Please see facility EMR for most up to date list.         Review of Systems   Patient denies fever, chills, headache, lightheadedness, dizziness, rhinorrhea, cough, congestion, shortness of breath, chest pain, palpitations, abdominal pain, n/v, diarrhea, constipation, change in appetite, change in sleep pattern, dysuria, frequency, burning or pain with urination.  Other than stated in HPI all other review of systems is negative.       Physical Exam  Vital signs:/65   Pulse 82   Temp 97.7  F (36.5  C)   Resp 18   Ht 1.575 m (5' 2\")   Wt 99.3 kg (219 lb)   LMP  (LMP Unknown)   SpO2 97%   BMI 40.06 kg/m     GENERAL APPEARANCE: Well developed, well nourished, in no acute distress.  HEENT: normocephalic, atraumatic   sclerae anicteric, conjunctivae clear and " moist, EOM intact  LUNGS: Lung sounds CTA, no adventitious sounds, respiratory effort normal.  CARD: RRR, S1, S2, without murmurs, gallops, rubs  MSK: Muscle strength and tone were equal bilaterally. Moves all extremities easily and intentionally.   EXTREMITIES: No cyanosis, clubbing or edema.  NEURO: Alert with cognitive impairment. Face is symmetric.  SKIN: left dorsal foot with shallow skin peeled away, minimal serous drainage and 100% granular tissue.   PSYCH: euthymic        Labs:    Last Comprehensive Metabolic Panel:  Lab Results   Component Value Date     11/16/2023    POTASSIUM 3.2 (L) 11/16/2023    CHLORIDE 98 11/16/2023    CO2 24 11/16/2023    ANIONGAP 16 (H) 11/16/2023    GLC 81 11/16/2023    BUN 10.6 11/16/2023    CR 2.38 (H) 11/16/2023    GFRESTIMATED 20 (L) 11/16/2023    SANDIP 9.3 11/16/2023       Lab Results   Component Value Date    WBC 5.9 11/16/2023     Lab Results   Component Value Date    RBC 3.16 11/16/2023     Lab Results   Component Value Date    HGB 10.1 11/16/2023     Lab Results   Component Value Date    HCT 31.8 11/16/2023     Lab Results   Component Value Date     11/16/2023     Lab Results   Component Value Date    MCH 32.0 11/16/2023     Lab Results   Component Value Date    MCHC 31.8 11/16/2023     Lab Results   Component Value Date    RDW 21.3 11/16/2023     Lab Results   Component Value Date     11/16/2023           Assessment/plan:   Chronic congestive heart failure, unspecified heart failure type (H)  Fluid managed by dialysis.  Compensated, weights stable.     Dialysis patient (H24)  ESRD (end stage renal disease) on dialysis (H)  MWF and follow up with nephrology as indicated.  Continue with midodrine on dialysis days.     Essential hypertension with goal blood pressure less than 140/90  Bps soft to low however asymptomatic.  On midodrine on dialysis days.  Will leave for now.     Atrial fibrillation, unspecified type (H)  Controlled without rate controlling  medications.  Continue with Eliquis for AC    Cognitive impairment  Lives with children who are her caregivers.  Will discharge to home.           Electronically signed by: Kimi Maldonado NP

## 2023-11-27 NOTE — PROGRESS NOTES
[facility] GERIATRIC SERVICES       Patient Mely Alegria  MRN: [id number]        Reason for Visit     Chief Complaint   Patient presents with    Discharge Summary Nursing Home       Code Status     DNR / DNI    Assessment     Staph aureus bronchitis with pneumonia  Acute hypoxic respiratory failure currently resolved  Multinodular goiter /hypothyroidism  HAMMAD/CKD 5 with now progression to end-stage renal disease on hemodialysis  E. coli UTI-resolved  Chronic A-fib with rapid ventricular response  Acute encephalopathy fluctuating  Normocytic anemia chronic  Thrombocytopenia  Mgus  Bilateral upper and lower extremity lymphedema-improved  Nonocclusive superficial thrombophlebitis of the left cephalic vein  Severe deconditioning  Generalized weakness    Plan     Pt is admitted to TCU for strengthening and rehab.  Patient currently is on hemodialysis.  She went for her session today and is doing better  Tolerating better.  Education provided about dietary as well as fluid restriction compliance.  She is going to follow-up with her nephrologist closely for placement of AV shunt and other concerns.  Hypertension management reviewed she is currently on midodrine and has been taken off all her antihypertensives   BP remain low  monitor blood pressures and advised follow-up with primary  Lymphedema has improved significantly in her arms which are no longer swollen improvement noted in lymphedema in her legs now  Continue eliquis with h/o of a fib  Follow-up with cardiology  Continue Synthroid due to history of hypothyroidism.  Monitor weights due to obesity concerns  Recheck labs during dialysis as required  Hypoxic respiratory failure has resolved and she has no concerns related to her pneumonia  Continue with PT/OT-reports overall improvement since she has started hemodialysis  Daughter at bedside updated    History     Patient is a very pleasant 83 year old female who is admitted to TCU  Patient was admitted with hypoxic  respiratory failure with pneumonia.  This has currently resolved and she is no longer on oxygen.  She is done with her antibiotics and denies any cough or fever.  Unfortunately patient was noted to have end-stage renal disease and she has been initiated on hemodialysis  She went for a session today.  Feels overall better.  Blood pressures are low and she is on midodrine for support  Lymphedema has improved since she went on hemodialysis.    Past Medical & Surgical History   End-stage renal disease  Hypothyroidism  PAST SURGICAL HISTORY:   has a past surgical history that includes TOTAL HIP ARTHROPLASTY; DILATION/CURETTAGE DIAG/THER NON OB; PART REMOVAL COLON W ANASTOMOSIS; COLOSTOMY; IR CVC Tunnel Placement > 5 Yrs of Age (10/30/2023); and PICC/Midline Placement (10/30/2023).      Past Social History     Reviewed,  reports that she has never smoked. She has never used smokeless tobacco.    Family History     Reviewed, and family history includes Diabetes in her mother.    Medication List     Current Outpatient Medications   Medication    apixaban ANTICOAGULANT (ELIQUIS) 2.5 MG tablet    baclofen (LIORESAL) 10 MG tablet    calamine 8-8 % lotion    cholecalciferol, vitamin D3, 50 mcg (2,000 unit) Tab    febuxostat (ULORIC) 40 MG TABS tablet    folic acid (FOLVITE) 1 MG tablet    lactobacillus rhamnosus, GG, (CULTURELL) capsule    levothyroxine (SYNTHROID/LEVOTHROID) 25 MCG tablet    loperamide (IMODIUM) 2 MG capsule    menthol-zinc oxide (CALMOSEPTINE) 0.44-20.6 % OINT ointment    miconazole (MICATIN) 2 % external powder    midodrine (PROAMATINE) 10 MG tablet    midodrine (PROAMATINE) 5 MG tablet    pantoprazole (PROTONIX) 40 MG EC tablet    thiamine (B-1) 100 MG tablet    topiramate (TOPAMAX) 25 MG tablet    calcium citrate-vitamin D (CITRACAL) 315-200 MG-UNIT TABS per tablet     No current facility-administered medications for this visit.      MED REC REQUIRED  Post Medication Reconciliation Status:  Discharge  "medications reconciled, continue medications without change       Allergies     Allergies   Allergen Reactions    Allopurinol Diarrhea    Lisinopril Cough     initiated 5/14/1996 and stopped         Review of Systems   A comprehensive review of 14 systems was done. Pertinent findings noted here and in history of present illness. All the rest negative.  Constitutional: Negative.  Negative for fever, chills, she has  activity change, appetite change and fatigue.   Appetite has improved and patient overall feels better  HENT: Negative for congestion and facial swelling.    Eyes: Negative for photophobia, redness and visual disturbance.   Respiratory: Negative for cough and chest tightness.    Cardiovascular: Negative for chest pain, palpitations and has significant improvement in her leg swelling.   Gastrointestinal: Negative for nausea, diarrhea, constipation, blood in stool and abdominal distention.   Genitourinary: Negative.    Musculoskeletal: She is able to walk better but now using mainly a walker or wheelchair for comfort  Skin: Negative.    Neurological: Negative for dizziness, tremors, syncope, weakness, light-headedness and headaches.   Hematological: Does not bruise/bleed easily.   Psychiatric/Behavioral: Negative.        Physical Exam   /65   Pulse 82   Temp 97.7  F (36.5  C)   Resp 18   Ht 1.575 m (5' 2\")   Wt 99.3 kg (219 lb)   LMP  (LMP Unknown)   SpO2 97%   BMI 40.06 kg/m       Constitutional: Oriented to person, place, and time and appears well-developed.   HEENT:  Normocephalic and atraumatic.  Eyes: Conjunctivae and EOM are normal. Pupils are equal, round, and reactive to light. No discharge.  No scleral icterus. Nose normal. Mouth/Throat: Oropharynx is clear and moist. No oropharyngeal exudate.    NECK: Normal range of motion. Neck supple. No JVD present. No tracheal deviation present. No thyromegaly present.   CARDIOVASCULAR: Normal rate, regular rhythm and intact distal pulses.  Exam " reveals no gallop and no friction rub.  Systolic murmur present.  PULMONARY: Effort normal and breath sounds normal. No respiratory distress.No Wheezing or rales.  ABDOMEN: Soft. Bowel sounds are normal. No distension and no mass.  There is no tenderness. There is no rebound and no guarding. No HSM.  MUSCULOSKELETAL: Normal range of motion. Mild kyphosis, no tenderness.  LYMPH NODES: Has no cervical, supraclavicular, axillary and groin adenopathy.   NEUROLOGICAL: Alert and oriented to person, place, and time. No cranial nerve deficit.  Normal muscle tone. Coordination normal.   GENITOURINARY: Deferred exam.  SKIN: Skin is warm and dry. No rash noted. No erythema. No pallor.   EXTREMITIES: No cyanosis, no clubbing, no edema noticed in bilateral upper extremities.  She has 2+ edema bilateral lower extremities. No Deformity.  PSYCHIATRIC: Normal mood, affect and behavior.      Lab Results   Last Comprehensive Metabolic Panel:  Lab Results   Component Value Date     11/16/2023    POTASSIUM 3.2 (L) 11/16/2023    CHLORIDE 98 11/16/2023    CO2 24 11/16/2023    ANIONGAP 16 (H) 11/16/2023    GLC 81 11/16/2023    BUN 10.6 11/16/2023    CR 2.38 (H) 11/16/2023    GFRESTIMATED 20 (L) 11/16/2023    SANDIP 9.3 11/16/2023       MEDICAL EQUIPMENT NEEDS:    Walker  DISCHARGE PLAN/FACE TO FACE:  I certify that services are/were furnished while this patient was under the care of a physician and that a physician or an allowed non-physician practitioner (NPP), had a face-to-face encounter that meets the physician face-to-face encounter requirements. The encounter was in whole, or in part, related to the primary reason for home health. The patient is confined to his/her home and needs intermittent skilled nursing, physical therapy, speech-language pathology, or the continued need for occupational therapy. A plan of care has been established by a physician and is periodically reviewed by a physician.  Date of Face-to-Face Encounter:  11/27/23     I certify that, based on my findings, the following services are medically necessary home health services: HHC HHA/RN and PT/OT to evaluate and treat    My clinical findings support the need for the above skilled services because: patient will be discharging to home. Patient will need assistance with medication management and performing IADLs and ADLs effectively and safely.     Patient to re-establish plan of care with their PCP within 7 days after leaving TCU.                Electronically signed by    Jory Avitia MD

## 2023-11-27 NOTE — LETTER
11/27/2023        RE: Mely Alegria  [address] [place] [address]        [facility] GERIATRIC SERVICES       Patient Mely Alegira  MRN: [id number]        Reason for Visit     Chief Complaint   Patient presents with     Discharge Summary Nursing Home       Code Status     DNR / DNI    Assessment     Staph aureus bronchitis with pneumonia  Acute hypoxic respiratory failure currently resolved  Multinodular goiter /hypothyroidism  HAMMAD/CKD 5 with now progression to end-stage renal disease on hemodialysis  E. coli UTI-resolved  Chronic A-fib with rapid ventricular response  Acute encephalopathy fluctuating  Normocytic anemia chronic  Thrombocytopenia  Mgus  Bilateral upper and lower extremity lymphedema-improved  Nonocclusive superficial thrombophlebitis of the left cephalic vein  Severe deconditioning  Generalized weakness    Plan     Pt is admitted to TCU for strengthening and rehab.  Patient currently is on hemodialysis.  She went for her session today and is doing better  Tolerating better.  Education provided about dietary as well as fluid restriction compliance.  She is going to follow-up with her nephrologist closely for placement of AV shunt and other concerns.  Hypertension management reviewed she is currently on midodrine and has been taken off all her antihypertensives   BP remain low  monitor blood pressures and advised follow-up with primary  Lymphedema has improved significantly in her arms which are no longer swollen improvement noted in lymphedema in her legs now  Continue eliquis with h/o of a fib  Follow-up with cardiology  Continue Synthroid due to history of hypothyroidism.  Monitor weights due to obesity concerns  Recheck labs during dialysis as required  Hypoxic respiratory failure has resolved and she has no concerns related to her pneumonia  Continue with PT/OT-reports overall improvement since she has started hemodialysis  Daughter at bedside updated    History     Patient is a  very pleasant 83 year old female who is admitted to TCU  Patient was admitted with hypoxic respiratory failure with pneumonia.  This has currently resolved and she is no longer on oxygen.  She is done with her antibiotics and denies any cough or fever.  Unfortunately patient was noted to have end-stage renal disease and she has been initiated on hemodialysis  She went for a session today.  Feels overall better.  Blood pressures are low and she is on midodrine for support  Lymphedema has improved since she went on hemodialysis.    Past Medical & Surgical History   End-stage renal disease  Hypothyroidism  PAST SURGICAL HISTORY:   has a past surgical history that includes TOTAL HIP ARTHROPLASTY; DILATION/CURETTAGE DIAG/THER NON OB; PART REMOVAL COLON W ANASTOMOSIS; COLOSTOMY; IR CVC Tunnel Placement > 5 Yrs of Age (10/30/2023); and PICC/Midline Placement (10/30/2023).      Past Social History     Reviewed,  reports that she has never smoked. She has never used smokeless tobacco.    Family History     Reviewed, and family history includes Diabetes in her mother.    Medication List     Current Outpatient Medications   Medication     apixaban ANTICOAGULANT (ELIQUIS) 2.5 MG tablet     baclofen (LIORESAL) 10 MG tablet     calamine 8-8 % lotion     cholecalciferol, vitamin D3, 50 mcg (2,000 unit) Tab     febuxostat (ULORIC) 40 MG TABS tablet     folic acid (FOLVITE) 1 MG tablet     lactobacillus rhamnosus, GG, (CULTURELL) capsule     levothyroxine (SYNTHROID/LEVOTHROID) 25 MCG tablet     loperamide (IMODIUM) 2 MG capsule     menthol-zinc oxide (CALMOSEPTINE) 0.44-20.6 % OINT ointment     miconazole (MICATIN) 2 % external powder     midodrine (PROAMATINE) 10 MG tablet     midodrine (PROAMATINE) 5 MG tablet     pantoprazole (PROTONIX) 40 MG EC tablet     thiamine (B-1) 100 MG tablet     topiramate (TOPAMAX) 25 MG tablet     calcium citrate-vitamin D (CITRACAL) 315-200 MG-UNIT TABS per tablet     No current  "facility-administered medications for this visit.      MED REC REQUIRED  Post Medication Reconciliation Status:  Discharge medications reconciled, continue medications without change       Allergies     Allergies   Allergen Reactions     Allopurinol Diarrhea     Lisinopril Cough     initiated 5/14/1996 and stopped         Review of Systems   A comprehensive review of 14 systems was done. Pertinent findings noted here and in history of present illness. All the rest negative.  Constitutional: Negative.  Negative for fever, chills, she has  activity change, appetite change and fatigue.   Appetite has improved and patient overall feels better  HENT: Negative for congestion and facial swelling.    Eyes: Negative for photophobia, redness and visual disturbance.   Respiratory: Negative for cough and chest tightness.    Cardiovascular: Negative for chest pain, palpitations and has significant improvement in her leg swelling.   Gastrointestinal: Negative for nausea, diarrhea, constipation, blood in stool and abdominal distention.   Genitourinary: Negative.    Musculoskeletal: She is able to walk better but now using mainly a walker or wheelchair for comfort  Skin: Negative.    Neurological: Negative for dizziness, tremors, syncope, weakness, light-headedness and headaches.   Hematological: Does not bruise/bleed easily.   Psychiatric/Behavioral: Negative.        Physical Exam   /65   Pulse 82   Temp 97.7  F (36.5  C)   Resp 18   Ht 1.575 m (5' 2\")   Wt 99.3 kg (219 lb)   LMP  (LMP Unknown)   SpO2 97%   BMI 40.06 kg/m       Constitutional: Oriented to person, place, and time and appears well-developed.   HEENT:  Normocephalic and atraumatic.  Eyes: Conjunctivae and EOM are normal. Pupils are equal, round, and reactive to light. No discharge.  No scleral icterus. Nose normal. Mouth/Throat: Oropharynx is clear and moist. No oropharyngeal exudate.    NECK: Normal range of motion. Neck supple. No JVD present. No " tracheal deviation present. No thyromegaly present.   CARDIOVASCULAR: Normal rate, regular rhythm and intact distal pulses.  Exam reveals no gallop and no friction rub.  Systolic murmur present.  PULMONARY: Effort normal and breath sounds normal. No respiratory distress.No Wheezing or rales.  ABDOMEN: Soft. Bowel sounds are normal. No distension and no mass.  There is no tenderness. There is no rebound and no guarding. No HSM.  MUSCULOSKELETAL: Normal range of motion. Mild kyphosis, no tenderness.  LYMPH NODES: Has no cervical, supraclavicular, axillary and groin adenopathy.   NEUROLOGICAL: Alert and oriented to person, place, and time. No cranial nerve deficit.  Normal muscle tone. Coordination normal.   GENITOURINARY: Deferred exam.  SKIN: Skin is warm and dry. No rash noted. No erythema. No pallor.   EXTREMITIES: No cyanosis, no clubbing, no edema noticed in bilateral upper extremities.  She has 2+ edema bilateral lower extremities. No Deformity.  PSYCHIATRIC: Normal mood, affect and behavior.      Lab Results   Last Comprehensive Metabolic Panel:  Lab Results   Component Value Date     11/16/2023    POTASSIUM 3.2 (L) 11/16/2023    CHLORIDE 98 11/16/2023    CO2 24 11/16/2023    ANIONGAP 16 (H) 11/16/2023    GLC 81 11/16/2023    BUN 10.6 11/16/2023    CR 2.38 (H) 11/16/2023    GFRESTIMATED 20 (L) 11/16/2023    SANDIP 9.3 11/16/2023       MEDICAL EQUIPMENT NEEDS:    Walker  DISCHARGE PLAN/FACE TO FACE:  I certify that services are/were furnished while this patient was under the care of a physician and that a physician or an allowed non-physician practitioner (NPP), had a face-to-face encounter that meets the physician face-to-face encounter requirements. The encounter was in whole, or in part, related to the primary reason for home health. The patient is confined to his/her home and needs intermittent skilled nursing, physical therapy, speech-language pathology, or the continued need for occupational therapy. A  plan of care has been established by a physician and is periodically reviewed by a physician.  Date of Face-to-Face Encounter: 11/27/23     I certify that, based on my findings, the following services are medically necessary home health services: [facility] HHA/RN and PT/OT to evaluate and treat    My clinical findings support the need for the above skilled services because: patient will be discharging to home. Patient will need assistance with medication management and performing IADLs and ADLs effectively and safely.     Patient to re-establish plan of care with their PCP within 7 days after leaving TCU.                Electronically signed by    Jory Avitia MD                            Sincerely,        KAROLINE Ruth

## 2023-12-06 NOTE — TELEPHONE ENCOUNTER
"  Order for  [employer] Petrolatom Dressing xeroform sheets for her foot 5\"x9\" was sent to patient's preferred pharmacy.      Thanks,    Annemarie Hooks, PharmD     Medication Therapy Management (MTM) Pharmacist     [phone]     [email or web address]    "

## 2023-12-07 NOTE — TELEPHONE ENCOUNTER
"Prior Authorization Retail Medication Request    Medication/Dose: Bismuth Tribromoph-Petrolatum (XEROFORM PETROLAT GAUZE 5\"X9\") MISC  Diagnosis and ICD code (if different than what is on RX):     New/renewal/insurance change PA/secondary ins. PA:  Previously Tried and Failed:  See Chart  Rationale:  See Chart    Insurance   Primary: See Chart  Insurance ID:  See Chart    Secondary (if applicable):   Insurance ID:       Pharmacy Information (if different than what is on RX)  Name:     Phone:     Fax:    "

## 2023-12-11 NOTE — TELEPHONE ENCOUNTER
"Central Prior Authorization Team   Phone: [phone]    PA Initiation    Medication: Bismuth Tribromoph-Petrolatum (XEROFORM PETROLAT GAUZE 5\"X9\") [facility]  Insurance Company: [employer] Part D - Phone [phone] Fax [phone]  Pharmacy Filling the Rx: [id number] - [place], [place] - [facility] [address] AT [facility] [address]  Filling Pharmacy Phone: [phone]  Filling Pharmacy Fax:    Start Date: 12/11/2023      "

## 2023-12-13 PROBLEM — E87.5 HYPERKALEMIA: Status: RESOLVED | Noted: 2023-01-01 | Resolved: 2023-01-01

## 2023-12-13 PROBLEM — N39.0 URINARY TRACT INFECTION WITHOUT HEMATURIA, SITE UNSPECIFIED: Status: RESOLVED | Noted: 2023-01-01 | Resolved: 2023-01-01

## 2023-12-13 NOTE — PROGRESS NOTES
Mely is a 83 year old female contacting the clinic today via video, who will use the platform: [employer] for the visit.  Phone # for [employer], or if Amwell drops:   Telephone Information:   Mobile [phone]          ASSESSMENT and PLAN:  1. ESRD (end stage renal disease) on dialysis (H)  Initiated on dialysis during hospitalization.  Readjust meds which do not seem to be coordinated  - furosemide (LASIX) 40 MG tablet; Take 1 tablet (40 mg) by mouth daily as needed  - folic acid (FOLVITE) 1 MG tablet; Take 1 tablet (1 mg) by mouth daily  Dispense: 90 tablet; Refill: 3  - famotidine (PEPCID) 20 MG tablet; Take 1 tablet (20 mg) by mouth daily  Dispense: 90 tablet; Refill: 3    2. Persistent atrial fibrillation (H)  Okay to continue Eliquis as long as creatinine clearance greater than 15  - rivaroxaban ANTICOAGULANT (XARELTO ANTICOAGULANT) 15 MG TABS tablet; Take 1 tablet (15 mg) by mouth daily (with dinner)  Dispense: 90 tablet; Refill: 3    3. Acute midline low back pain without sciatica  Recheck inflammatory markers.  - Erythrocyte sedimentation rate auto; Future  - CRP inflammation; Future    4. Uric acid arthropathy  Resume Uloric and recheck uric acid.  Discontinue probenecid  - Uric acid; Future  - febuxostat (ULORIC) 40 MG TABS tablet; Take 1 tablet (40 mg) by mouth daily  Dispense: 30 tablet; Refill: 11    5. Stage 4 chronic kidney disease (H)    6. Essential hypertension with goal blood pressure less than 140/90  Stable    7. Hypothyroidism due to acquired atrophy of thyroid  New, with goiter.  Continue thyroid and recheck TSH  - levothyroxine (SYNTHROID/LEVOTHROID) 25 MCG tablet; Take 1 tablet (25 mcg) by mouth every morning (before breakfast)  Dispense: 90 tablet; Refill: 3  - TSH; Future    8. Weight loss  Optimize thyroid.  Agree with mirtazapine.  Treat possible stress gastritis.  Discontinue topiramate  - mirtazapine (REMERON) 7.5 MG tablet; Take 2 tablets (15 mg) by mouth at bedtime  - famotidine  (PEPCID) 20 MG tablet; Take 1 tablet (20 mg) by mouth daily  Dispense: 90 tablet; Refill: 3       Patient Instructions   Famotidine 20 mg once daily    Discontinue omeprazole due to possible microscopic colitis    Resume Uloric 40 mg daily    Discontinue probenecid    Decrease furosemide to just as needed    Continue thyroid and folic acid    Discontinue topiramate    Proceed with mirtazapine 15 mg at bed    Continue Xarelto as long as creatinine clearance greater than 15    Update labs 2 weeks    If back pain persists after 1 week proceed with epidural steroid injection    Wound clinic as scheduled            Return in about 3 weeks (around 1/3/2024) for using a video visit.       CHIEF COMPLAINT:  Chief Complaint   Patient presents with    Follow Up     Follow up after hospital visit and medication refills. Adrien /son will be helping her verify and organize her medications before her visit. She states that many have changed and she is not able to verify.           12/13/2023     4:48 PM   Additional Questions   Roomed by Bessie DIETRICH   Accompanied by Adrien/son       HISTORY OF PRESENT ILLNESS:  Mely is a 83 year old female contacting the clinic today via video for review of illness and hospitalization.  She was admitted to the hospital at [facility] from October 29 to November 9, then discharged to a TCU, arriving home on November 29    Hospital Follow-up Visit:    Hospital/Nursing Home/IP Rehab Facility: [facility] and unknown  Date of Admission: October 29, 2023 to November 9, 2023  Date of Discharge: November 9, 2023 to TCU with discharge home November 29, 2023  Reason(s) for Admission: Aspiration pneumonia, acute renal failure, hypothyroidism, and sepsis      Was your hospitalization related to COVID-19? No   Problems taking medications regularly:  None  Medication changes since discharge: None  Problems adhering to non-medication therapy:  None    Discharge summary  "reviewed  Diagnostic Tests/Treatments reviewed.  Follow up needed: Yes, cardiology, renal and wound care  Update since discharge: stable.   Post Discharge Medication Reconciliation: discharge medications reconciled and changed, per note/orders.  Plan of care communicated with patient and family     Admitted to the hospital with sepsis possibly from goiter and aspiration.  Initiated on dialysis.  Total weight loss now approximately 70 pounds of fluid.  Discharge list from hospital to TCU reviewed.  Discharge list from TCU to home reviewed.  Discharge medications being given now reviewed and all of course completely different    Nephrologist has recommended mirtazapine due to continued weight loss and anorexia.  She continues to take topiramate which was initiated for back pain    Undergoes dialysis Monday Wednesday Friday and continues to take furosemide    Discharged on Eliquis but taking Xarelto.  Cardiology apparently reported both were adequate for her atrial fibrillation.    Uloric was recommended upon discharge with cessation of probenecid.  Son has been giving the opposite    Possibly depressed    Wound on right foot.  Peripheral edema, shortness of breath, has all improved    HPI    REVIEW OF SYSTEMS:   Wound on right foot    PFSH:  Social History     Social History Narrative    Not on file     Back home where she lives with son    TOBACCO USE:  History   Smoking Status    Never   Smokeless Tobacco    Never       VITALS:  There were no vitals filed for this visit.  LMP  (LMP Unknown)  Estimated body mass index is 40.06 kg/m  as calculated from the following:    Height as of 11/27/23: 1.575 m (5' 2\").    Weight as of 11/27/23: 99.3 kg (219 lb).    PHYSICAL EXAM:  (observations via Video)  Much skinnier sitting on couch at home    MEDICATIONS:   Current Outpatient Medications   Medication Sig Dispense Refill    baclofen (LIORESAL) 10 MG tablet Take 1 tablet (10 mg) by mouth 3 times daily as needed for muscle " "spasms 20 tablet 1    Bismuth Tribromoph-Petrolatum (XEROFORM PETROLAT GAUZE 5\"X9\") MISC Apply 1 each topically daily as needed (Wound dressing) 50 each 4    calamine 8-8 % lotion Apply topically every hour as needed for itching 177 mL 11    calcium citrate-vitamin D (CITRACAL) 315-200 MG-UNIT TABS per tablet Take 1 tablet by mouth daily      cholecalciferol, vitamin D3, 50 mcg (2,000 unit) Tab [CHOLECALCIFEROL, VITAMIN D3, 50 MCG (2,000 UNIT) TAB] Take 1 tablet (2,000 Units total) by mouth daily. 100 each 3    famotidine (PEPCID) 20 MG tablet Take 1 tablet (20 mg) by mouth daily 90 tablet 3    febuxostat (ULORIC) 40 MG TABS tablet Take 1 tablet (40 mg) by mouth daily 30 tablet 11    folic acid (FOLVITE) 1 MG tablet Take 1 tablet (1 mg) by mouth daily 90 tablet 3    furosemide (LASIX) 40 MG tablet Take 1 tablet (40 mg) by mouth daily as needed      lactobacillus rhamnosus, GG, (CULTURELL) capsule Take 1 capsule by mouth 2 times daily      levothyroxine (SYNTHROID/LEVOTHROID) 25 MCG tablet Take 1 tablet (25 mcg) by mouth every morning (before breakfast) 90 tablet 3    loperamide (IMODIUM) 2 MG capsule Take 1 capsule (2 mg) by mouth 4 times daily as needed for diarrhea      menthol-zinc oxide (CALMOSEPTINE) 0.44-20.6 % OINT ointment Apply topically 4 times daily as needed for skin protection or irritation      menthol-zinc oxide (CALMOSEPTINE) 0.44-20.625 % OINT ointment Apply topically 4 times daily as needed for skin protection 113 g 11    miconazole (MICATIN) 2 % external powder Apply topically 2 times daily      midodrine (PROAMATINE) 10 MG tablet Take 1 tablet (10 mg) by mouth Every Mon, Wed, Fri Morning 36 tablet 3    midodrine (PROAMATINE) 5 MG tablet Take 1 tablet (5 mg) by mouth every hour as needed (on HD for SBP <90)      mirtazapine (REMERON) 7.5 MG tablet Take 2 tablets (15 mg) by mouth at bedtime      rivaroxaban ANTICOAGULANT (XARELTO ANTICOAGULANT) 15 MG TABS tablet Take 1 tablet (15 mg) by mouth daily " "(with dinner) 90 tablet 3    vitamin B1 (THIAMINE) 100 MG tablet Take 1 tablet by mouth daily at 2 pm         Outside Notes summarized: Hospital discharge.  TCU discharge  Labs, x-rays, cardiology, GI tests reviewed: Chest x-ray, CAT scan, CT scan knee, labs  Recent Labs   Lab Test 11/16/23  0938 11/09/23  0532 11/04/23  0514 11/03/23  0557 10/30/23  1535 10/30/23  0743 10/29/23  2328 10/29/23  2211 04/11/23  1407 04/04/23  1504 03/11/22  1453 03/11/22  1453   HGB 10.1* 8.8*   < > 9.5*   < > 9.5*  --  11.6*  --  12.2  --  13.5   WBC 5.9 7.9   < > 8.5   < > 8.2  --  10.3  --  10.7  --  10.0    140   < > 135   < > 140   < >  --    < > 143  --  143   POTASSIUM 3.2* 3.6   < > 4.1   < > 5.9*   < >  --    < > 5.6*  --  4.8   CR 2.38* 2.62*   < > 3.62*   < > 6.12*   < >  --    < > 3.73*  --  1.70*   A1C  --   --   --   --   --  5.0  --   --   --   --   --   --    URIC  --   --   --   --   --   --   --   --   --  10.6*  --   --    B12  --   --   --  814  --   --   --   --   --   --   --  526   TSH  --   --   --  5.43*  --   --   --  5.41*  --  1.44   < > 1.74   VITDT  --   --   --   --   --   --   --   --   --   --   --  39   SED  --   --   --   --   --   --   --   --   --   --   --  14   CRP  --   --   --   --   --   --   --   --   --   --   --  1.5*    < > = values in this interval not displayed.     No results found for: \"PWWHY04TPT\"  Lab Results   Component Value Date    CHOL 166 04/04/2023     New orders:   Orders Placed This Encounter   Procedures    TSH    Uric acid    Erythrocyte sedimentation rate auto    CRP inflammation       Independent review of:     Oscar Hopper MD  [facility]    Video-Visit Details  Type of service:  Video Visit  Patient has given verbal consent to a Video visit?  Yes  How would you like to obtain your AVS?  MyChart  Will anyone else be joining your video visit, giving supplemental history?  Yes, son Tatyana and daughter  Originating location (pt " location): Home    Distant Location (provider location):  Off-site    Video Start Time: 5:31 PM  Video End time:  6:19 PM  Face to face plus orders: 48 minutes  Documentation time:  3 minutes     The visit lasted a total of 51 minutes

## 2023-12-13 NOTE — TELEPHONE ENCOUNTER
"PRIOR AUTHORIZATION DENIED    Medication: Bismuth Tribromoph-Petrolatum (XEROFORM PETROLAT GAUZE 5\"X9\") MISC    Denial Date: 12/13/2023    Denial Rational: This may be covered under patient's medical benefits as DME    Decision Notes:  XEROFRM GAUZ MIS 5\"X9\" is not covered. The requested medication with the submitted Generic  Product Identifier (GPI)/National Drug Code (NDC) is not properly listed with the Food and Drug  Administration (FDA) and does not meet regulatory requirements that would constitute a Part D-eligible  drug. Therefore, the medication is not covered under your Part D prescription drug plan.  Reviewed by: Stephany ferreira.        "

## 2023-12-13 NOTE — PROGRESS NOTES
"Mely is a 83 year old who is being evaluated via a billable video visit.      How would you like to obtain your AVS? MyChart  If the video visit is dropped, the invitation should be resent by: Other e-mail: MY CHART  Will anyone else be joining your video visit? Yes: Adrien/son. How would they like to receive their invitation? {video visit invitation (Optional):830744}  {If patient encounters technical issues they should call [phone] :334684}        {PROVIDER CHARTING PREFERENCE:432805}    Subjective   Mely is a 83 year old, presenting for the following health issues:  Follow Up (Follow up after hospital visit and medication refills. Adrien /son will be helping her verify and organize her medications before her visit. She states that many have changed and she is not able to verify.)      12/13/2023     4:48 PM   Additional Questions   Roomed by Bessie DIETRICH   Accompanied by Adrien/son       HPI     {SUPERLIST (Optional):869426}  {additonal problems for provider to add (Optional):460599}      Review of Systems   {ROS COMP (Optional):232551}      Objective    Vitals - Patient Reported  Systolic (Patient Reported):  (does not monitor)  Pain Score: No Pain (0)        Physical Exam   {video visit exam brief selected:963462}    {Diagnostic Test Results (Optional):556813}    {AMBULATORY ATTESTATION (Optional):346298}        Video-Visit Details    Type of service:  Video Visit   Video Start Time: {video visit start/end time for provider to select:371398}  Video End Time:{video visit start/end time for provider to select:312276}    Originating Location (pt. Location): {video visit patient location:110644::\"Home\"}  {PROVIDER LOCATION On-site should be selected for visits conducted from your clinic location or adjoining [facility] hospital, academic office, or other nearby [facility] building. Off-site should be selected for all other provider locations, including home:749242}  Distant Location (provider location):  On-site  Platform " used for Video Visit: [employer]

## 2023-12-14 NOTE — PATIENT INSTRUCTIONS
Famotidine 20 mg once daily    Discontinue omeprazole due to possible microscopic colitis    Resume Uloric 40 mg daily    Discontinue probenecid    Decrease furosemide to just as needed    Continue thyroid and folic acid    Discontinue topiramate    Proceed with mirtazapine 15 mg at bed    Continue Xarelto as long as creatinine clearance greater than 15    Update labs 2 weeks    If back pain persists after 1 week proceed with epidural steroid injection    Wound clinic as scheduled

## 2023-12-20 PROBLEM — L97.521 ULCER OF LEFT FOOT, LIMITED TO BREAKDOWN OF SKIN (H): Status: ACTIVE | Noted: 2023-01-01

## 2023-12-20 PROBLEM — L03.116 CELLULITIS OF LEFT FOOT: Status: ACTIVE | Noted: 2023-01-01

## 2023-12-20 NOTE — PATIENT INSTRUCTIONS
Important lnstructions      WEIGHT BEARING STATUS: You are to remain NON WEIGHT BEARING on your left foot. NON WEIGHT BEARING MEANS NO PRESSURE ON YOUR FOOT OR HEEL AT ANY TIME FOR ANY REASON!    2. OFFLOADING DEVICE: Must use a A WHEELCHAIR at all times! (do not use affected foot to push wheelchair)    3. STABILIZATION DEVICE: Use a CAM BOOT . You will need to WEAR THIS ANYTIME YOU ARE UP AND OUT OF BED, IT IS OKAY TO REMOVE WHEN YOU ARE SLEEPING..     4. ELEVATE: Elevating your leg means laying with your head on a pillow and your foot ABOVE YOUR HEART.     5. DO NOT MOVE YOUR FOOT.  There is a risk of worsening the wound or incision. To give yourself a higher chance of healing, please DO NOT swing foot back and forth and wiggle foot/toes especially when inside a stabilization device. Limited movement is allowable with therapy as recommended by the doctor.     6. TAKE A PROTEIN SHAKE TWICE A DAY.  (For ex: Boost, Ensure, Glucerna)      Dressing Change lnstructions    Dressing change daily with Santyl to left foot wound.     How to Use Collagenase  SANTYL  Ointment    DAILY CLEANSE  Gently cleanse the wound with sterile saline    APPLY  Apply SANTYL  Ointment at the thickness of a nickel or the thickness of the cream inside an [employer] cookie    COVER  Cover the wound with a clean moistened gauze followed by dry gauze if wound bed is dry. Wounds with sufficient exudate will naturally activate the collagenase enzyme and do not need a moistened gauze applied. Moisture helps release the active ingredient in SANTYL  to make the ointment most effective. Without the right amount of moisture, SANTYL  Ointment may not work properly. May apply adaptic touch over Santyl ointment to hold into place. Secure with roll gauze. Do not use dressings that contain silver or iodine with SANTYL  Ointment, as they may stop SANTYL  Ointment        It IS NOT ok to get your wound wet in the bath or shower    SEEK MEDICAL CARE IF:  You have an  increase in swelling, pain, or redness around the wound.  You have an increase in the amount of pus coming from the wound.  There is a bad smell coming from the wound.  The wound appears to be worsening/enlarging  You have a fever greater than 101.5 F      It is ok to continue current wound care treatment/products for the next 2-3 days until new wound care supplies are ordered and arrive. If longer than this please contact our office at [phone].    *Wheelchairs are never guaranteed at the front door, if you have your own wheel chair or knee walker, please bring that with you to your appointment. Thank you for your understanding!*    We want to hear from you!   In the next few weeks, you should receive a call or email to complete a survey about your visit at [facility]. Please help us improve your appointment experience by letting us know how we did today. We strive to make your experience good and value any ways in which we could do better.      We value your input and suggestions.    Thank you for choosing the [facility]!

## 2023-12-20 NOTE — PROGRESS NOTES
FOOT AND ANKLE SURGERY/PODIATRY CONSULT NOTE        ASSESSMENT:   Cellulitis left foot  Ulceration left foot into subcutaneous tissue   Venous stasis  ESRD      TREATMENT:  -I discussed with the patient and present family members today that the left foot ulceration does have localized erythema concerning for cellulitis.  Some of this erythema may be contributed by the occlusive dressing.  As a precaution I will start her on Bactrim.  Wound culture obtained today.    -We discussed principles of wound healing today including offloading of the left foot I recommend nonweightbearing with a wheelchair, referred today.  Also referred for a cam boot.    -Due to the amount of fibrotic tissue present today I recommend use of Santyl at this time.    -I refer the patient to Dr. Cleaning for evaluation of compression recommendations in the presence of a left foot ulceration    -After discussion of risk factors nursing staff removed dressing, cleansed wound and consent obtained 2% Lidocaine plain was injected circumferentially around the left foot ulceration, under clean conditions, the left foot ulceration(s) were debrided using .into the subcutaneous tissue.  Devitalized and nonviable tissue, along with any fibrin and slough, was removed to improve granulation tissue formation, stimulate wound healing, decrease overall bacteria load, disrupt biofilm formation and decrease edge senescence. Wound drainage was scant No. Total excisional debridement was 7.5 sq cm into the subcutaneous tissue with a depth of 0.2 cm.   Ulcers were improved afterwards and .  Measures were as noted on the flow sheet. Medihoney with gauze dressing applied today.     -She will follow-up in 1 week    Does the patient have mobility limitation significantly impairs his/her ability to participate in one or more mobility-related activities of daily living such as toileting, dressing, grooming, and bathing in customary locations in the home?  Yes     Can  the patient's mobility limitation be sufficiently resolved by the use of an appropriately fitted cane, crutches or walker?  No   Patient cannot use a cane, crutches, or walker due to the need for non-weight bearing status.     Does the patient's home provide adequate access between rooms, maneuvering space, and surfaces for the use of a manual wheelchair?  Yes     Is the patient or caregiver able to safely use/maneuver the wheelchair and expressed willingness to use the manual wheelchair in the home on a regular basis?  Yes    Can the patient's functional mobility deficit be sufficiently resolved by the use of a manual wheelchair and significantly improve the patient's ability to participate in mobility-related activities of daily living?  No     Does the patient have sufficient upper extremity function and other physical and mental capabilities needed to safely propel the manual wheel chair during a typical day?  Yes    Does the patient have a caregiver who is willing, available, and is able to provide assistance with the wheelchair.  Yes     If a standard bienvenido-wheelchair is ordered does the patient require a lower seat height because of short stature or to enable the patient to place his/her feet on the ground for propulsion?  No     If a lightweight wheelchair is ordered is the patient unable to self-propel in a standard weight wheelchair and would be able to self-propel in a lightweight chair?  No     Does your patient require height adjustable arms because they require an arm height that is different than that available using nonadjustable arms?  No     Does your patient spend more than 2 hours per day in the wheelchair?  Yes    Does your patient require a safety belt because they have weak upper body muscles, upper body instability, muscle spasticity which requires use of this item for positioning? No      Does the beneficiary self-propel and require anti-rollback devices because of ramps?  No     If the  "patient is in need of a bariatric wheelchair is their weight over 250 lbs?  No     Does the patient require elevating leg rests?   Yes   Elevating leg rests are needed due to a musculoskeletal condition or presence of a cast or brace which prevents 90 degree flexion at the knee.     Giles Rivera DPM  [facility]      HPI: Mely Alegria was seen today for an ulceration on her left foot.  Patient reports that she first noticed the sore on the left foot approximately 2 months ago after developing blistering secondary to excessive swelling in her foot.  She lives with her son.  She has been applying bacitracin ointment with a nonadherent dressing.    No past medical history on file.    Past Surgical History:   Procedure Laterality Date    HC DILATION/CURETTAGE DIAG/THER NON OB      Description: Dilation And Curettage;  Recorded: 01/18/2010;    IR CVC TUNNEL PLACEMENT > 5 YRS OF AGE  10/30/2023    PICC TRIPLE LUMEN PLACEMENT  10/30/2023    ZZC COLOSTOMY      Description: Colostomy;  Recorded: 11/05/2012;    ZZC PART REMOVAL COLON W ANASTOMOSIS      Description: Partial Colectomy;  Recorded: 11/05/2012;  Comments: with colostomy    ZZC TOTAL HIP ARTHROPLASTY      Description: Total Hip Replacement;  Recorded: 01/18/2010;        Allergies   Allergen Reactions    Allopurinol Diarrhea    Lisinopril Cough     initiated 5/14/1996 and stopped           Current Outpatient Medications:     baclofen (LIORESAL) 10 MG tablet, Take 1 tablet (10 mg) by mouth 3 times daily as needed for muscle spasms, Disp: 20 tablet, Rfl: 1    Bismuth Tribromoph-Petrolatum (XEROFORM PETROLAT GAUZE 5\"X9\") MISC, Apply 1 each topically daily as needed (Wound dressing), Disp: 50 each, Rfl: 4    calamine 8-8 % lotion, Apply topically every hour as needed for itching, Disp: 177 mL, Rfl: 11    calcium citrate-vitamin D (CITRACAL) 315-200 MG-UNIT TABS per tablet, Take 1 tablet by mouth daily, Disp: , Rfl:     cholecalciferol, " vitamin D3, 50 mcg (2,000 unit) Tab, [CHOLECALCIFEROL, VITAMIN D3, 50 MCG (2,000 UNIT) TAB] Take 1 tablet (2,000 Units total) by mouth daily., Disp: 100 each, Rfl: 3    collagenase (SANTYL) 250 UNIT/GM external ointment, Apply topically daily Total square centimeters of wound(s) being treated is 7.5 square cm, 30 day supply, Disp: 30 g, Rfl: 3    ELIQUIS ANTICOAGULANT 2.5 MG tablet, , Disp: , Rfl:     famotidine (PEPCID) 20 MG tablet, Take 1 tablet (20 mg) by mouth daily, Disp: 90 tablet, Rfl: 3    febuxostat (ULORIC) 40 MG TABS tablet, Take 1 tablet (40 mg) by mouth daily, Disp: 30 tablet, Rfl: 11    folic acid (FOLVITE) 1 MG tablet, Take 1 tablet (1 mg) by mouth daily, Disp: 90 tablet, Rfl: 3    furosemide (LASIX) 40 MG tablet, Take 1 tablet (40 mg) by mouth daily as needed, Disp: , Rfl:     lactobacillus rhamnosus, GG, (CULTURELL) capsule, Take 1 capsule by mouth 2 times daily, Disp: , Rfl:     levothyroxine (SYNTHROID/LEVOTHROID) 25 MCG tablet, Take 1 tablet (25 mcg) by mouth every morning (before breakfast), Disp: 90 tablet, Rfl: 3    loperamide (IMODIUM) 2 MG capsule, Take 1 capsule (2 mg) by mouth 4 times daily as needed for diarrhea, Disp: , Rfl:     menthol-zinc oxide (CALMOSEPTINE) 0.44-20.6 % OINT ointment, Apply topically 4 times daily as needed for skin protection or irritation, Disp: , Rfl:     menthol-zinc oxide (CALMOSEPTINE) 0.44-20.625 % OINT ointment, Apply topically 4 times daily as needed for skin protection, Disp: 113 g, Rfl: 11    miconazole (MICATIN) 2 % external powder, Apply topically 2 times daily, Disp: , Rfl:     midodrine (PROAMATINE) 10 MG tablet, Take 1 tablet (10 mg) by mouth Every Mon, Wed, Fri Morning, Disp: 36 tablet, Rfl: 3    midodrine (PROAMATINE) 5 MG tablet, Take 1 tablet (5 mg) by mouth every hour as needed (on HD for SBP <90), Disp: , Rfl:     mirtazapine (REMERON) 7.5 MG tablet, Take 2 tablets (15 mg) by mouth at bedtime, Disp: , Rfl:     sulfamethoxazole-trimethoprim  (BACTRIM DS) 800-160 MG tablet, Take 1 tablet by mouth 2 times daily, Disp: 20 tablet, Rfl: 0    vitamin B1 (THIAMINE) 100 MG tablet, Take 1 tablet by mouth daily at 2 pm, Disp: , Rfl:     rivaroxaban ANTICOAGULANT (XARELTO ANTICOAGULANT) 15 MG TABS tablet, Take 1 tablet (15 mg) by mouth daily (with dinner) (Patient not taking: Reported on 12/20/2023), Disp: 90 tablet, Rfl: 3    Social History     Social History Narrative    Not on file       Family History   Problem Relation Age of Onset    Diabetes Mother        Review of Systems - 10 point Review of Systems is negative except for left foot ulcer which is noted in HPI.      OBJECTIVE:  Appearance: alert, well appearing, and in no distress.    /68   Pulse 72   LMP  (LMP Unknown)     BMI= There is no height or weight on file to calculate BMI.    General appearance: Patient is alert and fully cooperative with history & exam.  No sign of distress is noted during the visit.     Psychiatric: Affect is pleasant & appropriate.  Patient appears motivated to improve health.     Respiratory: Breathing is regular & unlabored while sitting.     HEENT: Hearing is intact to spoken word.  Speech is clear.  No gross evidence of visual impairment that would impact ambulation.    Vascular: Dorsalis pedis non-palpableLeft.  Dermatologic:   Wound Foot Other (comment) (Active)       Wound Heel Skin tear (Active)       VASC Wound left foot (Active)   Pre Size Length 3 12/20/23 1500   Pre Size Width 2.5 12/20/23 1500   Pre Size Depth 0.2 12/20/23 1500   Pre Total Sq cm 7.5 12/20/23 1500   Majority of the base of the left foot ulceration has fibrotic tissue without increased depth, periwound maceration and localized erythema.  Neurologic: Intact to light touch Left.  Musculoskeletal: Contracted digits noted Left.

## 2023-12-20 NOTE — LETTER
2023             [facility]             Wound Dressing Rx and Order Form             Order Status:New Order             Verbal: Hemalatha Redman  [facility]   Account # [id number]  Fax: [phone]  Customer Service: [phone]          Patient Info:  Name: Mely Alegria  : 1940  Address:   [facility] [address] [facility] [address]  Phone: [phone]      Insurance Info:  PRIMARY INSURANCE: Payor: [employer] / Plan: [facility] MEDICARE ADVANTAGE / Product Type: HMO /   Primary Policy ID#: [id number]  SECONDARY INSURANCE:  N/A   Secondary Policy ID#: N/A    Physician Info:   Name:  Giles Rivera DPM   Dept Address/Phones:   [address] [id number] [phone]  Dept: [phone]  Fax: [phone]        Wound info:  Encounter Diagnoses   Name Primary?    Ulcer of left foot, limited to breakdown of skin (H) Yes    Cellulitis of left foot     PAD (peripheral artery disease) (H24)      Wound Foot Other (comment) (Active)       Wound Heel Skin tear (Active)       VASC Wound left foot (Active)   Pre Size Length 3 23 1500   Pre Size Width 2.5 23 1500   Pre Size Depth 0.2 23 1500   Pre Total Sq cm 7.5 23 1500     Drainage: moderate  Thickness:  Full  Duration of Need: 60  Days Supply: 30  Start Date: 2023  Starter Kit: Ancillary Kit (saline, gloves, gauze)  Qualifying wound/Debridement: Yes     Dressing Type Brand Size Days Supply  15/30 Quantity  changes/week   Secondary Square gauze   4''x4'' 30 DAILY    Sof form roll gauze   4''x75'' 30 DAILY   Tape Papertape  1in 30 DAILY         No substitutions preferred. Call [phone].    OK to forward to covered supplier.    Electronically Signed Physician: Giles Rivera DPM Date: 2023

## 2023-12-22 NOTE — TELEPHONE ENCOUNTER
Caller: Rhoda, regan    Provider: MENA Rivera    Detailed reason for call: daughter states that the pharmacy is having a hard time getting the Santyl. Would like to know what to use in the meantime. Advised to use medihoney. They do not have any. Writer will leave samples for Rhoda to .    Best phone number to contact: [phone]    Best time to contact: anytime    Ok to leave a detailed message: Yes          (Noted to patient if reason is related to wound or incision, to please send a photo via email or Novogeniet.)

## 2023-12-27 NOTE — PATIENT INSTRUCTIONS
Important lnstructions    I sent the order for your wheel chair to [facility]   [phone]      WEIGHT BEARING STATUS: You are to remain NON WEIGHT BEARING on your left foot. NON WEIGHT BEARING MEANS NO PRESSURE ON YOUR FOOT OR HEEL AT ANY TIME FOR ANY REASON!    2. OFFLOADING DEVICE: Must use a A WHEELCHAIR at all times! (do not use affected foot to push wheelchair)    3. STABILIZATION DEVICE: Use a CAM BOOT . You will need to WEAR THIS ANYTIME YOU ARE UP AND OUT OF BED, IT IS OKAY TO REMOVE WHEN YOU ARE SLEEPING..     4. ELEVATE: Elevating your leg means laying with your head on a pillow and your foot ABOVE YOUR HEART.     5. DO NOT MOVE YOUR FOOT.  There is a risk of worsening the wound or incision. To give yourself a higher chance of healing, please DO NOT swing foot back and forth and wiggle foot/toes especially when inside a stabilization device. Limited movement is allowable with therapy as recommended by the doctor.     6. TAKE A PROTEIN SHAKE TWICE A DAY.  (For ex: Boost, Ensure, Glucerna)      Dressing Change lnstructions    Dressing change daily with Santyl to left foot wound.     How to Use Collagenase  SANTYL  Ointment    DAILY CLEANSE  Gently cleanse the wound with sterile saline    APPLY  Apply SANTYL  Ointment at the thickness of a nickel or the thickness of the cream inside an Oreo cookie    COVER  Cover the wound with a clean moistened gauze followed by dry gauze if wound bed is dry. Wounds with sufficient exudate will naturally activate the collagenase enzyme and do not need a moistened gauze applied. Moisture helps release the active ingredient in SANTYL  to make the ointment most effective. Without the right amount of moisture, SANTYL  Ointment may not work properly. May apply adaptic touch over Santyl ointment to hold into place. Secure with roll gauze. Do not use dressings that contain silver or iodine with SANTYL  Ointment, as they may stop SANTYL  Ointment        It IS NOT ok to  get your wound wet in the bath or shower    SEEK MEDICAL CARE IF:  You have an increase in swelling, pain, or redness around the wound.  You have an increase in the amount of pus coming from the wound.  There is a bad smell coming from the wound.  The wound appears to be worsening/enlarging  You have a fever greater than 101.5 F      It is ok to continue current wound care treatment/products for the next 2-3 days until new wound care supplies are ordered and arrive. If longer than this please contact our office at [phone].    *Wheelchairs are never guaranteed at the front door, if you have your own wheel chair or knee walker, please bring that with you to your appointment. Thank you for your understanding!*    We want to hear from you!   In the next few weeks, you should receive a call or email to complete a survey about your visit at [facility]. Please help us improve your appointment experience by letting us know how we did today. We strive to make your experience good and value any ways in which we could do better.      We value your input and suggestions.    Thank you for choosing the [facility]!

## 2023-12-27 NOTE — PROGRESS NOTES
FOOT AND ANKLE SURGERY/PODIATRY Progress Note      ASSESSMENT:   Cellulitis left foot  Ulceration left foot into subcutaneous tissue   Venous stasis  ESRD      TREATMENT:  -I discussed with the patient and her son today that the left foot ulceration is stable with decreased erythema as compared to the previous visit.  I recommend she begin and finish current course of Augmentin.  I have asked him to monitor for any increasing erythema and contact my office should this occur.    -Recommend nonweightbearing on the left foot.  Again referred for a wheelchair to remain nonweightbearing on her left foot.    -After discussion of risk factors, nursing staff removed dressing, cleansed wound and consent obtained 2% Lidocaine HCL was injected circumferentially around the dorsal left foot ulceration, under clean conditions, the left foot ulceration(s) were debrided using #15 blade scalpel.  Devitalized and nonviable tissue, along with any fibrin and slough, was removed to improve granulation tissue formation, stimulate wound healing, decrease overall bacteria load, disrupt biofilm formation and decrease edge senescence. Wound drainage was scant No. Total excisional debridement was 7 sq cm into the subcutaneous tissue with a depth of 0.2 cm.   Ulcers were improved afterwards and .  Measures were as noted on the flow sheet. Medi-honey with a gauze dressing was applied.  She will continue to apply Medihoney with a gauze dressing until Santyl is available.    -She will follow-up in 2 weeks or sooner with concerns     Giles Rivera DPM  [facility] [place]      HPI: Mely Alegria was seen again today for a left foot ulceration.  Patient reports that she has taken Bactrim as directed and will  Augmentin today.  She has not obtain a wheelchair today.    No past medical history on file.    Past Surgical History:   Procedure Laterality Date    HC DILATION/CURETTAGE DIAG/THER NON OB      Description:  "Dilation And Curettage;  Recorded: 01/18/2010;    IR CVC TUNNEL PLACEMENT > 5 YRS OF AGE  10/30/2023    PICC TRIPLE LUMEN PLACEMENT  10/30/2023    ZZC COLOSTOMY      Description: Colostomy;  Recorded: 11/05/2012;    ZZC PART REMOVAL COLON W ANASTOMOSIS      Description: Partial Colectomy;  Recorded: 11/05/2012;  Comments: with colostomy    ZZC TOTAL HIP ARTHROPLASTY      Description: Total Hip Replacement;  Recorded: 01/18/2010;       Allergies   Allergen Reactions    Allopurinol Diarrhea    Lisinopril Cough     initiated 5/14/1996 and stopped           Current Outpatient Medications:     amoxicillin-clavulanate (AUGMENTIN) 875-125 MG tablet, Take 1 tablet by mouth 2 times daily for 10 days, Disp: 20 tablet, Rfl: 0    baclofen (LIORESAL) 10 MG tablet, Take 1 tablet (10 mg) by mouth 3 times daily as needed for muscle spasms, Disp: 20 tablet, Rfl: 1    Bismuth Tribromoph-Petrolatum (XEROFORM PETROLAT GAUZE 5\"X9\") MISC, Apply 1 each topically daily as needed (Wound dressing), Disp: 50 each, Rfl: 4    calamine 8-8 % lotion, Apply topically every hour as needed for itching, Disp: 177 mL, Rfl: 11    calcium citrate-vitamin D (CITRACAL) 315-200 MG-UNIT TABS per tablet, Take 1 tablet by mouth daily, Disp: , Rfl:     cholecalciferol, vitamin D3, 50 mcg (2,000 unit) Tab, [CHOLECALCIFEROL, VITAMIN D3, 50 MCG (2,000 UNIT) TAB] Take 1 tablet (2,000 Units total) by mouth daily., Disp: 100 each, Rfl: 3    collagenase (SANTYL) 250 UNIT/GM external ointment, Apply topically daily Total square centimeters of wound(s) being treated is 7.5 square cm, 30 day supply, Disp: 30 g, Rfl: 3    ELIQUIS ANTICOAGULANT 2.5 MG tablet, , Disp: , Rfl:     famotidine (PEPCID) 20 MG tablet, Take 1 tablet (20 mg) by mouth daily, Disp: 90 tablet, Rfl: 3    febuxostat (ULORIC) 40 MG TABS tablet, Take 1 tablet (40 mg) by mouth daily, Disp: 30 tablet, Rfl: 11    folic acid (FOLVITE) 1 MG tablet, Take 1 tablet (1 mg) by mouth daily, Disp: 90 tablet, Rfl: " 3    furosemide (LASIX) 40 MG tablet, Take 1 tablet (40 mg) by mouth daily as needed, Disp: , Rfl:     lactobacillus rhamnosus, GG, (CULTURELL) capsule, Take 1 capsule by mouth 2 times daily, Disp: , Rfl:     levothyroxine (SYNTHROID/LEVOTHROID) 25 MCG tablet, Take 1 tablet (25 mcg) by mouth every morning (before breakfast), Disp: 90 tablet, Rfl: 3    loperamide (IMODIUM) 2 MG capsule, Take 1 capsule (2 mg) by mouth 4 times daily as needed for diarrhea, Disp: , Rfl:     menthol-zinc oxide (CALMOSEPTINE) 0.44-20.6 % OINT ointment, Apply topically 4 times daily as needed for skin protection or irritation, Disp: , Rfl:     menthol-zinc oxide (CALMOSEPTINE) 0.44-20.625 % OINT ointment, Apply topically 4 times daily as needed for skin protection, Disp: 113 g, Rfl: 11    miconazole (MICATIN) 2 % external powder, Apply topically 2 times daily, Disp: , Rfl:     midodrine (PROAMATINE) 10 MG tablet, Take 1 tablet (10 mg) by mouth Every Mon, Wed, Fri Morning, Disp: 36 tablet, Rfl: 3    midodrine (PROAMATINE) 5 MG tablet, Take 1 tablet (5 mg) by mouth every hour as needed (on HD for SBP <90), Disp: , Rfl:     mirtazapine (REMERON) 7.5 MG tablet, Take 2 tablets (15 mg) by mouth at bedtime, Disp: , Rfl:     rivaroxaban ANTICOAGULANT (XARELTO ANTICOAGULANT) 15 MG TABS tablet, Take 1 tablet (15 mg) by mouth daily (with dinner), Disp: 90 tablet, Rfl: 3    sulfamethoxazole-trimethoprim (BACTRIM DS) 800-160 MG tablet, Take 1 tablet by mouth 2 times daily, Disp: 20 tablet, Rfl: 0    vitamin B1 (THIAMINE) 100 MG tablet, Take 1 tablet by mouth daily at 2 pm, Disp: , Rfl:     Review of Systems - 10 point Review of Systems is negative except for left foot ulcer which is noted in HPI.      OBJECTIVE:  BP (!) 140/70   Pulse 52   Temp 97.5  F (36.4  C)   Resp 18   LMP  (LMP Unknown)   SpO2 94%   General appearance: Patient is alert and fully cooperative with history & exam.  No sign of distress is noted during the visit.    Vascular:  Dorsalis pedis non-palpableLeft.  Dermatologic:    Wound Foot Other (comment) (Active)       Wound Heel Skin tear (Active)       VASC Wound left foot (Active)   Pre Size Length 3 12/27/23 1500   Pre Size Width 2.5 12/27/23 1500   Pre Size Depth 0.2 12/27/23 1500   Pre Total Sq cm 7 12/27/23 1500   Description macerated 12/27/23 1500   Mixed granular/fibrotic tissue ulceration dorsal lateral left foot with localized erythema, improved in the previous visit.  Neurologic: Diminished to light touch Left.  Musculoskeletal: Contracted digits noted Left.      Picture:

## 2023-12-27 NOTE — PROGRESS NOTES
Clinic Care Coordination Contact  CCC RN spoke with patient today to follow up after recent TCU discharge and to discuss any needs or concerns. Patient stated she was doing well at home. She denied any immediate needs or concerns. Per chart review, patient has had a follow up with PCP. Patient stated she just heading to an appointment with Dr. Rivera this afternoon. Patient declined offer to enroll in Care Coordination, but is aware she can be referred back if needs or concerns should arise.

## 2023-12-28 NOTE — TELEPHONE ENCOUNTER
Pt called- updated that liquid form of Amoxicillin was ordered and to take it with food. Writer called [facility] and ordered with pharmacist Tristen.     She requested Santyl order be sent to [facility] in [place] because she knows the order is in stock. Order sent.     She requested pain medication due to troubles sleeping. Informed her she would have to ask PCP if needed. Encouraged to use Tylenol and Ibuprofen.

## 2023-12-28 NOTE — TELEPHONE ENCOUNTER
Left a message for pt-if we should send a script for liquid amoxicillin. Also encouraged to take with food.

## 2023-12-28 NOTE — TELEPHONE ENCOUNTER
"Caller: Patient    Provider: MENA Rivera    Detailed reason for call: Patient states that she cannot continue to take amoxicillin. She states it is hard to swallow, and when she is able to swallow it, \"it just sits there\" and she states \"all I can taste is that pill.\" Patient states the medication is making her sick to her stomach and she is wondering if she can start taking something else.     Best phone number to contact: [phone]    Best time to contact: Any time    Ok to leave a detailed message: Yes    Ok to speak to authorized person if needed: No      (Noted to patient if reason is related to wound or incision, to please send a photo via email or Travellutiont.)     "

## 2024-01-01 ENCOUNTER — HOSPITAL ENCOUNTER (OUTPATIENT)
Dept: RADIOLOGY | Facility: HOSPITAL | Age: 84
Discharge: HOME | End: 2024-02-06
Attending: NURSE PRACTITIONER | Admitting: NURSE PRACTITIONER
Payer: COMMERCIAL

## 2024-01-01 ENCOUNTER — APPOINTMENT (OUTPATIENT)
Dept: OCCUPATIONAL THERAPY | Facility: HOSPITAL | Age: 84
DRG: 754 | End: 2024-01-01
Attending: STUDENT IN AN ORGANIZED HEALTH CARE EDUCATION/TRAINING PROGRAM
Payer: COMMERCIAL

## 2024-01-01 ENCOUNTER — APPOINTMENT (OUTPATIENT)
Dept: SPEECH THERAPY | Facility: HOSPITAL | Age: 84
DRG: 754 | End: 2024-01-01
Attending: STUDENT IN AN ORGANIZED HEALTH CARE EDUCATION/TRAINING PROGRAM
Payer: COMMERCIAL

## 2024-01-01 ENCOUNTER — APPOINTMENT (OUTPATIENT)
Dept: ULTRASOUND IMAGING | Facility: HOSPITAL | Age: 84
DRG: 754 | End: 2024-01-01
Attending: INTERNAL MEDICINE
Payer: COMMERCIAL

## 2024-01-01 ENCOUNTER — OFFICE VISIT (OUTPATIENT)
Dept: VASCULAR SURGERY | Facility: CLINIC | Age: 84
End: 2024-01-01
Attending: PODIATRIST
Payer: COMMERCIAL

## 2024-01-01 ENCOUNTER — TELEPHONE (OUTPATIENT)
Dept: VASCULAR SURGERY | Facility: CLINIC | Age: 84
End: 2024-01-01
Payer: COMMERCIAL

## 2024-01-01 ENCOUNTER — HOSPITAL ENCOUNTER (EMERGENCY)
Facility: HOSPITAL | Age: 84
Discharge: HOME | End: 2024-01-17
Attending: STUDENT IN AN ORGANIZED HEALTH CARE EDUCATION/TRAINING PROGRAM | Admitting: STUDENT IN AN ORGANIZED HEALTH CARE EDUCATION/TRAINING PROGRAM
Payer: COMMERCIAL

## 2024-01-01 ENCOUNTER — MYC MEDICAL ADVICE (OUTPATIENT)
Dept: PHYSICAL MEDICINE AND REHAB | Facility: CLINIC | Age: 84
End: 2024-01-01
Payer: COMMERCIAL

## 2024-01-01 ENCOUNTER — TELEPHONE (OUTPATIENT)
Dept: INTERNAL MEDICINE | Facility: CLINIC | Age: 84
End: 2024-01-01
Payer: COMMERCIAL

## 2024-01-01 ENCOUNTER — ANESTHESIA EVENT (OUTPATIENT)
Dept: SURGERY | Facility: HOSPITAL | Age: 84
End: 2024-01-01
Payer: COMMERCIAL

## 2024-01-01 ENCOUNTER — HEALTH MAINTENANCE LETTER (OUTPATIENT)
Age: 84
End: 2024-01-01

## 2024-01-01 ENCOUNTER — APPOINTMENT (OUTPATIENT)
Dept: ULTRASOUND IMAGING | Facility: HOSPITAL | Age: 84
DRG: 754 | End: 2024-01-01
Attending: EMERGENCY MEDICINE
Payer: COMMERCIAL

## 2024-01-01 ENCOUNTER — ANCILLARY PROCEDURE (OUTPATIENT)
Dept: VASCULAR ULTRASOUND | Facility: CLINIC | Age: 84
End: 2024-01-01
Attending: PODIATRIST
Payer: COMMERCIAL

## 2024-01-01 ENCOUNTER — VIRTUAL VISIT (OUTPATIENT)
Dept: PHYSICAL MEDICINE AND REHAB | Facility: CLINIC | Age: 84
End: 2024-01-01
Payer: COMMERCIAL

## 2024-01-01 ENCOUNTER — APPOINTMENT (OUTPATIENT)
Dept: RADIOLOGY | Facility: HOSPITAL | Age: 84
DRG: 754 | End: 2024-01-01
Attending: STUDENT IN AN ORGANIZED HEALTH CARE EDUCATION/TRAINING PROGRAM
Payer: COMMERCIAL

## 2024-01-01 ENCOUNTER — APPOINTMENT (OUTPATIENT)
Dept: OCCUPATIONAL THERAPY | Facility: HOSPITAL | Age: 84
DRG: 754 | End: 2024-01-01
Payer: COMMERCIAL

## 2024-01-01 ENCOUNTER — PATIENT OUTREACH (OUTPATIENT)
Dept: CARE COORDINATION | Facility: CLINIC | Age: 84
End: 2024-01-01

## 2024-01-01 ENCOUNTER — TRANSFERRED RECORDS (OUTPATIENT)
Dept: HEALTH INFORMATION MANAGEMENT | Facility: CLINIC | Age: 84
End: 2024-01-01

## 2024-01-01 ENCOUNTER — APPOINTMENT (OUTPATIENT)
Dept: MRI IMAGING | Facility: HOSPITAL | Age: 84
DRG: 754 | End: 2024-01-01
Attending: INTERNAL MEDICINE
Payer: COMMERCIAL

## 2024-01-01 ENCOUNTER — RADIOLOGY INJECTION OFFICE VISIT (OUTPATIENT)
Dept: PHYSICAL MEDICINE AND REHAB | Facility: CLINIC | Age: 84
End: 2024-01-01
Attending: NURSE PRACTITIONER
Payer: COMMERCIAL

## 2024-01-01 ENCOUNTER — APPOINTMENT (OUTPATIENT)
Dept: PHYSICAL THERAPY | Facility: HOSPITAL | Age: 84
DRG: 754 | End: 2024-01-01
Attending: STUDENT IN AN ORGANIZED HEALTH CARE EDUCATION/TRAINING PROGRAM
Payer: COMMERCIAL

## 2024-01-01 ENCOUNTER — APPOINTMENT (OUTPATIENT)
Dept: ULTRASOUND IMAGING | Facility: HOSPITAL | Age: 84
DRG: 754 | End: 2024-01-01
Attending: STUDENT IN AN ORGANIZED HEALTH CARE EDUCATION/TRAINING PROGRAM
Payer: COMMERCIAL

## 2024-01-01 ENCOUNTER — OFFICE VISIT (OUTPATIENT)
Dept: INTERNAL MEDICINE | Facility: CLINIC | Age: 84
End: 2024-01-01
Payer: COMMERCIAL

## 2024-01-01 ENCOUNTER — HOSPITAL ENCOUNTER (OUTPATIENT)
Dept: INTERVENTIONAL RADIOLOGY/VASCULAR | Facility: HOSPITAL | Age: 84
Discharge: HOME | End: 2024-07-17
Attending: INTERNAL MEDICINE | Admitting: NURSE PRACTITIONER
Payer: COMMERCIAL

## 2024-01-01 ENCOUNTER — APPOINTMENT (OUTPATIENT)
Dept: CT IMAGING | Facility: HOSPITAL | Age: 84
DRG: 754 | End: 2024-01-01
Attending: STUDENT IN AN ORGANIZED HEALTH CARE EDUCATION/TRAINING PROGRAM
Payer: COMMERCIAL

## 2024-01-01 ENCOUNTER — APPOINTMENT (OUTPATIENT)
Dept: PHYSICAL THERAPY | Facility: HOSPITAL | Age: 84
DRG: 754 | End: 2024-01-01
Payer: COMMERCIAL

## 2024-01-01 ENCOUNTER — OFFICE VISIT (OUTPATIENT)
Dept: PHYSICAL MEDICINE AND REHAB | Facility: CLINIC | Age: 84
End: 2024-01-01
Payer: COMMERCIAL

## 2024-01-01 ENCOUNTER — APPOINTMENT (OUTPATIENT)
Dept: PHYSICAL THERAPY | Facility: HOSPITAL | Age: 84
DRG: 754 | End: 2024-01-01
Attending: INTERNAL MEDICINE
Payer: COMMERCIAL

## 2024-01-01 ENCOUNTER — PATIENT OUTREACH (OUTPATIENT)
Dept: CARE COORDINATION | Facility: CLINIC | Age: 84
End: 2024-01-01
Payer: COMMERCIAL

## 2024-01-01 ENCOUNTER — APPOINTMENT (OUTPATIENT)
Dept: CARDIOLOGY | Facility: HOSPITAL | Age: 84
DRG: 754 | End: 2024-01-01
Attending: INTERNAL MEDICINE
Payer: COMMERCIAL

## 2024-01-01 ENCOUNTER — TELEPHONE (OUTPATIENT)
Dept: INTERNAL MEDICINE | Facility: CLINIC | Age: 84
End: 2024-01-01

## 2024-01-01 ENCOUNTER — APPOINTMENT (OUTPATIENT)
Dept: SPEECH THERAPY | Facility: HOSPITAL | Age: 84
DRG: 754 | End: 2024-01-01
Payer: COMMERCIAL

## 2024-01-01 ENCOUNTER — APPOINTMENT (OUTPATIENT)
Dept: CT IMAGING | Facility: HOSPITAL | Age: 84
DRG: 754 | End: 2024-01-01
Attending: EMERGENCY MEDICINE
Payer: COMMERCIAL

## 2024-01-01 ENCOUNTER — ANESTHESIA (OUTPATIENT)
Dept: SURGERY | Facility: HOSPITAL | Age: 84
End: 2024-01-01
Payer: COMMERCIAL

## 2024-01-01 ENCOUNTER — MYC MEDICAL ADVICE (OUTPATIENT)
Dept: INTERVENTIONAL RADIOLOGY/VASCULAR | Facility: CLINIC | Age: 84
End: 2024-01-01
Payer: COMMERCIAL

## 2024-01-01 ENCOUNTER — APPOINTMENT (OUTPATIENT)
Dept: INTERVENTIONAL RADIOLOGY/VASCULAR | Facility: HOSPITAL | Age: 84
End: 2024-01-01
Attending: NURSE PRACTITIONER
Payer: COMMERCIAL

## 2024-01-01 ENCOUNTER — HOSPITAL ENCOUNTER (OUTPATIENT)
Facility: HOSPITAL | Age: 84
Discharge: HOME | End: 2024-04-16
Attending: SURGERY | Admitting: SURGERY
Payer: COMMERCIAL

## 2024-01-01 ENCOUNTER — TRANSFERRED RECORDS (OUTPATIENT)
Dept: HEALTH INFORMATION MANAGEMENT | Facility: CLINIC | Age: 84
End: 2024-01-01
Payer: COMMERCIAL

## 2024-01-01 ENCOUNTER — THERAPY VISIT (OUTPATIENT)
Dept: PHYSICAL THERAPY | Facility: REHABILITATION | Age: 84
End: 2024-01-01
Attending: NURSE PRACTITIONER
Payer: COMMERCIAL

## 2024-01-01 ENCOUNTER — HOSPITAL ENCOUNTER (INPATIENT)
Facility: HOSPITAL | Age: 84
End: 2024-01-01
Attending: EMERGENCY MEDICINE | Admitting: STUDENT IN AN ORGANIZED HEALTH CARE EDUCATION/TRAINING PROGRAM
Payer: COMMERCIAL

## 2024-01-01 ENCOUNTER — OFFICE VISIT (OUTPATIENT)
Dept: PHYSICAL MEDICINE AND REHAB | Facility: CLINIC | Age: 84
End: 2024-01-01
Attending: INTERNAL MEDICINE
Payer: COMMERCIAL

## 2024-01-01 VITALS — DIASTOLIC BLOOD PRESSURE: 82 MMHG | OXYGEN SATURATION: 97 % | SYSTOLIC BLOOD PRESSURE: 130 MMHG | HEART RATE: 70 BPM

## 2024-01-01 VITALS
OXYGEN SATURATION: 96 % | SYSTOLIC BLOOD PRESSURE: 122 MMHG | BODY MASS INDEX: 32.37 KG/M2 | TEMPERATURE: 97.6 F | DIASTOLIC BLOOD PRESSURE: 80 MMHG | HEART RATE: 76 BPM | RESPIRATION RATE: 16 BRPM | WEIGHT: 177 LBS

## 2024-01-01 VITALS — OXYGEN SATURATION: 99 % | SYSTOLIC BLOOD PRESSURE: 152 MMHG | HEART RATE: 50 BPM | DIASTOLIC BLOOD PRESSURE: 88 MMHG

## 2024-01-01 VITALS
DIASTOLIC BLOOD PRESSURE: 60 MMHG | OXYGEN SATURATION: 95 % | SYSTOLIC BLOOD PRESSURE: 116 MMHG | TEMPERATURE: 97.3 F | HEART RATE: 84 BPM | RESPIRATION RATE: 22 BRPM

## 2024-01-01 VITALS
WEIGHT: 175.2 LBS | SYSTOLIC BLOOD PRESSURE: 125 MMHG | HEART RATE: 63 BPM | HEIGHT: 62 IN | DIASTOLIC BLOOD PRESSURE: 85 MMHG | RESPIRATION RATE: 18 BRPM | OXYGEN SATURATION: 97 % | BODY MASS INDEX: 32.24 KG/M2 | TEMPERATURE: 96.9 F

## 2024-01-01 VITALS
DIASTOLIC BLOOD PRESSURE: 62 MMHG | HEIGHT: 62 IN | SYSTOLIC BLOOD PRESSURE: 128 MMHG | BODY MASS INDEX: 32.54 KG/M2 | OXYGEN SATURATION: 95 % | HEART RATE: 77 BPM | WEIGHT: 176.81 LBS

## 2024-01-01 VITALS
OXYGEN SATURATION: 94 % | BODY MASS INDEX: 31.46 KG/M2 | DIASTOLIC BLOOD PRESSURE: 64 MMHG | TEMPERATURE: 98 F | RESPIRATION RATE: 16 BRPM | SYSTOLIC BLOOD PRESSURE: 115 MMHG | WEIGHT: 172 LBS | HEART RATE: 93 BPM

## 2024-01-01 VITALS
TEMPERATURE: 97.1 F | RESPIRATION RATE: 19 BRPM | BODY MASS INDEX: 33.71 KG/M2 | HEIGHT: 61 IN | HEART RATE: 82 BPM | DIASTOLIC BLOOD PRESSURE: 92 MMHG | OXYGEN SATURATION: 95 % | SYSTOLIC BLOOD PRESSURE: 155 MMHG | WEIGHT: 178.57 LBS

## 2024-01-01 VITALS
DIASTOLIC BLOOD PRESSURE: 75 MMHG | HEART RATE: 80 BPM | BODY MASS INDEX: 29.22 KG/M2 | HEIGHT: 61 IN | WEIGHT: 154.76 LBS | SYSTOLIC BLOOD PRESSURE: 92 MMHG | OXYGEN SATURATION: 94 % | RESPIRATION RATE: 14 BRPM | TEMPERATURE: 97.5 F

## 2024-01-01 VITALS
RESPIRATION RATE: 18 BRPM | WEIGHT: 165.79 LBS | TEMPERATURE: 97.3 F | SYSTOLIC BLOOD PRESSURE: 101 MMHG | DIASTOLIC BLOOD PRESSURE: 69 MMHG | OXYGEN SATURATION: 94 % | BODY MASS INDEX: 31.3 KG/M2 | HEART RATE: 87 BPM | HEIGHT: 61 IN

## 2024-01-01 VITALS
DIASTOLIC BLOOD PRESSURE: 84 MMHG | OXYGEN SATURATION: 99 % | SYSTOLIC BLOOD PRESSURE: 173 MMHG | HEART RATE: 100 BPM | RESPIRATION RATE: 16 BRPM

## 2024-01-01 VITALS
RESPIRATION RATE: 16 BRPM | DIASTOLIC BLOOD PRESSURE: 85 MMHG | HEART RATE: 77 BPM | SYSTOLIC BLOOD PRESSURE: 130 MMHG | TEMPERATURE: 97.8 F

## 2024-01-01 VITALS
DIASTOLIC BLOOD PRESSURE: 57 MMHG | HEART RATE: 53 BPM | WEIGHT: 172 LBS | HEIGHT: 62 IN | SYSTOLIC BLOOD PRESSURE: 105 MMHG | BODY MASS INDEX: 31.65 KG/M2

## 2024-01-01 DIAGNOSIS — Z01.818 PRE-OPERATIVE EXAMINATION FOR INTERNAL MEDICINE: Primary | ICD-10-CM

## 2024-01-01 DIAGNOSIS — M48.062 SPINAL STENOSIS OF LUMBAR REGION WITH NEUROGENIC CLAUDICATION: Primary | ICD-10-CM

## 2024-01-01 DIAGNOSIS — M48.061 LUMBAR FORAMINAL STENOSIS: ICD-10-CM

## 2024-01-01 DIAGNOSIS — M10.9 URIC ACID ARTHROPATHY: Primary | ICD-10-CM

## 2024-01-01 DIAGNOSIS — G89.29 CHRONIC BILATERAL LOW BACK PAIN WITHOUT SCIATICA: ICD-10-CM

## 2024-01-01 DIAGNOSIS — T82.42XA DISPLACEMENT OF VASCULAR DIALYSIS CATHETER, INITIAL ENCOUNTER (H): ICD-10-CM

## 2024-01-01 DIAGNOSIS — G89.29 CHRONIC PAIN OF LEFT KNEE: ICD-10-CM

## 2024-01-01 DIAGNOSIS — I48.19 PERSISTENT ATRIAL FIBRILLATION (H): ICD-10-CM

## 2024-01-01 DIAGNOSIS — M54.50 CHRONIC BILATERAL LOW BACK PAIN WITHOUT SCIATICA: ICD-10-CM

## 2024-01-01 DIAGNOSIS — M43.16 SPONDYLOLISTHESIS OF LUMBAR REGION: ICD-10-CM

## 2024-01-01 DIAGNOSIS — L97.521 ULCER OF LEFT FOOT, LIMITED TO BREAKDOWN OF SKIN (H): Primary | ICD-10-CM

## 2024-01-01 DIAGNOSIS — I73.9 PAD (PERIPHERAL ARTERY DISEASE) (H): ICD-10-CM

## 2024-01-01 DIAGNOSIS — E66.2 CLASS 1 OBESITY WITH ALVEOLAR HYPOVENTILATION, SERIOUS COMORBIDITY, AND BODY MASS INDEX (BMI) OF 32.0 TO 32.9 IN ADULT (H): ICD-10-CM

## 2024-01-01 DIAGNOSIS — I95.9 HYPOTENSION, UNSPECIFIED HYPOTENSION TYPE: ICD-10-CM

## 2024-01-01 DIAGNOSIS — M51.369 DDD (DEGENERATIVE DISC DISEASE), LUMBAR: ICD-10-CM

## 2024-01-01 DIAGNOSIS — R53.1 GENERAL WEAKNESS: ICD-10-CM

## 2024-01-01 DIAGNOSIS — G89.29 CHRONIC BILATERAL LOW BACK PAIN WITHOUT SCIATICA: Primary | ICD-10-CM

## 2024-01-01 DIAGNOSIS — E55.9 VITAMIN D DEFICIENCY: ICD-10-CM

## 2024-01-01 DIAGNOSIS — N93.9 VAGINAL BLEEDING: Primary | ICD-10-CM

## 2024-01-01 DIAGNOSIS — M54.50 CHRONIC BILATERAL LOW BACK PAIN WITHOUT SCIATICA: Primary | ICD-10-CM

## 2024-01-01 DIAGNOSIS — B37.9 CANDIDA INFECTION: ICD-10-CM

## 2024-01-01 DIAGNOSIS — R79.89 ELEVATED TROPONIN: ICD-10-CM

## 2024-01-01 DIAGNOSIS — M48.062 SPINAL STENOSIS OF LUMBAR REGION WITH NEUROGENIC CLAUDICATION: ICD-10-CM

## 2024-01-01 DIAGNOSIS — I50.22 CHRONIC SYSTOLIC CONGESTIVE HEART FAILURE (H): ICD-10-CM

## 2024-01-01 DIAGNOSIS — R63.8 DECREASED ORAL INTAKE: ICD-10-CM

## 2024-01-01 DIAGNOSIS — Z51.5 HOSPICE CARE PATIENT: ICD-10-CM

## 2024-01-01 DIAGNOSIS — E66.811 CLASS 1 OBESITY WITH ALVEOLAR HYPOVENTILATION, SERIOUS COMORBIDITY, AND BODY MASS INDEX (BMI) OF 32.0 TO 32.9 IN ADULT (H): ICD-10-CM

## 2024-01-01 DIAGNOSIS — M54.50 ACUTE RIGHT-SIDED LOW BACK PAIN WITHOUT SCIATICA: ICD-10-CM

## 2024-01-01 DIAGNOSIS — E03.4 HYPOTHYROIDISM DUE TO ACQUIRED ATROPHY OF THYROID: ICD-10-CM

## 2024-01-01 DIAGNOSIS — N18.6 END STAGE RENAL DISEASE (H): ICD-10-CM

## 2024-01-01 DIAGNOSIS — N18.6 ESRD (END STAGE RENAL DISEASE) ON DIALYSIS (H): Primary | ICD-10-CM

## 2024-01-01 DIAGNOSIS — M25.562 CHRONIC PAIN OF LEFT KNEE: ICD-10-CM

## 2024-01-01 DIAGNOSIS — K51.00 ULCERATIVE PANCOLITIS WITHOUT COMPLICATION (H): ICD-10-CM

## 2024-01-01 DIAGNOSIS — F11.20 OPIOID TYPE DEPENDENCE, EPISODIC (H): ICD-10-CM

## 2024-01-01 DIAGNOSIS — M10.9 URIC ACID ARTHROPATHY: ICD-10-CM

## 2024-01-01 DIAGNOSIS — Z79.899 OTHER LONG TERM (CURRENT) DRUG THERAPY: ICD-10-CM

## 2024-01-01 DIAGNOSIS — N18.6 ESRD (END STAGE RENAL DISEASE) ON DIALYSIS (H): ICD-10-CM

## 2024-01-01 DIAGNOSIS — M54.50 ACUTE MIDLINE LOW BACK PAIN WITHOUT SCIATICA: ICD-10-CM

## 2024-01-01 DIAGNOSIS — I50.9 CHRONIC CONGESTIVE HEART FAILURE, UNSPECIFIED HEART FAILURE TYPE (H): ICD-10-CM

## 2024-01-01 DIAGNOSIS — Z99.2 ESRD (END STAGE RENAL DISEASE) ON DIALYSIS (H): Primary | ICD-10-CM

## 2024-01-01 DIAGNOSIS — M54.42 CHRONIC BILATERAL LOW BACK PAIN WITH LEFT-SIDED SCIATICA: Primary | ICD-10-CM

## 2024-01-01 DIAGNOSIS — R10.13 DYSPEPSIA: ICD-10-CM

## 2024-01-01 DIAGNOSIS — Z99.2 ESRD (END STAGE RENAL DISEASE) ON DIALYSIS (H): ICD-10-CM

## 2024-01-01 DIAGNOSIS — Z99.2 PERITONEAL DIALYSIS STATUS (H): ICD-10-CM

## 2024-01-01 DIAGNOSIS — A41.9 SEPSIS, DUE TO UNSPECIFIED ORGANISM, UNSPECIFIED WHETHER ACUTE ORGAN DYSFUNCTION PRESENT (H): ICD-10-CM

## 2024-01-01 DIAGNOSIS — G89.29 CHRONIC BILATERAL LOW BACK PAIN WITH LEFT-SIDED SCIATICA: Primary | ICD-10-CM

## 2024-01-01 DIAGNOSIS — I48.91 RAPID ATRIAL FIBRILLATION (H): ICD-10-CM

## 2024-01-01 LAB
% LINING CELLS, BODY FLUID: 12 %
ABSOLUTE NEUTROPHILS, BODY FLUID: 0.2 /UL
ALBUMIN SERPL BCG-MCNC: 2.9 G/DL (ref 3.5–5.2)
ALBUMIN SERPL BCG-MCNC: 3 G/DL (ref 3.5–5.2)
ALP SERPL-CCNC: 126 U/L (ref 40–150)
ALT SERPL W P-5'-P-CCNC: 46 U/L (ref 0–50)
ANION GAP SERPL CALCULATED.3IONS-SCNC: 16 MMOL/L (ref 7–15)
ANION GAP SERPL CALCULATED.3IONS-SCNC: 18 MMOL/L (ref 7–15)
ANION GAP SERPL CALCULATED.3IONS-SCNC: 18 MMOL/L (ref 7–15)
ANION GAP SERPL CALCULATED.3IONS-SCNC: 19 MMOL/L (ref 7–15)
ANION GAP SERPL CALCULATED.3IONS-SCNC: 19 MMOL/L (ref 7–15)
ANION GAP SERPL CALCULATED.3IONS-SCNC: 20 MMOL/L (ref 7–15)
ANION GAP SERPL CALCULATED.3IONS-SCNC: 21 MMOL/L (ref 7–15)
ANION GAP SERPL CALCULATED.3IONS-SCNC: 21 MMOL/L (ref 7–15)
ANION GAP SERPL CALCULATED.3IONS-SCNC: 8 MMOL/L (ref 7–15)
APPEARANCE FLD: CLEAR
AST SERPL W P-5'-P-CCNC: 53 U/L (ref 0–45)
ATRIAL RATE - MUSE: 178 BPM
BACTERIA BLD CULT: ABNORMAL
BACTERIA BLD CULT: ABNORMAL
BACTERIA BLD CULT: NO GROWTH
BACTERIA BLD CULT: NO GROWTH
BACTERIA FLD CULT: NORMAL
BACTERIA PRT CULT: ABNORMAL
BASOPHILS # BLD AUTO: 0.1 10E3/UL (ref 0–0.2)
BASOPHILS # BLD AUTO: 0.1 10E3/UL (ref 0–0.2)
BASOPHILS NFR BLD AUTO: 0 %
BASOPHILS NFR BLD AUTO: 0 %
BILIRUB SERPL-MCNC: 0.7 MG/DL
BUN SERPL-MCNC: 29.1 MG/DL (ref 8–23)
BUN SERPL-MCNC: 29.8 MG/DL (ref 8–23)
BUN SERPL-MCNC: 29.9 MG/DL (ref 8–23)
BUN SERPL-MCNC: 29.9 MG/DL (ref 8–23)
BUN SERPL-MCNC: 30.6 MG/DL (ref 8–23)
BUN SERPL-MCNC: 30.8 MG/DL (ref 8–23)
BUN SERPL-MCNC: 32 MG/DL (ref 8–23)
BUN SERPL-MCNC: 33.9 MG/DL (ref 8–23)
BUN SERPL-MCNC: 34.6 MG/DL (ref 8–23)
C DIFF TOX B STL QL: NEGATIVE
CALCIUM SERPL-MCNC: 8.4 MG/DL (ref 8.8–10.4)
CALCIUM SERPL-MCNC: 8.8 MG/DL (ref 8.8–10.4)
CALCIUM SERPL-MCNC: 8.9 MG/DL (ref 8.8–10.4)
CALCIUM SERPL-MCNC: 8.9 MG/DL (ref 8.8–10.4)
CALCIUM SERPL-MCNC: 9 MG/DL (ref 8.8–10.4)
CALCIUM SERPL-MCNC: 9.2 MG/DL (ref 8.8–10.4)
CALCIUM SERPL-MCNC: 9.5 MG/DL (ref 8.8–10.4)
CALCIUM SERPL-MCNC: 9.6 MG/DL (ref 8.8–10.4)
CALCIUM SERPL-MCNC: 9.7 MG/DL (ref 8.8–10.2)
CELL COUNT BODY FLUID SOURCE: NORMAL
CHLORIDE SERPL-SCNC: 102 MMOL/L (ref 98–107)
CHLORIDE SERPL-SCNC: 92 MMOL/L (ref 98–107)
CHLORIDE SERPL-SCNC: 92 MMOL/L (ref 98–107)
CHLORIDE SERPL-SCNC: 95 MMOL/L (ref 98–107)
CHLORIDE SERPL-SCNC: 95 MMOL/L (ref 98–107)
CHLORIDE SERPL-SCNC: 96 MMOL/L (ref 98–107)
CHLORIDE SERPL-SCNC: 96 MMOL/L (ref 98–107)
CHLORIDE SERPL-SCNC: 97 MMOL/L (ref 98–107)
CHLORIDE SERPL-SCNC: 97 MMOL/L (ref 98–107)
CHOLEST SERPL-MCNC: 177 MG/DL
CLUE CELLS: ABNORMAL
COLOR FLD: COLORLESS
CORTIS SERPL-MCNC: 11.4 UG/DL
CREAT SERPL-MCNC: 3.79 MG/DL (ref 0.51–0.95)
CREAT SERPL-MCNC: 5.3 MG/DL (ref 0.51–0.95)
CREAT SERPL-MCNC: 5.56 MG/DL (ref 0.51–0.95)
CREAT SERPL-MCNC: 5.58 MG/DL (ref 0.51–0.95)
CREAT SERPL-MCNC: 5.66 MG/DL (ref 0.51–0.95)
CREAT SERPL-MCNC: 5.67 MG/DL (ref 0.51–0.95)
CREAT SERPL-MCNC: 5.88 MG/DL (ref 0.51–0.95)
CREAT SERPL-MCNC: 5.99 MG/DL (ref 0.51–0.95)
CREAT SERPL-MCNC: 6.23 MG/DL (ref 0.51–0.95)
CRP SERPL-MCNC: 20.3 MG/L
CRP SERPL-MCNC: 38.2 MG/L
CRP SERPL-MCNC: 50.9 MG/L
CRP SERPL-MCNC: 93.6 MG/L
DEPRECATED HCO3 PLAS-SCNC: 31 MMOL/L (ref 22–29)
DIASTOLIC BLOOD PRESSURE - MUSE: 77 MMHG
EGFRCR SERPLBLD CKD-EPI 2021: 11 ML/MIN/1.73M2
EGFRCR SERPLBLD CKD-EPI 2021: 6 ML/MIN/1.73M2
EGFRCR SERPLBLD CKD-EPI 2021: 6 ML/MIN/1.73M2
EGFRCR SERPLBLD CKD-EPI 2021: 7 ML/MIN/1.73M2
ENTEROCOCCUS FAECALIS: NOT DETECTED
ENTEROCOCCUS FAECIUM: NOT DETECTED
EOSINOPHIL # BLD AUTO: 0 10E3/UL (ref 0–0.7)
EOSINOPHIL # BLD AUTO: 0 10E3/UL (ref 0–0.7)
EOSINOPHIL NFR BLD AUTO: 0 %
EOSINOPHIL NFR BLD AUTO: 0 %
ERYTHROCYTE [DISTWIDTH] IN BLOOD BY AUTOMATED COUNT: 13.5 % (ref 10–15)
ERYTHROCYTE [DISTWIDTH] IN BLOOD BY AUTOMATED COUNT: 14.4 % (ref 10–15)
ERYTHROCYTE [DISTWIDTH] IN BLOOD BY AUTOMATED COUNT: 14.5 % (ref 10–15)
ERYTHROCYTE [DISTWIDTH] IN BLOOD BY AUTOMATED COUNT: 14.6 % (ref 10–15)
ERYTHROCYTE [DISTWIDTH] IN BLOOD BY AUTOMATED COUNT: 14.7 % (ref 10–15)
ERYTHROCYTE [DISTWIDTH] IN BLOOD BY AUTOMATED COUNT: 14.7 % (ref 10–15)
ERYTHROCYTE [DISTWIDTH] IN BLOOD BY AUTOMATED COUNT: 15.3 % (ref 10–15)
ERYTHROCYTE [DISTWIDTH] IN BLOOD BY AUTOMATED COUNT: 15.9 % (ref 10–15)
ERYTHROCYTE [SEDIMENTATION RATE] IN BLOOD BY WESTERGREN METHOD: 59 MM/HR (ref 0–30)
FLUAV RNA SPEC QL NAA+PROBE: NEGATIVE
FLUBV RNA RESP QL NAA+PROBE: NEGATIVE
GLUCOSE SERPL-MCNC: 111 MG/DL (ref 70–99)
GLUCOSE SERPL-MCNC: 111 MG/DL (ref 70–99)
GLUCOSE SERPL-MCNC: 118 MG/DL (ref 70–99)
GLUCOSE SERPL-MCNC: 131 MG/DL (ref 70–99)
GLUCOSE SERPL-MCNC: 132 MG/DL (ref 70–99)
GLUCOSE SERPL-MCNC: 134 MG/DL (ref 70–99)
GLUCOSE SERPL-MCNC: 140 MG/DL (ref 70–99)
GLUCOSE SERPL-MCNC: 147 MG/DL (ref 70–99)
GLUCOSE SERPL-MCNC: 78 MG/DL (ref 70–99)
GRAM STAIN RESULT: ABNORMAL
GRAM STAIN RESULT: ABNORMAL
HCO3 SERPL-SCNC: 20 MMOL/L (ref 22–29)
HCO3 SERPL-SCNC: 22 MMOL/L (ref 22–29)
HCO3 SERPL-SCNC: 23 MMOL/L (ref 22–29)
HCO3 SERPL-SCNC: 25 MMOL/L (ref 22–29)
HCT VFR BLD AUTO: 39.1 % (ref 35–47)
HCT VFR BLD AUTO: 41 % (ref 35–47)
HCT VFR BLD AUTO: 42 % (ref 35–47)
HCT VFR BLD AUTO: 42.2 % (ref 35–47)
HCT VFR BLD AUTO: 42.4 % (ref 35–47)
HCT VFR BLD AUTO: 43.1 % (ref 35–47)
HCT VFR BLD AUTO: 43.8 % (ref 35–47)
HCT VFR BLD AUTO: 44.3 % (ref 35–47)
HCT VFR BLD AUTO: 44.5 % (ref 35–47)
HCT VFR BLD AUTO: 47.1 % (ref 35–47)
HCT VFR BLD AUTO: 47.5 % (ref 35–47)
HDLC SERPL-MCNC: 52 MG/DL
HGB BLD-MCNC: 11 G/DL (ref 11.7–15.7)
HGB BLD-MCNC: 13 G/DL (ref 11.7–15.7)
HGB BLD-MCNC: 13.1 G/DL (ref 11.7–15.7)
HGB BLD-MCNC: 13.3 G/DL (ref 11.7–15.7)
HGB BLD-MCNC: 13.7 G/DL (ref 11.7–15.7)
HGB BLD-MCNC: 13.9 G/DL (ref 11.7–15.7)
HGB BLD-MCNC: 13.9 G/DL (ref 11.7–15.7)
HGB BLD-MCNC: 14 G/DL (ref 11.7–15.7)
HGB BLD-MCNC: 14.2 G/DL (ref 11.7–15.7)
HGB BLD-MCNC: 14.3 G/DL (ref 11.7–15.7)
HGB BLD-MCNC: 14.7 G/DL (ref 11.7–15.7)
HGB BLD-MCNC: 15.5 G/DL (ref 11.7–15.7)
HGB BLD-MCNC: 15.9 G/DL (ref 11.7–15.7)
HOLD SPECIMEN: NORMAL
IMM GRANULOCYTES # BLD: 0.5 10E3/UL
IMM GRANULOCYTES # BLD: 0.8 10E3/UL
IMM GRANULOCYTES NFR BLD: 2 %
IMM GRANULOCYTES NFR BLD: 5 %
INTERPRETATION ECG - MUSE: NORMAL
LACTATE SERPL-SCNC: 3.3 MMOL/L (ref 0.7–2)
LACTATE SERPL-SCNC: 3.5 MMOL/L (ref 0.7–2)
LACTATE SERPL-SCNC: 3.9 MMOL/L (ref 0.7–2)
LACTATE SERPL-SCNC: 4.8 MMOL/L (ref 0.7–2)
LDH SERPL L TO P-CCNC: 1227 U/L (ref 0–250)
LDLC SERPL CALC-MCNC: 96 MG/DL
LISTERIA SPECIES (DETECTED/NOT DETECTED): NOT DETECTED
LVEF ECHO: NORMAL
LYMPHOCYTES # BLD AUTO: 1.7 10E3/UL (ref 0.8–5.3)
LYMPHOCYTES # BLD AUTO: 1.7 10E3/UL (ref 0.8–5.3)
LYMPHOCYTES NFR BLD AUTO: 10 %
LYMPHOCYTES NFR BLD AUTO: 8 %
LYMPHOCYTES NFR FLD MANUAL: 72 %
MAGNESIUM SERPL-MCNC: 1.7 MG/DL (ref 1.7–2.3)
MCH RBC QN AUTO: 31.7 PG (ref 26.5–33)
MCH RBC QN AUTO: 32.4 PG (ref 26.5–33)
MCH RBC QN AUTO: 32.5 PG (ref 26.5–33)
MCH RBC QN AUTO: 32.5 PG (ref 26.5–33)
MCH RBC QN AUTO: 32.7 PG (ref 26.5–33)
MCH RBC QN AUTO: 32.9 PG (ref 26.5–33)
MCH RBC QN AUTO: 33 PG (ref 26.5–33)
MCH RBC QN AUTO: 33 PG (ref 26.5–33)
MCH RBC QN AUTO: 33.1 PG (ref 26.5–33)
MCH RBC QN AUTO: 33.1 PG (ref 26.5–33)
MCH RBC QN AUTO: 33.2 PG (ref 26.5–33)
MCHC RBC AUTO-ENTMCNC: 32.1 G/DL (ref 31.5–36.5)
MCHC RBC AUTO-ENTMCNC: 32.3 G/DL (ref 31.5–36.5)
MCHC RBC AUTO-ENTMCNC: 32.4 G/DL (ref 31.5–36.5)
MCHC RBC AUTO-ENTMCNC: 32.6 G/DL (ref 31.5–36.5)
MCHC RBC AUTO-ENTMCNC: 32.8 G/DL (ref 31.5–36.5)
MCHC RBC AUTO-ENTMCNC: 33 G/DL (ref 31.5–36.5)
MCHC RBC AUTO-ENTMCNC: 33.2 G/DL (ref 31.5–36.5)
MCHC RBC AUTO-ENTMCNC: 33.5 G/DL (ref 31.5–36.5)
MCHC RBC AUTO-ENTMCNC: 33.8 G/DL (ref 31.5–36.5)
MCV RBC AUTO: 100 FL (ref 78–100)
MCV RBC AUTO: 101 FL (ref 78–100)
MCV RBC AUTO: 101 FL (ref 78–100)
MCV RBC AUTO: 102 FL (ref 78–100)
MCV RBC AUTO: 97 FL (ref 78–100)
MCV RBC AUTO: 98 FL (ref 78–100)
MCV RBC AUTO: 99 FL (ref 78–100)
MCV RBC AUTO: 99 FL (ref 78–100)
MONOCYTES # BLD AUTO: 0.9 10E3/UL (ref 0–1.3)
MONOCYTES # BLD AUTO: 1.3 10E3/UL (ref 0–1.3)
MONOCYTES NFR BLD AUTO: 5 %
MONOCYTES NFR BLD AUTO: 7 %
MONOS+MACROS NFR FLD MANUAL: 10 %
NEUTROPHILS # BLD AUTO: 13.4 10E3/UL (ref 1.6–8.3)
NEUTROPHILS # BLD AUTO: 16.6 10E3/UL (ref 1.6–8.3)
NEUTROPHILS NFR BLD AUTO: 79 %
NEUTROPHILS NFR BLD AUTO: 82 %
NEUTS BAND NFR FLD MANUAL: 6 %
NONHDLC SERPL-MCNC: 125 MG/DL
NRBC # BLD AUTO: 0.1 10E3/UL
NRBC # BLD AUTO: 0.1 10E3/UL
NRBC BLD AUTO-RTO: 0 /100
NRBC BLD AUTO-RTO: 1 /100
NT-PROBNP SERPL-MCNC: ABNORMAL PG/ML (ref 0–1800)
P AXIS - MUSE: NORMAL DEGREES
PATH REPORT.COMMENTS IMP SPEC: NORMAL
PATH REPORT.COMMENTS IMP SPEC: NORMAL
PATH REPORT.FINAL DX SPEC: NORMAL
PATH REPORT.MICROSCOPIC SPEC OTHER STN: NORMAL
PATH REPORT.MICROSCOPIC SPEC OTHER STN: NORMAL
PATH REPORT.RELEVANT HX SPEC: NORMAL
PHOSPHATE SERPL-MCNC: 4.8 MG/DL (ref 2.5–4.5)
PHOSPHATE SERPL-MCNC: 5.7 MG/DL (ref 2.5–4.5)
PLATELET # BLD AUTO: 134 10E3/UL (ref 150–450)
PLATELET # BLD AUTO: 150 10E3/UL (ref 150–450)
PLATELET # BLD AUTO: 154 10E3/UL (ref 150–450)
PLATELET # BLD AUTO: 162 10E3/UL (ref 150–450)
PLATELET # BLD AUTO: 171 10E3/UL (ref 150–450)
PLATELET # BLD AUTO: 173 10E3/UL (ref 150–450)
PLATELET # BLD AUTO: 174 10E3/UL (ref 150–450)
PLATELET # BLD AUTO: 217 10E3/UL (ref 150–450)
PLATELET # BLD AUTO: 221 10E3/UL (ref 150–450)
PLATELET # BLD AUTO: 222 10E3/UL (ref 150–450)
PLATELET # BLD AUTO: 225 10E3/UL (ref 150–450)
POTASSIUM SERPL-SCNC: 3.4 MMOL/L (ref 3.4–5.3)
POTASSIUM SERPL-SCNC: 3.4 MMOL/L (ref 3.4–5.3)
POTASSIUM SERPL-SCNC: 3.5 MMOL/L (ref 3.4–5.3)
POTASSIUM SERPL-SCNC: 3.9 MMOL/L (ref 3.4–5.3)
POTASSIUM SERPL-SCNC: 4.5 MMOL/L (ref 3.4–5.3)
POTASSIUM SERPL-SCNC: 4.8 MMOL/L (ref 3.4–5.3)
POTASSIUM SERPL-SCNC: 5.2 MMOL/L (ref 3.4–5.3)
PR INTERVAL - MUSE: NORMAL MS
PROCALCITONIN SERPL IA-MCNC: 0.55 NG/ML
PROCALCITONIN SERPL IA-MCNC: 0.67 NG/ML
PROCALCITONIN SERPL IA-MCNC: 1.89 NG/ML
PROT SERPL-MCNC: 6.1 G/DL (ref 6.4–8.3)
QRS DURATION - MUSE: 76 MS
QT - MUSE: 290 MS
QTC - MUSE: 459 MS
R AXIS - MUSE: 43 DEGREES
RBC # BLD AUTO: 3.95 10E6/UL (ref 3.8–5.2)
RBC # BLD AUTO: 4.09 10E6/UL (ref 3.8–5.2)
RBC # BLD AUTO: 4.14 10E6/UL (ref 3.8–5.2)
RBC # BLD AUTO: 4.21 10E6/UL (ref 3.8–5.2)
RBC # BLD AUTO: 4.26 10E6/UL (ref 3.8–5.2)
RBC # BLD AUTO: 4.38 10E6/UL (ref 3.8–5.2)
RBC # BLD AUTO: 4.39 10E6/UL (ref 3.8–5.2)
RBC # BLD AUTO: 4.4 10E6/UL (ref 3.8–5.2)
RBC # BLD AUTO: 4.46 10E6/UL (ref 3.8–5.2)
RBC # BLD AUTO: 4.74 10E6/UL (ref 3.8–5.2)
RBC # BLD AUTO: 4.81 10E6/UL (ref 3.8–5.2)
RETICS # AUTO: 0.15 10E6/UL (ref 0.03–0.1)
RETICS/RBC NFR AUTO: 3.6 % (ref 0.5–2)
RSV RNA SPEC NAA+PROBE: NEGATIVE
SARS-COV-2 RNA RESP QL NAA+PROBE: NEGATIVE
SODIUM SERPL-SCNC: 135 MMOL/L (ref 135–145)
SODIUM SERPL-SCNC: 136 MMOL/L (ref 135–145)
SODIUM SERPL-SCNC: 137 MMOL/L (ref 135–145)
SODIUM SERPL-SCNC: 138 MMOL/L (ref 135–145)
SODIUM SERPL-SCNC: 141 MMOL/L (ref 135–145)
STAPHYLOCOCCUS AUREUS: NOT DETECTED
STAPHYLOCOCCUS EPIDERMIDIS: DETECTED
STAPHYLOCOCCUS LUGDUNENSIS: NOT DETECTED
STREPTOCOCCUS AGALACTIAE: NOT DETECTED
STREPTOCOCCUS ANGINOSUS GROUP: NOT DETECTED
STREPTOCOCCUS PNEUMONIAE: NOT DETECTED
STREPTOCOCCUS PYOGENES: NOT DETECTED
STREPTOCOCCUS SPECIES: NOT DETECTED
SYSTOLIC BLOOD PRESSURE - MUSE: 125 MMHG
T AXIS - MUSE: -73 DEGREES
TRICHOMONAS, WET PREP: ABNORMAL
TRIGL SERPL-MCNC: 144 MG/DL
TROPONIN T SERPL HS-MCNC: 118 NG/L
TROPONIN T SERPL HS-MCNC: 128 NG/L
TSH SERPL DL<=0.005 MIU/L-ACNC: 0.15 UIU/ML (ref 0.3–4.2)
TSH SERPL DL<=0.005 MIU/L-ACNC: 1.46 UIU/ML (ref 0.3–4.2)
URATE SERPL-MCNC: 4.2 MG/DL (ref 2.4–5.7)
VANCOMYCIN SERPL-MCNC: 11.3 UG/ML
VANCOMYCIN SERPL-MCNC: 12.9 UG/ML
VENTRICULAR RATE- MUSE: 151 BPM
WBC # BLD AUTO: 14.7 10E3/UL (ref 4–11)
WBC # BLD AUTO: 16.9 10E3/UL (ref 4–11)
WBC # BLD AUTO: 16.9 10E3/UL (ref 4–11)
WBC # BLD AUTO: 17.9 10E3/UL (ref 4–11)
WBC # BLD AUTO: 18.5 10E3/UL (ref 4–11)
WBC # BLD AUTO: 19.1 10E3/UL (ref 4–11)
WBC # BLD AUTO: 19.6 10E3/UL (ref 4–11)
WBC # BLD AUTO: 20.1 10E3/UL (ref 4–11)
WBC # BLD AUTO: 21 10E3/UL (ref 4–11)
WBC # BLD AUTO: 21 10E3/UL (ref 4–11)
WBC # BLD AUTO: 21.3 10E3/UL (ref 4–11)
WBC # BLD AUTO: 9 10E3/UL (ref 4–11)
WBC # FLD AUTO: 3 /UL
WBC'S/HIGH POWER FIELD, WET PREP: ABNORMAL
YEAST, WET PREP: ABNORMAL

## 2024-01-01 PROCEDURE — 36415 COLL VENOUS BLD VENIPUNCTURE: CPT | Performed by: INTERNAL MEDICINE

## 2024-01-01 PROCEDURE — 250N000013 HC RX MED GY IP 250 OP 250 PS 637: Performed by: INTERNAL MEDICINE

## 2024-01-01 PROCEDURE — 64483 NJX AA&/STRD TFRM EPI L/S 1: CPT | Mod: 50 | Performed by: PAIN MEDICINE

## 2024-01-01 PROCEDURE — 250N000009 HC RX 250: Performed by: SURGERY

## 2024-01-01 PROCEDURE — 99222 1ST HOSP IP/OBS MODERATE 55: CPT | Mod: GC | Performed by: SURGERY

## 2024-01-01 PROCEDURE — 80053 COMPREHEN METABOLIC PANEL: CPT | Performed by: STUDENT IN AN ORGANIZED HEALTH CARE EDUCATION/TRAINING PROGRAM

## 2024-01-01 PROCEDURE — 250N000012 HC RX MED GY IP 250 OP 636 PS 637: Performed by: STUDENT IN AN ORGANIZED HEALTH CARE EDUCATION/TRAINING PROGRAM

## 2024-01-01 PROCEDURE — 92526 ORAL FUNCTION THERAPY: CPT | Mod: GN

## 2024-01-01 PROCEDURE — 250N000013 HC RX MED GY IP 250 OP 250 PS 637: Performed by: STUDENT IN AN ORGANIZED HEALTH CARE EDUCATION/TRAINING PROGRAM

## 2024-01-01 PROCEDURE — 36589 REMOVAL TUNNELED CV CATH: CPT

## 2024-01-01 PROCEDURE — 86140 C-REACTIVE PROTEIN: CPT | Performed by: INTERNAL MEDICINE

## 2024-01-01 PROCEDURE — 97530 THERAPEUTIC ACTIVITIES: CPT | Mod: GP

## 2024-01-01 PROCEDURE — 258N000003 HC RX IP 258 OP 636: Performed by: ANESTHESIOLOGY

## 2024-01-01 PROCEDURE — 85027 COMPLETE CBC AUTOMATED: CPT | Performed by: INTERNAL MEDICINE

## 2024-01-01 PROCEDURE — 120N000004 HC R&B MS OVERFLOW

## 2024-01-01 PROCEDURE — 272N000004 HC RX 272: Performed by: INTERNAL MEDICINE

## 2024-01-01 PROCEDURE — 87075 CULTR BACTERIA EXCEPT BLOOD: CPT | Performed by: STUDENT IN AN ORGANIZED HEALTH CARE EDUCATION/TRAINING PROGRAM

## 2024-01-01 PROCEDURE — 85652 RBC SED RATE AUTOMATED: CPT | Performed by: INTERNAL MEDICINE

## 2024-01-01 PROCEDURE — 85018 HEMOGLOBIN: CPT | Performed by: INTERNAL MEDICINE

## 2024-01-01 PROCEDURE — 99233 SBSQ HOSP IP/OBS HIGH 50: CPT | Performed by: STUDENT IN AN ORGANIZED HEALTH CARE EDUCATION/TRAINING PROGRAM

## 2024-01-01 PROCEDURE — 360N000077 HC SURGERY LEVEL 4, PER MIN: Performed by: SURGERY

## 2024-01-01 PROCEDURE — 80048 BASIC METABOLIC PNL TOTAL CA: CPT | Performed by: INTERNAL MEDICINE

## 2024-01-01 PROCEDURE — 250N000011 HC RX IP 250 OP 636: Performed by: INTERNAL MEDICINE

## 2024-01-01 PROCEDURE — 250N000009 HC RX 250: Performed by: NURSE PRACTITIONER

## 2024-01-01 PROCEDURE — 84484 ASSAY OF TROPONIN QUANT: CPT | Performed by: EMERGENCY MEDICINE

## 2024-01-01 PROCEDURE — 84145 PROCALCITONIN (PCT): CPT | Performed by: STUDENT IN AN ORGANIZED HEALTH CARE EDUCATION/TRAINING PROGRAM

## 2024-01-01 PROCEDURE — 87637 SARSCOV2&INF A&B&RSV AMP PRB: CPT | Performed by: EMERGENCY MEDICINE

## 2024-01-01 PROCEDURE — G2211 COMPLEX E/M VISIT ADD ON: HCPCS | Performed by: INTERNAL MEDICINE

## 2024-01-01 PROCEDURE — 93925 LOWER EXTREMITY STUDY: CPT | Mod: 26 | Performed by: SURGERY

## 2024-01-01 PROCEDURE — 250N000013 HC RX MED GY IP 250 OP 250 PS 637: Performed by: SURGERY

## 2024-01-01 PROCEDURE — 99233 SBSQ HOSP IP/OBS HIGH 50: CPT | Performed by: INTERNAL MEDICINE

## 2024-01-01 PROCEDURE — 96367 TX/PROPH/DG ADDL SEQ IV INF: CPT

## 2024-01-01 PROCEDURE — 85045 AUTOMATED RETICULOCYTE COUNT: CPT | Performed by: INTERNAL MEDICINE

## 2024-01-01 PROCEDURE — 72110 X-RAY EXAM L-2 SPINE 4/>VWS: CPT

## 2024-01-01 PROCEDURE — 99232 SBSQ HOSP IP/OBS MODERATE 35: CPT | Performed by: INTERNAL MEDICINE

## 2024-01-01 PROCEDURE — 83880 ASSAY OF NATRIURETIC PEPTIDE: CPT | Performed by: EMERGENCY MEDICINE

## 2024-01-01 PROCEDURE — 87070 CULTURE OTHR SPECIMN AEROBIC: CPT | Performed by: INTERNAL MEDICINE

## 2024-01-01 PROCEDURE — 36415 COLL VENOUS BLD VENIPUNCTURE: CPT | Performed by: STUDENT IN AN ORGANIZED HEALTH CARE EDUCATION/TRAINING PROGRAM

## 2024-01-01 PROCEDURE — 87040 BLOOD CULTURE FOR BACTERIA: CPT | Performed by: INTERNAL MEDICINE

## 2024-01-01 PROCEDURE — 99232 SBSQ HOSP IP/OBS MODERATE 35: CPT | Performed by: STUDENT IN AN ORGANIZED HEALTH CARE EDUCATION/TRAINING PROGRAM

## 2024-01-01 PROCEDURE — 99205 OFFICE O/P NEW HI 60 MIN: CPT | Performed by: NURSE PRACTITIONER

## 2024-01-01 PROCEDURE — 97110 THERAPEUTIC EXERCISES: CPT | Mod: GP

## 2024-01-01 PROCEDURE — 75635 CT ANGIO ABDOMINAL ARTERIES: CPT

## 2024-01-01 PROCEDURE — 36415 COLL VENOUS BLD VENIPUNCTURE: CPT | Performed by: EMERGENCY MEDICINE

## 2024-01-01 PROCEDURE — 80048 BASIC METABOLIC PNL TOTAL CA: CPT | Performed by: EMERGENCY MEDICINE

## 2024-01-01 PROCEDURE — 85048 AUTOMATED LEUKOCYTE COUNT: CPT | Performed by: STUDENT IN AN ORGANIZED HEALTH CARE EDUCATION/TRAINING PROGRAM

## 2024-01-01 PROCEDURE — 84443 ASSAY THYROID STIM HORMONE: CPT | Performed by: INTERNAL MEDICINE

## 2024-01-01 PROCEDURE — 710N000012 HC RECOVERY PHASE 2, PER MINUTE: Performed by: SURGERY

## 2024-01-01 PROCEDURE — 258N000003 HC RX IP 258 OP 636: Performed by: EMERGENCY MEDICINE

## 2024-01-01 PROCEDURE — 250N000011 HC RX IP 250 OP 636: Performed by: STUDENT IN AN ORGANIZED HEALTH CARE EDUCATION/TRAINING PROGRAM

## 2024-01-01 PROCEDURE — 87493 C DIFF AMPLIFIED PROBE: CPT | Performed by: INTERNAL MEDICINE

## 2024-01-01 PROCEDURE — 87040 BLOOD CULTURE FOR BACTERIA: CPT | Performed by: EMERGENCY MEDICINE

## 2024-01-01 PROCEDURE — 82533 TOTAL CORTISOL: CPT | Performed by: INTERNAL MEDICINE

## 2024-01-01 PROCEDURE — 85025 COMPLETE CBC W/AUTO DIFF WBC: CPT | Performed by: EMERGENCY MEDICINE

## 2024-01-01 PROCEDURE — 87077 CULTURE AEROBIC IDENTIFY: CPT | Performed by: EMERGENCY MEDICINE

## 2024-01-01 PROCEDURE — 97163 PT EVAL HIGH COMPLEX 45 MIN: CPT | Mod: GP | Performed by: PHYSICAL THERAPIST

## 2024-01-01 PROCEDURE — 250N000011 HC RX IP 250 OP 636: Performed by: SURGERY

## 2024-01-01 PROCEDURE — 93923 UPR/LXTR ART STDY 3+ LVLS: CPT | Mod: 26 | Performed by: SURGERY

## 2024-01-01 PROCEDURE — 96375 TX/PRO/DX INJ NEW DRUG ADDON: CPT

## 2024-01-01 PROCEDURE — 84443 ASSAY THYROID STIM HORMONE: CPT | Performed by: EMERGENCY MEDICINE

## 2024-01-01 PROCEDURE — 87149 DNA/RNA DIRECT PROBE: CPT | Performed by: EMERGENCY MEDICINE

## 2024-01-01 PROCEDURE — 84100 ASSAY OF PHOSPHORUS: CPT | Performed by: EMERGENCY MEDICINE

## 2024-01-01 PROCEDURE — 97166 OT EVAL MOD COMPLEX 45 MIN: CPT | Mod: GO

## 2024-01-01 PROCEDURE — 89051 BODY FLUID CELL COUNT: CPT | Performed by: INTERNAL MEDICINE

## 2024-01-01 PROCEDURE — 250N000011 HC RX IP 250 OP 636: Performed by: ANESTHESIOLOGY

## 2024-01-01 PROCEDURE — 74230 X-RAY XM SWLNG FUNCJ C+: CPT

## 2024-01-01 PROCEDURE — 11042 DBRDMT SUBQ TIS 1ST 20SQCM/<: CPT | Performed by: PODIATRIST

## 2024-01-01 PROCEDURE — 93925 LOWER EXTREMITY STUDY: CPT

## 2024-01-01 PROCEDURE — 99223 1ST HOSP IP/OBS HIGH 75: CPT | Performed by: STUDENT IN AN ORGANIZED HEALTH CARE EDUCATION/TRAINING PROGRAM

## 2024-01-01 PROCEDURE — 99291 CRITICAL CARE FIRST HOUR: CPT | Mod: 25

## 2024-01-01 PROCEDURE — 80202 ASSAY OF VANCOMYCIN: CPT | Performed by: STUDENT IN AN ORGANIZED HEALTH CARE EDUCATION/TRAINING PROGRAM

## 2024-01-01 PROCEDURE — 84145 PROCALCITONIN (PCT): CPT | Performed by: EMERGENCY MEDICINE

## 2024-01-01 PROCEDURE — 97535 SELF CARE MNGMENT TRAINING: CPT | Mod: GO

## 2024-01-01 PROCEDURE — 99214 OFFICE O/P EST MOD 30 MIN: CPT | Performed by: INTERNAL MEDICINE

## 2024-01-01 PROCEDURE — 85060 BLOOD SMEAR INTERPRETATION: CPT | Performed by: PATHOLOGY

## 2024-01-01 PROCEDURE — 99418 PROLNG IP/OBS E/M EA 15 MIN: CPT | Performed by: CLINICAL NURSE SPECIALIST

## 2024-01-01 PROCEDURE — 99418 PROLNG IP/OBS E/M EA 15 MIN: CPT | Performed by: STUDENT IN AN ORGANIZED HEALTH CARE EDUCATION/TRAINING PROGRAM

## 2024-01-01 PROCEDURE — 370N000017 HC ANESTHESIA TECHNICAL FEE, PER MIN: Performed by: SURGERY

## 2024-01-01 PROCEDURE — 255N000002 HC RX 255 OP 636: Performed by: INTERNAL MEDICINE

## 2024-01-01 PROCEDURE — 80069 RENAL FUNCTION PANEL: CPT | Performed by: STUDENT IN AN ORGANIZED HEALTH CARE EDUCATION/TRAINING PROGRAM

## 2024-01-01 PROCEDURE — 87205 SMEAR GRAM STAIN: CPT | Performed by: INTERNAL MEDICINE

## 2024-01-01 PROCEDURE — 97530 THERAPEUTIC ACTIVITIES: CPT | Mod: GP | Performed by: PHYSICAL THERAPIST

## 2024-01-01 PROCEDURE — 93970 EXTREMITY STUDY: CPT

## 2024-01-01 PROCEDURE — 99232 SBSQ HOSP IP/OBS MODERATE 35: CPT | Performed by: CLINICAL NURSE SPECIALIST

## 2024-01-01 PROCEDURE — 76857 US EXAM PELVIC LIMITED: CPT

## 2024-01-01 PROCEDURE — 97110 THERAPEUTIC EXERCISES: CPT | Mod: GP | Performed by: PHYSICAL THERAPIST

## 2024-01-01 PROCEDURE — 73718 MRI LOWER EXTREMITY W/O DYE: CPT | Mod: RT

## 2024-01-01 PROCEDURE — 82310 ASSAY OF CALCIUM: CPT | Performed by: INTERNAL MEDICINE

## 2024-01-01 PROCEDURE — 85027 COMPLETE CBC AUTOMATED: CPT | Performed by: STUDENT IN AN ORGANIZED HEALTH CARE EDUCATION/TRAINING PROGRAM

## 2024-01-01 PROCEDURE — 96365 THER/PROPH/DIAG IV INF INIT: CPT

## 2024-01-01 PROCEDURE — 250N000009 HC RX 250: Performed by: NURSE ANESTHETIST, CERTIFIED REGISTERED

## 2024-01-01 PROCEDURE — 87210 SMEAR WET MOUNT SALINE/INK: CPT

## 2024-01-01 PROCEDURE — 85025 COMPLETE CBC W/AUTO DIFF WBC: CPT | Performed by: INTERNAL MEDICINE

## 2024-01-01 PROCEDURE — 258N000001 HC RX 258: Performed by: SURGERY

## 2024-01-01 PROCEDURE — C1751 CATH, INF, PER/CENT/MIDLINE: HCPCS | Performed by: SURGERY

## 2024-01-01 PROCEDURE — 3E1M39Z IRRIGATION OF PERITONEAL CAVITY USING DIALYSATE, PERCUTANEOUS APPROACH: ICD-10-PCS | Performed by: INTERNAL MEDICINE

## 2024-01-01 PROCEDURE — 250N000011 HC RX IP 250 OP 636: Performed by: EMERGENCY MEDICINE

## 2024-01-01 PROCEDURE — 93922 UPR/L XTREMITY ART 2 LEVELS: CPT

## 2024-01-01 PROCEDURE — 99152 MOD SED SAME PHYS/QHP 5/>YRS: CPT

## 2024-01-01 PROCEDURE — 99214 OFFICE O/P EST MOD 30 MIN: CPT | Performed by: NURSE PRACTITIONER

## 2024-01-01 PROCEDURE — 83615 LACTATE (LD) (LDH) ENZYME: CPT | Performed by: INTERNAL MEDICINE

## 2024-01-01 PROCEDURE — 80061 LIPID PANEL: CPT | Performed by: INTERNAL MEDICINE

## 2024-01-01 PROCEDURE — 999N000127 HC STATISTIC PERIPHERAL IV START W US GUIDANCE

## 2024-01-01 PROCEDURE — 99223 1ST HOSP IP/OBS HIGH 75: CPT | Performed by: CLINICAL NURSE SPECIALIST

## 2024-01-01 PROCEDURE — 99223 1ST HOSP IP/OBS HIGH 75: CPT | Performed by: INTERNAL MEDICINE

## 2024-01-01 PROCEDURE — 999N000141 HC STATISTIC PRE-PROCEDURE NURSING ASSESSMENT: Performed by: SURGERY

## 2024-01-01 PROCEDURE — 250N000009 HC RX 250: Performed by: ANESTHESIOLOGY

## 2024-01-01 PROCEDURE — 80048 BASIC METABOLIC PNL TOTAL CA: CPT | Performed by: STUDENT IN AN ORGANIZED HEALTH CARE EDUCATION/TRAINING PROGRAM

## 2024-01-01 PROCEDURE — 272N000001 HC OR GENERAL SUPPLY STERILE: Performed by: SURGERY

## 2024-01-01 PROCEDURE — 250N000011 HC RX IP 250 OP 636: Performed by: NURSE PRACTITIONER

## 2024-01-01 PROCEDURE — C1750 CATH, HEMODIALYSIS,LONG-TERM: HCPCS

## 2024-01-01 PROCEDURE — 99214 OFFICE O/P EST MOD 30 MIN: CPT | Mod: 95 | Performed by: NURSE PRACTITIONER

## 2024-01-01 PROCEDURE — 92610 EVALUATE SWALLOWING FUNCTION: CPT | Mod: GN

## 2024-01-01 PROCEDURE — 250N000011 HC RX IP 250 OP 636: Performed by: RADIOLOGY

## 2024-01-01 PROCEDURE — 250N000025 HC SEVOFLURANE, PER MIN: Performed by: SURGERY

## 2024-01-01 PROCEDURE — 80202 ASSAY OF VANCOMYCIN: CPT | Performed by: INTERNAL MEDICINE

## 2024-01-01 PROCEDURE — 258N000003 HC RX IP 258 OP 636: Performed by: NURSE ANESTHETIST, CERTIFIED REGISTERED

## 2024-01-01 PROCEDURE — 99285 EMERGENCY DEPT VISIT HI MDM: CPT | Mod: 25

## 2024-01-01 PROCEDURE — 710N000009 HC RECOVERY PHASE 1, LEVEL 1, PER MIN: Performed by: SURGERY

## 2024-01-01 PROCEDURE — 92611 MOTION FLUOROSCOPY/SWALLOW: CPT | Mod: GN

## 2024-01-01 PROCEDURE — 71250 CT THORAX DX C-: CPT

## 2024-01-01 PROCEDURE — 93923 UPR/LXTR ART STDY 3+ LVLS: CPT

## 2024-01-01 PROCEDURE — 97161 PT EVAL LOW COMPLEX 20 MIN: CPT | Mod: GP

## 2024-01-01 PROCEDURE — 84550 ASSAY OF BLOOD/URIC ACID: CPT | Performed by: INTERNAL MEDICINE

## 2024-01-01 PROCEDURE — 97112 NEUROMUSCULAR REEDUCATION: CPT | Mod: GP

## 2024-01-01 PROCEDURE — 83605 ASSAY OF LACTIC ACID: CPT | Performed by: EMERGENCY MEDICINE

## 2024-01-01 PROCEDURE — 87040 BLOOD CULTURE FOR BACTERIA: CPT | Performed by: STUDENT IN AN ORGANIZED HEALTH CARE EDUCATION/TRAINING PROGRAM

## 2024-01-01 PROCEDURE — 71045 X-RAY EXAM CHEST 1 VIEW: CPT

## 2024-01-01 PROCEDURE — 250N000013 HC RX MED GY IP 250 OP 250 PS 637: Performed by: ANESTHESIOLOGY

## 2024-01-01 PROCEDURE — 99233 SBSQ HOSP IP/OBS HIGH 50: CPT | Performed by: CLINICAL NURSE SPECIALIST

## 2024-01-01 PROCEDURE — 93306 TTE W/DOPPLER COMPLETE: CPT | Mod: 26 | Performed by: INTERNAL MEDICINE

## 2024-01-01 PROCEDURE — 86140 C-REACTIVE PROTEIN: CPT | Performed by: STUDENT IN AN ORGANIZED HEALTH CARE EDUCATION/TRAINING PROGRAM

## 2024-01-01 PROCEDURE — 83735 ASSAY OF MAGNESIUM: CPT | Performed by: EMERGENCY MEDICINE

## 2024-01-01 PROCEDURE — 99239 HOSP IP/OBS DSCHRG MGMT >30: CPT | Performed by: STUDENT IN AN ORGANIZED HEALTH CARE EDUCATION/TRAINING PROGRAM

## 2024-01-01 PROCEDURE — 99222 1ST HOSP IP/OBS MODERATE 55: CPT | Performed by: INTERNAL MEDICINE

## 2024-01-01 PROCEDURE — 96361 HYDRATE IV INFUSION ADD-ON: CPT

## 2024-01-01 PROCEDURE — 83605 ASSAY OF LACTIC ACID: CPT | Performed by: INTERNAL MEDICINE

## 2024-01-01 PROCEDURE — 73630 X-RAY EXAM OF FOOT: CPT | Mod: RT

## 2024-01-01 PROCEDURE — 93005 ELECTROCARDIOGRAM TRACING: CPT | Performed by: EMERGENCY MEDICINE

## 2024-01-01 PROCEDURE — 99212 OFFICE O/P EST SF 10 MIN: CPT | Performed by: PODIATRIST

## 2024-01-01 PROCEDURE — 250N000009 HC RX 250: Performed by: INTERNAL MEDICINE

## 2024-01-01 PROCEDURE — G0463 HOSPITAL OUTPT CLINIC VISIT: HCPCS | Performed by: PODIATRIST

## 2024-01-01 DEVICE — PERITONEAL DIALYSIS CATHETER KIT, CURL CATH, 2 CUFFS
Type: IMPLANTABLE DEVICE | Site: ABDOMEN | Status: FUNCTIONAL
Brand: ARGYLE

## 2024-01-01 RX ORDER — ONDANSETRON 2 MG/ML
4 INJECTION INTRAMUSCULAR; INTRAVENOUS
Status: DISCONTINUED | OUTPATIENT
Start: 2024-01-01 | End: 2024-01-01 | Stop reason: HOSPADM

## 2024-01-01 RX ORDER — METOPROLOL TARTRATE 1 MG/ML
INJECTION, SOLUTION INTRAVENOUS PRN
Status: DISCONTINUED | OUTPATIENT
Start: 2024-01-01 | End: 2024-01-01

## 2024-01-01 RX ORDER — GENTAMICIN SULFATE 1 MG/G
CREAM TOPICAL DAILY PRN
Status: DISCONTINUED | OUTPATIENT
Start: 2024-01-01 | End: 2024-01-01

## 2024-01-01 RX ORDER — LIDOCAINE 40 MG/G
CREAM TOPICAL
Status: ACTIVE | OUTPATIENT
Start: 2024-01-01

## 2024-01-01 RX ORDER — CLOTRIMAZOLE 1 %
CREAM (GRAM) TOPICAL 2 TIMES DAILY
Qty: 30 G | Refills: 0 | Status: SHIPPED | OUTPATIENT
Start: 2024-01-01

## 2024-01-01 RX ORDER — MIDODRINE HYDROCHLORIDE 5 MG/1
5 TABLET ORAL
Status: DISPENSED | OUTPATIENT
Start: 2024-01-01

## 2024-01-01 RX ORDER — MAGNESIUM HYDROXIDE/ALUMINUM HYDROXICE/SIMETHICONE 120; 1200; 1200 MG/30ML; MG/30ML; MG/30ML
30 SUSPENSION ORAL EVERY 4 HOURS PRN
Qty: 769 ML | Refills: 0 | Status: SHIPPED | OUTPATIENT
Start: 2024-01-01

## 2024-01-01 RX ORDER — LANOLIN ALCOHOL/MO/W.PET/CERES
3 CREAM (GRAM) TOPICAL
Status: DISPENSED | OUTPATIENT
Start: 2024-01-01

## 2024-01-01 RX ORDER — FLUMAZENIL 0.1 MG/ML
0.2 INJECTION, SOLUTION INTRAVENOUS
Status: DISCONTINUED | OUTPATIENT
Start: 2024-01-01 | End: 2024-01-01 | Stop reason: HOSPADM

## 2024-01-01 RX ORDER — FOLIC ACID 1 MG/1
1 TABLET ORAL DAILY
Status: DISPENSED | OUTPATIENT
Start: 2024-01-01

## 2024-01-01 RX ORDER — TORSEMIDE 100 MG/1
100 TABLET ORAL DAILY
Status: ON HOLD | COMMUNITY

## 2024-01-01 RX ORDER — POTASSIUM CHLORIDE 1500 MG/1
40 TABLET, EXTENDED RELEASE ORAL DAILY
Status: DISCONTINUED | OUTPATIENT
Start: 2024-01-01 | End: 2024-01-01 | Stop reason: HOSPADM

## 2024-01-01 RX ORDER — CALCIUM CARBONATE 500 MG/1
1000 TABLET, CHEWABLE ORAL 4 TIMES DAILY PRN
Status: DISPENSED | OUTPATIENT
Start: 2024-01-01

## 2024-01-01 RX ORDER — CLOTRIMAZOLE 1 %
CREAM (GRAM) TOPICAL 2 TIMES DAILY
Status: DISPENSED | OUTPATIENT
Start: 2024-01-01

## 2024-01-01 RX ORDER — HYDROMORPHONE HYDROCHLORIDE 2 MG/1
1-2 TABLET ORAL EVERY 4 HOURS PRN
Qty: 10 TABLET | Refills: 0 | Status: SHIPPED | OUTPATIENT
Start: 2024-01-01

## 2024-01-01 RX ORDER — PIPERACILLIN SODIUM, TAZOBACTAM SODIUM 3; .375 G/15ML; G/15ML
3.38 INJECTION, POWDER, LYOPHILIZED, FOR SOLUTION INTRAVENOUS ONCE
Status: COMPLETED | OUTPATIENT
Start: 2024-01-01 | End: 2024-01-01

## 2024-01-01 RX ORDER — FENTANYL CITRATE 50 UG/ML
50 INJECTION, SOLUTION INTRAMUSCULAR; INTRAVENOUS EVERY 5 MIN PRN
Status: DISCONTINUED | OUTPATIENT
Start: 2024-01-01 | End: 2024-01-01 | Stop reason: HOSPADM

## 2024-01-01 RX ORDER — ONDANSETRON 2 MG/ML
4 INJECTION INTRAMUSCULAR; INTRAVENOUS EVERY 6 HOURS PRN
Status: ACTIVE | OUTPATIENT
Start: 2024-01-01

## 2024-01-01 RX ORDER — NALOXONE HYDROCHLORIDE 0.4 MG/ML
0.4 INJECTION, SOLUTION INTRAMUSCULAR; INTRAVENOUS; SUBCUTANEOUS
Status: ACTIVE | OUTPATIENT
Start: 2024-01-01

## 2024-01-01 RX ORDER — TRAMADOL HYDROCHLORIDE 50 MG/1
50 TABLET ORAL 2 TIMES DAILY
Status: DISPENSED | OUTPATIENT
Start: 2024-01-01

## 2024-01-01 RX ORDER — FAMOTIDINE 20 MG/1
20 TABLET, FILM COATED ORAL 2 TIMES DAILY
Status: DISCONTINUED | OUTPATIENT
Start: 2024-01-01 | End: 2024-01-01

## 2024-01-01 RX ORDER — HYDROMORPHONE HCL IN WATER/PF 6 MG/30 ML
0.4 PATIENT CONTROLLED ANALGESIA SYRINGE INTRAVENOUS EVERY 4 HOURS PRN
Status: DISCONTINUED | OUTPATIENT
Start: 2024-01-01 | End: 2024-01-01

## 2024-01-01 RX ORDER — HEPARIN SODIUM 1000 [USP'U]/ML
4200 INJECTION, SOLUTION INTRAVENOUS; SUBCUTANEOUS ONCE
Status: COMPLETED | OUTPATIENT
Start: 2024-01-01 | End: 2024-01-01

## 2024-01-01 RX ORDER — MAGNESIUM HYDROXIDE/ALUMINUM HYDROXICE/SIMETHICONE 120; 1200; 1200 MG/30ML; MG/30ML; MG/30ML
30 SUSPENSION ORAL EVERY 4 HOURS PRN
Status: ACTIVE | OUTPATIENT
Start: 2024-01-01

## 2024-01-01 RX ORDER — GENTAMICIN SULFATE 1 MG/G
OINTMENT TOPICAL PRN
Status: DISCONTINUED | OUTPATIENT
Start: 2024-01-01 | End: 2024-01-01 | Stop reason: HOSPADM

## 2024-01-01 RX ORDER — METOPROLOL SUCCINATE 50 MG/1
50 TABLET, EXTENDED RELEASE ORAL DAILY
Qty: 30 TABLET | Refills: 0 | Status: SHIPPED | OUTPATIENT
Start: 2024-01-01

## 2024-01-01 RX ORDER — OXYCODONE HYDROCHLORIDE 5 MG/1
5 TABLET ORAL ONCE
Status: COMPLETED | OUTPATIENT
Start: 2024-01-01 | End: 2024-01-01

## 2024-01-01 RX ORDER — ACETAMINOPHEN 325 MG/1
975 TABLET ORAL 3 TIMES DAILY PRN
Status: DISPENSED | OUTPATIENT
Start: 2024-01-01

## 2024-01-01 RX ORDER — METOPROLOL TARTRATE 25 MG/1
25 TABLET, FILM COATED ORAL EVERY 6 HOURS
Status: COMPLETED | OUTPATIENT
Start: 2024-01-01 | End: 2024-01-01

## 2024-01-01 RX ORDER — CEFAZOLIN SODIUM/WATER 2 G/20 ML
2 SYRINGE (ML) INTRAVENOUS SEE ADMIN INSTRUCTIONS
Status: DISCONTINUED | OUTPATIENT
Start: 2024-01-01 | End: 2024-01-01 | Stop reason: HOSPADM

## 2024-01-01 RX ORDER — NALOXONE HYDROCHLORIDE 0.4 MG/ML
0.2 INJECTION, SOLUTION INTRAMUSCULAR; INTRAVENOUS; SUBCUTANEOUS
Status: ACTIVE | OUTPATIENT
Start: 2024-01-01

## 2024-01-01 RX ORDER — TORSEMIDE 100 MG/1
100 TABLET ORAL DAILY
Status: DISCONTINUED | OUTPATIENT
Start: 2024-01-01 | End: 2024-01-01

## 2024-01-01 RX ORDER — AMOXICILLIN 250 MG
1 CAPSULE ORAL 2 TIMES DAILY PRN
Status: ACTIVE | OUTPATIENT
Start: 2024-01-01

## 2024-01-01 RX ORDER — ONDANSETRON 2 MG/ML
4 INJECTION INTRAMUSCULAR; INTRAVENOUS EVERY 30 MIN PRN
Status: DISCONTINUED | OUTPATIENT
Start: 2024-01-01 | End: 2024-01-01 | Stop reason: HOSPADM

## 2024-01-01 RX ORDER — DEXAMETHASONE SODIUM PHOSPHATE 10 MG/ML
INJECTION, SOLUTION INTRAMUSCULAR; INTRAVENOUS
Status: COMPLETED | OUTPATIENT
Start: 2024-01-01 | End: 2024-01-01

## 2024-01-01 RX ORDER — DEXAMETHASONE SODIUM PHOSPHATE 10 MG/ML
INJECTION, SOLUTION INTRAMUSCULAR; INTRAVENOUS PRN
Status: DISCONTINUED | OUTPATIENT
Start: 2024-01-01 | End: 2024-01-01

## 2024-01-01 RX ORDER — AMOXICILLIN 250 MG
2 CAPSULE ORAL 2 TIMES DAILY PRN
Status: ACTIVE | OUTPATIENT
Start: 2024-01-01

## 2024-01-01 RX ORDER — LIDOCAINE HYDROCHLORIDE 10 MG/ML
INJECTION, SOLUTION INFILTRATION; PERINEURAL PRN
Status: DISCONTINUED | OUTPATIENT
Start: 2024-01-01 | End: 2024-01-01

## 2024-01-01 RX ORDER — ONDANSETRON 4 MG/1
4 TABLET, ORALLY DISINTEGRATING ORAL EVERY 6 HOURS PRN
Status: ACTIVE | OUTPATIENT
Start: 2024-01-01

## 2024-01-01 RX ORDER — HYDROMORPHONE HYDROCHLORIDE 1 MG/ML
0.4 INJECTION, SOLUTION INTRAMUSCULAR; INTRAVENOUS; SUBCUTANEOUS EVERY 5 MIN PRN
Status: DISCONTINUED | OUTPATIENT
Start: 2024-01-01 | End: 2024-01-01 | Stop reason: HOSPADM

## 2024-01-01 RX ORDER — HYDROCODONE BITARTRATE AND ACETAMINOPHEN 5; 325 MG/1; MG/1
1-2 TABLET ORAL EVERY 4 HOURS PRN
Qty: 20 TABLET | Refills: 0 | Status: SHIPPED | OUTPATIENT
Start: 2024-01-01 | End: 2024-01-01

## 2024-01-01 RX ORDER — NALOXONE HYDROCHLORIDE 0.4 MG/ML
0.4 INJECTION, SOLUTION INTRAMUSCULAR; INTRAVENOUS; SUBCUTANEOUS
Status: DISCONTINUED | OUTPATIENT
Start: 2024-01-01 | End: 2024-01-01 | Stop reason: HOSPADM

## 2024-01-01 RX ORDER — METOPROLOL TARTRATE 25 MG/1
50 TABLET, FILM COATED ORAL 2 TIMES DAILY
Status: DISCONTINUED | OUTPATIENT
Start: 2024-01-01 | End: 2024-01-01

## 2024-01-01 RX ORDER — FUROSEMIDE 40 MG
20 TABLET ORAL EVERY OTHER DAY
COMMUNITY
Start: 2024-01-01 | End: 2024-01-01

## 2024-01-01 RX ORDER — LIDOCAINE HYDROCHLORIDE 10 MG/ML
INJECTION, SOLUTION EPIDURAL; INFILTRATION; INTRACAUDAL; PERINEURAL
Status: COMPLETED | OUTPATIENT
Start: 2024-01-01 | End: 2024-01-01

## 2024-01-01 RX ORDER — HYDROMORPHONE HCL IN WATER/PF 6 MG/30 ML
0.2 PATIENT CONTROLLED ANALGESIA SYRINGE INTRAVENOUS ONCE
Status: COMPLETED | OUTPATIENT
Start: 2024-01-01 | End: 2024-01-01

## 2024-01-01 RX ORDER — ONDANSETRON 4 MG/1
4 TABLET, ORALLY DISINTEGRATING ORAL EVERY 30 MIN PRN
Status: DISCONTINUED | OUTPATIENT
Start: 2024-01-01 | End: 2024-01-01 | Stop reason: HOSPADM

## 2024-01-01 RX ORDER — CEFAZOLIN SODIUM/WATER 2 G/20 ML
2 SYRINGE (ML) INTRAVENOUS
Status: COMPLETED | OUTPATIENT
Start: 2024-01-01 | End: 2024-01-01

## 2024-01-01 RX ORDER — NALOXONE HYDROCHLORIDE 0.4 MG/ML
0.2 INJECTION, SOLUTION INTRAMUSCULAR; INTRAVENOUS; SUBCUTANEOUS
Status: DISCONTINUED | OUTPATIENT
Start: 2024-01-01 | End: 2024-01-01 | Stop reason: HOSPADM

## 2024-01-01 RX ORDER — MUPIROCIN 20 MG/G
OINTMENT TOPICAL DAILY
Status: DISPENSED | OUTPATIENT
Start: 2024-01-01

## 2024-01-01 RX ORDER — FENTANYL CITRATE 50 UG/ML
INJECTION, SOLUTION INTRAMUSCULAR; INTRAVENOUS PRN
Status: DISCONTINUED | OUTPATIENT
Start: 2024-01-01 | End: 2024-01-01

## 2024-01-01 RX ORDER — HYDROMORPHONE HYDROCHLORIDE 2 MG/1
2 TABLET ORAL EVERY 4 HOURS PRN
Status: ACTIVE | OUTPATIENT
Start: 2024-01-01

## 2024-01-01 RX ORDER — GENTAMICIN SULFATE 1 MG/G
CREAM TOPICAL DAILY PRN
Status: ACTIVE | OUTPATIENT
Start: 2024-01-01

## 2024-01-01 RX ORDER — POLYETHYLENE GLYCOL 3350 17 G/17G
17 POWDER, FOR SOLUTION ORAL DAILY PRN
Qty: 510 G | Refills: 0 | Status: SHIPPED | OUTPATIENT
Start: 2024-01-01

## 2024-01-01 RX ORDER — POTASSIUM CHLORIDE 1500 MG/1
40 TABLET, EXTENDED RELEASE ORAL DAILY
Qty: 60 TABLET | Refills: 0 | Status: SHIPPED | OUTPATIENT
Start: 2024-01-01

## 2024-01-01 RX ORDER — LIDOCAINE 40 MG/G
CREAM TOPICAL
Status: DISCONTINUED | OUTPATIENT
Start: 2024-01-01 | End: 2024-01-01 | Stop reason: HOSPADM

## 2024-01-01 RX ORDER — FENTANYL CITRATE 50 UG/ML
25-50 INJECTION, SOLUTION INTRAMUSCULAR; INTRAVENOUS EVERY 5 MIN PRN
Status: DISCONTINUED | OUTPATIENT
Start: 2024-01-01 | End: 2024-01-01 | Stop reason: HOSPADM

## 2024-01-01 RX ORDER — SPIRONOLACTONE 25 MG/1
25 TABLET ORAL DAILY
Status: ON HOLD | COMMUNITY

## 2024-01-01 RX ORDER — PROPOFOL 10 MG/ML
INJECTION, EMULSION INTRAVENOUS PRN
Status: DISCONTINUED | OUTPATIENT
Start: 2024-01-01 | End: 2024-01-01

## 2024-01-01 RX ORDER — FAMOTIDINE 20 MG/1
20 TABLET, FILM COATED ORAL 2 TIMES DAILY
Status: ON HOLD | COMMUNITY

## 2024-01-01 RX ORDER — TRAMADOL HYDROCHLORIDE 50 MG/1
50 TABLET ORAL EVERY 8 HOURS PRN
Qty: 30 TABLET | Refills: 1 | Status: SHIPPED | OUTPATIENT
Start: 2024-01-01 | End: 2024-01-01

## 2024-01-01 RX ORDER — MIDODRINE HYDROCHLORIDE 5 MG/1
5 TABLET ORAL
Qty: 60 TABLET | Refills: 0 | Status: SHIPPED | OUTPATIENT
Start: 2024-01-01

## 2024-01-01 RX ORDER — PREDNISONE 5 MG/1
5-10 TABLET ORAL DAILY
Qty: 60 TABLET | Refills: 11 | Status: SHIPPED | OUTPATIENT
Start: 2024-01-01 | End: 2024-01-01

## 2024-01-01 RX ORDER — RESPIRATORY SYNCYTIAL VIRUS VACCINE 120MCG/0.5
0.5 KIT INTRAMUSCULAR ONCE
Qty: 1 EACH | Refills: 0 | Status: CANCELLED | OUTPATIENT
Start: 2024-01-01 | End: 2024-01-01

## 2024-01-01 RX ORDER — GENTAMICIN SULFATE 1 MG/G
CREAM TOPICAL DAILY
Status: DISPENSED | OUTPATIENT
Start: 2024-01-01

## 2024-01-01 RX ORDER — GUAIFENESIN 200 MG/10ML
200 LIQUID ORAL EVERY 6 HOURS PRN
Status: DISCONTINUED | OUTPATIENT
Start: 2024-01-01 | End: 2024-01-01

## 2024-01-01 RX ORDER — FAMOTIDINE 20 MG/1
20 TABLET, FILM COATED ORAL DAILY
Status: DISCONTINUED | OUTPATIENT
Start: 2024-01-01 | End: 2024-01-01

## 2024-01-01 RX ORDER — HYDROMORPHONE HCL IN WATER/PF 6 MG/30 ML
0.2 PATIENT CONTROLLED ANALGESIA SYRINGE INTRAVENOUS EVERY 4 HOURS PRN
Status: DISCONTINUED | OUTPATIENT
Start: 2024-01-01 | End: 2024-01-01

## 2024-01-01 RX ORDER — PREDNISONE 10 MG/1
10 TABLET ORAL EVERY MORNING
Qty: 20 TABLET | Refills: 0 | Status: SHIPPED | OUTPATIENT
Start: 2024-01-01 | End: 2024-01-01

## 2024-01-01 RX ORDER — HEPARIN SODIUM 5000 [USP'U]/.5ML
5000 INJECTION, SOLUTION INTRAVENOUS; SUBCUTANEOUS EVERY 8 HOURS
Status: DISCONTINUED | OUTPATIENT
Start: 2024-01-01 | End: 2024-01-01

## 2024-01-01 RX ORDER — PREDNISONE 10 MG/1
10 TABLET ORAL EVERY MORNING
Qty: 10 TABLET | Refills: 0 | Status: SHIPPED | OUTPATIENT
Start: 2024-01-01 | End: 2024-01-01

## 2024-01-01 RX ORDER — CEFAZOLIN SODIUM 1 G/50ML
1250 SOLUTION INTRAVENOUS ONCE
Status: DISCONTINUED | OUTPATIENT
Start: 2024-01-01 | End: 2024-01-01 | Stop reason: DRUGHIGH

## 2024-01-01 RX ORDER — SPIRONOLACTONE 25 MG/1
25 TABLET ORAL DAILY
Status: DISCONTINUED | OUTPATIENT
Start: 2024-01-01 | End: 2024-01-01

## 2024-01-01 RX ORDER — GENTAMICIN SULFATE 1 MG/G
OINTMENT TOPICAL ONCE
Status: DISCONTINUED | OUTPATIENT
Start: 2024-01-01 | End: 2024-01-01 | Stop reason: HOSPADM

## 2024-01-01 RX ORDER — SODIUM CHLORIDE, SODIUM LACTATE, POTASSIUM CHLORIDE, CALCIUM CHLORIDE 600; 310; 30; 20 MG/100ML; MG/100ML; MG/100ML; MG/100ML
INJECTION, SOLUTION INTRAVENOUS CONTINUOUS
Status: DISCONTINUED | OUTPATIENT
Start: 2024-01-01 | End: 2024-01-01 | Stop reason: HOSPADM

## 2024-01-01 RX ORDER — POLYETHYLENE GLYCOL 3350 17 G/17G
17 POWDER, FOR SOLUTION ORAL 2 TIMES DAILY PRN
Status: ACTIVE | OUTPATIENT
Start: 2024-01-01

## 2024-01-01 RX ORDER — DILTIAZEM HYDROCHLORIDE 5 MG/ML
10 INJECTION INTRAVENOUS ONCE
Status: COMPLETED | OUTPATIENT
Start: 2024-01-01 | End: 2024-01-01

## 2024-01-01 RX ORDER — ACETAMINOPHEN 325 MG/1
975 TABLET ORAL 3 TIMES DAILY PRN
Qty: 30 TABLET | Refills: 1 | Status: SHIPPED | OUTPATIENT
Start: 2024-01-01

## 2024-01-01 RX ORDER — MUPIROCIN CALCIUM 20 MG/G
CREAM TOPICAL
COMMUNITY
Start: 2024-01-01 | End: 2024-01-01

## 2024-01-01 RX ORDER — POTASSIUM CHLORIDE 1500 MG/1
20 TABLET, EXTENDED RELEASE ORAL DAILY
Status: DISPENSED | OUTPATIENT
Start: 2024-01-01

## 2024-01-01 RX ORDER — SODIUM CHLORIDE, SODIUM LACTATE, POTASSIUM CHLORIDE, CALCIUM CHLORIDE 600; 310; 30; 20 MG/100ML; MG/100ML; MG/100ML; MG/100ML
INJECTION, SOLUTION INTRAVENOUS CONTINUOUS
Status: DISCONTINUED | OUTPATIENT
Start: 2024-01-01 | End: 2024-01-01

## 2024-01-01 RX ORDER — HYDROMORPHONE HYDROCHLORIDE 1 MG/ML
0.2 INJECTION, SOLUTION INTRAMUSCULAR; INTRAVENOUS; SUBCUTANEOUS EVERY 5 MIN PRN
Status: DISCONTINUED | OUTPATIENT
Start: 2024-01-01 | End: 2024-01-01 | Stop reason: HOSPADM

## 2024-01-01 RX ORDER — CEFAZOLIN SODIUM/WATER 2 G/20 ML
2 SYRINGE (ML) INTRAVENOUS ONCE
Status: COMPLETED | OUTPATIENT
Start: 2024-01-01 | End: 2024-01-01

## 2024-01-01 RX ORDER — FENTANYL CITRATE 50 UG/ML
25 INJECTION, SOLUTION INTRAMUSCULAR; INTRAVENOUS EVERY 5 MIN PRN
Status: DISCONTINUED | OUTPATIENT
Start: 2024-01-01 | End: 2024-01-01 | Stop reason: HOSPADM

## 2024-01-01 RX ORDER — NALOXONE HYDROCHLORIDE 0.4 MG/ML
0.1 INJECTION, SOLUTION INTRAMUSCULAR; INTRAVENOUS; SUBCUTANEOUS
Status: DISCONTINUED | OUTPATIENT
Start: 2024-01-01 | End: 2024-01-01 | Stop reason: HOSPADM

## 2024-01-01 RX ORDER — BARIUM SULFATE 400 MG/ML
SUSPENSION ORAL ONCE
Status: COMPLETED | OUTPATIENT
Start: 2024-01-01 | End: 2024-01-01

## 2024-01-01 RX ORDER — MAGNESIUM OXIDE 400 MG/1
400 TABLET ORAL DAILY
Status: DISPENSED | OUTPATIENT
Start: 2024-01-01

## 2024-01-01 RX ORDER — PIPERACILLIN SODIUM, TAZOBACTAM SODIUM 3; .375 G/15ML; G/15ML
3.38 INJECTION, POWDER, LYOPHILIZED, FOR SOLUTION INTRAVENOUS EVERY 12 HOURS
Status: DISCONTINUED | OUTPATIENT
Start: 2024-01-01 | End: 2024-01-01

## 2024-01-01 RX ORDER — DILTIAZEM HCL/D5W 125 MG/125
5-15 PLASTIC BAG, INJECTION (ML) INTRAVENOUS CONTINUOUS
Status: DISCONTINUED | OUTPATIENT
Start: 2024-01-01 | End: 2024-01-01

## 2024-01-01 RX ORDER — FAMOTIDINE 20 MG/1
20 TABLET, FILM COATED ORAL EVERY OTHER DAY
Status: DISPENSED | OUTPATIENT
Start: 2024-01-01

## 2024-01-01 RX ORDER — MIDODRINE HYDROCHLORIDE 2.5 MG/1
2.5 TABLET ORAL
Status: DISCONTINUED | OUTPATIENT
Start: 2024-01-01 | End: 2024-01-01

## 2024-01-01 RX ORDER — IOPAMIDOL 755 MG/ML
90 INJECTION, SOLUTION INTRAVASCULAR ONCE
Status: COMPLETED | OUTPATIENT
Start: 2024-01-01 | End: 2024-01-01

## 2024-01-01 RX ORDER — METOPROLOL SUCCINATE 50 MG/1
50 TABLET, EXTENDED RELEASE ORAL DAILY
Status: DISPENSED | OUTPATIENT
Start: 2024-01-01

## 2024-01-01 RX ORDER — PREDNISONE 5 MG/1
5 TABLET ORAL 2 TIMES DAILY
Status: DISPENSED | OUTPATIENT
Start: 2024-01-01

## 2024-01-01 RX ORDER — ONDANSETRON 2 MG/ML
INJECTION INTRAMUSCULAR; INTRAVENOUS PRN
Status: DISCONTINUED | OUTPATIENT
Start: 2024-01-01 | End: 2024-01-01

## 2024-01-01 RX ORDER — TRAMADOL HYDROCHLORIDE 50 MG/1
50 TABLET ORAL 2 TIMES DAILY
Status: ON HOLD | COMMUNITY
Start: 2024-01-01

## 2024-01-01 RX ORDER — PREDNISONE 5 MG/1
5 TABLET ORAL 2 TIMES DAILY
Status: ON HOLD | COMMUNITY

## 2024-01-01 RX ADMIN — SUGAMMADEX 150 MG: 100 INJECTION, SOLUTION INTRAVENOUS at 10:18

## 2024-01-01 RX ADMIN — CARBIDOPA AND LEVODOPA 2.5 MG: 50; 200 TABLET, EXTENDED RELEASE ORAL at 17:22

## 2024-01-01 RX ADMIN — PERFLUTREN 2 ML: 6.52 INJECTION, SUSPENSION INTRAVENOUS at 07:45

## 2024-01-01 RX ADMIN — TORSEMIDE 100 MG: 100 TABLET ORAL at 09:23

## 2024-01-01 RX ADMIN — APIXABAN 2.5 MG: 2.5 TABLET, FILM COATED ORAL at 20:28

## 2024-01-01 RX ADMIN — TRAMADOL HYDROCHLORIDE 50 MG: 50 TABLET, COATED ORAL at 19:03

## 2024-01-01 RX ADMIN — HYDROMORPHONE HYDROCHLORIDE 2 MG: 2 TABLET ORAL at 18:06

## 2024-01-01 RX ADMIN — CLOTRIMAZOLE: 1 CREAM TOPICAL at 08:47

## 2024-01-01 RX ADMIN — FOLIC ACID 1 MG: 1 TABLET ORAL at 08:42

## 2024-01-01 RX ADMIN — CLOTRIMAZOLE: 1 CREAM TOPICAL at 10:04

## 2024-01-01 RX ADMIN — HYDROMORPHONE HYDROCHLORIDE 2 MG: 2 TABLET ORAL at 12:19

## 2024-01-01 RX ADMIN — SODIUM CHLORIDE 1500 MG: 9 INJECTION, SOLUTION INTRAVENOUS at 12:29

## 2024-01-01 RX ADMIN — PREDNISONE 5 MG: 5 TABLET ORAL at 09:52

## 2024-01-01 RX ADMIN — MAGNESIUM OXIDE TAB 400 MG (241.3 MG ELEMENTAL MG) 400 MG: 400 (241.3 MG) TAB at 08:36

## 2024-01-01 RX ADMIN — LIDOCAINE HYDROCHLORIDE 20 ML: 10 INJECTION, SOLUTION INFILTRATION; PERINEURAL at 10:19

## 2024-01-01 RX ADMIN — TRAMADOL HYDROCHLORIDE 50 MG: 50 TABLET, COATED ORAL at 09:53

## 2024-01-01 RX ADMIN — PREDNISONE 5 MG: 5 TABLET ORAL at 19:03

## 2024-01-01 RX ADMIN — HYDROMORPHONE HYDROCHLORIDE 1 MG: 2 TABLET ORAL at 10:35

## 2024-01-01 RX ADMIN — MUPIROCIN: 20 OINTMENT TOPICAL at 09:22

## 2024-01-01 RX ADMIN — CLOTRIMAZOLE: 1 CREAM TOPICAL at 20:01

## 2024-01-01 RX ADMIN — CLOTRIMAZOLE: 1 CREAM TOPICAL at 21:45

## 2024-01-01 RX ADMIN — FOLIC ACID 1 MG: 1 TABLET ORAL at 09:27

## 2024-01-01 RX ADMIN — CLOTRIMAZOLE: 1 CREAM TOPICAL at 10:06

## 2024-01-01 RX ADMIN — Medication 3 MG: at 23:51

## 2024-01-01 RX ADMIN — MAGNESIUM OXIDE TAB 400 MG (241.3 MG ELEMENTAL MG) 400 MG: 400 (241.3 MG) TAB at 09:28

## 2024-01-01 RX ADMIN — TRAMADOL HYDROCHLORIDE 50 MG: 50 TABLET, COATED ORAL at 20:05

## 2024-01-01 RX ADMIN — PROPOFOL 50 MG: 10 INJECTION, EMULSION INTRAVENOUS at 09:09

## 2024-01-01 RX ADMIN — ACETAMINOPHEN 325 MG: 325 TABLET ORAL at 00:35

## 2024-01-01 RX ADMIN — FENTANYL CITRATE 50 MCG: 50 INJECTION, SOLUTION INTRAMUSCULAR; INTRAVENOUS at 10:17

## 2024-01-01 RX ADMIN — DEXAMETHASONE SODIUM PHOSPHATE 20 MG: 10 INJECTION, SOLUTION INTRAMUSCULAR; INTRAVENOUS at 14:59

## 2024-01-01 RX ADMIN — GENTAMICIN SULFATE: 1 CREAM TOPICAL at 08:41

## 2024-01-01 RX ADMIN — CLOTRIMAZOLE: 1 CREAM TOPICAL at 20:26

## 2024-01-01 RX ADMIN — PROPOFOL 50 MG: 10 INJECTION, EMULSION INTRAVENOUS at 09:02

## 2024-01-01 RX ADMIN — METOPROLOL SUCCINATE 50 MG: 50 TABLET, EXTENDED RELEASE ORAL at 16:49

## 2024-01-01 RX ADMIN — TRAMADOL HYDROCHLORIDE 50 MG: 50 TABLET, COATED ORAL at 09:01

## 2024-01-01 RX ADMIN — CARBIDOPA AND LEVODOPA 2.5 MG: 50; 200 TABLET, EXTENDED RELEASE ORAL at 12:50

## 2024-01-01 RX ADMIN — FOLIC ACID 1 MG: 1 TABLET ORAL at 09:03

## 2024-01-01 RX ADMIN — TRAMADOL HYDROCHLORIDE 50 MG: 50 TABLET, COATED ORAL at 19:52

## 2024-01-01 RX ADMIN — CLOTRIMAZOLE: 1 CREAM TOPICAL at 22:07

## 2024-01-01 RX ADMIN — HEPARIN SODIUM: 1000 INJECTION INTRAVENOUS; SUBCUTANEOUS at 20:42

## 2024-01-01 RX ADMIN — TRAMADOL HYDROCHLORIDE 50 MG: 50 TABLET, COATED ORAL at 07:20

## 2024-01-01 RX ADMIN — APIXABAN 2.5 MG: 2.5 TABLET, FILM COATED ORAL at 09:30

## 2024-01-01 RX ADMIN — METOPROLOL SUCCINATE 50 MG: 50 TABLET, EXTENDED RELEASE ORAL at 09:02

## 2024-01-01 RX ADMIN — TRAMADOL HYDROCHLORIDE 50 MG: 50 TABLET, COATED ORAL at 20:36

## 2024-01-01 RX ADMIN — MAGNESIUM OXIDE TAB 400 MG (241.3 MG ELEMENTAL MG) 400 MG: 400 (241.3 MG) TAB at 08:05

## 2024-01-01 RX ADMIN — CLOTRIMAZOLE: 1 CREAM TOPICAL at 20:36

## 2024-01-01 RX ADMIN — CLOTRIMAZOLE: 1 CREAM TOPICAL at 21:20

## 2024-01-01 RX ADMIN — ROCURONIUM BROMIDE 40 MG: 50 INJECTION, SOLUTION INTRAVENOUS at 08:57

## 2024-01-01 RX ADMIN — APIXABAN 2.5 MG: 2.5 TABLET, FILM COATED ORAL at 20:23

## 2024-01-01 RX ADMIN — CARBIDOPA AND LEVODOPA 2.5 MG: 50; 200 TABLET, EXTENDED RELEASE ORAL at 09:51

## 2024-01-01 RX ADMIN — HYDROMORPHONE HYDROCHLORIDE 1 MG: 2 TABLET ORAL at 16:09

## 2024-01-01 RX ADMIN — APIXABAN 2.5 MG: 2.5 TABLET, FILM COATED ORAL at 19:03

## 2024-01-01 RX ADMIN — FAMOTIDINE 20 MG: 20 TABLET ORAL at 08:42

## 2024-01-01 RX ADMIN — CALCIUM CARBONATE (ANTACID) CHEW TAB 500 MG 1000 MG: 500 CHEW TAB at 19:58

## 2024-01-01 RX ADMIN — TRAMADOL HYDROCHLORIDE 50 MG: 50 TABLET, COATED ORAL at 09:52

## 2024-01-01 RX ADMIN — POTASSIUM CHLORIDE 20 MEQ: 1500 TABLET, EXTENDED RELEASE ORAL at 16:09

## 2024-01-01 RX ADMIN — CLOTRIMAZOLE: 1 CREAM TOPICAL at 14:26

## 2024-01-01 RX ADMIN — CLOTRIMAZOLE: 1 CREAM TOPICAL at 10:17

## 2024-01-01 RX ADMIN — METOPROLOL TARTRATE 25 MG: 25 TABLET, FILM COATED ORAL at 16:08

## 2024-01-01 RX ADMIN — MAGNESIUM OXIDE TAB 400 MG (241.3 MG ELEMENTAL MG) 400 MG: 400 (241.3 MG) TAB at 09:01

## 2024-01-01 RX ADMIN — MUPIROCIN: 20 OINTMENT TOPICAL at 09:00

## 2024-01-01 RX ADMIN — MUPIROCIN: 20 OINTMENT TOPICAL at 10:06

## 2024-01-01 RX ADMIN — APIXABAN 2.5 MG: 2.5 TABLET, FILM COATED ORAL at 20:05

## 2024-01-01 RX ADMIN — HYDROMORPHONE HYDROCHLORIDE 1 MG: 2 TABLET ORAL at 23:53

## 2024-01-01 RX ADMIN — Medication 5 MG/HR: at 14:20

## 2024-01-01 RX ADMIN — POTASSIUM CHLORIDE 40 MEQ: 1500 TABLET, EXTENDED RELEASE ORAL at 09:11

## 2024-01-01 RX ADMIN — APIXABAN 2.5 MG: 2.5 TABLET, FILM COATED ORAL at 08:05

## 2024-01-01 RX ADMIN — ACETAMINOPHEN 975 MG: 325 TABLET ORAL at 13:44

## 2024-01-01 RX ADMIN — MAGNESIUM OXIDE TAB 400 MG (241.3 MG ELEMENTAL MG) 400 MG: 400 (241.3 MG) TAB at 08:59

## 2024-01-01 RX ADMIN — ANORECTAL OINTMENT: 15.7; .44; 24; 20.6 OINTMENT TOPICAL at 02:37

## 2024-01-01 RX ADMIN — MIDAZOLAM HYDROCHLORIDE 0.5 MG: 1 INJECTION, SOLUTION INTRAMUSCULAR; INTRAVENOUS at 10:18

## 2024-01-01 RX ADMIN — TRAMADOL HYDROCHLORIDE 50 MG: 50 TABLET, COATED ORAL at 09:29

## 2024-01-01 RX ADMIN — HYDROMORPHONE HYDROCHLORIDE 2 MG: 2 TABLET ORAL at 11:10

## 2024-01-01 RX ADMIN — TRAMADOL HYDROCHLORIDE 50 MG: 50 TABLET, COATED ORAL at 20:23

## 2024-01-01 RX ADMIN — PREDNISONE 5 MG: 5 TABLET ORAL at 08:06

## 2024-01-01 RX ADMIN — MIDODRINE HYDROCHLORIDE 5 MG: 5 TABLET ORAL at 12:20

## 2024-01-01 RX ADMIN — PREDNISONE 5 MG: 5 TABLET ORAL at 19:55

## 2024-01-01 RX ADMIN — TRAMADOL HYDROCHLORIDE 50 MG: 50 TABLET, COATED ORAL at 20:00

## 2024-01-01 RX ADMIN — METOPROLOL SUCCINATE 50 MG: 50 TABLET, EXTENDED RELEASE ORAL at 09:26

## 2024-01-01 RX ADMIN — DEXAMETHASONE SODIUM PHOSPHATE 20 MG: 10 INJECTION, SOLUTION INTRAMUSCULAR; INTRAVENOUS at 15:52

## 2024-01-01 RX ADMIN — Medication 2 G: at 10:10

## 2024-01-01 RX ADMIN — MUPIROCIN: 20 OINTMENT TOPICAL at 14:30

## 2024-01-01 RX ADMIN — HYDROMORPHONE HYDROCHLORIDE 0.5 MG: 1 INJECTION, SOLUTION INTRAMUSCULAR; INTRAVENOUS; SUBCUTANEOUS at 09:42

## 2024-01-01 RX ADMIN — MIDODRINE HYDROCHLORIDE 5 MG: 5 TABLET ORAL at 09:36

## 2024-01-01 RX ADMIN — SODIUM CHLORIDE 500 ML: 9 INJECTION, SOLUTION INTRAVENOUS at 11:13

## 2024-01-01 RX ADMIN — METOPROLOL TARTRATE 2 MG: 5 INJECTION INTRAVENOUS at 09:02

## 2024-01-01 RX ADMIN — Medication 60 ML: at 12:27

## 2024-01-01 RX ADMIN — DEXMEDETOMIDINE HYDROCHLORIDE 12 MCG: 100 INJECTION, SOLUTION INTRAVENOUS at 08:42

## 2024-01-01 RX ADMIN — PREDNISONE 5 MG: 5 TABLET ORAL at 20:00

## 2024-01-01 RX ADMIN — ONDANSETRON 4 MG: 2 INJECTION INTRAMUSCULAR; INTRAVENOUS at 09:49

## 2024-01-01 RX ADMIN — APIXABAN 2.5 MG: 2.5 TABLET, FILM COATED ORAL at 20:00

## 2024-01-01 RX ADMIN — METOPROLOL SUCCINATE 50 MG: 50 TABLET, EXTENDED RELEASE ORAL at 09:00

## 2024-01-01 RX ADMIN — FENTANYL CITRATE 25 MCG: 50 INJECTION, SOLUTION INTRAMUSCULAR; INTRAVENOUS at 10:31

## 2024-01-01 RX ADMIN — SPIRONOLACTONE 25 MG: 25 TABLET, FILM COATED ORAL at 08:59

## 2024-01-01 RX ADMIN — METOPROLOL TARTRATE 25 MG: 25 TABLET, FILM COATED ORAL at 20:28

## 2024-01-01 RX ADMIN — PREDNISONE 5 MG: 5 TABLET ORAL at 20:05

## 2024-01-01 RX ADMIN — TRAMADOL HYDROCHLORIDE 50 MG: 50 TABLET, COATED ORAL at 19:55

## 2024-01-01 RX ADMIN — ANORECTAL OINTMENT: 15.7; .44; 24; 20.6 OINTMENT TOPICAL at 10:17

## 2024-01-01 RX ADMIN — SODIUM CHLORIDE, POTASSIUM CHLORIDE, SODIUM LACTATE AND CALCIUM CHLORIDE 100 ML/HR: 600; 310; 30; 20 INJECTION, SOLUTION INTRAVENOUS at 07:54

## 2024-01-01 RX ADMIN — PIPERACILLIN AND TAZOBACTAM 3.38 G: 3; .375 INJECTION, POWDER, FOR SOLUTION INTRAVENOUS at 20:23

## 2024-01-01 RX ADMIN — METOPROLOL TARTRATE 25 MG: 25 TABLET, FILM COATED ORAL at 22:06

## 2024-01-01 RX ADMIN — PREDNISONE 5 MG: 5 TABLET ORAL at 19:34

## 2024-01-01 RX ADMIN — APIXABAN 2.5 MG: 2.5 TABLET, FILM COATED ORAL at 09:53

## 2024-01-01 RX ADMIN — METOPROLOL TARTRATE 2 MG: 5 INJECTION INTRAVENOUS at 09:31

## 2024-01-01 RX ADMIN — FAMOTIDINE 20 MG: 20 TABLET ORAL at 20:27

## 2024-01-01 RX ADMIN — PIPERACILLIN AND TAZOBACTAM 3.38 G: 3; .375 INJECTION, POWDER, FOR SOLUTION INTRAVENOUS at 08:11

## 2024-01-01 RX ADMIN — HYDROMORPHONE HYDROCHLORIDE 0.2 MG: 0.2 INJECTION, SOLUTION INTRAMUSCULAR; INTRAVENOUS; SUBCUTANEOUS at 14:31

## 2024-01-01 RX ADMIN — PREDNISONE 5 MG: 5 TABLET ORAL at 20:36

## 2024-01-01 RX ADMIN — GENTAMICIN SULFATE: 1 CREAM TOPICAL at 20:36

## 2024-01-01 RX ADMIN — PREDNISONE 5 MG: 5 TABLET ORAL at 20:27

## 2024-01-01 RX ADMIN — TRAMADOL HYDROCHLORIDE 50 MG: 50 TABLET, COATED ORAL at 20:26

## 2024-01-01 RX ADMIN — HYDROMORPHONE HYDROCHLORIDE 0.4 MG: 0.2 INJECTION, SOLUTION INTRAMUSCULAR; INTRAVENOUS; SUBCUTANEOUS at 13:48

## 2024-01-01 RX ADMIN — IOPAMIDOL 90 ML: 755 INJECTION, SOLUTION INTRAVENOUS at 19:01

## 2024-01-01 RX ADMIN — SPIRONOLACTONE 25 MG: 25 TABLET, FILM COATED ORAL at 09:01

## 2024-01-01 RX ADMIN — HYDROMORPHONE HYDROCHLORIDE 2 MG: 2 TABLET ORAL at 04:00

## 2024-01-01 RX ADMIN — PREDNISONE 5 MG: 5 TABLET ORAL at 08:34

## 2024-01-01 RX ADMIN — PREDNISONE 5 MG: 5 TABLET ORAL at 08:59

## 2024-01-01 RX ADMIN — PREDNISONE 5 MG: 5 TABLET ORAL at 09:01

## 2024-01-01 RX ADMIN — MIDAZOLAM HYDROCHLORIDE 1 MG: 1 INJECTION, SOLUTION INTRAMUSCULAR; INTRAVENOUS at 10:14

## 2024-01-01 RX ADMIN — FAMOTIDINE 20 MG: 20 TABLET ORAL at 09:02

## 2024-01-01 RX ADMIN — POTASSIUM CHLORIDE 20 MEQ: 1500 TABLET, EXTENDED RELEASE ORAL at 09:02

## 2024-01-01 RX ADMIN — CARBIDOPA AND LEVODOPA 2.5 MG: 50; 200 TABLET, EXTENDED RELEASE ORAL at 13:21

## 2024-01-01 RX ADMIN — TRAMADOL HYDROCHLORIDE 50 MG: 50 TABLET, COATED ORAL at 20:14

## 2024-01-01 RX ADMIN — APIXABAN 2.5 MG: 2.5 TABLET, FILM COATED ORAL at 09:00

## 2024-01-01 RX ADMIN — PIPERACILLIN AND TAZOBACTAM 3.38 G: 3; .375 INJECTION, POWDER, FOR SOLUTION INTRAVENOUS at 11:45

## 2024-01-01 RX ADMIN — FAMOTIDINE 20 MG: 20 TABLET ORAL at 09:27

## 2024-01-01 RX ADMIN — TORSEMIDE 100 MG: 100 TABLET ORAL at 08:06

## 2024-01-01 RX ADMIN — MAGNESIUM OXIDE TAB 400 MG (241.3 MG ELEMENTAL MG) 400 MG: 400 (241.3 MG) TAB at 08:42

## 2024-01-01 RX ADMIN — PREDNISONE 5 MG: 5 TABLET ORAL at 09:02

## 2024-01-01 RX ADMIN — TRAMADOL HYDROCHLORIDE 50 MG: 50 TABLET, COATED ORAL at 20:27

## 2024-01-01 RX ADMIN — METOPROLOL TARTRATE 25 MG: 25 TABLET, FILM COATED ORAL at 22:02

## 2024-01-01 RX ADMIN — HYDROMORPHONE HYDROCHLORIDE 0.4 MG: 0.2 INJECTION, SOLUTION INTRAMUSCULAR; INTRAVENOUS; SUBCUTANEOUS at 21:19

## 2024-01-01 RX ADMIN — TRAMADOL HYDROCHLORIDE 50 MG: 50 TABLET, COATED ORAL at 19:34

## 2024-01-01 RX ADMIN — SPIRONOLACTONE 25 MG: 25 TABLET, FILM COATED ORAL at 08:05

## 2024-01-01 RX ADMIN — HYDROMORPHONE HYDROCHLORIDE 0.4 MG: 0.2 INJECTION, SOLUTION INTRAMUSCULAR; INTRAVENOUS; SUBCUTANEOUS at 09:52

## 2024-01-01 RX ADMIN — DILTIAZEM HYDROCHLORIDE 10 MG: 5 INJECTION INTRAVENOUS at 12:29

## 2024-01-01 RX ADMIN — PREDNISONE 5 MG: 5 TABLET ORAL at 09:53

## 2024-01-01 RX ADMIN — FAMOTIDINE 20 MG: 20 TABLET ORAL at 10:35

## 2024-01-01 RX ADMIN — MAGNESIUM OXIDE TAB 400 MG (241.3 MG ELEMENTAL MG) 400 MG: 400 (241.3 MG) TAB at 09:23

## 2024-01-01 RX ADMIN — MUPIROCIN: 20 OINTMENT TOPICAL at 08:40

## 2024-01-01 RX ADMIN — TRAMADOL HYDROCHLORIDE 50 MG: 50 TABLET, COATED ORAL at 08:42

## 2024-01-01 RX ADMIN — SPIRONOLACTONE 25 MG: 25 TABLET, FILM COATED ORAL at 09:27

## 2024-01-01 RX ADMIN — APIXABAN 2.5 MG: 2.5 TABLET, FILM COATED ORAL at 08:42

## 2024-01-01 RX ADMIN — PROPOFOL 100 MG: 10 INJECTION, EMULSION INTRAVENOUS at 08:57

## 2024-01-01 RX ADMIN — FAMOTIDINE 20 MG: 20 TABLET ORAL at 09:01

## 2024-01-01 RX ADMIN — FAMOTIDINE 20 MG: 20 TABLET ORAL at 08:06

## 2024-01-01 RX ADMIN — MAGNESIUM OXIDE TAB 400 MG (241.3 MG ELEMENTAL MG) 400 MG: 400 (241.3 MG) TAB at 20:28

## 2024-01-01 RX ADMIN — PREDNISONE 5 MG: 5 TABLET ORAL at 20:23

## 2024-01-01 RX ADMIN — TRAMADOL HYDROCHLORIDE 50 MG: 50 TABLET, COATED ORAL at 19:58

## 2024-01-01 RX ADMIN — PREDNISONE 5 MG: 5 TABLET ORAL at 08:42

## 2024-01-01 RX ADMIN — Medication 2 G: at 08:42

## 2024-01-01 RX ADMIN — PREDNISONE 5 MG: 5 TABLET ORAL at 20:26

## 2024-01-01 RX ADMIN — PREDNISONE 5 MG: 5 TABLET ORAL at 19:52

## 2024-01-01 RX ADMIN — PHENYLEPHRINE HYDROCHLORIDE 100 MCG: 10 INJECTION INTRAVENOUS at 09:05

## 2024-01-01 RX ADMIN — PREDNISONE 5 MG: 5 TABLET ORAL at 19:58

## 2024-01-01 RX ADMIN — TRAMADOL HYDROCHLORIDE 50 MG: 50 TABLET, COATED ORAL at 09:55

## 2024-01-01 RX ADMIN — FOLIC ACID 1 MG: 1 TABLET ORAL at 09:52

## 2024-01-01 RX ADMIN — MIDODRINE HYDROCHLORIDE 5 MG: 5 TABLET ORAL at 11:10

## 2024-01-01 RX ADMIN — ANORECTAL OINTMENT: 15.7; .44; 24; 20.6 OINTMENT TOPICAL at 20:26

## 2024-01-01 RX ADMIN — PREDNISONE 5 MG: 5 TABLET ORAL at 07:23

## 2024-01-01 RX ADMIN — MIDODRINE HYDROCHLORIDE 5 MG: 5 TABLET ORAL at 08:34

## 2024-01-01 RX ADMIN — FOLIC ACID 1 MG: 1 TABLET ORAL at 08:34

## 2024-01-01 RX ADMIN — APIXABAN 2.5 MG: 2.5 TABLET, FILM COATED ORAL at 09:01

## 2024-01-01 RX ADMIN — MAGNESIUM OXIDE TAB 400 MG (241.3 MG ELEMENTAL MG) 400 MG: 400 (241.3 MG) TAB at 09:53

## 2024-01-01 RX ADMIN — METOPROLOL TARTRATE 25 MG: 25 TABLET, FILM COATED ORAL at 08:04

## 2024-01-01 RX ADMIN — LIDOCAINE HYDROCHLORIDE 5 ML: 10 INJECTION, SOLUTION INFILTRATION; PERINEURAL at 08:56

## 2024-01-01 RX ADMIN — APIXABAN 2.5 MG: 2.5 TABLET, FILM COATED ORAL at 19:34

## 2024-01-01 RX ADMIN — TRAMADOL HYDROCHLORIDE 50 MG: 50 TABLET, COATED ORAL at 08:05

## 2024-01-01 RX ADMIN — METOPROLOL TARTRATE 50 MG: 25 TABLET, FILM COATED ORAL at 09:01

## 2024-01-01 RX ADMIN — HEPARIN SODIUM: 1000 INJECTION INTRAVENOUS; SUBCUTANEOUS at 20:58

## 2024-01-01 RX ADMIN — ANORECTAL OINTMENT: 15.7; .44; 24; 20.6 OINTMENT TOPICAL at 20:15

## 2024-01-01 RX ADMIN — MIDODRINE HYDROCHLORIDE 5 MG: 5 TABLET ORAL at 16:09

## 2024-01-01 RX ADMIN — BARIUM SULFATE: 400 SUSPENSION ORAL at 11:21

## 2024-01-01 RX ADMIN — TRAMADOL HYDROCHLORIDE 50 MG: 50 TABLET, COATED ORAL at 08:36

## 2024-01-01 RX ADMIN — METOPROLOL SUCCINATE 50 MG: 50 TABLET, EXTENDED RELEASE ORAL at 12:38

## 2024-01-01 RX ADMIN — GENTAMICIN SULFATE: 1 CREAM TOPICAL at 10:17

## 2024-01-01 RX ADMIN — MAGNESIUM OXIDE TAB 400 MG (241.3 MG ELEMENTAL MG) 400 MG: 400 (241.3 MG) TAB at 09:51

## 2024-01-01 RX ADMIN — TORSEMIDE 100 MG: 100 TABLET ORAL at 09:01

## 2024-01-01 RX ADMIN — FENTANYL CITRATE 100 MCG: 50 INJECTION INTRAMUSCULAR; INTRAVENOUS at 08:56

## 2024-01-01 RX ADMIN — FOLIC ACID 1 MG: 1 TABLET ORAL at 09:36

## 2024-01-01 RX ADMIN — PIPERACILLIN AND TAZOBACTAM 3.38 G: 3; .375 INJECTION, POWDER, FOR SOLUTION INTRAVENOUS at 20:27

## 2024-01-01 RX ADMIN — CLOTRIMAZOLE: 1 CREAM TOPICAL at 08:40

## 2024-01-01 RX ADMIN — CLOTRIMAZOLE: 1 CREAM TOPICAL at 20:42

## 2024-01-01 RX ADMIN — CALCIUM CARBONATE (ANTACID) CHEW TAB 500 MG 1000 MG: 500 CHEW TAB at 20:51

## 2024-01-01 RX ADMIN — CLOTRIMAZOLE: 1 CREAM TOPICAL at 20:05

## 2024-01-01 RX ADMIN — FOLIC ACID 1 MG: 1 TABLET ORAL at 07:22

## 2024-01-01 RX ADMIN — APIXABAN 2.5 MG: 2.5 TABLET, FILM COATED ORAL at 19:52

## 2024-01-01 RX ADMIN — CLOTRIMAZOLE: 1 CREAM TOPICAL at 20:16

## 2024-01-01 RX ADMIN — PREDNISONE 5 MG: 5 TABLET ORAL at 09:23

## 2024-01-01 RX ADMIN — LIDOCAINE HYDROCHLORIDE 4 ML: 10 INJECTION, SOLUTION EPIDURAL; INFILTRATION; INTRACAUDAL; PERINEURAL at 15:51

## 2024-01-01 RX ADMIN — HYDROMORPHONE HYDROCHLORIDE 1 MG: 2 TABLET ORAL at 17:48

## 2024-01-01 RX ADMIN — PREDNISONE 5 MG: 5 TABLET ORAL at 09:34

## 2024-01-01 RX ADMIN — HYDROMORPHONE HYDROCHLORIDE 2 MG: 2 TABLET ORAL at 21:50

## 2024-01-01 RX ADMIN — METOPROLOL SUCCINATE 50 MG: 50 TABLET, EXTENDED RELEASE ORAL at 08:35

## 2024-01-01 RX ADMIN — FOLIC ACID 1 MG: 1 TABLET ORAL at 08:05

## 2024-01-01 RX ADMIN — MUPIROCIN: 20 OINTMENT TOPICAL at 18:16

## 2024-01-01 RX ADMIN — HYDROMORPHONE HYDROCHLORIDE 0.2 MG: 0.2 INJECTION, SOLUTION INTRAMUSCULAR; INTRAVENOUS; SUBCUTANEOUS at 15:10

## 2024-01-01 RX ADMIN — DEXAMETHASONE SODIUM PHOSPHATE 5 MG: 10 INJECTION, SOLUTION INTRAMUSCULAR; INTRAVENOUS at 09:05

## 2024-01-01 RX ADMIN — MIDODRINE HYDROCHLORIDE 5 MG: 5 TABLET ORAL at 09:55

## 2024-01-01 RX ADMIN — PREDNISONE 5 MG: 5 TABLET ORAL at 20:14

## 2024-01-01 RX ADMIN — METOPROLOL TARTRATE 25 MG: 25 TABLET, FILM COATED ORAL at 14:34

## 2024-01-01 RX ADMIN — PIPERACILLIN AND TAZOBACTAM 3.38 G: 3; .375 INJECTION, POWDER, FOR SOLUTION INTRAVENOUS at 09:00

## 2024-01-01 RX ADMIN — FOLIC ACID 1 MG: 1 TABLET ORAL at 08:59

## 2024-01-01 RX ADMIN — MIDODRINE HYDROCHLORIDE 5 MG: 5 TABLET ORAL at 07:21

## 2024-01-01 RX ADMIN — FOLIC ACID 1 MG: 1 TABLET ORAL at 09:53

## 2024-01-01 RX ADMIN — HEPARIN SODIUM: 1000 INJECTION INTRAVENOUS; SUBCUTANEOUS at 21:17

## 2024-01-01 RX ADMIN — MIDODRINE HYDROCHLORIDE 5 MG: 5 TABLET ORAL at 12:42

## 2024-01-01 RX ADMIN — TORSEMIDE 100 MG: 100 TABLET ORAL at 08:59

## 2024-01-01 RX ADMIN — APIXABAN 2.5 MG: 2.5 TABLET, FILM COATED ORAL at 20:14

## 2024-01-01 RX ADMIN — OXYCODONE HYDROCHLORIDE 5 MG: 5 TABLET ORAL at 11:37

## 2024-01-01 RX ADMIN — POTASSIUM CHLORIDE 40 MEQ: 1500 TABLET, EXTENDED RELEASE ORAL at 10:40

## 2024-01-01 RX ADMIN — MAGNESIUM OXIDE TAB 400 MG (241.3 MG ELEMENTAL MG) 400 MG: 400 (241.3 MG) TAB at 12:24

## 2024-01-01 RX ADMIN — TRAMADOL HYDROCHLORIDE 50 MG: 50 TABLET, COATED ORAL at 08:53

## 2024-01-01 RX ADMIN — CLOTRIMAZOLE: 1 CREAM TOPICAL at 21:51

## 2024-01-01 RX ADMIN — LIDOCAINE HYDROCHLORIDE 4 ML: 10 INJECTION, SOLUTION EPIDURAL; INFILTRATION; INTRACAUDAL; PERINEURAL at 14:58

## 2024-01-01 RX ADMIN — CLOTRIMAZOLE: 1 CREAM TOPICAL at 13:49

## 2024-01-01 RX ADMIN — FAMOTIDINE 20 MG: 20 TABLET ORAL at 09:52

## 2024-01-01 RX ADMIN — ROCURONIUM BROMIDE 10 MG: 50 INJECTION, SOLUTION INTRAVENOUS at 09:24

## 2024-01-01 RX ADMIN — HYDROMORPHONE HYDROCHLORIDE 0.4 MG: 0.2 INJECTION, SOLUTION INTRAMUSCULAR; INTRAVENOUS; SUBCUTANEOUS at 18:38

## 2024-01-01 RX ADMIN — ACETAMINOPHEN 975 MG: 325 TABLET ORAL at 05:51

## 2024-01-01 RX ADMIN — METOPROLOL SUCCINATE 50 MG: 50 TABLET, EXTENDED RELEASE ORAL at 10:44

## 2024-01-01 RX ADMIN — MAGNESIUM OXIDE TAB 400 MG (241.3 MG ELEMENTAL MG) 400 MG: 400 (241.3 MG) TAB at 09:03

## 2024-01-01 RX ADMIN — HYDROMORPHONE HYDROCHLORIDE 0.5 MG: 1 INJECTION, SOLUTION INTRAMUSCULAR; INTRAVENOUS; SUBCUTANEOUS at 09:28

## 2024-01-01 RX ADMIN — PREDNISONE 5 MG: 5 TABLET ORAL at 09:26

## 2024-01-01 RX ADMIN — ANORECTAL OINTMENT: 15.7; .44; 24; 20.6 OINTMENT TOPICAL at 21:48

## 2024-01-01 RX ADMIN — FOLIC ACID 1 MG: 1 TABLET ORAL at 09:01

## 2024-01-01 RX ADMIN — CARBIDOPA AND LEVODOPA 2.5 MG: 50; 200 TABLET, EXTENDED RELEASE ORAL at 13:20

## 2024-01-01 RX ADMIN — TRAMADOL HYDROCHLORIDE 50 MG: 50 TABLET, COATED ORAL at 09:28

## 2024-01-01 RX ADMIN — CLOTRIMAZOLE: 1 CREAM TOPICAL at 09:35

## 2024-01-01 RX ADMIN — CLOTRIMAZOLE: 1 CREAM TOPICAL at 09:30

## 2024-01-01 RX ADMIN — OXYCODONE HYDROCHLORIDE 5 MG: 5 TABLET ORAL at 12:35

## 2024-01-01 RX ADMIN — MIDODRINE HYDROCHLORIDE 5 MG: 5 TABLET ORAL at 18:06

## 2024-01-01 RX ADMIN — TRAMADOL HYDROCHLORIDE 50 MG: 50 TABLET, COATED ORAL at 09:13

## 2024-01-01 RX ADMIN — HEPARIN SODIUM 4100 UNITS: 1000 INJECTION INTRAVENOUS; SUBCUTANEOUS at 10:34

## 2024-01-01 RX ADMIN — MUPIROCIN: 20 OINTMENT TOPICAL at 09:36

## 2024-01-01 RX ADMIN — ACETAMINOPHEN 975 MG: 325 TABLET ORAL at 23:51

## 2024-01-01 RX ADMIN — FOLIC ACID 1 MG: 1 TABLET ORAL at 09:28

## 2024-01-01 RX ADMIN — MAGNESIUM OXIDE TAB 400 MG (241.3 MG ELEMENTAL MG) 400 MG: 400 (241.3 MG) TAB at 10:35

## 2024-01-01 RX ADMIN — CLOTRIMAZOLE: 1 CREAM TOPICAL at 09:01

## 2024-01-01 ASSESSMENT — ACTIVITIES OF DAILY LIVING (ADL)
ADLS_ACUITY_SCORE: 39
ADLS_ACUITY_SCORE: 54
ADLS_ACUITY_SCORE: 49
ADLS_ACUITY_SCORE: 42
ADLS_ACUITY_SCORE: 47
ADLS_ACUITY_SCORE: 49
ADLS_ACUITY_SCORE: 48
ADLS_ACUITY_SCORE: 50
ADLS_ACUITY_SCORE: 47
ADLS_ACUITY_SCORE: 48
ADLS_ACUITY_SCORE: 50
ADLS_ACUITY_SCORE: 53
ADLS_ACUITY_SCORE: 54
ADLS_ACUITY_SCORE: 38
ADLS_ACUITY_SCORE: 42
ADLS_ACUITY_SCORE: 48
ADLS_ACUITY_SCORE: 50
DEPENDENT_IADLS:: CLEANING;COOKING;LAUNDRY;SHOPPING;MEAL PREPARATION;MEDICATION MANAGEMENT;MONEY MANAGEMENT;TRANSPORTATION
ADLS_ACUITY_SCORE: 49
ADLS_ACUITY_SCORE: 45
ADLS_ACUITY_SCORE: 49
ADLS_ACUITY_SCORE: 39
ADLS_ACUITY_SCORE: 49
ADLS_ACUITY_SCORE: 50
ADLS_ACUITY_SCORE: 48
ADLS_ACUITY_SCORE: 49
ADLS_ACUITY_SCORE: 50
ADLS_ACUITY_SCORE: 50
ADLS_ACUITY_SCORE: 49
ADLS_ACUITY_SCORE: 49
ADLS_ACUITY_SCORE: 50
ADLS_ACUITY_SCORE: 48
ADLS_ACUITY_SCORE: 49
IADL_COMMENTS: DEP IADLS
ADLS_ACUITY_SCORE: 47
ADLS_ACUITY_SCORE: 49
ADLS_ACUITY_SCORE: 54
ADLS_ACUITY_SCORE: 50
ADLS_ACUITY_SCORE: 46
ADLS_ACUITY_SCORE: 47
ADLS_ACUITY_SCORE: 49
ADLS_ACUITY_SCORE: 54
ADLS_ACUITY_SCORE: 47
ADLS_ACUITY_SCORE: 50
ADLS_ACUITY_SCORE: 49
ADLS_ACUITY_SCORE: 49
ADLS_ACUITY_SCORE: 48
ADLS_ACUITY_SCORE: 52
ADLS_ACUITY_SCORE: 49
ADLS_ACUITY_SCORE: 50
ADLS_ACUITY_SCORE: 46
ADLS_ACUITY_SCORE: 50
ADLS_ACUITY_SCORE: 49
ADLS_ACUITY_SCORE: 49
ADLS_ACUITY_SCORE: 44
ADLS_ACUITY_SCORE: 38
ADLS_ACUITY_SCORE: 48
ADLS_ACUITY_SCORE: 47
ADLS_ACUITY_SCORE: 48
ADLS_ACUITY_SCORE: 50
ADLS_ACUITY_SCORE: 45
ADLS_ACUITY_SCORE: 54
ADLS_ACUITY_SCORE: 50
ADLS_ACUITY_SCORE: 49
ADLS_ACUITY_SCORE: 54
ADLS_ACUITY_SCORE: 49
ADLS_ACUITY_SCORE: 49
ADLS_ACUITY_SCORE: 46
ADLS_ACUITY_SCORE: 54
ADLS_ACUITY_SCORE: 49
ADLS_ACUITY_SCORE: 54
ADLS_ACUITY_SCORE: 49
ADLS_ACUITY_SCORE: 48
ADLS_ACUITY_SCORE: 49
ADLS_ACUITY_SCORE: 54
ADLS_ACUITY_SCORE: 49
ADLS_ACUITY_SCORE: 48
ADLS_ACUITY_SCORE: 51
ADLS_ACUITY_SCORE: 47
ADLS_ACUITY_SCORE: 48
ADLS_ACUITY_SCORE: 50
ADLS_ACUITY_SCORE: 50
ADLS_ACUITY_SCORE: 48
ADLS_ACUITY_SCORE: 49
ADLS_ACUITY_SCORE: 53
ADLS_ACUITY_SCORE: 49
ADLS_ACUITY_SCORE: 48
ADLS_ACUITY_SCORE: 49
ADLS_ACUITY_SCORE: 44
ADLS_ACUITY_SCORE: 52
ADLS_ACUITY_SCORE: 42
ADLS_ACUITY_SCORE: 44
ADLS_ACUITY_SCORE: 47
ADLS_ACUITY_SCORE: 49
ADLS_ACUITY_SCORE: 47
ADLS_ACUITY_SCORE: 48
ADLS_ACUITY_SCORE: 49
ADLS_ACUITY_SCORE: 51
ADLS_ACUITY_SCORE: 49
ADLS_ACUITY_SCORE: 46
ADLS_ACUITY_SCORE: 50
ADLS_ACUITY_SCORE: 51
ADLS_ACUITY_SCORE: 48
ADLS_ACUITY_SCORE: 45
ADLS_ACUITY_SCORE: 47
ADLS_ACUITY_SCORE: 48
ADLS_ACUITY_SCORE: 54
ADLS_ACUITY_SCORE: 48
ADLS_ACUITY_SCORE: 48
ADLS_ACUITY_SCORE: 49
ADLS_ACUITY_SCORE: 40
ADLS_ACUITY_SCORE: 48
ADLS_ACUITY_SCORE: 47
ADLS_ACUITY_SCORE: 53
ADLS_ACUITY_SCORE: 54
ADLS_ACUITY_SCORE: 46
ADLS_ACUITY_SCORE: 46
ADLS_ACUITY_SCORE: 49
ADLS_ACUITY_SCORE: 49
ADLS_ACUITY_SCORE: 45
ADLS_ACUITY_SCORE: 47
ADLS_ACUITY_SCORE: 50
ADLS_ACUITY_SCORE: 46
ADLS_ACUITY_SCORE: 54
ADLS_ACUITY_SCORE: 54
ADLS_ACUITY_SCORE: 46
ADLS_ACUITY_SCORE: 53
ADLS_ACUITY_SCORE: 48
ADLS_ACUITY_SCORE: 46
ADLS_ACUITY_SCORE: 38
ADLS_ACUITY_SCORE: 46
ADLS_ACUITY_SCORE: 38
ADLS_ACUITY_SCORE: 54
ADLS_ACUITY_SCORE: 54
ADLS_ACUITY_SCORE: 50
ADLS_ACUITY_SCORE: 47
ADLS_ACUITY_SCORE: 50
ADLS_ACUITY_SCORE: 49
ADLS_ACUITY_SCORE: 52
ADLS_ACUITY_SCORE: 48
ADLS_ACUITY_SCORE: 42
ADLS_ACUITY_SCORE: 51
ADLS_ACUITY_SCORE: 47
ADLS_ACUITY_SCORE: 51
ADLS_ACUITY_SCORE: 42
ADLS_ACUITY_SCORE: 50
ADLS_ACUITY_SCORE: 49
ADLS_ACUITY_SCORE: 53
ADLS_ACUITY_SCORE: 49
ADLS_ACUITY_SCORE: 45
ADLS_ACUITY_SCORE: 38
ADLS_ACUITY_SCORE: 54
ADLS_ACUITY_SCORE: 50
ADLS_ACUITY_SCORE: 49
ADLS_ACUITY_SCORE: 53
ADLS_ACUITY_SCORE: 46
ADLS_ACUITY_SCORE: 54
ADLS_ACUITY_SCORE: 49
ADLS_ACUITY_SCORE: 47
ADLS_ACUITY_SCORE: 48
ADLS_ACUITY_SCORE: 54
ADLS_ACUITY_SCORE: 41
ADLS_ACUITY_SCORE: 42
ADLS_ACUITY_SCORE: 42
ADLS_ACUITY_SCORE: 50
ADLS_ACUITY_SCORE: 50
ADLS_ACUITY_SCORE: 38
ADLS_ACUITY_SCORE: 48
ADLS_ACUITY_SCORE: 42
ADLS_ACUITY_SCORE: 49
ADLS_ACUITY_SCORE: 49
ADLS_ACUITY_SCORE: 47
ADLS_ACUITY_SCORE: 50
ADLS_ACUITY_SCORE: 47
ADLS_ACUITY_SCORE: 47
ADLS_ACUITY_SCORE: 41
ADLS_ACUITY_SCORE: 46
ADLS_ACUITY_SCORE: 49
ADLS_ACUITY_SCORE: 50
ADLS_ACUITY_SCORE: 54
ADLS_ACUITY_SCORE: 50
ADLS_ACUITY_SCORE: 50
ADLS_ACUITY_SCORE: 46
ADLS_ACUITY_SCORE: 47
ADLS_ACUITY_SCORE: 53
ADLS_ACUITY_SCORE: 49
ADLS_ACUITY_SCORE: 47
ADLS_ACUITY_SCORE: 47
ADLS_ACUITY_SCORE: 49
ADLS_ACUITY_SCORE: 47
ADLS_ACUITY_SCORE: 54
ADLS_ACUITY_SCORE: 48
ADLS_ACUITY_SCORE: 38
ADLS_ACUITY_SCORE: 46
ADLS_ACUITY_SCORE: 49
ADLS_ACUITY_SCORE: 38
ADLS_ACUITY_SCORE: 49
ADLS_ACUITY_SCORE: 42
ADLS_ACUITY_SCORE: 48
ADLS_ACUITY_SCORE: 48
ADLS_ACUITY_SCORE: 50
ADLS_ACUITY_SCORE: 47
ADLS_ACUITY_SCORE: 48
ADLS_ACUITY_SCORE: 42
ADLS_ACUITY_SCORE: 50
ADLS_ACUITY_SCORE: 46
ADLS_ACUITY_SCORE: 54
ADLS_ACUITY_SCORE: 39
ADLS_ACUITY_SCORE: 50
ADLS_ACUITY_SCORE: 49
ADLS_ACUITY_SCORE: 48
ADLS_ACUITY_SCORE: 48
ADLS_ACUITY_SCORE: 47
ADLS_ACUITY_SCORE: 40
ADLS_ACUITY_SCORE: 44
ADLS_ACUITY_SCORE: 38
ADLS_ACUITY_SCORE: 51
ADLS_ACUITY_SCORE: 50
ADLS_ACUITY_SCORE: 49
ADLS_ACUITY_SCORE: 49
ADLS_ACUITY_SCORE: 53
ADLS_ACUITY_SCORE: 50
ADLS_ACUITY_SCORE: 54
ADLS_ACUITY_SCORE: 48
ADLS_ACUITY_SCORE: 54
ADLS_ACUITY_SCORE: 51
ADLS_ACUITY_SCORE: 42
ADLS_ACUITY_SCORE: 47
ADLS_ACUITY_SCORE: 47
ADLS_ACUITY_SCORE: 41
ADLS_ACUITY_SCORE: 50
ADLS_ACUITY_SCORE: 48
ADLS_ACUITY_SCORE: 54
ADLS_ACUITY_SCORE: 50
ADLS_ACUITY_SCORE: 54
ADLS_ACUITY_SCORE: 42
ADLS_ACUITY_SCORE: 45
ADLS_ACUITY_SCORE: 48
ADLS_ACUITY_SCORE: 50
ADLS_ACUITY_SCORE: 48
ADLS_ACUITY_SCORE: 47
ADLS_ACUITY_SCORE: 42
ADLS_ACUITY_SCORE: 53
ADLS_ACUITY_SCORE: 54
ADLS_ACUITY_SCORE: 48
ADLS_ACUITY_SCORE: 54
ADLS_ACUITY_SCORE: 48
ADLS_ACUITY_SCORE: 47
ADLS_ACUITY_SCORE: 49
ADLS_ACUITY_SCORE: 38
ADLS_ACUITY_SCORE: 53
ADLS_ACUITY_SCORE: 48
ADLS_ACUITY_SCORE: 53
ADLS_ACUITY_SCORE: 46
ADLS_ACUITY_SCORE: 52
ADLS_ACUITY_SCORE: 41
ADLS_ACUITY_SCORE: 49
ADLS_ACUITY_SCORE: 49
ADLS_ACUITY_SCORE: 42
ADLS_ACUITY_SCORE: 48
ADLS_ACUITY_SCORE: 49
ADLS_ACUITY_SCORE: 35
ADLS_ACUITY_SCORE: 48
ADLS_ACUITY_SCORE: 49
ADLS_ACUITY_SCORE: 42
ADLS_ACUITY_SCORE: 48
ADLS_ACUITY_SCORE: 41
ADLS_ACUITY_SCORE: 53
ADLS_ACUITY_SCORE: 48
ADLS_ACUITY_SCORE: 44
ADLS_ACUITY_SCORE: 48
ADLS_ACUITY_SCORE: 49
ADLS_ACUITY_SCORE: 50
ADLS_ACUITY_SCORE: 49
ADLS_ACUITY_SCORE: 45
ADLS_ACUITY_SCORE: 47
ADLS_ACUITY_SCORE: 35
ADLS_ACUITY_SCORE: 49
ADLS_ACUITY_SCORE: 46
ADLS_ACUITY_SCORE: 49

## 2024-01-01 ASSESSMENT — COLUMBIA-SUICIDE SEVERITY RATING SCALE - C-SSRS
2. HAVE YOU ACTUALLY HAD ANY THOUGHTS OF KILLING YOURSELF IN THE PAST MONTH?: NO
6. HAVE YOU EVER DONE ANYTHING, STARTED TO DO ANYTHING, OR PREPARED TO DO ANYTHING TO END YOUR LIFE?: NO
1. IN THE PAST MONTH, HAVE YOU WISHED YOU WERE DEAD OR WISHED YOU COULD GO TO SLEEP AND NOT WAKE UP?: NO

## 2024-01-01 ASSESSMENT — PAIN SCALES - GENERAL
PAINLEVEL: MODERATE PAIN (4)
PAINLEVEL: EXTREME PAIN (9)
PAINLEVEL: MILD PAIN (3)
PAINLEVEL: WORST PAIN (10)
PAINLEVEL: EXTREME PAIN (8)
PAINLEVEL: EXTREME PAIN (9)
PAINLEVEL: EXTREME PAIN (8)
PAINLEVEL: EXTREME PAIN (9)
PAINLEVEL: SEVERE PAIN (7)
PAINLEVEL: MILD PAIN (3)

## 2024-01-01 ASSESSMENT — ENCOUNTER SYMPTOMS
NAUSEA: 1
COUGH: 0
ABDOMINAL PAIN: 0
VOMITING: 1
DYSRHYTHMIAS: 1
FEVER: 0
SHORTNESS OF BREATH: 0

## 2024-01-17 NOTE — ED NOTES
Pt doing well, taking a few sips of water. Is alert, adult children are here. Pt goes to [facility] and son has arranged for her to go to dialysis tomorrow.

## 2024-01-17 NOTE — ED NOTES
Pt ready for discharge. Does meet criteria. Discharge instructions reviewed with patient and family. Discharged via w/c with family.

## 2024-01-17 NOTE — ED TRIAGE NOTES
Pt's hemodialysis port dislodged from upper right chest sometime overnight. She woke up with it on the floor and she had blood in her bed. Bleeding is controlled now. Denies any pain.  Pt does dialysis M/W/F.     Triage Assessment (Adult)       Row Name 01/17/24 0817          Triage Assessment    Airway WDL WDL        Respiratory WDL    Respiratory WDL WDL        Cardiac WDL    Cardiac WDL WDL        Peripheral/Neurovascular WDL    Peripheral Neurovascular WDL WDL        Cognitive/Neuro/Behavioral WDL    Cognitive/Neuro/Behavioral WDL WDL

## 2024-01-17 NOTE — SEDATION DOCUMENTATION
Patient Name: Mely Alegria  Medical Record Number: [id number]  Today's Date: 1/17/2024    Procedure: tunneled dialysis catheter placement  Proceduralist: Dr. Albarran    Procedure Start: 1019  Procedure end: 1033  Sedation medications administered: 1.5 mg midazolam and 75 mcg fentanyl   Sedation time: 14 minutes    Report given to: Elaine KNOTT RN ER    Other Notes: Pt arrived to IR room 1 from ER. Consent reviewed. Pt denies any questions or concerns regarding procedure. Pt positioned supine and monitored per protocol. Pt tolerated procedure without any noted complications. VSS. Pt transferred back to ER.

## 2024-01-17 NOTE — PROCEDURES
Interventional Radiology Post-Procedure Note     Patient name:  Mely Alegria  MRN:  [id number]   Date:  1/17/2024     Procedure(s): Right chest tunneled HD catheter placement    Attending:  Brandon    Sedation:  Moderate sedation    Pre/Post Procedure Diagnosis: ESRD    Drains/Lines:  15.5Fr x 19cm Duraflow    Specimen(s):  None    Estimated Blood Loss:  Minimal    Complications:  None     Findings:  Uneventful procedure, please see dictation in PACS or under the Imaging tab in EPIC for detailed procedure note.    Plan:  HD catheter ready for immediate use.      Tristen Taylor MD  Vascular & Interventional Radiology  [facility]  1/17/2024  11:07 AM

## 2024-01-17 NOTE — ED PROVIDER NOTES
EMERGENCY DEPARTMENT ENCOUNTER       ED Course & Medical Decision Making     Final Impression  84 year old female presents for evaluation of a hemodialysis catheter malfunction.  Patient reports that she woke up this morning to realize that her tunneled dialysis catheter in her right chest had fallen out in the middle of the night, her nightgown and her sheets were soaked in blood, was dripping on the floor.  Patient is on chronic Eliquis for A-fib.  Has had this dialysis catheter in place and been on MWF HD since 10/31.  No pain, no weakness, no fevers, no lightheadedness.  States she is otherwise feeling relatively well, just needs her hemodialysis catheter replaced.    Hemodialysis catheter replaced by IR, patient's son contacted dialysis center and they will arrange for additional dialysis due to missing her session this morning.  Patient recovered from IR tunneled line placement, will discharge home with her son.    Prior to making a final disposition on this patient the results of patient's tests and other diagnostic studies were discussed with the patient. All questions were answered. Patient expressed understanding of the plan and was amenable to it.    Medical Decision Making    History:  Supplemental history from: Son  External Record(s) reviewed as documented below;  10/29/2023, inpatient admission at [facility], admitted for acute hypoxic respiratory failure, pneumonia, E. coli UTI, HAMMAD with new onset dialysis, circulatory shock and heart failure.  Discharge summary reviewed.  Tunneled dialysis catheter placed on 10/30/2023, hemodialysis started on 10/31.    Work Up:  Chart documentation includes differential considered and any EKGs or imaging independently interpreted by provider, where specified.  DDx considered but not limited to: Electrolyte derangements, hyperkalemia, acute blood loss anemia    External consultation:  Discussion of management with another provider: Interventional  radiology    Complicating factors:  Care impacted by chronic illness: Anticoagulated State, Chronic Kidney Disease, Heart Disease, Hyperlipidemia, and Hypertension  Care affected by social determinants of health: N/A    Disposition considerations: Discharge. No recommendations on prescription strength medication(s). I considered admission, but discharged patient after significant clinical improvement.      Medications   midazolam (VERSED) injection 0.5-2 mg (0.5 mg Intravenous $Given 1/17/24 1018)   flumazenil (ROMAZICON) injection 0.2 mg (has no administration in time range)   fentaNYL (PF) (SUBLIMAZE) injection 25-50 mcg (25 mcg Intravenous $Given 1/17/24 1031)   naloxone (NARCAN) injection 0.2 mg (has no administration in time range)     Or   naloxone (NARCAN) injection 0.4 mg (has no administration in time range)     Or   naloxone (NARCAN) injection 0.2 mg (has no administration in time range)     Or   naloxone (NARCAN) injection 0.4 mg (has no administration in time range)   ondansetron (ZOFRAN) injection 4 mg (has no administration in time range)   ceFAZolin Sodium (ANCEF) injection 2 g (2 g Intravenous $Given 1/17/24 1010)   lidocaine 1 % 1-30 mL (20 mLs Intradermal $Given by Other Clinician 1/17/24 1019)   heparin Lock (1000 units/mL High concentration) 4,200 Units (4,100 Units Intracatheter $Given by Other Clinician 1/17/24 1034)       New Prescriptions    No medications on file       Final Impression     1. Displacement of vascular dialysis catheter, initial encounter (H24)        Chief Complaint     Chief Complaint   Patient presents with    Vascular Access Problem     Pt's hemodialysis port dislodged from upper right chest sometime overnight. She woke up with it on the floor and she had blood in her bed. Bleeding is controlled now. Denies any pain.  Pt does dialysis M/W/F.    HPI     Mely Alegria is a 84 year old female who presents for evaluation of hemodialysis ports that accidentally came out in  "the middle of the night in bed.    Physical Exam     BP (!) 135/90   Pulse 82   Temp 97.1  F (36.2  C) (Tympanic)   Resp 19   Ht 1.549 m (5' 1\")   Wt 81 kg (178 lb 9.2 oz)   LMP  (LMP Unknown)   SpO2 (!) 89%   BMI 33.74 kg/m    Constitutional: Awake, alert, in no acute distress.  Head: Normocephalic, atraumatic.  ENT: Mucous membranes moist.  Eyes: Conjunctiva normal.  Respiratory: Respirations even, unlabored, in no acute respiratory distress.  Cardiovascular: Regular rate and rhythm. Good peripheral perfusion.  GI: Abdomen soft, non-tender.  Musculoskeletal: Moves all 4 extremities equally.  Integument: Warm, dry.  Empty hemodialysis port site on right upper chest wall, no active bleeding.  Neurologic: Alert & oriented x 3. Normal speech. Grossly normal motor and sensory function. No focal deficits noted.  Psychiatric: Normal mood    Labs & Imaging     Results for orders placed or performed during the hospital encounter of 01/17/24   IR CVC Tunnel Placement > 5 Yrs of Age    Impression    IMPRESSION:    1.  Tunneled dialysis catheter placement.  2.  The catheter position was verified fluoroscopically and this is ready for use.   CBC (+ platelets, no diff)   Result Value Ref Range    WBC Count 9.0 4.0 - 11.0 10e3/uL    RBC Count 4.39 3.80 - 5.20 10e6/uL    Hemoglobin 13.9 11.7 - 15.7 g/dL    Hematocrit 42.4 35.0 - 47.0 %    MCV 97 78 - 100 fL    MCH 31.7 26.5 - 33.0 pg    MCHC 32.8 31.5 - 36.5 g/dL    RDW 13.5 10.0 - 15.0 %    Platelet Count 221 150 - 450 10e3/uL   Basic metabolic panel   Result Value Ref Range    Sodium 141 135 - 145 mmol/L    Potassium 5.2 3.4 - 5.3 mmol/L    Chloride 102 98 - 107 mmol/L    Carbon Dioxide (CO2) 31 (H) 22 - 29 mmol/L    Anion Gap 8 7 - 15 mmol/L    Urea Nitrogen 29.1 (H) 8.0 - 23.0 mg/dL    Creatinine 3.79 (H) 0.51 - 0.95 mg/dL    GFR Estimate 11 (L) >60 mL/min/1.73m2    Calcium 9.7 8.8 - 10.2 mg/dL    Glucose 78 70 - 99 mg/dL        Lexa Sandoval MD  01/17/24 " 1516

## 2024-01-17 NOTE — DISCHARGE INSTRUCTIONS
Your dialysis catheter was replaced by the interventional radiology team.  Please follow-up with your dialysis center to make sure you are getting dialysis soon.

## 2024-01-17 NOTE — ED NOTES
Patient returns from IR, Wagner port replaced rt chest. Pt tolerated the procedure well, denies any pain.

## 2024-01-17 NOTE — PROGRESS NOTES
FOOT AND ANKLE SURGERY/PODIATRY Progress Note      ASSESSMENT:   Ulceration left foot into subcutaneous tissue   Venous stasis  ESRD      TREATMENT:  -I discussed with the patient that the dorsal lateral left foot ulceration is measuring smaller today with decreasing amount of fibrotic tissue present.  I recommend she continue with Santyl.    -After discussion of risk factors, nursing staff removed dressing, cleansed wound and consent obtained 2% Lidocaine HCL jelly was applied, under clean conditions, the left foot ulceration(s) were debrided using #15 blade scalpel.  Devitalized and nonviable tissue, along with any fibrin and slough, was removed to improve granulation tissue formation, stimulate wound healing, decrease overall bacteria load, disrupt biofilm formation and decrease edge senescence. Wound drainage was scant No. Total excisional debridement was 2.25 sq cm into the subcutaneous tissue with a depth of 0.2 cm.   Ulcers were improved afterwards and .  Measures were as noted on the flow sheet. Medi-honey with a gauze dressing was applied.    -She will follow-up in 3-4 weeks    Giles Rivera [facility]      HPI: Mely Alegria was seen again today for a left foot ulceration.  Since her last visit with me she has used Santyl every other day.  Patient's son and daughter present for today's visit.    Past Medical History:   Diagnosis Date    Chronic atrial fibrillation (H)        Past Surgical History:   Procedure Laterality Date    HC DILATION/CURETTAGE DIAG/THER NON OB      Description: Dilation And Curettage;  Recorded: 01/18/2010;    IR CVC TUNNEL PLACEMENT > 5 YRS OF AGE  10/30/2023    IR CVC TUNNEL PLACEMENT > 5 YRS OF AGE  1/17/2024    PICC TRIPLE LUMEN PLACEMENT  10/30/2023    ZZC COLOSTOMY      Description: Colostomy;  Recorded: 11/05/2012;    ZZC PART REMOVAL COLON W ANASTOMOSIS      Description: Partial Colectomy;  Recorded: 11/05/2012;  Comments: with  "colostomy    [facility] TOTAL HIP ARTHROPLASTY      Description: Total Hip Replacement;  Recorded: 01/18/2010;       Allergies   Allergen Reactions    Allopurinol Diarrhea    Lisinopril Cough     initiated 5/14/1996 and stopped           Current Outpatient Medications:     baclofen (LIORESAL) 10 MG tablet, Take 1 tablet (10 mg) by mouth 3 times daily as needed for muscle spasms, Disp: 20 tablet, Rfl: 1    Bismuth Tribromoph-Petrolatum (XEROFORM PETROLAT GAUZE 5\"X9\") MISC, Apply 1 each topically daily as needed (Wound dressing), Disp: 50 each, Rfl: 4    calamine 8-8 % lotion, Apply topically every hour as needed for itching, Disp: 177 mL, Rfl: 11    calcium citrate-vitamin D (CITRACAL) 315-200 MG-UNIT TABS per tablet, Take 1 tablet by mouth daily, Disp: , Rfl:     cholecalciferol, vitamin D3, 50 mcg (2,000 unit) Tab, [CHOLECALCIFEROL, VITAMIN D3, 50 MCG (2,000 UNIT) TAB] Take 1 tablet (2,000 Units total) by mouth daily., Disp: 100 each, Rfl: 3    collagenase (SANTYL) 250 UNIT/GM external ointment, Apply topically daily Total square centimeters of wound(s) being treated is 7.5 square cm, 30 day supply, Disp: 30 g, Rfl: 3    collagenase (SANTYL) 250 UNIT/GM external ointment, Apply topically daily, Disp: 30 g, Rfl: 3    ELIQUIS ANTICOAGULANT 2.5 MG tablet, , Disp: , Rfl:     famotidine (PEPCID) 20 MG tablet, Take 1 tablet (20 mg) by mouth daily, Disp: 90 tablet, Rfl: 3    febuxostat (ULORIC) 40 MG TABS tablet, Take 1 tablet (40 mg) by mouth daily, Disp: 30 tablet, Rfl: 11    folic acid (FOLVITE) 1 MG tablet, Take 1 tablet (1 mg) by mouth daily, Disp: 90 tablet, Rfl: 3    furosemide (LASIX) 40 MG tablet, Take 1 tablet (40 mg) by mouth daily as needed, Disp: , Rfl:     lactobacillus rhamnosus, GG, (CULTURELL) capsule, Take 1 capsule by mouth 2 times daily, Disp: , Rfl:     levothyroxine (SYNTHROID/LEVOTHROID) 25 MCG tablet, Take 1 tablet (25 mcg) by mouth every morning (before breakfast), Disp: 90 tablet, Rfl: 3    loperamide " (IMODIUM) 2 MG capsule, Take 1 capsule (2 mg) by mouth 4 times daily as needed for diarrhea, Disp: , Rfl:     menthol-zinc oxide (CALMOSEPTINE) 0.44-20.6 % OINT ointment, Apply topically 4 times daily as needed for skin protection or irritation, Disp: , Rfl:     menthol-zinc oxide (CALMOSEPTINE) 0.44-20.625 % OINT ointment, Apply topically 4 times daily as needed for skin protection, Disp: 113 g, Rfl: 11    miconazole (MICATIN) 2 % external powder, Apply topically 2 times daily, Disp: , Rfl:     midodrine (PROAMATINE) 10 MG tablet, Take 1 tablet (10 mg) by mouth Every Mon, Wed, Fri Morning, Disp: 36 tablet, Rfl: 3    midodrine (PROAMATINE) 5 MG tablet, Take 1 tablet (5 mg) by mouth every hour as needed (on HD for SBP <90), Disp: , Rfl:     mirtazapine (REMERON) 7.5 MG tablet, Take 2 tablets (15 mg) by mouth at bedtime, Disp: , Rfl:     rivaroxaban ANTICOAGULANT (XARELTO ANTICOAGULANT) 15 MG TABS tablet, Take 1 tablet (15 mg) by mouth daily (with dinner), Disp: 90 tablet, Rfl: 3    sulfamethoxazole-trimethoprim (BACTRIM DS) 800-160 MG tablet, Take 1 tablet by mouth 2 times daily, Disp: 20 tablet, Rfl: 0    vitamin B1 (THIAMINE) 100 MG tablet, Take 1 tablet by mouth daily at 2 pm, Disp: , Rfl:   No current facility-administered medications for this visit.    Review of Systems - 10 point Review of Systems is negative except for left foot ulcer which is noted in HPI.      OBJECTIVE:  BP (!) 152/88   Pulse 50   LMP  (LMP Unknown)   SpO2 99%   General appearance: Patient is alert and fully cooperative with history & exam.  No sign of distress is noted during the visit.    Vascular: Dorsalis pedis non-palpableLeft.  Dermatologic:    Wound Foot Other (comment) (Active)       Wound Heel Skin tear (Active)       VASC Wound left foot (Active)   Pre Size Length 3 12/27/23 1500   Pre Size Width 2.5 12/27/23 1500   Pre Size Depth 0.2 12/27/23 1500   Pre Total Sq cm 7 12/27/23 1500   Post Size Length 1.5 01/17/24 1500   Post  Size Width 1.5 01/17/24 1500   Post Size Depth 0.2 01/17/24 1500   Post Total Sq cm 2.25 01/17/24 1500   Description macerated 12/27/23 1500   Mixed granular/fibrotic tissue ulceration dorsal lateral left foot, no erythema.  Neurologic: Intact to light touch Left.  Musculoskeletal: Contracted digits noted Left.      Picture:

## 2024-01-17 NOTE — PATIENT INSTRUCTIONS
Important lnstructions        WEIGHT BEARING STATUS: You are to remain NON WEIGHT BEARING on your left foot. NON WEIGHT BEARING MEANS NO PRESSURE ON YOUR FOOT OR HEEL AT ANY TIME FOR ANY REASON!    2. OFFLOADING DEVICE: Must use a A WHEELCHAIR at all times! (do not use affected foot to push wheelchair)    3. STABILIZATION DEVICE: Use a CAM BOOT . You will need to WEAR THIS ANYTIME YOU ARE UP AND OUT OF BED, IT IS OKAY TO REMOVE WHEN YOU ARE SLEEPING..     4. ELEVATE: Elevating your leg means laying with your head on a pillow and your foot ABOVE YOUR HEART.     5. DO NOT MOVE YOUR FOOT.  There is a risk of worsening the wound or incision. To give yourself a higher chance of healing, please DO NOT swing foot back and forth and wiggle foot/toes especially when inside a stabilization device. Limited movement is allowable with therapy as recommended by the doctor.     6. TAKE A PROTEIN SHAKE TWICE A DAY.  (For ex: Boost, Ensure, Glucerna)      Dressing Change lnstructions    Dressing change daily with Santyl to left foot wound.     How to Use Collagenase  SANTYL  Ointment    DAILY CLEANSE  Gently cleanse the wound with sterile saline    APPLY  Apply SANTYL  Ointment at the thickness of a nickel or the thickness of the cream inside an Oreo cookie    COVER  Cover the wound with a clean moistened gauze followed by dry gauze if wound bed is dry. Wounds with sufficient exudate will naturally activate the collagenase enzyme and do not need a moistened gauze applied. Moisture helps release the active ingredient in SANTYL  to make the ointment most effective. Without the right amount of moisture, SANTYL  Ointment may not work properly. May apply adaptic touch over Santyl ointment to hold into place. Secure with roll gauze. Do not use dressings that contain silver or iodine with SANTYL  Ointment, as they may stop SANTYL  Ointment        It IS NOT ok to get your wound wet in the bath or shower    SEEK MEDICAL CARE IF:  You have  an increase in swelling, pain, or redness around the wound.  You have an increase in the amount of pus coming from the wound.  There is a bad smell coming from the wound.  The wound appears to be worsening/enlarging  You have a fever greater than 101.5 F      It is ok to continue current wound care treatment/products for the next 2-3 days until new wound care supplies are ordered and arrive. If longer than this please contact our office at [phone].    *Wheelchairs are never guaranteed at the front door, if you have your own wheel chair or knee walker, please bring that with you to your appointment. Thank you for your understanding!*    We want to hear from you!   In the next few weeks, you should receive a call or email to complete a survey about your visit at [facility]. Please help us improve your appointment experience by letting us know how we did today. We strive to make your experience good and value any ways in which we could do better.      We value your input and suggestions.    Thank you for choosing the [facility]!

## 2024-01-17 NOTE — PRE-PROCEDURE
GENERAL PRE-PROCEDURE:   Procedure:  Tunneled HD catheter replacement  Date/Time:  1/17/2024 9:58 AM    Written consent obtained?: Yes    Risks and benefits: Risks, benefits and alternatives were discussed    Consent given by:  Patient  Patient states understanding of procedure being performed: Yes    Patient's understanding of procedure matches consent: Yes    Procedure consent matches procedure scheduled: Yes    Expected level of sedation:  Moderate  Appropriately NPO:  Yes  ASA Class:  3  Mallampati  :  Grade 1- soft palate, uvula, tonsillar pillars, and posterior pharyngeal wall visible  Lungs:  Lungs clear with good breath sounds bilaterally  Heart:  Normal heart sounds and rate  History & Physical reviewed:  History and physical reviewed and no updates needed  Statement of review:  I have reviewed the lab findings, diagnostic data, medications, and the plan for sedation

## 2024-01-17 NOTE — CONSULTS
Interventional Radiology - Consultation Note:  ED - [facility]  01/17/2024     Reason for Consult:  tunneled HD line replacement   Requesting Provider:  Dr. Sandoval    HPI:  Mely Alegria is a 84 year old female PMHx a-fib on Eliquis AC, CKD stage IV, diastolic HF, HTN, morbid obesity and chronic back pain. Previously admitted 10/30/23 with UTI, HAMMAD/CKD, hyperkalemia, elevated troponin, AMS and generalized weakness. S/p HD catheter placement 10/30/23 to facilitate dialysis initiation. Typically dialyzes MWF.     Presented to ED this morning after tunneled dialysis catheter fell out this AM and had subsequent bleeding. Patient and family note that patient has been itching area for the past week or so d/t bandages at the site.    IR consulted for tunneled HD catheter replacement.    IMAGING:      Study Result  Narrative & Impression   LOCATION: [facility]  DATE: 10/30/2023     PROCEDURE: TUNNELED DIALYSIS CATHETER PLACEMENT  1.  Insertion of a tunneled central venous catheter.  2.  Ultrasound guidance for vascular access. A permanent image was stored.  3.  Fluoroscopic guidance for central venous access device placement.     INTERVENTIONAL RADIOLOGIST: Lane Christian MD     INDICATION: Renal insufficiency requiring renal replacement therapy, plan for tunneled dialysis catheter placement..     CONSENT: The risks, benefits and alternatives of the procedure were discussed with the patient or representative in detail. All questions were answered. Informed consent was given to proceed with the procedure.     MODERATE SEDATION: Versed 1 mg IV; Fentanyl 25 mcg IV. During the time out, immediately prior to the administration of medications, the patient was reassessed for adequacy to receive conscious sedation.  Under physician supervision, Versed and fentanyl   were administered for moderate sedation. Pulse oximetry, heart rate and blood pressure were continuously  monitored by an independent trained observer. The physician spent 15 minutes of face-to-face sedation time with the patient.     FLUOROSCOPIC TIME: 0.2 minutes.  RADIATION DOSE: Air Kerma: 11 mGy.     COMPLICATIONS: No immediate complications.     UNIVERSAL PROTOCOL: The operative site was marked and any prior imaging was reviewed. Required items including blood products, implants, devices and special equipment was made available. Patient identity was confirmed either verbally, with demographic   information, hospital assigned identification or other identification markers. A timeout was performed immediately prior to the procedure.     STERILE BARRIER TECHNIQUE: Maximum sterile barrier technique was used. Cutaneous antisepsis was performed at the operative site with application of 2% chlorhexidine and large sterile drape. Prior to the procedure, the  and assistant performed   hand hygiene and wore hat, mask, sterile gown, and sterile gloves during the entire procedure.     PROCEDURE:    Using local anesthesia and real-time ultrasound guidance the right internal jugular vein was accessed. Ultrasound shows an anechoic and compressible vein. Permanent images were stored to the patient's medical record. A subcutaneous tunnel was created   requiring a second incision. Using this access, a 19 cm tip to cuff tunneled dialysis catheter was advanced under fluoroscopic guidance until the tip was in the proximal right atrium. Final position was confirmed with a spot radiograph from the   fluoroscopic unit. The catheter was tested and found to flush and aspirate appropriately.     FINDINGS:  At the completion of the study, a spot radiograph was performed utilizing the in room fluoroscopic unit. Imaging demonstrates the tunneled dialysis catheter tip positioned in the proximal right atrium.                                                                      IMPRESSION:    1.  Successful tunneled dialysis catheter  "placement.  2.  The catheter is ready for use.     Order-Level Documents:    There are no order-level documents.    NPO Status:  midnight  Anticoagulation/Antiplatelets/Bleeding tendencies:  Eliquis - no AC hold required for procedure  Antibiotics:  Ancef 2g ordered for procedure    REVIEW OF SYSTEMS:  A comprehensive 10-point review of systems was performed. All systems were reviewed and negative with exception to those reported in the HPI.     PAST MEDICAL HISTORY:  No past medical history on file.    PAST SURGICAL HISTORY:  Past Surgical History:   Procedure Laterality Date    HC DILATION/CURETTAGE DIAG/THER NON OB      Description: Dilation And Curettage;  Recorded: 01/18/2010;    IR CVC TUNNEL PLACEMENT > 5 YRS OF AGE  10/30/2023    PICC TRIPLE LUMEN PLACEMENT  10/30/2023    ZZC COLOSTOMY      Description: Colostomy;  Recorded: 11/05/2012;    ZZC PART REMOVAL COLON W ANASTOMOSIS      Description: Partial Colectomy;  Recorded: 11/05/2012;  Comments: with colostomy    ZZC TOTAL HIP ARTHROPLASTY      Description: Total Hip Replacement;  Recorded: 01/18/2010;       ALLERGIES:  Allergies   Allergen Reactions    Allopurinol Diarrhea    Lisinopril Cough     initiated 5/14/1996 and stopped          MEDICATIONS:  No current facility-administered medications for this encounter.     Current Outpatient Medications   Medication    baclofen (LIORESAL) 10 MG tablet    Bismuth Tribromoph-Petrolatum (XEROFORM PETROLAT GAUZE 5\"X9\") MISC    calamine 8-8 % lotion    calcium citrate-vitamin D (CITRACAL) 315-200 MG-UNIT TABS per tablet    cholecalciferol, vitamin D3, 50 mcg (2,000 unit) Tab    collagenase (SANTYL) 250 UNIT/GM external ointment    collagenase (SANTYL) 250 UNIT/GM external ointment    ELIQUIS ANTICOAGULANT 2.5 MG tablet    famotidine (PEPCID) 20 MG tablet    febuxostat (ULORIC) 40 MG TABS tablet    folic acid (FOLVITE) 1 MG tablet    furosemide (LASIX) 40 MG tablet    lactobacillus rhamnosus, GG, (CULTURELL) capsule " "   levothyroxine (SYNTHROID/LEVOTHROID) 25 MCG tablet    loperamide (IMODIUM) 2 MG capsule    menthol-zinc oxide (CALMOSEPTINE) 0.44-20.6 % OINT ointment    menthol-zinc oxide (CALMOSEPTINE) 0.44-20.625 % OINT ointment    miconazole (MICATIN) 2 % external powder    midodrine (PROAMATINE) 10 MG tablet    midodrine (PROAMATINE) 5 MG tablet    mirtazapine (REMERON) 7.5 MG tablet    rivaroxaban ANTICOAGULANT (XARELTO ANTICOAGULANT) 15 MG TABS tablet    sulfamethoxazole-trimethoprim (BACTRIM DS) 800-160 MG tablet    vitamin B1 (THIAMINE) 100 MG tablet        LABS:  No results found for: \"INR\"   Hemoglobin   Date Value Ref Range Status   01/17/2024 13.9 11.7 - 15.7 g/dL Final     Platelet Count   Date Value Ref Range Status   01/17/2024 221 150 - 450 10e3/uL Final     Creatinine   Date Value Ref Range Status   11/16/2023 2.38 (H) 0.51 - 0.95 mg/dL Final     Potassium   Date Value Ref Range Status   11/16/2023 3.2 (L) 3.4 - 5.3 mmol/L Final   03/11/2022 4.8 3.5 - 5.0 mmol/L Final         EXAM:  /67   Pulse 105   Temp 97.1  F (36.2  C) (Tympanic)   Resp 14   Ht 1.549 m (5' 1\")   Wt 81 kg (178 lb 9.2 oz)   LMP  (LMP Unknown)   SpO2 96%   BMI 33.74 kg/m    General:  Stable.  In no acute distress.    Neuro:  A&O x 3. Moves all extremities equally.  Resp:  Lungs clear to auscultation bilaterally.  Cardio:  S1S2 and reg, with murmur, clicks or rubs  Skin:  Without excoriations, ecchymosis, erythema, lesions or open sores on bilateral upper neck and chest; however, RU chest with bandage at site of previously inserted HD catheter.      Pre-Sedation Assessment:  Mallampati Airway Classification:  I - Faucial pillars, soft palate, and uvula are visible  Previous reaction to anesthesia/sedation:  No  Sedation plan based on assessment: Moderate (conscious) sedation  ASA Classification: Class 3 - SEVERE SYSTEMIC DISEASE, DEFINITE FUNCTIONAL LIMITATIONS.   Code Status/Advanced care directive: Full Code intra procedure, per " discussion with patient.     ASSESSMENT:  83yo female with ESRD on HD via tunneled HD line on MWF. Right chest tunneled HD line fell out this AM and in need of replacement.    - No AC hold required for procedure  - Ancef 2g ordered for procedure  - Hgb stable this AM 13.9    PLAN:    Hypoallergenic dressing as able to dress after procedure    Tunneled hemodialysis catheter replacement with sedation.    Recommendations and procedural education reviewed with patient/family in detail including, but not limited to risks, benefits and alternatives with understanding verbalized by patient/family.    Thank you for kindly for this consultation.     Total time spent on the date of the encounter: 45 minutes.      RUPERT Jack CNP  Interventional Radiology  [phone]

## 2024-01-30 NOTE — TELEPHONE ENCOUNTER
Caller: regan Duong    Provider: EMNA Rivera    Detailed reason for call: Daughter states patient has not received any supplies since their last appointment with Dr. Rivera; she has enough right now to last until about Friday or Saturday this week.     Best phone number to contact: [phone]    Best time to contact: Any time    Ok to leave a detailed message: Yes    Ok to speak to authorized person if needed: Yes: Rhoda      (Noted to patient if reason is related to wound or incision, to please send a photo via email or Splunk.)

## 2024-01-30 NOTE — TELEPHONE ENCOUNTER
Writer spoke with Rhoda, informed her  that supplies was not ordered.     She requested same order that was sent on 12/20- writer reordered from [facility]. Pt did not need more Santyl.

## 2024-01-30 NOTE — LETTER
24             [facility]             Wound Dressing Rx and Order Form             Order Status:reorder             Verbal: Ana AGUSTIN  [facility]   Account # [id number]  Fax: [phone]  Customer Service: [phone]          Patient Info:  Name: Mely Alegria  : 1940  Address:   [facility] [address] [facility] [address]  Phone: [phone]      Insurance Info:  PRIMARY INSURANCE: Payor: [employer] / Plan: [facility] MEDICARE ADVANTAGE / Product Type: HMO /   Primary Policy ID#: [id number]  SECONDARY INSURANCE:  N/A   Secondary Policy ID#: N/A    Physician Info:  DrRedd Name:  Giles Rivera DPM   Dept Address/Phones:   [address] [address] [phone]  Dept: [phone]  Fax: [phone]        Wound info:  No diagnosis found.    Wound Foot Other (comment) (Active)       Wound Heel Skin tear (Active)       VASC Wound left foot (Active)   Pre Size Length 3 23 1500   Pre Size Width 2.5 23 1500   Pre Size Depth 0.2 23 1500   Pre Total Sq cm 7.5 23 1500     Drainage: moderate  Thickness:  Full  Duration of Need: 60  Days Supply: 30  Start Date: 2023  Starter Kit: Ancillary Kit (saline, gloves, gauze)  Qualifying wound/Debridement: Yes     Dressing Type Brand Size Days Supply  15/30 Quantity  changes/week   Secondary Square gauze   4''x4'' 30 DAILY    Sof form roll gauze   4''x75'' 30 DAILY   Tape Papertape  1in 30 DAILY         No substitutions preferred. Call [phone].    OK to forward to covered supplier.    Electronically Signed Physician: Giles Rivera DPM Date: 24

## 2024-01-31 NOTE — PROGRESS NOTES
ASSESSMENT: Mely Alegria is a 84 year old female who presents for consultation at the request of PCP Oscar Hopper, with a past medical history significant for hypercholesterolemia, persistent atrial fibrillation, hypertension, CHF, chronic kidney disease stage IV-on Monday Wednesday Friday hemodialysis, chronic knee pain, diaphragmatic hernia, vitamin D deficiency, ulcerative pancolitis, DNR who presents today for new patient evaluation of:    -Chronic bilateral low back pain mostly with standing and walking consistent with neurogenic claudication as well as some pain with bending and twisting.  MRI with L4-5 spondylolisthesis with significant central and bilateral foraminal stenosis.    Patient is neurologically intact on exam. No myelopathic or red flag symptoms.          No data to display                     Diagnoses and all orders for this visit:  Spinal stenosis of lumbar region with neurogenic claudication  -     XR Lumbar Spine G/E 4 Views; Future  -     Physical Therapy Referral; Future  -     PAIN Transforaminal MIRACLE Inj Lumbosacral Adolfo; Future  Chronic bilateral low back pain without sciatica  -     Physical Therapy Referral; Future  Lumbar foraminal stenosis  -     XR Lumbar Spine G/E 4 Views; Future  -     Physical Therapy Referral; Future  -     PAIN Transforaminal MIRACLE Inj Lumbosacral Adolfo; Future  DDD (degenerative disc disease), lumbar  -     Physical Therapy Referral; Future  Spondylolisthesis of lumbar region  -     XR Lumbar Spine G/E 4 Views; Future  -     Physical Therapy Referral; Future  -     PAIN Transforaminal MIRACLE Inj Lumbosacral Adolfo; Future  Other orders  -     Spine  Referral    PLAN:  Reviewed spine anatomy and disease process. Discussed diagnosis and treatment options with the patient today. A shared decision making model was used.  The patient's values and choices were respected. The following represents what was discussed and decided upon by the provider and the  patient.      -DIAGNOSTIC TESTS:  Images were personally reviewed and interpreted and explained to patient today using spine model.   --Ordered flexion-extension x-ray to evaluate L4-5 spondylolisthesis stability.  -- Cervical spine CT scan 11/5/2023 with no acute fracture.  Grade 1 spondylolisthesis L4-5 with multilevel facet arthropathy and multilevel disc height loss.  -- Lumbar spine MRI 10/12/2023 with L4-5 disc bulging on the left with left lateral recess stenosis and L5 impingement, severe left, mild right foraminal stenosis and mild central stenosis.  L3-4 mild bilateral foraminal stenosis.  L5-S1 severe bilateral foraminal stenosis.    -PHYSICAL THERAPY: Referral to physical therapy placed to address home exercises for core strengthening.  Discussed the importance of core strengthening, ROM, stretching exercises with the patient and how each of these entities is important in decreasing pain.  Explained to the patient that the purpose of physical therapy is to teach the patient a home exercise program.  These exercises need to be performed every day in order to decrease pain and prevent future occurrences of pain.        -MEDICATIONS: No changes in medications.  Discussed multiple medication options today with patient. Discussed risks, side effects, and proper use of medications. Patient verbalized understanding.    -INTERVENTIONS: Ordered bilateral L4-5 transforaminal epidural steroid injection to see if we get further relief of chronic low back pain consistent with neurogenic claudication.  If no benefit with this injection we could consider medial branch block targeting the facet arthropathy versus bilateral L5-S1 TFESI.    Lumbar spine MRI with distribution on the left L4-5 with left L5 compression and severe left foraminal stenosis noted at both left L4-5 and left L5-S1 levels.  Discussed risks and benefits of injections with patient today.  **Eliquis anticoagulation medication.  Prior to HD she was on  "Xarelto.    -PATIENT EDUCATION:  Total time of 65 minutes, on the day of service, spent with the patient, reviewing the chart, placing orders, and documenting.     -FOLLOW-UP:   Follow-up for injection at the spine center then 2 weeks postinjection.    Advised patient to call the Spine Center if symptoms worsen or you have problems controlling bladder and bowel function.   ______________________________________________________________________    SUBJECTIVE:  HPI:  Mely Alegria  Is a 84 year old female who presents today for new patient evaluation of low back pain generalized lower lumbar spine at the belt line that is been ongoing for 6 months or so, no known injury.  She does report that her pain is fairly tolerable with sitting and laying down however becomes very severe with transition from sit to stand any type of standing or walking, lifting anything or moving in general.  She denies any radiating lower extremity pain.  Denies any recent trips or falls or balance changes.  Denies bowel or bladder loss control.    Patient does report that she has chronic bone-on-bone osteoarthritis in the left knee however as well.  Patient is dealing with a left foot wound and has a nonweightbearing boot on to protect the wound and help with healing as well, she is getting regular debridements for this.    Patient's son Tatyana was present today during entire visit and added to past medical/surgical/family/social history and history of presenting illness.     -Treatment to Date: No prior spinal surgery or spinal injections.  No prior physical therapy for back pain.    -Medications:  Baclofen 10 mg    Current Outpatient Medications   Medication    ELIQUIS ANTICOAGULANT 2.5 MG tablet    baclofen (LIORESAL) 10 MG tablet    Bismuth Tribromoph-Petrolatum (XEROFORM PETROLAT GAUZE 5\"X9\") MISC    calamine 8-8 % lotion    calcium citrate-vitamin D (CITRACAL) 315-200 MG-UNIT TABS per tablet    cholecalciferol, vitamin D3, 50 mcg (2,000 " unit) Tab    collagenase (SANTYL) 250 UNIT/GM external ointment    collagenase (SANTYL) 250 UNIT/GM external ointment    famotidine (PEPCID) 20 MG tablet    folic acid (FOLVITE) 1 MG tablet    furosemide (LASIX) 40 MG tablet    lactobacillus rhamnosus, GG, (CULTURELL) capsule    levothyroxine (SYNTHROID/LEVOTHROID) 25 MCG tablet    loperamide (IMODIUM) 2 MG capsule    menthol-zinc oxide (CALMOSEPTINE) 0.44-20.6 % OINT ointment    menthol-zinc oxide (CALMOSEPTINE) 0.44-20.625 % OINT ointment    miconazole (MICATIN) 2 % external powder    midodrine (PROAMATINE) 10 MG tablet    midodrine (PROAMATINE) 5 MG tablet    mirtazapine (REMERON) 7.5 MG tablet    rivaroxaban ANTICOAGULANT (XARELTO ANTICOAGULANT) 15 MG TABS tablet    sulfamethoxazole-trimethoprim (BACTRIM DS) 800-160 MG tablet    vitamin B1 (THIAMINE) 100 MG tablet     No current facility-administered medications for this visit.       Allergies   Allergen Reactions    Allopurinol Diarrhea    Lisinopril Cough     initiated 5/14/1996 and stopped         Past Medical History:   Diagnosis Date    Chronic atrial fibrillation (H)         Patient Active Problem List   Diagnosis    Vitamin D Deficiency    Pure hypercholesterolemia    History of Helicobacter pylori gastritis    Diaphragmatic hernia without obstruction and without gangrene    Ulcerative pancolitis without complication (H)    Chronic pain of left knee    Essential hypertension with goal blood pressure less than 140/90    DNR (do not resuscitate)    Adult BMI 40.0-44.9 kg/sq m (H)    Chronic kidney disease, stage 3 (H)    Stage 4 chronic kidney disease (H)    History of left hip replacement    Persistent atrial fibrillation (H)    CHF (congestive heart failure) (H)    Acute renal failure superimposed on chronic kidney disease, unspecified acute renal failure type, unspecified CKD stage  (H24)    Ulcer of left foot, limited to breakdown of skin (H)    Cellulitis of left foot       Past Surgical History:    Procedure Laterality Date    HC DILATION/CURETTAGE DIAG/THER NON OB      Description: Dilation And Curettage;  Recorded: 01/18/2010;    IR CVC TUNNEL PLACEMENT > 5 YRS OF AGE  10/30/2023    IR CVC TUNNEL PLACEMENT > 5 YRS OF AGE  1/17/2024    PICC TRIPLE LUMEN PLACEMENT  10/30/2023    ZZC COLOSTOMY      Description: Colostomy;  Recorded: 11/05/2012;    ZZC PART REMOVAL COLON W ANASTOMOSIS      Description: Partial Colectomy;  Recorded: 11/05/2012;  Comments: with colostomy    ZZC TOTAL HIP ARTHROPLASTY      Description: Total Hip Replacement;  Recorded: 01/18/2010;       Family History   Problem Relation Age of Onset    Diabetes Mother        Reviewed past medical, surgical, and family history with patient found on new patient intake packet located in EMR Media tab.     SOCIAL HX: Patient denies smoking/tobacco use, denies alcohol use, denies history being heavy drinker.  Denies recreational drug use.    ROS: Positive for back pain, open wounds, easy bruising.  Specifically negative for bowel/bladder dysfunction, balance changes, headache, dizziness, foot drop, fevers, chills, appetite changes, nausea/vomiting, unexplained weight loss. Otherwise 13 systems reviewed are negative. Please see the patient's intake questionnaire from today for details.    OBJECTIVE:  /62 (BP Location: Right arm, Patient Position: Sitting)   LMP  (LMP Unknown)     PHYSICAL EXAMINATION:    --CONSTITUTIONAL:  Vital signs as above.  No acute distress.  The patient is well nourished and well groomed.  --PSYCHIATRIC:  Appropriate mood and affect. The patient is awake, alert, oriented to person, place, time and answering questions appropriately with clear speech.    --SKIN:  Skin over the face, bilateral lower extremities, and posterior torso is clean, dry, intact without rashes.    --RESPIRATORY: Normal rhythm and effort. No abnormal accessory muscle breathing patterns noted.   --STANDING EXAMINATION:  Normal lumbar lordosis noted, no  lateral shift.  --MUSCULOSKELETAL: Range of motion is limited in forward flexion and extension due to back pain.  Tenderness to palpation lumbar sacral junction.  Straight leg raising is negative to radicular pain. Sciatic notch non-tender.  --GROSS MOTOR: Gait is antalgic, with nonweightbearing left boot.  Transition from sit to stand with difficulty due to pain.  --LOWER EXTREMITY MOTOR TESTING:  Plantar flexion left 5/5, right 5/5   Dorsiflexion left 5/5, right 5/5   Great toe MTP extension left 5/5, right 5/5  Knee flexion left 5/5, right 5/5  Knee extension left 5/5, right 5/5   Hip flexion left 5/5, right 5/5  Hip abduction left 5/5, right 5/5  Hip adduction left 5/5, right 5/5   --HIPS: Full range of motion bilaterally.   --NEUROLOGICAL:  2/4 patellar reflexes bilaterally..  Sensation to light touch is intact in the bilateral L4, L5, and S1 dermatomes.   --VASCULAR:  Bilateral lower extremities are warm.      RESULTS: Prior medical records from [facility] and Care Everywhere were reviewed today.    Imaging: Spine imaging was personally reviewed and interpreted today. The images were shown to the patient and the findings were explained using a spine model.      Lumbar spine MRI reviewed.

## 2024-01-31 NOTE — PATIENT INSTRUCTIONS
~Spine Center Scheduling #[phone].  ~Please call our [facility] Spine Nurse Navigation #[phone] with any questions or concerns about your treatment plan, if symptoms worsen and you would like to be seen urgently, or if you have problems controlling bladder and bowel function.  ~For any future flareups or new symptoms, recommend follow-up in clinic or contact the nurse navigator line.  ~Please note that any My Chart messages may take multiple days for a response due to the high volume of patients seen in clinic.  Anything sent Thursday night or after will be answered the following week when able, as Arabella Lim CNP does not work in clinic on Fridays.   ~Arabella Lim CNP is at the [facility] on the first and third Tuesdays of the month only, otherwise primarily at the [place] [facility].        ~You have been referred for Physical Therapy to [facility]. They will call you to schedule an appointment.      Scheduling phone number is [phone] for [facility] [place], [place], or [place] location.  If you have not heard from the scheduling office within 2 business days, please call [phone] for ALL other locations.    Discussed the importance of core strengthening, ROM, stretching exercises and how each of these entities is important in decreasing pain and improving long term spine health.  The purpose of physical therapy is to teach you an individualized home exercise program.  These exercises need to be performed every day in order to decrease pain and prevent future occurrences of pain.           Imaging has been ordered. Radiology will call you to schedule. Please call below if you do not hear from them in the next couple of days.     [facility] Radiology Scheduling    Please call [phone] to schedule your image(s) (select option #1). There are 3 different locations, see below.     [facility]  [address]  [facility] [address]    [facility]  [address], [address]   [facility] [address]    [facility]  [address] [id number]       An injection has been ordered today to potentially help with your pain symptoms. These injections do not fix what is going on in your back, therefore they typically do not take away the pain completely, however they can many times help improve symptoms. Injections should always be completed along with other modalities such as physical therapy for the best long term outcomes. If injections alone are done, then pain will likely return.     [facility] Injection Requirements    A  is required for all fluoroscopically-guided injections.  Injection appointments may be cancelled if there are signs/symptoms of an active infection or if the patient is being actively treated with antibiotics for a diagnosed infection.  Patients may have their steroid injection cancelled if they have had another steroid injection within 2 weeks.  Diabetic patients will have their blood glucose levels checked the day of their injection and the appointment will be rescheduled if the blood glucose level is 300 or higher.  Patients with allergies to cortisone, local anesthetics, iodine, or contrast dye should contact the Spine Center to further discuss these considerations.  Patients scheduled for medial branch block diagnostic injections should refrain from taking pain medication the day of the procedure.  The medial branch block injection appointment will be rescheduled if the patient's pain rating is not 5/10 or greater at the time of the procedure.  Patients taking warfarin/Coumadin will have their INR checked the day of the procedure and the procedure may be rescheduled if the INR is greater than 3.0.  Please contact the Spine Center (#[phone]) if you are taking any prescription blood-thinning  medications (warfarin, Plavix, Lovenox, Eliquis, Brilinta, Effient, etc.) as special dosing adjustments may need to be made depending on the type of injection you are scheduled to receive.  It is recommended that you delay having your steroid injection if you have received a flu shot or shingles vaccine within 2 weeks.

## 2024-01-31 NOTE — LETTER
1/31/2024         RE: Mely Alegria  [address] [facility] [address]        Dear Colleague,    Thank you for referring your patient, Mely Alegria, to the [facility]. Please see a copy of my visit note below.    ASSESSMENT: Mely Alegria is a 84 year old female who presents for consultation at the request of PCP Oscar Hopper, with a past medical history significant for hypercholesterolemia, persistent atrial fibrillation, hypertension, CHF, chronic kidney disease stage IV-on Monday Wednesday Friday hemodialysis, chronic knee pain, diaphragmatic hernia, vitamin D deficiency, ulcerative pancolitis, DNR who presents today for new patient evaluation of:    -Chronic bilateral low back pain mostly with standing and walking consistent with neurogenic claudication as well as some pain with bending and twisting.  MRI with L4-5 spondylolisthesis with significant central and bilateral foraminal stenosis.    Patient is neurologically intact on exam. No myelopathic or red flag symptoms.          No data to display                     Diagnoses and all orders for this visit:  Spinal stenosis of lumbar region with neurogenic claudication  -     XR Lumbar Spine G/E 4 Views; Future  -     Physical Therapy Referral; Future  -     PAIN Transforaminal MIRACLE Inj Lumbosacral Adolfo; Future  Chronic bilateral low back pain without sciatica  -     Physical Therapy Referral; Future  Lumbar foraminal stenosis  -     XR Lumbar Spine G/E 4 Views; Future  -     Physical Therapy Referral; Future  -     PAIN Transforaminal MIRACLE Inj Lumbosacral Adolfo; Future  DDD (degenerative disc disease), lumbar  -     Physical Therapy Referral; Future  Spondylolisthesis of lumbar region  -     XR Lumbar Spine G/E 4 Views; Future  -     Physical Therapy Referral; Future  -     PAIN Transforaminal MIRACLE Inj Lumbosacral Adolfo; Future  Other orders  -     Spine  Referral    PLAN:  Reviewed spine anatomy and  disease process. Discussed diagnosis and treatment options with the patient today. A shared decision making model was used.  The patient's values and choices were respected. The following represents what was discussed and decided upon by the provider and the patient.      -DIAGNOSTIC TESTS:  Images were personally reviewed and interpreted and explained to patient today using spine model.   --Ordered flexion-extension x-ray to evaluate L4-5 spondylolisthesis stability.  -- Cervical spine CT scan 11/5/2023 with no acute fracture.  Grade 1 spondylolisthesis L4-5 with multilevel facet arthropathy and multilevel disc height loss.  -- Lumbar spine MRI 10/12/2023 with L4-5 disc bulging on the left with left lateral recess stenosis and L5 impingement, severe left, mild right foraminal stenosis and mild central stenosis.  L3-4 mild bilateral foraminal stenosis.  L5-S1 severe bilateral foraminal stenosis.    -PHYSICAL THERAPY: Referral to physical therapy placed to address home exercises for core strengthening.  Discussed the importance of core strengthening, ROM, stretching exercises with the patient and how each of these entities is important in decreasing pain.  Explained to the patient that the purpose of physical therapy is to teach the patient a home exercise program.  These exercises need to be performed every day in order to decrease pain and prevent future occurrences of pain.        -MEDICATIONS: No changes in medications.  Discussed multiple medication options today with patient. Discussed risks, side effects, and proper use of medications. Patient verbalized understanding.    -INTERVENTIONS: Ordered bilateral L4-5 transforaminal epidural steroid injection to see if we get further relief of chronic low back pain consistent with neurogenic claudication.  If no benefit with this injection we could consider medial branch block targeting the facet arthropathy versus bilateral L5-S1 TFESI.    Lumbar spine MRI with  distribution on the left L4-5 with left L5 compression and severe left foraminal stenosis noted at both left L4-5 and left L5-S1 levels.  Discussed risks and benefits of injections with patient today.  **Eliquis anticoagulation medication.  Prior to HD she was on Xarelto.    -PATIENT EDUCATION:  Total time of 65 minutes, on the day of service, spent with the patient, reviewing the chart, placing orders, and documenting.     -FOLLOW-UP:   Follow-up for injection at the spine center then 2 weeks postinjection.    Advised patient to call the Spine Center if symptoms worsen or you have problems controlling bladder and bowel function.   ______________________________________________________________________    SUBJECTIVE:  HPI:  Mely Alegria  Is a 84 year old female who presents today for new patient evaluation of low back pain generalized lower lumbar spine at the belt line that is been ongoing for 6 months or so, no known injury.  She does report that her pain is fairly tolerable with sitting and laying down however becomes very severe with transition from sit to stand any type of standing or walking, lifting anything or moving in general.  She denies any radiating lower extremity pain.  Denies any recent trips or falls or balance changes.  Denies bowel or bladder loss control.    Patient does report that she has chronic bone-on-bone osteoarthritis in the left knee however as well.  Patient is dealing with a left foot wound and has a nonweightbearing boot on to protect the wound and help with healing as well, she is getting regular debridements for this.    Patient's son Tatyana was present today during entire visit and added to past medical/surgical/family/social history and history of presenting illness.     -Treatment to Date: No prior spinal surgery or spinal injections.  No prior physical therapy for back pain.    -Medications:  Baclofen 10 mg    Current Outpatient Medications   Medication     ELIQUIS  "ANTICOAGULANT 2.5 MG tablet     baclofen (LIORESAL) 10 MG tablet     Bismuth Tribromoph-Petrolatum (XEROFORM PETROLAT GAUZE 5\"X9\") MISC     calamine 8-8 % lotion     calcium citrate-vitamin D (CITRACAL) 315-200 MG-UNIT TABS per tablet     cholecalciferol, vitamin D3, 50 mcg (2,000 unit) Tab     collagenase (SANTYL) 250 UNIT/GM external ointment     collagenase (SANTYL) 250 UNIT/GM external ointment     famotidine (PEPCID) 20 MG tablet     folic acid (FOLVITE) 1 MG tablet     furosemide (LASIX) 40 MG tablet     lactobacillus rhamnosus, GG, (CULTURELL) capsule     levothyroxine (SYNTHROID/LEVOTHROID) 25 MCG tablet     loperamide (IMODIUM) 2 MG capsule     menthol-zinc oxide (CALMOSEPTINE) 0.44-20.6 % OINT ointment     menthol-zinc oxide (CALMOSEPTINE) 0.44-20.625 % OINT ointment     miconazole (MICATIN) 2 % external powder     midodrine (PROAMATINE) 10 MG tablet     midodrine (PROAMATINE) 5 MG tablet     mirtazapine (REMERON) 7.5 MG tablet     rivaroxaban ANTICOAGULANT (XARELTO ANTICOAGULANT) 15 MG TABS tablet     sulfamethoxazole-trimethoprim (BACTRIM DS) 800-160 MG tablet     vitamin B1 (THIAMINE) 100 MG tablet     No current facility-administered medications for this visit.       Allergies   Allergen Reactions     Allopurinol Diarrhea     Lisinopril Cough     initiated 5/14/1996 and stopped         Past Medical History:   Diagnosis Date     Chronic atrial fibrillation (H)         Patient Active Problem List   Diagnosis     Vitamin D Deficiency     Pure hypercholesterolemia     History of Helicobacter pylori gastritis     Diaphragmatic hernia without obstruction and without gangrene     Ulcerative pancolitis without complication (H)     Chronic pain of left knee     Essential hypertension with goal blood pressure less than 140/90     DNR (do not resuscitate)     Adult BMI 40.0-44.9 kg/sq m (H)     Chronic kidney disease, stage 3 (H)     Stage 4 chronic kidney disease (H)     History of left hip replacement     " Persistent atrial fibrillation (H)     CHF (congestive heart failure) (H)     Acute renal failure superimposed on chronic kidney disease, unspecified acute renal failure type, unspecified CKD stage  (H24)     Ulcer of left foot, limited to breakdown of skin (H)     Cellulitis of left foot       Past Surgical History:   Procedure Laterality Date     HC DILATION/CURETTAGE DIAG/THER NON OB      Description: Dilation And Curettage;  Recorded: 01/18/2010;     IR CVC TUNNEL PLACEMENT > 5 YRS OF AGE  10/30/2023     IR CVC TUNNEL PLACEMENT > 5 YRS OF AGE  1/17/2024     PICC TRIPLE LUMEN PLACEMENT  10/30/2023     ZZC COLOSTOMY      Description: Colostomy;  Recorded: 11/05/2012;     ZZC PART REMOVAL COLON W ANASTOMOSIS      Description: Partial Colectomy;  Recorded: 11/05/2012;  Comments: with colostomy     ZZC TOTAL HIP ARTHROPLASTY      Description: Total Hip Replacement;  Recorded: 01/18/2010;       Family History   Problem Relation Age of Onset     Diabetes Mother        Reviewed past medical, surgical, and family history with patient found on new patient intake packet located in EMR Media tab.     SOCIAL HX: Patient denies smoking/tobacco use, denies alcohol use, denies history being heavy drinker.  Denies recreational drug use.    ROS: Positive for back pain, open wounds, easy bruising.  Specifically negative for bowel/bladder dysfunction, balance changes, headache, dizziness, foot drop, fevers, chills, appetite changes, nausea/vomiting, unexplained weight loss. Otherwise 13 systems reviewed are negative. Please see the patient's intake questionnaire from today for details.    OBJECTIVE:  /62 (BP Location: Right arm, Patient Position: Sitting)   LMP  (LMP Unknown)     PHYSICAL EXAMINATION:    --CONSTITUTIONAL:  Vital signs as above.  No acute distress.  The patient is well nourished and well groomed.  --PSYCHIATRIC:  Appropriate mood and affect. The patient is awake, alert, oriented to person, place, time and  answering questions appropriately with clear speech.    --SKIN:  Skin over the face, bilateral lower extremities, and posterior torso is clean, dry, intact without rashes.    --RESPIRATORY: Normal rhythm and effort. No abnormal accessory muscle breathing patterns noted.   --STANDING EXAMINATION:  Normal lumbar lordosis noted, no lateral shift.  --MUSCULOSKELETAL: Range of motion is limited in forward flexion and extension due to back pain.  Tenderness to palpation lumbar sacral junction.  Straight leg raising is negative to radicular pain. Sciatic notch non-tender.  --GROSS MOTOR: Gait is antalgic, with nonweightbearing left boot.  Transition from sit to stand with difficulty due to pain.  --LOWER EXTREMITY MOTOR TESTING:  Plantar flexion left 5/5, right 5/5   Dorsiflexion left 5/5, right 5/5   Great toe MTP extension left 5/5, right 5/5  Knee flexion left 5/5, right 5/5  Knee extension left 5/5, right 5/5   Hip flexion left 5/5, right 5/5  Hip abduction left 5/5, right 5/5  Hip adduction left 5/5, right 5/5   --HIPS: Full range of motion bilaterally.   --NEUROLOGICAL:  2/4 patellar reflexes bilaterally..  Sensation to light touch is intact in the bilateral L4, L5, and S1 dermatomes.   --VASCULAR:  Bilateral lower extremities are warm.      RESULTS: Prior medical records from [facility]where were reviewed today.    Imaging: Spine imaging was personally reviewed and interpreted today. The images were shown to the patient and the findings were explained using a spine model.      Lumbar spine MRI reviewed.           Again, thank you for allowing me to participate in the care of your patient.        Sincerely,        Arabella Lim, CNP

## 2024-02-14 NOTE — PATIENT INSTRUCTIONS
Important lnstructions        WEIGHT BEARING STATUS: You are to remain NON WEIGHT BEARING on your left foot. NON WEIGHT BEARING MEANS NO PRESSURE ON YOUR FOOT OR HEEL AT ANY TIME FOR ANY REASON!    2. OFFLOADING DEVICE: Must use a A WHEELCHAIR at all times! (do not use affected foot to push wheelchair)    3. STABILIZATION DEVICE: Use a CAM BOOT . You will need to WEAR THIS ANYTIME YOU ARE UP AND OUT OF BED, IT IS OKAY TO REMOVE WHEN YOU ARE SLEEPING..     4. ELEVATE: Elevating your leg means laying with your head on a pillow and your foot ABOVE YOUR HEART.     5. DO NOT MOVE YOUR FOOT.  There is a risk of worsening the wound or incision. To give yourself a higher chance of healing, please DO NOT swing foot back and forth and wiggle foot/toes especially when inside a stabilization device. Limited movement is allowable with therapy as recommended by the doctor.     6. TAKE A PROTEIN SHAKE TWICE A DAY.  (For ex: Boost, Ensure, Glucerna)      Dressing Change lnstructions    Dressing change daily with Santyl to left foot wound.     How to Use Collagenase  SANTYL  Ointment    DAILY CLEANSE  Gently cleanse the wound with sterile saline    APPLY  Apply SANTYL  Ointment at the thickness of a nickel or the thickness of the cream inside an Oreo cookie    COVER  Cover the wound with a clean moistened gauze followed by dry gauze if wound bed is dry. Wounds with sufficient exudate will naturally activate the collagenase enzyme and do not need a moistened gauze applied. Moisture helps release the active ingredient in SANTYL  to make the ointment most effective. Without the right amount of moisture, SANTYL  Ointment may not work properly. May apply adaptic touch over Santyl ointment to hold into place. Secure with roll gauze. Do not use dressings that contain silver or iodine with SANTYL  Ointment, as they may stop SANTYL  Ointment        It IS NOT ok to get your wound wet in the bath or shower    SEEK MEDICAL CARE IF:  You have  an increase in swelling, pain, or redness around the wound.  You have an increase in the amount of pus coming from the wound.  There is a bad smell coming from the wound.  The wound appears to be worsening/enlarging  You have a fever greater than 101.5 F      It is ok to continue current wound care treatment/products for the next 2-3 days until new wound care supplies are ordered and arrive. If longer than this please contact our office at [phone].    *Wheelchairs are never guaranteed at the front door, if you have your own wheel chair or knee walker, please bring that with you to your appointment. Thank you for your understanding!*    We want to hear from you!   In the next few weeks, you should receive a call or email to complete a survey about your visit at [facility]. Please help us improve your appointment experience by letting us know how we did today. We strive to make your experience good and value any ways in which we could do better.      We value your input and suggestions.    Thank you for choosing the [facility]!

## 2024-02-14 NOTE — PROGRESS NOTES
FOOT AND ANKLE SURGERY/PODIATRY Progress Note      ASSESSMENT:   Ulceration left foot into subcutaneous tissue   Venous stasis  ESRD      TREATMENT:  -I discussed with the patient that the dorsal lateral left foot ulceration is measuring smaller today with decreasing amount of fibrotic tissue present.  I recommend she continue with Santyl.    -After discussion of risk factors, nursing staff removed dressing, cleansed wound and consent obtained 2% Lidocaine HCL jelly was applied, under clean conditions, the left foot ulceration(s) were debrided using #15 blade scalpel.  Devitalized and nonviable tissue, along with any fibrin and slough, was removed to improve granulation tissue formation, stimulate wound healing, decrease overall bacteria load, disrupt biofilm formation and decrease edge senescence. Wound drainage was scant No. Total excisional debridement was 0.8 sq cm into the subcutaneous tissue with a depth of 0.2 cm.   Ulcers were improved afterwards and .  Measures were as noted on the flow sheet. Medi-honey with a gauze dressing was applied.    -She will follow-up in 3-4 weeks    Giles Rivera [facility]      HPI: Mely Alegria was seen again today for a left foot ulceration.  Since her last visit with me she has used Santyl every other day.  Patient's son is present for today's visit.    Past Medical History:   Diagnosis Date    Chronic atrial fibrillation (H)        Past Surgical History:   Procedure Laterality Date    HC DILATION/CURETTAGE DIAG/THER NON OB      Description: Dilation And Curettage;  Recorded: 01/18/2010;    IR CVC TUNNEL PLACEMENT > 5 YRS OF AGE  10/30/2023    IR CVC TUNNEL PLACEMENT > 5 YRS OF AGE  1/17/2024    PICC TRIPLE LUMEN PLACEMENT  10/30/2023    Z COLOSTOMY      Description: Colostomy;  Recorded: 11/05/2012;    ZZC PART REMOVAL COLON W ANASTOMOSIS      Description: Partial Colectomy;  Recorded: 11/05/2012;  Comments: with colostomy    ZZC  "TOTAL HIP ARTHROPLASTY      Description: Total Hip Replacement;  Recorded: 01/18/2010;       Allergies   Allergen Reactions    Allopurinol Diarrhea    Lisinopril Cough     initiated 5/14/1996 and stopped           Current Outpatient Medications:     Bismuth Tribromoph-Petrolatum (XEROFORM PETROLAT GAUZE 5\"X9\") MISC, Apply 1 each topically daily as needed (Wound dressing), Disp: 50 each, Rfl: 4    calamine 8-8 % lotion, Apply topically every hour as needed for itching, Disp: 177 mL, Rfl: 11    cholecalciferol, vitamin D3, 50 mcg (2,000 unit) Tab, [CHOLECALCIFEROL, VITAMIN D3, 50 MCG (2,000 UNIT) TAB] Take 1 tablet (2,000 Units total) by mouth daily., Disp: 100 each, Rfl: 3    collagenase (SANTYL) 250 UNIT/GM external ointment, Apply topically daily Total square centimeters of wound(s) being treated is 7.5 square cm, 30 day supply, Disp: 30 g, Rfl: 3    collagenase (SANTYL) 250 UNIT/GM external ointment, Apply topically daily, Disp: 30 g, Rfl: 3    ELIQUIS ANTICOAGULANT 2.5 MG tablet, Take 2.5 mg by mouth 2 times daily, Disp: , Rfl:     folic acid (FOLVITE) 1 MG tablet, Take 1 tablet (1 mg) by mouth daily, Disp: 90 tablet, Rfl: 3    furosemide (LASIX) 40 MG tablet, Take 1 tablet (40 mg) by mouth daily as needed, Disp: , Rfl:     lactobacillus rhamnosus, GG, (CULTURELL) capsule, Take 1 capsule by mouth 2 times daily, Disp: , Rfl:     levothyroxine (SYNTHROID/LEVOTHROID) 25 MCG tablet, Take 1 tablet (25 mcg) by mouth every morning (before breakfast), Disp: 90 tablet, Rfl: 3    loperamide (IMODIUM) 2 MG capsule, Take 1 capsule (2 mg) by mouth 4 times daily as needed for diarrhea, Disp: , Rfl:     menthol-zinc oxide (CALMOSEPTINE) 0.44-20.6 % OINT ointment, Apply topically 4 times daily as needed for skin protection or irritation, Disp: , Rfl:     menthol-zinc oxide (CALMOSEPTINE) 0.44-20.625 % OINT ointment, Apply topically 4 times daily as needed for skin protection, Disp: 113 g, Rfl: 11    miconazole (MICATIN) 2 % " external powder, Apply topically 2 times daily, Disp: , Rfl:     midodrine (PROAMATINE) 10 MG tablet, Take 1 tablet (10 mg) by mouth Every Mon, Wed, Fri Morning, Disp: 36 tablet, Rfl: 3    midodrine (PROAMATINE) 5 MG tablet, Take 1 tablet (5 mg) by mouth every hour as needed (on HD for SBP <90), Disp: , Rfl:     calcium citrate-vitamin D (CITRACAL) 315-200 MG-UNIT TABS per tablet, Take 1 tablet by mouth daily (Patient not taking: Reported on 1/31/2024), Disp: , Rfl:     famotidine (PEPCID) 20 MG tablet, Take 1 tablet (20 mg) by mouth daily (Patient not taking: Reported on 1/31/2024), Disp: 90 tablet, Rfl: 3    mirtazapine (REMERON) 7.5 MG tablet, Take 2 tablets (15 mg) by mouth at bedtime (Patient not taking: Reported on 1/31/2024), Disp: , Rfl:     rivaroxaban ANTICOAGULANT (XARELTO ANTICOAGULANT) 15 MG TABS tablet, Take 1 tablet (15 mg) by mouth daily (with dinner) (Patient not taking: Reported on 1/31/2024), Disp: 90 tablet, Rfl: 3    vitamin B1 (THIAMINE) 100 MG tablet, Take 1 tablet by mouth daily at 2 pm (Patient not taking: Reported on 1/31/2024), Disp: , Rfl:     Review of Systems - 10 point Review of Systems is negative except for left foot ulcer which is noted in HPI.      OBJECTIVE:  /82   Pulse 70   LMP  (LMP Unknown)   SpO2 97%   General appearance: Patient is alert and fully cooperative with history & exam.  No sign of distress is noted during the visit.    Vascular: Dorsalis pedis non-palpableLeft.  Dermatologic:    Wound Foot Other (comment) (Active)       Wound Heel Skin tear (Active)       VASC Wound left foot (Active)   Pre Size Length 3 12/27/23 1500   Pre Size Width 2.5 12/27/23 1500   Pre Size Depth 0.2 12/27/23 1500   Pre Total Sq cm 7 12/27/23 1500   Post Size Length 1.5 01/17/24 1500   Post Size Width 1.5 01/17/24 1500   Post Size Depth 0.2 01/17/24 1500   Post Total Sq cm 2.25 01/17/24 1500   Description macerated 12/27/23 1500   Mixed granular/fibrotic tissue ulceration dorsal  lateral left foot, no erythema.  Neurologic: Intact to light touch Left.  Musculoskeletal: Contracted digits noted Left.     colostrum

## 2024-02-20 NOTE — PATIENT INSTRUCTIONS
DISCHARGE INSTRUCTIONS    During office hours (8:00 a.m.- 4:00 p.m.) questions or concerns may be answered  by calling [facility] Center Navigation Nurses at  [phone].  Messages received after hours will be returned the following business day.      In the case of an emergency, please dial 911 or seek assistance at the nearest Emergency Room/Urgent Care facility.     All Patients:    You may experience an increase in your symptoms for the first 2 days (It may take anywhere between 2 days- 2 weeks for the steroid to have maximum effect).    You may use ice on the injection site, as frequently as 20 minutes each hour if needed.    You may take your pain medicine.    You may continue taking your regular medication after your injection. If you have had a Medial Branch Block you may resume pain medication once your pain diary is completed.    You may shower. No swimming, tub bath or hot tub for 48 hours.  You may remove your bandaid/bandage as soon as you are home.    You may resume light activities, as tolerated.    Resume your usual diet as tolerated.    It is strongly advised that you do not drive for 1-3 hours post injection.    If you have had oral sedation:  Do not drive for 8 hours post injection.      If you have had IV sedation:  Do not drive for 24 hours post injection.  Do not operate hazardous machinery or make important personal/business decisions for 24 hours.      POSSIBLE STEROID SIDE EFFECTS (If steroid/cortisone was used for your procedure)    -If you experience these symptoms, it should only last for a short period    Swelling of the legs              Skin redness (flushing)     Mouth (oral) irritation   Blood sugar (glucose) levels            Sweats                    Mood changes  Headache  Sleeplessness  Weakened immune system for up to 14 days, which could increase the risk of esther the COVID-19 virus and/or experiencing more severe symptoms of the disease, if exposed.  Decreased  effectiveness of the flu vaccine if given within 2 weeks of the steroid.         POSSIBLE PROCEDURE SIDE EFFECTS  -Call the Spine Center if you are concerned  Increased Pain           Increased numbness/tingling      Nausea/Vomiting          Bruising/bleeding at site      Redness or swelling                                              Difficulty walking      Weakness           Fever greater than 100.5    *In the event of a severe headache after an epidural steroid injection that is relieved by lying down, please call the [facility] to speak with a clinical staff member*

## 2024-02-28 NOTE — TELEPHONE ENCOUNTER
February 28, 2024    Handicap parking permit was received via fax for Dr. Hopper to sign.  Patient label was attached to paperwork and placed in provider's inbox to be signed.    Dee Dee Bates

## 2024-03-07 NOTE — PROGRESS NOTES
FOOT AND ANKLE SURGERY/PODIATRY Progress Note      ASSESSMENT:   Ulceration left foot, resolved  Venous stasis  ESRD      TREATMENT:  -I discussed with the patient and her son today that the dorsal lateral left foot ulceration has resolved.  I am pleased with her progress.    -Patient will return to regular shoes at this time and begin showering.    -Discussed importance of continued monitoring for any skin irritation or skin breakdown.    -All questions invited and answered.  Patient is discharged my care at this time but encouraged to follow-up with any problems questions or concerns they develop.    Giles Rivera DPM  [facility]      HPI: Mely Alegria was seen again today for a left foot ulceration.  Doing well today.  Patient's son is present for today's visit.    Past Medical History:   Diagnosis Date    Chronic atrial fibrillation (H)        Past Surgical History:   Procedure Laterality Date    HC DILATION/CURETTAGE DIAG/THER NON OB      Description: Dilation And Curettage;  Recorded: 01/18/2010;    IR CVC TUNNEL PLACEMENT > 5 YRS OF AGE  10/30/2023    IR CVC TUNNEL PLACEMENT > 5 YRS OF AGE  1/17/2024    PICC TRIPLE LUMEN PLACEMENT  10/30/2023    ZZC COLOSTOMY      Description: Colostomy;  Recorded: 11/05/2012;    ZZC PART REMOVAL COLON W ANASTOMOSIS      Description: Partial Colectomy;  Recorded: 11/05/2012;  Comments: with colostomy    ZZC TOTAL HIP ARTHROPLASTY      Description: Total Hip Replacement;  Recorded: 01/18/2010;       Allergies   Allergen Reactions    Allopurinol Diarrhea    Lisinopril Cough     initiated 5/14/1996 and stopped           Current Outpatient Medications:     ELIQUIS ANTICOAGULANT 2.5 MG tablet, Take 2.5 mg by mouth 2 times daily, Disp: , Rfl:     famotidine (PEPCID) 20 MG tablet, Take 1 tablet (20 mg) by mouth daily, Disp: 90 tablet, Rfl: 3    folic acid (FOLVITE) 1 MG tablet, Take 1 tablet (1 mg) by mouth daily, Disp: 90 tablet, Rfl: 3    levothyroxine  "(SYNTHROID/LEVOTHROID) 25 MCG tablet, Take 1 tablet (25 mcg) by mouth every morning (before breakfast), Disp: 90 tablet, Rfl: 3    predniSONE (DELTASONE) 10 MG tablet, Take 1 tablet (10 mg) by mouth every morning, Disp: 10 tablet, Rfl: 0    Bismuth Tribromoph-Petrolatum (XEROFORM PETROLAT GAUZE 5\"X9\") MISC, Apply 1 each topically daily as needed (Wound dressing), Disp: 50 each, Rfl: 4    calamine 8-8 % lotion, Apply topically every hour as needed for itching, Disp: 177 mL, Rfl: 11    calcium citrate-vitamin D (CITRACAL) 315-200 MG-UNIT TABS per tablet, Take 1 tablet by mouth daily (Patient not taking: Reported on 1/31/2024), Disp: , Rfl:     cholecalciferol, vitamin D3, 50 mcg (2,000 unit) Tab, [CHOLECALCIFEROL, VITAMIN D3, 50 MCG (2,000 UNIT) TAB] Take 1 tablet (2,000 Units total) by mouth daily., Disp: 100 each, Rfl: 3    collagenase (SANTYL) 250 UNIT/GM external ointment, Apply topically daily Total square centimeters of wound(s) being treated is 7.5 square cm, 30 day supply, Disp: 30 g, Rfl: 3    collagenase (SANTYL) 250 UNIT/GM external ointment, Apply topically daily, Disp: 30 g, Rfl: 3    furosemide (LASIX) 40 MG tablet, Take 1 tablet (40 mg) by mouth daily as needed (Patient not taking: Reported on 3/7/2024), Disp: , Rfl:     lactobacillus rhamnosus, GG, (CULTURELL) capsule, Take 1 capsule by mouth 2 times daily (Patient not taking: Reported on 3/7/2024), Disp: , Rfl:     loperamide (IMODIUM) 2 MG capsule, Take 1 capsule (2 mg) by mouth 4 times daily as needed for diarrhea (Patient not taking: Reported on 3/7/2024), Disp: , Rfl:     menthol-zinc oxide (CALMOSEPTINE) 0.44-20.6 % OINT ointment, Apply topically 4 times daily as needed for skin protection or irritation (Patient not taking: Reported on 3/7/2024), Disp: , Rfl:     menthol-zinc oxide (CALMOSEPTINE) 0.44-20.625 % OINT ointment, Apply topically 4 times daily as needed for skin protection (Patient not taking: Reported on 3/7/2024), Disp: 113 g, Rfl: " 11    miconazole (MICATIN) 2 % external powder, Apply topically 2 times daily (Patient not taking: Reported on 3/7/2024), Disp: , Rfl:     midodrine (PROAMATINE) 10 MG tablet, Take 1 tablet (10 mg) by mouth Every Mon, Wed, Fri Morning (Patient not taking: Reported on 3/7/2024), Disp: 36 tablet, Rfl: 3    midodrine (PROAMATINE) 5 MG tablet, Take 1 tablet (5 mg) by mouth every hour as needed (on HD for SBP <90) (Patient not taking: Reported on 3/7/2024), Disp: , Rfl:     mirtazapine (REMERON) 7.5 MG tablet, Take 2 tablets (15 mg) by mouth at bedtime (Patient not taking: Reported on 1/31/2024), Disp: , Rfl:     rivaroxaban ANTICOAGULANT (XARELTO ANTICOAGULANT) 15 MG TABS tablet, Take 1 tablet (15 mg) by mouth daily (with dinner) (Patient not taking: Reported on 1/31/2024), Disp: 90 tablet, Rfl: 3    vitamin B1 (THIAMINE) 100 MG tablet, Take 1 tablet by mouth daily at 2 pm (Patient not taking: Reported on 1/31/2024), Disp: , Rfl:     Review of Systems - 10 point Review of Systems is negative except for left foot ulcer which is noted in HPI.      OBJECTIVE:  /85   Pulse 77   Temp 97.8  F (36.6  C)   Resp 16   LMP  (LMP Unknown)   General appearance: Patient is alert and fully cooperative with history & exam.  No sign of distress is noted during the visit.    Vascular: Dorsalis pedis non-palpableLeft.  Dermatologic:    Wound Foot Other (comment) (Active)       Wound Heel Skin tear (Active)       VASC Wound left foot (Active)   Pre Size Length 0.8 02/14/24 1500   Pre Size Width 1 02/14/24 1500   Pre Size Depth 0.1 02/14/24 1500   Pre Total Sq cm 0.8 02/14/24 1500   Post Size Length 0 03/07/24 1600   Post Size Width 0 03/07/24 1600   Post Size Depth 0 03/07/24 1600   Post Total Sq cm 0 03/07/24 1600   Description macerated 12/27/23 1500     Neurologic: Intact to light touch left foot.  Musculoskeletal: Contracted digits noted Left.      Picture:

## 2024-03-07 NOTE — PATIENT INSTRUCTIONS
Buy a shoe with a wide toe box      Podiatry Clinics that offer foot and toenail care  [facility]  [place]  [phone]    [facility], [facility]  [phone]    [facility]  [place] - Dr. Yan on Tuesdays  [phone]    [facility]  [place]  [phone]    [facility]  [facility] [place]  [phone]    [facility]  [phone]    [facility]  [phone]      [facility]  *Nurse comes to your home for nail care.  Karen Rome RN Foot Specialist  [phone]

## 2024-03-27 NOTE — PROGRESS NOTES
Assessment:     Diagnoses and all orders for this visit:  Chronic bilateral low back pain without sciatica  -     PAIN Transforaminal MIRACLE Inj Lumbosacral Adolfo; Future  Spinal stenosis of lumbar region with neurogenic claudication  -     PAIN Transforaminal MIRACLE Inj Lumbosacral Adolfo; Future  Lumbar foraminal stenosis  -     PAIN Transforaminal MIRACLE Inj Lumbosacral Adolfo; Future  Spondylolisthesis of lumbar region  DDD (degenerative disc disease), lumbar     Mely Alegria is a 84 year old y.o. female with past medical history significant for hypercholesterolemia, persistent atrial fibrillation, hypertension, CHF, chronic kidney disease stage IV-on Monday Wednesday Friday hemodialysis, chronic knee pain, diaphragmatic hernia, vitamin D deficiency, ulcerative pancolitis, DNR who presents today for follow-up regarding:    -Chronic bilateral low back pain with no lasting relief post bilateral L4-5 TFESI 2/20/2024     Plan:     A shared decision making plan was used. The patient's values and choices were respected. Prior medical records were reviewed today. The following represents what was discussed and decided upon by the provider and the patient.        -DIAGNOSTIC TESTS: Images were personally reviewed and interpreted.   --Cervical spine CT scan 11/5/2023 with no acute fracture.  Grade 1 spondylolisthesis L4-5 with multilevel facet arthropathy and multilevel disc height loss.  --Lumbar spine MRI 10/12/2023 with L4-5 disc bulging on the left with left lateral recess stenosis and L5 impingement, severe left, mild right foraminal stenosis and mild central stenosis.  L3-4 mild bilateral foraminal stenosis.  L5-S1 severe bilateral foraminal stenosis.    -INTERVENTIONS: Order placed for bilateral L5-S1 transforaminal epidural steroid injection to see if we can get further relief of chronic bilateral low back pain as she does have severe bilateral foraminal stenosis at this level as well as spondylolisthesis with central and  foraminal stenosis at L4-5.  If no benefit with his injection we could consider potential medial branch block L4-5 and L5-S1 levels.  *Patient is having surgery for her dialysis on 4/17/2024.  Discussed that she will need to either do the injection on or before 4/2/2024 or on or after 4/30/2024 as we would not want to do the injection either 2 weeks before 2 weeks after her surgery.  Patient verbalized understanding.  **Eliquis anticoagulation medication.  Prior to HD she was on Xarelto.     -MEDICATIONS: Discussed that she could potentially consider gabapentin 100 mg twice daily however would need to get the okay from her nephrologist, she will discuss this with them.  Discussed side effects of medications and proper use. Patient verbalized understanding.    -PHYSICAL THERAPY: Encourage patient to consider physical therapy however currently she feels her pain is severe and debilitating she does not feel she would tolerate physical therapy at this time unfortunately.  Discussed the importance of core strengthening, ROM, stretching exercises with the patient and how each of these entities is important in decreasing pain.  Explained to the patient that the purpose of physical therapy is to teach the patient a home exercise program.  These exercises need to be performed every day in order to decrease pain and prevent future occurrences of pain.        -PATIENT EDUCATION:  Total time of 34 minutes, on the day of service, spent with the patient, reviewing the chart, placing orders, and documenting.     -FOLLOW UP: Follow-up for injection and then 2 weeks postinjection  Advised to contact clinic if symptoms worsen or change.    Subjective:     Mely Alegria is a 84 year old female who presents today for follow-up regarding chronic bilateral low back pain that is currently still significant at an 8/10, 10 at its worst, 6 at its best.  She does report that getting out of bed is most aggravating whereas tramadol does help  "but unfortunately makes her loopy.  Otherwise denies any radiating lower extremity pain.  She does report feeling weakness in her legs however due to her back pain and she is fairly immobile at this time.  She does have dialysis Monday Wednesday Fridays as well currently.  She reports that she is scheduled for surgery related to her dialysis on 4/16/2024.    Patient's son Tatyana was present today during entire visit and added to past medical/surgical/family/social history and history of presenting illness.      -Treatment to Date: No prior spinal surgery   No prior physical therapy for back pain.    Bilateral L4-5 TFESI 2/20/2024 with initial but no lasting benefit.      -Medications:  Baclofen 10 mg    Patient Active Problem List   Diagnosis    Vitamin D Deficiency    Pure hypercholesterolemia    History of Helicobacter pylori gastritis    Diaphragmatic hernia without obstruction and without gangrene    Ulcerative pancolitis without complication (H)    Chronic pain of left knee    Essential hypertension with goal blood pressure less than 140/90    DNR (do not resuscitate)    Adult BMI 40.0-44.9 kg/sq m (H)    Chronic kidney disease, stage 3 (H)    Stage 4 chronic kidney disease (H)    History of left hip replacement    Persistent atrial fibrillation (H)    CHF (congestive heart failure) (H)    Acute renal failure superimposed on chronic kidney disease, unspecified acute renal failure type, unspecified CKD stage  (H24)    Ulcer of left foot, limited to breakdown of skin (H)    Cellulitis of left foot       Current Outpatient Medications   Medication    predniSONE (DELTASONE) 10 MG tablet    Bismuth Tribromoph-Petrolatum (XEROFORM PETROLAT GAUZE 5\"X9\") MISC    calamine 8-8 % lotion    calcium citrate-vitamin D (CITRACAL) 315-200 MG-UNIT TABS per tablet    cholecalciferol, vitamin D3, 50 mcg (2,000 unit) Tab    collagenase (SANTYL) 250 UNIT/GM external ointment    collagenase (SANTYL) 250 UNIT/GM external ointment    " ELIQUIS ANTICOAGULANT 2.5 MG tablet    famotidine (PEPCID) 20 MG tablet    folic acid (FOLVITE) 1 MG tablet    furosemide (LASIX) 40 MG tablet    lactobacillus rhamnosus, GG, (CULTURELL) capsule    levothyroxine (SYNTHROID/LEVOTHROID) 25 MCG tablet    loperamide (IMODIUM) 2 MG capsule    menthol-zinc oxide (CALMOSEPTINE) 0.44-20.6 % OINT ointment    menthol-zinc oxide (CALMOSEPTINE) 0.44-20.625 % OINT ointment    miconazole (MICATIN) 2 % external powder    midodrine (PROAMATINE) 10 MG tablet    midodrine (PROAMATINE) 5 MG tablet    mirtazapine (REMERON) 7.5 MG tablet    rivaroxaban ANTICOAGULANT (XARELTO ANTICOAGULANT) 15 MG TABS tablet    vitamin B1 (THIAMINE) 100 MG tablet     No current facility-administered medications for this visit.       Allergies   Allergen Reactions    Allopurinol Diarrhea    Lisinopril Cough     initiated 5/14/1996 and stopped         Past Medical History:   Diagnosis Date    Chronic atrial fibrillation (H)         Review of Systems  ROS:  Specifically negative for bowel/bladder dysfunction, balance changes, headache, dizziness, foot drop, fevers, chills, appetite changes, nausea/vomiting, unexplained weight loss. Otherwise 13 systems reviewed are negative. Please see the patient's intake questionnaire from today for details.    Reviewed Social, Family, Past Medical and Past Surgical history with patient, no significant changes noted since prior visit.     Objective:     LMP  (LMP Unknown)     PHYSICAL EXAMINATION:    --CONSTITUTIONAL: Well developed, well nourished, healthy appearing individual.  --PSYCHIATRIC: Appropriate mood and affect. No difficulty interacting due to temper, social withdrawal, or memory issues.    RESULTS:   Imaging: Spine imaging was reviewed today. The images were shown to the patient and the findings were explained using a spine model.      Lumbar spine MRI reviewed

## 2024-03-27 NOTE — LETTER
3/27/2024         RE: Mely Alegria  [address] [facility] [address]        Dear Colleague,    Thank you for referring your patient, Mely Alegria, to the [facility]. Please see a copy of my visit note below.      Assessment:     Diagnoses and all orders for this visit:  Chronic bilateral low back pain without sciatica  -     PAIN Transforaminal MIRACLE Inj Lumbosacral Adolfo; Future  Spinal stenosis of lumbar region with neurogenic claudication  -     PAIN Transforaminal MIRACLE Inj Lumbosacral Adolfo; Future  Lumbar foraminal stenosis  -     PAIN Transforaminal MIRACLE Inj Lumbosacral Adolfo; Future  Spondylolisthesis of lumbar region  DDD (degenerative disc disease), lumbar     Mely Alegria is a 84 year old y.o. female with past medical history significant for hypercholesterolemia, persistent atrial fibrillation, hypertension, CHF, chronic kidney disease stage IV-on Monday Wednesday Friday hemodialysis, chronic knee pain, diaphragmatic hernia, vitamin D deficiency, ulcerative pancolitis, DNR who presents today for follow-up regarding:    -Chronic bilateral low back pain with no lasting relief post bilateral L4-5 TFESI 2/20/2024     Plan:     A shared decision making plan was used. The patient's values and choices were respected. Prior medical records were reviewed today. The following represents what was discussed and decided upon by the provider and the patient.        -DIAGNOSTIC TESTS: Images were personally reviewed and interpreted.   --Cervical spine CT scan 11/5/2023 with no acute fracture.  Grade 1 spondylolisthesis L4-5 with multilevel facet arthropathy and multilevel disc height loss.  --Lumbar spine MRI 10/12/2023 with L4-5 disc bulging on the left with left lateral recess stenosis and L5 impingement, severe left, mild right foraminal stenosis and mild central stenosis.  L3-4 mild bilateral foraminal stenosis.  L5-S1 severe bilateral foraminal stenosis.    -INTERVENTIONS:  Order placed for bilateral L5-S1 transforaminal epidural steroid injection to see if we can get further relief of chronic bilateral low back pain as she does have severe bilateral foraminal stenosis at this level as well as spondylolisthesis with central and foraminal stenosis at L4-5.  If no benefit with his injection we could consider potential medial branch block L4-5 and L5-S1 levels.  *Patient is having surgery for her dialysis on 4/17/2024.  Discussed that she will need to either do the injection on or before 4/2/2024 or on or after 4/30/2024 as we would not want to do the injection either 2 weeks before 2 weeks after her surgery.  Patient verbalized understanding.  **Eliquis anticoagulation medication.  Prior to HD she was on Xarelto.     -MEDICATIONS: Discussed that she could potentially consider gabapentin 100 mg twice daily however would need to get the okay from her nephrologist, she will discuss this with them.  Discussed side effects of medications and proper use. Patient verbalized understanding.    -PHYSICAL THERAPY: Encourage patient to consider physical therapy however currently she feels her pain is severe and debilitating she does not feel she would tolerate physical therapy at this time unfortunately.  Discussed the importance of core strengthening, ROM, stretching exercises with the patient and how each of these entities is important in decreasing pain.  Explained to the patient that the purpose of physical therapy is to teach the patient a home exercise program.  These exercises need to be performed every day in order to decrease pain and prevent future occurrences of pain.        -PATIENT EDUCATION:  Total time of 34 minutes, on the day of service, spent with the patient, reviewing the chart, placing orders, and documenting.     -FOLLOW UP: Follow-up for injection and then 2 weeks postinjection  Advised to contact clinic if symptoms worsen or change.    Subjective:     Mely Alegria is a 84  year old female who presents today for follow-up regarding chronic bilateral low back pain that is currently still significant at an 8/10, 10 at its worst, 6 at its best.  She does report that getting out of bed is most aggravating whereas tramadol does help but unfortunately makes her loopy.  Otherwise denies any radiating lower extremity pain.  She does report feeling weakness in her legs however due to her back pain and she is fairly immobile at this time.  She does have dialysis Monday Wednesday Fridays as well currently.  She reports that she is scheduled for surgery related to her dialysis on 4/16/2024.    Patient's son Tatyana was present today during entire visit and added to past medical/surgical/family/social history and history of presenting illness.      -Treatment to Date: No prior spinal surgery   No prior physical therapy for back pain.    Bilateral L4-5 TFESI 2/20/2024 with initial but no lasting benefit.      -Medications:  Baclofen 10 mg    Patient Active Problem List   Diagnosis     Vitamin D Deficiency     Pure hypercholesterolemia     History of Helicobacter pylori gastritis     Diaphragmatic hernia without obstruction and without gangrene     Ulcerative pancolitis without complication (H)     Chronic pain of left knee     Essential hypertension with goal blood pressure less than 140/90     DNR (do not resuscitate)     Adult BMI 40.0-44.9 kg/sq m (H)     Chronic kidney disease, stage 3 (H)     Stage 4 chronic kidney disease (H)     History of left hip replacement     Persistent atrial fibrillation (H)     CHF (congestive heart failure) (H)     Acute renal failure superimposed on chronic kidney disease, unspecified acute renal failure type, unspecified CKD stage  (H24)     Ulcer of left foot, limited to breakdown of skin (H)     Cellulitis of left foot       Current Outpatient Medications   Medication     predniSONE (DELTASONE) 10 MG tablet     Bismuth Tribromoph-Petrolatum (XEROFORM PETROLAT GAUZE  "5\"X9\") MISC     calamine 8-8 % lotion     calcium citrate-vitamin D (CITRACAL) 315-200 MG-UNIT TABS per tablet     cholecalciferol, vitamin D3, 50 mcg (2,000 unit) Tab     collagenase (SANTYL) 250 UNIT/GM external ointment     collagenase (SANTYL) 250 UNIT/GM external ointment     ELIQUIS ANTICOAGULANT 2.5 MG tablet     famotidine (PEPCID) 20 MG tablet     folic acid (FOLVITE) 1 MG tablet     furosemide (LASIX) 40 MG tablet     lactobacillus rhamnosus, GG, (CULTURELL) capsule     levothyroxine (SYNTHROID/LEVOTHROID) 25 MCG tablet     loperamide (IMODIUM) 2 MG capsule     menthol-zinc oxide (CALMOSEPTINE) 0.44-20.6 % OINT ointment     menthol-zinc oxide (CALMOSEPTINE) 0.44-20.625 % OINT ointment     miconazole (MICATIN) 2 % external powder     midodrine (PROAMATINE) 10 MG tablet     midodrine (PROAMATINE) 5 MG tablet     mirtazapine (REMERON) 7.5 MG tablet     rivaroxaban ANTICOAGULANT (XARELTO ANTICOAGULANT) 15 MG TABS tablet     vitamin B1 (THIAMINE) 100 MG tablet     No current facility-administered medications for this visit.       Allergies   Allergen Reactions     Allopurinol Diarrhea     Lisinopril Cough     initiated 5/14/1996 and stopped         Past Medical History:   Diagnosis Date     Chronic atrial fibrillation (H)         Review of Systems  ROS:  Specifically negative for bowel/bladder dysfunction, balance changes, headache, dizziness, foot drop, fevers, chills, appetite changes, nausea/vomiting, unexplained weight loss. Otherwise 13 systems reviewed are negative. Please see the patient's intake questionnaire from today for details.    Reviewed Social, Family, Past Medical and Past Surgical history with patient, no significant changes noted since prior visit.     Objective:     LMP  (LMP Unknown)     PHYSICAL EXAMINATION:    --CONSTITUTIONAL: Well developed, well nourished, healthy appearing individual.  --PSYCHIATRIC: Appropriate mood and affect. No difficulty interacting due to temper, social " withdrawal, or memory issues.    RESULTS:   Imaging: Spine imaging was reviewed today. The images were shown to the patient and the findings were explained using a spine model.      Lumbar spine MRI reviewed                        Again, thank you for allowing me to participate in the care of your patient.        Sincerely,        Arabella Lim, CNP

## 2024-03-27 NOTE — PATIENT INSTRUCTIONS
~Spine Center Scheduling #[phone].  ~Please call our [facility] Spine Nurse Navigation #[phone] with any questions or concerns about your treatment plan, if symptoms worsen and you would like to be seen urgently, or if you have problems controlling bladder and bowel function.  ~For any future flareups or new symptoms, recommend follow-up in clinic or contact the nurse navigator line.  ~Please note that any My Chart messages may take multiple days for a response due to the high volume of patients seen in clinic.  Anything sent Thursday night or after will be answered the following week when able, as Arabella Lim CNP does not work in clinic on Fridays.   ~Arabella Lim CNP is at the [facility] on the first and third Tuesdays of the month only, otherwise primarily at the [facility].        An injection has been ordered today to potentially help with your pain symptoms. These injections do not fix what is going on in your back, therefore they typically do not take away the pain completely, however they can many times help improve symptoms. Injections should always be completed along with other modalities such as physical therapy for the best long term outcomes. If injections alone are done, then pain will likely return.     [facility] [place] Injection Requirements    A  is required for all fluoroscopically-guided injections.  Injection appointments may be cancelled if there are signs/symptoms of an active infection or if the patient is being actively treated with antibiotics for a diagnosed infection.  Patients may have their steroid injection cancelled if they have had another steroid injection within 2 weeks.  Diabetic patients will have their blood glucose levels checked the day of their injection and the appointment will be rescheduled if the blood glucose level is 300 or higher.  Patients with allergies to cortisone, local anesthetics,  iodine, or contrast dye should contact the Spine Center to further discuss these considerations.  Patients scheduled for medial branch block diagnostic injections should refrain from taking pain medication the day of the procedure.  The medial branch block injection appointment will be rescheduled if the patient's pain rating is not 5/10 or greater at the time of the procedure.  Patients taking warfarin/Coumadin will have their INR checked the day of the procedure and the procedure may be rescheduled if the INR is greater than 3.0.  Please contact the Spine Center (#[phone]) if you are taking any prescription blood-thinning medications (warfarin, Plavix, Lovenox, Eliquis, Brilinta, Effient, etc.) as special dosing adjustments may need to be made depending on the type of injection you are scheduled to receive.  It is recommended that you delay having your steroid injection if you have received a flu shot or shingles vaccine within 2 weeks.

## 2024-04-09 PROBLEM — Z99.2 ESRD (END STAGE RENAL DISEASE) ON DIALYSIS (H): Status: ACTIVE | Noted: 2024-01-01

## 2024-04-09 PROBLEM — N18.6 ESRD (END STAGE RENAL DISEASE) ON DIALYSIS (H): Status: ACTIVE | Noted: 2024-01-01

## 2024-04-09 PROBLEM — N17.9 ACUTE RENAL FAILURE SUPERIMPOSED ON CHRONIC KIDNEY DISEASE, UNSPECIFIED ACUTE RENAL FAILURE TYPE, UNSPECIFIED CKD STAGE (H): Status: RESOLVED | Noted: 2023-01-01 | Resolved: 2024-01-01

## 2024-04-09 PROBLEM — L03.116 CELLULITIS OF LEFT FOOT: Status: RESOLVED | Noted: 2023-01-01 | Resolved: 2024-01-01

## 2024-04-09 PROBLEM — L97.521 ULCER OF LEFT FOOT, LIMITED TO BREAKDOWN OF SKIN (H): Status: RESOLVED | Noted: 2023-01-01 | Resolved: 2024-01-01

## 2024-04-09 PROBLEM — N18.30 CHRONIC KIDNEY DISEASE, STAGE 3 (H): Status: RESOLVED | Noted: 2020-12-03 | Resolved: 2024-01-01

## 2024-04-09 PROBLEM — N18.4 STAGE 4 CHRONIC KIDNEY DISEASE (H): Status: RESOLVED | Noted: 2021-06-25 | Resolved: 2024-01-01

## 2024-04-09 PROBLEM — N18.9 ACUTE RENAL FAILURE SUPERIMPOSED ON CHRONIC KIDNEY DISEASE, UNSPECIFIED ACUTE RENAL FAILURE TYPE, UNSPECIFIED CKD STAGE (H): Status: RESOLVED | Noted: 2023-01-01 | Resolved: 2024-01-01

## 2024-04-09 NOTE — PROGRESS NOTES
Preoperative Evaluation  [facility]  [address]  [place] [place] [address]  Phone: [phone]  Fax: [phone]  Primary Provider: Oscar Hopper  Pre-op Performing Provider: OSCAR HOPPER  Apr 9, 2024   {Provider  Link to PREOP SmartSet  Use this to apply standard patient instructions to AVS; includes medication directions, common orders, guidelines for anemia, warfarin, additional testing   :796849}    Mely is a 84 year old, presenting for the following:  Pre-Op Exam (Surgical Information/Surgery/Procedure: LAPAROSCOPIC PLACEMENT PERITONEAL DIALYSIS CATHETER WITH OMENTOPEXY /Surgery Location: [place]/Surgeon: August Myers MD/Surgery Date: 04/16/2024/Time of Surgery: 8:30//Fax number for surgical facility: Note does not need to be faxed, will be available electronically in Epic.)        4/9/2024     2:50 PM   Additional Questions   Roomed by BONNIE Dean   Accompanied by Son and Daughter     Surgical Information  Surgery/Procedure: LAPAROSCOPIC PLACEMENT PERITONEAL DIALYSIS CATHETER WITH OMENTOPEXY   Surgery Location: [place]  Surgeon: August Myers MD  Surgery Date: 04/16/2024  Time of Surgery: 8:30 AM  Where patient plans to recover: At home with family  Fax number for surgical facility: Note does not need to be faxed, will be available electronically in Epic.    {2021 Provider Charting Preference for Preop :373636}    Subjective       HPI related to upcoming procedure: ***          4/9/2024     2:44 PM   Preop Questions   1. Have you ever had a heart attack or stroke? No   2. Have you ever had surgery on your heart or blood vessels, such as a stent placement, a coronary artery bypass, or surgery on an artery in your head, neck, heart, or legs? No   3. Do you have chest pain with activity? No   4. Do you have a history of  heart failure? No   5. Do you currently have a cold, bronchitis or symptoms of other infection? No   6. Do you  have a cough, shortness of breath, or wheezing? No   7. Do you or anyone in your family have previous history of blood clots? No   8. Do you or does anyone in your family have a serious bleeding problem such as prolonged bleeding following surgeries or cuts? No   9. Have you ever had problems with anemia or been told to take iron pills? No   10. Have you had any abnormal blood loss such as black, tarry or bloody stools, or abnormal vaginal bleeding? No   11. Have you ever had a blood transfusion? UNKNOWN - ***   12. Are you willing to have a blood transfusion if it is medically needed before, during, or after your surgery? Yes   13. Have you or any of your relatives ever had problems with anesthesia? No   14. Do you have sleep apnea, excessive snoring or daytime drowsiness? No   15. Do you have any artifical heart valves or other implanted medical devices like a pacemaker, defibrillator, or continuous glucose monitor? No   16. Do you have artificial joints? YES - ***   17. Are you allergic to latex? YES: ***     Health Care Directive  Patient does not have a Health Care Directive or Living Will: Advance Directive received and scanned. Click on Code in the patient header to view.    Preoperative Review of   {Mnpmpreport:099560}  {Review MNPMP for all patients per [employer] MNPMP Profile:087054}    {Chronic problem details (Optional) :796663}    Patient Active Problem List    Diagnosis Date Noted    Ulcer of left foot, limited to breakdown of skin (H) 12/20/2023     Priority: Medium    Cellulitis of left foot 12/20/2023     Priority: Medium    Acute renal failure superimposed on chronic kidney disease, unspecified acute renal failure type, unspecified CKD stage  (H24) 10/30/2023     Priority: Medium    CHF (congestive heart failure) (H) 06/30/2023     Priority: Medium    Persistent atrial fibrillation (H) 04/04/2023     Priority: Medium    Stage 4 chronic kidney disease (H) 06/25/2021     Priority: Medium    Chronic  kidney disease, stage 3 (H) 12/03/2020     Priority: Medium    Pure hypercholesterolemia      Priority: Medium     Created by Conversion        History of Helicobacter pylori gastritis      Priority: Medium     Created by Conversion  Replacement Utility updated for latest IMO load        Diaphragmatic hernia without obstruction and without gangrene      Priority: Medium     Created by Conversion  Replacement Utility updated for latest IMO load        Adult BMI 40.0-44.9 kg/sq m (H) 11/30/2018     Priority: Medium    Ulcerative pancolitis without complication (H)      Priority: Medium     Created by Conversion  Replacement Utility updated for latest IMO load        Essential hypertension with goal blood pressure less than 140/90 11/29/2017     Priority: Medium    DNR (do not resuscitate) 11/29/2017     Priority: Medium    Vitamin D Deficiency      Priority: Medium     Created by Conversion  Replacement Utility updated for latest IMO load        Chronic pain of left knee 12/02/2015     Priority: Medium    History of left hip replacement 01/11/2006     Priority: Medium      Past Medical History:   Diagnosis Date    Chronic atrial fibrillation (H)      Past Surgical History:   Procedure Laterality Date    HC DILATION/CURETTAGE DIAG/THER NON OB      Description: Dilation And Curettage;  Recorded: 01/18/2010;    IR CVC TUNNEL PLACEMENT > 5 YRS OF AGE  10/30/2023    IR CVC TUNNEL PLACEMENT > 5 YRS OF AGE  1/17/2024    PICC TRIPLE LUMEN PLACEMENT  10/30/2023    ZZC COLOSTOMY      Description: Colostomy;  Recorded: 11/05/2012;    ZZC PART REMOVAL COLON W ANASTOMOSIS      Description: Partial Colectomy;  Recorded: 11/05/2012;  Comments: with colostomy    ZZC TOTAL HIP ARTHROPLASTY      Description: Total Hip Replacement;  Recorded: 01/18/2010;     Current Outpatient Medications   Medication Sig Dispense Refill    cholecalciferol, vitamin D3, 50 mcg (2,000 unit) Tab [CHOLECALCIFEROL, VITAMIN D3, 50 MCG (2,000 UNIT) TAB] Take 1  "tablet (2,000 Units total) by mouth daily. 100 each 3    ELIQUIS ANTICOAGULANT 2.5 MG tablet Take 2.5 mg by mouth 2 times daily      famotidine (PEPCID) 20 MG tablet Take 1 tablet (20 mg) by mouth daily 90 tablet 3    folic acid (FOLVITE) 1 MG tablet Take 1 tablet (1 mg) by mouth daily 90 tablet 3    lactobacillus rhamnosus, GG, (CULTURELL) capsule Take 1 capsule by mouth 2 times daily      levothyroxine (SYNTHROID/LEVOTHROID) 25 MCG tablet Take 1 tablet (25 mcg) by mouth every morning (before breakfast) 90 tablet 3    loperamide (IMODIUM) 2 MG capsule Take 1 capsule (2 mg) by mouth 4 times daily as needed for diarrhea      midodrine (PROAMATINE) 10 MG tablet Take 1 tablet (10 mg) by mouth Every Mon, Wed, Fri Morning 36 tablet 3    midodrine (PROAMATINE) 5 MG tablet Take 1 tablet (5 mg) by mouth every hour as needed (on HD for SBP <90)      mupirocin (BACTROBAN) 2 % external cream APPLY TO AFFECTED AREA 3 TIMES DAILY*      predniSONE (DELTASONE) 10 MG tablet Take 1 tablet (10 mg) by mouth every morning 10 tablet 0    traMADol (ULTRAM) 50 MG tablet Take 50 mg by mouth every 8 hours as needed for pain      Bismuth Tribromoph-Petrolatum (XEROFORM PETROLAT GAUZE 5\"X9\") MISC Apply 1 each topically daily as needed (Wound dressing) (Patient not taking: Reported on 4/9/2024) 50 each 4    calamine 8-8 % lotion Apply topically every hour as needed for itching (Patient not taking: Reported on 4/9/2024) 177 mL 11    calcium citrate-vitamin D (CITRACAL) 315-200 MG-UNIT TABS per tablet Take 1 tablet by mouth daily (Patient not taking: Reported on 4/9/2024)      collagenase (SANTYL) 250 UNIT/GM external ointment Apply topically daily Total square centimeters of wound(s) being treated is 7.5 square cm, 30 day supply (Patient not taking: Reported on 4/9/2024) 30 g 3    collagenase (SANTYL) 250 UNIT/GM external ointment Apply topically daily (Patient not taking: Reported on 4/9/2024) 30 g 3    furosemide (LASIX) 40 MG tablet Take 1 " "tablet (40 mg) by mouth daily as needed (Patient not taking: Reported on 3/7/2024)      menthol-zinc oxide (CALMOSEPTINE) 0.44-20.6 % OINT ointment Apply topically 4 times daily as needed for skin protection or irritation (Patient not taking: Reported on 3/7/2024)      menthol-zinc oxide (CALMOSEPTINE) 0.44-20.625 % OINT ointment Apply topically 4 times daily as needed for skin protection (Patient not taking: Reported on 3/7/2024) 113 g 11    miconazole (MICATIN) 2 % external powder Apply topically 2 times daily (Patient not taking: Reported on 3/7/2024)      mirtazapine (REMERON) 7.5 MG tablet Take 2 tablets (15 mg) by mouth at bedtime (Patient not taking: Reported on 1/31/2024)      rivaroxaban ANTICOAGULANT (XARELTO ANTICOAGULANT) 15 MG TABS tablet Take 1 tablet (15 mg) by mouth daily (with dinner) (Patient not taking: Reported on 1/31/2024) 90 tablet 3    vitamin B1 (THIAMINE) 100 MG tablet Take 1 tablet by mouth daily at 2 pm (Patient not taking: Reported on 1/31/2024)         Allergies   Allergen Reactions    Allopurinol Diarrhea    Lisinopril Cough     initiated 5/14/1996 and stopped          Social History     Tobacco Use    Smoking status: Never    Smokeless tobacco: Never   Substance Use Topics    Alcohol use: Not on file     {FAMILY HISTORY (Optional):430312895}  History   Drug Use Not on file         Review of Systems  {ROS Picklists (Optional):357413}    Objective    /85 (BP Location: Left arm, Patient Position: Sitting, Cuff Size: Adult Regular)   Pulse 63   Temp 96.9  F (36.1  C) (Tympanic)   Resp 18   Ht 1.575 m (5' 2\")   Wt 79.5 kg (175 lb 3.2 oz)   LMP  (LMP Unknown)   SpO2 97%   BMI 32.04 kg/m     Estimated body mass index is 32.04 kg/m  as calculated from the following:    Height as of this encounter: 1.575 m (5' 2\").    Weight as of this encounter: 79.5 kg (175 lb 3.2 oz).  Physical Exam  {Exam List (Optional):110404}    Recent Labs   Lab Test 01/17/24  0922 11/16/23  0938 " 10/30/23  1535 10/30/23  0743   HGB 13.9 10.1*   < > 9.5*    205   < > 110*    138   < > 140   POTASSIUM 5.2 3.2*   < > 5.9*   CR 3.79* 2.38*   < > 6.12*   A1C  --   --   --  5.0    < > = values in this interval not displayed.        Diagnostics  {LABS:432350}   {EK}    Revised Cardiac Risk Index (RCRI)  The patient has the following serious cardiovascular risks for perioperative complications:  {PREOP REVISED CARDIAC RISK INDEX (RCRI) :287252}     RCRI Interpretation: {REVISED CARDIAC RISK INTERPRETATION :205861}         Signed Electronically by: Oscar Hopper MD  Copy of this evaluation report is provided to requesting physician.    {Provider Resources  Preop [facility] Preop Guidelines  Revised Cardiac Risk Index :307576}   {Email feedback regarding this note to [email or web address]   :335842}

## 2024-04-09 NOTE — PATIENT INSTRUCTIONS
You might have polymyalgia rheumatica, which is lots of muscle aches and pains, with high sed rate or crp. Check those today    We can try prednisone 10 mgs daily for 2 weeks, then try between 5 and 10 and find her dose    The morning of surgery take:  Prednisone  Thyroid  Famotidine    No eliquis 16th, 15th, 14th.  Ok to take 13th am, not pm.  Resume the day after surgery    I can order tramadol as needed  Urine tox screen  Controlled substance agreement   2 questionairres

## 2024-04-09 NOTE — LETTER

## 2024-04-09 NOTE — PROGRESS NOTES
[facility]  [address] [place] [address]  Phone: [phone]  Fax: [phone]  Primary Provider: Oscar Hopper  :[id number]}  PREOPERATIVE EVALUATION:  Today's date: 4/9/2024    Mely Alegria is a 84 year old female who presents for a preoperative evaluation.    Surgical Information  Surgery/Procedure: LAPAROSCOPIC PLACEMENT PERITONEAL DIALYSIS CATHETER WITH OMENTOPEXY   Surgery Location: [place]  Surgeon: August Myers MD  Surgery Date: 04/16/2024  Time of Surgery: 8:30 AM  Where patient plans to recover: At home with family  Fax number for surgical facility: Note does not need to be faxed, will be available electronically in Epic.    Type of Anesthesia Anticipated: General    Assessment/Plan:     ASSESSMENT and PLAN:    1. Pre-operative examination for internal medicine  Stable for surgery  - REVIEW OF HEALTH MAINTENANCE PROTOCOL ORDERS    2. ESRD (end stage renal disease) on dialysis (H)  Stable for surgery for planned peritoneal dialysis shunt  - BETA BLOCKER NOT PRESCRIBED (INTENTIONAL); Please choose reason not prescribed from choices below.  - ACE/ARB/ARNI NOT PRESCRIBED (INTENTIONAL); Please choose reason not prescribed from choices below.  - furosemide (LASIX) 40 MG tablet; Take 0.5 tablets (20 mg) by mouth every other day    3. Vitamin D deficiency    4. Persistent atrial fibrillation (H)  On chronic anticoagulation and asymptomatic.  Volume compensated    5. Ulcerative pancolitis without complication (H)  Stable    6. Adult BMI 40.0-44.9 kg/sq m (H)  With significant weight loss down to current BMI of 32 noted and adjusted    7. Chronic congestive heart failure, unspecified heart failure type (H)  Volume stable with furosemide and dialysis    8. Acute midline low back pain without sciatica  Evaluate for possible polymyalgia rheumatica.  Allow tramadol.  Trial of low-dose prednisone  - CRP inflammation; Future  - Erythrocyte  sedimentation rate auto; Future  - Urine Drug Screen; Future  - predniSONE (DELTASONE) 5 MG tablet; Take 1-2 tablets (5-10 mg) by mouth daily  Dispense: 60 tablet; Refill: 11  - traMADol (ULTRAM) 50 MG tablet; Take 1 tablet (50 mg) by mouth every 8 hours as needed for severe pain  Dispense: 30 tablet; Refill: 1  - Erythrocyte sedimentation rate auto  - CRP inflammation  - Urine Drug Screen; Future    9. Hypothyroidism due to acquired atrophy of thyroid  Recheck and adjust accordingly  - TSH; Future  - TSH    10. 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 Check those today    We can try prednisone 10 mgs daily for 2 weeks, then try between 5 and 10 and find her dose    The morning of surgery take:  Prednisone  Thyroid  Famotidine    No eliquis 16th, 15th, 14th.  Ok to take 13th am, not pm.  Resume the day after surgery    I can order tramadol as needed  Urine tox screen  Controlled substance agreement   2 questionairres            Return in about 4 months (around 8/9/2024) for using a video visit.       Subjective         4/9/2024     2:50 PM   Additional Questions   Roomed by BONNIE Dean   Accompanied by Son and Daughter       HPI: Last seen in December, and had experienced worsening of chronic kidney disease to follow renal failure in October.  Has been on dialysis since that time.  Hypothyroidism diagnosed and levothyroxine initiated in October.  Has lost 71 pounds since October to a current BMI of 32.  Volume is stable.  Has been receiving dialysis through a tunneled subclavian catheter and is now planning to have her dialysis shunt placed.  She takes Eliquis for chronic atrial fibrillation.  Breathing and volume have been stable.   She has tolerated minor surgeries before without bleeding or anesthesia difficulties        4/9/2024     2:44 PM   Preop Questions   1. Have you ever had a heart attack or stroke? No   2. Have you ever had surgery on your heart or blood vessels, such as a stent placement, a coronary artery bypass, or surgery on an artery in your head, neck, heart, or legs? No   3. Do you have chest pain with activity? No   4. Do you have a history of  heart failure? No   5. Do you currently have a cold, bronchitis or symptoms of other infection? No   6. Do you have a cough, shortness of breath, or wheezing? No   7. Do you or anyone in your family have previous history of blood clots? No   8. Do you or does anyone in your family have a serious bleeding problem such as prolonged bleeding following surgeries or cuts? No   9. Have you ever had problems with anemia or been told to take iron pills? No   10. Have you had any abnormal blood loss such as black, tarry or bloody stools, or abnormal vaginal bleeding? No   11. Have you ever had a blood transfusion? UNKNOWN -    12. Are you willing to have a blood transfusion if it is medically needed before, during, or after your surgery? Yes   13. Have you or any of your relatives ever had problems with anesthesia? No   14. Do you have sleep apnea, excessive snoring or daytime drowsiness? No   15. Do you have any artifical heart valves or other implanted medical devices like a pacemaker, defibrillator, or continuous glucose monitor? No   16. Do you have artificial joints? YES -    17. Are you allergic to latex? YES:          Further interval history and review of symptoms: Weight loss of 71 pounds.  Severe back pain helped dramatically by low-dose prednisone and occasional tramadol.  Discussed criteria for allowing use of tramadol including PHQ-9, BENITEZ-7, controlled substance agreement and urine toxicology    Today's PHQ-2 Score:       1/31/2024     2:15 PM   PHQ-2 ( 1999 [employer])   Q1: Little  interest or pleasure in doing things 1   Q2: Feeling down, depressed or hopeless 0   PHQ-2 Score 1       Health Care Directive:  Patient does not have a Health Care Directive or Living Will: Patient states has Advance Directive and will bring in a copy to clinic.    Preoperative Review of :   reviewed - controlled substances reflected in medication list.    Patient Active Problem List    Diagnosis Date Noted    Opioid type dependence, episodic (H) 04/10/2024     Priority: Medium    ESRD (end stage renal disease) on dialysis (H) 04/09/2024     Priority: Medium    CHF (congestive heart failure) (H) 06/30/2023     Priority: Medium    Persistent atrial fibrillation (H) 04/04/2023     Priority: Medium    Pure hypercholesterolemia      Priority: Medium     Created by Conversion        History of Helicobacter pylori gastritis      Priority: Medium     Created by Conversion  Replacement Utility updated for latest IMO load        Diaphragmatic hernia without obstruction and without gangrene      Priority: Medium     Created by Conversion  Replacement Utility updated for latest IMO load        Class 1 obesity with alveolar hypoventilation, serious comorbidity, and body mass index (BMI) of 32.0 to 32.9 in adult (H) 11/30/2018     Priority: Medium    Ulcerative pancolitis without complication (H)      Priority: Medium     Created by Conversion  Replacement Utility updated for latest IMO load        Essential hypertension with goal blood pressure less than 140/90 11/29/2017     Priority: Medium    DNR (do not resuscitate) 11/29/2017     Priority: Medium    Vitamin D Deficiency      Priority: Medium     Created by Conversion  Replacement Utility updated for latest IMO load        Chronic pain of left knee 12/02/2015     Priority: Medium    History of left hip replacement 01/11/2006     Priority: Medium      Past Medical History:   Diagnosis Date    Acute renal failure superimposed on chronic kidney disease, unspecified  acute renal failure type, unspecified CKD stage  (H24)     Cellulitis of left foot     Chronic atrial fibrillation (H)     Ulcer of left foot, limited to breakdown of skin (H)      Past Surgical History:   Procedure Laterality Date    HC DILATION/CURETTAGE DIAG/THER NON OB      Description: Dilation And Curettage;  Recorded: 01/18/2010;    IR CVC TUNNEL PLACEMENT > 5 YRS OF AGE  10/30/2023    IR CVC TUNNEL PLACEMENT > 5 YRS OF AGE  1/17/2024    PICC TRIPLE LUMEN PLACEMENT  10/30/2023    ZZC COLOSTOMY      Description: Colostomy;  Recorded: 11/05/2012;    ZZC PART REMOVAL COLON W ANASTOMOSIS      Description: Partial Colectomy;  Recorded: 11/05/2012;  Comments: with colostomy    ZZC TOTAL HIP ARTHROPLASTY      Description: Total Hip Replacement;  Recorded: 01/18/2010;     Allergies   Allergen Reactions    Allopurinol Diarrhea    Lisinopril Cough     initiated 5/14/1996 and stopped       Current Outpatient Medications   Medication Sig Dispense Refill    ACE/ARB/ARNI NOT PRESCRIBED (INTENTIONAL) Please choose reason not prescribed from choices below.      BETA BLOCKER NOT PRESCRIBED (INTENTIONAL) Please choose reason not prescribed from choices below.      cholecalciferol, vitamin D3, 50 mcg (2,000 unit) Tab [CHOLECALCIFEROL, VITAMIN D3, 50 MCG (2,000 UNIT) TAB] Take 1 tablet (2,000 Units total) by mouth daily. 100 each 3    ELIQUIS ANTICOAGULANT 2.5 MG tablet Take 2.5 mg by mouth 2 times daily      famotidine (PEPCID) 20 MG tablet Take 1 tablet (20 mg) by mouth daily 90 tablet 3    folic acid (FOLVITE) 1 MG tablet Take 1 tablet (1 mg) by mouth daily 90 tablet 3    furosemide (LASIX) 40 MG tablet Take 0.5 tablets (20 mg) by mouth every other day      lactobacillus rhamnosus, GG, (CULTURELL) capsule Take 1 capsule by mouth 2 times daily      levothyroxine (SYNTHROID/LEVOTHROID) 25 MCG tablet Take 1 tablet (25 mcg) by mouth every morning (before breakfast) 90 tablet 3    loperamide (IMODIUM) 2 MG capsule Take 1 capsule  "(2 mg) by mouth 4 times daily as needed for diarrhea      midodrine (PROAMATINE) 5 MG tablet Take 1 tablet (5 mg) by mouth every hour as needed (on HD for SBP <90)      mupirocin (BACTROBAN) 2 % external cream APPLY TO AFFECTED AREA 3 TIMES DAILY*      predniSONE (DELTASONE) 5 MG tablet Take 1-2 tablets (5-10 mg) by mouth daily 60 tablet 11    traMADol (ULTRAM) 50 MG tablet Take 50 mg by mouth every 8 hours as needed for pain      traMADol (ULTRAM) 50 MG tablet Take 1 tablet (50 mg) by mouth every 8 hours as needed for severe pain 30 tablet 1       Social History     Tobacco Use    Smoking status: Never    Smokeless tobacco: Never   Substance Use Topics    Alcohol use: Not on file     History   Drug Use Not on file           Objective     OBJECTIVE:     PHYSICAL EXAM:  /85 (BP Location: Left arm, Patient Position: Sitting, Cuff Size: Adult Regular)   Pulse 63   Temp 96.9  F (36.1  C) (Tympanic)   Resp 18   Ht 1.575 m (5' 2\")   Wt 79.5 kg (175 lb 3.2 oz)   LMP  (LMP Unknown)   SpO2 97%   BMI 32.04 kg/m     Estimated body mass index is 32.04 kg/m  as calculated from the following:    Height as of this encounter: 1.575 m (5' 2\").    Weight as of this encounter: 79.5 kg (175 lb 3.2 oz).    Constitutional:  Reveals pleasant woman who ambulates very slowly and stiffly with a cane  Accompanied by son and daughter  Palpation of radial pulse irregularly irregular  Thyroid:  Non palpable   Cardiac: Irregularly irregular without murmur  Lungs: Clear.  Respiratory effort normal.  Edema of Legs: None   Psychiatric:  Alert and oriented   Wearing mask    More recent labs done April 1 through [facility] regarding potassium, creatinine, hemoglobin  Recent Labs   Lab Test 01/17/24  0922 11/16/23  0938 11/04/23  0514 11/03/23  0557 10/30/23  1535 10/30/23  0743 10/29/23  2328 10/29/23  2211 04/11/23  1407 04/04/23  1504   HGB 13.9 10.1*   < > 9.5*   < > 9.5*  --  11.6*  --  12.2    205   < > 96*   < > 110*  " --  182  --  180    138   < > 135   < > 140   < >  --    < > 143   POTASSIUM 5.2 3.2*   < > 4.1   < > 5.9*   < >  --    < > 5.6*   CR 3.79* 2.38*   < > 3.62*   < > 6.12*   < >  --    < > 3.73*   A1C  --   --   --   --   --  5.0  --   --   --   --    URIC  --   --   --   --   --   --   --   --   --  10.6*   B12  --   --   --  814  --   --   --   --   --   --    TSH  --   --   --  5.43*  --   --   --  5.41*  --  1.44    < > = values in this interval not displayed.       Lab Results   Component Value Date    CHOL 166 04/04/2023        Diagnostics:  Orders Placed This Encounter   Procedures    REVIEW OF HEALTH MAINTENANCE PROTOCOL ORDERS    TSH    CRP inflammation    Erythrocyte sedimentation rate auto    Urine Drug Screen    Urine Drug Screen       No EKG required for low risk surgery (cataract, skin procedure, breast biopsy, etc).  EKG October 2023 chronic atrial fibrillation  Echocardiogram October 2023 left ventricular function 55%    Revised Cardiac Risk Index (RCRI):  The patient has the following serious cardiovascular risks for perioperative complications:   - No serious cardiac risks = 0 points     RCRI Interpretation: 0 points: Class I (very low risk - 0.4% complication rate)         Start Time: 3:11 PM  End time:  3:46 PM  Visit plus orders: 35 minutes  Dictation time:  3 minutes    The visit lasted a total of 35 minutes     Oscar Hopper MD  [facility]

## 2024-04-09 NOTE — H&P (VIEW-ONLY)
[facility]  [address] [place] [address]  Phone: [phone]  Fax: [phone]  Primary Provider: Oscar Hopper  :[id number]}  PREOPERATIVE EVALUATION:  Today's date: 4/9/2024    Mely Alegria is a 84 year old female who presents for a preoperative evaluation.    Surgical Information  Surgery/Procedure: LAPAROSCOPIC PLACEMENT PERITONEAL DIALYSIS CATHETER WITH OMENTOPEXY   Surgery Location: [facility]  Surgeon: August Myers MD  Surgery Date: 04/16/2024  Time of Surgery: 8:30 AM  Where patient plans to recover: At home with family  Fax number for surgical facility: Note does not need to be faxed, will be available electronically in Epic.    Type of Anesthesia Anticipated: General    Assessment/Plan:     ASSESSMENT and PLAN:    1. Pre-operative examination for internal medicine  Stable for surgery  - REVIEW OF HEALTH MAINTENANCE PROTOCOL ORDERS    2. ESRD (end stage renal disease) on dialysis (H)  Stable for surgery for planned peritoneal dialysis shunt  - BETA BLOCKER NOT PRESCRIBED (INTENTIONAL); Please choose reason not prescribed from choices below.  - ACE/ARB/ARNI NOT PRESCRIBED (INTENTIONAL); Please choose reason not prescribed from choices below.  - furosemide (LASIX) 40 MG tablet; Take 0.5 tablets (20 mg) by mouth every other day    3. Vitamin D deficiency    4. Persistent atrial fibrillation (H)  On chronic anticoagulation and asymptomatic.  Volume compensated    5. Ulcerative pancolitis without complication (H)  Stable    6. Adult BMI 40.0-44.9 kg/sq m (H)  With significant weight loss down to current BMI of 32 noted and adjusted    7. Chronic congestive heart failure, unspecified heart failure type (H)  Volume stable with furosemide and dialysis    8. Acute midline low back pain without sciatica  Evaluate for possible polymyalgia rheumatica.  Allow tramadol.  Trial of low-dose prednisone  - CRP inflammation; Future  - Erythrocyte  sedimentation rate auto; Future  - Urine Drug Screen; Future  - predniSONE (DELTASONE) 5 MG tablet; Take 1-2 tablets (5-10 mg) by mouth daily  Dispense: 60 tablet; Refill: 11  - traMADol (ULTRAM) 50 MG tablet; Take 1 tablet (50 mg) by mouth every 8 hours as needed for severe pain  Dispense: 30 tablet; Refill: 1  - Erythrocyte sedimentation rate auto  - CRP inflammation  - Urine Drug Screen; Future    9. Hypothyroidism due to acquired atrophy of thyroid  Recheck and adjust accordingly  - TSH; Future  - TSH    10. 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 Check those today    We can try prednisone 10 mgs daily for 2 weeks, then try between 5 and 10 and find her dose    The morning of surgery take:  Prednisone  Thyroid  Famotidine    No eliquis 16th, 15th, 14th.  Ok to take 13th am, not pm.  Resume the day after surgery    I can order tramadol as needed  Urine tox screen  Controlled substance agreement   2 questionairres            Return in about 4 months (around 8/9/2024) for using a video visit.       Subjective         4/9/2024     2:50 PM   Additional Questions   Roomed by BONNIE Dean   Accompanied by Son and Daughter       HPI: Last seen in December, and had experienced worsening of chronic kidney disease to follow renal failure in October.  Has been on dialysis since that time.  Hypothyroidism diagnosed and levothyroxine initiated in October.  Has lost 71 pounds since October to a current BMI of 32.  Volume is stable.  Has been receiving dialysis through a tunneled subclavian catheter and is now planning to have her dialysis shunt placed.  She takes Eliquis for chronic atrial fibrillation.  Breathing and volume have been stable.   She has tolerated minor surgeries before without bleeding or anesthesia difficulties        4/9/2024     2:44 PM   Preop Questions   1. Have you ever had a heart attack or stroke? No   2. Have you ever had surgery on your heart or blood vessels, such as a stent placement, a coronary artery bypass, or surgery on an artery in your head, neck, heart, or legs? No   3. Do you have chest pain with activity? No   4. Do you have a history of  heart failure? No   5. Do you currently have a cold, bronchitis or symptoms of other infection? No   6. Do you have a cough, shortness of breath, or wheezing? No   7. Do you or anyone in your family have previous history of blood clots? No   8. Do you or does anyone in your family have a serious bleeding problem such as prolonged bleeding following surgeries or cuts? No   9. Have you ever had problems with anemia or been told to take iron pills? No   10. Have you had any abnormal blood loss such as black, tarry or bloody stools, or abnormal vaginal bleeding? No   11. Have you ever had a blood transfusion? UNKNOWN -    12. Are you willing to have a blood transfusion if it is medically needed before, during, or after your surgery? Yes   13. Have you or any of your relatives ever had problems with anesthesia? No   14. Do you have sleep apnea, excessive snoring or daytime drowsiness? No   15. Do you have any artifical heart valves or other implanted medical devices like a pacemaker, defibrillator, or continuous glucose monitor? No   16. Do you have artificial joints? YES -    17. Are you allergic to latex? YES:          Further interval history and review of symptoms: Weight loss of 71 pounds.  Severe back pain helped dramatically by low-dose prednisone and occasional tramadol.  Discussed criteria for allowing use of tramadol including PHQ-9, BENITEZ-7, controlled substance agreement and urine toxicology    Today's PHQ-2 Score:       1/31/2024     2:15 PM   PHQ-2 ( 1999 [employer])   Q1: Little  interest or pleasure in doing things 1   Q2: Feeling down, depressed or hopeless 0   PHQ-2 Score 1       Health Care Directive:  Patient does not have a Health Care Directive or Living Will: Patient states has Advance Directive and will bring in a copy to clinic.    Preoperative Review of :   reviewed - controlled substances reflected in medication list.    Patient Active Problem List    Diagnosis Date Noted    Opioid type dependence, episodic (H) 04/10/2024     Priority: Medium    ESRD (end stage renal disease) on dialysis (H) 04/09/2024     Priority: Medium    CHF (congestive heart failure) (H) 06/30/2023     Priority: Medium    Persistent atrial fibrillation (H) 04/04/2023     Priority: Medium    Pure hypercholesterolemia      Priority: Medium     Created by Conversion        History of Helicobacter pylori gastritis      Priority: Medium     Created by Conversion  Replacement Utility updated for latest IMO load        Diaphragmatic hernia without obstruction and without gangrene      Priority: Medium     Created by Conversion  Replacement Utility updated for latest IMO load        Class 1 obesity with alveolar hypoventilation, serious comorbidity, and body mass index (BMI) of 32.0 to 32.9 in adult (H) 11/30/2018     Priority: Medium    Ulcerative pancolitis without complication (H)      Priority: Medium     Created by Conversion  Replacement Utility updated for latest IMO load        Essential hypertension with goal blood pressure less than 140/90 11/29/2017     Priority: Medium    DNR (do not resuscitate) 11/29/2017     Priority: Medium    Vitamin D Deficiency      Priority: Medium     Created by Conversion  Replacement Utility updated for latest IMO load        Chronic pain of left knee 12/02/2015     Priority: Medium    History of left hip replacement 01/11/2006     Priority: Medium      Past Medical History:   Diagnosis Date    Acute renal failure superimposed on chronic kidney disease, unspecified  acute renal failure type, unspecified CKD stage  (H24)     Cellulitis of left foot     Chronic atrial fibrillation (H)     Ulcer of left foot, limited to breakdown of skin (H)      Past Surgical History:   Procedure Laterality Date    HC DILATION/CURETTAGE DIAG/THER NON OB      Description: Dilation And Curettage;  Recorded: 01/18/2010;    IR CVC TUNNEL PLACEMENT > 5 YRS OF AGE  10/30/2023    IR CVC TUNNEL PLACEMENT > 5 YRS OF AGE  1/17/2024    PICC TRIPLE LUMEN PLACEMENT  10/30/2023    ZZC COLOSTOMY      Description: Colostomy;  Recorded: 11/05/2012;    ZZC PART REMOVAL COLON W ANASTOMOSIS      Description: Partial Colectomy;  Recorded: 11/05/2012;  Comments: with colostomy    ZZC TOTAL HIP ARTHROPLASTY      Description: Total Hip Replacement;  Recorded: 01/18/2010;     Allergies   Allergen Reactions    Allopurinol Diarrhea    Lisinopril Cough     initiated 5/14/1996 and stopped       Current Outpatient Medications   Medication Sig Dispense Refill    ACE/ARB/ARNI NOT PRESCRIBED (INTENTIONAL) Please choose reason not prescribed from choices below.      BETA BLOCKER NOT PRESCRIBED (INTENTIONAL) Please choose reason not prescribed from choices below.      cholecalciferol, vitamin D3, 50 mcg (2,000 unit) Tab [CHOLECALCIFEROL, VITAMIN D3, 50 MCG (2,000 UNIT) TAB] Take 1 tablet (2,000 Units total) by mouth daily. 100 each 3    ELIQUIS ANTICOAGULANT 2.5 MG tablet Take 2.5 mg by mouth 2 times daily      famotidine (PEPCID) 20 MG tablet Take 1 tablet (20 mg) by mouth daily 90 tablet 3    folic acid (FOLVITE) 1 MG tablet Take 1 tablet (1 mg) by mouth daily 90 tablet 3    furosemide (LASIX) 40 MG tablet Take 0.5 tablets (20 mg) by mouth every other day      lactobacillus rhamnosus, GG, (CULTURELL) capsule Take 1 capsule by mouth 2 times daily      levothyroxine (SYNTHROID/LEVOTHROID) 25 MCG tablet Take 1 tablet (25 mcg) by mouth every morning (before breakfast) 90 tablet 3    loperamide (IMODIUM) 2 MG capsule Take 1 capsule  "(2 mg) by mouth 4 times daily as needed for diarrhea      midodrine (PROAMATINE) 5 MG tablet Take 1 tablet (5 mg) by mouth every hour as needed (on HD for SBP <90)      mupirocin (BACTROBAN) 2 % external cream APPLY TO AFFECTED AREA 3 TIMES DAILY*      predniSONE (DELTASONE) 5 MG tablet Take 1-2 tablets (5-10 mg) by mouth daily 60 tablet 11    traMADol (ULTRAM) 50 MG tablet Take 50 mg by mouth every 8 hours as needed for pain      traMADol (ULTRAM) 50 MG tablet Take 1 tablet (50 mg) by mouth every 8 hours as needed for severe pain 30 tablet 1       Social History     Tobacco Use    Smoking status: Never    Smokeless tobacco: Never   Substance Use Topics    Alcohol use: Not on file     History   Drug Use Not on file           Objective     OBJECTIVE:     PHYSICAL EXAM:  /85 (BP Location: Left arm, Patient Position: Sitting, Cuff Size: Adult Regular)   Pulse 63   Temp 96.9  F (36.1  C) (Tympanic)   Resp 18   Ht 1.575 m (5' 2\")   Wt 79.5 kg (175 lb 3.2 oz)   LMP  (LMP Unknown)   SpO2 97%   BMI 32.04 kg/m     Estimated body mass index is 32.04 kg/m  as calculated from the following:    Height as of this encounter: 1.575 m (5' 2\").    Weight as of this encounter: 79.5 kg (175 lb 3.2 oz).    Constitutional:  Reveals pleasant woman who ambulates very slowly and stiffly with a cane  Accompanied by son and daughter  Palpation of radial pulse irregularly irregular  Thyroid:  Non palpable   Cardiac: Irregularly irregular without murmur  Lungs: Clear.  Respiratory effort normal.  Edema of Legs: None   Psychiatric:  Alert and oriented   Wearing mask    More recent labs done April 1 through [facility] regarding potassium, creatinine, hemoglobin  Recent Labs   Lab Test 01/17/24  0922 11/16/23  0938 11/04/23  0514 11/03/23  0557 10/30/23  1535 10/30/23  0743 10/29/23  2328 10/29/23  2211 04/11/23  1407 04/04/23  1504   HGB 13.9 10.1*   < > 9.5*   < > 9.5*  --  11.6*  --  12.2    205   < > 96*   < > 110*  " --  182  --  180    138   < > 135   < > 140   < >  --    < > 143   POTASSIUM 5.2 3.2*   < > 4.1   < > 5.9*   < >  --    < > 5.6*   CR 3.79* 2.38*   < > 3.62*   < > 6.12*   < >  --    < > 3.73*   A1C  --   --   --   --   --  5.0  --   --   --   --    URIC  --   --   --   --   --   --   --   --   --  10.6*   B12  --   --   --  814  --   --   --   --   --   --    TSH  --   --   --  5.43*  --   --   --  5.41*  --  1.44    < > = values in this interval not displayed.       Lab Results   Component Value Date    CHOL 166 04/04/2023        Diagnostics:  Orders Placed This Encounter   Procedures    REVIEW OF HEALTH MAINTENANCE PROTOCOL ORDERS    TSH    CRP inflammation    Erythrocyte sedimentation rate auto    Urine Drug Screen    Urine Drug Screen       No EKG required for low risk surgery (cataract, skin procedure, breast biopsy, etc).  EKG October 2023 chronic atrial fibrillation  Echocardiogram October 2023 left ventricular function 55%    Revised Cardiac Risk Index (RCRI):  The patient has the following serious cardiovascular risks for perioperative complications:   - No serious cardiac risks = 0 points     RCRI Interpretation: 0 points: Class I (very low risk - 0.4% complication rate)         Start Time: 3:11 PM  End time:  3:46 PM  Visit plus orders: 35 minutes  Dictation time:  3 minutes    The visit lasted a total of 35 minutes     Oscar Hopper MD  [facility]

## 2024-04-10 PROBLEM — E66.811 CLASS 1 OBESITY WITH ALVEOLAR HYPOVENTILATION, SERIOUS COMORBIDITY, AND BODY MASS INDEX (BMI) OF 32.0 TO 32.9 IN ADULT (H): Status: ACTIVE | Noted: 2018-11-30

## 2024-04-10 PROBLEM — E66.2 CLASS 1 OBESITY WITH ALVEOLAR HYPOVENTILATION, SERIOUS COMORBIDITY, AND BODY MASS INDEX (BMI) OF 32.0 TO 32.9 IN ADULT (H): Status: ACTIVE | Noted: 2018-11-30

## 2024-04-10 PROBLEM — F11.20 OPIOID TYPE DEPENDENCE, EPISODIC (H): Status: ACTIVE | Noted: 2024-01-01

## 2024-04-11 NOTE — OR NURSING
Pre-op call RN Assessment    Per patient, she was told to stop her Eliquis 3 days prior to surgery on Friday 4/12 from her pre-op evaluation provider. Daughter on phone as well and will be accompanying patient on Tuesday 4/16.    Procedure start: 0855  Arrive: 0725  NPO: [id number]  Clears: [id number]

## 2024-04-16 NOTE — ANESTHESIA POSTPROCEDURE EVALUATION
Patient: Mely Alegria    Procedure: Procedure(s):  LAPAROSCOPIC PLACEMENT PERITONEAL DIALYSIS CATHETER WITH OMENTOPEXY       Anesthesia Type:  General    Note:  Disposition: Outpatient   Postop Pain Control: Uneventful            Sign Out: Well controlled pain   PONV: No   Neuro/Psych: Uneventful            Sign Out: Acceptable/Baseline neuro status   Airway/Respiratory: Uneventful            Sign Out: Acceptable/Baseline resp. status   CV/Hemodynamics: Uneventful            Sign Out: Acceptable CV status; No obvious hypovolemia; No obvious fluid overload   Other NRE: NONE   DID A NON-ROUTINE EVENT OCCUR? No           Last vitals:  Vitals Value Taken Time   /58 04/16/24 1200   Temp 36.7  C (98  F) 04/16/24 1215   Pulse 106 04/16/24 1215   Resp 16 04/16/24 1215   SpO2 93 % 04/16/24 1215       Electronically Signed By: Ata Gibson MD  April 16, 2024  1:56 PM

## 2024-04-16 NOTE — ANESTHESIA CARE TRANSFER NOTE
Patient: Mely Alegria    Procedure: Procedure(s):  LAPAROSCOPIC PLACEMENT PERITONEAL DIALYSIS CATHETER WITH OMENTOPEXY       Diagnosis: Renal failure [N19]  Diagnosis Additional Information: No value filed.    Anesthesia Type:   General     Note:    Oropharynx: spontaneously breathing  Level of Consciousness: awake  Oxygen Supplementation: nasal cannula    Independent Airway: airway patency satisfactory and stable  Dentition: dentition unchanged  Vital Signs Stable: post-procedure vital signs reviewed and stable  Report to RN Given: handoff report given  Patient transferred to: PACU    Handoff Report: Identifed the Patient, Identified the Reponsible Provider, Reviewed the pertinent medical history, Discussed the surgical course, Reviewed Intra-OP anesthesia mangement and issues during anesthesia, Set expectations for post-procedure period and Allowed opportunity for questions and acknowledgement of understanding      Vitals:  Vitals Value Taken Time   /69 04/16/24 1035   Temp 98.3 F 04/16/24 1035   Pulse 123 04/16/24 1035   Resp 12 04/16/24 1035   SpO2 96 % 04/16/24 1035   Vitals shown include unfiled device data.    Electronically Signed By: RUPERT Cuadra CRNA  April 16, 2024  10:37 AM

## 2024-04-16 NOTE — INTERVAL H&P NOTE
"I have reviewed the surgical (or preoperative) H&P that is linked to this encounter, and examined the patient. There are no significant changes    Clinical Conditions Present on Arrival:  Clinically Significant Risk Factors Present on Admission                # Drug Induced Coagulation Defect: home medication list includes an anticoagulant medication   # Obesity: Estimated body mass index is 31.46 kg/m  as calculated from the following:    Height as of 4/9/24: 1.575 m (5' 2\").    Weight as of this encounter: 78 kg (172 lb).       "

## 2024-04-16 NOTE — ANESTHESIA PROCEDURE NOTES
Airway       Patient location during procedure: OR       Procedure Start/Stop Times: 4/16/2024 8:59 AM  Staff -        CRNA: Tamar Suárez APRN CRNA       Performed By: CRNA  Consent for Airway        Urgency: elective  Indications and Patient Condition       Indications for airway management: kesha-procedural       Induction type:intravenous       Mask difficulty assessment: 1 - vent by mask    Final Airway Details       Final airway type: endotracheal airway       Successful airway: ETT - single and Oral  Endotracheal Airway Details        ETT size (mm): 7.0       Cuffed: yes       Successful intubation technique: video laryngoscopy       VL Blade Size: Glidescope 3       Grade View of Cords: 1       Adjucts: stylet       Position: Right       Measured from: gums/teeth       Secured at (cm): 20       Bite block used: None    Post intubation assessment        Placement verified by: capnometry, equal breath sounds and chest rise        Number of attempts at approach: 1       Number of other approaches attempted: 0       Secured with: tape       Ease of procedure: easy       Dentition: Intact and Unchanged    Medication(s) Administered   Medication Administration Time: 4/16/2024 8:59 AM

## 2024-04-16 NOTE — ANESTHESIA PREPROCEDURE EVALUATION
"Anesthesia Pre-Procedure Evaluation    Patient: Mely Alegria   MRN: [id number] : 1940        Procedure : Procedure(s):  LAPAROSCOPIC PLACEMENT PERITONEAL DIALYSIS CATHETER WITH OMENTOPEXY          Past Medical History:   Diagnosis Date    Acute midline low back pain without sciatica     Acute renal failure superimposed on chronic kidney disease, unspecified acute renal failure type, unspecified CKD stage  (H24)     Anemia     Cellulitis of left foot     CHF (congestive heart failure) (H)     Chronic atrial fibrillation (H)     Chronic back pain     Chronic heart failure, unspecified heart failure type (H)     Dialysis patient (H24)     M,W,F    Diaphragmatic hernia without obstruction and without gangrene     End stage renal disease (H)     Gout     Helicobacter pylori gastritis     Hypertension     Hypothyroidism due to acquired atrophy of thyroid     Obesity     Opioid type dependence, episodic (H)     Pure hypercholesterolemia     Ulcer of left foot, limited to breakdown of skin (H)     Ulcerative pancolitis without complication (H)     Uric acid arthropathy     Vitamin D deficiency       Past Surgical History:   Procedure Laterality Date    HC DILATION/CURETTAGE DIAG/THER NON OB      Description: Dilation And Curettage;  Recorded: 2010;    IR CVC TUNNEL PLACEMENT > 5 YRS OF AGE  10/30/2023    IR CVC TUNNEL PLACEMENT > 5 YRS OF AGE  2024    PICC TRIPLE LUMEN PLACEMENT  10/30/2023    ZZC COLOSTOMY      Description: Colostomy;  Recorded: 2012;    ZZC PART REMOVAL COLON W ANASTOMOSIS      Description: Partial Colectomy;  Recorded: 2012;  Comments: with colostomy    ZZC TOTAL HIP ARTHROPLASTY      Description: Total Hip Replacement;  Recorded: 2010;      Allergies   Allergen Reactions    Allopurinol Diarrhea    Latex     Lisinopril Cough     initiated 1996 and stopped      Percocet [Oxycodone-Acetaminophen] Other (See Comments)     Difficulty swallowing   \" gets stuck in " "throat\"      Social History     Tobacco Use    Smoking status: Never    Smokeless tobacco: Never   Substance Use Topics    Alcohol use: Not on file      Wt Readings from Last 1 Encounters:   04/16/24 78 kg (172 lb)        Anesthesia Evaluation   Pt has had prior anesthetic.         ROS/MED HX  ENT/Pulmonary:  - neg pulmonary ROS     Neurologic:       Cardiovascular:     (+)  hypertension- -   -  - -      CHF                  dysrhythmias, a-fib,             METS/Exercise Tolerance:     Hematologic:       Musculoskeletal:   (+)  arthritis,             GI/Hepatic:       Renal/Genitourinary:     (+) renal disease, type: ESRD, Pt requires dialysis, type: Hemodialysis,          Endo:     (+)          thyroid problem,     Obesity,       Psychiatric/Substance Use:       Infectious Disease:       Malignancy:  - neg malignancy ROS     Other:  - neg other ROS          Physical Exam    Airway  airway exam normal      Mallampati: II   TM distance: > 3 FB   Neck ROM: full   Mouth opening: > 3 cm    Respiratory Devices and Support         Dental       (+) Edentulous      Cardiovascular   cardiovascular exam normal       Rhythm and rate: regular and normal     Pulmonary   pulmonary exam normal        breath sounds clear to auscultation           OUTSIDE LABS:  CBC:   Lab Results   Component Value Date    WBC 9.0 01/17/2024    WBC 5.9 11/16/2023    HGB 13.9 01/17/2024    HGB 10.1 (L) 11/16/2023    HCT 42.4 01/17/2024    HCT 31.8 (L) 11/16/2023     01/17/2024     11/16/2023     BMP:   Lab Results   Component Value Date     01/17/2024     11/16/2023    POTASSIUM 5.2 01/17/2024    POTASSIUM 3.2 (L) 11/16/2023    CHLORIDE 102 01/17/2024    CHLORIDE 98 11/16/2023    CO2 31 (H) 01/17/2024    CO2 24 11/16/2023    BUN 29.1 (H) 01/17/2024    BUN 10.6 11/16/2023    CR 3.79 (H) 01/17/2024    CR 2.38 (H) 11/16/2023    GLC 78 01/17/2024    GLC 81 11/16/2023     COAGS:   Lab Results   Component Value Date    PTT >300 " "(HH) 11/02/2023     POC: No results found for: \"BGM\", \"HCG\", \"HCGS\"  HEPATIC:   Lab Results   Component Value Date    ALBUMIN 2.9 (L) 11/09/2023    PROTTOTAL 6.0 (L) 11/08/2023    ALT 10 11/08/2023    AST 23 11/08/2023    ALKPHOS 100 11/08/2023    BILITOTAL 0.7 11/08/2023    ROSEANN 16 10/29/2023     OTHER:   Lab Results   Component Value Date    PH 7.30 (L) 10/31/2023    LACT 1.6 11/07/2023    A1C 5.0 10/30/2023    SANDIP 9.7 01/17/2024    PHOS 2.8 11/09/2023    MAG 1.6 (L) 11/09/2023    TSH 0.15 (L) 04/09/2024    T4 0.50 (L) 11/03/2023    CRP 1.5 (H) 03/11/2022    SED 59 (H) 04/09/2024       Anesthesia Plan    ASA Status:  3    NPO Status:  NPO Appropriate    Anesthesia Type: General.     - Airway: ETT   Induction: Intravenous, Propofol, RSI.   Maintenance: Inhalation.   Techniques and Equipment:     - Airway: Video-Laryngoscope       Consents    Anesthesia Plan(s) and associated risks, benefits, and realistic alternatives discussed. Questions answered and patient/representative(s) expressed understanding.     - Discussed: Risks, Benefits and Alternatives for BOTH SEDATION and the PROCEDURE were discussed     - Discussed with:  Patient      - Extended Intubation/Ventilatory Support Discussed: No.      - Patient is DNR/DNI Status: Yes             Suspend during perioperative period? Yes.             Agree to: chemical resuscitation, chest compression/defibrillation.   Use of blood products discussed: No .     Postoperative Care    Pain management: IV analgesics.   PONV prophylaxis: Ondansetron (or other 5HT-3), Dexamethasone or Solumedrol     Comments:               Ata Gibson MD    I have reviewed the pertinent notes and labs in the chart from the past 30 days and (re)examined the patient.  Any updates or changes from those notes are reflected in this note.            # Drug Induced Coagulation Defect: home medication list includes an anticoagulant medication   # Obesity: Estimated body mass index is 31.46 " "kg/m  as calculated from the following:    Height as of 4/9/24: 1.575 m (5' 2\").    Weight as of this encounter: 78 kg (172 lb).      "

## 2024-04-16 NOTE — OP NOTE
OPERATIVE REPORT    Mely Alegria  [facility]  Medical Record #:  [id number]  YOB: 1940  Age:  84 year old    PROCEDURE DATE:  4/16/2024    PREOPERATIVE DIAGNOSIS: Chronic renal failure need for peritoneal dialysis access catheter    POSTOPERATIVE DIAGNOSIS: Same    PROCEDURE: 1.  Laparoscopic placement of peritoneal dialysis catheter with lysis of adhesions 2.  Omentopexy    OPERATING SURGEON:  August Myers MD    ASSISTANT: Technician    ANESTHESIA: General    DESCRIPTION OF PROCEDURE: With the patient in the supine position under general anesthesia having reviewed the risk benefits and complications of surgical intervention with her and with her family the abdomen is prepped in usual sterile fashion.  Pneumoperitoneum is instilled by open technique in the subxiphoid location.  Two 5 mm trocars were placed in the right mid abdominal wall.  Lysis of adhesions accomplished.  Appropriate measurements are made and 2 incisions are made left upper portion of the abdomen and the catheter drawn through subcutaneous tunnel and the distal pledget placed in the rectus musculature.  Catheter tip was placed just over the pubic symphysis superior bladder location.  700 cc of fluid was instilled and retrieved without difficulty by gravity drainage.  Time was taken to ensure hemostasis is obtained and all instrumentation was removed fascial defect closed with interrupted and running 0 Vicryl.  Skin closed with running 3-0 Vicryl subcuticular suture and Dermabond.  The estimated blood loss is minimal there were no complications and the patient tolerated the procedure well.  Sponge needle counts correct x 2.    EBL:  [unfilled]    SPECIMENS:  * No specimens in log *    August myers md  [facility]

## 2024-04-16 NOTE — OR NURSING
Afib with  to 130 rate, Dr Gibson aware.  Pain inback 9/10 and abd 8/10 Dr Gibson is aware and ordered 5mg po oxycodone x1.

## 2024-07-09 NOTE — PATIENT INSTRUCTIONS
DISCHARGE INSTRUCTIONS    During office hours (8:00 a.m.- 4:00 p.m.) questions or concerns may be answered  by calling Spine Center Navigation Nurses at  [phone].  Messages received after hours will be returned the following business day.      In the case of an emergency, please dial 911 or seek assistance at the nearest Emergency Room/Urgent Care facility.     All Patients:    You may experience an increase in your symptoms for the first 2 days (It may take anywhere between 2 days- 2 weeks for the steroid to have maximum effect).    You may use ice on the injection site, as frequently as 20 minutes each hour if needed.    You may take your pain medicine.    You may continue taking your regular medication after your injection. If you have had a Medial Branch Block you may resume pain medication once your pain diary is completed.    You may shower. No swimming, tub bath or hot tub for 48 hours.  You may remove your bandaid/bandage as soon as you are home.    You may resume light activities, as tolerated.    Resume your usual diet as tolerated.    It is strongly advised that you do not drive for 1-3 hours post injection.    If you have had oral sedation:  Do not drive for 8 hours post injection.      If you have had IV sedation:  Do not drive for 24 hours post injection.  Do not operate hazardous machinery or make important personal/business decisions for 24 hours.      POSSIBLE STEROID SIDE EFFECTS (If steroid/cortisone was used for your procedure)    -If you experience these symptoms, it should only last for a short period    Swelling of the legs              Skin redness (flushing)     Mouth (oral) irritation   Blood sugar (glucose) levels            Sweats                    Mood changes  Headache  Sleeplessness  Weakened immune system for up to 14 days, which could increase the risk of esther the COVID-19 virus and/or experiencing more severe symptoms of the disease, if exposed.  Decreased  effectiveness of the flu vaccine if given within 2 weeks of the steroid.         POSSIBLE PROCEDURE SIDE EFFECTS  -Call the Spine Center if you are concerned  Increased Pain           Increased numbness/tingling      Nausea/Vomiting          Bruising/bleeding at site      Redness or swelling                                              Difficulty walking      Weakness           Fever greater than 100.5    *In the event of a severe headache after an epidural steroid injection that is relieved by lying down, please call the [facility] to speak with a clinical staff member*

## 2024-07-17 NOTE — PROCEDURES
Interventional Radiology  Status post tunneled central venous catheter removal    Right tunneled central venous catheter removed. Catheter appeared to be fully intact upon removal.  Bottom portion of the subcutaneous tissue was connected to the catheter, skin tear at exit site with removal. Site cleansed with chlorhexidine wand then approximated with dermabond.  Full report to be dictated.    Plan:  Bedrest for 30 minutes post-catheter removal.  Monitor site for bleeding and check vital signs every 15 minutes post-catheter removal.    Okay to discharge home when post-procedure monitoring/bedrest timeframe is complete, patient remains hemodynamically stable and procedural site remains stable.      Maryjane Plummer, APRN, CNP

## 2024-07-22 NOTE — TELEPHONE ENCOUNTER
Patient is on the Chronic Pain Quality Measure/Registry but does not have a chronic opioid diagnosis in the problem list.  Please review the chart and update the diagnosis list if appropriate.      The two ICD-10 codes which would be acceptable are F11.90 or F11.2.    Adding either the F11.90 or F11.2 diagnoses to the problem list will automatically enroll your patient in the annual Health Maintenance Plan for the four ACC components of the metric (BENITEZ-7, PHQ-9, Utox and CSA) at their next visit.    Once the problem list has been updated or not (if not appropriate), then please close the chart.        Pam J. Behr     Home

## 2024-08-28 NOTE — LETTER
8/28/2024      Mely Alegria  [address] [facility] [address]      Dear Colleague,    Thank you for referring your patient, Mely Alegria, to the [facility]. Please see a copy of my visit note below.      Assessment:     Diagnoses and all orders for this visit:  Chronic bilateral low back pain without sciatica  -     Physical Therapy  Referral; Future  Spinal stenosis of lumbar region with neurogenic claudication  -     Physical Therapy  Referral; Future  Lumbar foraminal stenosis  -     Physical Therapy  Referral; Future  Spondylolisthesis of lumbar region  -     Physical Therapy  Referral; Future  DDD (degenerative disc disease), lumbar  -     Physical Therapy  Referral; Future     Mely Alegria is a 84 year old y.o. female with past medical history significant for hypercholesterolemia, persistent atrial fibrillation, hypertension, CHF, chronic kidney disease stage IV-on Monday Wednesday Friday hemodialysis, chronic knee pain, diaphragmatic hernia, vitamin D deficiency, ulcerative pancolitis, DNR who presents today for follow-up regarding:    -Chronic bilateral low back pain with no lasting relief post bilateral L5-S1 TFESI 7/9/2024..  Previously short-term relief only with bilateral L4-5 TFESI.     Plan:     A shared decision making plan was used. The patient's values and choices were respected. Prior medical records were reviewed today. The following represents what was discussed and decided upon by the provider and the patient.        -DIAGNOSTIC TESTS: Images were personally reviewed and interpreted.   --Cervical spine CT scan 11/5/2023 with no acute fracture.  Grade 1 spondylolisthesis L4-5 with multilevel facet arthropathy and multilevel disc height loss.  --Lumbar spine MRI 10/12/2023 with L4-5 disc bulging on the left with left lateral recess stenosis and L5 impingement, severe left, mild right foraminal stenosis  and mild central stenosis.  L3-4 mild bilateral foraminal stenosis.  L5-S1 severe bilateral foraminal stenosis.    -INTERVENTIONS: Discussed with patient if no benefit with 3 sessions of physical therapy would recommend trialing a bilateral L3, L4, L5 medial branch block to diagnostically determine how much of her back pain is facet mediated.  She does have facet arthropathy at L4-5 and L5-S1.  If she has a positive response she would be a good candidate with moving forward with RFA.  **Eliquis anticoagulation medication.  Prior to HD she was on Xarelto.     -MEDICATIONS: No changes in medications today  Discussed side effects of medications and proper use. Patient verbalized understanding.    -PHYSICAL THERAPY: Referral to physical therapy at this time, did advise patient to complete at least 3 sessions of PT and continue with home exercises as well.  Discussed the importance of core strengthening, ROM, stretching exercises with the patient and how each of these entities is important in decreasing pain.  Explained to the patient that the purpose of physical therapy is to teach the patient a home exercise program.  These exercises need to be performed every day in order to decrease pain and prevent future occurrences of pain.        -PATIENT EDUCATION:  Total time of 38 minutes, on the day of service, spent with the patient, reviewing the chart, placing orders, and documenting.     -FOLLOW UP: Follow-up as needed if symptoms are not improving with PT, MyChart messages okay if they want to move forward with MBB.  Advised to contact clinic if symptoms worsen or change.    Subjective:     Mely Alegria is a 84 year old female who presents today for follow-up regarding post bilateral L5-S1 TFESI in which she unfortunately got minimal lasting benefit.  Previously she reported short-term relief for the first couple of days with L4-5 TFESI but no lasting relief with that injection either.  She does report that her back  pain is still across the lower lumbar spine at a 9/10, 10 at its worst, and 8 at its best.  Aggravated specifically with walking as well as getting up and down.  Sitting and icing give her benefit.  She denies otherwise any lower extremity radiating pain.  Denies lower extremity numbness or tingling sensations.  Denies recent trips or falls or balance changes.    Patient's son Tatyana, and daughter, was present today during entire visit and added to past medical/surgical/family/social history and history of presenting illness.      -Treatment to Date: No prior spinal surgery   No prior physical therapy for back pain.     Bilateral L4-5 TFESI 2/20/2024 with initial but no lasting benefit.   Bilateral L5-S1 TFESI 7/9/2024 with no lasting benefit.      -Medications:  Baclofen 10 mg    Patient Active Problem List   Diagnosis     Vitamin D Deficiency     Pure hypercholesterolemia     History of Helicobacter pylori gastritis     Diaphragmatic hernia without obstruction and without gangrene     Ulcerative pancolitis without complication (H)     Chronic pain of left knee     Essential hypertension with goal blood pressure less than 140/90     DNR (do not resuscitate)     Class 1 obesity with alveolar hypoventilation, serious comorbidity, and body mass index (BMI) of 32.0 to 32.9 in adult (H)     History of left hip replacement     Persistent atrial fibrillation (H)     CHF (congestive heart failure) (H)     ESRD (end stage renal disease) on dialysis (H)     Opioid type dependence, episodic (H)       Current Outpatient Medications   Medication Sig Dispense Refill     ELIQUIS ANTICOAGULANT 2.5 MG tablet Take 2.5 mg by mouth 2 times daily       predniSONE (DELTASONE) 5 MG tablet Take 1-2 tablets (5-10 mg) by mouth daily 60 tablet 11     traMADol (ULTRAM) 50 MG tablet Take 50 mg by mouth every 8 hours as needed for pain       ACE/ARB/ARNI NOT PRESCRIBED (INTENTIONAL) Please choose reason not prescribed from choices below.        "BETA BLOCKER NOT PRESCRIBED (INTENTIONAL) Please choose reason not prescribed from choices below.       cholecalciferol, vitamin D3, 50 mcg (2,000 unit) Tab [CHOLECALCIFEROL, VITAMIN D3, 50 MCG (2,000 UNIT) TAB] Take 1 tablet (2,000 Units total) by mouth daily. 100 each 3     famotidine (PEPCID) 20 MG tablet Take 1 tablet (20 mg) by mouth daily 90 tablet 3     folic acid (FOLVITE) 1 MG tablet Take 1 tablet (1 mg) by mouth daily 90 tablet 3     furosemide (LASIX) 40 MG tablet Take 0.5 tablets (20 mg) by mouth every other day       HYDROcodone-acetaminophen (NORCO) 5-325 MG tablet Take 1-2 tablets by mouth every 4 hours as needed for moderate to severe pain (Patient not taking: Reported on 8/28/2024) 20 tablet 0     lactobacillus rhamnosus, GG, (CULTURELL) capsule Take 1 capsule by mouth 2 times daily       loperamide (IMODIUM) 2 MG capsule Take 1 capsule (2 mg) by mouth 4 times daily as needed for diarrhea       midodrine (PROAMATINE) 5 MG tablet Take 1 tablet (5 mg) by mouth every hour as needed (on HD for SBP <90)       mupirocin (BACTROBAN) 2 % external cream APPLY TO AFFECTED AREA 3 TIMES DAILY*       No current facility-administered medications for this visit.       Allergies   Allergen Reactions     Allopurinol Diarrhea     Latex      Lisinopril Cough     initiated 5/14/1996 and stopped       Percocet [Oxycodone-Acetaminophen] Other (See Comments)     Difficulty swallowing   \" gets stuck in throat\"       Past Medical History:   Diagnosis Date     Acute midline low back pain without sciatica      Acute renal failure superimposed on chronic kidney disease, unspecified acute renal failure type, unspecified CKD stage  (H24)      Anemia      Cellulitis of left foot      CHF (congestive heart failure) (H)      Chronic atrial fibrillation (H)      Chronic back pain      Chronic heart failure, unspecified heart failure type (H)      Dialysis patient (H24)     M,W,F     Diaphragmatic hernia without obstruction and " "without gangrene      End stage renal disease (H)      Gout      Helicobacter pylori gastritis      Hypertension      Hypothyroidism due to acquired atrophy of thyroid      Obesity      Opioid type dependence, episodic (H)      Pure hypercholesterolemia      Ulcer of left foot, limited to breakdown of skin (H)      Ulcerative pancolitis without complication (H)      Uric acid arthropathy      Vitamin D deficiency         Review of Systems  ROS:  Specifically negative for bowel/bladder dysfunction, balance changes, headache, dizziness, foot drop, fevers, chills, appetite changes, nausea/vomiting, unexplained weight loss. Otherwise 13 systems reviewed are negative. Please see the patient's intake questionnaire from today for details.    Reviewed Social, Family, Past Medical and Past Surgical history with patient, no significant changes noted since prior visit.     Objective:     /57 (BP Location: Right arm, Patient Position: Sitting, Cuff Size: Adult Large)   Pulse 53   Ht 5' 2\" (1.575 m)   Wt 172 lb (78 kg)   LMP  (LMP Unknown)   BMI 31.46 kg/m      PHYSICAL EXAMINATION:    --CONSTITUTIONAL: Well developed, well nourished, healthy appearing individual.  --PSYCHIATRIC: Appropriate mood and affect. No difficulty interacting due to temper, social withdrawal, or memory issues.  --SKIN: Lumbar region is dry and intact.   --RESPIRATORY: Normal rhythm and effort. No abnormal accessory muscle breathing patterns noted.   --MUSCULOSKELETAL:  Normal lumbar lordosis noted, no lateral shift.  --GROSS MOTOR: Easily arises from a seated position. Gait is non-antalgic  --LUMBAR SPINE:  Inspection reveals no evidence of deformity.     RESULTS:   Imaging: Spine imaging was reviewed today. The images were shown to the patient and the findings were explained using a spine model.      Lumbar spine MRI reviewed                        Again, thank you for allowing me to participate in the care of your patient.  "       Sincerely,        Arabella Lim, CNP

## 2024-08-28 NOTE — PATIENT INSTRUCTIONS
~Spine Center Scheduling #[phone].  ~Please call our [facility] Spine Nurse Navigation #[phone] with any questions or concerns about your treatment plan, if symptoms worsen and you would like to be seen urgently, or if you have problems controlling bladder and bowel function.  ~For any future flareups or new symptoms, recommend follow-up in clinic or contact the nurse navigator line.  ~Please note that any My Chart messages may take multiple days for a response due to the high volume of patients seen in clinic.  Anything sent Thursday night or after will be answered the following week when able, as Arabella Lim CNP does not work in clinic on Fridays.   ~Arabella Lim CNP is at the [facility] on Tuesdays, otherwise primarily at the [facility].       ~You have been referred for Physical Therapy to [facility]. They will call you to schedule an appointment.       Scheduling phone number is [phone] [place], [place], or [place] [facility].  If you have not heard from the scheduling office within 2 business days, please call [phone] for ALL other locations.     Discussed the importance of core strengthening, ROM, stretching exercises and how each of these entities is important in decreasing pain and improving long term spine health.  The purpose of physical therapy is to teach you an individualized home exercise program.  These exercises need to be performed every day in order to decrease pain and prevent future occurrences of pain.

## 2024-08-28 NOTE — PROGRESS NOTES
Assessment:     Diagnoses and all orders for this visit:  Chronic bilateral low back pain without sciatica  -     Physical Therapy  Referral; Future  Spinal stenosis of lumbar region with neurogenic claudication  -     Physical Therapy  Referral; Future  Lumbar foraminal stenosis  -     Physical Therapy  Referral; Future  Spondylolisthesis of lumbar region  -     Physical Therapy  Referral; Future  DDD (degenerative disc disease), lumbar  -     Physical Therapy  Referral; Future     Mely Alegria is a 84 year old y.o. female with past medical history significant for hypercholesterolemia, persistent atrial fibrillation, hypertension, CHF, chronic kidney disease stage IV-on Monday Wednesday Friday hemodialysis, chronic knee pain, diaphragmatic hernia, vitamin D deficiency, ulcerative pancolitis, DNR who presents today for follow-up regarding:    -Chronic bilateral low back pain with no lasting relief post bilateral L5-S1 TFESI 7/9/2024..  Previously short-term relief only with bilateral L4-5 TFESI.     Plan:     A shared decision making plan was used. The patient's values and choices were respected. Prior medical records were reviewed today. The following represents what was discussed and decided upon by the provider and the patient.        -DIAGNOSTIC TESTS: Images were personally reviewed and interpreted.   --Cervical spine CT scan 11/5/2023 with no acute fracture.  Grade 1 spondylolisthesis L4-5 with multilevel facet arthropathy and multilevel disc height loss.  --Lumbar spine MRI 10/12/2023 with L4-5 disc bulging on the left with left lateral recess stenosis and L5 impingement, severe left, mild right foraminal stenosis and mild central stenosis.  L3-4 mild bilateral foraminal stenosis.  L5-S1 severe bilateral foraminal stenosis.    -INTERVENTIONS: Discussed with patient if no benefit with 3 sessions of physical therapy would recommend trialing a bilateral L3, L4,  L5 medial branch block to diagnostically determine how much of her back pain is facet mediated.  She does have facet arthropathy at L4-5 and L5-S1.  If she has a positive response she would be a good candidate with moving forward with RFA.  **Eliquis anticoagulation medication.  Prior to HD she was on Xarelto.     -MEDICATIONS: No changes in medications today  Discussed side effects of medications and proper use. Patient verbalized understanding.    -PHYSICAL THERAPY: Referral to physical therapy at this time, did advise patient to complete at least 3 sessions of PT and continue with home exercises as well.  Discussed the importance of core strengthening, ROM, stretching exercises with the patient and how each of these entities is important in decreasing pain.  Explained to the patient that the purpose of physical therapy is to teach the patient a home exercise program.  These exercises need to be performed every day in order to decrease pain and prevent future occurrences of pain.        -PATIENT EDUCATION:  Total time of 38 minutes, on the day of service, spent with the patient, reviewing the chart, placing orders, and documenting.     -FOLLOW UP: Follow-up as needed if symptoms are not improving with PT, MyChart messages okay if they want to move forward with MBB.  Advised to contact clinic if symptoms worsen or change.    Subjective:     Mely Alegria is a 84 year old female who presents today for follow-up regarding post bilateral L5-S1 TFESI in which she unfortunately got minimal lasting benefit.  Previously she reported short-term relief for the first couple of days with L4-5 TFESI but no lasting relief with that injection either.  She does report that her back pain is still across the lower lumbar spine at a 9/10, 10 at its worst, and 8 at its best.  Aggravated specifically with walking as well as getting up and down.  Sitting and icing give her benefit.  She denies otherwise any lower extremity radiating  pain.  Denies lower extremity numbness or tingling sensations.  Denies recent trips or falls or balance changes.    Patient's son Tatyana, and daughter, was present today during entire visit and added to past medical/surgical/family/social history and history of presenting illness.      -Treatment to Date: No prior spinal surgery   No prior physical therapy for back pain.     Bilateral L4-5 TFESI 2/20/2024 with initial but no lasting benefit.   Bilateral L5-S1 TFESI 7/9/2024 with no lasting benefit.      -Medications:  Baclofen 10 mg    Patient Active Problem List   Diagnosis    Vitamin D Deficiency    Pure hypercholesterolemia    History of Helicobacter pylori gastritis    Diaphragmatic hernia without obstruction and without gangrene    Ulcerative pancolitis without complication (H)    Chronic pain of left knee    Essential hypertension with goal blood pressure less than 140/90    DNR (do not resuscitate)    Class 1 obesity with alveolar hypoventilation, serious comorbidity, and body mass index (BMI) of 32.0 to 32.9 in adult (H)    History of left hip replacement    Persistent atrial fibrillation (H)    CHF (congestive heart failure) (H)    ESRD (end stage renal disease) on dialysis (H)    Opioid type dependence, episodic (H)       Current Outpatient Medications   Medication Sig Dispense Refill    ELIQUIS ANTICOAGULANT 2.5 MG tablet Take 2.5 mg by mouth 2 times daily      predniSONE (DELTASONE) 5 MG tablet Take 1-2 tablets (5-10 mg) by mouth daily 60 tablet 11    traMADol (ULTRAM) 50 MG tablet Take 50 mg by mouth every 8 hours as needed for pain      ACE/ARB/ARNI NOT PRESCRIBED (INTENTIONAL) Please choose reason not prescribed from choices below.      BETA BLOCKER NOT PRESCRIBED (INTENTIONAL) Please choose reason not prescribed from choices below.      cholecalciferol, vitamin D3, 50 mcg (2,000 unit) Tab [CHOLECALCIFEROL, VITAMIN D3, 50 MCG (2,000 UNIT) TAB] Take 1 tablet (2,000 Units total) by mouth daily. 100 each 3  "   famotidine (PEPCID) 20 MG tablet Take 1 tablet (20 mg) by mouth daily 90 tablet 3    folic acid (FOLVITE) 1 MG tablet Take 1 tablet (1 mg) by mouth daily 90 tablet 3    furosemide (LASIX) 40 MG tablet Take 0.5 tablets (20 mg) by mouth every other day      HYDROcodone-acetaminophen (NORCO) 5-325 MG tablet Take 1-2 tablets by mouth every 4 hours as needed for moderate to severe pain (Patient not taking: Reported on 8/28/2024) 20 tablet 0    lactobacillus rhamnosus, GG, (CULTURELL) capsule Take 1 capsule by mouth 2 times daily      loperamide (IMODIUM) 2 MG capsule Take 1 capsule (2 mg) by mouth 4 times daily as needed for diarrhea      midodrine (PROAMATINE) 5 MG tablet Take 1 tablet (5 mg) by mouth every hour as needed (on HD for SBP <90)      mupirocin (BACTROBAN) 2 % external cream APPLY TO AFFECTED AREA 3 TIMES DAILY*       No current facility-administered medications for this visit.       Allergies   Allergen Reactions    Allopurinol Diarrhea    Latex     Lisinopril Cough     initiated 5/14/1996 and stopped      Percocet [Oxycodone-Acetaminophen] Other (See Comments)     Difficulty swallowing   \" gets stuck in throat\"       Past Medical History:   Diagnosis Date    Acute midline low back pain without sciatica     Acute renal failure superimposed on chronic kidney disease, unspecified acute renal failure type, unspecified CKD stage  (H24)     Anemia     Cellulitis of left foot     CHF (congestive heart failure) (H)     Chronic atrial fibrillation (H)     Chronic back pain     Chronic heart failure, unspecified heart failure type (H)     Dialysis patient (H24)     M,W,F    Diaphragmatic hernia without obstruction and without gangrene     End stage renal disease (H)     Gout     Helicobacter pylori gastritis     Hypertension     Hypothyroidism due to acquired atrophy of thyroid     Obesity     Opioid type dependence, episodic (H)     Pure hypercholesterolemia     Ulcer of left foot, limited to breakdown of skin " "(H)     Ulcerative pancolitis without complication (H)     Uric acid arthropathy     Vitamin D deficiency         Review of Systems  ROS:  Specifically negative for bowel/bladder dysfunction, balance changes, headache, dizziness, foot drop, fevers, chills, appetite changes, nausea/vomiting, unexplained weight loss. Otherwise 13 systems reviewed are negative. Please see the patient's intake questionnaire from today for details.    Reviewed Social, Family, Past Medical and Past Surgical history with patient, no significant changes noted since prior visit.     Objective:     /57 (BP Location: Right arm, Patient Position: Sitting, Cuff Size: Adult Large)   Pulse 53   Ht 5' 2\" (1.575 m)   Wt 172 lb (78 kg)   LMP  (LMP Unknown)   BMI 31.46 kg/m      PHYSICAL EXAMINATION:    --CONSTITUTIONAL: Well developed, well nourished, healthy appearing individual.  --PSYCHIATRIC: Appropriate mood and affect. No difficulty interacting due to temper, social withdrawal, or memory issues.  --SKIN: Lumbar region is dry and intact.   --RESPIRATORY: Normal rhythm and effort. No abnormal accessory muscle breathing patterns noted.   --MUSCULOSKELETAL:  Normal lumbar lordosis noted, no lateral shift.  --GROSS MOTOR: Easily arises from a seated position. Gait is non-antalgic  --LUMBAR SPINE:  Inspection reveals no evidence of deformity.     RESULTS:   Imaging: Spine imaging was reviewed today. The images were shown to the patient and the findings were explained using a spine model.      Lumbar spine MRI reviewed                      "

## 2024-09-25 PROBLEM — M54.42 CHRONIC BILATERAL LOW BACK PAIN WITH LEFT-SIDED SCIATICA: Status: ACTIVE | Noted: 2024-01-01

## 2024-09-25 PROBLEM — G89.29 CHRONIC BILATERAL LOW BACK PAIN WITH LEFT-SIDED SCIATICA: Status: ACTIVE | Noted: 2024-01-01

## 2024-09-25 NOTE — PROGRESS NOTES
PHYSICAL THERAPY EVALUATION  Type of Visit: Evaluation       Fall Risk Screen:  Fall screen completed by: PT  Have you fallen 2 or more times in the past year?: No  Have you fallen and had an injury in the past year?: No  Is patient a fall risk?: Yes  Fall screen comments: not appropriate to do a TUG today due to time.    Subjective       Presenting condition or subjective complaint: doctor referral    The patient presents to therapy with a chief complaint of low back pain. Is in reportedly severe pain. Did have an injection 7/9/24 without releif, some previous short term relief with bilateral L405 TFESI. The patient reports that she is just miserable. Is in 10/10 pain at all times. Is having a really hard time going to the bathroom due to difficulty getting up from the toilet. She reports that recently her leg fell asleep while sitting on the toilet. Reports that she is required to get 3 visits of PT in before hopefully having a nerve ablation procedure done. Patient gets 10 hours of dialysis overnight. Lives with son/daughter in law.     Per MD note:   --Cervical spine CT scan 11/5/2023 with no acute fracture.  Grade 1 spondylolisthesis L4-5 with multilevel facet arthropathy and multilevel disc height loss.  --Lumbar spine MRI 10/12/2023 with L4-5 disc bulging on the left with left lateral recess stenosis and L5 impingement, severe left, mild right foraminal stenosis and mild central stenosis.  L3-4 mild bilateral foraminal stenosis.  L5-S1 severe bilateral foraminal stenosis.    Past Medical History:   Diagnosis Date    Acute midline low back pain without sciatica     Acute renal failure superimposed on chronic kidney disease, unspecified acute renal failure type, unspecified CKD stage  (H24)     Anemia     Cellulitis of left foot     CHF (congestive heart failure) (H)     Chronic atrial fibrillation (H)     Chronic back pain     Chronic heart failure, unspecified heart failure type (H)     Dialysis patient (H24)      M,W,F    Diaphragmatic hernia without obstruction and without gangrene     End stage renal disease (H)     Gout     Helicobacter pylori gastritis     Hypertension     Hypothyroidism due to acquired atrophy of thyroid     Obesity     Opioid type dependence, episodic (H)     Pure hypercholesterolemia     Ulcer of left foot, limited to breakdown of skin (H)     Ulcerative pancolitis without complication (H)     Uric acid arthropathy     Vitamin D deficiency        Date of onset: 08/28/24 (MD lawson)    Relevant medical history:     Dates & types of surgery: kidney dialysis port in stomach    Prior diagnostic imaging/testing results: MRI; CT scan; X-ray; Bone scan     Prior therapy history for the same diagnosis, illness or injury:        Living Environment  Social support: With family members   Type of home: House   Stairs to enter the home: Yes       Ramp: No   Stairs inside the home: Yes 12 Is there a railing: Yes     Help at home: Self Cares (home health aide/personal care attendant, family, etc); Assist for driving and community activities  Equipment owned: Straight Cane; Walker; Walker with wheels; Standard wheelchair     Employment: No    Hobbies/Interests:      Patient goals for therapy: normal daily activities    Pain assessment: See objective evaluation for additional pain details     Objective   LUMBAR SPINE EVALUATION  PAIN: Pain Level at Rest: 7/10  Pain Level with Use: 10/10  Pain Location: lumbar spine  Pain Quality: Aching, Burning, Gnawing, Miserable, Nagging, Pressure, Sharp, Shooting, and Stabbing  Pain is Exacerbated By: everything, especially standing and getting out of a chair   Pain is Relieved By: cold and sitting  POSTURE:  patient tends to sit in wheelchair in forward leaning posture   GAIT:   Unable to walk in clinic today. Patient arrives in w/c and spends the duration  of the appointment in the w/c .  STRENGTH:  Not formally tested but able to perform seated hip flexion, ankle DF, PF and  knee extension/flexion in sittting     Significantly limited eval due to the patient's severity of pain.   The patient is observed to be in severe pain. During the session the patient has to go to the bathroom and is unable to transfer off of the toilet in the clinic without max assist of 2 with gait belt. Due to the severity of pain, the patient may not be a good fit for outpatient PT at this time due to challenges transferring the patient in/out of the clinic, safety independently transferring within the clinic, and the inability to perform progressive PT exercises due to limited tolerance.     Assessment & Plan   CLINICAL IMPRESSIONS  Medical Diagnosis: Chronic bilateral low back pain without sciatica  Spinal stenosis of lumbar region with neurogenic claudication  Lumbar foraminal stenosis  Spondylolisthesis of lumbar region  DDD (degenerative disc disease), lumbar    Treatment Diagnosis: Low Back Pain/Stenosis   Impression/Assessment: Patient is a 84 year old female with low back complaints.  The following significant findings have been identified: Pain, Decreased ROM/flexibility, Decreased joint mobility, Decreased strength, Inflammation, Impaired gait, Impaired muscle performance, Decreased activity tolerance, and Impaired posture. These impairments interfere with their ability to perform self care tasks, work tasks, recreational activities, household chores, driving , household mobility, and community mobility as compared to previous level of function.     Clinical Decision Making (Complexity):  Clinical Presentation: Evolving/Changing   Clinical Presentation Rationale: based on medical and personal factors listed in PT evaluation  Clinical Decision Making (Complexity): High Complexity     PLAN OF CARE  Treatment Interventions:  Modalities: Cryotherapy, Hot Pack, Electrical Stimulation  Interventions: Gait Training, Manual Therapy, Neuromuscular Re-education, Therapeutic Activity, Therapeutic Exercise,  Self-Care/Home Management    Long Term Goals     PT Goal 1  Goal Identifier: Prolonged Positioning.  Goal Description: The patient will be able to sustain a position, either sitting or standing x30 minutes with appropriate posture and pain <3/10.  Rationale: to maximize safety and independence with performance of ADLs and functional tasks;to maximize safety and independence with self cares  Target Date: 12/18/24  PT Goal 2  Goal Identifier: Standing  Goal Description: The patient will be able to stand x5 minutes to perform self care tasks  Rationale: to maximize safety and independence with performance of ADLs and functional tasks;to maximize safety and independence within the home;to maximize safety and independence with self cares  Target Date: 12/18/24  PT Goal 3  Goal Identifier: Transfers  Goal Description: The patient will be able to independently transfer from toilet to standing with UE use but independently to improve safety with ADLs and transfers  Target Date: 12/18/24      Frequency of Treatment: 1x/week tapering to 1x every 2 weeks  Duration of Treatment: 12 weeks    Recommended Referrals to Other Professionals:   Education Assessment:   Learner/Method: Patient;Family;Caregiver (son/daughter in attendance at visit)  Education Comments: Eager to participate in therapy    Risks and benefits of evaluation/treatment have been explained.   Patient/Family/caregiver agrees with Plan of Care.     Evaluation Time:     PT Eval, High Complexity Minutes (10782): 20       Signing Clinician: Court Ballard, PT        [facility]                                                                                   OUTPATIENT PHYSICAL THERAPY      PLAN OF TREATMENT FOR OUTPATIENT REHABILITATION   Patient's Last Name, First Name, KAURMely Boateng YOB: 1940   Provider's Name   [facility]   Medical Record No.  [id number]     Onset  Date: 08/28/24 (MD order)  Start of Care Date: 09/25/24     Medical Diagnosis:  Chronic bilateral low back pain without sciatica  Spinal stenosis of lumbar region with neurogenic claudication  Lumbar foraminal stenosis  Spondylolisthesis of lumbar region  DDD (degenerative disc disease), lumbar      PT Treatment Diagnosis:  Low Back Pain/Stenosis Plan of Treatment  Frequency/Duration: 1x/week tapering to 1x every 2 weeks/ 12 weeks    Certification date from 09/25/24 to 12/18/24         See note for plan of treatment details and functional goals     Court Ballard, PT                         I CERTIFY THE NEED FOR THESE SERVICES FURNISHED UNDER        THIS PLAN OF TREATMENT AND WHILE UNDER MY CARE     (Physician attestation of this document indicates review and certification of the therapy plan).              Referring Provider:  Arabella Lim    Initial Assessment  See Epic Evaluation- Start of Care Date: 09/25/24

## 2024-09-28 PROBLEM — R63.8 DECREASED ORAL INTAKE: Status: ACTIVE | Noted: 2024-01-01

## 2024-09-28 PROBLEM — I48.91 RAPID ATRIAL FIBRILLATION (H): Status: ACTIVE | Noted: 2024-01-01

## 2024-09-28 PROBLEM — R53.1 GENERAL WEAKNESS: Status: ACTIVE | Noted: 2024-01-01

## 2024-09-28 PROBLEM — N93.9 VAGINAL BLEEDING: Status: ACTIVE | Noted: 2024-01-01

## 2024-09-28 PROBLEM — R79.89 ELEVATED TROPONIN: Status: ACTIVE | Noted: 2024-01-01

## 2024-09-28 NOTE — PHARMACY-ADMISSION MEDICATION HISTORY
Pharmacist Admission Medication History    Admission medication history is complete. The information provided in this note is only as accurate as the sources available at the time of the update.    Information Source(s): Family member via in-person    Pertinent Information: Family reported patient takes vitamin D on Monday, Wednesday, and Friday but unable to provide dose. Family stated vitamin D was a chewable nature made product.     Changes made to PTA medication list:  Added: torsemide, spironolactone   Deleted: cholecalciferol 2000 UI, furosemide, norco, culturell, loperamide, midodrine, mupirocin,   Changed: famotidine 20 mg daily to 20 mg twice daily, prednisone 1-2 tablets once daily to 1 tablet twice daily, tramadol 50 mg every 8 hour to 50 mg twice daily    Allergies reviewed with patient and updates made in EHR: yes    Medication History Completed By: Tabatha Rosenberg RPH 9/28/2024 11:07 AM    PTA Med List   Medication Sig Last Dose    ELIQUIS ANTICOAGULANT 2.5 MG tablet Take 2.5 mg by mouth 2 times daily 9/28/2024 at AM    famotidine (PEPCID) 20 MG tablet Take 20 mg by mouth 2 times daily. 9/27/2024 at PM    folic acid (FOLVITE) 1 MG tablet Take 1 tablet (1 mg) by mouth daily 9/27/2024 at AM    predniSONE (DELTASONE) 5 MG tablet Take 5 mg by mouth 2 times daily. 9/27/2024 at PM    spironolactone (ALDACTONE) 25 MG tablet Take 25 mg by mouth daily. 9/27/2024 at AM    torsemide (DEMADEX) 100 MG tablet Take 100 mg by mouth daily. 9/27/2024 at AM    traMADol (ULTRAM) 50 MG tablet Take 50 mg by mouth 2 times daily. 9/28/2024 at AM

## 2024-09-28 NOTE — CONSULTS
NEPHROLOGY CONSULTATION      ASSESSMENT/PLAN:  84 year old female with hx of ESRD on PD , CAD , Chronic back pain , hx of atrial fib, UC , Gout and Htn , presenting with abd pain and generalized weakness with nausea and poor intakes for 3-4days now. She has minimal UO but has been noticing blood on her briefs, uncledar vaginal or urinary, no stool with blood. PD fluid has been clear no alarms or drain pain except the slight chronic twinge she gets at end of last cycle. Nephrology consulted for ESRD and management of volume /peritonitis    ESRD on PD   Usually 4 cycles over 10hrs and then LF with icodextyrin 2300 FV for cycler and 1 L for Icodextrin  Kt/V barely adequate but makes it  UO low 300-700  No drain pain / some blood in pull up ? If UTI or vaginal  Will drain icodextrin and send cellcounts and Cx after a 1 Hr dwell for fluid samples  Overnight PD ordered for 12 hrs 6 cycles 2.5%  --> follow on renal labs daily  --> ok to continue vanco and zosyn , ? Infection/source for now    HTN   Hypervolemia  2.5% with better UF results  Had been hypervolemic when using 1.5% and started on all 2.5% for last 3 wks now  Close to TW now aiming for 76.5kg at home  On Spironolactone 25mg , Torsemide 100mg and metoprolol  --> has been off metoprolol , now resumed , titrate per cardiology  --> UF with PD    Electrolytes  Hypokalemia chronic  On MRA  Follow on labs   On KCL 20meq daily PTA    Hypomagnesemia  Chronic low mag   On mag oxide    Anemia  Hb better  Was 12-13  Possible hemoconc, leucocytosis noted  --> follow on labs    MBD  No binders       Abdominal pain   Concerns for peritonitis  Leucocytosis   CAP with LN+ , no renal/bladder abn  Started on vanco and zosyn  PD samples and urine samples with straight cath after 1 dose of Abx    CAD  HFpEF 50-55%  Atrial fib with RVR  On Ac continued; Eliquis  Troponin leak down trend  Received metoprolol   Stopped dilt inf now  --> continue BB    Hx of PMR on  steroids    Chronic back pan  On tramadol    Gout on allopurinol    GERD on pepcid    Thank you for the consultation   We will follow    Keri Finn MD  [facility]  [phone]    CC: ESRD on PD   Concerns for abd pain and peritonitis    REASON FOR CONSULTATION: We are asked to see pt by Dr Delgado    HISTORY OF PRESENT ILLNESS:84 year old female  84 year old female with hx of ESRD on PD , CAD , Chronic back pain , hx of atrial fib, UC , Gout and Htn , presenting with abd pain and generalized weakness with nausea and poor intakes for 3-4days now. She has minimal UO but has been noticing blood on her briefs, uncledar vaginal or urinary, no stool with blood. PD fluid has been clear no alarms or drain pain except the slight chronic twinge she gets at end of last cycle. Nephrology consulted for ESRD and management of volume /peritonitis  She reports PD was goiung well  No tiny pain , no cloudy fluid  She has been feeling very weak and fatigued , unable to walk around  Abd pain diffuse but more barbi localized area in hypogastrium where her diaper is tight and has a shallow ulcer and red area  No tenderness at exit site/tunnel   Mild left flank pain  Has some blood stains on her briefs and she is not sure if they are from vagina or urine  No dysuria but has been having a hard time pee  Recent increased UF goals with edema worsening and had been doing well with that  Appetite and energy is very low, no fevers        REVIEW OF SYSTEMS:  ROS was completely reviewed and otherwise negative and non-contributory    Past Medical History:   Diagnosis Date    Acute midline low back pain without sciatica     Acute renal failure superimposed on chronic kidney disease, unspecified acute renal failure type, unspecified CKD stage (H)     Anemia     Cellulitis of left foot     CHF (congestive heart failure) (H)     Chronic atrial fibrillation (H)     Chronic atrial fibrillation (H)     Chronic back pain      Chronic heart failure, unspecified heart failure type (H)     Dialysis patient (H)     M,W,F    Diaphragmatic hernia without obstruction and without gangrene     End stage renal disease (H)     Gout     Helicobacter pylori gastritis     Hypertension     Hypothyroidism due to acquired atrophy of thyroid     Obesity     Opioid type dependence, episodic (H)     Pure hypercholesterolemia     Ulcer of left foot, limited to breakdown of skin (H)     Ulcerative pancolitis without complication (H)     Uric acid arthropathy     Vitamin D deficiency        Social History     Socioeconomic History    Marital status:      Spouse name: Not on file    Number of children: Not on file    Years of education: Not on file    Highest education level: Not on file   Occupational History    Not on file   Tobacco Use    Smoking status: Never    Smokeless tobacco: Never   Vaping Use    Vaping status: Never Used   Substance and Sexual Activity    Alcohol use: Not on file    Drug use: Not on file    Sexual activity: Not on file   Other Topics Concern    Not on file   Social History Narrative    Not on file     Social Determinants of Health     Financial Resource Strain: Low Risk  (12/13/2023)    Financial Resource Strain     Within the past 12 months, have you or your family members you live with been unable to get utilities (heat, electricity) when it was really needed?: No   Food Insecurity: Low Risk  (12/13/2023)    Food Insecurity     Within the past 12 months, did you worry that your food would run out before you got money to buy more?: No     Within the past 12 months, did the food you bought just not last and you didn t have money to get more?: No   Transportation Needs: Low Risk  (12/13/2023)    Transportation Needs     Within the past 12 months, has lack of transportation kept you from medical appointments, getting your medicines, non-medical meetings or appointments, work, or from getting things that you need?: No   Physical  "Activity: Not on file   Stress: Not on file   Social Connections: Not on file   Interpersonal Safety: Not on file   Housing Stability: Low Risk  (12/13/2023)    Housing Stability     Do you have housing? : Yes     Are you worried about losing your housing?: No       Family History   Problem Relation Age of Onset    Diabetes Mother        Allergies   Allergen Reactions    Allopurinol Diarrhea    Latex     Lisinopril Cough     initiated 5/14/1996 and stopped      Percocet [Oxycodone-Acetaminophen] Other (See Comments)     Difficulty swallowing   \" gets stuck in throat\"       MEDICATIONS:  Current Facility-Administered Medications   Medication Dose Route Frequency Provider Last Rate Last Admin    apixaban ANTICOAGULANT (ELIQUIS) tablet 2.5 mg  2.5 mg Oral BID August Delgado MD        famotidine (PEPCID) tablet 20 mg  20 mg Oral BID August Delgado MD        [START ON 9/29/2024] folic acid (FOLVITE) tablet 1 mg  1 mg Oral Daily August Delgado MD        metoprolol tartrate (LOPRESSOR) tablet 25 mg  25 mg Oral Q6H August Delgado MD   25 mg at 09/28/24 1608    piperacillin-tazobactam (ZOSYN) 3.375 g vial to attach to  mL bag  3.375 g Intravenous Q12H August Delgado MD        predniSONE (DELTASONE) tablet 5 mg  5 mg Oral BID August Delgado MD        sodium chloride (PF) 0.9% PF flush 3 mL  3 mL Intracatheter Q8H August Delgado MD        [START ON 9/29/2024] spironolactone (ALDACTONE) tablet 25 mg  25 mg Oral Daily August Delgado MD        [START ON 9/29/2024] torsemide (DEMADEX) tablet 100 mg  100 mg Oral Daily August Delgado MD        traMADol (ULTRAM) tablet 50 mg  50 mg Oral BID August Delgado MD        vancomycin place taylor - receiving intermittent dosing  1 each Intravenous See Admin Instructions August Delgado MD             PHYSICAL EXAM    BP (!) 152/87 (BP Location: Left arm)   Pulse 101   Temp 97.7  F (36.5  C) (Oral)   Resp 22   Ht 1.549 m (5' 1\")  " " Wt 77.6 kg (171 lb)   LMP  (LMP Unknown)   SpO2 91%   BMI 32.31 kg/m      No intake or output data in the 24 hours ending 09/28/24 1810    Alert/oriented x 3; awake and NAD  HEENT NC/AT; perrla; OP clear without lesions; mmm  Neck supple without LAD, TM  CV; RRR without rub or murmur  Lung: clear and equal; no extra sounds  Ab: soft and NT; not distended; normal bs  Ext: no edema and well perfused  Skin; no rash  Neuro; grossly intact    LABORATORIES    Recent Labs   Lab 09/28/24  1107   WBC 16.9*   HGB 14.7   HCT 44.5        Recent Labs   Lab 09/28/24  1107      CO2 22   BUN 32.0*     No results for input(s): \"INR\", \"PTT\" in the last 168 hours.    Invalid input(s): \"APTT\"  Invalid input(s): \"FERRITIN\"  No results for input(s): \"IRON\" in the last 168 hours.    Invalid input(s): \"TIBC\"    I reviewed all labs and imaging    Thank you for the consultation we will follow    Keri Finn MD  [facility]  [phone]     "

## 2024-09-28 NOTE — ED NOTES
"[facility] ED Handoff Report    ED Chief Complaint: generalized weakness    ED Diagnosis:  (I48.91) Rapid atrial fibrillation (H)      (N93.9) Vaginal bleeding      (R63.8) Decreased oral intake      (R53.1) General weakness      (R79.89) Elevated troponin      (Z99.2) Peritoneal dialysis status (H)      (A41.9) Sepsis, due to unspecified organism, unspecified whether acute organ dysfunction present (H)      PMH:    Past Medical History:   Diagnosis Date    Acute midline low back pain without sciatica     Acute renal failure superimposed on chronic kidney disease, unspecified acute renal failure type, unspecified CKD stage (H)     Anemia     Cellulitis of left foot     CHF (congestive heart failure) (H)     Chronic atrial fibrillation (H)     Chronic atrial fibrillation (H)     Chronic back pain     Chronic heart failure, unspecified heart failure type (H)     Dialysis patient (H)     M,W,F    Diaphragmatic hernia without obstruction and without gangrene     End stage renal disease (H)     Gout     Helicobacter pylori gastritis     Hypertension     Hypothyroidism due to acquired atrophy of thyroid     Obesity     Opioid type dependence, episodic (H)     Pure hypercholesterolemia     Ulcer of left foot, limited to breakdown of skin (H)     Ulcerative pancolitis without complication (H)     Uric acid arthropathy     Vitamin D deficiency         Code Status:  No CPR- Do NOT Intubate     Falls Risk: Yes Band: Applied    Current Living Situation/Residence: lives with their son or daughter     Elimination Status: Continent: Yes, wears a brief     Activity Level: SBA w/ walker    Patients Preferred Language:  English     Needed: No    Vital Signs:  BP (!) 160/78   Pulse 113   Temp 97.5  F (36.4  C) (Oral)   Resp 18   Ht 1.549 m (5' 1\")   Wt 77.6 kg (171 lb)   LMP  (LMP Unknown)   SpO2 92%   BMI 32.31 kg/m       Cardiac Rhythm: A fib with RVR    Pain Score: 4/10, back    Is the Patient Confused:  " No    Last Food or Drink: 09/28/24 at prior to arrival    Focused Assessment:  Patient comes in with generalized weakness and poor appetite. Lact was elevated so she received one 500ml bolus. Has received Zosyn and Vanco. HR has been between 130-160s, Diltiazem drip at 10mg\hr, which has HR in the 90-110s. Daughter and son have been at bedside, it sounds like they do most of patients cares. Patient has been alert and oriented. Does peritoneal dialysis at home.     Tests Performed: Done: Labs and Imaging    Treatments Provided:  see MAR    Family Dynamics/Concerns: No    Family Updated On Visitor Policy: Yes    Plan of Care Communicated to Family: Yes    Who Was Updated about Plan of Care: patient and daughter/son    Belongings Checklist Done and Signed by Patient: No    Belongings Sent with Patient: sent with family    Medications sent with patient: running IV meds    Covid: asymptomatic , negative      RN: Maxine Earl RN 9/28/2024 3:46 PM

## 2024-09-28 NOTE — PHARMACY-VANCOMYCIN DOSING SERVICE
Pharmacy Vancomycin Initial Note  Date of Service 2024  Patient's  1940  84 year old, female    Indication: Sepsis    Current estimated CrCl = Estimated Creatinine Clearance: 10.4 mL/min (A) (based on SCr of 3.79 mg/dL (H)).    Creatinine for last 3 days  No results found for requested labs within last 3 days.    Recent Vancomycin Level(s) for last 3 days  No results found for requested labs within last 3 days.      Vancomycin IV Administrations (past 72 hours)        No vancomycin orders with administrations in past 72 hours.                    Nephrotoxins and other renal medications (From now, onward)      Start     Dose/Rate Route Frequency Ordered Stop    24 1200  piperacillin-tazobactam (ZOSYN) 3.375 g vial to attach to  mL bag         3.375 g  over 30 Minutes Intravenous ONCE 24 1132      24 1200  vancomycin (VANCOCIN) 1,500 mg in sodium chloride 0.9 % 250 mL intermittent infusion         1,500 mg  over 90 Minutes Intravenous ONCE 24 1144              Contrast Orders - past 72 hours (72h ago, onward)      None                  Plan:  Start vancomycin 1500 mg IV once.   Vancomycin monitoring method: AUC  Vancomycin therapeutic monitoring goal: 400-600 mg*h/L  Pharmacy will check vancomycin levels as appropriate in 1-3 Days.    Serum creatinine levels will be ordered daily for the first week of therapy and at least twice weekly for subsequent weeks.      Tabatha Rosenberg RP

## 2024-09-28 NOTE — ED TRIAGE NOTES
Pt has been nauseated x2 days. Vomited this am.   When she woke up this am she has felt very weak. Pt has low back to right back pain rated 101/0 this am.  This back pain is not new for pt but it is worse today.  Pt does peritonneal dialysis.started this last May. Unable to get a Bp in triage on left arm after several tries.

## 2024-09-28 NOTE — H&P
[facility]    History and Physical - Hospitalist Service       Date of Admission:  9/28/2024    Assessment & Plan   Mely Alegria is a 84 year old female with PMHx of ESRD on PD, chronic low back pain, possible PMR, Afib on AC, ulcerative colitis, gout who presents with 2 days of acute onset generalized weakness, diffuse abdominal pain, and and nausea with vomiting. Presentation most consistent with secondary bacterial peritonitis.    #Sepsis due to intra-abdominal infection, present on admission  #Secondary bacterial peritonitis with PD catheter present  Presented with 2 days of abdominal pain, N/V, weakness. Found to be tachycardic with RVR, WBC 16.9. Diffuse abdominal tenderness on exam with PD catheter in place. CT C/A/P without contrast showed RP and pelvic lymphadenopathy. Clinical presentation most consistent with infectious peritonitis. UTI is also possible, patient unable to give sample initially as she makes more limited urine, though seems slightly less likely with normal appearing bladder/kidneys on non-con CT.  - ID consulted  - Continue Vanc/Zosyn  - S/p 500cc IVF in setting of ESRD  - Bcx 9/28: NGTD  - Check peritoneal cell count with diff and fluid cultures   - Straight cath for UA    #Lactic acidosis  #Anion-gap metabolic acidosis  Lactic acid 4.8 on admission in setting of sepsis  - S/p 500cc, down to 3.3, monitor  - Use IVF judiciously with ESRD    #ESRD on PD  #Chronic HFpEF without acute exacerbation  #Elevated NTpro-BNP due to hypervolemia  PD has been going well at home. Started 5/2024. Follows with Dr. Finn. Hypervolemic on exam, but patient reports her LE edema is stable, and not needing supplementary oxygen.  - Renal consulted  - PD per Renal  -Continue home torsemide 100mg daily  - Continue home spironolactone 25mg daily    #Permanent atrial fibrillation on chronic AC  #Rapid ventricular response  -170 on admission. Not on rate controlling agents as OP.  Chart mentions intentionally not using BB. But reviewed and it was stopped for hypotension some years ago, not an acute issue here. Patient has no recollection o having issues with metoprolol.  - Stop dilt gtt  - Start metoprolol 25mg q6h, titrate  - Continue home Eliquis  - TTE 11/2023: EF 50-55%    #Non-ischemic myocardial injury due to sepsis  HS tropoin 128 down to 118 on re-check. The patient denies CP, SOB, jaw/arm pain. Is nauseous with abdominal pain, but clinical picture fits abdominal infection more than ACS.  - EKG personally reviewed: Afib with RVR; narrow complex; TWI in II and aVF, no ST segment depression or elevation    #R>L chronic LE edema  In setting of ESRD. Patient reports chronic and stable. Has had multiple joint surgeries on the right leg. On chronic AC.  - Defer LE US given stability and AC use    #Blood in briefs  Patient reports 1 month of noticing consistent pink fluid in briefs, sometimes darker red. Doesn't make much urine. She can't differentiate if it's her urine or vaginal bleeding.  - Pelvic US ordered in ED    #Possible polymyalgia rheumatica  Mentioned in PCP notes.  - Continue home prednisone 5mg PO BID  - Consider stress dose steroids if develops hypotension, but hypertensive on admission    #Chronic low back pain  #Chronic opioid use  Has been a long-term issue affecting mobility.  - Continue prednisone as above  - Continue home tramadol 50mg BID    #Gout  Was on febuxostat in the past. No acute flares right now.  - Continue home prednisone as above    #Ulcerative colitis  Noted in prior PCP notes, but stable.  - Chemical DVT ppx    #GERD  - Continue home famotidine    #Code status  Prior documentation of DNR/DNI. Patient recalls those discussions and does not want to change it on admission, but is going to discuss with family.  -  Check in with patient again tomorrow          Diet: Renal Diet (dialysis)    DVT Prophylaxis: Heparin SQ  Lerma Catheter: Not present  Lines:  "PRESENT             Cardiac Monitoring: ACTIVE order. Indication: Tachyarrhythmias, acute (48 hours)  Code Status: No CPR- Do NOT Intubate      Clinically Significant Risk Factors Present on Admission             # Anion Gap Metabolic Acidosis: Highest Anion Gap = 21 mmol/L in last 2 days, will monitor and treat as appropriate   # Drug Induced Coagulation Defect: home medication list includes an anticoagulant medication    # Hypertension: Noted on problem list  # Chronic heart failure with preserved ejection fraction: heart failure noted on problem list and last echo with EF >50%        # Obesity: Estimated body mass index is 32.31 kg/m  as calculated from the following:    Height as of this encounter: 1.549 m (5' 1\").    Weight as of this encounter: 77.6 kg (171 lb).              Disposition Plan     Medically Ready for Discharge: Anticipated in 2-4 Days           CONG HAMILTON MD  Hospitalist Service  [facility]  Securely message with [employer] (more info)  Text page via [employer] Paging/Directory     ______________________________________________________________________    Chief Complaint   Abdominal pain, N/V, weakness    History is obtained from the patient    History of Present Illness   Mely Alegria is a 84 year old female with PMHx of ESRD on PD, chronic low back pain, possible PMR, Afib on AC, ulcerative colitis, gout who presents with 2 days of acute onset generalized weakness, diffuse abdominal pain, and and nausea with vomiting. She reports PD has been going well since starting 5/2024. But first started to feel weak and tired, then developed the diffuse abdominal pain and nausea. Also has been feeling warm/flushed during this time. Denies URI symptoms, chest pain, SOB, new rashes, joint swelling/pain.    In the ED the patient was afebrile, HR in the 150s in Afib, mildly hypertensive, satting well on room air. On exam she was non-toxic appearing but with diffuse abdominal " tenderness with voluntary guarding. Has significantly more RLE edema than LLE but the patient and family report this is chronic and stable. Lungs clear. PD catheter insertion site clean with dry overlying bandage. Labs notable for WBC 16.9, Cr 6, electrolytes okay. Lactic acid 4.8. CT C/A/P without contrast showed increased RP and pelvic LAD with small to moderate volume ascites.      Past Medical History    Past Medical History:   Diagnosis Date    Acute midline low back pain without sciatica     Acute renal failure superimposed on chronic kidney disease, unspecified acute renal failure type, unspecified CKD stage (H)     Anemia     Cellulitis of left foot     CHF (congestive heart failure) (H)     Chronic atrial fibrillation (H)     Chronic atrial fibrillation (H)     Chronic back pain     Chronic heart failure, unspecified heart failure type (H)     Dialysis patient (H)     M,W,F    Diaphragmatic hernia without obstruction and without gangrene     End stage renal disease (H)     Gout     Helicobacter pylori gastritis     Hypertension     Hypothyroidism due to acquired atrophy of thyroid     Obesity     Opioid type dependence, episodic (H)     Pure hypercholesterolemia     Ulcer of left foot, limited to breakdown of skin (H)     Ulcerative pancolitis without complication (H)     Uric acid arthropathy     Vitamin D deficiency        Past Surgical History   Past Surgical History:   Procedure Laterality Date    HC DILATION/CURETTAGE DIAG/THER NON OB      Description: Dilation And Curettage;  Recorded: 01/18/2010;    IR CVC TUNNEL PLACEMENT > 5 YRS OF AGE  10/30/2023    IR CVC TUNNEL PLACEMENT > 5 YRS OF AGE  1/17/2024    IR CVC TUNNEL REMOVAL RIGHT  7/17/2024    LAPAROSCOPIC INSERTION CATHETER PERITONEAL DIALYSIS N/A 4/16/2024    Procedure: LAPAROSCOPIC PLACEMENT OF PERITONEAL DIALYSIS CATHETER WITH;  Surgeon: August Myers MD;  Location: [facility] OR    LAPAROSCOPIC LYSIS ADHESIONS N/A 4/16/2024    Procedure:  LYSIS OF ADHESIONS;  Surgeon: August Myers MD;  Location: [facility] OR    LAPAROSCOPIC OMENTECTOMY N/A 4/16/2024    Procedure: OMENTOPEXY;  Surgeon: August Myers MD;  Location: [facility] OR    PICC TRIPLE LUMEN PLACEMENT  10/30/2023    ZC COLOSTOMY      Description: Colostomy;  Recorded: 11/05/2012;    ZZC PART REMOVAL COLON W ANASTOMOSIS      Description: Partial Colectomy;  Recorded: 11/05/2012;  Comments: with colostomy    Z TOTAL HIP ARTHROPLASTY      Description: Total Hip Replacement;  Recorded: 01/18/2010;       Prior to Admission Medications   Prior to Admission Medications   Prescriptions Last Dose Informant Patient Reported? Taking?   ACE/ARB/ARNI NOT PRESCRIBED (INTENTIONAL)   Yes No   Sig: Please choose reason not prescribed from choices below.   BETA BLOCKER NOT PRESCRIBED (INTENTIONAL)   Yes No   Sig: Please choose reason not prescribed from choices below.   ELIQUIS ANTICOAGULANT 2.5 MG tablet 9/28/2024 at AM  Yes Yes   Sig: Take 2.5 mg by mouth 2 times daily   famotidine (PEPCID) 20 MG tablet 9/27/2024 at PM  Yes Yes   Sig: Take 20 mg by mouth 2 times daily.   folic acid (FOLVITE) 1 MG tablet 9/27/2024 at AM  No Yes   Sig: Take 1 tablet (1 mg) by mouth daily   predniSONE (DELTASONE) 5 MG tablet 9/27/2024 at PM  Yes Yes   Sig: Take 5 mg by mouth 2 times daily.   spironolactone (ALDACTONE) 25 MG tablet 9/27/2024 at AM  Yes Yes   Sig: Take 25 mg by mouth daily.   torsemide (DEMADEX) 100 MG tablet 9/27/2024 at AM  Yes Yes   Sig: Take 100 mg by mouth daily.   traMADol (ULTRAM) 50 MG tablet 9/28/2024 at AM  Yes Yes   Sig: Take 50 mg by mouth 2 times daily.      Facility-Administered Medications: None           Physical Exam   Vital Signs: Temp: 97.5  F (36.4  C) Temp src: Oral BP: (!) 158/97 Pulse: (!) 125   Resp: 19 SpO2: 92 % O2 Device: None (Room air)    Weight: 171 lbs 0 oz    General: No overt distress, conversational, non-toxic but chronically ill appearing  HEENT: MMM  Pulmonary:  CTAB; no crackles, wheezing, or rhonchi, normal effort  Cardiac: Tachycardic, irregular rhythm. No m/r/g  Abdomen: Soft. Mild ascites with fluid wave present. Diffuse TTP of the abdomen with voluntary guarding. PD catheter in the left abdomen with clean appearing insertion site.  Extremities: 3+ RLE edema, 1+ LLE edema, chronic  Neuro: alert and awake, Ox4      Medical Decision Making       80 MINUTES SPENT BY ME on the date of service doing chart review, history, exam, documentation & further activities per the note.      Data     I have personally reviewed the following data over the past 24 hrs:    16.9 (H)  \   14.7   / 225     135 92 (L) 32.0 (H) /  131 (H)   4.8 22 6.23 (H) \     Trop: 118 (HH) BNP: 13,314 (H)     TSH: 1.46 T4: N/A A1C: N/A     Procal: 0.67 (H) CRP: N/A Lactic Acid: 3.3 (H)         Imaging results reviewed over the past 24 hrs:   Recent Results (from the past 24 hour(s))   CT Chest Abdomen Pelvis w/o Contrast    Narrative    EXAM: CT CHEST ABDOMEN PELVIS W/O CONTRAST  LOCATION: [facility]  DATE: 9/28/2024    INDICATION: gen weakness and feels unwell  COMPARISON: 11/5/2023  TECHNIQUE: CT scan of the chest, abdomen, and pelvis was performed without IV contrast. Multiplanar reformats were obtained. Dose reduction techniques were used.   CONTRAST: None.    FINDINGS: Mild motion artifact.    LUNGS AND PLEURA: Mild emphysema. Stable mild diffuse bronchial wall thickening. Elevated right hemidiaphragm. Right basilar atelectasis. No pleural effusion. Calcified granuloma. A few stable benign solid pulmonary nodules with the largest measuring 0.3   cm in the left upper lobe, image 81:4.    MEDIASTINUM/AXILLAE: Stable enlarged multinodular thyroid. No lymphadenopathy. Trace air in the main pulmonary artery and right subclavian vein are most likely secondary to intravenous line placement.    CORONARY ARTERY CALCIFICATION: Moderate.    HEPATOBILIARY: Cholelithiasis.    PANCREAS:  Normal.    SPLEEN: Normal.    ADRENAL GLANDS: Normal.    KIDNEYS/BLADDER: A simple cyst midpole right kidney, no follow-up required. Bilateral renal atrophy.    BOWEL: No obstruction or inflammatory change. Appendix not visualized.    LYMPH NODES: Increasing retroperitoneal lymphadenopathy with the largest being a left para-aortic node measuring 1.4 cm in short axis, previously 0.8 cm, image 183:3. A 2.6 x 1.6 cm left external iliac node, previously not visualized, image 224:3. A 2.4   x 1.7 cm right external iliac node, previously 1.0 x 0.6 cm, image 226:3.    VASCULATURE: Moderately severe atherosclerosis. Stable ectasia of the abdominal aorta.    PELVIC ORGANS: Peritoneal dialysis catheter. Small-moderate volume ascites.    MUSCULOSKELETAL: Left hip arthroplasty. Degenerative changes. Severe osseous demineralization.      Impression    IMPRESSION:  1.  Increasing retroperitoneal and pelvic lymphadenopathy.  2.  Small-moderate volume ascites.

## 2024-09-28 NOTE — CONSULTS
Consultation - Infectious Disease  [facility]  Mely Alegria,  1940, MRN [id number]    Admitting Dx: Vaginal bleeding [N93.9]  General weakness [R53.1]  Elevated troponin [R79.89]  Peritoneal dialysis status (H) [Z99.2]  Rapid atrial fibrillation (H) [I48.91]  Decreased oral intake [R63.8]  Sepsis, due to unspecified organism, unspecified whether acute organ dysfunction present (H) [A41.9]    PCP: Oscar Hopper, [phone]       ASSESSMENT   84-year-old woman with a history of end-stage renal disease, on peritoneal dialysis, A-fib, CHF, ulcerative colitis who is admitted with increasing weakness and abdominal pain.  ID is consulted regarding concerns for peritonitis.    Abdominal pain.  Patient is on peritoneal dialysis, managed by her adult children.  No recent complications.  Reporting clear effluent.  CT scan of the abdomen with some lymphadenopathy, without other signs of occult infection.  No pain around the dialysis catheter.    Fungal  dermatitis.  New area of pain in the lower abdomen with rash  History of urinary tract infection.  Patient makes very little urine, but she does have a history of urinary tract infection      Active Problems:    Vaginal bleeding    General weakness    Elevated troponin    Rapid atrial fibrillation (H)    Decreased oral intake       PLAN   -Continue vancomycin and Zosyn, renally dosed  -Appreciate renal evaluation.  Plans to get fluid off the peritoneal dialysis catheter and send for cell counts and culture  -Urine culture if able  -Follow-up on blood cultures  -Clotrimazole cream to the lower abdomen    Thank you for this consult. Will follow.    Michael Vela MD  [facility]  Direct messaging: [email or web address] Paging  On-Call ID provider: [phone], option: [phone]      ===========================================      Chief Complaint   <principal problem not specified>       HPI     We have been requested by August Delgado MD  to evaluate Mely Alegria for the above.    History obtained by patient and patient's children    Mely Alegria is a 84 year old woman with a history of end-stage renal disease, on peritoneal dialysis, CHF, A-fib, ulcerative colitis who is at admitted with worsening weakness.  The patient says that she has been unable to get up from the toilet seat for the past few days.  She needs help from her children to get back into bed.  She has had very little oral intake recently.  She was admitted due to concern for infection, in particular possible peritonitis due to abdominal pain.  CT scan in the ER did not show obvious signs of infection.  She has been doing well with peritoneal dialysis, without report of any cloudiness of her effluent fluid.          Review of Systems   Ten systems reviewed and negative except for what is noted in the HPI       Medical History  Past Medical History:   Diagnosis Date    Acute midline low back pain without sciatica     Acute renal failure superimposed on chronic kidney disease, unspecified acute renal failure type, unspecified CKD stage (H)     Anemia     Cellulitis of left foot     CHF (congestive heart failure) (H)     Chronic atrial fibrillation (H)     Chronic atrial fibrillation (H)     Chronic back pain     Chronic heart failure, unspecified heart failure type (H)     Dialysis patient (H)     M,W,F    Diaphragmatic hernia without obstruction and without gangrene     End stage renal disease (H)     Gout     Helicobacter pylori gastritis     Hypertension     Hypothyroidism due to acquired atrophy of thyroid     Obesity     Opioid type dependence, episodic (H)     Pure hypercholesterolemia     Ulcer of left foot, limited to breakdown of skin (H)     Ulcerative pancolitis without complication (H)     Uric acid arthropathy     Vitamin D deficiency     Surgical History  She  has a past surgical history that includes TOTAL HIP ARTHROPLASTY; DILATION/CURETTAGE DIAG/THER NON OB; PART  "REMOVAL COLON W ANASTOMOSIS; COLOSTOMY; IR CVC Tunnel Placement > 5 Yrs of Age (10/30/2023); PICC/Midline Placement (10/30/2023); IR CVC Tunnel Placement > 5 Yrs of Age (1/17/2024); Laparoscopic insertion catheter peritoneal dialysis (N/A, 4/16/2024); Laparoscopic lysis adhesions (N/A, 4/16/2024); Laparoscopic Omentectomy (N/A, 4/16/2024); and IR CVC Tunnel Removal Right (7/17/2024).     Social History  Reviewed, and she  reports that she has never smoked. She has never used smokeless tobacco.  Social History     Social History Narrative    Not on file     Family History  family history includes Diabetes in her mother.  family history reviewed and is not pertinent to the presenting problem.            Allergies     Allergies   Allergen Reactions    Allopurinol Diarrhea    Latex     Lisinopril Cough     initiated 5/14/1996 and stopped      Percocet [Oxycodone-Acetaminophen] Other (See Comments)     Difficulty swallowing   \" gets stuck in throat\"         Antibiotics   Vancomycin 9/28-  Zosyn 9/28-    Previous:  None      Physical Exam     Temp:  [97.5  F (36.4  C)-97.7  F (36.5  C)] 97.7  F (36.5  C)  Pulse:  [] 101  Resp:  [12-23] 22  BP: (125-186)/() 152/87  SpO2:  [91 %-98 %] 91 %    BP (!) 152/87 (BP Location: Left arm)   Pulse 101   Temp 97.7  F (36.5  C) (Oral)   Resp 22   Ht 1.549 m (5' 1\")   Wt 77.6 kg (171 lb)   LMP  (LMP Unknown)   SpO2 91%   BMI 32.31 kg/m      GENERAL:  well-developed, well-nourished, lying in bed in no acute distress.   HENT:  Head is normocephalic, atraumatic. Oropharynx is moist without exudates or ulcers.  EYES:  Eyes have anicteric sclerae without conjunctival injection or stigmata of endocarditis.   NECK:  Supple.  LUNGS:  Clear to auscultation.  CARDIOVASCULAR:  Regular rate and rhythm with no murmurs, gallops or rubs.  ABDOMEN:  Normal bowel sounds, soft.  Mild tenderness on the right abdomen.  No appreciable hepatosplenomegaly.  Peritoneal catheter without " surrounding inflammation or tenderness  EXT: Extremities warm and without edema.  SKIN: Erythema in the lower pannus without skin break. No stigmata of endocarditis.  NEUROLOGIC:  Grossly nonfocal.      Cultures   9/28 blood culture: Pending    No results found for the last 90 days.       Laboratory results     Recent Labs   Lab 09/28/24  1107   WBC 16.9*   HGB 14.7          Recent Labs   Lab 09/28/24  1107      CO2 22   BUN 32.0*       Recent Labs   Lab 09/28/24  1331   CRPI 20.30*           Imaging   Radiology results reviewed    CT Chest Abdomen Pelvis w/o Contrast    Result Date: 9/28/2024  EXAM: CT CHEST ABDOMEN PELVIS W/O CONTRAST LOCATION: [facility] DATE: 9/28/2024 INDICATION: gen weakness and feels unwell COMPARISON: 11/5/2023 TECHNIQUE: CT scan of the chest, abdomen, and pelvis was performed without IV contrast. Multiplanar reformats were obtained. Dose reduction techniques were used. CONTRAST: None. FINDINGS: Mild motion artifact. LUNGS AND PLEURA: Mild emphysema. Stable mild diffuse bronchial wall thickening. Elevated right hemidiaphragm. Right basilar atelectasis. No pleural effusion. Calcified granuloma. A few stable benign solid pulmonary nodules with the largest measuring 0.3  cm in the left upper lobe, image 81:4. MEDIASTINUM/AXILLAE: Stable enlarged multinodular thyroid. No lymphadenopathy. Trace air in the main pulmonary artery and right subclavian vein are most likely secondary to intravenous line placement. CORONARY ARTERY CALCIFICATION: Moderate. HEPATOBILIARY: Cholelithiasis. PANCREAS: Normal. SPLEEN: Normal. ADRENAL GLANDS: Normal. KIDNEYS/BLADDER: A simple cyst midpole right kidney, no follow-up required. Bilateral renal atrophy. BOWEL: No obstruction or inflammatory change. Appendix not visualized. LYMPH NODES: Increasing retroperitoneal lymphadenopathy with the largest being a left para-aortic node measuring 1.4 cm in short axis, previously 0.8 cm,  image 183:3. A 2.6 x 1.6 cm left external iliac node, previously not visualized, image 224:3. A 2.4 x 1.7 cm right external iliac node, previously 1.0 x 0.6 cm, image 226:3. VASCULATURE: Moderately severe atherosclerosis. Stable ectasia of the abdominal aorta. PELVIC ORGANS: Peritoneal dialysis catheter. Small-moderate volume ascites. MUSCULOSKELETAL: Left hip arthroplasty. Degenerative changes. Severe osseous demineralization.     IMPRESSION: 1.  Increasing retroperitoneal and pelvic lymphadenopathy. 2.  Small-moderate volume ascites.      Data reviewed today: I reviewed all medications, new labs and imaging results over the last 24 hours. I personally reviewed the abdominal CT image(s) showing lymphadenopathy .  The patient's care was discussed with the Bedside Nurse, Patient, Patient's Family, and Renal Team.

## 2024-09-28 NOTE — ED PROVIDER NOTES
EMERGENCY DEPARTMENT ENCOUNTER      NAME: Mely Alegria  AGE: 84 year old female  YOB: 1940  MRN: [id number]  EVALUATION DATE & TIME: 9/28/2024 10:28 AM    PCP: Oscar Hopper    ED PROVIDER: Ramona Castellanos M.D.        Chief Complaint   Patient presents with    Generalized Weakness         FINAL IMPRESSION:    1. Rapid atrial fibrillation (H)    2. Vaginal bleeding    3. Decreased oral intake    4. General weakness    5. Elevated troponin    6. Peritoneal dialysis status (H)    7. Sepsis, due to unspecified organism, unspecified whether acute organ dysfunction present (H)            MEDICAL DECISION MAKING:    Mely Alegria is a 84 year old female with history of patient fibrillation, peritoneal dialysis, CHF, opiate dependence, chronic back pain, ulcerative pancolitis, who presents to the ER with complaints of weakness.    Patient has been unable to eat or drink, worse in the past couple of days.  Daughter thinks she may have had 8 ounces of water in the past 3 days.  Patient is really not eating otherwise as well.  She has tried to have a few sips of Juancarlos-Aid but states that she would vomit when she would do this, but reportedly was able to take her Eliquis and keep it down today.    Here her laboratories overall are somewhat reassuring.  Troponin elevated but also a dialysis patient.  Denies any chest pain.  Some of this may be demand or sepsis related.  Will trend her troponins.  She denies any chest pain.  Will hold off on heparin at this time.  Imaging does not show any obvious source.  There is some fluid in her abdomen but she is a peritoneal dialysis patient.  This fluid will be sent for culture.  At this time she does meet sepsis criteria and antibiotics have been initiated.  Also command in rapid A-fib, chronic history of A-fib.  She has responded well to small bump of IV fluids as well as diltiazem.  Blood pressure is holding steady.    At this time is admission to the  hospitalist for ongoing monitoring and management of her symptoms.  She also reports she has been having vaginal bleeding for about 1 month.  Pelvic ultrasound has been ordered and this can be further followed up by admitting service.  Patient and family agree with the plan for admission.  She overall is feeling better, oxygen has been removed, and blood pressure stable.      ED COURSE:  10:40 AM  I met with the patient to gather history and perform my exam. ED course and treatment discussed.  Patient does have history of heart failure, but without having much to eat or drink in the past couple of days and continue to do her dialysis I have concerns that she has some dehydration and therefore we will give her a 500 cc bolus.  We will continue to monitor her respiratory status.  No history of atrial fibrillation and I do not see that she is on any rate control medications.    11:33 AM  CODE SEPSIS called due to lactic 4.8. Abx ordered.  Unclear at this time if this truly represents a severe sepsis situation versus just dehydration, but she certainly is at high risk given peritoneal dialysis.  Will continue with fluid hydration, antibiotics ordered.  Patient is completing her IV fluids.  Continues to have a good blood pressure at this time.  If there is no improvement in her heart rate after the 500 cc bolus then may consider doing small dose of diltiazem.    11:59 AM  Pt at CT scan now.  Heart rate has slightly improved with these IV fluids, though still tachycardic with rapid A-fib.  Laboratories slowly coming back though majority of labs are still pending.    12:13 PM  Lab called with Trop 128.   Latest Reference Range & Units 10/29/23 23:28 10/30/23 01:50 10/30/23 07:43 10/30/23 11:09 09/28/24 11:07   Troponin T, High Sensitivity <=14 ng/L 84 (H) 79 (H) 81 (H) 82 (H) 128 (HH)   (HH): Data is critically high  (H): Data is abnormally high     Latest Reference Range & Units 10/30/23 15:35 09/28/24 11:07   N-Terminal  Pro BNP Inpatient 0 - 1,800 pg/mL 12,726 (H) 13,314 (H)   (H): Data is abnormally high    12:25 PM  Patient is back from CT scan.  Heart rate has had slight improvement but again more persistently around the 130-140 range.  Will give a bump of diltiazem.  Blood pressure still holding strong at this time.  Antibiotics have been initiated.  Awaiting CT scan results.    12:42 PM  Pt HR  has improved with the IVF and dilatizem. Awaiting CT scan results.    1:27 PM  Patient and family have been updated on current results.  She is still awaiting the pelvic ultrasound for vaginal bleeding but I feel that can be done while in the hospital.  Hemoglobin stable.  She does have some fluid seen on the CT scan but she does peritoneal dialysis.  At some point we should have some of that fluid sent.  Responded very well to a single dose of diltiazem, but now slightly increasing.  Will put her on a diltiazem drip.  Blood pressures have remained stable.    1:54 PM  Patient has been accepted to the hospital by the hospitalist will go to cardiac telemetry on inpatient status.  We discussed antibiotics, IV fluids, cultures, etc.  Plan at this time is to hold off on more IV fluids.  Peritoneal fluid culture is pending to be drawn by nursing.  She is not requiring any oxygen.  She is not an extremis.  Heart rate is better controlled but will do that diltiazem infusion as needed.  She is not requiring any oxygen.  I do not think she requires ICU level care at this time.  No indication for emergent dialysis.  Electrolytes otherwise stable at this time.  Troponin mildly elevated, but dialysis state and she has slightly elevated troponins at baseline.  May have been more of a demand from her tachycardia and possibly even sepsis.  This time I feel is more important to treat these conditions and we will trend her troponins.  Hospice agrees without anticoagulation at this time.  EKG did not show any ST elevations.  She denies any actual chest  pain.    I do not think that this represents ACS, PE, ruptured AAA, aortic dissection, bowel obstruction, bowel ischemia, cholecystitis, pancreatitis, appendicitis, diverticulitis, kidney stone, pyelonephritis, incarcerated or strangulated hernia, ovarian torsion, PID, ectopic pregnancy, tubo-ovarian abscess, viscus perforation, perforated GI ulcer, or other such etiologies at this time.  SBP is possible but even that I think is less likely.  Peritoneal fluid has been requested to send for culture.    At the conclusion of the encounter I discussed the results of all of the tests and the disposition. Their questions were answered. The patient (and any family present) acknowledged understanding and were agreeable with the care plan.      CRITICAL CARE TIME   Total critical care time: 30 minutes  Critical care time was exclusive of separately billable procedures and treating other patients.  Critical care was necessary to treat or prevent imminent or life-threatening deterioration of the following conditions: sepsis  Critical care was time spent personally by me on the following activities: development of treatment plan with patient or surrogate, discussions with consultants, examination of patient, evaluation of patient's response to treatment, obtaining history from patient or surrogate, ordering and performing treatments and interventions, ordering and review of laboratory studies, ordering and review of radiographic studies and re-evaluation of patient's condition, this excludes any separately billable procedures.      CONSULTANTS:  Hospitalist - Dr. Delgado       MEDICATIONS GIVEN IN THE EMERGENCY:  Medications   vancomycin (VANCOCIN) 1,500 mg in sodium chloride 0.9 % 250 mL intermittent infusion ( Intravenous Rate/Dose Verify 9/28/24 1345)   diltiazem (CARDIZEM) 125 mg in dextrose 5 % 125 mL infusion (has no administration in time range)   sodium chloride 0.9% BOLUS 500 mL (0 mLs Intravenous Stopped 9/28/24  "1345)   piperacillin-tazobactam (ZOSYN) 3.375 g vial to attach to  mL bag (0 g Intravenous Stopped 9/28/24 1217)   diltiazem (CARDIZEM) injection 10 mg (10 mg Intravenous $Given 9/28/24 1229)           NEW PRESCRIPTIONS STARTED AT TODAY'S ER VISIT  none        CONDITION:  Stable, improving        DISPOSITION:  Card tele ip as accepted by Dr. Delgado, hospitalist         =================================================================  =================================================================  TRIAGE ASSESSMENT:  Pt has been nauseated x2 days. Vomited this am.   When she woke up this am she has felt very weak. Pt has low back to right back pain rated 101/0 this am.  This back pain is not new for pt but it is worse today.  Pt does peritonneal dialysis.started this last May. Unable to get a Bp in triage on left arm after several tries.      ED Triage Vitals   Enc Vitals Group      BP 09/28/24 1027 127/77      Pulse 09/28/24 1027 87      Resp 09/28/24 1022 12      Temp 09/28/24 1027 97.5  F (36.4  C)      Temp src 09/28/24 1027 Oral      SpO2 09/28/24 1027 97 %      Weight 09/28/24 1022 77.6 kg (171 lb)      Height 09/28/24 1022 1.549 m (5' 1\")          ================================================================  ================================================================    HPI    Patient information was obtained from: patient and family    Use of Intrepreter: N/A      Mely Alegria is a 84 year old female with history of patient fibrillation, peritoneal dialysis, CHF, opiate dependence, chronic back pain, ulcerative pancolitis, who presents to the ER with complaints of weakness.    Unable to eat or drink, worse in the past couple of days.  Daughter thinks she may have had 8 ounces of water in the past 3 days.  Patient is really not eating otherwise as well.  She has tried to have a few sips of Juancarlos-Aid but states that she would vomit when she would do this, but reportedly was able to " take her Eliquis and keep it down today.    She otherwise denies fevers, cough, chest pain, shortness of breath, abdominal pain.    She does report she has been having vaginal bleeding now for the past 1 month.  She wears a brief but if she did were brief she states it is heavy enough that she would have to wear a feminine pad.    She does peritoneal dialysis and has continued to do so.      CHART REVIEW:  Chart review shows that her last ED visit was back in January 2024 at which time she was having a hemodialysis catheter malfunction      REVIEW OF SYSTEMS  Review of Systems   Constitutional:  Negative for fever.   Respiratory:  Negative for cough and shortness of breath.    Cardiovascular:  Negative for chest pain.   Gastrointestinal:  Positive for nausea and vomiting. Negative for abdominal pain.   Genitourinary:  Positive for vaginal bleeding.   All other systems reviewed and are negative.      CODE STATUS:  DNR per chart review      PAST MEDICAL HISTORY:  Past Medical History:   Diagnosis Date    Acute midline low back pain without sciatica     Acute renal failure superimposed on chronic kidney disease, unspecified acute renal failure type, unspecified CKD stage (H)     Anemia     Cellulitis of left foot     CHF (congestive heart failure) (H)     Chronic atrial fibrillation (H)     Chronic atrial fibrillation (H)     Chronic back pain     Chronic heart failure, unspecified heart failure type (H)     Dialysis patient (H)     M,W,F    Diaphragmatic hernia without obstruction and without gangrene     End stage renal disease (H)     Gout     Helicobacter pylori gastritis     Hypertension     Hypothyroidism due to acquired atrophy of thyroid     Obesity     Opioid type dependence, episodic (H)     Pure hypercholesterolemia     Ulcer of left foot, limited to breakdown of skin (H)     Ulcerative pancolitis without complication (H)     Uric acid arthropathy     Vitamin D deficiency          PAST SURGICAL  HISTORY:  Past Surgical History:   Procedure Laterality Date    HC DILATION/CURETTAGE DIAG/THER NON OB      Description: Dilation And Curettage;  Recorded: 01/18/2010;    IR CVC TUNNEL PLACEMENT > 5 YRS OF AGE  10/30/2023    IR CVC TUNNEL PLACEMENT > 5 YRS OF AGE  1/17/2024    IR CVC TUNNEL REMOVAL RIGHT  7/17/2024    LAPAROSCOPIC INSERTION CATHETER PERITONEAL DIALYSIS N/A 4/16/2024    Procedure: LAPAROSCOPIC PLACEMENT OF PERITONEAL DIALYSIS CATHETER WITH;  Surgeon: August Myers MD;  [place] [facility] [place]    LAPAROSCOPIC LYSIS ADHESIONS N/A 4/16/2024    Procedure: LYSIS OF ADHESIONS;  Surgeon: August Myers MD;  [place] [facility] [place]    LAPAROSCOPIC OMENTECTOMY N/A 4/16/2024    Procedure: OMENTOPEXY;  Surgeon: August Myers MD;  [place] [facility] [place]    PICC TRIPLE LUMEN PLACEMENT  10/30/2023    ZZC COLOSTOMY      Description: Colostomy;  Recorded: 11/05/2012;    ZZC PART REMOVAL COLON W ANASTOMOSIS      Description: Partial Colectomy;  Recorded: 11/05/2012;  Comments: with colostomy    ZZC TOTAL HIP ARTHROPLASTY      Description: Total Hip Replacement;  Recorded: 01/18/2010;         CURRENT MEDICATIONS:    Prior to Admission medications    Medication Sig Start Date End Date Taking? Authorizing Provider   ACE/ARB/ARNI NOT PRESCRIBED (INTENTIONAL) Please choose reason not prescribed from choices below.    Oscar Hopper MD   BETA BLOCKER NOT PRESCRIBED (INTENTIONAL) Please choose reason not prescribed from choices below.    Oscar Hopper MD   cholecalciferol, vitamin D3, 50 mcg (2,000 unit) Tab [CHOLECALCIFEROL, VITAMIN D3, 50 MCG (2,000 UNIT) TAB] Take 1 tablet (2,000 Units total) by mouth daily. 12/3/20   Oscar Hopper MD   ELIQUIS ANTICOAGULANT 2.5 MG tablet Take 2.5 mg by mouth 2 times daily 12/18/23   Reported, Patient   famotidine (PEPCID) 20 MG tablet Take 1 tablet (20 mg) by mouth daily 12/13/23 12/12/24  Oscar Hopper MD   folic acid (FOLVITE) 1 MG tablet  "Take 1 tablet (1 mg) by mouth daily 12/13/23   Oscar Hopper MD   furosemide (LASIX) 40 MG tablet Take 0.5 tablets (20 mg) by mouth every other day 4/9/24   Oscar Hopper MD   HYDROcodone-acetaminophen (NORCO) 5-325 MG tablet Take 1-2 tablets by mouth every 4 hours as needed for moderate to severe pain  Patient not taking: Reported on 8/28/2024 4/16/24   August Myers MD   lactobacillus rhamnosus, GG, (CULTURELL) capsule Take 1 capsule by mouth 2 times daily 11/9/23   Ramona Corey DO   loperamide (IMODIUM) 2 MG capsule Take 1 capsule (2 mg) by mouth 4 times daily as needed for diarrhea 11/9/23   Ramona Corey DO   midodrine (PROAMATINE) 5 MG tablet Take 1 tablet (5 mg) by mouth every hour as needed (on HD for SBP <90) 11/9/23   Ramona Corey DO   mupirocin (BACTROBAN) 2 % external cream APPLY TO AFFECTED AREA 3 TIMES DAILY* 3/16/24   Reported, Patient   predniSONE (DELTASONE) 5 MG tablet Take 1-2 tablets (5-10 mg) by mouth daily 4/9/24 4/9/25  Oscar Hopper MD   traMADol (ULTRAM) 50 MG tablet Take 50 mg by mouth every 8 hours as needed for pain 3/15/24   Reported, Patient         ALLERGIES:  Allergies   Allergen Reactions    Allopurinol Diarrhea    Latex     Lisinopril Cough     initiated 5/14/1996 and stopped      Percocet [Oxycodone-Acetaminophen] Other (See Comments)     Difficulty swallowing   \" gets stuck in throat\"         FAMILY HISTORY:  Family History   Problem Relation Age of Onset    Diabetes Mother          SOCIAL HISTORY:  Social History     Socioeconomic History    Marital status:    Tobacco Use    Smoking status: Never    Smokeless tobacco: Never   Vaping Use    Vaping status: Never Used     Social Determinants of Health     Financial Resource Strain: Low Risk  (12/13/2023)    Financial Resource Strain     Within the past 12 months, have you or your family members you live with been unable to get utilities (heat, electricity) when " "it was really needed?: No   Food Insecurity: Low Risk  (12/13/2023)    Food Insecurity     Within the past 12 months, did you worry that your food would run out before you got money to buy more?: No     Within the past 12 months, did the food you bought just not last and you didn t have money to get more?: No   Transportation Needs: Low Risk  (12/13/2023)    Transportation Needs     Within the past 12 months, has lack of transportation kept you from medical appointments, getting your medicines, non-medical meetings or appointments, work, or from getting things that you need?: No   Housing Stability: Low Risk  (12/13/2023)    Housing Stability     Do you have housing? : Yes     Are you worried about losing your housing?: No         VITALS:  Patient Vitals for the past 24 hrs:   BP Temp Temp src Pulse Resp SpO2 Height Weight   09/28/24 1345 (!) 157/94 -- -- 120 16 96 % -- --   09/28/24 1330 (!) 140/82 -- -- 115 17 97 % -- --   09/28/24 1329 (!) 140/82 -- -- 118 15 96 % -- --   09/28/24 1315 (!) 149/88 -- -- 101 14 96 % -- --   09/28/24 1300 (!) 170/82 -- -- 104 16 97 % -- --   09/28/24 1245 (!) 144/83 -- -- 104 20 -- -- --   09/28/24 1237 -- -- -- 100 -- -- -- --   09/28/24 1233 (!) 160/97 -- -- 107 17 94 % -- --   09/28/24 1230 (!) 160/97 -- -- (!) 141 16 93 % -- --   09/28/24 1223 -- -- -- 113 -- -- -- --   09/28/24 1222 (!) 141/91 -- -- (!) 138 -- 95 % -- --   09/28/24 1145 (!) 164/97 -- -- (!) 129 16 98 % -- --   09/28/24 1135 -- -- -- (!) 141 -- -- -- --   09/28/24 1130 (!) 148/106 -- -- (!) 148 16 97 % -- --   09/28/24 1125 137/87 -- -- (!) 128 23 97 % -- --   09/28/24 1058 -- -- -- (!) 133 -- -- -- --   09/28/24 1030 125/77 -- -- -- -- -- -- --   09/28/24 1027 127/77 97.5  F (36.4  C) Oral 87 12 97 % -- --   09/28/24 1022 -- -- -- -- 12 -- 1.549 m (5' 1\") 77.6 kg (171 lb)       Wt Readings from Last 3 Encounters:   09/28/24 77.6 kg (171 lb)   08/28/24 78 kg (172 lb)   04/16/24 78 kg (172 lb)       Estimated " Creatinine Clearance: 6.3 mL/min (A) (based on SCr of 6.23 mg/dL (H)).    PHYSICAL EXAM    Constitutional:  Well developed, Well nourished, NAD  HENT:  Normocephalic, Atraumatic, Bilateral external ears normal,  Nose normal. Neck- Supple, No stridor.   Eyes:  PERRL, EOMI, Conjunctiva normal, No discharge.  Respiratory:  Normal breath sounds, No respiratory distress, No wheezing, Speaks full sentences easily. No cough.   Cardiovascular:  Tachy heart rate, irregular rhythm, No murmurs , No rubs, No gallops. Chest wall nontender.   GI:  No excessive obesity.  Bowel sounds normal, Soft, No tenderness, No masses, No flank tenderness. No rebound or guarding.   : deferred    Musculoskeletal: No cyanosis, No clubbing. Good range of motion in all major joints. No tenderness to palpation or major deformities noted.   Integument:  Warm, Dry, No erythema, No rash.  No petechiae.   Neurologic:  Alert & oriented x 3  Psychiatric:  Affect normal, Cooperative         LAB:  All pertinent labs reviewed and interpreted.  Recent Results (from the past 24 hour(s))   Basic metabolic panel    Collection Time: 09/28/24 11:07 AM   Result Value Ref Range    Sodium 135 135 - 145 mmol/L    Potassium 4.8 3.4 - 5.3 mmol/L    Chloride 92 (L) 98 - 107 mmol/L    Carbon Dioxide (CO2) 22 22 - 29 mmol/L    Anion Gap 21 (H) 7 - 15 mmol/L    Urea Nitrogen 32.0 (H) 8.0 - 23.0 mg/dL    Creatinine 6.23 (H) 0.51 - 0.95 mg/dL    GFR Estimate 6 (L) >60 mL/min/1.73m2    Calcium 9.6 8.8 - 10.4 mg/dL    Glucose 131 (H) 70 - 99 mg/dL   Troponin T, High Sensitivity    Collection Time: 09/28/24 11:07 AM   Result Value Ref Range    Troponin T, High Sensitivity 128 (HH) <=14 ng/L   Magnesium    Collection Time: 09/28/24 11:07 AM   Result Value Ref Range    Magnesium 1.7 1.7 - 2.3 mg/dL   Phosphorus    Collection Time: 09/28/24 11:07 AM   Result Value Ref Range    Phosphorus 4.8 (H) 2.5 - 4.5 mg/dL   CBC with platelets and differential    Collection Time: 09/28/24  "11:07 AM   Result Value Ref Range    WBC Count 16.9 (H) 4.0 - 11.0 10e3/uL    RBC Count 4.46 3.80 - 5.20 10e6/uL    Hemoglobin 14.7 11.7 - 15.7 g/dL    Hematocrit 44.5 35.0 - 47.0 %     78 - 100 fL    MCH 33.0 26.5 - 33.0 pg    MCHC 33.0 31.5 - 36.5 g/dL    RDW 14.5 10.0 - 15.0 %    Platelet Count 225 150 - 450 10e3/uL    % Neutrophils 79 %    % Lymphocytes 10 %    % Monocytes 5 %    % Eosinophils 0 %    % Basophils 0 %    % Immature Granulocytes 5 %    NRBCs per 100 WBC 1 (H) <1 /100    Absolute Neutrophils 13.4 (H) 1.6 - 8.3 10e3/uL    Absolute Lymphocytes 1.7 0.8 - 5.3 10e3/uL    Absolute Monocytes 0.9 0.0 - 1.3 10e3/uL    Absolute Eosinophils 0.0 0.0 - 0.7 10e3/uL    Absolute Basophils 0.1 0.0 - 0.2 10e3/uL    Absolute Immature Granulocytes 0.8 (H) <=0.4 10e3/uL    Absolute NRBCs 0.1 10e3/uL   TSH with free T4 reflex    Collection Time: 09/28/24 11:07 AM   Result Value Ref Range    TSH 1.46 0.30 - 4.20 uIU/mL   Nt probnp inpatient    Collection Time: 09/28/24 11:07 AM   Result Value Ref Range    N terminal Pro BNP Inpatient 13,314 (H) 0 - 1,800 pg/mL   Procalcitonin    Collection Time: 09/28/24 11:07 AM   Result Value Ref Range    Procalcitonin 0.67 (H) <0.50 ng/mL   Lactic acid whole blood    Collection Time: 09/28/24 11:15 AM   Result Value Ref Range    Lactic Acid 4.8 (HH) 0.7 - 2.0 mmol/L   Symptomatic Influenza A/B, RSV, & SARS-CoV2 PCR (COVID-19) Nose    Collection Time: 09/28/24 11:16 AM    Specimen: Nose; Swab   Result Value Ref Range    Influenza A PCR Negative Negative    Influenza B PCR Negative Negative    RSV PCR Negative Negative    SARS CoV2 PCR Negative Negative   Lactic Acid Whole Blood with 1X Repeat in 2 HR when >2    Collection Time: 09/28/24  1:31 PM   Result Value Ref Range    Lactic Acid, Initial 3.3 (H) 0.7 - 2.0 mmol/L       No results found for: \"ABORH\"        RADIOLOGY:  Reviewed all pertinent imaging. Please see official radiology report.    CT Chest Abdomen Pelvis w/o Contrast "   Final Result   IMPRESSION:   1.  Increasing retroperitoneal and pelvic lymphadenopathy.   2.  Small-moderate volume ascites.      US Pelvic Complete with Transvaginal    (Results Pending)         EKG:    Indication: tachycardia    Performed at: 10:36am  Impression: Atrial fibrillation at 151 bpm.  No ST elevation appreciated.  QRS 76 ms, QTc 459 ms.  Atrial fibrillation rate has increased otherwise atrial fibrillation present on prior EKG from November 4, 2023.  Otherwise nonspecific changes.      I have independently reviewed and interpreted the EKG(s) documented above.        PROCEDURES:  none      The patient has signs of sepsis  Sepsis ED evaluation   The patient has signs of sepsis as evidenced by:  1. Presence of 2 SIRS criteria, suspected infection, AND  2. Organ dysfunction: Lactic Acidosis with value >2.0 due to infection    Time zero: 11:34 AM  on 09/28/24 as this was the time when  lactic 4.8 resulted, raising suspicion for bacterial infection and Lactate was resulted and the level was greater than 2.    Note: Due to a national blood culture bottle shortage, reduced blood cultures may have been drawn on this patient.    Lactic Acid Results:  Recent Labs   Lab Test 09/28/24  1331 09/28/24  1115 11/07/23  2314   LACT 3.3* 4.8* 1.6       3 Hour Bundle 6 Hour Bundle (Reassessment)   Blood Cultures before IV Antibiotics: Yes  Antibiotics given: see below  Prehospital fluid volume (mL):                     Total fluids given (ED +Pre-hospital):  The patient responded to a lesser volume of IV fluids. The initial volume ordered was 500 mL.    Repeat Lactic Acid Level: Ordered by reflex for 2 hours after initial lactic acid collection.  Vasopressors: MAP>65 after initial IVF bolus, will continue to monitor fluid status and vital signs.  Repeat perfusion exam: I attest to having performed a repeat sepsis exam and assessment of perfusion at 1:56 PM .   BMI Readings from Last 1 Encounters:   09/28/24 32.31 kg/m         Anti-infectives (From admission through now)      Start     Dose/Rate Route Frequency Ordered Stop    09/28/24 1200  piperacillin-tazobactam (ZOSYN) 3.375 g vial to attach to  mL bag         3.375 g  over 30 Minutes Intravenous ONCE 09/28/24 1132 09/28/24 1217    09/28/24 1200  vancomycin (VANCOCIN) 1,500 mg in sodium chloride 0.9 % 250 mL intermittent infusion         1,500 mg  over 90 Minutes Intravenous ONCE 09/28/24 1144 09/28/24 1359                  Medical Decision Making  Obtained supplemental history:Supplemental history obtained?: Documented in chart and Family Member/Significant Other  Reviewed external records: External records reviewed?: Documented in chart and Outpatient Record: see HPI  Care impacted by chronic illness:Documented in Chart  Did you consider but not order tests?: Work up considered but not performed and documented in chart, if applicable  Did you interpret images independently?: Independent interpretation of ECG and images noted in documentation, when applicable.  Consultation discussion with other provider:Did you involve another provider (consultant, , pharmacy, etc.)?: I discussed the care with another health care provider, see documentation for details.  Admit.    MIPS: Not Applicable      Ramona Castellanos M.D. [facility]  Emergency Medicine and Medical Toxicology  Formerly [facility] EMERGENCY DEPARTMENT  [facility] [facility] [phone]  Dept: [phone]           Ramona Castellanos MD  09/28/24 5950     Negative

## 2024-09-28 NOTE — ED PROVIDER NOTES
Lab called with repeat trop. It is decreasing. No indication at this time for heparin. No chest pain or SOB.     Latest Reference Range & Units 10/30/23 11:09 09/28/24 11:07 09/28/24 13:31   Troponin T, High Sensitivity <=14 ng/L 82 (H) 128 (HH) 118 (HH)   (HH): Data is critically high  (H): Data is abnormally high     Ramona Castellanos MD  09/28/24 2300

## 2024-09-29 NOTE — PROGRESS NOTES
[facility]    Medicine Progress Note - Hospitalist Service    Date of Admission:  9/28/2024    Assessment & Plan      84 year old female with PMHx of ESRD on PD, chronic low back pain, possible PMR, Afib on AC, ulcerative colitis, gout who presents with 2 days of acute onset generalized weakness, diffuse abdominal pain, and and nausea with vomiting. She was admitted for concern for possible bacterial peritonitis.She also notes over past week SUAREZ and intermit vaginal bleeding        1.Sepsis due to intra-abdominal infection, present on admission  -Secondary bacterial peritonitis with PD catheter present  Presented with 2 days of abdominal pain, N/V, weakness. Found to be tachycardic with RVR, WBC 16.9. Diffuse abdominal tenderness on exam with PD catheter in place. CT C/A/P without contrast showed RP and pelvic lymphadenopathy. Clinical presentation most consistent with infectious peritonitis. UTI is also possible, patient unable to give sample initially as she makes more limited urine, though seems slightly less likely with normal appearing bladder/kidneys on non-con CT.  - ID consulted  - Continue Vanc/Zosyn  - Bcx 9/28: NGTD  - PD fluid sent   -today pain is improved  -labs pending     2.Lactic acidosis  -Anion-gap metabolic acidosis  Lactic acid 4.8 on admission in setting of sepsis  - S/p 500cc, down to 3.3, monitor  - Use IVF judiciously with ESRD  -check this am now improved clinically     3.ESRD on PD  -Chronic HFpEF without acute exacerbation  -Elevated NTpro-BNP due to hypervolemia  PD has been going well at home. Started 5/2024. Follows with Dr. Finn.  - Renal consulted  - PD per Renal  -Continue home torsemide 100mg daily  - Continue home spironolactone 25mg daily     4.Permanent atrial fibrillation on chronic AC  -Rapid ventricular response  -170 on admission. Not on rate controlling agents as OP. Chart mentions intentionally not using BB. But reviewed and it was stopped for  hypotension some years ago, not an acute issue here. Patient has no recollection o having issues with metoprolol.  - Stop dilt gtt  - Start metoprolol 25mg q6h, titrate  - Continue home Eliquis  - TTE 11/2023: EF 50-55%  -she notes SUAREZ this past week which may have been RVR--will ask cards to see     5.Non-ischemic myocardial injury due to sepsis  HS tropoin 128 down to 118 on re-check. The patient denies CP, SOB, jaw/arm pain. Is nauseous with abdominal pain, but clinical picture fits abdominal infection more than ACS.  - EKG  Afib with RVR; narrow complex; TWI in II and aVF, no ST segment depression or elevation  -cards to see- since she does c/o SUAREZ more so this past week     6.R>L chronic LE edema  In setting of ESRD. Patient reports chronic and stable. Has had multiple joint surgeries on the right leg. On chronic AC.  -check ultrasound to be sure not clotting on DOAC     7.Blood in briefs--she is fairly sure this is vaginal  Patient reports 1 month of noticing consistent pink fluid in briefs, sometimes darker red. Doesn't make much urine.   - Pelvic US ordered in ED  -GYN consult     8.Possible polymyalgia rheumatica  Mentioned in PCP notes.  - Continue home prednisone 5mg PO BID  - Consider stress dose steroids if develops hypotension, but hypertensive on admission     9.Chronic low back pain  -Chronic opioid use  Has been a long-term issue affecting mobility.  - Continue prednisone as above  - Continue home tramadol 50mg BID     10.Gout  Was on febuxostat in the past. No acute flares right now.  - Continue home prednisone as above     11.Ulcerative colitis  Noted in prior PCP notes, but stable.  - Chemical DVT ppx     12.GERD  - Continue home famotidine     13.Code status  DNR/DNI.        -I called son to update 1432          Diet: Renal Diet (dialysis)    DVT Prophylaxis: DOAC  Lerma Catheter: Not present  Lines: PRESENT             Cardiac Monitoring: ACTIVE order. Indication: Tachyarrhythmias, acute (48  "hours)  Code Status: No CPR- Do NOT Intubate      Clinically Significant Risk Factors Present on Admission             # Anion Gap Metabolic Acidosis: Highest Anion Gap = 21 mmol/L in last 2 days, will monitor and treat as appropriate   # Drug Induced Coagulation Defect: home medication list includes an anticoagulant medication    # Hypertension: Noted on problem list  # Chronic heart failure with preserved ejection fraction: heart failure noted on problem list and last echo with EF >50%        # Obesity: Estimated body mass index is 32.49 kg/m  as calculated from the following:    Height as of this encounter: 1.549 m (5' 1\").    Weight as of this encounter: 78 kg (171 lb 15.3 oz).              Disposition Plan     Medically Ready for Discharge: Anticipated in 2-4 Days             Merlene Vincent MD  Hospitalist Service  [facility]  Securely message with MUBI (more info)  Text page via [employer] Paging/Directory   ______________________________________________________________________    Interval History   She thinks pain in abd better  She notes intermit vag bleeding >month, never had it checked  Past week more SUAREZ and some pressure in chest   No cp now    Physical Exam   Vital Signs: Temp: 97.9  F (36.6  C) Temp src: Oral BP: 98/59 Pulse: 80   Resp: 16 SpO2: 96 % O2 Device: None (Room air)    Weight: 171 lbs 15.34 oz    Constitutional: awake, alert, cooperative, and no apparent distress  Eyes: sclera clear  Respiratory: no increased work of breathing, good air exchange, no retractions, and clear to auscultation  Cardiovascular: irregularly irregular rhythm and normal S1 and S2  GI: normal bowel sounds, soft, non-distended, and non-tender  Skin: no bruising or bleeding  Musculoskeletal: edema legs  Neurologic: Mental Status Exam:  Level of Alertness:   awake  Neuropsychiatric: General: normal, calm, and normal eye contact    Medical Decision Making       50 MINUTES SPENT BY ME on the date " of service doing chart review, history, exam, documentation & further activities per the note.      Data     I have personally reviewed the following data over the past 24 hrs:    16.9 (H)  \   14.7   / 225     135 92 (L) 32.0 (H) /  131 (H)   4.8 22 6.23 (H) \     Trop: 118 (HH) BNP: 13,314 (H)     TSH: 1.46 T4: N/A A1C: N/A     Procal: 0.67 (H) CRP: 20.30 (H) Lactic Acid: 3.9 (H)         Imaging results reviewed over the past 24 hrs:   Recent Results (from the past 24 hour(s))   CT Chest Abdomen Pelvis w/o Contrast    Narrative    EXAM: CT CHEST ABDOMEN PELVIS W/O CONTRAST  LOCATION: [facility]  DATE: 9/28/2024    INDICATION: gen weakness and feels unwell  COMPARISON: 11/5/2023  TECHNIQUE: CT scan of the chest, abdomen, and pelvis was performed without IV contrast. Multiplanar reformats were obtained. Dose reduction techniques were used.   CONTRAST: None.    FINDINGS: Mild motion artifact.    LUNGS AND PLEURA: Mild emphysema. Stable mild diffuse bronchial wall thickening. Elevated right hemidiaphragm. Right basilar atelectasis. No pleural effusion. Calcified granuloma. A few stable benign solid pulmonary nodules with the largest measuring 0.3   cm in the left upper lobe, image 81:4.    MEDIASTINUM/AXILLAE: Stable enlarged multinodular thyroid. No lymphadenopathy. Trace air in the main pulmonary artery and right subclavian vein are most likely secondary to intravenous line placement.    CORONARY ARTERY CALCIFICATION: Moderate.    HEPATOBILIARY: Cholelithiasis.    PANCREAS: Normal.    SPLEEN: Normal.    ADRENAL GLANDS: Normal.    KIDNEYS/BLADDER: A simple cyst midpole right kidney, no follow-up required. Bilateral renal atrophy.    BOWEL: No obstruction or inflammatory change. Appendix not visualized.    LYMPH NODES: Increasing retroperitoneal lymphadenopathy with the largest being a left para-aortic node measuring 1.4 cm in short axis, previously 0.8 cm, image 183:3. A 2.6 x 1.6 cm left  external iliac node, previously not visualized, image 224:3. A 2.4   x 1.7 cm right external iliac node, previously 1.0 x 0.6 cm, image 226:3.    VASCULATURE: Moderately severe atherosclerosis. Stable ectasia of the abdominal aorta.    PELVIC ORGANS: Peritoneal dialysis catheter. Small-moderate volume ascites.    MUSCULOSKELETAL: Left hip arthroplasty. Degenerative changes. Severe osseous demineralization.      Impression    IMPRESSION:  1.  Increasing retroperitoneal and pelvic lymphadenopathy.  2.  Small-moderate volume ascites.

## 2024-09-29 NOTE — PLAN OF CARE
Goal Outcome Evaluation:      Plan of Care Reviewed With: patient, family          Outcome Evaluation: Pt's goal is to return home with family support.

## 2024-09-29 NOTE — CONSULTS
[facility]ART CARE [address], [place] [address]   [email or web address]   OFFICE: [phone]        Impression and Plan     1.  Atrial fibrillation (permanent).  Per daughter, Mely Duong was diagnosed with atrial fibrillation 4  years ago.  Mely was noted to have elevated ventricular response on presentation.  She was noted not to be on any rate control therapy.  She was placed on diltiazem infusion though now discontinued.  Continue apixaban 2.5 mg twice daily (age greater than 80 and creatinine greater than 1.5).  Continue metoprolol tartrate, but will convert from 25 mg every 6  to 50 mg twice daily starting tomorrow AM.  Will obtain echocardiogram tomorrow a.m. to rule out any evidence of tachycardia mediated depression and left ventricular function.    2.  Coronary artery disease.  Jennifer is coronary artery disease by virtue of CT scan of chest 28 September 2024 revealing moderate coronary calcification.  High-sensitivity troponin this admission elevated though with negative ? change (128 ? 118 ng/L).  Specificity of elevated troponin reduced in setting of end-stage renal disease.  ECG despite heart rate of 151 bpm on presentation with mild nonspecific T wave changes only which is reassuring and suggesting against ischemia.  She denies any chest pain or other symptoms concerning for angina.    Given all the aforementioned, do not feel additional cardiac workup is necessarily required in this regard at this time.  For completeness, however, will obtain fasting lipid profile and consider initiation of statin therapy.  Echocardiogram tomorrow a.m. as per problem #1.    3.  End-stage renal disease/peritoneal dialysis.  Management plan per Nephrology and other providers.  Appreciate recommendations including diuretic management as well as PD management as outlined by nephrology.    4. Sepsis due to intra-abdominal infection, present on admission  -Secondary bacterial peritonitis with PD catheter present. Management plan as outlined by other providers.      Primary Cardiologist: Dr. Ana Reeder     History of Present Illness    Mely Alegria is a 84 year old female admitted with 48  generalized weakness.  She also reported abdominal discomfort as well as nausea/emesis.  She has renal disease and was started on peritoneal dialysis in May 2024.  She has documented permanent atrial fibrillation.  On presentation, she was noted to have elevation in her ventricular response with heart rate in the 150s.    On interview, Mely denies any chest pain symptoms.  She was seen with her daughter, Rhoda, who also confirms that she has not been reporting any chest pain or other symptoms of concern in this regard.  Her breathing is fairly comfortable at present.  She still has some nausea particularly with sensation of singultus.  Presently denies any fevers, chills, or other constitutional symptoms.    Further review of systems is otherwise negative/noncontributory (medical record and 13 point review of systems reviewed as well and pertinent positives noted).    Cardiac Diagnostics   Telemetry (personally reviewed): Atrial fibrillation    Echocardiogram 6 November 2023:  Normal left ventricular size and systolic performance with ejection fraction of 50-55%.  No significant valvular heart disease.  Normal right ventricular size with mildly reduced right ventricular systolic performance.    Echocardiogram 30 October 2023 (personally reviewed):  The left ventricle is normal in size. Left ventricular systolic performance is at the lower limits of normal. The ejection fraction is estimated to be 50%.  There is mild to moderate tricuspid insufficiency.  Normal right ventricular size with low normal right ventricular systolic performance.  There is severe left atrial enlargement. There is mild to moderate right atrial enlargement.  When compared to the prior real-time  echocardiogram dated 28 March 2022, the findings are felt to be fairly similar on both examinations.    Medical History  Surgical History   Past Medical History:   Diagnosis Date     Acute midline low back pain without sciatica      Acute renal failure superimposed on chronic kidney disease, unspecified acute renal failure type, unspecified CKD stage (H)      Anemia      Cellulitis of left foot      CHF (congestive heart failure) (H)      Chronic atrial fibrillation (H)      Chronic atrial fibrillation (H)      Chronic back pain      Chronic heart failure, unspecified heart failure type (H)      Dialysis patient (H)     M,W,F     Diaphragmatic hernia without obstruction and without gangrene      End stage renal disease (H)      Gout      Helicobacter pylori gastritis      Hypertension      Hypothyroidism due to acquired atrophy of thyroid      Obesity      Opioid type dependence, episodic (H)      Pure hypercholesterolemia      Ulcer of left foot, limited to breakdown of skin (H)      Ulcerative pancolitis without complication (H)      Uric acid arthropathy      Vitamin D deficiency       Past Surgical History:   Procedure Laterality Date     HC DILATION/CURETTAGE DIAG/THER NON OB      Description: Dilation And Curettage;  Recorded: 01/18/2010;     IR CVC TUNNEL PLACEMENT > 5 YRS OF AGE  10/30/2023     IR CVC TUNNEL PLACEMENT > 5 YRS OF AGE  1/17/2024     IR CVC TUNNEL REMOVAL RIGHT  7/17/2024     LAPAROSCOPIC INSERTION CATHETER PERITONEAL DIALYSIS N/A 4/16/2024    Procedure: LAPAROSCOPIC PLACEMENT OF PERITONEAL DIALYSIS CATHETER WITH;  Surgeon: August Myers MD;  Location: [facility] OR     LAPAROSCOPIC LYSIS ADHESIONS N/A 4/16/2024    Procedure: LYSIS OF ADHESIONS;  Surgeon: August Myers MD;  Location: [facility] OR     LAPAROSCOPIC OMENTECTOMY N/A 4/16/2024    Procedure: OMENTOPEXY;  Surgeon: August Myers MD;  Location: [facility] OR     PICC TRIPLE LUMEN PLACEMENT  10/30/2023     [facility]  "COLOSTOMY      Description: Colostomy;  Recorded: 11/05/2012;     [facility] [place] PART REMOVAL COLON W ANASTOMOSIS      Description: Partial Colectomy;  Recorded: 11/05/2012;  Comments: with colostomy     [facility] [place] TOTAL HIP ARTHROPLASTY      Description: Total Hip Replacement;  Recorded: 01/18/2010;          Family History/Social History/Risk Factors   Patient does not smoke.  Family history reviewed, and   Family History   Problem Relation Age of Onset     Diabetes Mother           Physical Examination   /82 (BP Location: Left arm)   Pulse 80   Temp 97.7  F (36.5  C) (Oral)   Resp 16   Ht 1.549 m (5' 1\")   Wt 78 kg (171 lb 15.3 oz)   LMP  (LMP Unknown)   SpO2 94%   BMI 32.49 kg/m    Wt Readings from Last 5 Encounters:   09/29/24 78 kg (171 lb 15.3 oz)   08/28/24 78 kg (172 lb)   04/16/24 78 kg (172 lb)   04/09/24 79.5 kg (175 lb 3.2 oz)   02/20/24 80.3 kg (177 lb)     Wt Readings from Last 3 Encounters:   09/29/24 78 kg (171 lb 15.3 oz)   08/28/24 78 kg (172 lb)   04/16/24 78 kg (172 lb)     No intake or output data in the 24 hours ending 09/29/24 1001      The patient is alert and oriented times three. Sclerae are anicteric. Mucosal membranes are moist. Jugular venous pressure is reasonable. No significant adenopathy/thyromegally appreciated. Lungs are fairly clear with reasonable expansion. On cardiovascular exam, the patient has an irregular S1 and S2. Abdomen is soft and non-tender. Extremities reveal no clubbing, cyanosis, with bilateral lower extremity edema (right somewhat more so than left-chronic)         Imaging      CT scan of chest/abdomen/pelvis 28 September 2024:  Increasing retroperitoneal and pelvic lymphadenopathy.  Small-moderate volume ascites.  Moderate coronary calcification.    Lab Results     Recent Labs   Lab 09/29/24  0756 09/28/24  1107   WBC 18.5* 16.9*   HGB 13.9 14.7   * 100    225    135   POTASSIUM 4.5 4.8   CHLORIDE 96* 92*   CO2 20* 22   BUN 30.8* 32.0*   CR 5.67* " "6.23*   ANIONGAP 19* 21*   SANDIP 8.9 9.6   * 131*   ALBUMIN 3.0*  --    PROTTOTAL 6.1*  --    BILITOTAL 0.7  --    ALKPHOS 126  --    ALT 46  --    AST 53*  --      Recent Labs   Lab Test 09/28/24  1107 10/30/23  1535 03/11/22  1453   NTBNPI 13,314*   < >  --    BNP  --   --  572*    < > = values in this interval not displayed.     No lab results found in last 7 days.    Invalid input(s): \"LDLCALC\"  Lab Results   Component Value Date     09/29/2024    CO2 20 09/29/2024    CO2 19 03/11/2022    BUN 30.8 09/29/2024    BUN 20 03/11/2022     Lab Results   Component Value Date    WBC 18.5 09/29/2024    HGB 13.9 09/29/2024    HCT 43.1 09/29/2024     09/29/2024     09/29/2024     Lab Results   Component Value Date    CHOL 166 04/04/2023    TRIG 119 04/04/2023    HDL 41 04/04/2023     Recent Labs   Lab Test 04/04/23  1504   CHOL 166   HDL 41*   *   TRIG 119     No results found for: \"INR\"  No results found for: \"CKTOTAL\", \"CKMB\", \"TROPONINI\"  Lab Results   Component Value Date    TSH 1.46 09/28/2024    TSH 1.74 03/11/2022         Current Inpatient Scheduled Medications   Scheduled Meds:  Current Facility-Administered Medications   Medication Dose Route Frequency Provider Last Rate Last Admin     apixaban ANTICOAGULANT (ELIQUIS) tablet 2.5 mg  2.5 mg Oral BID August Delgado MD   2.5 mg at 09/29/24 0805     clotrimazole (LOTRIMIN) 1 % cream   Topical BID Michael Vela MD         famotidine (PEPCID) tablet 20 mg  20 mg Oral BID August Delgado MD   20 mg at 09/29/24 0806     folic acid (FOLVITE) tablet 1 mg  1 mg Oral Daily August Delgado MD   1 mg at 09/29/24 0805     magnesium oxide (MAG-OX) tablet 400 mg  400 mg Oral Daily Keri Finn MD   400 mg at 09/29/24 0805     metoprolol tartrate (LOPRESSOR) tablet 25 mg  25 mg Oral Q6H August Delgado MD   25 mg at 09/29/24 0804     piperacillin-tazobactam (ZOSYN) 3.375 g vial to attach to  mL bag  3.375 g Intravenous Q12H " August Delgado MD   3.375 g at 09/29/24 0811     predniSONE (DELTASONE) tablet 5 mg  5 mg Oral BID August Delgado MD   5 mg at 09/29/24 0806     sodium chloride (PF) 0.9% PF flush 3 mL  3 mL Intracatheter Q8H August Delgado MD   3 mL at 09/29/24 0812     spironolactone (ALDACTONE) tablet 25 mg  25 mg Oral Daily August Delgado MD   25 mg at 09/29/24 0805     torsemide (DEMADEX) tablet 100 mg  100 mg Oral Daily August Delgado MD   100 mg at 09/29/24 0806     traMADol (ULTRAM) tablet 50 mg  50 mg Oral BID August Delgado MD   50 mg at 09/29/24 0805     vancomycin place taylor - receiving intermittent dosing  1 each Intravenous See Admin Instructions August Delgado MD         Continuous Infusions:  Current Facility-Administered Medications   Medication Dose Route Frequency Provider Last Rate Last Admin     dianeal PD LOW calcium-2.5% dex (calcium 2.5 mEq/L) 18,000 mL with heparin (porcine) 500 Units/L PERITONEAL DIALYSATE   Dialysis [facility] Supplied PD Keri Finn MD         dianeal PD LOW calcium-2.5% dex (calcium 2.5 mEq/L) 2,000 mL PERITONEAL DIALYSATE   Dialysis [facility] Supplied PD Keri Finn MD              Medications Prior to Admission   Prior to Admission medications    Medication Sig Start Date End Date Taking? Authorizing Provider   DEBORAH ANTICOAGULANT 2.5 MG tablet Take 2.5 mg by mouth 2 times daily 12/18/23  Yes Reported, Patient   famotidine (PEPCID) 20 MG tablet Take 20 mg by mouth 2 times daily.   Yes Unknown, Entered By History   folic acid (FOLVITE) 1 MG tablet Take 1 tablet (1 mg) by mouth daily 12/13/23  Yes Oscar Hopper MD   predniSONE (DELTASONE) 5 MG tablet Take 5 mg by mouth 2 times daily.   Yes Unknown, Entered By History   spironolactone (ALDACTONE) 25 MG tablet Take 25 mg by mouth daily.   Yes Unknown, Entered By History   torsemide (DEMADEX) 100 MG tablet Take 100 mg by mouth daily.   Yes Unknown, Entered By History   traMADol (ULTRAM) 50 MG  "tablet Take 50 mg by mouth 2 times daily. 3/15/24  Yes Reported, Patient   ACE/ARB/ARNI NOT PRESCRIBED (INTENTIONAL) Please choose reason not prescribed from choices below.    Oscar Hopper MD   BETA BLOCKER NOT PRESCRIBED (INTENTIONAL) Please choose reason not prescribed from choices below.    Oscar Hopper MD          Clinically Significant Risk Factors Present on Admission   Clinically Significant Risk Factors Present on Admission             # Anion Gap Metabolic Acidosis: Highest Anion Gap = 21 mmol/L in last 2 days, will monitor and treat as appropriate  # Hypoalbuminemia: Lowest albumin = 3 g/dL at 9/29/2024  7:56 AM, will monitor as appropriate  # Drug Induced Coagulation Defect: home medication list includes an anticoagulant medication      # Hypertension: Noted on problem list  # Chronic heart failure with preserved ejection fraction: heart failure noted on problem list and last echo with EF >50%          # Obesity: Estimated body mass index is 32.49 kg/m  as calculated from the following:    Height as of this encounter: 1.549 m (5' 1\").    Weight as of this encounter: 78 kg (171 lb 15.3 oz).               Native vessel CAD  Permanent atrial fibrillation    CKD POA List: ESRD on dialysis                       "

## 2024-09-29 NOTE — PROCEDURES
CCPD cycler set up per orders of Dr. Finn    Total volume (ml) : 13,800 ml    Therapy time:12 hours     Fill volume (ml):2300 ml    Cycles:6    Dextrose 2.5% volume (ml): 13,800 ml    Heparin added to bags by pharmacy     PD catheter visualized. Machine ready to connect. Report given to  ALLYSSA Cao RN.    Ramona Nevarez, RN [facility]

## 2024-09-29 NOTE — CONSULTS
Care Management Initial Consult    General Information  Assessment completed with: Patient, Family, Pt- Delmer -milad  Type of CM/SW Visit: Initial Assessment    Primary Care Provider verified and updated as needed:     Readmission within the last 30 days: no previous admission in last 30 days      Reason for Consult: discharge planning  Advance Care Planning: Advance Care Planning Reviewed: verified with patient (Tatyana durham have a copy, provided pt with copy of advanced directive per request)          Communication Assessment  Patient's communication style: spoken language (English or Bilingual)             Cognitive  Cognitive/Neuro/Behavioral: WDL                      Living Environment:   People in home: child(rosenda), adult (son - Tatyana)  Tatyana (son)  Current living Arrangements: house      Able to return to prior arrangements: yes (Pt/family goal is home.)  Living Arrangement Comments: Pt lives with Tatyana durham    Family/Social Support:  Care provided by: child(rosenda) (Adult children (Tatyana & Rhoda))  Provides care for: no one, unable/limited ability to care for self  Marital Status:   Support system: Children          Description of Support System: Supportive    Support Assessment: Adequate family and caregiver support    Current Resources:   Patient receiving home care services: No        Community Resources: None  Equipment currently used at home: cane, straight, walker, rolling, wheelchair, manual  Supplies currently used at home: Other, Gloves (Peritoneal Dialysis (PD) - supplies)    Employment/Financial:  Employment Status: retired     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  Yes     Which insurance plan 3 day waiver is available? Alternative insurance waiver    Will the waiver be used for post-acute placement?  Undetermined at this time    Lifestyle & Psychosocial Needs:  Social Determinants of Health     Food Insecurity: Low Risk  (12/13/2023)    Food Insecurity     Within the past 12 months, did you worry that your food would run out before you got money to buy more?: No     Within the past 12 months, did the food you bought just not last and you didn t have money to get more?: No   Depression: Not at risk (1/31/2024)    PHQ-2     PHQ-2 Score: 1   Housing Stability: Low Risk  (12/13/2023)    Housing Stability     Do you have housing? : Yes     Are you worried about losing your housing?: No   Tobacco Use: Low Risk  (9/28/2024)    Patient History     Smoking Tobacco Use: Never     Smokeless Tobacco Use: Never     Passive Exposure: Not on file   Financial Resource Strain: Low Risk  (12/13/2023)    Financial Resource Strain     Within the past 12 months, have you or your family members you live with been unable to get utilities (heat, electricity) when it was really needed?: No   Alcohol Use: Not on file   Transportation Needs: Low Risk  (12/13/2023)    Transportation Needs     Within the past 12 months, has lack of transportation kept you from medical appointments, getting your medicines, non-medical meetings or appointments, work, or from getting things that you need?: No   Physical Activity: Not on file   Interpersonal Safety: Not on file   Stress: Not on file   Social Connections: Not on file   Health Literacy: Not on file       Functional Status:  Prior to admission patient needed assistance:   Dependent ADLs:: Bathing, Toileting, Ambulation-walker, Ambulation-cane  Dependent IADLs:: Cleaning, Cooking, Laundry, Shopping, Meal Preparation, Medication Management, Money Management, Transportation (Family assists)       Mental Health Status:          Chemical Dependency Status:         Values/Beliefs:  Spiritual, Cultural Beliefs, Judaism Practices, Values that affect care: no               Discussed  Partnership in Safe  Discharge Planning  document with patient/family: No      Additional Information:  Met with pt's family to review roll of care management and to complete initial assessment. DaughterRhoda stated, brother, Tatyana is primary contact. No HCA on file. Per Rhoda pt lives at home in [place] with her son, Tatyana. Family provides 24/7 caregiver support for pt. DaughterRhoda provides care for pt when son, Tatyana is at work. Tatyana provides care the remainder of the day. Pt receives support for ADLs and IADLs from family/ caregivers. Pt and caregiver reported significant difficulty getting pt in and out of the tub for bathing. Pt has home peritoneal dialysis and is unable to submerge her body in the bathtub. Pt/caregiver also reported showering is difficult for pt d/t fatigue with standing. Pt uses cane- straight, walker- 4WW, and manual wheelchair. Pt's goal is to return home with family. Per pt's request, RNCM provided pt and dtrRhoda with 2 copies of a health care directive to discuss and complete as a family. Informed dtr she could provide a completed copy to pt's PCP to be on file. Also, RNCM provided contact information for the Alternative Care program [facility] services to pt and dtrRhoda per pt's request.          Next Steps: CM will  continue to follow care progression and aide in discharge planning as needed.     Petra Gerard RN

## 2024-09-29 NOTE — PHARMACY-VANCOMYCIN DOSING SERVICE
"Pharmacy Vancomycin Initial Note  Date of Service 2024  Patient's  1940  84 year old, female    Indication: Intra-abdominal infection    Current estimated CrCl = Estimated Creatinine Clearance: 6.3 mL/min (A) (based on SCr of 6.23 mg/dL (H)).    Creatinine for last 3 days  2024: 11:07 AM Creatinine 6.23 mg/dL    Recent Vancomycin Level(s) for last 3 days  No results found for requested labs within last 3 days.      Vancomycin IV Administrations (past 72 hours)                     vancomycin (VANCOCIN) 1,500 mg in sodium chloride 0.9 % 250 mL intermittent infusion (mg) 1,500 mg New Bag 24 1229                    Nephrotoxins and other renal medications (From now, onward)      Start     Dose/Rate Route Frequency Ordered Stop    24 0800  torsemide (DEMADEX) tablet 100 mg        Note to Pharmacy: PTA Sig:Take 100 mg by mouth daily.      100 mg Oral DAILY 24 1502      24 1800  piperacillin-tazobactam (ZOSYN) 3.375 g vial to attach to  mL bag        Note to Pharmacy: For [facility], [facility] and [facility]: For Zosyn-naive patients, use the \"Zosyn initial dose + extended infusion\" order panel.    3.375 g  over 240 Minutes Intravenous EVERY 12 HOURS 24 1448      24 1510  vancomycin place taylor - receiving intermittent dosing         1 each Intravenous SEE ADMIN INSTRUCTIONS 24 1511              Contrast Orders - past 72 hours (72h ago, onward)      None            Plan:  Start vancomycin intermittent dosing with PD. She received a loading dose of 1500 mg (~20mg/kg) at 1229 this morning. We will plan to obtain a level in roughly 48 hours and re-dose based on that level.   Vancomycin monitoring method: Renal Replacement Therapy  Vancomycin therapeutic monitoring goal: 15-20 mg/L  Pharmacy will check vancomycin levels as appropriate in 1-3 Days.    Serum creatinine levels will be ordered daily for the first week of therapy and at least twice weekly for subsequent weeks.  "     MONICA PAZ, [facility]

## 2024-09-29 NOTE — PROGRESS NOTES
09/29/24 0868   Appointment Info   Signing Clinician's Name / Credentials (PT) Martine Higgins   Living Environment   People in Home child(rosenda), adult  (son)   Current Living Arrangements house   Home Accessibility stairs to enter home   Number of Stairs, Main Entrance 1   Stair Railings, Main Entrance none   Transportation Anticipated family or friend will provide   Self-Care   Equipment Currently Used at Home cane, straight;walker, rolling;wheelchair, manual   Activity/Exercise/Self-Care Comment ambulates with FWW in home; has manual w/c for community mobility with assist; dtr comes daily to assist with ADL/IADL's   General Information   Onset of Illness/Injury or Date of Surgery 09/28/24   Referring Physician Dr. Vincent   Patient/Family Therapy Goals Statement (PT) get stronger   Pertinent History of Current Problem (include personal factors and/or comorbidities that impact the POC) PMHx of ESRD on PD, chronic low back pain, possible PMR, Afib on AC, ulcerative colitis, gout who presents with 2 days of acute onset generalized weakness, diffuse abdominal pain, and and nausea with vomiting. She was admitted for concern for possible bacterial peritonitis; pt reports chronic back pain with comp. fx   Existing Precautions/Restrictions no known precautions/restrictions   Cognition   Affect/Mental Status (Cognition) WFL   Orientation Status (Cognition) disoriented to;time;oriented to;person;place;situation   Follows Commands (Cognition) WFL   Range of Motion (ROM)   Range of Motion ROM is WFL   Strength (Manual Muscle Testing)   Strength (Manual Muscle Testing) Deficits observed during functional mobility   Strength Comments generalized weakness BLE   Transfers   Transfers sit-stand transfer;toilet transfer   Sit-Stand Transfer   Sit-Stand [place] (Transfers) contact guard;verbal cues;nonverbal cues (demo/gesture)   Assistive Device (Sit-Stand Transfers) walker, front-wheeled   Comment, (Sit-Stand Transfer) cuing  for hand placement   Toilet Transfer   [place] Level (Toilet Transfer) contact guard;verbal cues;nonverbal cues (demo/gesture)   Type (Toilet Transfer) sit-stand;stand-sit   Gait/Stairs (Locomotion)   [place] Level (Gait) contact guard;verbal cues   Assistive Device (Gait) walker, front-wheeled   Distance in Feet (Gait) 3'x2  (limited by fatigue, dyspnea, and nausea and back pain)   Pattern (Gait) step-to   Deviations/Abnormal Patterns (Gait) base of support, wide;tanner decreased;gait speed decreased;weight shifting decreased   Comment, (Gait/Stairs) pt unable to tolerate stair trial this date due to weakness and fatigue   Balance   Balance Comments sba static standing with UE support   Clinical Impression   Criteria for Skilled Therapeutic Intervention Yes, treatment indicated   PT Diagnosis (PT) impaired functional mobility   Influenced by the following impairments decreased strength, activity tolerance, balance   Functional limitations due to impairments transfers, gait,. stairs   Clinical Presentation (PT Evaluation Complexity) stable   Clinical Presentation Rationale pt presents as medically diagnosed   Clinical Decision Making (Complexity) low complexity   Planned Therapy Interventions (PT) balance training;bed mobility training;gait training;home exercise program;neuromuscular re-education;patient/family education;stair training;strengthening;transfer training;stretching   Risk & Benefits of therapy have been explained evaluation/treatment results reviewed;patient   PT Total Evaluation Time   PT Ronen Low Complexity Minutes (97278) 15   Physical Therapy Goals   PT Frequency 5x/week   PT Predicted Duration/Target Date for Goal Attainment 10/06/24   PT Goals Transfers;Gait;Stairs   PT: Transfers Modified independent;Sit to/from stand;Assistive device   PT: Gait Supervision/stand-by assist;50 feet;Rolling walker   PT: Stairs 1 stair;Minimal assist;Rail on left;Rail on right   Interventions    Interventions Quick Adds Therapeutic Procedure   Therapeutic Procedure/Exercise   Ther. Procedure: strength, endurance, ROM, flexibillity Minutes (55215) 10   Symptoms Noted During/After Treatment fatigue;shortness of breath;other (see comments)  (nausea)   Treatment Detail/Skilled Intervention seated BLE ex x10-15 reps with sba, cuing and demonstration for technique; pt needing frequent rest breaks during session due to fatigue, dyspnea with afib, and nausea and back pain chronic   PT Discharge Planning   PT Plan gait with FWW, LE ex, 1 ALEX when able   PT Discharge Recommendation (DC Rec) Transitional Care Facility   PT Rationale for DC Rec continue with PT at TCU for strengthening and mobility training   PT Brief overview of current status amb. short distances only with walker and assist of 1   Total Session Time   Timed Code Treatment Minutes 10   Total Session Time (sum of timed and untimed services) 25

## 2024-09-29 NOTE — CONSULTS
[facility] consult     Impression: 1.  Blood in the underwear-of unclear etiology.  I think would be helpful just to proceed with external rectal examination to try to clarify where this bleeding is coming from.  It does not seem significant.  If it were from the rectum, potentially could be something like hemorrhoidal bleeding intermittently.  It could be vaginal or urinary bleeding potentially also.  Currently, patient is not willing to proceed with external examination.  We can see if someone could potentially do that later.  She is deciding.    2.  Ulcerative colitis?-Patient is not on any maintenance medications (on steroids for possible PMR which may potentially help colitis but typically does not continue to work long-term and would be a poor long-term option if he were being used for that).  She is not having obvious symptoms, diarrhea, etc.  I am unsure how this diagnosis was made.  She reports having possible surgery for this, that would be unusual to have surgery for ulcerative colitis (unless there is something like total colectomy).  On abdominal CT imaging, no obvious changes commented on that would suggest significant surgery.    Recommendation: Potentially proceed with rectal exam if patient allows.  In the meantime, follow hemoglobin watch for overt bleeding.    Name: Mely Alegria    Medical Record #: [id number]    YOB: 1940    Date/Time: 9/29/2024/1:06 PM    Reason for Consultation: Merlene Vincent MD has asked me to evaluate Mely Alegria regarding possible rectal bleeding.    HPI: This is an 84-year-old female with a history of end-stage renal disease on peritoneal dialysis since 5/2024, chronic low back pain, possible PMR on steroids, atrial fibrillation on Eliquis, ulcerative colitis, gout who was admitted with diffuse abdominal pain, nausea, vomiting.  She was found to have elevated white blood cell count 16.9, lactic acid 4.8.  She reports having hiccups  "for 5 times and then the nausea goes away.  Currently she denies any nausea, vomiting, abdominal pain.      Troponins have been markedly elevated.  Cardiology saw earlier today with plans for echocardiogram tomorrow.    Patient has seen blood in her underwear, unclear vaginal, urinary or rectal bleeding.  No stool.  Patient thought it was more likely vaginal.  The overnight nurse thought it might have been from the rectum.    The patient was seen with her daughter.  She has seen bright red blood in her underwear, unclear source.  She states she has brown stool, no diarrhea.  I brought in an extra nurses aide to try to proceed with external rectal examination, but the patient currently refused.  She states that she has had ulcerative colitis surgery years ago and it was \"hell\".  She wanted to think about whether she would allow an external exam or not and said ask her again in 24 hours.  Her daughter who was present tried to also reassure her that should be okay just to look externally, the patient refused.    Patient was seen by nephrology.  There were concerns for peritonitis.  She was started on Vanco and Zosyn.  Blood culture positive for gram-positive cocci in clusters, Staph epidermidis.    I do not see any records at [facility].  I do not see any maintenance medications for ulcerative colitis.  Prior to admission patient was on prednisone 5 mg 2 times daily.  She has not been on the medications for many years apparently.  I do not have any of the records regarding the ulcerative colitis.    10 months ago hemoglobin was 10.1, then 1/17/2024 was 13.9.    Review of Systems (ROS): Complete ROS otherwise negative except for as above.    Past Medical History:  Patient Active Problem List    Diagnosis Date Noted    Vaginal bleeding 09/28/2024     Priority: Medium    General weakness 09/28/2024     Priority: Medium    Elevated troponin 09/28/2024     Priority: Medium    Rapid atrial fibrillation (H) 09/28/2024     Priority: " Medium    Decreased oral intake 09/28/2024     Priority: Medium    Chronic bilateral low back pain with left-sided sciatica 09/25/2024     Priority: Medium    Opioid type dependence, episodic (H) 04/10/2024     Priority: Medium    ESRD (end stage renal disease) on dialysis (H) 04/09/2024     Priority: Medium    CHF (congestive heart failure) (H) 06/30/2023     Priority: Medium    Persistent atrial fibrillation (H) 04/04/2023     Priority: Medium    Pure hypercholesterolemia      Priority: Medium     Created by Conversion        History of Helicobacter pylori gastritis      Priority: Medium     Created by Conversion  Replacement Utility updated for latest IMO load        Diaphragmatic hernia without obstruction and without gangrene      Priority: Medium     Created by Conversion  Replacement Utility updated for latest IMO load        Class 1 obesity with alveolar hypoventilation, serious comorbidity, and body mass index (BMI) of 32.0 to 32.9 in adult (H) 11/30/2018     Priority: Medium    Ulcerative pancolitis without complication (H)      Priority: Medium     Created by Conversion  Replacement Utility updated for latest IMO load        Essential hypertension with goal blood pressure less than 140/90 11/29/2017     Priority: Medium    DNR (do not resuscitate) 11/29/2017     Priority: Medium    Vitamin D Deficiency      Priority: Medium     Created by Conversion  Replacement Utility updated for latest IMO load        Chronic pain of left knee 12/02/2015     Priority: Medium    History of left hip replacement 01/11/2006     Priority: Medium       Medications:   Medications Prior to Admission   Medication Sig Dispense Refill Last Dose    ELIQUIS ANTICOAGULANT 2.5 MG tablet Take 2.5 mg by mouth 2 times daily   9/28/2024 at AM    famotidine (PEPCID) 20 MG tablet Take 20 mg by mouth 2 times daily.   9/27/2024 at PM    folic acid (FOLVITE) 1 MG tablet Take 1 tablet (1 mg) by mouth daily 90 tablet 3 9/27/2024 at AM     predniSONE (DELTASONE) 5 MG tablet Take 5 mg by mouth 2 times daily.   9/27/2024 at PM    spironolactone (ALDACTONE) 25 MG tablet Take 25 mg by mouth daily.   9/27/2024 at AM    torsemide (DEMADEX) 100 MG tablet Take 100 mg by mouth daily.   9/27/2024 at AM    traMADol (ULTRAM) 50 MG tablet Take 50 mg by mouth 2 times daily.   9/28/2024 at AM    ACE/ARB/ARNI NOT PRESCRIBED (INTENTIONAL) Please choose reason not prescribed from choices below.       BETA BLOCKER NOT PRESCRIBED (INTENTIONAL) Please choose reason not prescribed from choices below.          Current Facility-Administered Medications:     acetaminophen (TYLENOL) tablet 975 mg, 975 mg, Oral, TID PRN, August Delgado MD, 975 mg at 09/29/24 0035    apixaban ANTICOAGULANT (ELIQUIS) tablet 2.5 mg, 2.5 mg, Oral, BID, August Delgado MD, 2.5 mg at 09/29/24 0805    calcium carbonate (TUMS) chewable tablet 1,000 mg, 1,000 mg, Oral, 4x Daily PRN, August Delgado MD    clotrimazole (LOTRIMIN) 1 % cream, , Topical, BID, Michael Vela MD    dianeal PD LOW calcium-2.5% dex (calcium 2.5 mEq/L) 18,000 mL with heparin (porcine) 500 Units/L PERITONEAL DIALYSATE, , Dialysis, [facility] Supplied PD, Keri Finn MD    dianeal PD LOW calcium-2.5% dex (calcium 2.5 mEq/L) 2,000 mL PERITONEAL DIALYSATE, , Dialysis, [facility] Supplied PD, Keri Finn MD    [START ON 9/30/2024] famotidine (PEPCID) tablet 20 mg, 20 mg, Oral, Daily, Merlene Vincent MD    folic acid (FOLVITE) tablet 1 mg, 1 mg, Oral, Daily, August Delgado MD, 1 mg at 09/29/24 0805    gentamicin (GARAMYCIN) 0.1 % cream, , Topical, Daily PRN, Keri Finn MD    gentamicin (GARAMYCIN) 0.1 % cream, , Topical, Daily PRN, Keri Finn MD    lidocaine (LMX4) cream, , Topical, Q1H PRN, August Delgado MD    lidocaine 1 % 0.1-1 mL, 0.1-1 mL, Other, Q1H PRN, August Delgado MD    magnesium oxide (MAG-OX) tablet 400 mg, 400 mg, Oral, Daily, Keri Finn MD, 400 mg at 09/29/24 0805    melatonin  tablet 3 mg, 3 mg, Oral, At Bedtime PRN, August Delgado MD    metoprolol tartrate (LOPRESSOR) tablet 25 mg, 25 mg, Oral, Q6H, Liam Mcmillan MD, 25 mg at 09/29/24 0804    [START ON 9/30/2024] metoprolol tartrate (LOPRESSOR) tablet 50 mg, 50 mg, Oral, BID, Liam Mcmillan MD    ondansetron (ZOFRAN ODT) ODT tab 4 mg, 4 mg, Oral, Q6H PRN **OR** ondansetron (ZOFRAN) injection 4 mg, 4 mg, Intravenous, Q6H PRN, August Delgado MD    piperacillin-tazobactam (ZOSYN) 3.375 g vial to attach to  mL bag, 3.375 g, Intravenous, Q12H, August Delgado MD, 3.375 g at 09/29/24 0811    polyethylene glycol (MIRALAX) Packet 17 g, 17 g, Oral, BID PRN, August Delgado MD    predniSONE (DELTASONE) tablet 5 mg, 5 mg, Oral, BID, August Delgado MD, 5 mg at 09/29/24 0806    senna-docusate (SENOKOT-S/PERICOLACE) 8.6-50 MG per tablet 1 tablet, 1 tablet, Oral, BID PRN **OR** senna-docusate (SENOKOT-S/PERICOLACE) 8.6-50 MG per tablet 2 tablet, 2 tablet, Oral, BID PRN, August Delgado MD    sodium chloride (PF) 0.9% PF flush 3 mL, 3 mL, Intracatheter, Q8H, August Delgado MD, 3 mL at 09/29/24 0812    sodium chloride (PF) 0.9% PF flush 3 mL, 3 mL, Intracatheter, q1 min prn, August Delgado MD    spironolactone (ALDACTONE) tablet 25 mg, 25 mg, Oral, Daily, August Delgado MD, 25 mg at 09/29/24 0805    torsemide (DEMADEX) tablet 100 mg, 100 mg, Oral, Daily, August Delgado MD, 100 mg at 09/29/24 0806    traMADol (ULTRAM) tablet 50 mg, 50 mg, Oral, BID, August Delgado MD, 50 mg at 09/29/24 0805    vancomycin place taylor - receiving intermittent dosing, 1 each, Intravenous, See Admin Instructions, Auguts Delgado MD       Allergies: Allopurinol, Latex, Lisinopril, and Percocet [oxycodone-acetaminophen]    Family History:  Family History   Problem Relation Age of Onset    Diabetes Mother         Social History:  Social History     Tobacco Use    Smoking status: Never     "Smokeless tobacco: Never   Vaping Use    Vaping status: Never Used          Physical Exam: /65 (BP Location: Left arm)   Pulse 89   Temp 97.2  F (36.2  C) (Oral)   Resp 18   Ht 1.549 m (5' 1\")   Wt 78 kg (171 lb 15.3 oz)   LMP  (LMP Unknown)   SpO2 96%   BMI 32.49 kg/m      General: NAD  Eyes: No scleral icterus or conjunctivitis  Oropharynx: WNL  Neck/Thyroid: No neck masses or thyromegaly  Pulmonary: Lungs are clear to auscultation bilaterally  Cardiovascular: Regular, no lower extermity edema   Gastrointestinal: Positive bowel sounds, soft, non-tender, no rebound or guarding. No HSM.  Lymph nodes: No cervical or supraclavicular lymphadenopathy  Skin: The patient is not jaundiced.  Back: No spinal/paraspinal tenderness, no CVA tenderness.  Neurologic: Alert and oriented ×3    Labs:    CBC RESULTS:   Recent Labs   Lab Test 09/29/24  1151 09/29/24  0756   WBC  --  18.5*   RBC  --  4.21   HGB 11.0* 13.9   HCT  --  43.1   MCV  --  102*   MCH  --  33.0   MCHC  --  32.3   RDW  --  14.6   PLT  --  173        CMP Results:   Recent Labs   Lab Test 09/29/24  0756      POTASSIUM 4.5   CHLORIDE 96*   CO2 20*   ANIONGAP 19*   *   BUN 30.8*   CR 5.67*   BILITOTAL 0.7   ALKPHOS 126   ALT 46   AST 53*        INR Results: No results for input(s): \"INR\" in the last 30211 hours.       Radiology: CT Chest Abdomen Pelvis w/o Contrast    Result Date: 9/28/2024  EXAM: CT CHEST ABDOMEN PELVIS W/O CONTRAST LOCATION: [facility] DATE: 9/28/2024 INDICATION: gen weakness and feels unwell COMPARISON: 11/5/2023 TECHNIQUE: CT scan of the chest, abdomen, and pelvis was performed without IV contrast. Multiplanar reformats were obtained. Dose reduction techniques were used. CONTRAST: None. FINDINGS: Mild motion artifact. LUNGS AND PLEURA: Mild emphysema. Stable mild diffuse bronchial wall thickening. Elevated right hemidiaphragm. Right basilar atelectasis. No pleural effusion. Calcified granuloma. " A few stable benign solid pulmonary nodules with the largest measuring 0.3  cm in the left upper lobe, image 81:4. MEDIASTINUM/AXILLAE: Stable enlarged multinodular thyroid. No lymphadenopathy. Trace air in the main pulmonary artery and right subclavian vein are most likely secondary to intravenous line placement. CORONARY ARTERY CALCIFICATION: Moderate. HEPATOBILIARY: Cholelithiasis. PANCREAS: Normal. SPLEEN: Normal. ADRENAL GLANDS: Normal. KIDNEYS/BLADDER: A simple cyst midpole right kidney, no follow-up required. Bilateral renal atrophy. BOWEL: No obstruction or inflammatory change. Appendix not visualized. LYMPH NODES: Increasing retroperitoneal lymphadenopathy with the largest being a left para-aortic node measuring 1.4 cm in short axis, previously 0.8 cm, image 183:3. A 2.6 x 1.6 cm left external iliac node, previously not visualized, image 224:3. A 2.4 x 1.7 cm right external iliac node, previously 1.0 x 0.6 cm, image 226:3. VASCULATURE: Moderately severe atherosclerosis. Stable ectasia of the abdominal aorta. PELVIC ORGANS: Peritoneal dialysis catheter. Small-moderate volume ascites. MUSCULOSKELETAL: Left hip arthroplasty. Degenerative changes. Severe osseous demineralization.     IMPRESSION: 1.  Increasing retroperitoneal and pelvic lymphadenopathy. 2.  Small-moderate volume ascites.       The total time spent with this patient was 35 minutes                                             Kyler Cowan M.D.  Thank you for the opportunity to participate in the care of this patient.   Please feel free to call me with any questions or concerns.  Phone number [phone].

## 2024-09-29 NOTE — PLAN OF CARE
Problem: Adult Inpatient Plan of Care  Goal: Plan of Care Review  Description: The Plan of Care Review/Shift note should be completed every shift.  The Outcome Evaluation is a brief statement about your assessment that the patient is improving, declining, or no change.  This information will be displayed automatically on your shift  note.  Outcome: Progressing   Goal Outcome Evaluation:    Manual PD cycle this evening per nephrology order. Labs collected from peritoneal fluid, awaiting results. PD cycler set up at bedside. Nursing staff will continue to monitor.

## 2024-09-29 NOTE — CONSULTS
"    CONSULTATION - GYN    NAME: Mely Alegria   : 1940   MRN: [id number]      ADMISSION DATE: 2024    PCP:  Oscar Hopper     I have been requested by Dr. Vincent to evaluate Mely Alegria for possible vaginal bleeding.     CHIEF COMPLAINT: Abdominal pain    HPI:  Mely Alegria is a 84 year old with a complex past medical history including ESRD on dialysis, ulcerative colitis, atrial fibrillation on anticoagulation who presented for abdominal pain and weakness and admitted for sepsis likely due to peritonitis. History is obtained through the patient and chart review. I was asked to see the patient for possible vaginal bleeding. Patient states that these symptoms began approximately a month ago watery pink tinge in her underwear - she believes she is having rectal bleeding. It is not worse with bowel movements. Nothing makes it worse or better to her knowledge. She reports a history of ulcerative colitis and \"went through hell\" years ago with her bowel disease and because of this is hesitant to have an exam whether rectally or vaginally. She declined vaginal US today. She denies history of abnormal pap smear. She was unaware of increasing lymphadenopathy on CT. She has had an MRI in the past for her back and has claustrophobia but has been able to complete the MRIs with a warm blanket, music and trying to sleep. She states that she doesn't want to keep treating things that have been coming up.     PAST MEDICAL HISTORY:  Past Medical History:   Diagnosis Date    Acute midline low back pain without sciatica     Acute renal failure superimposed on chronic kidney disease, unspecified acute renal failure type, unspecified CKD stage (H)     Anemia     Cellulitis of left foot     CHF (congestive heart failure) (H)     Chronic atrial fibrillation (H)     Chronic atrial fibrillation (H)     Chronic back pain     Chronic heart failure, unspecified heart failure type (H)     Dialysis patient (H)     " M,W,F    Diaphragmatic hernia without obstruction and without gangrene     End stage renal disease (H)     Gout     Helicobacter pylori gastritis     Hypertension     Hypothyroidism due to acquired atrophy of thyroid     Obesity     Opioid type dependence, episodic (H)     Pure hypercholesterolemia     Ulcer of left foot, limited to breakdown of skin (H)     Ulcerative pancolitis without complication (H)     Uric acid arthropathy     Vitamin D deficiency        PAST SURGICAL HISTORY:  Past Surgical History:   Procedure Laterality Date    HC DILATION/CURETTAGE DIAG/THER NON OB      Description: Dilation And Curettage;  Recorded: 01/18/2010;    IR CVC TUNNEL PLACEMENT > 5 YRS OF AGE  10/30/2023    IR CVC TUNNEL PLACEMENT > 5 YRS OF AGE  1/17/2024    IR CVC TUNNEL REMOVAL RIGHT  7/17/2024    LAPAROSCOPIC INSERTION CATHETER PERITONEAL DIALYSIS N/A 4/16/2024    Procedure: LAPAROSCOPIC PLACEMENT OF PERITONEAL DIALYSIS CATHETER WITH;  Surgeon: August Myers MD;  Location: [facility] OR    LAPAROSCOPIC LYSIS ADHESIONS N/A 4/16/2024    Procedure: LYSIS OF ADHESIONS;  Surgeon: August Myers MD;  Location: [facility] OR    LAPAROSCOPIC OMENTECTOMY N/A 4/16/2024    Procedure: OMENTOPEXY;  Surgeon: August Myers MD;  Location: [facility] OR    PICC TRIPLE LUMEN PLACEMENT  10/30/2023    ZZC COLOSTOMY      Description: Colostomy;  Recorded: 11/05/2012;    ZZC PART REMOVAL COLON W ANASTOMOSIS      Description: Partial Colectomy;  Recorded: 11/05/2012;  Comments: with colostomy    ZZC TOTAL HIP ARTHROPLASTY      Description: Total Hip Replacement;  Recorded: 01/18/2010;       SOCIAL HISTORY:  Social History     Socioeconomic History    Marital status:      Spouse name: Not on file    Number of children: Not on file    Years of education: Not on file    Highest education level: Not on file   Occupational History    Not on file   Tobacco Use    Smoking status: Never    Smokeless tobacco: Never   Vaping Use     Vaping status: Never Used   Substance and Sexual Activity    Alcohol use: Not on file    Drug use: Not on file    Sexual activity: Not on file   Other Topics Concern    Not on file   Social History Narrative    Not on file     Social Determinants of Health     Financial Resource Strain: Low Risk  (12/13/2023)    Financial Resource Strain     Within the past 12 months, have you or your family members you live with been unable to get utilities (heat, electricity) when it was really needed?: No   Food Insecurity: Low Risk  (12/13/2023)    Food Insecurity     Within the past 12 months, did you worry that your food would run out before you got money to buy more?: No     Within the past 12 months, did the food you bought just not last and you didn t have money to get more?: No   Transportation Needs: Low Risk  (12/13/2023)    Transportation Needs     Within the past 12 months, has lack of transportation kept you from medical appointments, getting your medicines, non-medical meetings or appointments, work, or from getting things that you need?: No   Physical Activity: Not on file   Stress: Not on file   Social Connections: Not on file   Interpersonal Safety: Not on file   Housing Stability: Low Risk  (12/13/2023)    Housing Stability     Do you have housing? : Yes     Are you worried about losing your housing?: No       MEDICATIONS:  Current Facility-Administered Medications   Medication Dose Route Frequency Provider Last Rate Last Admin    acetaminophen (TYLENOL) tablet 975 mg  975 mg Oral TID PRN August Delgado MD   975 mg at 09/29/24 0035    apixaban ANTICOAGULANT (ELIQUIS) tablet 2.5 mg  2.5 mg Oral BID August Delgado MD   2.5 mg at 09/29/24 0805    calcium carbonate (TUMS) chewable tablet 1,000 mg  1,000 mg Oral 4x Daily PRN August Delgado MD        clotrimazole (LOTRIMIN) 1 % cream   Topical BID Michael Vela MD   Given at 09/29/24 1426    dianeal PD LOW calcium-2.5% dex (calcium 2.5 mEq/L) 18,000  mL with heparin (porcine) 500 Units/L PERITONEAL DIALYSATE   Dialysis [facility] Supplied PD Keri Finn MD        dianeal PD LOW calcium-2.5% dex (calcium 2.5 mEq/L) 2,000 mL PERITONEAL DIALYSATE   Dialysis [facility] Supplied PD Keri Finn MD        [START ON 9/30/2024] famotidine (PEPCID) tablet 20 mg  20 mg Oral Daily Merlene Vincent MD        folic acid (FOLVITE) tablet 1 mg  1 mg Oral Daily August Delgado MD   1 mg at 09/29/24 0805    gentamicin (GARAMYCIN) 0.1 % cream   Topical Daily PRN Keri Finn MD        gentamicin (GARAMYCIN) 0.1 % cream   Topical Daily PRN Keri Finn MD        lidocaine (LMX4) cream   Topical Q1H PRN August Delgado MD        lidocaine 1 % 0.1-1 mL  0.1-1 mL Other Q1H PRN August Delgado MD        magnesium oxide (MAG-OX) tablet 400 mg  400 mg Oral Daily Keri Finn MD   400 mg at 09/29/24 0805    melatonin tablet 3 mg  3 mg Oral At Bedtime PRN August Delgado MD        metoprolol tartrate (LOPRESSOR) tablet 25 mg  25 mg Oral Q6H Liam Mcmillan MD   25 mg at 09/29/24 0804    [START ON 9/30/2024] metoprolol tartrate (LOPRESSOR) tablet 50 mg  50 mg Oral BID Liam Mcmillan MD        ondansetron (ZOFRAN ODT) ODT tab 4 mg  4 mg Oral Q6H PRN August Delgado MD        Or    ondansetron (ZOFRAN) injection 4 mg  4 mg Intravenous Q6H PRN August Delgado MD        piperacillin-tazobactam (ZOSYN) 3.375 g vial to attach to  mL bag  3.375 g Intravenous Q12H August Delgado MD   3.375 g at 09/29/24 0811    polyethylene glycol (MIRALAX) Packet 17 g  17 g Oral BID PRN August Delgado MD        predniSONE (DELTASONE) tablet 5 mg  5 mg Oral BID August Delgado MD   5 mg at 09/29/24 0806    senna-docusate (SENOKOT-S/PERICOLACE) 8.6-50 MG per tablet 1 tablet  1 tablet Oral BID August Charles MD        Or    senna-docusate (SENOKOT-S/PERICOLACE) 8.6-50 MG per tablet 2 tablet  2 tablet Oral BID August Charles MD         "sodium chloride (PF) 0.9% PF flush 3 mL  3 mL Intracatheter Q8H August Delgado MD   3 mL at 09/29/24 0812    sodium chloride (PF) 0.9% PF flush 3 mL  3 mL Intracatheter q1 min prn August Delgado MD        spironolactone (ALDACTONE) tablet 25 mg  25 mg Oral Daily August Delgado MD   25 mg at 09/29/24 0805    torsemide (DEMADEX) tablet 100 mg  100 mg Oral Daily August Delgado MD   100 mg at 09/29/24 0806    traMADol (ULTRAM) tablet 50 mg  50 mg Oral BID August Delgado MD   50 mg at 09/29/24 0805    vancomycin place taylor - receiving intermittent dosing  1 each Intravenous See Admin Instructions August Delgado MD           ALLERGIES:  Allergies   Allergen Reactions    Allopurinol Diarrhea    Latex     Lisinopril Cough     initiated 5/14/1996 and stopped      Percocet [Oxycodone-Acetaminophen] Other (See Comments)     Difficulty swallowing   \" gets stuck in throat\"       REVIEW OF SYSTEMS    Negative except what is stated in the HPI    PHYSICAL EXAM:  /74 (BP Location: Left arm)   Pulse 94   Temp 97.2  F (36.2  C) (Oral)   Resp 18   Ht 1.549 m (5' 1\")   Wt 78 kg (171 lb 15.3 oz)   LMP  (LMP Unknown)   SpO2 96%   BMI 32.49 kg/m     General Appearance: Alert, appropriate appearance for age. No acute distress  HEENT : Grossly normal  Pelvic Exam Female:  declined,   Psychiatric: Alert and oriented, appropriate affect.    LABS  Recent Results (from the past 24 hour(s))   Lactic acid whole blood    Collection Time: 09/28/24  5:03 PM   Result Value Ref Range    Lactic Acid 3.9 (H) 0.7 - 2.0 mmol/L   Peritoneal Fluid Aerobic Bacterial Culture Routine With Gram Stain    Collection Time: 09/28/24 11:25 PM    Specimen: Peritoneum; Peritoneal Fluid   Result Value Ref Range    Gram Stain Result No organisms seen     Gram Stain Result No white blood cells seen    Cell Count Body Fluid    Collection Time: 09/28/24 11:27 PM   Result Value Ref Range    Color Colorless Colorless, Yellow    " Clarity Clear Clear    Cell Count Fluid Source Peritoneum     Total Nucleated Cells 3 /uL   Differential Body Fluid    Collection Time: 09/28/24 11:27 PM   Result Value Ref Range    % Neutrophils 6 %    % Lymphocytes 72 %    % Monocyte/Macrophages 10 %    % Lining Cells 12 %    Absolute Neutrophils, Body Fluid 0.2   /uL   Comprehensive metabolic panel    Collection Time: 09/29/24  7:56 AM   Result Value Ref Range    Sodium 135 135 - 145 mmol/L    Potassium 4.5 3.4 - 5.3 mmol/L    Carbon Dioxide (CO2) 20 (L) 22 - 29 mmol/L    Anion Gap 19 (H) 7 - 15 mmol/L    Urea Nitrogen 30.8 (H) 8.0 - 23.0 mg/dL    Creatinine 5.67 (H) 0.51 - 0.95 mg/dL    GFR Estimate 7 (L) >60 mL/min/1.73m2    Calcium 8.9 8.8 - 10.4 mg/dL    Chloride 96 (L) 98 - 107 mmol/L    Glucose 147 (H) 70 - 99 mg/dL    Alkaline Phosphatase 126 40 - 150 U/L    AST 53 (H) 0 - 45 U/L    ALT 46 0 - 50 U/L    Protein Total 6.1 (L) 6.4 - 8.3 g/dL    Albumin 3.0 (L) 3.5 - 5.2 g/dL    Bilirubin Total 0.7 <=1.2 mg/dL   CBC with platelets    Collection Time: 09/29/24  7:56 AM   Result Value Ref Range    WBC Count 18.5 (H) 4.0 - 11.0 10e3/uL    RBC Count 4.21 3.80 - 5.20 10e6/uL    Hemoglobin 13.9 11.7 - 15.7 g/dL    Hematocrit 43.1 35.0 - 47.0 %     (H) 78 - 100 fL    MCH 33.0 26.5 - 33.0 pg    MCHC 32.3 31.5 - 36.5 g/dL    RDW 14.6 10.0 - 15.0 %    Platelet Count 173 150 - 450 10e3/uL   Lipid panel reflex to direct LDL    Collection Time: 09/29/24  7:56 AM   Result Value Ref Range    Cholesterol 177 <200 mg/dL    Triglycerides 144 <150 mg/dL    Direct Measure HDL 52 >=50 mg/dL    LDL Cholesterol Calculated 96 <100 mg/dL    Non HDL Cholesterol 125 <130 mg/dL   Hemoglobin    Collection Time: 09/29/24 11:51 AM   Result Value Ref Range    Hemoglobin 11.0 (L) 11.7 - 15.7 g/dL   Lactic acid whole blood    Collection Time: 09/29/24 11:51 AM   Result Value Ref Range    Lactic Acid 3.5 (H) 0.7 - 2.0 mmol/L       Lab Results: personally reviewed.     IMAGING:  Results  for orders placed or performed during the hospital encounter of 09/28/24   CT Chest Abdomen Pelvis w/o Contrast    Impression    IMPRESSION:  1.  Increasing retroperitoneal and pelvic lymphadenopathy.  2.  Small-moderate volume ascites.   US Lower Extremity Venous Duplex Bilateral    Impression    IMPRESSION:  No deep venous thrombosis in the bilateral lower extremities.   US Pelvic Limited    Impression    IMPRESSION:  Uterus and ovaries unable to be identified due to technical factors and body habitus.         Radiology Results: Personally reviewed impression/s    IMPRESSION:  Mely Alegria is a 84 year old with a complex past medical history including ESRD on dialysis, ulcerative colitis, atrial fibrillation on anticoagulation who presented for abdominal pain and weakness and admitted for sepsis likely due to peritonitis with incidentally noted possible vaginal bleeding which is light for the last month. Also has increasing retroperitoneal lymphadenopathy. Patient declined vaginal US, pelvic US unable to see uterus to evaluate for endometrial thickening. CT previously did not note abnormalities of the uterus or ovaries, but did note small-moderate ascites.    RECOMMENDATIONS:  -Would recommend work up for increasing retroperitoneal lymphadenopathy - wonder if IR biopsy would be feasible or reasonable, but would recommend gynecologic oncology for this. CT has not shown obvious cervical or uterine mass on reports.  -Outpatient follow up for exam, unless patient going to the operating room for any reason, then we would perform at that time.  -Given inability to visualize the uterus on US, biopsy could be performed to evaluate for endometrial cancer, pap smear could be performed, however patient declines at this point.   -Recommend ruling out blood/pink fluid in underwear not from bowel or hemorrhoids. GI has been consulted and she declined exam for this until thinking about it overnight.  -Speculum and wet prep  swabs at patient bedside, will check tomorrow if patient is agreeable to exam, if not then outpatient follow up appropriate. Benign GYN causes of vaginal/vulvar bleeding commonly atrophy, vulvar dermatologic condition. Could be cervical or uterine cancer, but if wants to be worked up further for this, would recommend GYN ONC. At this point, patient discussed that she wouldn't necessarily aggressively treat anything.    Thank you for allowing us to participate in the care of this patient.  Please contact us with any questions/concerns.    Marion Rodriguez MD     CC: Oscar Hopper

## 2024-09-29 NOTE — PROGRESS NOTES
Infectious Diseases Progress Note  [facility]    Date of visit: 09/29/2024     ASSESSMENT   84-year-old woman with a history of end-stage renal disease, on peritoneal dialysis, A-fib, CHF, ulcerative colitis who is admitted with increasing weakness and abdominal pain.  ID is consulted regarding concerns for peritonitis.    Abdominal pain.  Patient is on peritoneal dialysis, managed by her adult children.  No recent complications.  Reporting clear effluent.  CT scan of the abdomen with some lymphadenopathy, without other signs of occult infection.  No pain around the dialysis catheter.  Fluid drawn from catheter with low cell count (3 wbc). Cultures pending   Positive blood culture. Single blood culture on admission with GPC in clusters, identified as S.epidermidis on verigene. Repeat culture today. Possible contaminant?  Fungal  dermatitis.  New area of pain in the lower abdomen with rash  History of urinary tract infection.  Patient makes very little urine, but she does have a history of urinary tract infection. Unable to collect sample today due to stool incontinence       Active Problems:    Vaginal bleeding    General weakness    Elevated troponin    Rapid atrial fibrillation (H)    Decreased oral intake       PLAN   -Continue vancomycin and Zosyn, renally dosed  -Urine culture if able  -repeat blood culture today  -Clotrimazole cream to the lower abdomen      Michael Vela MD  [facility]  Direct messaging: [employer] Paging  On-Call ID provider: [phone], option: 9      ===========================================        SUBJECTIVE / INTERVAL HISTORY:     No events. Feels better. Daughter says she looks better. PD overnight without issues.       Antibiotics   Vancomycin 9/28-  Zosyn 9/28-    Previous:  None      Physical Exam     Temp:  [97.2  F (36.2  C)-98  F (36.7  C)] 97.2  F (36.2  C)  Pulse:  [] 89  Resp:  [15-22] 18  BP: ()/(58-97) 109/65  SpO2:  [91  "%-96 %] 96 %    /65 (BP Location: Left arm)   Pulse 89   Temp 97.2  F (36.2  C) (Oral)   Resp 18   Ht 1.549 m (5' 1\")   Wt 78 kg (171 lb 15.3 oz)   LMP  (LMP Unknown)   SpO2 96%   BMI 32.49 kg/m      GENERAL:  well-developed, well-nourished, lying in bed in no acute distress.   HENT:  Head is normocephalic, atraumatic. EYES:  Eyes have anicteric sclerae without conjunctival injection   LUNGS:  Clear to auscultation.  CARDIOVASCULAR:  Regular rate and rhythm with no murmurs, gallops or rubs.  ABDOMEN:  soft, tender around umbilicus fold. Peritoneal catheter without surrounding inflammation or tenderness  EXT: left leg edema, chronic   SKIN: Erythema in the lower pannus without skin break. No stigmata of endocarditis.  NEUROLOGIC:  Grossly nonfocal.      Cultures   9/28 blood culture: GPC in clusters  9/29 blood culture: pending     Susceptibility data from last 90 days.  Collected Specimen Info Organism   09/28/24 Peripheral Blood Gram positive cocci in clusters          Pertinent Labs:     Recent Labs   Lab 09/29/24  1151 09/29/24  0756 09/28/24  1107   WBC  --  18.5* 16.9*   HGB 11.0* 13.9 14.7   PLT  --  173 225       Recent Labs   Lab 09/29/24  0756 09/28/24  1107    135   CO2 20* 22   BUN 30.8* 32.0*   ALBUMIN 3.0*  --    ALKPHOS 126  --    ALT 46  --    AST 53*  --        Recent Labs   Lab 09/28/24  1331   CRPI 20.30*           Imaging:     CT Chest Abdomen Pelvis w/o Contrast    Result Date: 9/28/2024  EXAM: CT CHEST ABDOMEN PELVIS W/O CONTRAST LOCATION: [facility] DATE: 9/28/2024 INDICATION: gen weakness and feels unwell COMPARISON: 11/5/2023 TECHNIQUE: CT scan of the chest, abdomen, and pelvis was performed without IV contrast. Multiplanar reformats were obtained. Dose reduction techniques were used. CONTRAST: None. FINDINGS: Mild motion artifact. LUNGS AND PLEURA: Mild emphysema. Stable mild diffuse bronchial wall thickening. Elevated right hemidiaphragm. Right " basilar atelectasis. No pleural effusion. Calcified granuloma. A few stable benign solid pulmonary nodules with the largest measuring 0.3  cm in the left upper lobe, image 81:4. MEDIASTINUM/AXILLAE: Stable enlarged multinodular thyroid. No lymphadenopathy. Trace air in the main pulmonary artery and right subclavian vein are most likely secondary to intravenous line placement. CORONARY ARTERY CALCIFICATION: Moderate. HEPATOBILIARY: Cholelithiasis. PANCREAS: Normal. SPLEEN: Normal. ADRENAL GLANDS: Normal. KIDNEYS/BLADDER: A simple cyst midpole right kidney, no follow-up required. Bilateral renal atrophy. BOWEL: No obstruction or inflammatory change. Appendix not visualized. LYMPH NODES: Increasing retroperitoneal lymphadenopathy with the largest being a left para-aortic node measuring 1.4 cm in short axis, previously 0.8 cm, image 183:3. A 2.6 x 1.6 cm left external iliac node, previously not visualized, image 224:3. A 2.4 x 1.7 cm right external iliac node, previously 1.0 x 0.6 cm, image 226:3. VASCULATURE: Moderately severe atherosclerosis. Stable ectasia of the abdominal aorta. PELVIC ORGANS: Peritoneal dialysis catheter. Small-moderate volume ascites. MUSCULOSKELETAL: Left hip arthroplasty. Degenerative changes. Severe osseous demineralization.     IMPRESSION: 1.  Increasing retroperitoneal and pelvic lymphadenopathy. 2.  Small-moderate volume ascites.         Data reviewed today: I reviewed all medications, new labs and imaging results over the last 24 hours. I personally reviewed no images or EKG's today.  The patient's care was discussed with the Bedside Nurse, Patient, and Patient's Family.

## 2024-09-29 NOTE — PROGRESS NOTES
Renal progress note  CC: ESRD , PD  Assessment and Plan:  84 year old female with hx of ESRD on PD , CAD , Chronic back pain , hx of atrial fib, UC , Gout and Htn , presenting with abd pain and generalized weakness with nausea and poor intakes for 3-4days now. She has minimal UO but has been noticing blood on her briefs, uncledar vaginal or urinary, no stool with blood. PD fluid has been clear no alarms or drain pain except the slight chronic twinge she gets at end of last cycle. Nephrology consulted for ESRD and management of volume /peritonitis     ESRD on PD   Usually 4 cycles over 10hrs and then LF with icodextyrin 2300 FV for cycler and 1 L for Icodextrin  Kt/V barely adequate but makes it  UO low 300-700  No drain pain / some blood in pull up ? If UTI or vaginal  --> PD samples taken after drain icodextrin and send cellcounts and Cx after a 1 Hr dwell for fluid samples; cell count 3 and clear fluid , unlikely peritonitis  Overnight PD ordered for 12 hrs 6 cycles 2.5%  --> follow on renal labs daily  --> ok to continue vanco and zosyn , GPC on blood Cx     HTN   Hypervolemia, appears quite dry as compared to my last visit, edema minimal to none  2.5% with better UF results  Had been hypervolemic when using 1.5% and started on all 2.5% for last 3 wks now  Close to TW now aiming for 76.5-77kg at home  On Spironolactone 25mg , Torsemide 100mg and metoprolol  --> had been off metoprolol , now resumed at 50mg x2 titrate per cardiology  --> UF with PD     Electrolytes  Hypokalemia chronic  On MRA  Follow on labs   On KCL 20meq daily PTA     Hypomagnesemia  Chronic low mag   On mag oxide     Anemia  Hb better  Was 12-13  Possible hemoconc, leucocytosis noted  --> follow on labs     MBD  No binders        GPC + in blood Cx  Abdominal pain   Concerns for peritonitis; fluid clear  Leucocytosis persistent  CAP with LN+ , no renal/bladder abn  Started on vanco and zosyn  --> has worsened back pain and  weakness possibly with bacteremia , will follow on ID recs , may need more spine imaging ; unclear source     CAD  HFpEF 50-55%  Atrial fib with RVR  On Ac continued; Eliquis  Troponin leak down trend  Received metoprolol -- now started on 50mg bid  Stopped dilt inf now  --> continue BB     Hx of PMR on steroids     Chronic back pan  On tramadol     Gout on allopurinol     GERD on pepcid     Thank you for the consultation we will follow  Keri Finn MD  [facility]  [phone]      Subjective  Her back pain and weakness in significantly worse in past 3-4 days then mu last visit 2 wks back  Now with GPC in blood Cx, will need to follow on ID recs  On vanco and zosyn   Peritoneal fluid is clear  Quite uncomfortable with pain despite tramadol    Objective    Vital signs in last 24 hours  Temp:  [97.5  F (36.4  C)-98  F (36.7  C)] 97.7  F (36.5  C)  Pulse:  [] 80  Resp:  [12-23] 16  BP: ()/() 113/82  SpO2:  [91 %-98 %] 94 %  Weight:   [unfilled]    Intake/Output last 3 shifts  No intake/output data recorded.  Intake/Output this shift:  No intake/output data recorded.    Physical Exam  Alert awake, NAD  CV: RRR without murmur or rub  Lung: clear and equal; no extra sounds  Ab: soft and NT; not distended; normal bs  Ext: no edema and well perfused  Skin; no rash    Pertinent Labs   Lab Results   Component Value Date    WBC 18.5 (H) 09/29/2024    HGB 13.9 09/29/2024    HCT 43.1 09/29/2024     (H) 09/29/2024     09/29/2024     Lab Results   Component Value Date    BUN 30.8 (H) 09/29/2024     09/29/2024    CO2 20 (L) 09/29/2024       Lab Results   Component Value Date    ALBUMIN 3.0 (L) 09/29/2024     Lab Results   Component Value Date    PHOS 4.8 (H) 09/28/2024     I reviewed all lab results  Keri Finn MD

## 2024-09-29 NOTE — PLAN OF CARE
"  Problem: Adult Inpatient Plan of Care  Goal: Plan of Care Review  Description: The Plan of Care Review/Shift note should be completed every shift.  The Outcome Evaluation is a brief statement about your assessment that the patient is improving, declining, or no change.  This information will be displayed automatically on your shift  note.  Outcome: Progressing  Flowsheets (Taken 9/29/2024 1615)  Plan of Care Reviewed With:   patient   child  Goal: Patient-Specific Goal (Individualized)  Description: You can add care plan individualizations to a care plan. Examples of Individualization might be:  \"Parent requests to be called daily at 9am for status\", \"I have a hard time hearing out of my right ear\", or \"Do not touch me to wake me up as it startles  me\".  Outcome: Progressing  Goal: Absence of Hospital-Acquired Illness or Injury  Outcome: Progressing  Intervention: Prevent Skin Injury  Recent Flowsheet Documentation  Taken 9/29/2024 1240 by Karen Lopez RN  Body Position:   turned   weight shifting   other (see comments)  Taken 9/29/2024 0831 by Karen Lopez RN  Body Position:   turned   weight shifting   other (see comments)  Goal: Optimal Comfort and Wellbeing  Outcome: Progressing  Goal: Readiness for Transition of Care  Outcome: Progressing     Problem: Risk for Delirium  Goal: Optimal Coping  Outcome: Progressing  Intervention: Optimize Psychosocial Adjustment to Delirium  Recent Flowsheet Documentation  Taken 9/29/2024 1240 by Karen Lopez RN  Supportive Measures: active listening utilized  Taken 9/29/2024 0831 by Karen Lopez RN  Supportive Measures: active listening utilized  Goal: Improved Behavioral Control  Outcome: Progressing  Intervention: Prevent and Manage Agitation  Recent Flowsheet Documentation  Taken 9/29/2024 1240 by Karen Lopez RN  Environment Familiarity/Consistency: (Pt daughter here, asking multiple questions with PD. To re-assure pt with [facility] protocol nursing supervisor Yaima alfaro visit " with pt=  Pt daughter appreciative.) other (see comments)  Taken 9/29/2024 0831 by Karen Lopez RN  Environment Familiarity/Consistency: (Aris Joe is at bedside, and updated with new orders) other (see comments)  Goal: Improved Attention and Thought Clarity  Outcome: Progressing  Goal: Improved Sleep  Outcome: Progressing   Goal Outcome Evaluation:      Plan of Care Reviewed With: patient, child  VSS, A-fib, pt denies pain. Assist 1-2 for cares depending on pt fatigue. Pt tolerated PD as ordered without complications. Pt was deaccessed from PD at 1230. Pt daughter did have multiple questions regarding [facility] PD procedure, writer did acknowledge pt's & pt's questions & concerns. Nursing supervisor also visited pt & pt daughter regarding questions and concerns = pt and pt daughter appreciative and that their questions & concerns were resolved. Writer worked closely with Dr Vincent with frequent updates regarding new orders & plan of care.

## 2024-09-29 NOTE — PLAN OF CARE
Goal Outcome Evaluation:      Plan of Care Reviewed With: patient    Overall Patient Progress: improvingOverall Patient Progress: improving     Pt. Now denies abdominal pain, but did have 2 BM's with olga blood present. Noted that anal skin is very red and bleeding, very tender to touch. Pt. Has been on peritoneal dialysis cycler ovrnight, tolerating well. VSS, continue to monitor.

## 2024-09-29 NOTE — PROGRESS NOTES
Detail Level: Simple OT Ronen     09/29/24 1100   Appointment Info   Signing Clinician's Name / Credentials (OT) Patito Alonso, OTR/L   Living Environment   People in Home child(rosenda), adult   Current Living Arrangements house   Home Accessibility stairs to enter home   Number of Stairs, Main Entrance 1   Stair Railings, Main Entrance none   Living Environment Comments Pt lives w/ adult son, when he is not home her daughter is home w/ her. She has 24/7 supervision. Shower chair, family is installing GBs.   Self-Care   Usual Activity Tolerance fair   Current Activity Tolerance poor   Equipment Currently Used at Home cane, straight;walker, rolling;wheelchair, manual   Activity/Exercise/Self-Care Comment FWW in home (recently familly has been helping her w/ w/c in home), w/c in community. Family present 24/7, recently has been needing A w/ toileting, bathing, mobility at times as pt's tolerance has been impaired. Her kids do her at-home dialysis.   Instrumental Activities of Daily Living (IADL)   IADL Comments Dep IADLs   General Information   Onset of Illness/Injury or Date of Surgery 09/28/24   Referring Physician August Delgado MD   Additional Occupational Profile Info/Pertinent History of Current Problem Mely Alegria is a 84 year old female with PMHx of ESRD on PD, chronic low back pain, possible PMR, Afib on AC, ulcerative colitis, gout who presents with 2 days of acute onset generalized weakness, diffuse abdominal pain, and and nausea with vomiting. Presentation most consistent with secondary bacterial peritonitis.   Existing Precautions/Restrictions fall   Cognitive Status Examination   Orientation Status other (see comments)   Affect/Mental Status (Cognitive) anxious;low arousal/lethargic   Cognitive Status Comments Unable to fully assess today as pt lethargic/low arousal d/t fatigue. Pt able to follow commands, pt's family mainly answering Qs but pt able to answer Qs when asked appropriately.   Sensory   Sensory Quick Adds  sensation intact   Range of Motion Comprehensive   Comment, General Range of Motion ROM WFL   Strength Comprehensive (MMT)   Comment, General Manual Muscle Testing (MMT) Assessment Generalized weakness   Muscle Tone Assessment   Muscle Tone Quick Adds No deficits were identified   Coordination   Upper Extremity Coordination No deficits were identified   Bed Mobility   Bed Mobility sit-supine   Sit-Supine Jasper (Bed Mobility) supervision   Comment (Bed Mobility) Dep to reposition towards HOB   Transfers   Transfers sit-stand transfer   Sit-Stand Transfer   Sit-Stand Jasper (Transfers) minimum assist (75% patient effort)   Assistive Device (Sit-Stand Transfers) walker, front-wheeled   Sit/Stand Transfer Comments Min A-CGA, cues for hand placement   Balance   Balance Comments CGA   Activities of Daily Living   BADL Assessment/Intervention toileting;lower body dressing   Lower Body Dressing Assessment/Training   Comment, (Lower Body Dressing) Per clinical reasoning pt likely Dep at this time, or Max Ax1 d/t pt's impaired standing tolerance and ROM   Toileting   Comment, (Toileting) Per clinical reasoning, pt likely CGA-Min A SPT to commode at this time, Ax1 hygiene/clothing   Clinical Impression   Criteria for Skilled Therapeutic Interventions Met (OT) Yes, treatment indicated   OT Diagnosis Impaired activity tolerance, ADLs, mobility   Influenced by the following impairments Weakness, medical complexity   OT Problem List-Impairments impacting ADL problems related to;activity tolerance impaired;mobility;strength   Assessment of Occupational Performance 3-5 Performance Deficits   Identified Performance Deficits Toileting, transfers, bed mobility, functional endurance/mobility, dressing   Planned Therapy Interventions (OT) ADL retraining;bed mobility training;strengthening;transfer training;progressive activity/exercise;home program guidelines   Clinical Decision Making Complexity (OT) detailed  assessment/moderate complexity   Risk & Benefits of therapy have been explained evaluation/treatment results reviewed;care plan/treatment goals reviewed;patient;daughter   OT Total Evaluation Time   OT Eval, Moderate Complexity Minutes (10193) 10   OT Goals   Therapy Frequency (OT) 5 times/week   OT Predicted Duration/Target Date for Goal Attainment 10/06/24   OT Goals Toilet Transfer/Toileting;Aerobic Activity   OT: Toilet Transfer/Toileting Minimal assist;using adaptive equipment;cleaning and garment management;toilet transfer   OT: Perform aerobic activity with stable cardiovascular response intermittent activity;5 minutes;ambulation   Self-Care/Home Management   Self-Care/Home Mgmt/ADL, Compensatory, Meal Prep Minutes (92834) 10   Symptoms Noted During/After Treatment (Meal Preparation/Planning Training) fatigue   Treatment Detail/Skilled Intervention Eval complete, treatment indicated and initiated. Additional STS w/ SPT ~3' chair<>bed, pt reporting increased fatigue, needing cues for hand placement, impulsive when sitting. Dep to reposition in bed, extra time spent w/ pt and pt's family discussing DC planning - D/t pt's dialysis pt likely DCing home, discussed other DME recs per pt's limited activity tolerance (using w/c or placing chairs for rest breaks, bed rail, commode to keep near bed, and more). Pt's family receptive. Updated pt and pt's family on POC and educated on HC therapy. Session ended w/ pt returned to supine in bed, CNA present.   OT Discharge Planning   OT Plan Progress activity tolerance; SPTs and short-distance transfers to chair/commode, progress to ambulate to BR. EC handout, further discuss DME needs for home.   OT Discharge Recommendation (DC Rec) home with home care occupational therapy   OT Rationale for DC Rec Pt has family present 24/7 who provide supervision /A w/ ADLs. Pt would benefit from HC therapy to maintain/progress strength and activity tolerance after DC. Pt has accessible  home.   OT Brief overview of current status Min A FWW, ~3'   Total Session Time   Timed Code Treatment Minutes 10   Total Session Time (sum of timed and untimed services) 20

## 2024-09-30 NOTE — PHARMACY-VANCOMYCIN DOSING SERVICE
"Pharmacy Vancomycin Note  Date of Service 2024  Patient's  1940   84 year old, female    Indication: Intra-abdominal infection  Day of Therapy: 3  Current vancomycin regimen:  1500 mg IV once  Current vancomycin monitoring method: Trough (Method 2 = manual dose calculation)  Current vancomycin therapeutic monitoring goal: 15-20 mg/L    Current estimated CrCl = Estimated Creatinine Clearance: 7 mL/min (A) (based on SCr of 5.66 mg/dL (H)).    Creatinine for last 3 days  2024: 11:07 AM Creatinine 6.23 mg/dL  2024:  7:56 AM Creatinine 5.67 mg/dL  2024:  7:02 AM Creatinine 5.66 mg/dL    Recent Vancomycin Levels (past 3 days)  2024:  7:02 AM Vancomycin 12.9 ug/mL    Vancomycin IV Administrations (past 72 hours)                     vancomycin (VANCOCIN) 1,500 mg in sodium chloride 0.9 % 250 mL intermittent infusion (mg) 1,500 mg New Bag 24 1229                    Nephrotoxins and other renal medications (From now, onward)      Start     Dose/Rate Route Frequency Ordered Stop    24  vancomycin (VANCOCIN) 1,250 mg in sodium chloride 0.9 % 250 mL intermittent infusion         1,250 mg  over 90 Minutes Intravenous ONCE 24 0957      24 0800  torsemide (DEMADEX) tablet 100 mg        Note to Pharmacy: PTA Sig:Take 100 mg by mouth daily.      100 mg Oral DAILY 24 1502      24 1800  piperacillin-tazobactam (ZOSYN) 3.375 g vial to attach to  mL bag        Note to Pharmacy: For [facility], [facility] and [facility]: For Zosyn-naive patients, use the \"Zosyn initial dose + extended infusion\" order panel.    3.375 g  over 240 Minutes Intravenous EVERY 12 HOURS 24 1448      24 1510  vancomycin place taylor - receiving intermittent dosing         1 each Intravenous SEE ADMIN INSTRUCTIONS 24 1511                 Contrast Orders - past 72 hours (72h ago, onward)      Start     Dose/Rate Route Frequency Stop    24 1000  perflutren lipid microsphere " (DEFINITY) injection SUSP          Intravenous ONCE 09/30/24 0717            Interpretation of levels and current regimen:  Vancomycin level is reflective of subtherapeutic level    Has serum creatinine changed greater than 50% in last 72 hours: No    Renal Function: ESRD on Dialysis    Plan:  Continue 1500 mg IV once  Vancomycin monitoring method: Trough (Method 2 = manual dose calculation)  Vancomycin therapeutic monitoring goal: 15-20 mg/L  Pharmacy will check vancomycin levels as appropriate  before next dialysis session .  Serum creatinine levels will be ordered daily for the first week of therapy and at least twice weekly for subsequent weeks.    Day Pratt, [facility]

## 2024-09-30 NOTE — PROGRESS NOTES
[facility]   [address], [place] [address]   [email or web address]   OFFICE: [phone]     CARDIOLOGY INPATIENT PROGRESS NOTE     Impression and Plan     Assessment/Plan:  Permanent afib   - Cont apixaban 2.5 mg twice daily (age greater than 80 and creatinine greater than 1.5).    - Cont metoprolol, convert to succinate    Acute HFrEF   - Possible tachycardia mediated vs. Stress CM.  Cannot rule out ICM especially given presence of CAC on CT scan   - Discussed options of ischemic testing now vs. Initiating GDMT and re-evaluation with an echocardiogram in 2 months.  Pt prefers to wait on ischemic testing   - Continue spironolactone, metoprolol succinate   - Was previously on losartan but stopped due to hypotension    Elevated troponin   - Mildly elevated troponin with a plateau trend not consistent with ACS.  Likely supply/demand mismatch related to afib with RVR,sepsis, and poor troponin clearance from ESRD    Will continue to follow      Subjective     Mely Alegria is feeling better today.        Review of Systems:  Further review of systems is otherwise negative/noncontributory (based on review of medical record (admission H&P) and 13 point review of systems reviewed. Pertinent positives noted).    Cardiac Diagnostics     ECG: Personally reviewed and interpreted: 9/28/2024  Afib with RVR  Nonspecific T wave    Telemetry (personally reviewed): Afib rate controlled    Most recent:  Echocardiogram (results reviewed): 9/30/2024  Interpretation Summary     1.Left ventricular function is decreased. The ejection fraction is 30-35%  (moderately reduced).  2.Mildly decreased right ventricular systolic function  3.The left atrium is moderately dilated.  4.No hemodynamically significant valvular abnormalities on 2D or color flow  imaging.  Compared to the prior study dated 11/6/2023, the LV EF looks lower and the  pulmonic pressures are estimated  lower.        Medical History  Surgical History Family History Social History   Past Medical History:   Diagnosis Date    Acute midline low back pain without sciatica     Acute renal failure superimposed on chronic kidney disease, unspecified acute renal failure type, unspecified CKD stage (H)     Anemia     Cellulitis of left foot     CHF (congestive heart failure) (H)     Chronic atrial fibrillation (H)     Chronic atrial fibrillation (H)     Chronic back pain     Chronic heart failure, unspecified heart failure type (H)     Dialysis patient (H)     M,W,F    Diaphragmatic hernia without obstruction and without gangrene     End stage renal disease (H)     Gout     Helicobacter pylori gastritis     Hypertension     Hypothyroidism due to acquired atrophy of thyroid     Obesity     Opioid type dependence, episodic (H)     Pure hypercholesterolemia     Ulcer of left foot, limited to breakdown of skin (H)     Ulcerative pancolitis without complication (H)     Uric acid arthropathy     Vitamin D deficiency      Past Surgical History:   Procedure Laterality Date    HC DILATION/CURETTAGE DIAG/THER NON OB      Description: Dilation And Curettage;  Recorded: 01/18/2010;    IR CVC TUNNEL PLACEMENT > 5 YRS OF AGE  10/30/2023    IR CVC TUNNEL PLACEMENT > 5 YRS OF AGE  1/17/2024    IR CVC TUNNEL REMOVAL RIGHT  7/17/2024    LAPAROSCOPIC INSERTION CATHETER PERITONEAL DIALYSIS N/A 4/16/2024    Procedure: LAPAROSCOPIC PLACEMENT OF PERITONEAL DIALYSIS CATHETER WITH;  Surgeon: August Myers MD;  Location: [facility] OR    LAPAROSCOPIC LYSIS ADHESIONS N/A 4/16/2024    Procedure: LYSIS OF ADHESIONS;  Surgeon: August Myers MD;  Location: [facility] OR    LAPAROSCOPIC OMENTECTOMY N/A 4/16/2024    Procedure: OMENTOPEXY;  Surgeon: August Myers MD;  Location: [facility] OR    PICC TRIPLE LUMEN PLACEMENT  10/30/2023    ZZC COLOSTOMY      Description: Colostomy;  Recorded: 11/05/2012;    ZZC PART REMOVAL COLON W ANASTOMOSIS       Description: Partial Colectomy;  Recorded: 11/05/2012;  Comments: with colostomy    ZZC TOTAL HIP ARTHROPLASTY      Description: Total Hip Replacement;  Recorded: 01/18/2010;     Family History   Problem Relation Age of Onset    Diabetes Mother            Social History     Socioeconomic History    Marital status:      Spouse name: Not on file    Number of children: Not on file    Years of education: Not on file    Highest education level: Not on file   Occupational History    Not on file   Tobacco Use    Smoking status: Never    Smokeless tobacco: Never   Vaping Use    Vaping status: Never Used   Substance and Sexual Activity    Alcohol use: Not on file    Drug use: Not on file    Sexual activity: Not on file   Other Topics Concern    Not on file   Social History Narrative    Not on file     Social Determinants of Health     Financial Resource Strain: Low Risk  (12/13/2023)    Financial Resource Strain     Within the past 12 months, have you or your family members you live with been unable to get utilities (heat, electricity) when it was really needed?: No   Food Insecurity: Low Risk  (12/13/2023)    Food Insecurity     Within the past 12 months, did you worry that your food would run out before you got money to buy more?: No     Within the past 12 months, did the food you bought just not last and you didn t have money to get more?: No   Transportation Needs: Low Risk  (12/13/2023)    Transportation Needs     Within the past 12 months, has lack of transportation kept you from medical appointments, getting your medicines, non-medical meetings or appointments, work, or from getting things that you need?: No   Physical Activity: Not on file   Stress: Not on file   Social Connections: Not on file   Interpersonal Safety: Not on file   Housing Stability: Low Risk  (12/13/2023)    Housing Stability     Do you have housing? : Yes     Are you worried about losing your housing?: No             Physical  "Examination   VITALS: BP 99/58 (BP Location: Right arm, Patient Position: Chair)   Pulse 86   Temp 97.3  F (36.3  C) (Oral)   Resp 18   Ht 1.549 m (5' 1\")   Wt 78 kg (171 lb 15.3 oz)   LMP  (LMP Unknown)   SpO2 97%   BMI 32.49 kg/m    BMI: Body mass index is 32.49 kg/m .  Wt Readings from Last 3 Encounters:   09/30/24 78 kg (171 lb 15.3 oz)   08/28/24 78 kg (172 lb)   04/16/24 78 kg (172 lb)       Intake/Output Summary (Last 24 hours) at 9/30/2024 1345  Last data filed at 9/30/2024 0923  Gross per 24 hour   Intake 173 ml   Output 604 ml   Net -431 ml       General: pleasant female. No acute distress.   HENT: external ears normal. Nares patent. Mucous membranes moist.  Neck: No JVD  Lungs: clear to auscultation  COR: irregularly irregular rate and rhythm, No murmurs, rubs, or gallops  Abd: nondistended, BS present  Extrem: No edema            Lab Results Reviewed    Chemistry/lipid CBC Cardiac Enzymes/BNP/TSH/INR   Recent Labs   Lab Test 09/29/24  0756   CHOL 177   HDL 52   LDL 96   TRIG 144     Recent Labs   Lab Test 09/29/24  0756 04/04/23  1504 03/11/22  1453   LDL 96 101* 68     Recent Labs   Lab Test 09/30/24  0702      POTASSIUM 3.9   CHLORIDE 95*   CO2 22   *   BUN 29.9*   CR 5.66*   GFRESTIMATED 7*   SANDIP 9.0     Recent Labs   Lab Test 09/30/24  0702 09/29/24  0756 09/28/24  1107   CR 5.66* 5.67* 6.23*     Recent Labs   Lab Test 10/30/23  0743   A1C 5.0          Recent Labs   Lab Test 09/30/24  0702   WBC 19.6*   HGB 13.3   HCT 41.0           Recent Labs   Lab Test 09/30/24  0702 09/29/24  2354 09/29/24  1151   HGB 13.3 13.0 11.0*    No results for input(s): \"TROPONINI\" in the last 09755 hours.  Recent Labs   Lab Test 09/28/24  1107 10/30/23  1535 04/04/23  1504 03/11/22  1453   BNP  --   --   --  572*   NTBNPI 13,314* 12,726*  --   --    NTBNP  --   --  9,157*  --      Recent Labs   Lab Test 09/28/24  1107   TSH 1.46     No results for input(s): \"INR\" in the last 72000 " hours.        Current Inpatient Scheduled Medications   Scheduled Meds:  Current Facility-Administered Medications   Medication Dose Route Frequency Provider Last Rate Last Admin    apixaban ANTICOAGULANT (ELIQUIS) tablet 2.5 mg  2.5 mg Oral BID August Delgado MD   2.5 mg at 09/30/24 0901    clotrimazole (LOTRIMIN) 1 % cream   Topical BID Michael Vela MD   Given at 09/30/24 1004    famotidine (PEPCID) tablet 20 mg  20 mg Oral Daily Merlene Vincent MD   20 mg at 09/30/24 0901    folic acid (FOLVITE) tablet 1 mg  1 mg Oral Daily August Delgado MD   1 mg at 09/30/24 0901    magnesium oxide (MAG-OX) tablet 400 mg  400 mg Oral Daily Keri Finn MD   400 mg at 09/30/24 0901    metoprolol tartrate (LOPRESSOR) tablet 50 mg  50 mg Oral BID Liam Mcmillan MD   50 mg at 09/30/24 0901    mupirocin (BACTROBAN) 2 % ointment   Topical Daily Mana Banda MD        predniSONE (DELTASONE) tablet 5 mg  5 mg Oral BID August Delgado MD   5 mg at 09/30/24 0901    sodium chloride (PF) 0.9% PF flush 3 mL  3 mL Intracatheter Q8H August Delgado MD   3 mL at 09/30/24 0923    spironolactone (ALDACTONE) tablet 25 mg  25 mg Oral Daily August Delgado MD   25 mg at 09/30/24 0901    torsemide (DEMADEX) tablet 100 mg  100 mg Oral Daily August Delgado MD   100 mg at 09/30/24 0901    traMADol (ULTRAM) tablet 50 mg  50 mg Oral BID August Delgado MD   50 mg at 09/30/24 0901     Continuous Infusions:  Current Facility-Administered Medications   Medication Dose Route Frequency Provider Last Rate Last Admin    dianeal PD LOW calcium-2.5% dex (calcium 2.5 mEq/L) 18,000 mL with heparin (porcine) 500 Units/L PERITONEAL DIALYSATE   Dialysis [facility] Supplied PD Keri Finn MD           No current outpatient medications on file.          Medications Prior to Admission   Prior to Admission medications    Medication Sig Start Date End Date Taking? Authorizing Provider   DEBORAH ANTICOAGULANT 2.5 MG  tablet Take 2.5 mg by mouth 2 times daily 12/18/23  Yes Reported, Patient   famotidine (PEPCID) 20 MG tablet Take 20 mg by mouth 2 times daily.   Yes Unknown, Entered By History   folic acid (FOLVITE) 1 MG tablet Take 1 tablet (1 mg) by mouth daily 12/13/23  Yes Oscar Hopper MD   predniSONE (DELTASONE) 5 MG tablet Take 5 mg by mouth 2 times daily.   Yes Unknown, Entered By History   spironolactone (ALDACTONE) 25 MG tablet Take 25 mg by mouth daily.   Yes Unknown, Entered By History   torsemide (DEMADEX) 100 MG tablet Take 100 mg by mouth daily.   Yes Unknown, Entered By History   traMADol (ULTRAM) 50 MG tablet Take 50 mg by mouth 2 times daily. 3/15/24  Yes Reported, Patient   ACE/ARB/ARNI NOT PRESCRIBED (INTENTIONAL) Please choose reason not prescribed from choices below.    Oscar Hopper MD   BETA BLOCKER NOT PRESCRIBED (INTENTIONAL) Please choose reason not prescribed from choices below.    Oscar Hopper MD Timothy Shih, DO [facility]  Non-invasive Cardiologist  [facility]

## 2024-09-30 NOTE — PROGRESS NOTES
Renal progress note  CC: ESRD , PD  Assessment and Plan:  84 year old female with hx of ESRD on PD , CAD , Chronic back pain , hx of atrial fib, UC , Gout and Htn , presenting with abd pain and generalized weakness with nausea and poor intakes for 3-4days now. She has minimal UO but has been noticing blood on her briefs, uncledar vaginal or urinary, no stool with blood. PD fluid has been clear no alarms or drain pain except the slight chronic twinge she gets at end of last cycle. Nephrology is following for ESRD and co-morbidities management.     ESRD on PD under care of   OP Rx : FV 2300 ccx 4 cycles over 10hrs and then LF with1 L for Icodextrin.  OP Kt/V barely adequate.  Still with residual renal function, UO low 300-700  PD samples taken after drain icodextrin and send cellcounts and Cx after a 1 Hr dwell for fluid samples; cell count 3 and clear fluid , unlikely peritonitis  Inpatient PD Rx 6 cycles over 12 hours using 2.5% dextrose solution. No LF.  Continue the same tonight  I will order exit site care, apply mupirocin 2% cream to PD exit site and change dressing daily    Electrolytes  Hypokalemia chronic. On KCL 20meq daily PTA.Serum potassium is wnl at 3.9 on today labs.  Ok to change from renal to Regular diet.     Chronic Hypomagnesemia-On mag oxide    Metabolic acidosis-Mild. Improved. CO2 is wnl at 22 on today labs.      HTN/Volume status-BP is in normotensive range.Patient appears euvolemic on exam.  2.5% with better UF results  Close to TW now aiming for 76.5-77kg at home  On Spironolactone 25mg , Torsemide 100mg and metoprolol XL 50 mg daily  Recommend no changes  Daily standing weight        Anemia-Hgb is wnl in 13s range.     MBD-  Not on  binders     Change to Regular diet.     Bacteremia-Blood culture from 09/28 grew Strep epidermidis. Repeat blood cx from 09/29 with no growth so far.   Peritoneal fluid cx is negative as well.  H/o recurrent UTI. No urine cx collected this  admission  Leucocytosis persistent and worsening  Initially received vanco and zosyn  ID is following, input appreciated     CAD  HFpEF 50-55%  Atrial fib with RVR  On Ac continued; Eliquis  Troponin leak down trend  Received metoprolol -- now started on 50mg bid  Stopped dilt inf now  --> continue BB     Hx of PMR on steroids    New issue, vaginal vs rectal  bleeding. I reviewed GI and OB/GYN notes. GI signed off. Patient declined TVUS.      Chronic back pan-On tramadol     Gout -controlled, on allopurinol     GERD on pepcid    We will follow     Mana Banda MD  [facility]  [address], [address]  [place] [address]  Phone# [phone]  Fax# [phone]        Subjective  No acute issues overnight.  Patient accompanied by brother, son and daughter at the bedside.  Patient states that sh is feeling better, abdominal pain resolved. Tolerated PD without issues. Appetite remains poor. Patient's family is asking if her diet can be changed to Regular. Had one BM yesterday.  Patient denies: fever, chills, dizziness, sore throat, rhinorrhea, cough, shortness of breath , chest pain, palpitations, orthopnea, nausea, vomiting, changes in bowel habits, dysuria, urinary frequency, urgency, hematuria, rash.  Updated on labs. All questions answered.    Objective    Vital signs in last 24 hours  Temp:  [97.2  F (36.2  C)-98.6  F (37  C)] 97.5  F (36.4  C)  Pulse:  [85-94] 91  Resp:  [16-20] 18  BP: ()/(54-74) 109/74  SpO2:  [96 %-97 %] 96 %  Weight:   [unfilled]    Intake/Output last 3 shifts  I/O last 3 completed shifts:  In: 530 [P.O.:530]  Out: -   Intake/Output this shift:  I/O this shift:  In: 123 [P.O.:120; I.V.:3]  Out: 604 [Other:604]    Physical Exam  Alert awake, NAD  CV: RRR without murmur or rub  Lung: clear and equal; no extra sounds  Ab: soft and NT; not distended; normal bs  Ext: no edema and well perfused  Skin; no rash  Access: PD exit side is intact. Covered with  clean dressing.    Pertinent Labs   Lab Results   Component Value Date    WBC 19.6 (H) 09/30/2024    HGB 13.3 09/30/2024    HCT 41.0 09/30/2024     09/30/2024     09/30/2024     Lab Results   Component Value Date    BUN 29.9 (H) 09/30/2024     09/30/2024    CO2 22 09/30/2024       Lab Results   Component Value Date    ALBUMIN 3.0 (L) 09/29/2024     Lab Results   Component Value Date    PHOS 4.8 (H) 09/28/2024     I reviewed all lab results  Mana Banda MD  [facility]  [address], [address]  [place] [address]  Phone# [phone]  Fax# [phone]

## 2024-09-30 NOTE — PROGRESS NOTES
Infectious Diseases Progress Note  [facility]    Date of visit: 09/30/2024     ASSESSMENT   84-year-old woman with a history of end-stage renal disease, on peritoneal dialysis, A-fib, CHF, ulcerative colitis who is admitted with increasing weakness and abdominal pain.  ID is consulted regarding concerns for peritonitis.    Abdominal pain.  Patient is on peritoneal dialysis, managed by her adult children.  No recent complications.  Reporting clear effluent.  CT scan of the abdomen with some lymphadenopathy, without other signs of occult infection.  No pain around the dialysis catheter.  Fluid drawn from catheter with low cell count (3 wbc). Cultures no growth.  Positive blood culture. Single blood culture on admission with GPC in clusters, identified as S.epidermidis on verigene. Repeat culture on 9/29/2024 no growth so far.  Most likely contaminant.    Fungal  dermatitis.  New area of pain in the lower abdomen with rash  History of urinary tract infection.  Patient makes very little urine, but she does have a history of urinary tract infection.      Active Problems:    Vaginal bleeding    General weakness    Elevated troponin    Rapid atrial fibrillation (H)    Decreased oral intake       PLAN   -Discontinue vancomycin and Zosyn  -Monitor off antibiotics.  -Clotrimazole cream to the lower abdomen  -I am hoping the patient will allow bilateral great toe examination tomorrow    Discussed with the patient,    Kim Leyva MD  [facility]  Direct messaging: [employer] Paging  On-Call ID provider: [phone], option: 9      ===========================================        SUBJECTIVE / INTERVAL HISTORY:   She is overall doing pretty good today.  Denies much abdominal pain.  Report is improved.  No fever, chills, night sweats.  She has dressing on her bilateral great toe and refused examination.  According to the patient and daughter, she has fungal infection of the toes that  "she protects with dressing to prevent pain when she wear shoes or socks.  The current dressing has been in place for at least 10 days according to the patient.      Antibiotics   Vancomycin 9/28-30  Zosyn 9/28-30    Previous:  None      Physical Exam     Temp:  [97.2  F (36.2  C)-98.6  F (37  C)] 97.3  F (36.3  C)  Pulse:  [85-94] 86  Resp:  [16-20] 18  BP: ()/(54-74) 99/58  SpO2:  [96 %-97 %] 97 %    BP 99/58 (BP Location: Right arm, Patient Position: Chair)   Pulse 86   Temp 97.3  F (36.3  C) (Oral)   Resp 18   Ht 1.549 m (5' 1\")   Wt 78 kg (171 lb 15.3 oz)   LMP  (LMP Unknown)   SpO2 97%   BMI 32.49 kg/m      GENERAL:  well-developed, well-nourished, lying in bed in no acute distress.   HENT:  Head is normocephalic, atraumatic. EYES:  Eyes have anicteric sclerae without conjunctival injection   LUNGS:  Clear to auscultation.  CARDIOVASCULAR:  Regular rate and rhythm with no murmurs, gallops or rubs.  ABDOMEN:  soft, tender around umbilicus fold. Peritoneal catheter without surrounding inflammation or tenderness  EXT: left leg edema, chronic   SKIN: Erythema in the lower pannus without skin break. No stigmata of endocarditis.  NEUROLOGIC:  Grossly nonfocal.      Cultures   9/28 blood culture: GPC in clusters  9/29 blood culture: pending     Susceptibility data from last 90 days.  Collected Specimen Info Organism   09/28/24 Peripheral Blood Staphylococcus epidermidis          Pertinent Labs:     Recent Labs   Lab 09/30/24  0702 09/29/24  2354 09/29/24  1151 09/29/24  0756 09/28/24  1107   WBC 19.6*  --   --  18.5* 16.9*   HGB 13.3 13.0 11.0* 13.9 14.7     --   --  173 225       Recent Labs   Lab 09/30/24  0702 09/29/24  0756 09/28/24  1107    135 135   CO2 22 20* 22   BUN 29.9* 30.8* 32.0*   ALBUMIN  --  3.0*  --    ALKPHOS  --  126  --    ALT  --  46  --    AST  --  53*  --        Recent Labs   Lab 09/28/24  1331   CRPI 20.30*           Imaging:     Echocardiogram Complete    Result " Date: 2024  [id number]^REMI^MINOR^[place] [address]  Name: LUKE AGUILERA MRN: [id number] : 1940 Study Date: 2024 07:25 AM Age: 84 yrs Gender: Female Patient Location: [facility] Reason For Study: Atrial Fibrillation Ordering Physician: MINOR KHALIL Performed By:   BSA: 1.8 m2 Height: 61 in Weight: 171 lb HR: 94 ______________________________________________________________________________ Procedure Complete Portable Echo Adult. Definity (NDC #04819-815) given intravenously. No hemodynamically significant valvular abnormalities on 2D or color flow imaging. Compared to the prior study dated 2023, there have been no changes. ______________________________________________________________________________ Interpretation Summary  1.Left ventricular function is decreased. The ejection fraction is 30-35% (moderately reduced). 2.Mildly decreased right ventricular systolic function 3.The left atrium is moderately dilated. 4.No hemodynamically significant valvular abnormalities on 2D or color flow imaging. Compared to the prior study dated 2023, the LV EF looks lower and the pulmonic pressures are estimated lower. ______________________________________________________________________________ I      WMSI = 2.00     % Normal = 0  X - Cannot   0 -                      (2) - Mildly 2 -          Segments  Size Interpret    Hyperkinetic 1 - Normal  Hypokinetic  Hypokinetic  1-2     small                                                    7 -          3-5    moderate 3 - Akinetic 4 -          5 -         6 - Akinetic Dyskinetic   6-14    large              Dyskinetic   Aneurysmal  w/scar       w/scar       15-16   diffuse  Left Ventricle Left ventricular function is decreased. The ejection fraction is 30-35% (moderately reduced). There is normal left ventricular wall thickness. There is borderline concentric left  ventricular hypertrophy. Left ventricular diastolic function is not assessable. No regional wall motion abnormalities noted.  Right Ventricle The right ventricle is normal size. TAPSE is abnormal, which is consistent with abnormal right ventricular systolic function. Mildly decreased right ventricular systolic function.  Atria The left atrium is moderately dilated. Right atrial size is normal. There is no color Doppler evidence of an atrial shunt.  Mitral Valve There is mild mitral annular calcification. There is trace mitral regurgitation. There is no mitral valve stenosis.  Tricuspid Valve The tricuspid valve is not well visualized, but is grossly normal. Right ventricle systolic pressure estimate normal. There is trace to mild tricuspid regurgitation. There is no tricuspid stenosis.  Aortic Valve Aortic valve leaflets appear normal. There is no evidence of aortic stenosis or clinically significant aortic regurgitation. The aortic valve is trileaflet. No aortic regurgitation is present. No aortic stenosis is present.  Pulmonic Valve The pulmonic valve is not well seen, but is grossly normal. This degree of valvular regurgitation is within normal limits. There is no pulmonic valvular stenosis.  Vessels The aorta root is normal. Normal size ascending aorta. IVC diameter <2.1 cm collapsing >50% with sniff suggests a normal RA pressure of 3 mmHg.  Pericardium There is no pericardial effusion.  Rhythm The rhythm was atrial fibrillation.  ______________________________________________________________________________ MMode/2D Measurements & Calculations IVSd: 1.2 cm LVIDd: 4.3 cm LVIDs: 3.6 cm LVPWd: 1.2 cm  FS: 17.4 % LV mass(C)d: 189.4 grams LV mass(C)dI: 107.2 grams/m2 Ao root diam: 2.9 cm asc Aorta Diam: 3.6 cm LVOT diam: 2.0 cm LVOT area: 3.1 cm2 Ao root diam index Ht(cm/m): 1.8 Ao root diam index BSA (cm/m2): 1.6 Asc Ao diam index BSA (cm/m2): 2.0 Asc Ao diam index Ht(cm/m): 2.3 EF Biplane: 33.4 % LA Volume (BP):  64.9 ml  LA Volume Index (BP): 36.7 ml/m2 LA Volume Indexed (AL/bp): 38.7 ml/m2 RV Base: 3.8 cm RWT: 0.56  Doppler Measurements & Calculations MV E max hebr: 68.5 cm/sec MV dec slope: 432.7 cm/sec2 MV dec time: 0.16 sec  Ao V2 max: 96.5 cm/sec Ao max P.0 mmHg Ao V2 mean: 75.9 cm/sec Ao mean PG: 3.0 mmHg Ao V2 VTI: 20.0 cm GABRIEL(I,D): 1.4 cm2 GABRIEL(V,D): 2.1 cm2 LV V1 max P.6 mmHg LV V1 max: 63.6 cm/sec LV V1 VTI: 8.8 cm SV(LVOT): 27.7 ml SI(LVOT): 15.7 ml/m2 PA V2 max: 57.8 cm/sec PA max P.3 mmHg PA acc time: 0.07 sec PI end-d herb: 133.8 cm/sec TR max herb: 264.0 cm/sec TR max P.9 mmHg AV Herb Ratio (DI): 0.66 GABRIEL Index (cm2/m2): 0.78 E/E' av.4 Lateral E/e': 14.6 Medial E/e': 12.1 RV S Herb: 9.5 cm/sec  ______________________________________________________________________________ Report approved by: Brown Waldrop 2024 09:52 AM       US Lower Extremity Venous Duplex Bilateral    Result Date: 2024  EXAM: US LOWER EXTREMITY VENOUS DUPLEX BILATERAL LOCATION: [facility] DATE: 2024 INDICATION: edema legs , on DOAC, make sure no clots COMPARISON: None. TECHNIQUE: Venous Duplex ultrasound of bilateral lower extremities with and without compression, augmentation and duplex. Color flow and spectral Doppler with waveform analysis performed. FINDINGS: Exam includes the common femoral, femoral, popliteal veins as well as segmentally visualized deep calf veins and greater saphenous vein. RIGHT: No deep vein thrombosis. No superficial thrombophlebitis. No popliteal cyst. LEFT: No deep vein thrombosis. No superficial thrombophlebitis. No popliteal cyst.     IMPRESSION: No deep venous thrombosis in the bilateral lower extremities.    US Pelvic Limited    Result Date: 2024  EXAM: US PELVIC LIMITED LOCATION: [facility] DATE: 2024 INDICATION: vag bleeding x 1 month COMPARISON: None. TECHNIQUE: Transabdominal scans were performed.  Endovaginal ultrasound was declined by the patient. FINDINGS: Uterus and ovaries not identified. No significant free fluid.     IMPRESSION: Uterus and ovaries unable to be identified due to technical factors and body habitus.     CT Chest Abdomen Pelvis w/o Contrast    Result Date: 9/28/2024  EXAM: CT CHEST ABDOMEN PELVIS W/O CONTRAST LOCATION: [facility] DATE: 9/28/2024 INDICATION: gen weakness and feels unwell COMPARISON: 11/5/2023 TECHNIQUE: CT scan of the chest, abdomen, and pelvis was performed without IV contrast. Multiplanar reformats were obtained. Dose reduction techniques were used. CONTRAST: None. FINDINGS: Mild motion artifact. LUNGS AND PLEURA: Mild emphysema. Stable mild diffuse bronchial wall thickening. Elevated right hemidiaphragm. Right basilar atelectasis. No pleural effusion. Calcified granuloma. A few stable benign solid pulmonary nodules with the largest measuring 0.3  cm in the left upper lobe, image 81:4. MEDIASTINUM/AXILLAE: Stable enlarged multinodular thyroid. No lymphadenopathy. Trace air in the main pulmonary artery and right subclavian vein are most likely secondary to intravenous line placement. CORONARY ARTERY CALCIFICATION: Moderate. HEPATOBILIARY: Cholelithiasis. PANCREAS: Normal. SPLEEN: Normal. ADRENAL GLANDS: Normal. KIDNEYS/BLADDER: A simple cyst midpole right kidney, no follow-up required. Bilateral renal atrophy. BOWEL: No obstruction or inflammatory change. Appendix not visualized. LYMPH NODES: Increasing retroperitoneal lymphadenopathy with the largest being a left para-aortic node measuring 1.4 cm in short axis, previously 0.8 cm, image 183:3. A 2.6 x 1.6 cm left external iliac node, previously not visualized, image 224:3. A 2.4 x 1.7 cm right external iliac node, previously 1.0 x 0.6 cm, image 226:3. VASCULATURE: Moderately severe atherosclerosis. Stable ectasia of the abdominal aorta. PELVIC ORGANS: Peritoneal dialysis catheter. Small-moderate  volume ascites. MUSCULOSKELETAL: Left hip arthroplasty. Degenerative changes. Severe osseous demineralization.     IMPRESSION: 1.  Increasing retroperitoneal and pelvic lymphadenopathy. 2.  Small-moderate volume ascites.         Data reviewed today: I reviewed all medications, new labs and imaging results over the last 24 hours. I personally reviewed no images or EKG's today.  The patient's care was discussed with the Bedside Nurse, Patient, and Patient's Family.

## 2024-09-30 NOTE — PROGRESS NOTES
"  GASTROENTEROLOGY PROGRESS NOTE     SUBJECTIVE   [facility] was consulted for further evaluation of possible rectal bleeding.  The patient declined rectal exam.  Patient was examined by our gynecology colleagues earlier today and was noted to have vaginal bleeding.    The patient denies having any abdominal pain.  She typically passes 2 stools per day.  She has not had trouble with diarrhea nor has she had difficulties with nausea or vomiting.  Hemoglobin is going up.     OBJECTIVE     Vitals Blood pressure 99/58, pulse 86, temperature 97.3  F (36.3  C), temperature source Oral, resp. rate 18, height 1.549 m (5' 1\"), weight 78 kg (171 lb 15.3 oz), SpO2 97%, not currently breastfeeding.          Physical Exam  General: awake, alert, responds appropriately, family at bedside   Cardiovascular: S1S2, no edema   Chest: lungs are clear    Abdomen: +bs, soft, not tender   Neurologic: grossly intact        LABORATORY    ELECTROLYTE PANEL   Recent Labs   Lab 09/30/24  0702 09/29/24  0756 09/28/24  1107    135 135   POTASSIUM 3.9 4.5 4.8   CHLORIDE 95* 96* 92*   CO2 22 20* 22   * 147* 131*   CR 5.66* 5.67* 6.23*   BUN 29.9* 30.8* 32.0*      HEMATOLOGY PANEL   Recent Labs   Lab 09/30/24  0702 09/29/24  2354 09/29/24  1151 09/29/24  0756 09/28/24  1107   HGB 13.3 13.0 11.0* 13.9 14.7     --   --  102* 100   WBC 19.6*  --   --  18.5* 16.9*     --   --  173 225      LIVER AND PANCREAS PANEL   Recent Labs   Lab 09/29/24  0756   AST 53*   ALT 46   ALKPHOS 126   BILITOTAL 0.7     IMAGING STUDIES    EXAM: CT CHEST ABDOMEN PELVIS W/O CONTRAST  LOCATION: [facility]  DATE: 9/28/2024     INDICATION: gen weakness and feels unwell  COMPARISON: 11/5/2023  TECHNIQUE: CT scan of the chest, abdomen, and pelvis was performed without IV contrast. Multiplanar reformats were obtained. Dose reduction techniques were used.   CONTRAST: None.     FINDINGS: Mild motion artifact.     LUNGS AND PLEURA: Mild " emphysema. Stable mild diffuse bronchial wall thickening. Elevated right hemidiaphragm. Right basilar atelectasis. No pleural effusion. Calcified granuloma. A few stable benign solid pulmonary nodules with the largest measuring 0.3   cm in the left upper lobe, image 81:4.     MEDIASTINUM/AXILLAE: Stable enlarged multinodular thyroid. No lymphadenopathy. Trace air in the main pulmonary artery and right subclavian vein are most likely secondary to intravenous line placement.     CORONARY ARTERY CALCIFICATION: Moderate.     HEPATOBILIARY: Cholelithiasis.     PANCREAS: Normal.     SPLEEN: Normal.     ADRENAL GLANDS: Normal.     KIDNEYS/BLADDER: A simple cyst midpole right kidney, no follow-up required. Bilateral renal atrophy.     BOWEL: No obstruction or inflammatory change. Appendix not visualized.     LYMPH NODES: Increasing retroperitoneal lymphadenopathy with the largest being a left para-aortic node measuring 1.4 cm in short axis, previously 0.8 cm, image 183:3. A 2.6 x 1.6 cm left external iliac node, previously not visualized, image 224:3. A 2.4   x 1.7 cm right external iliac node, previously 1.0 x 0.6 cm, image 226:3.     VASCULATURE: Moderately severe atherosclerosis. Stable ectasia of the abdominal aorta.     PELVIC ORGANS: Peritoneal dialysis catheter. Small-moderate volume ascites.     MUSCULOSKELETAL: Left hip arthroplasty. Degenerative changes. Severe osseous demineralization.                                                                      IMPRESSION:  1.  Increasing retroperitoneal and pelvic lymphadenopathy.  2.  Small-moderate volume ascites.    I have reviewed the current diagnostic and laboratory tests.              IMPRESSION   Concern for Bacterial peritonitis with PD catheter/intra-abdominal infection-Per infectious disease and primary team (monitoring off antibiotics at this time per ID)  Vaginal bleeding- further evaluation per primary team/gynecology    Possible history of Ulcerative  colitis--patient is not currently reporting any lower GI symptoms.  She is not on any maintenance medication for ulcerative colitis.  CT chest,abdomen and pelvis without contrast showed increasing retroperitoneal and pelvic lymphadenopathy and small-moderate volume of ascites, but normal bowel. At this point, the patient declines endoscopic evaluation and rectal exam.     End-stage renal disease on maintenance peritoneal dialysis  Possible polymyalgia rheumatica on prednisone  Chronic low back pain on opioids         PLAN   No additional tests plan from GI perspective.  UA showing vaginal bleeding per primary/gynecology.  [facility] will sign off. Please call with questions/concerns.     Total time spent on this encounter=35minutes        Jeni Gray PA-C  Thank you for the opportunity to participate in the care of this patient.   Please feel free to call me with any questions or concerns.  Phone number [phone].

## 2024-09-30 NOTE — TREATMENT PLAN
RCAT Treatment Plan    Patient Score: 7  Patient Acuity: 4    Clinical Indication for Therapy: COPD    Therapy Ordered: Continue with current therapy    Assessment Summary: Pt currently on 4 lpm tolerating well sat's at upper 90's no current chest x-ray,breath sound was coarse RT will assesses  the pt again after 72 hrs .    Sallie Lawrence, RT  9/30/2024

## 2024-09-30 NOTE — PROCEDURES
[facility]PD cycler set up per orders of Dr. Banda    Total volume (ml) : 13,800    Therapy time:12 hrs     Fill volume (ml): 2300    Cycles: 6    Dextrose 2.5% volume (ml): 14,000    Heparin added to bags by pharmacy.     PD catheter visualized. Machine ready to connect. Report given to  MEGAN Becker RN.    NIKOS Suggs RN [facility]

## 2024-09-30 NOTE — PLAN OF CARE
Goal Outcome Evaluation:    Problem: Adult Inpatient Plan of Care  Goal: Absence of Hospital-Acquired Illness or Injury  Outcome: Progressing  Intervention: Prevent Skin Injury  Recent Flowsheet Documentation  Taken 9/29/2024 2058 by Jacqueline Mayer RN  Body Position: log-rolled  Taken 9/29/2024 1639 by Jacqueline Mayer RN  Body Position: sitting up in bed     Problem: Risk for Delirium  Goal: Improved Sleep  Outcome: Progressing     Problem: Infection  Goal: Absence of Infection Signs and Symptoms  Outcome: Progressing       Plan of Care Reviewed With: patient    Overall Patient Progress: improving Overall Patient Progress: improving    VSS on RA. Tele is a fib, which is chronic for this patient. She has been intermittently confused on this shift. Family had questions for RN about medications that the pt was receiving, RN answered questions. Pt reports chronic back pain, rating it about a 5-7 on this shift. Scheduled Tylenol given, PRN Tylenol refused.

## 2024-09-30 NOTE — PLAN OF CARE
Problem: Dysrhythmia  Goal: Normalized Cardiac Rhythm  Outcome: Progressing     Problem: Risk for Delirium  Goal: Improved Attention and Thought Clarity  Outcome: Not Progressing     Vaginal swab completed this morning. Bleeding from vagina found, although no signs of infection. OBGYN discussed causes of vaginal bleeding with patient. Patient may consider pelvic US. GI consulted and signed off (per note patient refused rectal exam). Patient initially had (+) blood culture on day of admission that was believed to be contaminated per ID. No growth from culture drawn from peritoneal dialysis catheter. ID discontinued IV Zosyn and Vanco. WBC elevated. Will continue to monitor off antibiotics.    Patient is disoriented to date only. She is confused and having visual hallucinations (e.g. stating she is seeing bugs on the curtain). PD completed at 0900. VSS. A-fib with controlled rate on tele. Assist of 2 w/walker and gait belt.

## 2024-09-30 NOTE — PROGRESS NOTES
[facility]    Medicine Progress Note - Hospitalist Service    Date of Admission:  9/28/2024    Assessment & Plan   84 year old female with PMHx of ESRD on PD, chronic low back pain, possible PMR, Afib on AC, ulcerative colitis, gout who presents with 2 days of acute onset generalized weakness, diffuse abdominal pain, and and nausea with vomiting. She was admitted for concern for possible bacterial peritonitis.She also notes over past week SUAREZ and intermit vaginal bleeding        1.Sepsis there was concern for possible intra-abdominal infection, present on admission  -Secondary bacterial peritonitis with PD catheter -however negative cx   Presented with 2 days of abdominal pain, N/V, weakness. Found to be tachycardic with RVR, WBC 16.9. Diffuse abdominal tenderness on exam with PD catheter in place. CT C/A/P without contrast showed RP and pelvic lymphadenopathy. Clinical presentation most consistent with infectious peritonitis. UTI is also possible, patient unable to give sample initially as she makes more limited urine, though seems slightly less likely with normal appearing bladder/kidneys on non-con CT.  - ID consulted and appreciated  - was on Vanc/Zosyn iv(9/30--ID stopping these)  - Bcx 9/28: staph epi--?contaminant , resent blood cx 9/29  - PD fluid sent --so far cx negative   -pain is improved  -WBC rising 16.9-->18-->19.6    2.Leukocytosis  -believed to be infectious with above   -however, with this rising , CT showing lymphadenopathy , on vanco, zosyn(9/30--ID stopping these), abd pain better will send smear and LDH    3.CT finding-->Increasing retroperitoneal and pelvic lymphadenopathy.   -this could be related to reaction to PD, infection, but with vag bleeding I am also concerned possible malignancy   -send smear, LDH  -gyn working up vaginal bleeding as much as pt will allow  -address PD issues      4.Lactic acidosis  -Anion-gap metabolic acidosis  Lactic acid 4.8 on admission  "in setting of sepsis  - S/p 500cc, down to 3.3, monitor  - Use IVF judiciously with ESRD     5.ESRD on PD  -Chronic HFpEF without acute exacerbation  -Elevated NTpro-BNP due to hypervolemia  PD has been going well at home. Started 5/2024. Follows with Dr. Finn.  - Renal consulted  - PD per Renal  -Continue home torsemide 100mg daily  - Continue home spironolactone 25mg daily  -seems to be tolerating PD here ok     6.Permanent atrial fibrillation on chronic AC  -Rapid ventricular response  -170 on admission. Not on rate controlling agents as OP. Chart mentions intentionally not using BB. But reviewed and it was stopped for hypotension some years ago, not an acute issue here. Patient has no recollection o having issues with metoprolol.  - Stopped dilt gtt  - Start metoprolol 25mg q6h, titrate now to 50 mg bid   - Continue home Eliquis  - TTE 11/2023: EF 50-55%  -she notes SUAREZ this past week which may have been RVR-cards seeing      7.Non-ischemic myocardial injury due to sepsis  HS tropoin 128 down to 118 on re-check. The patient denies CP, SOB, jaw/arm pain. Is nauseous with abdominal pain, but clinical picture fits abdominal infection more than ACS.  - EKG  Afib with RVR; narrow complex; TWI in II and aVF, no ST segment depression or elevation  -cards seeing      8.R>L chronic LE edema  In setting of ESRD. Patient reports chronic and stable. Has had multiple joint surgeries on the right leg. On chronic AC.  -check ultrasound to be sure not clotting on DOAC--negative     9.Blood in briefs--she is fairly sure this is vaginal  Patient reports 1 month of noticing consistent pink fluid in briefs, sometimes darker red. Doesn't make much urine.   - Pelvic US not able to visualize well  -GYN consult did see and did examine today when she agreed and notes today blood in vaginal vault--rec transvag ultrasound, she declined  -at this time I agree with gyn on differential-->\"atrophy, vulvar dermatologic conditions, " "cervical or uterine cancer.\"  -however, pt declining work up     10.Possible polymyalgia rheumatica  Mentioned in PCP notes.  - Continue home prednisone 5mg PO BID  - Consider stress dose steroids if develops hypotension, but hypertensive on admission     11.Chronic low back pain  -Chronic opioid use  Has been a long-term issue affecting mobility.  - Continue prednisone as above  - Continue home tramadol 50mg BID     12.Gout  Was on febuxostat in the past. No acute flares right now.  - Continue home prednisone as above     13.Ulcerative colitis  Noted in prior PCP notes, but stable.  - Chemical DVT ppx  -there was concern yesterday possible rectal bleeding too--gi consulted and she declined rectal exam      14.GERD  - Continue home famotidine     15.Code status  DNR/DNI.      --Updated daughter in room at 1400          Diet: Snacks/Supplements Adult: Nepro Oral Supplement; With Meals  Regular Diet Adult    DVT Prophylaxis: DOAC  Lerma Catheter: Not present  Lines: PRESENT             Cardiac Monitoring: ACTIVE order. Indication: Tachyarrhythmias, acute (48 hours)  Code Status: No CPR- Do NOT Intubate      Clinically Significant Risk Factors             # Anion Gap Metabolic Acidosis: Highest Anion Gap = 19 mmol/L in last 2 days, will monitor and treat as appropriate  # Hypoalbuminemia: Lowest albumin = 3 g/dL at 9/29/2024  7:56 AM, will monitor as appropriate     # Hypertension: Noted on problem list  # Chronic heart failure with reduced ejection fraction: last echo with EF <40%          # Obesity: Estimated body mass index is 32.49 kg/m  as calculated from the following:    Height as of this encounter: 1.549 m (5' 1\").    Weight as of this encounter: 78 kg (171 lb 15.3 oz)., PRESENT ON ADMISSION     # Financial/Environmental Concerns: none         Disposition Plan     Medically Ready for Discharge: Anticipated in 2-4 Days             Merlene Vincent MD  Hospitalist Service  [facility]  Securely message with Mau (more info)  Text page via [email or web address] Paging/Directory   ______________________________________________________________________    Interval History   She denied abd pain  She told me this am she still would not allow rectal exam and that her refusal to parts of the exam have to do with how much she has been thru in past with UC  Eating ok  Tolerating PD ok      Physical Exam   Vital Signs: Temp: 97.3  F (36.3  C) Temp src: Oral BP: 99/58 Pulse: 86   Resp: 18 SpO2: 97 % O2 Device: None (Room air)    Weight: 171 lbs 15.34 oz    Constitutional: awake, alert, cooperative, and no apparent distress  Eyes: sclera clear  Respiratory: no increased work of breathing, good air exchange, no retractions, and clear to auscultation  Cardiovascular: regular rate and rhythm and normal S1 and S2  GI: normal bowel sounds, soft, non-distended, pd cath, and non-tender  Skin: no bruising or bleeding  Musculoskeletal: no lower extremity pitting edema present  Neurologic: Mental Status Exam:  Level of Alertness:   awake  Neuropsychiatric: General: normal, calm, and normal eye contact    Medical Decision Making       50 MINUTES SPENT BY ME on the date of service doing chart review, history, exam, documentation & further activities per the note.      Data     I have personally reviewed the following data over the past 24 hrs:    19.6 (H)  \   13.3   / 162     136 95 (L) 29.9 (H) /  140 (H)   3.9 22 5.66 (H) \       Imaging results reviewed over the past 24 hrs:   Recent Results (from the past 24 hour(s))   US Lower Extremity Venous Duplex Bilateral    Narrative    EXAM: US LOWER EXTREMITY VENOUS DUPLEX BILATERAL  LOCATION: [facility]  DATE: 9/29/2024    INDICATION: edema legs , on DOAC, make sure no clots  COMPARISON: None.  TECHNIQUE: Venous Duplex ultrasound of bilateral lower extremities with and without compression, augmentation and duplex. Color flow and spectral Doppler with  waveform analysis performed.    FINDINGS: Exam includes the common femoral, femoral, popliteal veins as well as segmentally visualized deep calf veins and greater saphenous vein.     RIGHT: No deep vein thrombosis. No superficial thrombophlebitis. No popliteal cyst.    LEFT: No deep vein thrombosis. No superficial thrombophlebitis. No popliteal cyst.      Impression    IMPRESSION:  No deep venous thrombosis in the bilateral lower extremities.   US Pelvic Limited    Narrative    EXAM: US PELVIC LIMITED  LOCATION: [facility]  DATE: 2024    INDICATION: vag bleeding x 1 month  COMPARISON: None.  TECHNIQUE: Transabdominal scans were performed. Endovaginal ultrasound was declined by the patient.    FINDINGS:    Uterus and ovaries not identified. No significant free fluid.      Impression    IMPRESSION:  Uterus and ovaries unable to be identified due to technical factors and body habitus.     Echocardiogram Complete   Result Value    LVEF  30-35% (moderately reduced)    Narrative    [id number]  [id number]  [id number]  [id number]^REMI^MINOR^[place] [address]     Name: LUKE AGUILERA  MRN: [id number]  : 1940  Study Date: 2024 07:25 AM  Age: 84 yrs  Gender: Female  Patient Location: [facility]  Reason For Study: Atrial Fibrillation  Ordering Physician: MINOR KHALIL  Performed By: CORINA     BSA: 1.8 m2  Height: 61 in  Weight: 171 lb  HR: 94  ______________________________________________________________________________  Procedure  Complete Portable Echo Adult. Definity (NDC #72589-820) given intravenously.  No hemodynamically significant valvular abnormalities on 2D or color flow  imaging. Compared to the prior study dated 2023, there have been no  changes.  ______________________________________________________________________________  Interpretation Summary     1.Left ventricular function is decreased. The ejection  fraction is 30-35%  (moderately reduced).  2.Mildly decreased right ventricular systolic function  3.The left atrium is moderately dilated.  4.No hemodynamically significant valvular abnormalities on 2D or color flow  imaging.  Compared to the prior study dated 11/6/2023, the LV EF looks lower and the  pulmonic pressures are estimated lower.  ______________________________________________________________________________  I      WMSI = 2.00     % Normal = 0     X - Cannot   0 -                      (2) - Mildly 2 -          Segments  Size  Interpret    Hyperkinetic 1 - Normal  Hypokinetic  Hypokinetic  1-2     small                                                     7 -          3-5      moderate  3 - Akinetic 4 -          5 -         6 - Akinetic Dyskinetic   6-14    large               Dyskinetic   Aneurysmal  w/scar       w/scar       15-16   diffuse     Left Ventricle  Left ventricular function is decreased. The ejection fraction is 30-35%  (moderately reduced). There is normal left ventricular wall thickness. There  is borderline concentric left ventricular hypertrophy. Left ventricular  diastolic function is not assessable. No regional wall motion abnormalities  noted.     Right Ventricle  The right ventricle is normal size. TAPSE is abnormal, which is consistent  with abnormal right ventricular systolic function. Mildly decreased right  ventricular systolic function.     Atria  The left atrium is moderately dilated. Right atrial size is normal. There is  no color Doppler evidence of an atrial shunt.     Mitral Valve  There is mild mitral annular calcification. There is trace mitral  regurgitation. There is no mitral valve stenosis.     Tricuspid Valve  The tricuspid valve is not well visualized, but is grossly normal. Right  ventricle systolic pressure estimate normal. There is trace to mild tricuspid  regurgitation. There is no tricuspid stenosis.     Aortic Valve  Aortic valve leaflets appear normal. There  is no evidence of aortic stenosis  or clinically significant aortic regurgitation. The aortic valve is  trileaflet. No aortic regurgitation is present. No aortic stenosis is present.     Pulmonic Valve  The pulmonic valve is not well seen, but is grossly normal. This degree of  valvular regurgitation is within normal limits. There is no pulmonic valvular  stenosis.     Vessels  The aorta root is normal. Normal size ascending aorta. IVC diameter <2.1 cm  collapsing >50% with sniff suggests a normal RA pressure of 3 mmHg.     Pericardium  There is no pericardial effusion.     Rhythm  The rhythm was atrial fibrillation.     ______________________________________________________________________________  MMode/2D Measurements & Calculations  IVSd: 1.2 cm  LVIDd: 4.3 cm  LVIDs: 3.6 cm  LVPWd: 1.2 cm     FS: 17.4 %  LV mass(C)d: 189.4 grams  LV mass(C)dI: 107.2 grams/m2  Ao root diam: 2.9 cm  asc Aorta Diam: 3.6 cm  LVOT diam: 2.0 cm  LVOT area: 3.1 cm2  Ao root diam index Ht(cm/m): 1.8  Ao root diam index BSA (cm/m2): 1.6  Asc Ao diam index BSA (cm/m2): 2.0  Asc Ao diam index Ht(cm/m): 2.3  EF Biplane: 33.4 %  LA Volume (BP): 64.9 ml     LA Volume Index (BP): 36.7 ml/m2  LA Volume Indexed (AL/bp): 38.7 ml/m2  RV Base: 3.8 cm  RWT: 0.56     Doppler Measurements & Calculations  MV E max herb: 68.5 cm/sec  MV dec slope: 432.7 cm/sec2  MV dec time: 0.16 sec     Ao V2 max: 96.5 cm/sec  Ao max P.0 mmHg  Ao V2 mean: 75.9 cm/sec  Ao mean PG: 3.0 mmHg  Ao V2 VTI: 20.0 cm  GABRIEL(I,D): 1.4 cm2  GABRIEL(V,D): 2.1 cm2  LV V1 max P.6 mmHg  LV V1 max: 63.6 cm/sec  LV V1 VTI: 8.8 cm  SV(LVOT): 27.7 ml  SI(LVOT): 15.7 ml/m2  PA V2 max: 57.8 cm/sec  PA max P.3 mmHg  PA acc time: 0.07 sec  PI end-d herb: 133.8 cm/sec  TR max herb: 264.0 cm/sec  TR max P.9 mmHg  AV Herb Ratio (DI): 0.66  GABRIEL Index (cm2/m2): 0.78  E/E' av.4  Lateral E/e': 14.6  Medial E/e': 12.1  RV S Herb: 9.5 cm/sec      ______________________________________________________________________________  Report approved by: Brown Waldrop 09/30/2024 09:52 AM

## 2024-09-30 NOTE — PROGRESS NOTES
"Care Management Follow Up    Length of Stay (days): 2    Expected Discharge Date: 10/01/2024     Concerns to be Addressed:     Care progression - discharge planning  Patient plan of care discussed at interdisciplinary rounds: Yes    Anticipated Discharge Disposition:  PT rec Transitional care and OT rec home with home care    Anticipated Discharge Services:  PT rec Transitional care and OT rec home with home care  Anticipated Discharge DME:  NA    Patient/family educated on Medicare website which has current facility and service quality ratings:  NA  Education Provided on the Discharge Plan:  Yes per team  Patient/Family in Agreement with the Plan:  Yes    Referrals Placed by CM/SW:  Yes, home care  Private pay costs discussed: Not applicable    Discussed  Partnership in Safe Discharge Planning  document with patient/family: Yes: Tatyana durham    Handoff Completed: No, handoff not indicated or clinically appropriate    Additional Information:  Met with patient and her sonTatyana, at bedside to discuss the discharge rec: PT rec Transitional care and OT rec home with home care.  Patient states, \"I want to go home.\"  They both agreed to home with home care for RN/PT/OT  Referral sent    Per provider, Dr. Vincent, patient has peritoneal dialysis. Will be here for days.    Social Hx:  Assessment: Follow. From home w/family; 24 hr care; home PD  Last Note: 09/30/24  Plan: Home with home care for RN/PT/OT  Needs: NA  Hand off sent: NA  Transport: Family    Next Steps: RNCM to follow for medical progression, recommendations, and final discharge plan.     Jennifer Westfall RN     "

## 2024-09-30 NOTE — PROGRESS NOTES
"Gynecology Note    Assessment & Plan      84-year-old woman with a history of end-stage renal disease, on peritoneal dialysis, A-fib, CHF, ulcerative colitis who is admitted with vaginal bleeding, increasing weakness, abdominal pain. GYN had been consulted regarding vaginal bleeding.      Doing well  Blood in her underwear currently. Two dime sized areas of dark red/brown blood visualized.   Her daughter and son are at the bedside and verbalize that she \"looks better today after antibiotics.\"    Active Problems:    Vaginal bleeding    General weakness    Elevated troponin    Rapid atrial fibrillation (H)    Decreased oral intake      Plan:  -Speculum exam and wet prep to evaluate source of bleeding and visualize vagina and cervix  -Transvaginal ultrasound if patient allows. Discussed possible option of TVUS with sedation with a radiology consult.   -Discussed common causes of vaginal/vulvar bleeding including atrophy, vulvar dermatologic conditions, cervical or uterine cancer.      -Currently declines TVUS and radiology consult. She will notify nursing staff if she desires procedure. DNR status discussed.  -Additional problems being followed by individual medical specialists       Jessica Moritz, APRN CNP Kathryn Hanson Goralski, MD FACOG  [facility]  [phone]      Interval History   Stable.  Doing well.  Generally feels improved overnight. She does have blood in her brief this morning. Previously declined rectal exam, vaginal exam, vaginal ultrasound, and vaginal swab. Today, she is open to a speculum exam and vaginal swab. She declines any sexual partners. Patient underwent limited transabdominal ultrasound yesterday.  Continues to decline transvaginal.  States even if cancer would likely not want treatment.      Physical Exam   Temp: 97.5  F (36.4  C) Temp src: Oral BP: 109/74 Pulse: 91   Resp: 18 SpO2: 96 % O2 Device: None (Room air)    Vitals:    09/28/24 1022 09/29/24 0131 09/30/24 0509 "   Weight: 77.6 kg (171 lb) 78 kg (171 lb 15.3 oz) 78 kg (171 lb 15.3 oz)     Vital Signs with Ranges  Temp:  [97.2  F (36.2  C)-98.6  F (37  C)] 97.5  F (36.4  C)  Pulse:  [85-94] 91  Resp:  [16-20] 18  BP: ()/(54-74) 109/74  SpO2:  [96 %-97 %] 96 %  I/O last 3 completed shifts:  In: 530 [P.O.:530]  Out: -     Genitounirinary:   External Genitalia:  General appearance; normal, Abnormal findings: atrophic vaginal introitus, blood on labia   Vagina:  Abnormal findings: blood in vaginal vault, both red and newer appearing along with brown and older appearing.   Cervix:  unable to visualize  Anus/Perineum:  limited view, small amount of stool around anus, no apparent blood     Small Mary speculum was inserted with mild discomfort at introitus. After two attempts at visualization, patient requested the exam to be stopped. Exam was immediately discontinued. She consented to the wet prep swab, and it was performed without difficulty. Cervix was unable to be visualized. Blood was seen in vaginal vault.     Medications   Current Facility-Administered Medications   Medication Dose Route Frequency Provider Last Rate Last Admin    dianeal PD LOW calcium-2.5% dex (calcium 2.5 mEq/L) 18,000 mL with heparin (porcine) 500 Units/L PERITONEAL DIALYSATE   Dialysis [facility] Supplied PD Keri Finn MD          Current Facility-Administered Medications   Medication Dose Route Frequency Provider Last Rate Last Admin    apixaban ANTICOAGULANT (ELIQUIS) tablet 2.5 mg  2.5 mg Oral BID August Delgado MD   2.5 mg at 09/30/24 0901    clotrimazole (LOTRIMIN) 1 % cream   Topical BID Michael Vela MD   Given at 09/30/24 1004    famotidine (PEPCID) tablet 20 mg  20 mg Oral Daily Merlene Vincent MD   20 mg at 09/30/24 0901    folic acid (FOLVITE) tablet 1 mg  1 mg Oral Daily August Delgado MD   1 mg at 09/30/24 0901    magnesium oxide (MAG-OX) tablet 400 mg  400 mg Oral Daily Keri Finn MD   400 mg at 09/30/24 0901     metoprolol tartrate (LOPRESSOR) tablet 50 mg  50 mg Oral BID Liam Mcmillan MD   50 mg at 09/30/24 0901    piperacillin-tazobactam (ZOSYN) 3.375 g vial to attach to  mL bag  3.375 g Intravenous Q12H August Delgado MD   3.375 g at 09/30/24 0900    predniSONE (DELTASONE) tablet 5 mg  5 mg Oral BID August Delgado MD   5 mg at 09/30/24 0901    sodium chloride (PF) 0.9% PF flush 3 mL  3 mL Intracatheter Q8H August Delgado MD   3 mL at 09/30/24 0923    spironolactone (ALDACTONE) tablet 25 mg  25 mg Oral Daily August Delgado MD   25 mg at 09/30/24 0901    torsemide (DEMADEX) tablet 100 mg  100 mg Oral Daily August Delgado MD   100 mg at 09/30/24 0901    traMADol (ULTRAM) tablet 50 mg  50 mg Oral BID August Delgado MD   50 mg at 09/30/24 0901    vancomycin (VANCOCIN) 1,500 mg in sodium chloride 0.9 % 250 mL intermittent infusion  1,500 mg Intravenous Once Merlene Vincent MD        vancomycin place taylor - receiving intermittent dosing  1 each Intravenous See Admin Instructions August Delgado MD           Data   Wet prep collected, negative    Results for orders placed or performed during the hospital encounter of 09/28/24   CT Chest Abdomen Pelvis w/o Contrast    Impression    IMPRESSION:  1.  Increasing retroperitoneal and pelvic lymphadenopathy.  2.  Small-moderate volume ascites.   US Lower Extremity Venous Duplex Bilateral    Impression    IMPRESSION:  No deep venous thrombosis in the bilateral lower extremities.   US Pelvic Limited    Impression    IMPRESSION:  Uterus and ovaries unable to be identified due to technical factors and body habitus.     Echocardiogram Complete   Result Value Ref Range    LVEF  30-35% (moderately reduced)

## 2024-09-30 NOTE — PLAN OF CARE
Goal Outcome Evaluation:      Plan of Care Reviewed With: patient    Overall Patient Progress: improvingOverall Patient Progress: improving     Pt. Denied pain, abdominal discomfort, dyspnea, or dizziness. Has been more confused, not always knowing situation and thought a few times she was home. sergey JEFFERY on tele with rate in the 90's. Continue to monitor.

## 2024-10-01 NOTE — PROCEDURES
[facility]PD cycler set up per orders of Dr. Banda     Total volume (ml) : 13,800     Therapy time:12 hrs                Fill volume (ml): 2300     Cycles: 6     Dextrose 2.5% volume (ml): 14,000     Heparin added to bags by pharmacy.     PD catheter visualized. Machine ready to connect. Report given to  KAUR Velazco RN

## 2024-10-01 NOTE — PLAN OF CARE
Problem: Risk for Delirium  Goal: Optimal Coping  Outcome: Progressing  Intervention: Optimize Psychosocial Adjustment to Delirium  Recent Flowsheet Documentation  Taken 9/30/2024 2316 by Ever Suggs RN  Supportive Measures:   active listening utilized   verbalization of feelings encouraged  Taken 9/30/2024 1934 by Ever Suggs RN  Supportive Measures:   active listening utilized   verbalization of feelings encouraged     Problem: Pain Chronic (Persistent)  Goal: Optimal Pain Control and Function  Outcome: Progressing  Intervention: Manage Persistent Pain  Recent Flowsheet Documentation  Taken 9/30/2024 2316 by Ever Suggs RN  Medication Review/Management: medications reviewed  Taken 9/30/2024 1934 by Ever Suggs RN  Medication Review/Management: medications reviewed  Intervention: Optimize Psychosocial Wellbeing  Recent Flowsheet Documentation  Taken 9/30/2024 2316 by Ever Suggs RN  Supportive Measures:   active listening utilized   verbalization of feelings encouraged  Taken 9/30/2024 1934 by Ever Suggs RN  Supportive Measures:   active listening utilized   verbalization of feelings encouraged     Problem: Infection  Goal: Absence of Infection Signs and Symptoms  Outcome: Progressing   Goal Outcome Evaluation:  Patient denied pain. Confused; oriented to self. Patient with reddened buttock and perineum. Incontinent of stool; had several overnight and small amount of vaginal bleeding noted. Calmoseptine cream ordered and applied to bottom. Painful with any touch, patient screaming and yelling with perineal cares. Repositioning as patient allowed.  Remains in Afib, HR control.  PD started at 2000, tolerating well. Currently dwelling.

## 2024-10-01 NOTE — PROGRESS NOTES
[facility]    Medicine Progress Note - Hospitalist Service    Date of Admission:  9/28/2024    Assessment & Plan   84 year old female with PMHx of ESRD on PD, chronic low back pain, possible PMR, Afib on AC, ulcerative colitis, gout who presents with 2 days of acute onset generalized weakness, diffuse abdominal pain, and and nausea with vomiting. She was admitted for concern for possible bacterial peritonitis.She also notes over past week SUAREZ and intermit vaginal bleeding        #Sepsis there was concern for possible intra-abdominal infection, present on admission  -Secondary bacterial peritonitis with PD catheter -however negative cx   Presented with 2 days of abdominal pain, N/V, weakness. Found to be tachycardic with RVR, WBC 16.9. Diffuse abdominal tenderness on exam with PD catheter in place. CT C/A/P without contrast showed RP and pelvic lymphadenopathy. Clinical presentation most consistent with infectious peritonitis. UTI is also possible, patient unable to give sample initially as she makes more limited urine, though seems slightly less likely with normal appearing bladder/kidneys on non-con CT.  - ID following, vancomycin/Zosyn discontinued 9/30, per ID.  Low WBCs at 3 had fluid drawn from catheter.  PD cultures growing gram-positive bacilli, positive culture reported on 10/1.  ID made aware.  -pain is improved    #Positive blood culture with Staph epidermidis.  Repeat cultures on 9/29 without growth.  Likely contaminant.  Antibiotic discontinued as above.    #Fungal dermatitis, lower abdomen rash.  On clotrimazole cream.      #Increasing retroperitoneal and pelvic lymphadenopathy incidental finding on CT abdomen.   -this could be related to reaction to PD, infection, but with vag bleeding I am also concerned possible malignancy   -send smear, LDH  -gyn working up vaginal bleeding as much as pt will allow  -address PD issues      #Right second toe ulcer.  Concerning for  cellulitis.  - ID requested right foot x-ray  - Monitor of antibiotics    #Lactic acidosis  -Anion-gap metabolic acidosis  Lactic acid 4.8 on admission in setting of sepsis  - S/p 500cc, down to 3.3, monitor  - Use IVF judiciously with ESRD     #ESRD on PD  -Chronic HFpEF without acute exacerbation  -Elevated NTpro-BNP due to hypervolemia  PD has been going well at home. Started 5/2024. Follows with Dr. Finn.  - Renal following  - PD per Renal  -Continue home torsemide 100mg daily,spironolactone 25mg daily  -seems to be tolerating PD here ok     #Permanent atrial fibrillation on chronic AC  -Rapid ventricular response  -170 on admission. Not on rate controlling agents as OP. Chart mentions intentionally not using BB. But reviewed and it was stopped for hypotension some years ago, not an acute issue here. Patient has no recollection o having issues with metoprolol.  - Stopped dilt gtt  - Start metoprolol 25mg q6h, titrate now to 50 mg bid   - Continue home Eliquis  - TTE 11/2023: EF 50-55%  -she notes SUAREZ this past week which may have been RVR-cards seeing      #Non-ischemic myocardial injury due to sepsis  HS tropoin 128 down to 118 on re-check. The patient denies CP, SOB, jaw/arm pain. Is nauseous with abdominal pain, but clinical picture fits abdominal infection more than ACS.  - EKG  Afib with RVR; narrow complex; TWI in II and aVF, no ST segment depression or elevation  -cards seeing      #R>L chronic LE edema  In setting of ESRD. Patient reports chronic and stable. Has had multiple joint surgeries on the right leg. On chronic AC.  -check ultrasound to be sure not clotting on DOAC--negative     #Blood in briefs--she is fairly sure this is vaginal  Patient reports 1 month of noticing consistent pink fluid in briefs, sometimes darker red. Doesn't make much urine.   - Pelvic US not able to visualize well  -GYN consult did see and did examine today when she agreed and notes today blood in vaginal vault--rec  "transvag ultrasound, she declined  -at this time I agree with gyn on differential-->\"atrophy, vulvar dermatologic conditions, cervical or uterine cancer.\"  -however, pt declining work up     #Possible polymyalgia rheumatica  Mentioned in PCP notes.  - Continue home prednisone 5mg PO BID  - Consider stress dose steroids if develops hypotension, but hypertensive on admission     #Chronic low back pain  -Chronic opioid use  Has been a long-term issue affecting mobility.  - Continue prednisone as above  - Continue home tramadol 50mg BID     #Gout  Was on febuxostat in the past. No acute flares right now.  - Continue home prednisone as above     #Ulcerative colitis  Noted in prior PCP notes, but stable.  - Chemical DVT ppx  -there was concern yesterday possible rectal bleeding too--gi consulted and she declined rectal exam      #GERD  - Continue home famotidine     Code status  DNR/DNI.      -Updated daughter in room during morning rounds    Diet: Snacks/Supplements Adult: Nepro Oral Supplement; With Meals  Regular Diet Adult    DVT Prophylaxis: DOAC  Lerma Catheter: Not present  Lines: PRESENT          Cardiac Monitoring: None  Code Status: No CPR- Do NOT Intubate      Clinically Significant Risk Factors     # Anion Gap Metabolic Acidosis: Highest Anion Gap = 19 mmol/L in last 2 days, will monitor and treat as appropriate  # Hypoalbuminemia: Lowest albumin = 3 g/dL at 9/29/2024  7:56 AM, will monitor as appropriate     # Hypertension: Noted on problem list    # Chronic heart failure with reduced ejection fraction: last echo with EF <40%          # Obesity: Estimated body mass index is 30.61 kg/m  as calculated from the following:    Height as of this encounter: 1.549 m (5' 1\").    Weight as of this encounter: 73.5 kg (162 lb)., PRESENT ON ADMISSION       # Financial/Environmental Concerns: none         Disposition Plan     Medically Ready for Discharge: Anticipated in 2-4 Days    Jacklyn Matos MD  Hospitalist " Service  [facility]  Securely message with [employer] (more info)  Text page via [employer] Paging/Directory   ______________________________________________________________________    Interval History   Patient is new to me.  Chart reviewed.  Patient was seen and examined, in attendance of care daughter.  Noted right second toe ulcer.  Noted that ID requested x-ray.  PD catheter dressing intact, was not removed.  Patient denies abdominal pain.  No fever, chills.  Okay p.o. intake.    Physical Exam   Vital Signs: Temp: 97.6  F (36.4  C) Temp src: Oral BP: 106/65 Pulse: 88   Resp: 18 SpO2: 96 % O2 Device: None (Room air)    Weight: 162 lbs 0 oz    Constitutional: awake, alert, cooperative, and no apparent distress  Eyes: sclera clear  Respiratory: no increased work of breathing, good air exchange, no retractions, and clear to auscultation  Cardiovascular: regular rate and rhythm and normal S1 and S2  GI: normal bowel sounds, soft, non-distended, pd cath, and non-tender  Skin: no bruising or bleeding  Musculoskeletal: no lower extremity pitting edema present  Neurologic: Mental Status Exam:  Level of Alertness:   awake  Neuropsychiatric: General: normal, calm, and normal eye contact    Medical Decision Making       40 MINUTES SPENT BY ME on the date of service doing chart review, history, exam, documentation & further activities per the note.      Data     I have personally reviewed the following data over the past 24 hrs:    17.9 (H)  \   13.1   / 150     137 97 (L) 29.9 (H) /  132 (H)   3.4 22 5.56 (H) \     Ferritin:  N/A % Retic:  N/A LDH:  N/A       Imaging results reviewed over the past 24 hrs:   No results found for this or any previous visit (from the past 24 hour(s)).

## 2024-10-01 NOTE — PROGRESS NOTES
Infectious Diseases Progress Note  [facility]    Date of visit: 10/01/2024     ASSESSMENT   84-year-old woman with a history of end-stage renal disease, on peritoneal dialysis, A-fib, CHF, ulcerative colitis who is admitted with increasing weakness and abdominal pain.  ID is consulted regarding concerns for peritonitis.    Abdominal pain.  Patient is on peritoneal dialysis, managed by her adult children.  No recent complications.  Reporting clear effluent.  CT scan of the abdomen with some lymphadenopathy, without other signs of occult infection.  No pain around the dialysis catheter.  Fluid drawn from catheter with low cell count (3 wbc). Cultures no growth. Antibiotics discontinued on 9/30. Clinically feels better. WBC a little better.    Positive blood culture. Single blood culture on admission with GPC in clusters, identified as S.epidermidis on verigene. Repeat culture on 9/29/2024 no growth so far.  Most likely contaminant.    Fungal  dermatitis.  New area of pain in the lower abdomen with rash  History of urinary tract infection.  Patient makes very little urine, but she does have a history of urinary tract infection.  R 2nd toe ulcer. Chronic concerning for osteomyelitis.       Active Problems:    Vaginal bleeding    General weakness    Elevated troponin    Rapid atrial fibrillation (H)    Decreased oral intake       PLAN   - Xray R foot   -Monitor off antibiotics.  -Clotrimazole cream to the lower abdomen    Discussed with the patient,    Kim Leyva MD  [facility]  Direct messaging: [employer] Paging  On-Call ID provider: [phone], option: 9      ===========================================        SUBJECTIVE / INTERVAL HISTORY:   Feels less tired today. No fever. WBC a little better.       Antibiotics   Vancomycin 9/28-30  Zosyn 9/28-30    Previous:  None      Physical Exam     Temp:  [97.2  F (36.2  C)-97.9  F (36.6  C)] 97.6  F (36.4  C)  Pulse:  [78-95]  "88  Resp:  [18-26] 18  BP: ()/(52-76) 106/65  SpO2:  [96 %-98 %] 96 %    /65 (BP Location: Left arm)   Pulse 88   Temp 97.6  F (36.4  C) (Oral)   Resp 18   Ht 1.549 m (5' 1\")   Wt 73.5 kg (162 lb)   LMP  (LMP Unknown)   SpO2 96%   BMI 30.61 kg/m      GENERAL:  well-developed, well-nourished, lying in bed in no acute distress.   HENT:  Head is normocephalic, atraumatic. EYES:  Eyes have anicteric sclerae without conjunctival injection   LUNGS:  Clear to auscultation.  CARDIOVASCULAR:  Regular rate and rhythm with no murmurs, gallops or rubs.  ABDOMEN:  soft, tender around umbilicus fold. Peritoneal catheter without surrounding inflammation or tenderness  EXT: left leg edema, chronic . Ulcer of the tip of the R 2nd toe.   SKIN: Erythema in the lower pannus without skin break. No stigmata of endocarditis.  NEUROLOGIC:  Grossly nonfocal.      Cultures    blood culture: GPC in clusters   blood culture: pending     Susceptibility data from last 90 days.  Collected Specimen Info Organism   24 Peripheral Blood Staphylococcus epidermidis          Pertinent Labs:     Recent Labs   Lab 10/01/24  0427 09/30/24  1445 24  0702   WBC 17.9* 20.1* 19.6*   HGB 13.1 13.7 13.3    154 162       Recent Labs   Lab 10/01/24  0427 09/30/24  0702 24  0756    136 135   CO2 22 22 20*   BUN 29.9* 29.9* 30.8*   ALBUMIN  --   --  3.0*   ALKPHOS  --   --  126   ALT  --   --  46   AST  --   --  53*       Recent Labs   Lab 24  1331   CRPI 20.30*           Imaging:     Echocardiogram Complete    Result Date: 2024  [id number]^[email or web address] [address]  Name: LUKE AGUILERA MRN: [id number] : 1940 Study Date: 2024 07:25 AM Age: 84 yrs Gender: Female Patient Location: [facility] Reason For Study: Atrial Fibrillation Ordering Physician: MINOR KHALIL Performed By: CORINA  BSA: 1.8 m2 " Height: 61 in Weight: 171 lb HR: 94 ______________________________________________________________________________ Procedure Complete Portable Echo Adult. Definity (NDC #08924-143) given intravenously. No hemodynamically significant valvular abnormalities on 2D or color flow imaging. Compared to the prior study dated 11/6/2023, there have been no changes. ______________________________________________________________________________ Interpretation Summary  1.Left ventricular function is decreased. The ejection fraction is 30-35% (moderately reduced). 2.Mildly decreased right ventricular systolic function 3.The left atrium is moderately dilated. 4.No hemodynamically significant valvular abnormalities on 2D or color flow imaging. Compared to the prior study dated 11/6/2023, the LV EF looks lower and the pulmonic pressures are estimated lower. ______________________________________________________________________________ I      WMSI = 2.00     % Normal = 0  X - Cannot   0 -                      (2) - Mildly 2 -          Segments  Size Interpret    Hyperkinetic 1 - Normal  Hypokinetic  Hypokinetic  1-2     small                                                    7 -          3-5    moderate 3 - Akinetic 4 -          5 -         6 - Akinetic Dyskinetic   6-14    large              Dyskinetic   Aneurysmal  w/scar       w/scar       15-16   diffuse  Left Ventricle Left ventricular function is decreased. The ejection fraction is 30-35% (moderately reduced). There is normal left ventricular wall thickness. There is borderline concentric left ventricular hypertrophy. Left ventricular diastolic function is not assessable. No regional wall motion abnormalities noted.  Right Ventricle The right ventricle is normal size. TAPSE is abnormal, which is consistent with abnormal right ventricular systolic function. Mildly decreased right ventricular systolic function.  Atria The left atrium is moderately dilated. Right atrial size is  normal. There is no color Doppler evidence of an atrial shunt.  Mitral Valve There is mild mitral annular calcification. There is trace mitral regurgitation. There is no mitral valve stenosis.  Tricuspid Valve The tricuspid valve is not well visualized, but is grossly normal. Right ventricle systolic pressure estimate normal. There is trace to mild tricuspid regurgitation. There is no tricuspid stenosis.  Aortic Valve Aortic valve leaflets appear normal. There is no evidence of aortic stenosis or clinically significant aortic regurgitation. The aortic valve is trileaflet. No aortic regurgitation is present. No aortic stenosis is present.  Pulmonic Valve The pulmonic valve is not well seen, but is grossly normal. This degree of valvular regurgitation is within normal limits. There is no pulmonic valvular stenosis.  Vessels The aorta root is normal. Normal size ascending aorta. IVC diameter <2.1 cm collapsing >50% with sniff suggests a normal RA pressure of 3 mmHg.  Pericardium There is no pericardial effusion.  Rhythm The rhythm was atrial fibrillation.  ______________________________________________________________________________ MMode/2D Measurements & Calculations IVSd: 1.2 cm LVIDd: 4.3 cm LVIDs: 3.6 cm LVPWd: 1.2 cm  FS: 17.4 % LV mass(C)d: 189.4 grams LV mass(C)dI: 107.2 grams/m2 Ao root diam: 2.9 cm asc Aorta Diam: 3.6 cm LVOT diam: 2.0 cm LVOT area: 3.1 cm2 Ao root diam index Ht(cm/m): 1.8 Ao root diam index BSA (cm/m2): 1.6 Asc Ao diam index BSA (cm/m2): 2.0 Asc Ao diam index Ht(cm/m): 2.3 EF Biplane: 33.4 % LA Volume (BP): 64.9 ml  LA Volume Index (BP): 36.7 ml/m2 LA Volume Indexed (AL/bp): 38.7 ml/m2 RV Base: 3.8 cm RWT: 0.56  Doppler Measurements & Calculations MV E max anmol: 68.5 cm/sec MV dec slope: 432.7 cm/sec2 MV dec time: 0.16 sec  Ao V2 max: 96.5 cm/sec Ao max P.0 mmHg Ao V2 mean: 75.9 cm/sec Ao mean PG: 3.0 mmHg Ao V2 VTI: 20.0 cm GABRIEL(I,D): 1.4 cm2 GABRIEL(V,D): 2.1 cm2 LV V1 max P.6 mmHg LV  V1 max: 63.6 cm/sec LV V1 VTI: 8.8 cm SV(LVOT): 27.7 ml SI(LVOT): 15.7 ml/m2 PA V2 max: 57.8 cm/sec PA max P.3 mmHg PA acc time: 0.07 sec PI end-d herb: 133.8 cm/sec TR max herb: 264.0 cm/sec TR max P.9 mmHg AV Herb Ratio (DI): 0.66 GABRIEL Index (cm2/m2): 0.78 E/E' av.4 Lateral E/e': 14.6 Medial E/e': 12.1 RV S Herb: 9.5 cm/sec  ______________________________________________________________________________ Report approved by: Brown Waldrop 2024 09:52 AM       US Lower Extremity Venous Duplex Bilateral    Result Date: 2024  EXAM: US LOWER EXTREMITY VENOUS DUPLEX BILATERAL LOCATION: [facility] DATE: 2024 INDICATION: edema legs , on DOAC, make sure no clots COMPARISON: None. TECHNIQUE: Venous Duplex ultrasound of bilateral lower extremities with and without compression, augmentation and duplex. Color flow and spectral Doppler with waveform analysis performed. FINDINGS: Exam includes the common femoral, femoral, popliteal veins as well as segmentally visualized deep calf veins and greater saphenous vein. RIGHT: No deep vein thrombosis. No superficial thrombophlebitis. No popliteal cyst. LEFT: No deep vein thrombosis. No superficial thrombophlebitis. No popliteal cyst.     IMPRESSION: No deep venous thrombosis in the bilateral lower extremities.    US Pelvic Limited    Result Date: 2024  EXAM: US PELVIC LIMITED LOCATION: [facility] DATE: 2024 INDICATION: vag bleeding x 1 month COMPARISON: None. TECHNIQUE: Transabdominal scans were performed. Endovaginal ultrasound was declined by the patient. FINDINGS: Uterus and ovaries not identified. No significant free fluid.     IMPRESSION: Uterus and ovaries unable to be identified due to technical factors and body habitus.     CT Chest Abdomen Pelvis w/o Contrast    Result Date: 2024  EXAM: CT CHEST ABDOMEN PELVIS W/O CONTRAST LOCATION: [facility] DATE: 2024  INDICATION: gen weakness and feels unwell COMPARISON: 11/5/2023 TECHNIQUE: CT scan of the chest, abdomen, and pelvis was performed without IV contrast. Multiplanar reformats were obtained. Dose reduction techniques were used. CONTRAST: None. FINDINGS: Mild motion artifact. LUNGS AND PLEURA: Mild emphysema. Stable mild diffuse bronchial wall thickening. Elevated right hemidiaphragm. Right basilar atelectasis. No pleural effusion. Calcified granuloma. A few stable benign solid pulmonary nodules with the largest measuring 0.3  cm in the left upper lobe, image 81:4. MEDIASTINUM/AXILLAE: Stable enlarged multinodular thyroid. No lymphadenopathy. Trace air in the main pulmonary artery and right subclavian vein are most likely secondary to intravenous line placement. CORONARY ARTERY CALCIFICATION: Moderate. HEPATOBILIARY: Cholelithiasis. PANCREAS: Normal. SPLEEN: Normal. ADRENAL GLANDS: Normal. KIDNEYS/BLADDER: A simple cyst midpole right kidney, no follow-up required. Bilateral renal atrophy. BOWEL: No obstruction or inflammatory change. Appendix not visualized. LYMPH NODES: Increasing retroperitoneal lymphadenopathy with the largest being a left para-aortic node measuring 1.4 cm in short axis, previously 0.8 cm, image 183:3. A 2.6 x 1.6 cm left external iliac node, previously not visualized, image 224:3. A 2.4 x 1.7 cm right external iliac node, previously 1.0 x 0.6 cm, image 226:3. VASCULATURE: Moderately severe atherosclerosis. Stable ectasia of the abdominal aorta. PELVIC ORGANS: Peritoneal dialysis catheter. Small-moderate volume ascites. MUSCULOSKELETAL: Left hip arthroplasty. Degenerative changes. Severe osseous demineralization.     IMPRESSION: 1.  Increasing retroperitoneal and pelvic lymphadenopathy. 2.  Small-moderate volume ascites.         Data reviewed today: I reviewed all medications, new labs and imaging results over the last 24 hours. I personally reviewed no images or EKG's today.  The patient's care was  discussed with the Bedside Nurse, Patient, and Patient's Family.

## 2024-10-01 NOTE — PROGRESS NOTES
Renal progress note  CC: ESRD , PD  Assessment and Plan:  84 year old female with hx of ESRD on PD , CAD , Chronic back pain , hx of atrial fib, UC , Gout and Htn , presenting with abd pain and generalized weakness with nausea and poor intakes for 3-4days now. She has minimal UO but has been noticing blood on her briefs, uncledar vaginal or urinary, no stool with blood. PD fluid has been clear no alarms or drain pain except the slight chronic twinge she gets at end of last cycle. Nephrology is following for ESRD and co-morbidities management.     ESRD on PD under care of   OP Rx : FV 2300 ccx 4 cycles over 10hrs and then LF with1 L for Icodextrin.  OP Kt/V barely adequate.  Still with residual renal function, UO low 300-700  PD samples taken after drain icodextrin and send cellcounts and Cx after a 1 Hr dwell for fluid samples; cell count 3 and clear fluid , unlikely peritonitis  Inpatient PD Rx 6 cycles over 12 hours using 2.5% dextrose solution. No LF.  Continue the same tonight  I will order exit site care, apply mupirocin 2% cream to PD exit site and change dressing daily    Electrolytes  Hypokalemia chronic. On KCL 20meq daily PTA.Serum potassium is wnl at 3.4 on today labs. On Regular diet.     Chronic Hypomagnesemia-On mag oxide    Metabolic acidosis-Mild. Improved. CO2 is wnl at 22 on today labs.      HTN/Volume status-BP is in normotensive range.Patient appears euvolemic on exam.  2.5% with better UF results  TW 76.5-77kg at home. Today standing weight is 73.5 kgs.  On Spironolactone 25mg , Torsemide 100mg and metoprolol XL 50 mg daily  Recommend no changes  Daily standing weight        Anemia-Hgb is wnl in 13s range.     MBD-  Not on  binders     On Regular diet.     Bacteremia-Blood culture from 09/28 grew Strep epidermidis. Repeat blood cx from 09/29 with no growth so far.   Peritoneal fluid cx is negative as well.  H/o recurrent UTI. No urine cx collected this  admission  Leucocytosis persistent but trending down on today labs  Initially received vanco and zosyn  ID is following, input appreciated     CAD  HFpEF 50-55%  Atrial fib with RVR  On Ac continued; Eliquis  Troponin leak down trend  Received metoprolol -- now started on 50mg bid  Stopped dilt inf now  --> continue BB     Hx of PMR on steroids    New issue, vaginal vs rectal  bleeding. GI and OB/GYN signed off. Patient declined TVUS.      Chronic back pan-On tramadol     Gout -controlled, on allopurinol     GERD on pepcid    We will follow     Mana Banda MD  [facility]  [address], [address]  [place] [address]  Phone# [phone]  Fax# [phone]        Subjective  Patient was seen at the bedside and events were reviewed with nursing.  No acute issues overnight.  Patient accompanied by daughter at the bedside.  Patient states that she is feeling the same. No new complains. Tolerated PD without issues. 604 ml removed with UF. Appetite remains poor. Having frequent BMs with small amount of stool.  Patient denies: fever, chills, dizziness, sore throat, rhinorrhea, cough, shortness of breath , chest pain, palpitations, orthopnea, nausea, vomiting,dysuria, urinary frequency, urgency, hematuria, rash.  Updated on labs. All questions answered.    Objective    Vital signs in last 24 hours  Temp:  [97.2  F (36.2  C)-97.9  F (36.6  C)] 97.4  F (36.3  C)  Pulse:  [78-95] 95  Resp:  [18-26] 18  BP: ()/(52-76) 99/65  SpO2:  [96 %-98 %] 97 %  Weight:   [unfilled]    Intake/Output last 3 shifts  I/O last 3 completed shifts:  In: 246 [P.O.:240; I.V.:6]  Out: 604 [Other:604]  Intake/Output this shift:  I/O this shift:  In: 120 [P.O.:120]  Out: -     Physical Exam  Alert awake, NAD  CV: RRR without murmur or rub  Lung: clear and equal; no extra sounds  Ab: soft and NT; not distended; normal bs  Ext: no edema and well perfused  Skin; no rash  Access: PD exit side is intact. Covered  with clean dressing.    Pertinent Labs   Lab Results   Component Value Date    WBC 17.9 (H) 10/01/2024    HGB 13.1 10/01/2024    HCT 39.1 10/01/2024    MCV 99 10/01/2024     10/01/2024     Lab Results   Component Value Date    BUN 29.9 (H) 10/01/2024     10/01/2024    CO2 22 10/01/2024       Lab Results   Component Value Date    ALBUMIN 3.0 (L) 09/29/2024     Lab Results   Component Value Date    PHOS 4.8 (H) 09/28/2024     I reviewed all lab results  Mana Banda MD  [facility]  [address], [address]  [place] [address]  Phone# [phone]  Fax# [phone]

## 2024-10-01 NOTE — PROGRESS NOTES
[facility]   [address], [place] [address]   [email or web address]   OFFICE: [phone]     CARDIOLOGY INPATIENT PROGRESS NOTE     Impression and Plan     Assessment/Plan:  Permanent afib   - Cont apixaban 2.5 mg twice daily (age greater than 80 and creatinine greater than 1.5).    - Cont metoprolol, convert to succinate     Acute HFrEF   - Possible tachycardia mediated vs. Stress CM.  Cannot rule out ICM especially given presence of CAC on CT scan   - Discussed options of ischemic testing now vs. Initiating GDMT and re-evaluation with an echocardiogram in 2 months.  Pt prefers to wait on ischemic testing   - Continue spironolactone, metoprolol succinate   - Was previously on losartan but stopped due to hypotension     Elevated troponin   - Mildly elevated troponin with a plateau trend not consistent with ACS.  Likely supply/demand mismatch related to afib with RVR,sepsis, and poor troponin clearance from ESRD     Will sign off  Follow up with me requested in 2 months      Subjective     Mely Alegria is feeling good today        Review of Systems:  Further review of systems is otherwise negative/noncontributory (based on review of medical record (admission H&P) and 13 point review of systems reviewed. Pertinent positives noted).    Cardiac Diagnostics       ECG: Personally reviewed and interpreted: 9/28/2024  Afib with RVR  Nonspecific T wave     Telemetry (personally reviewed): Afib rate controlled     Most recent:  Echocardiogram (results reviewed): 9/30/2024  Interpretation Summary     1.Left ventricular function is decreased. The ejection fraction is 30-35%  (moderately reduced).  2.Mildly decreased right ventricular systolic function  3.The left atrium is moderately dilated.  4.No hemodynamically significant valvular abnormalities on 2D or color flow  imaging.  Compared to the prior study dated 11/6/2023, the LV EF looks lower and the  pulmonic  pressures are estimated lower.        Medical History  Surgical History Family History Social History   Past Medical History:   Diagnosis Date    Acute midline low back pain without sciatica     Acute renal failure superimposed on chronic kidney disease, unspecified acute renal failure type, unspecified CKD stage (H)     Anemia     Cellulitis of left foot     CHF (congestive heart failure) (H)     Chronic atrial fibrillation (H)     Chronic atrial fibrillation (H)     Chronic back pain     Chronic heart failure, unspecified heart failure type (H)     Dialysis patient (H)     M,W,F    Diaphragmatic hernia without obstruction and without gangrene     End stage renal disease (H)     Gout     Helicobacter pylori gastritis     Hypertension     Hypothyroidism due to acquired atrophy of thyroid     Obesity     Opioid type dependence, episodic (H)     Pure hypercholesterolemia     Ulcer of left foot, limited to breakdown of skin (H)     Ulcerative pancolitis without complication (H)     Uric acid arthropathy     Vitamin D deficiency      Past Surgical History:   Procedure Laterality Date    HC DILATION/CURETTAGE DIAG/THER NON OB      Description: Dilation And Curettage;  Recorded: 01/18/2010;    IR CVC TUNNEL PLACEMENT > 5 YRS OF AGE  10/30/2023    IR CVC TUNNEL PLACEMENT > 5 YRS OF AGE  1/17/2024    IR CVC TUNNEL REMOVAL RIGHT  7/17/2024    LAPAROSCOPIC INSERTION CATHETER PERITONEAL DIALYSIS N/A 4/16/2024    Procedure: LAPAROSCOPIC PLACEMENT OF PERITONEAL DIALYSIS CATHETER WITH;  Surgeon: August Myers MD;  [place] [facility] [place]    LAPAROSCOPIC LYSIS ADHESIONS N/A 4/16/2024    Procedure: LYSIS OF ADHESIONS;  Surgeon: August Myers MD;  [place] [facility] [place]    LAPAROSCOPIC OMENTECTOMY N/A 4/16/2024    Procedure: OMENTOPEXY;  Surgeon: August Myers MD;  [place] [facility] [place]    PICC TRIPLE LUMEN PLACEMENT  10/30/2023    [facility] COLOSTOMY      Description: Colostomy;  Recorded: 11/05/2012;    ZZC PART  REMOVAL COLON W ANASTOMOSIS      Description: Partial Colectomy;  Recorded: 11/05/2012;  Comments: with colostomy    ZZC TOTAL HIP ARTHROPLASTY      Description: Total Hip Replacement;  Recorded: 01/18/2010;     Family History   Problem Relation Age of Onset    Diabetes Mother            Social History     Socioeconomic History    Marital status:      Spouse name: Not on file    Number of children: Not on file    Years of education: Not on file    Highest education level: Not on file   Occupational History    Not on file   Tobacco Use    Smoking status: Never    Smokeless tobacco: Never   Vaping Use    Vaping status: Never Used   Substance and Sexual Activity    Alcohol use: Not on file    Drug use: Not on file    Sexual activity: Not on file   Other Topics Concern    Not on file   Social History Narrative    Not on file     Social Determinants of Health     Financial Resource Strain: Low Risk  (12/13/2023)    Financial Resource Strain     Within the past 12 months, have you or your family members you live with been unable to get utilities (heat, electricity) when it was really needed?: No   Food Insecurity: Low Risk  (12/13/2023)    Food Insecurity     Within the past 12 months, did you worry that your food would run out before you got money to buy more?: No     Within the past 12 months, did the food you bought just not last and you didn t have money to get more?: No   Transportation Needs: Low Risk  (12/13/2023)    Transportation Needs     Within the past 12 months, has lack of transportation kept you from medical appointments, getting your medicines, non-medical meetings or appointments, work, or from getting things that you need?: No   Physical Activity: Not on file   Stress: Not on file   Social Connections: Not on file   Interpersonal Safety: Not on file   Housing Stability: Low Risk  (12/13/2023)    Housing Stability     Do you have housing? : Yes     Are you worried about losing your housing?: No     "         Physical Examination   VITALS: BP 99/65   Pulse 95   Temp 97.4  F (36.3  C) (Oral)   Resp 18   Ht 1.549 m (5' 1\")   Wt 78 kg (171 lb 15.3 oz)   LMP  (LMP Unknown)   SpO2 97%   BMI 32.49 kg/m    BMI: Body mass index is 32.49 kg/m .  Wt Readings from Last 3 Encounters:   09/30/24 78 kg (171 lb 15.3 oz)   08/28/24 78 kg (172 lb)   04/16/24 78 kg (172 lb)       Intake/Output Summary (Last 24 hours) at 10/1/2024 0948  Last data filed at 10/1/2024 0607  Gross per 24 hour   Intake 3 ml   Output 0 ml   Net 3 ml       General: pleasant female. No acute distress.   HENT: external ears normal. Nares patent. Mucous membranes moist.  Neck: No JVD  Lungs: clear to auscultation  COR: irregularly irregular rate and rhythm, No murmurs, rubs, or gallops  Abd: nondistended, BS present  Extrem: No edema                Lab Results Reviewed    Chemistry/lipid CBC Cardiac Enzymes/BNP/TSH/INR   Recent Labs   Lab Test 09/29/24  0756   CHOL 177   HDL 52   LDL 96   TRIG 144     Recent Labs   Lab Test 09/29/24  0756 04/04/23  1504 03/11/22  1453   LDL 96 101* 68     Recent Labs   Lab Test 10/01/24  0427      POTASSIUM 3.4   CHLORIDE 97*   CO2 22   *   BUN 29.9*   CR 5.56*   GFRESTIMATED 7*   SANDIP 8.8     Recent Labs   Lab Test 10/01/24  0427 09/30/24  0702 09/29/24  0756   CR 5.56* 5.66* 5.67*     Recent Labs   Lab Test 10/30/23  0743   A1C 5.0          Recent Labs   Lab Test 10/01/24  0427   WBC 17.9*   HGB 13.1   HCT 39.1   MCV 99        Recent Labs   Lab Test 10/01/24  0427 09/30/24  1445 09/30/24  0702   HGB 13.1 13.7 13.3    No results for input(s): \"TROPONINI\" in the last 55923 hours.  Recent Labs   Lab Test 09/28/24  1107 10/30/23  1535 04/04/23  1504 03/11/22  1453   BNP  --   --   --  572*   NTBNPI 13,314* 12,726*  --   --    NTBNP  --   --  9,157*  --      Recent Labs   Lab Test 09/28/24  1107   TSH 1.46     No results for input(s): \"INR\" in the last 09836 hours.        Current Inpatient Scheduled " Medications   Scheduled Meds:  Current Facility-Administered Medications   Medication Dose Route Frequency Provider Last Rate Last Admin    apixaban ANTICOAGULANT (ELIQUIS) tablet 2.5 mg  2.5 mg Oral BID August Delgado MD   2.5 mg at 10/01/24 0900    clotrimazole (LOTRIMIN) 1 % cream   Topical BID Michael Vela MD   Given at 10/01/24 0901    [START ON 10/2/2024] famotidine (PEPCID) tablet 20 mg  20 mg Oral Every Other Day Jacklyn Matos MD        folic acid (FOLVITE) tablet 1 mg  1 mg Oral Daily August Delgado MD   1 mg at 10/01/24 0859    magnesium oxide (MAG-OX) tablet 400 mg  400 mg Oral Daily Keri Finn MD   400 mg at 10/01/24 0859    metoprolol succinate ER (TOPROL XL) 24 hr tablet 50 mg  50 mg Oral Daily Jose Alberto Laguerre DO   50 mg at 10/01/24 0900    mupirocin (BACTROBAN) 2 % ointment   Topical Daily Mana Banda MD   Given at 10/01/24 0900    predniSONE (DELTASONE) tablet 5 mg  5 mg Oral BID August Delgado MD   5 mg at 10/01/24 0859    sodium chloride (PF) 0.9% PF flush 3 mL  3 mL Intracatheter Q8H August Delgado MD   3 mL at 10/01/24 0652    spironolactone (ALDACTONE) tablet 25 mg  25 mg Oral Daily August Delgado MD   25 mg at 10/01/24 0859    torsemide (DEMADEX) tablet 100 mg  100 mg Oral Daily August Delgado MD   100 mg at 10/01/24 0859    traMADol (ULTRAM) tablet 50 mg  50 mg Oral BID August Delgado MD   50 mg at 10/01/24 0853     Continuous Infusions:  Current Facility-Administered Medications   Medication Dose Route Frequency Provider Last Rate Last Admin    dianeal PD LOW calcium-2.5% dex (calcium 2.5 mEq/L) 18,000 mL with heparin (porcine) 500 Units/L PERITONEAL DIALYSATE   Dialysis [facility] Supplied PD Keri Finn MD           No current outpatient medications on file.          Medications Prior to Admission   Prior to Admission medications    Medication Sig Start Date End Date Taking? Authorizing Provider   DEBORAH ANTICOAGULANT 2.5 MG tablet  Take 2.5 mg by mouth 2 times daily 12/18/23  Yes Reported, Patient   famotidine (PEPCID) 20 MG tablet Take 20 mg by mouth 2 times daily.   Yes Unknown, Entered By History   folic acid (FOLVITE) 1 MG tablet Take 1 tablet (1 mg) by mouth daily 12/13/23  Yes Oscar Hopper MD   predniSONE (DELTASONE) 5 MG tablet Take 5 mg by mouth 2 times daily.   Yes Unknown, Entered By History   spironolactone (ALDACTONE) 25 MG tablet Take 25 mg by mouth daily.   Yes Unknown, Entered By History   torsemide (DEMADEX) 100 MG tablet Take 100 mg by mouth daily.   Yes Unknown, Entered By History   traMADol (ULTRAM) 50 MG tablet Take 50 mg by mouth 2 times daily. 3/15/24  Yes Reported, Patient   ACE/ARB/ARNI NOT PRESCRIBED (INTENTIONAL) Please choose reason not prescribed from choices below.    Oscar oHpper MD   BETA BLOCKER NOT PRESCRIBED (INTENTIONAL) Please choose reason not prescribed from choices below.    Oscar Hopper MD Timothy Shih, DO [facility]  Non-invasive Cardiologist  [facility]

## 2024-10-01 NOTE — TELEPHONE ENCOUNTER
Home Health Care    Reason for call:  looking to confirm if Dr. Hopper will be following for home care services and sign off on plans of care.     Pt Provider: Dr. Hopper    Phone Number Homecare Nurse can be reached at:  [phone] [facility] [employer] [facility]

## 2024-10-01 NOTE — PROGRESS NOTES
Bacterial culture results + Gram+ Bacili informed Dr Matos and Dr Autumn DIETRICH ID regarding results .

## 2024-10-01 NOTE — PLAN OF CARE
"  Problem: Adult Inpatient Plan of Care  Goal: Plan of Care Review  Description: The Plan of Care Review/Shift note should be completed every shift.  The Outcome Evaluation is a brief statement about your assessment that the patient is improving, declining, or no change.  This information will be displayed automatically on your shift  note.  Outcome: Progressing  Goal: Patient-Specific Goal (Individualized)  Description: You can add care plan individualizations to a care plan. Examples of Individualization might be:  \"Parent requests to be called daily at 9am for status\", \"I have a hard time hearing out of my right ear\", or \"Do not touch me to wake me up as it startles  me\".  Outcome: Progressing  Goal: Absence of Hospital-Acquired Illness or Injury  Outcome: Progressing  Intervention: Identify and Manage Fall Risk  Recent Flowsheet Documentation  Taken 10/1/2024 1734 by Cecilia Velazco, RN  Safety Promotion/Fall Prevention:   activity supervised   clutter free environment maintained   lighting adjusted   mobility aid in reach   nonskid shoes/slippers when out of bed   patient and family education   room door open   room organization consistent   safety round/check completed  Taken 10/1/2024 1227 by Cecilia Velazco, RN  Safety Promotion/Fall Prevention:   activity supervised   clutter free environment maintained   lighting adjusted   mobility aid in reach   nonskid shoes/slippers when out of bed   patient and family education   room door open   room organization consistent   safety round/check completed  Taken 10/1/2024 0849 by Cecilia Velazco, RN  Safety Promotion/Fall Prevention:   activity supervised   clutter free environment maintained   lighting adjusted   mobility aid in reach   nonskid shoes/slippers when out of bed   patient and family education   room door open   room organization consistent   safety round/check completed  Intervention: Prevent Skin Injury  Recent Flowsheet Documentation  Taken " 10/1/2024 1734 by Cecilia Velazco RN  Body Position:   turned   left   foot of bed elevated  Taken 10/1/2024 1700 by Cecilia Velazco RN  Body Position: right  Taken 10/1/2024 1227 by Cecilia Velazco RN  Body Position:   turned   left   foot of bed elevated  Taken 10/1/2024 1000 by Cecilia Velazco RN  Body Position:   heels elevated   turned  Taken 10/1/2024 0849 by Cecilia Velazco RN  Body Position:   turned   left   foot of bed elevated   Goal Outcome Evaluation:       Was up in the recliner and in the commode with assist of 2 very painful with every diaper change as the kesha area and the rectal area very raw  and denuded and bleeding  per family she tried to take care of herself  at home and daughter says she did not really realize how bad the skin had become until now .Noted  a minimal vaginal bleeding The patient is advised to apply ice or cold packs intermittently as needed to relieve pain. Pack applied to back patient prefers the ice instead of the tylenol she said Tylenol Tablets are big to swallow .

## 2024-10-02 PROBLEM — A41.9 SEPSIS WITHOUT ACUTE ORGAN DYSFUNCTION (H): Status: ACTIVE | Noted: 2024-01-01

## 2024-10-02 PROBLEM — L97.519: Status: ACTIVE | Noted: 2024-01-01

## 2024-10-02 NOTE — PROGRESS NOTES
Care Management Follow Up    Length of Stay (days): 4    Expected Discharge Date: 10/03/2024     Concerns to be Addressed:     Care progression - discharge planning  Patient plan of care discussed at interdisciplinary rounds: Yes    Anticipated Discharge Disposition:  Transitional Care vs home with home care    Anticipated Discharge Services:  Transitional Care vs home with home care  Anticipated Discharge DME:  NA    Patient/family educated on Medicare website which has current facility and service quality ratings:  NA  Education Provided on the Discharge Plan:  Yes per team  Patient/Family in Agreement with the Plan:  Yes    Referrals Placed by CM/SW:  Yes  Private pay costs discussed: Not applicable    Discussed  Partnership in Safe Discharge Planning  document with patient/family: No     Handoff Completed: No, handoff not indicated or clinically appropriate    Additional Information:  Per provider, Dr. Matos, patient is not ready. Family declined TCU and wants patient to return home, but patient is not strong enough. Add HHA to home care orders.    Social Hx:  Assessment: Follow. From home w/family; 24 hr care; home PD  Last Note: 10/2/24  Plan: Home with home care for RN/PT/OT/HHA - [facility]  Needs: NA  Hand off sent: NA  Transport: Family    Next Steps: RNCM to follow for medical progression, recommendations, and final discharge plan.     Jennifer Westfall RN

## 2024-10-02 NOTE — PROCEDURES
CCPD cycler set up per orders of Dr. Banda    Total volume (ml) : 13,800    Therapy time:12 hrs     Fill volume (ml): 2300    Cycles: 6    Dextrose 2.5% volume (ml): 14,000    Heparin added per pharmacy.      PD catheter visualized. Machine ready to connect. Report given to ALLYSSA Copeland RN.    NIKOS Suggs RN [facility]

## 2024-10-02 NOTE — PROGRESS NOTES
Renal progress note  CC: ESRD , PD  Assessment and Plan:  84 year old female with hx of ESRD on PD , CAD , Chronic back pain , hx of atrial fib, UC , Gout and Htn , presenting with abd pain and generalized weakness with nausea and poor intakes for 3-4days now. She has minimal UO but has been noticing blood on her briefs, uncledar vaginal or urinary, no stool with blood. PD fluid has been clear no alarms or drain pain except the slight chronic twinge she gets at end of last cycle. Nephrology is following for ESRD and co-morbidities management.     ESRD on PD under care of   OP Rx : FV 2300 ccx 4 cycles over 10hrs and then LF with1 L for Icodextrin.  OP Kt/V barely adequate.  Still with residual renal function, UO low 300-700  PD samples taken after drain icodextrin and send cellcounts and Cx after a 1 Hr dwell for fluid samples; cell count 3 and clear fluid , unlikely peritonitis  Inpatient PD Rx 6 cycles over 12 hours using 2.5% dextrose solution. No LF.  Continue the same tonight  I will order exit site care, apply mupirocin 2% cream to PD exit site and change dressing daily    Electrolytes  Hypokalemia chronic. On KCL 20meq daily PTA.Serum potassium is wnl at 3.5 on today labs. On Regular diet.     Chronic Hypomagnesemia-On mag oxide    Metabolic acidosis-Mild. Improved. CO2 is wnl at 25 on today labs.      HTN/Volume status-BP is low at 94/62mmHG this am. Patient appears euvolemic on exam.  2.5% with better UF results  TW 76.5-77kg at home. Today standing weight is 74.2 kgs.  On Spironolactone 25mg , Torsemide 100mg .   Metoprolol XL 50 mg daily with holding parameters started this admission  Anuric now  Recs  Discontinue Torsemide and Spironolactone given anuria  Daily standing weight  Strict I/O        Anemia-Hgb is wnl in 14s range. No indications for FAUSTO.     MBD-  Not on  binders     On Regular diet.     Bacteremia-Blood culture from 09/28 grew Strep epidermidis. Repeat blood cx from  09/29 with no growth so far.   Peritoneal fluid cx is negative as well.  H/o recurrent UTI. No urine cx collected this admission  Leucocytosis persistent but trending down on today labs  Initially received vanco and zosyn  X ray of the right foot 10/01 showed deformity of the third middle phalanx with suggestion of erosion of the third proximal phalanx base concerning for septic arthritis and osteomyelitis. No definite erosion of the second toe.   ID is following, input appreciated     CAD  HFpEF 50-55%  Atrial fib with RVR  On Ac continued; Eliquis  Troponin leak down trend  Started on Metoprolol XL 50mg daily this admission.  I reviewed cardiology not from yesterday. Patient prefers to wait for ischemic testing.    Diarrhea-Having frequent painless BMs with small amount of stool.  stool C.diff by PCR is negative this am.      Hx of PMR on Prednisone 5 mg BID    New issue, vaginal vs rectal  bleeding. GI and OB/GYN signed off. Patient declined TVUS.      Chronic back pan-On tramadol     Gout -controlled, on allopurinol     GERD on pepcid    We will follow     Mana Banda MD  [facility]  [address] [place] [address], [address]  [place] [address]  Phone# [phone]  Fax# [phone]        Subjective  Patient was seen at the bedside and events were reviewed with nursing.  No acute issues overnight.  Patient accompanied by two sons at the bedside.  Patient states that she is feeling better, reports improved appetite.Tolerated PD without issues. 821 ml removed with UF. Having frequent painless BMs with small amount of stool.  Patient denies: fever, chills, dizziness, sore throat, rhinorrhea, cough, shortness of breath , chest pain, palpitations, orthopnea, nausea, vomiting,dysuria, urinary frequency, urgency, hematuria, rash.  Updated on labs. All questions answered.    Objective    Vital signs in last 24 hours  Temp:  [97.3  F (36.3  C)-97.7  F (36.5  C)] 97.5  F (36.4  C)  Pulse:  []  93  Resp:  [18-20] 18  BP: ()/(62-74) 94/62  SpO2:  [94 %-97 %] 94 %  Weight:   [unfilled]    Intake/Output last 3 shifts  I/O last 3 completed shifts:  In: 360 [P.O.:360]  Out: 0   Intake/Output this shift:  No intake/output data recorded.    Physical Exam  Alert awake, NAD  CV: RRR without murmur or rub  Lung: clear and equal; no extra sounds  Ab: soft and NT; not distended; normal bs  Ext: no edema and well perfused  Skin; no rash  Access: PD exit side is intact. Covered with clean dressing.    Pertinent Labs   Lab Results   Component Value Date    WBC 16.9 (H) 10/02/2024    HGB 14.0 10/02/2024    HCT 42.2 10/02/2024    MCV 99 10/02/2024     (L) 10/02/2024     Lab Results   Component Value Date    BUN 29.8 (H) 10/02/2024     10/02/2024    CO2 25 10/02/2024       Lab Results   Component Value Date    ALBUMIN 3.0 (L) 09/29/2024     Lab Results   Component Value Date    PHOS 4.8 (H) 09/28/2024     I reviewed all lab results  Mana Banda MD  [facility]  [address] [place] [address], [address]  [place] [address]  Phone# [phone]  Fax# [phone]

## 2024-10-02 NOTE — TELEPHONE ENCOUNTER
Spoke with Lennie and verfied Dr. Ruchi mcrae to follow patient for home care. No further questions or concerns at this time.

## 2024-10-02 NOTE — PROGRESS NOTES
[facility]    Medicine Progress Note - Hospitalist Service    Date of Admission:  9/28/2024    Assessment & Plan   84 year old female with PMHx of ESRD on PD, chronic low back pain, possible PMR, Afib on AC, ulcerative colitis, gout who presents with 2 days of acute onset generalized weakness, diffuse abdominal pain, and and nausea with vomiting. She was admitted for concern for possible bacterial peritonitis.She also notes over past week SUAREZ and intermit vaginal bleeding       #Sepsis there was concern for possible intra-abdominal infection, present on admission  -Secondary bacterial peritonitis with PD catheter -however negative cx   Presented with 2 days of abdominal pain, N/V, weakness. Found to be tachycardic with RVR, WBC 16.9. Diffuse abdominal tenderness on exam with PD catheter in place. CT C/A/P without contrast showed RP and pelvic lymphadenopathy. Clinical presentation most consistent with infectious peritonitis. UTI is also possible, patient unable to give sample initially as she makes more limited urine, though seems slightly less likely with normal appearing bladder/kidneys on non-con CT.  - ID following, vancomycin/Zosyn discontinued 9/30, per ID.  Low WBCs at 3 had fluid drawn from catheter.  PD cultures growing gram-positive bacilli, positive culture reported on 10/1.  ID made aware.  -pain is improved    #Positive blood culture with Staph epidermidis.  Repeat cultures on 9/29 without growth.  Likely contaminant.  Antibiotic discontinued as above.    #Fungal dermatitis, lower abdomen rash.  On clotrimazole cream.    #Increasing retroperitoneal and pelvic lymphadenopathy incidental finding on CT abdomen.   -this could be related to reaction to PD, infection, but with vag bleeding I am also concerned possible malignancy   -send smear, LDH  -gyn working up vaginal bleeding as much as pt will allow  -address PD issues      #Right second toe ulcer.  Concerning for cellulitis.  -  ID requested right foot x-ray negative for osteomyelitis  - Monitor off antibiotics  - Concern for right third toe osteomyelitis, per x-ray.  MRI requested 10/2    #Lactic acidosis  -Anion-gap metabolic acidosis  Lactic acid 4.8 on admission in setting of sepsis  - S/p 500cc, down to 3.3, monitor  - Use IVF judiciously with ESRD     #ESRD on PD  -Chronic HFpEF without acute exacerbation  -Elevated NTpro-BNP due to hypervolemia  PD has been going well at home. Started 5/2024. Follows with Dr. Finn.  - Renal following  - PD per Renal  -Continue home torsemide 100mg daily,spironolactone 25mg daily  -seems to be tolerating PD here ok     #Permanent atrial fibrillation on chronic AC  -Rapid ventricular response  -170 on admission. Not on rate controlling agents as OP. Chart mentions intentionally not using BB. But reviewed and it was stopped for hypotension some years ago, not an acute issue here. Patient has no recollection o having issues with metoprolol.  - Stopped dilt gtt  - Start metoprolol 25mg q6h, titrate now to 50 mg bid   - Continue home Eliquis  - TTE 11/2023: EF 50-55%  -she notes SUAREZ this past week which may have been RVR-cards seeing      #Non-ischemic myocardial injury due to sepsis  HS tropoin 128 down to 118 on re-check. The patient denies CP, SOB, jaw/arm pain. Is nauseous with abdominal pain, but clinical picture fits abdominal infection more than ACS.  - EKG  Afib with RVR; narrow complex; TWI in II and aVF, no ST segment depression or elevation  -cards seeing      #R>L chronic LE edema  In setting of ESRD. Patient reports chronic and stable. Has had multiple joint surgeries on the right leg. On chronic AC.  -check ultrasound to be sure not clotting on DOAC--negative     #Blood in briefs--she is fairly sure this is vaginal  Patient reports 1 month of noticing consistent pink fluid in briefs, sometimes darker red. Doesn't make much urine.   - Pelvic US not able to visualize well  -GYN consult did  "see and did examine today when she agreed and notes today blood in vaginal vault--rec transvag ultrasound, she declined  -at this time I agree with gyn on differential-->\"atrophy, vulvar dermatologic conditions, cervical or uterine cancer.\"  -however, pt declining work up     #Possible polymyalgia rheumatica  Mentioned in PCP notes.  - Continue home prednisone 5mg PO BID  - Consider stress dose steroids if develops hypotension, but hypertensive on admission     #Chronic low back pain  -Chronic opioid use  Has been a long-term issue affecting mobility.  - Continue prednisone as above  - Continue home tramadol 50mg BID     #Gout  Was on febuxostat in the past. No acute flares right now.  - Continue home prednisone as above     #Ulcerative colitis  Noted in prior PCP notes, but stable.  - Chemical DVT ppx  -there was concern yesterday possible rectal bleeding too--gi consulted and she declined rectal exam      #GERD  - Continue home famotidine    #Physical deconditioning.  Progressing during hospitalization.  -PT/OT evaluate for disposition needs    Code status  DNR/DNI.      -Updated daughter in room during morning rounds    Diet: Snacks/Supplements Adult: Nepro Oral Supplement; With Meals  Regular Diet Adult    DVT Prophylaxis: DOAC  Lerma Catheter: Not present  Lines: PRESENT          Cardiac Monitoring: None  Code Status: No CPR- Do NOT Intubate      Clinically Significant Risk Factors     # Anion Gap Metabolic Acidosis: Highest Anion Gap = 19 mmol/L in last 2 days, will monitor and treat as appropriate  # Hypoalbuminemia: Lowest albumin = 3 g/dL at 9/29/2024  7:56 AM, will monitor as appropriate     # Hypertension: Noted on problem list    # Chronic heart failure with reduced ejection fraction: last echo with EF <40%          # Obesity: Estimated body mass index is 30.91 kg/m  as calculated from the following:    Height as of this encounter: 1.549 m (5' 1\").    Weight as of this encounter: 74.2 kg (163 lb 9.6 oz). "        # Financial/Environmental Concerns: none         Disposition Plan     Medically Ready for Discharge: Anticipated in 2-4 Days    Jacklyn Matos MD  Hospitalist Service  [facility]  Securely message with [employer] (more info)  Text page via [employer] Paging/Directory   ______________________________________________________________________    Interval History   Patient complaining feeling weak.  Planning for 15-20 seconds, said feeling weak, and wanted to sit down.  Denies associated lightheadedness, chest pain, cardiac palpitations.  Tip of second toe is not draining wound.  Right third toe swollen, both toes tender palpation.  PD catheter dressing intact, was not removed.  Patient denies abdominal pain.  No fever, chills.  Okay p.o. intake.    POC D/W patient's daughter at bedside and Dr. Sanderson.    Physical Exam   Vital Signs: Temp: 97.7  F (36.5  C) Temp src: Oral BP: 114/81 Pulse: 108   Resp: 18 SpO2: 94 % O2 Device: None (Room air)    Weight: 163 lbs 9.6 oz    Constitutional: awake, alert, cooperative, and no apparent distress  Eyes: sclera clear  Respiratory: no increased work of breathing, good air exchange, no retractions, and clear to auscultation  Cardiovascular: regular rate and rhythm and normal S1 and S2  GI: normal bowel sounds, soft, non-distended, pd cath, and non-tender  Skin: no bruising or bleeding  Musculoskeletal: no lower extremity pitting edema present  Neurologic: Mental Status Exam:  Level of Alertness:   awake  Neuropsychiatric: General: normal, calm, and normal eye contact    Medical Decision Making       40 MINUTES SPENT BY ME on the date of service doing chart review, history, exam, documentation & further activities per the note.      Data     I have personally reviewed the following data over the past 24 hrs:    16.9 (H)  \   14.0   / 134 (L)     138 97 (L) 29.8 (H) /  134 (H)   3.5 25 5.30 (H) \     Procal: N/A CRP: 93.60 (H) Lactic Acid: N/A         Imaging  results reviewed over the past 24 hrs:   Recent Results (from the past 24 hour(s))   XR Foot Right G/E 3 Views    Narrative    EXAM: XR FOOT RIGHT G/E 3 VIEWS  LOCATION: [facility]  DATE: 10/1/2024    INDICATION: Ulcer of the tip of the R second toe. Look for osteomyelitis  COMPARISON: None.      Impression    IMPRESSION: There is deformity of the third middle phalanx with suggestion of erosion of the third proximal phalanx base concerning for septic arthritis and osteomyelitis. No definite erosion of the second toe.    Moderate midfoot osteoarthritis and first metatarsophalangeal joint osteoarthritis. Bipartite tibial great toe sesamoid. Plantar calcaneal spur. Achilles enthesophyte. Vascular calcifications.     This document is created with the help of Dragon dictation system. All grammatical errors are unintentional.

## 2024-10-02 NOTE — PLAN OF CARE
"  Problem: Adult Inpatient Plan of Care  Goal: Plan of Care Review  Description: The Plan of Care Review/Shift note should be completed every shift.  The Outcome Evaluation is a brief statement about your assessment that the patient is improving, declining, or no change.  This information will be displayed automatically on your shift  note.  10/2/2024 1643 by Cecilia Velazco RN  Outcome: Progressing  10/2/2024 1638 by Cecilia Velazco RN  Outcome: Progressing  Goal: Patient-Specific Goal (Individualized)  Description: You can add care plan individualizations to a care plan. Examples of Individualization might be:  \"Parent requests to be called daily at 9am for status\", \"I have a hard time hearing out of my right ear\", or \"Do not touch me to wake me up as it startles  me\".  10/2/2024 1643 by Cecilia Velazco RN  Outcome: Progressing  10/2/2024 1638 by Cecilia Velazco RN  Outcome: Progressing  Goal: Absence of Hospital-Acquired Illness or Injury  Outcome: Progressing  Intervention: Identify and Manage Fall Risk  Recent Flowsheet Documentation  Taken 10/2/2024 1621 by Cecilia Velazco, RN  Safety Promotion/Fall Prevention:   activity supervised   clutter free environment maintained   lighting adjusted   mobility aid in reach   nonskid shoes/slippers when out of bed   patient and family education   room door open   room organization consistent   safety round/check completed  Taken 10/2/2024 1218 by Cecilia Velazco, RN  Safety Promotion/Fall Prevention:   activity supervised   clutter free environment maintained   lighting adjusted   mobility aid in reach   nonskid shoes/slippers when out of bed   patient and family education   room door open   room organization consistent   safety round/check completed  Taken 10/2/2024 0746 by Cecilia Velazco, RN  Safety Promotion/Fall Prevention:   activity supervised   clutter free environment maintained   lighting adjusted   mobility aid in reach   nonskid " shoes/slippers when out of bed   patient and family education   room door open   room organization consistent   safety round/check completed  Intervention: Prevent Skin Injury  Recent Flowsheet Documentation  Taken 10/2/2024 1621 by Cecilia Velazco RN  Body Position: position changed independently  Taken 10/2/2024 1500 by Cecilia Velazco RN  Body Position: heels elevated  Taken 10/2/2024 1248 by Cecilia Velazco RN  Body Position:   heels elevated   supine  Taken 10/2/2024 1218 by Cecilia Velazco RN  Body Position: position changed independently  Taken 10/2/2024 0800 by Cecilia Velazco RN  Body Position:   turned   right  Taken 10/2/2024 0746 by Cecilia Velazco RN  Body Position: position changed independently  Goal: Optimal Comfort and Wellbeing  Outcome: Progressing   Goal Outcome Evaluation:      Able to get up to the chair with assist of 1 denied pain declined Tylenol ice pack placed on back for comfort .

## 2024-10-02 NOTE — PROGRESS NOTES
Infectious Diseases Progress Note  [facility]    Date of visit: 10/02/2024     ASSESSMENT   84-year-old woman with a history of end-stage renal disease, on peritoneal dialysis, A-fib, CHF, ulcerative colitis who is admitted with increasing weakness and abdominal pain.  ID is consulted regarding concerns for peritonitis.    Abdominal pain.  Patient is on peritoneal dialysis, managed by her adult children.  No recent complications.  Reporting clear effluent.  CT scan of the abdomen with some lymphadenopathy, without other signs of occult infection.  No pain around the dialysis catheter.  Fluid drawn from catheter with low cell count (3 wbc). Cultures no growth. Antibiotics discontinued on 9/30. Clinically feels better. WBC a little better.    Positive blood culture. Single blood culture on admission with GPC in clusters, identified as S.epidermidis on verigene. Repeat culture on 9/29/2024 no growth so far.  Most likely contaminant.    Fungal  dermatitis.  New area of pain in the lower abdomen with rash  History of urinary tract infection.  Patient makes very little urine, but she does have a history of urinary tract infection.  Osteomyelitis of the right third toe as seen on x-ray of the foot.  MRI ordered.    Active Problems:    Vaginal bleeding    General weakness    Elevated troponin    Rapid atrial fibrillation (H)    Decreased oral intake       PLAN   -I concur with MRI of the right foot as ordered.  -Podiatry consult  -Monitor off antibiotics.  -Clotrimazole cream to the lower abdomen    Discussed with the patient, and nursing staff.    Kim Leyva MD  [facility]  Direct messaging: [email or web address] Paging  On-Call ID provider: [phone], option: 9      ===========================================        SUBJECTIVE / INTERVAL HISTORY:   Doing better today.  X-ray of the right foot reviewed.  Osteomyelitis as above.      Antibiotics   Vancomycin 9/28-30  Zosyn  "9/28-30    Previous:  None      Physical Exam     Temp:  [97.3  F (36.3  C)-97.7  F (36.5  C)] 97.5  F (36.4  C)  Pulse:  [] 93  Resp:  [18-20] 18  BP: ()/(62-74) 94/62  SpO2:  [94 %-97 %] 94 %    BP 94/62 (BP Location: Left arm, Cuff Size: Adult Regular)   Pulse 93   Temp 97.5  F (36.4  C) (Oral)   Resp 18   Ht 1.549 m (5' 1\")   Wt 74.2 kg (163 lb 9.6 oz)   LMP  (LMP Unknown)   SpO2 94%   BMI 30.91 kg/m      GENERAL:  well-developed, well-nourished, lying in bed in no acute distress.   HENT:  Head is normocephalic, atraumatic. EYES:  Eyes have anicteric sclerae without conjunctival injection   LUNGS:  Clear to auscultation.  CARDIOVASCULAR:  Regular rate and rhythm with no murmurs, gallops or rubs.  ABDOMEN:  soft, tender around umbilicus fold. Peritoneal catheter without surrounding inflammation or tenderness  EXT: left leg edema, chronic . Ulcer of the tip of the R 2nd toe.   SKIN: Erythema in the lower pannus without skin break. No stigmata of endocarditis.  NEUROLOGIC:  Grossly nonfocal.      Cultures   9/28 blood culture: GPC in clusters  9/29 blood culture: pending     Susceptibility data from last 90 days.  Collected Specimen Info Organism   09/28/24 Peritoneal Fluid from Peritoneum Gram positive bacilli   09/28/24 Peripheral Blood Staphylococcus epidermidis          Pertinent Labs:     Recent Labs   Lab 10/02/24  0454 10/01/24  0427 09/30/24  1445   WBC 16.9* 17.9* 20.1*   HGB 14.0 13.1 13.7   * 150 154       Recent Labs   Lab 10/02/24  0454 10/01/24  0427 09/30/24  0702 09/29/24  0756    137 136 135   CO2 25 22 22 20*   BUN 29.8* 29.9* 29.9* 30.8*   ALBUMIN  --   --   --  3.0*   ALKPHOS  --   --   --  126   ALT  --   --   --  46   AST  --   --   --  53*       Recent Labs   Lab 10/02/24  0454 09/28/24  1331   CRPI 93.60* 20.30*           Imaging:     XR Foot Right G/E 3 Views    Result Date: 10/1/2024  EXAM: XR FOOT RIGHT G/E 3 VIEWS LOCATION: [facility]" HOSPITAL DATE: 10/1/2024 INDICATION: Ulcer of the tip of the R second toe. Look for osteomyelitis COMPARISON: None.     IMPRESSION: There is deformity of the third middle phalanx with suggestion of erosion of the third proximal phalanx base concerning for septic arthritis and osteomyelitis. No definite erosion of the second toe. Moderate midfoot osteoarthritis and first metatarsophalangeal joint osteoarthritis. Bipartite tibial great toe sesamoid. Plantar calcaneal spur. Achilles enthesophyte. Vascular calcifications.    Echocardiogram Complete    Result Date: 2024  [id number]^REMI^[place] [address]  Name: LUKE AGUILERA MRN: [id number] : 1940 Study Date: 2024 07:25 AM Age: 84 yrs Gender: Female Patient Location: [facility] Reason For Study: Atrial Fibrillation Ordering Physician: MINOR KHALIL Performed By:   BSA: 1.8 m2 Height: 61 in Weight: 171 lb HR: 94 ______________________________________________________________________________ Procedure Complete Portable Echo Adult. Definity (NDC #93843-991) given intravenously. No hemodynamically significant valvular abnormalities on 2D or color flow imaging. Compared to the prior study dated 2023, there have been no changes. ______________________________________________________________________________ Interpretation Summary  1.Left ventricular function is decreased. The ejection fraction is 30-35% (moderately reduced). 2.Mildly decreased right ventricular systolic function 3.The left atrium is moderately dilated. 4.No hemodynamically significant valvular abnormalities on 2D or color flow imaging. Compared to the prior study dated 2023, the LV EF looks lower and the pulmonic pressures are estimated lower. ______________________________________________________________________________ I      WMSI = 2.00     % Normal = 0  X - Cannot   0 -                       (2) - Mildly 2 -          Segments  Size Interpret    Hyperkinetic 1 - Normal  Hypokinetic  Hypokinetic  1-2     small                                                    7 -          3-5    moderate 3 - Akinetic 4 -          5 -         6 - Akinetic Dyskinetic   6-14    large              Dyskinetic   Aneurysmal  w/scar       w/scar       15-16   diffuse  Left Ventricle Left ventricular function is decreased. The ejection fraction is 30-35% (moderately reduced). There is normal left ventricular wall thickness. There is borderline concentric left ventricular hypertrophy. Left ventricular diastolic function is not assessable. No regional wall motion abnormalities noted.  Right Ventricle The right ventricle is normal size. TAPSE is abnormal, which is consistent with abnormal right ventricular systolic function. Mildly decreased right ventricular systolic function.  Atria The left atrium is moderately dilated. Right atrial size is normal. There is no color Doppler evidence of an atrial shunt.  Mitral Valve There is mild mitral annular calcification. There is trace mitral regurgitation. There is no mitral valve stenosis.  Tricuspid Valve The tricuspid valve is not well visualized, but is grossly normal. Right ventricle systolic pressure estimate normal. There is trace to mild tricuspid regurgitation. There is no tricuspid stenosis.  Aortic Valve Aortic valve leaflets appear normal. There is no evidence of aortic stenosis or clinically significant aortic regurgitation. The aortic valve is trileaflet. No aortic regurgitation is present. No aortic stenosis is present.  Pulmonic Valve The pulmonic valve is not well seen, but is grossly normal. This degree of valvular regurgitation is within normal limits. There is no pulmonic valvular stenosis.  Vessels The aorta root is normal. Normal size ascending aorta. IVC diameter <2.1 cm collapsing >50% with sniff suggests a normal RA pressure of 3 mmHg.  Pericardium There is no  pericardial effusion.  Rhythm The rhythm was atrial fibrillation.  ______________________________________________________________________________ MMode/2D Measurements & Calculations IVSd: 1.2 cm LVIDd: 4.3 cm LVIDs: 3.6 cm LVPWd: 1.2 cm  FS: 17.4 % LV mass(C)d: 189.4 grams LV mass(C)dI: 107.2 grams/m2 Ao root diam: 2.9 cm asc Aorta Diam: 3.6 cm LVOT diam: 2.0 cm LVOT area: 3.1 cm2 Ao root diam index Ht(cm/m): 1.8 Ao root diam index BSA (cm/m2): 1.6 Asc Ao diam index BSA (cm/m2): 2.0 Asc Ao diam index Ht(cm/m): 2.3 EF Biplane: 33.4 % LA Volume (BP): 64.9 ml  LA Volume Index (BP): 36.7 ml/m2 LA Volume Indexed (AL/bp): 38.7 ml/m2 RV Base: 3.8 cm RWT: 0.56  Doppler Measurements & Calculations MV E max herb: 68.5 cm/sec MV dec slope: 432.7 cm/sec2 MV dec time: 0.16 sec  Ao V2 max: 96.5 cm/sec Ao max P.0 mmHg Ao V2 mean: 75.9 cm/sec Ao mean PG: 3.0 mmHg Ao V2 VTI: 20.0 cm GABRIEL(I,D): 1.4 cm2 GABRIEL(V,D): 2.1 cm2 LV V1 max P.6 mmHg LV V1 max: 63.6 cm/sec LV V1 VTI: 8.8 cm SV(LVOT): 27.7 ml SI(LVOT): 15.7 ml/m2 PA V2 max: 57.8 cm/sec PA max P.3 mmHg PA acc time: 0.07 sec PI end-d herb: 133.8 cm/sec TR max herb: 264.0 cm/sec TR max P.9 mmHg AV Herb Ratio (DI): 0.66 GABRIEL Index (cm2/m2): 0.78 E/E' av.4 Lateral E/e': 14.6 Medial E/e': 12.1 RV S Herb: 9.5 cm/sec  ______________________________________________________________________________ Report approved by: Brown Waldrop 2024 09:52 AM       US Lower Extremity Venous Duplex Bilateral    Result Date: 2024  EXAM: US LOWER EXTREMITY VENOUS DUPLEX BILATERAL LOCATION: [facility] DATE: 2024 INDICATION: edema legs , on DOAC, make sure no clots COMPARISON: None. TECHNIQUE: Venous Duplex ultrasound of bilateral lower extremities with and without compression, augmentation and duplex. Color flow and spectral Doppler with waveform analysis performed. FINDINGS: Exam includes the common femoral, femoral, popliteal veins as well as  segmentally visualized deep calf veins and greater saphenous vein. RIGHT: No deep vein thrombosis. No superficial thrombophlebitis. No popliteal cyst. LEFT: No deep vein thrombosis. No superficial thrombophlebitis. No popliteal cyst.     IMPRESSION: No deep venous thrombosis in the bilateral lower extremities.    US Pelvic Limited    Result Date: 9/29/2024  EXAM: US PELVIC LIMITED LOCATION: [facility] DATE: 9/29/2024 INDICATION: vag bleeding x 1 month COMPARISON: None. TECHNIQUE: Transabdominal scans were performed. Endovaginal ultrasound was declined by the patient. FINDINGS: Uterus and ovaries not identified. No significant free fluid.     IMPRESSION: Uterus and ovaries unable to be identified due to technical factors and body habitus.     CT Chest Abdomen Pelvis w/o Contrast    Result Date: 9/28/2024  EXAM: CT CHEST ABDOMEN PELVIS W/O CONTRAST LOCATION: [facility] DATE: 9/28/2024 INDICATION: gen weakness and feels unwell COMPARISON: 11/5/2023 TECHNIQUE: CT scan of the chest, abdomen, and pelvis was performed without IV contrast. Multiplanar reformats were obtained. Dose reduction techniques were used. CONTRAST: None. FINDINGS: Mild motion artifact. LUNGS AND PLEURA: Mild emphysema. Stable mild diffuse bronchial wall thickening. Elevated right hemidiaphragm. Right basilar atelectasis. No pleural effusion. Calcified granuloma. A few stable benign solid pulmonary nodules with the largest measuring 0.3  cm in the left upper lobe, image 81:4. MEDIASTINUM/AXILLAE: Stable enlarged multinodular thyroid. No lymphadenopathy. Trace air in the main pulmonary artery and right subclavian vein are most likely secondary to intravenous line placement. CORONARY ARTERY CALCIFICATION: Moderate. HEPATOBILIARY: Cholelithiasis. PANCREAS: Normal. SPLEEN: Normal. ADRENAL GLANDS: Normal. KIDNEYS/BLADDER: A simple cyst midpole right kidney, no follow-up required. Bilateral renal atrophy.  BOWEL: No obstruction or inflammatory change. Appendix not visualized. LYMPH NODES: Increasing retroperitoneal lymphadenopathy with the largest being a left para-aortic node measuring 1.4 cm in short axis, previously 0.8 cm, image 183:3. A 2.6 x 1.6 cm left external iliac node, previously not visualized, image 224:3. A 2.4 x 1.7 cm right external iliac node, previously 1.0 x 0.6 cm, image 226:3. VASCULATURE: Moderately severe atherosclerosis. Stable ectasia of the abdominal aorta. PELVIC ORGANS: Peritoneal dialysis catheter. Small-moderate volume ascites. MUSCULOSKELETAL: Left hip arthroplasty. Degenerative changes. Severe osseous demineralization.     IMPRESSION: 1.  Increasing retroperitoneal and pelvic lymphadenopathy. 2.  Small-moderate volume ascites.         Data reviewed today: I reviewed all medications, new labs and imaging results over the last 24 hours. I personally reviewed no images or EKG's today.  The patient's care was discussed with the Bedside Nurse, Patient, and Patient's Family.

## 2024-10-02 NOTE — PLAN OF CARE
Problem: Risk for Delirium  Goal: Improved Sleep  Outcome: Progressing     Problem: Infection  Goal: Absence of Infection Signs and Symptoms  Outcome: Progressing     Problem: Risk for Delirium  Goal: Optimal Coping  Outcome: Progressing  Intervention: Optimize Psychosocial Adjustment to Delirium  Recent Flowsheet Documentation  Taken 10/1/2024 2312 by Ever Suggs RN  Supportive Measures:   active listening utilized   verbalization of feelings encouraged  Taken 10/1/2024 2000 by Ever Suggs RN  Supportive Measures:   active listening utilized   verbalization of feelings encouraged   Goal Outcome Evaluation:  Patient denied pain. Repositions self and patient allows staff to reposition her. Since 1900 patient has had several loose stools. Dr. Vincent notified and C.diff collected. Patient now placed in Enteric isolation until results come back. Chasity and buttock area red and painful. Calmoseptine applied.  PD started around 2030. Patient tolerating it well.   Remains in Afib HR controlled.

## 2024-10-03 NOTE — PLAN OF CARE
"  Problem: Adult Inpatient Plan of Care  Goal: Plan of Care Review  Description: The Plan of Care Review/Shift note should be completed every shift.  The Outcome Evaluation is a brief statement about your assessment that the patient is improving, declining, or no change.  This information will be displayed automatically on your shift  note.  10/3/2024 1543 by Karen Lopez RN  Outcome: Progressing  10/3/2024 1543 by Karen Lopez RN  Outcome: Progressing  Flowsheets (Taken 10/3/2024 1542)  Plan of Care Reviewed With:   patient   child  Goal: Patient-Specific Goal (Individualized)  Description: You can add care plan individualizations to a care plan. Examples of Individualization might be:  \"Parent requests to be called daily at 9am for status\", \"I have a hard time hearing out of my right ear\", or \"Do not touch me to wake me up as it startles  me\".  10/3/2024 1543 by Karen Lopez RN  Outcome: Progressing  10/3/2024 1543 by Karen Lopez RN  Outcome: Progressing  Goal: Absence of Hospital-Acquired Illness or Injury  10/3/2024 1543 by Karen Lopez RN  Outcome: Progressing  10/3/2024 1543 by Karen Lopez RN  Outcome: Progressing  Intervention: Identify and Manage Fall Risk  Recent Flowsheet Documentation  Taken 10/3/2024 0815 by Karen Lopez RN  Safety Promotion/Fall Prevention:   nonskid shoes/slippers when out of bed   mobility aid in reach   clutter free environment maintained   activity supervised  Intervention: Prevent Skin Injury  Recent Flowsheet Documentation  Taken 10/3/2024 0815 by Karen Lopez RN  Body Position:   side-lying   right  Skin Protection:   adhesive use limited   incontinence pads utilized  Device Skin Pressure Protection:   adhesive use limited   absorbent pad utilized/changed  Intervention: Prevent Infection  Recent Flowsheet Documentation  Taken 10/3/2024 0815 by Karen Lopez RN  Infection Prevention:   environmental surveillance performed   hand hygiene promoted   rest/sleep " promoted  Goal: Optimal Comfort and Wellbeing  10/3/2024 1543 by Karen Lopez RN  Outcome: Progressing  10/3/2024 1543 by Karen Lopez RN  Outcome: Progressing  Goal: Readiness for Transition of Care  10/3/2024 1543 by Karen Lopez RN  Outcome: Progressing  10/3/2024 1543 by Karen Lopez RN  Outcome: Progressing     Problem: Risk for Delirium  Goal: Optimal Coping  10/3/2024 1543 by Karen Lopez RN  Outcome: Progressing  10/3/2024 1543 by Karen Lopez RN  Outcome: Progressing  Intervention: Optimize Psychosocial Adjustment to Delirium  Recent Flowsheet Documentation  Taken 10/3/2024 1230 by Karen Lopez RN  Supportive Measures: active listening utilized  Taken 10/3/2024 0815 by Karen Lopez RN  Supportive Measures: active listening utilized  Goal: Improved Behavioral Control  10/3/2024 1543 by Karen Lopez RN  Outcome: Progressing  10/3/2024 1543 by Karen Lopez RN  Outcome: Progressing  Intervention: Prevent and Manage Agitation  Recent Flowsheet Documentation  Taken 10/3/2024 1230 by Karen Lopez RN  Environment Familiarity/Consistency: daily routine followed  Taken 10/3/2024 0815 by Karen Lopez RN  Environment Familiarity/Consistency: daily routine followed  Intervention: Minimize Safety Risk  Recent Flowsheet Documentation  Taken 10/3/2024 1230 by Karen Lopez RN  Communication Enhancement Strategies: call light answered in person  Taken 10/3/2024 0815 by Karen Lopez RN  Communication Enhancement Strategies: call light answered in person  Enhanced Safety Measures: pain management  Goal: Improved Attention and Thought Clarity  10/3/2024 1543 by Karen Lopez RN  Outcome: Progressing  10/3/2024 1543 by Karen Lopez RN  Outcome: Progressing  Intervention: Maximize Cognitive Function  Recent Flowsheet Documentation  Taken 10/3/2024 1230 by Karen Lopez RN  Reorientation Measures:   calendar in view   clock in view  Taken 10/3/2024 0815 by Karen Lopez RN  Sensory Stimulation Regulation: care  clustered  Reorientation Measures:   clock in view   calendar in view  Goal: Improved Sleep  10/3/2024 1543 by Karen Lopez RN  Outcome: Progressing  10/3/2024 1543 by Karen Lopez RN  Outcome: Progressing     Problem: Pain Chronic (Persistent)  Goal: Optimal Pain Control and Function  10/3/2024 1543 by Karen Lopez RN  Outcome: Progressing  10/3/2024 1543 by Karen Lopez RN  Outcome: Progressing  Intervention: Manage Persistent Pain  Recent Flowsheet Documentation  Taken 10/3/2024 0815 by Karen Lopez RN  Medication Review/Management: medications reviewed  Intervention: Optimize Psychosocial Wellbeing  Recent Flowsheet Documentation  Taken 10/3/2024 1230 by Karen Lopez RN  Supportive Measures: active listening utilized  Taken 10/3/2024 0815 by Karen Lopez RN  Supportive Measures: active listening utilized   Goal Outcome Evaluation:      Plan of Care Reviewed With: patient, child      Pt remains high risk for falls. Assist 3 & ceiling lift was used today to get pt back to bed from the commode. MD was updated and risk for falls were discussed with pt's adult children who care for her Rose & Tatyana.  Pt tolerated PD with no complications. Pt is not tele status. Pt eats 25% of her meals.  Red blood was noted today when pt used the bedside commode, MD were updated. Discharge planning in expected.

## 2024-10-03 NOTE — PROCEDURES
CCPD cycler set up per orders of KYM Oliveira    Total volume (ml) : 13,800    Therapy time:12 hrs     Fill volume (ml): 2,300    Cycles: 6    Dextrose 2.5% volume (ml): 14,500     PD catheter visualized. Machine ready to connect. Report given to MEGAN Fitch RN.    NIKOS Suggs RN [facility]

## 2024-10-03 NOTE — PROGRESS NOTES
Care Management Follow Up    Length of Stay (days): 5    Expected Discharge Date: 10/03/2024     Concerns to be Addressed:     Care progression - discharge planning  Patient plan of care discussed at interdisciplinary rounds: Yes    Anticipated Discharge Disposition:  Transitional Care vs home with home care    Anticipated Discharge Services:  Transitional Care vs home with home care  Anticipated Discharge DME:  NA    Patient/family educated on Medicare website which has current facility and service quality ratings:  NA  Education Provided on the Discharge Plan:  Yes per team  Patient/Family in Agreement with the Plan:  Yes    Referrals Placed by CM/SW:  Yes  Private pay costs discussed: Not applicable    Discussed  Partnership in Safe Discharge Planning  document with patient/family: No     Handoff Completed: No, handoff not indicated or clinically appropriate    Additional Information:  Per provider, Dr. Matos, patient is not ready. Patient may benefit from TCU placement.    Called patient's son, Tatyana, and left a message to return call.    Social Hx:  Assessment: Follow. From home w/family; 24 hr care; home PD  Last Note: 10/3/24  Plan: Home with home care for RN/PT/OT/A - [facility]  Needs: NA  Hand off sent: NA  Transport: Family    Next Steps: RNCM to follow for medical progression, recommendations, and final discharge plan.     Jennifer Westfall RN     Rec'd a message from bedside nurse, Karen, the TCU that can accommodate PD patients are all in the [place] area. Family wants to know f they can go to a TCU close to home and family will do the PD at the TCU?    Tatyana called back. Informed him on the PD TCU list [facility] [address] [facility] in [place]. The other TCU, they may not allow family to go and do the PD, but will ask each accepting TCU if they will allow.  Tatyana wants a referral sent to [facility] on [address]  Tatyana will look through the TCU list and give more choices  Writer provided a list of  local skilled nursing [facility] (which includes the medicare.gov website) for patient and family to review.

## 2024-10-03 NOTE — PROGRESS NOTES
Infectious Diseases Progress Note  [facility]    Date of visit: 10/03/2024     ASSESSMENT   84-year-old woman with a history of end-stage renal disease, on peritoneal dialysis, A-fib, CHF, ulcerative colitis who is admitted with increasing weakness and abdominal pain.  ID is consulted regarding concerns for peritonitis.    Abdominal pain.  Patient is on peritoneal dialysis, managed by her adult children.  No recent complications.  Reporting clear effluent.  CT scan of the abdomen with some lymphadenopathy, without other signs of occult infection.  No pain around the dialysis catheter.  Fluid drawn from catheter with low cell count (3 wbc). Cultures no growth. Antibiotics discontinued on 9/30. Clinically feels better. WBC a little better.    Positive blood culture. Single blood culture on admission with GPC in clusters, identified as S.epidermidis on verigene. Repeat culture on 9/29/2024 no growth so far.  Most likely contaminant.    Fungal  dermatitis.  New area of pain in the lower abdomen with rash  History of urinary tract infection.  Patient makes very little urine, but she does have a history of urinary tract infection.  Osteomyelitis of the right third toe as seen on x-ray of the foot.  MRI did not show any osteomyelitis    Principal Problem:    Sepsis without acute organ dysfunction (H)  Active Problems:    Chronic kidney disease, stage 3 (H)    ESRD (end stage renal disease) on dialysis (H)    Vaginal bleeding    General weakness    Elevated troponin    Rapid atrial fibrillation (H)    Decreased oral intake    Right second toe ulcer (H)       PLAN   Monitor off antibiotics  Foot care per podiatry.    Discussed with the patient, daughter at bedside, and nursing staff.    ID will sign off.    Kim Leyva MD  [facility]  Direct messaging: [employer] Paging  On-Call ID provider: [phone], option: 9      ===========================================        SUBJECTIVE /  "INTERVAL HISTORY:   Overall feels better.  MRI was reviewed.      Antibiotics   Vancomycin 9/28-30  Zosyn 9/28-30    Previous:  None      Physical Exam     Temp:  [97.4  F (36.3  C)-97.9  F (36.6  C)] 97.4  F (36.3  C)  Pulse:  [] 98  Resp:  [18-20] 20  BP: ()/(62-81) 115/75  SpO2:  [91 %-100 %] 91 %    /75 (BP Location: Left arm)   Pulse 98   Temp 97.4  F (36.3  C) (Oral)   Resp 20   Ht 1.549 m (5' 1\")   Wt 74.2 kg (163 lb 9.6 oz)   LMP  (LMP Unknown)   SpO2 91%   BMI 30.91 kg/m      GENERAL:  well-developed, well-nourished, lying in bed in no acute distress.   HENT:  Head is normocephalic, atraumatic. EYES:  Eyes have anicteric sclerae without conjunctival injection   LUNGS:  Clear to auscultation.  CARDIOVASCULAR:  Regular rate and rhythm with no murmurs, gallops or rubs.  ABDOMEN:  soft, tender around umbilicus fold. Peritoneal catheter without surrounding inflammation or tenderness  EXT: left leg edema, chronic . Ulcer of the tip of the R 2nd toe.   SKIN: Erythema in the lower pannus without skin break. No stigmata of endocarditis.  NEUROLOGIC:  Grossly nonfocal.      Cultures   9/28 blood culture: GPC in clusters  9/29 blood culture: pending     Susceptibility data from last 90 days.  Collected Specimen Info Organism   09/28/24 Peritoneal Fluid from Peritoneum Streptomyces species   09/28/24 Peripheral Blood Staphylococcus epidermidis          Pertinent Labs:     Recent Labs   Lab 10/02/24  0454 10/01/24  0427 09/30/24  1445   WBC 16.9* 17.9* 20.1*   HGB 14.0 13.1 13.7   * 150 154       Recent Labs   Lab 10/02/24  0454 10/01/24  0427 09/30/24  0702 09/29/24  0756    137 136 135   CO2 25 22 22 20*   BUN 29.8* 29.9* 29.9* 30.8*   ALBUMIN  --   --   --  3.0*   ALKPHOS  --   --   --  126   ALT  --   --   --  46   AST  --   --   --  53*       Recent Labs   Lab 10/02/24  0454 09/28/24  1331   CRPI 93.60* 20.30*           Imaging:     MR Foot Right w/o Contrast    Result Date: " 10/2/2024  EXAM: MR FOOT RIGHT W/O CONTRAST LOCATION: [facility] DATE: 10/2/2024 INDICATION: Evaluate for right 3rd toe osteomyelitis. COMPARISON: Right foot radiographic exam 10/1/2024. TECHNIQUE: Unenhanced. FINDINGS: JOINTS AND BONES: -Chronic-appearing deformity at the distal margin of the middle phalanx 3rd toe, and chronic deformity at the corresponding 3rd toe distal phalangeal base. No edema-like marrow signal intensity in the corresponding bones at the remaining distal phalanx 3rd toe or remaining middle phalanx 3rd toe. Findings could be related to previous postoperative change or chronic destruction. Nothing definitive for acute 3rd toe osteomyelitis or septic arthritis currently. -Probable ulcer at the tip of the 2nd toe. No definitive evidence for remaining toe acute osteomyelitis. -First MTP and metatarsal sesamoid arthrosis. Degenerative change in the midfoot is moderate to severe and pronounced at the 3rd TMT joint. Advanced navicular-medial cuneiform arthrosis. Bipartite hallux tibial sesamoid. No evidence for acute right foot fracture or classic metatarsal stress fracture. TENDONS: -No acute forefoot tendon injury or significant tenosynovitis. Distal peroneus longus tendon intact. LIGAMENTS: -Lisfranc ligament intact. No midfoot subluxation. MUSCLES AND SOFT TISSUES: -Moderate intrinsic foot muscle atrophy with patchy muscle edema which could represent myositis or denervation edema. Visualized plantar fascia intact.     IMPRESSION: 1.  Chronic-appearing bone loss/deformities at the distal aspect middle phalanx 3rd toe and distal phalangeal base 3rd toe centered at the DIP joint. Findings could be related to chronic postoperative change or chronic bony destruction. No convincing evidence for acute 3rd toe osteomyelitis. 2.  Probable ulcer at the tip of the 2nd toe without evidence for acute 2nd toe osteomyelitis. 3.  Advanced navicular-medial cuneiform and 3rd TMT joint  chondromalacia. Patchy juxta-articular marrow edema and subchondral cystic change in the midfoot. 4.  First MTP and metatarsal sesamoid chondromalacia. 5.  No acute right forefoot tendon injury or significant tenosynovitis. 6.  No drainable fluid collection to suggest abscess.     XR Foot Right G/E 3 Views    Result Date: 10/1/2024  EXAM: XR FOOT RIGHT G/E 3 VIEWS LOCATION: [facility] DATE: 10/1/2024 INDICATION: Ulcer of the tip of the R second toe. Look for osteomyelitis COMPARISON: None.     IMPRESSION: There is deformity of the third middle phalanx with suggestion of erosion of the third proximal phalanx base concerning for septic arthritis and osteomyelitis. No definite erosion of the second toe. Moderate midfoot osteoarthritis and first metatarsophalangeal joint osteoarthritis. Bipartite tibial great toe sesamoid. Plantar calcaneal spur. Achilles enthesophyte. Vascular calcifications.    Echocardiogram Complete    Result Date: 2024  [id number]^REMI^[place] [address]  Name: LUKE AGUILERA MRN: [id number] : 1940 Study Date: 2024 07:25 AM Age: 84 yrs Gender: Female Patient Location: [facility] Reason For Study: Atrial Fibrillation Ordering Physician: MINOR KHALIL Performed By: CORINA  BSA: 1.8 m2 Height: 61 in Weight: 171 lb HR: 94 ______________________________________________________________________________ Procedure Complete Portable Echo Adult. Definity (NDC #54728-309) given intravenously. No hemodynamically significant valvular abnormalities on 2D or color flow imaging. Compared to the prior study dated 2023, there have been no changes. ______________________________________________________________________________ Interpretation Summary  1.Left ventricular function is decreased. The ejection fraction is 30-35% (moderately reduced). 2.Mildly decreased right ventricular  systolic function 3.The left atrium is moderately dilated. 4.No hemodynamically significant valvular abnormalities on 2D or color flow imaging. Compared to the prior study dated 11/6/2023, the LV EF looks lower and the pulmonic pressures are estimated lower. ______________________________________________________________________________ I      WMSI = 2.00     % Normal = 0  X - Cannot   0 -                      (2) - Mildly 2 -          Segments  Size Interpret    Hyperkinetic 1 - Normal  Hypokinetic  Hypokinetic  1-2     small                                                    7 -          3-5    moderate 3 - Akinetic 4 -          5 -         6 - Akinetic Dyskinetic   6-14    large              Dyskinetic   Aneurysmal  w/scar       w/scar       15-16   diffuse  Left Ventricle Left ventricular function is decreased. The ejection fraction is 30-35% (moderately reduced). There is normal left ventricular wall thickness. There is borderline concentric left ventricular hypertrophy. Left ventricular diastolic function is not assessable. No regional wall motion abnormalities noted.  Right Ventricle The right ventricle is normal size. TAPSE is abnormal, which is consistent with abnormal right ventricular systolic function. Mildly decreased right ventricular systolic function.  Atria The left atrium is moderately dilated. Right atrial size is normal. There is no color Doppler evidence of an atrial shunt.  Mitral Valve There is mild mitral annular calcification. There is trace mitral regurgitation. There is no mitral valve stenosis.  Tricuspid Valve The tricuspid valve is not well visualized, but is grossly normal. Right ventricle systolic pressure estimate normal. There is trace to mild tricuspid regurgitation. There is no tricuspid stenosis.  Aortic Valve Aortic valve leaflets appear normal. There is no evidence of aortic stenosis or clinically significant aortic regurgitation. The aortic valve is trileaflet. No aortic  regurgitation is present. No aortic stenosis is present.  Pulmonic Valve The pulmonic valve is not well seen, but is grossly normal. This degree of valvular regurgitation is within normal limits. There is no pulmonic valvular stenosis.  Vessels The aorta root is normal. Normal size ascending aorta. IVC diameter <2.1 cm collapsing >50% with sniff suggests a normal RA pressure of 3 mmHg.  Pericardium There is no pericardial effusion.  Rhythm The rhythm was atrial fibrillation.  ______________________________________________________________________________ MMode/2D Measurements & Calculations IVSd: 1.2 cm LVIDd: 4.3 cm LVIDs: 3.6 cm LVPWd: 1.2 cm  FS: 17.4 % LV mass(C)d: 189.4 grams LV mass(C)dI: 107.2 grams/m2 Ao root diam: 2.9 cm asc Aorta Diam: 3.6 cm LVOT diam: 2.0 cm LVOT area: 3.1 cm2 Ao root diam index Ht(cm/m): 1.8 Ao root diam index BSA (cm/m2): 1.6 Asc Ao diam index BSA (cm/m2): 2.0 Asc Ao diam index Ht(cm/m): 2.3 EF Biplane: 33.4 % LA Volume (BP): 64.9 ml  LA Volume Index (BP): 36.7 ml/m2 LA Volume Indexed (AL/bp): 38.7 ml/m2 RV Base: 3.8 cm RWT: 0.56  Doppler Measurements & Calculations MV E max herb: 68.5 cm/sec MV dec slope: 432.7 cm/sec2 MV dec time: 0.16 sec  Ao V2 max: 96.5 cm/sec Ao max P.0 mmHg Ao V2 mean: 75.9 cm/sec Ao mean PG: 3.0 mmHg Ao V2 VTI: 20.0 cm GABRIEL(I,D): 1.4 cm2 GABRIEL(V,D): 2.1 cm2 LV V1 max P.6 mmHg LV V1 max: 63.6 cm/sec LV V1 VTI: 8.8 cm SV(LVOT): 27.7 ml SI(LVOT): 15.7 ml/m2 PA V2 max: 57.8 cm/sec PA max P.3 mmHg PA acc time: 0.07 sec PI end-d herb: 133.8 cm/sec TR max herb: 264.0 cm/sec TR max P.9 mmHg AV Herb Ratio (DI): 0.66 GABRIEL Index (cm2/m2): 0.78 E/E' av.4 Lateral E/e': 14.6 Medial E/e': 12.1 RV S Herb: 9.5 cm/sec  ______________________________________________________________________________ Report approved by: Brown Waldrop 2024 09:52 AM       US Lower Extremity Venous Duplex Bilateral    Result Date: 2024  EXAM: US LOWER EXTREMITY VENOUS  DUPLEX BILATERAL LOCATION: [facility] DATE: 9/29/2024 INDICATION: edema legs , on DOAC, make sure no clots COMPARISON: None. TECHNIQUE: Venous Duplex ultrasound of bilateral lower extremities with and without compression, augmentation and duplex. Color flow and spectral Doppler with waveform analysis performed. FINDINGS: Exam includes the common femoral, femoral, popliteal veins as well as segmentally visualized deep calf veins and greater saphenous vein. RIGHT: No deep vein thrombosis. No superficial thrombophlebitis. No popliteal cyst. LEFT: No deep vein thrombosis. No superficial thrombophlebitis. No popliteal cyst.     IMPRESSION: No deep venous thrombosis in the bilateral lower extremities.    US Pelvic Limited    Result Date: 9/29/2024  EXAM: US PELVIC LIMITED LOCATION: [facility] DATE: 9/29/2024 INDICATION: vag bleeding x 1 month COMPARISON: None. TECHNIQUE: Transabdominal scans were performed. Endovaginal ultrasound was declined by the patient. FINDINGS: Uterus and ovaries not identified. No significant free fluid.     IMPRESSION: Uterus and ovaries unable to be identified due to technical factors and body habitus.     CT Chest Abdomen Pelvis w/o Contrast    Result Date: 9/28/2024  EXAM: CT CHEST ABDOMEN PELVIS W/O CONTRAST LOCATION: [facility] DATE: 9/28/2024 INDICATION: gen weakness and feels unwell COMPARISON: 11/5/2023 TECHNIQUE: CT scan of the chest, abdomen, and pelvis was performed without IV contrast. Multiplanar reformats were obtained. Dose reduction techniques were used. CONTRAST: None. FINDINGS: Mild motion artifact. LUNGS AND PLEURA: Mild emphysema. Stable mild diffuse bronchial wall thickening. Elevated right hemidiaphragm. Right basilar atelectasis. No pleural effusion. Calcified granuloma. A few stable benign solid pulmonary nodules with the largest measuring 0.3  cm in the left upper lobe, image 81:4.  MEDIASTINUM/AXILLAE: Stable enlarged multinodular thyroid. No lymphadenopathy. Trace air in the main pulmonary artery and right subclavian vein are most likely secondary to intravenous line placement. CORONARY ARTERY CALCIFICATION: Moderate. HEPATOBILIARY: Cholelithiasis. PANCREAS: Normal. SPLEEN: Normal. ADRENAL GLANDS: Normal. KIDNEYS/BLADDER: A simple cyst midpole right kidney, no follow-up required. Bilateral renal atrophy. BOWEL: No obstruction or inflammatory change. Appendix not visualized. LYMPH NODES: Increasing retroperitoneal lymphadenopathy with the largest being a left para-aortic node measuring 1.4 cm in short axis, previously 0.8 cm, image 183:3. A 2.6 x 1.6 cm left external iliac node, previously not visualized, image 224:3. A 2.4 x 1.7 cm right external iliac node, previously 1.0 x 0.6 cm, image 226:3. VASCULATURE: Moderately severe atherosclerosis. Stable ectasia of the abdominal aorta. PELVIC ORGANS: Peritoneal dialysis catheter. Small-moderate volume ascites. MUSCULOSKELETAL: Left hip arthroplasty. Degenerative changes. Severe osseous demineralization.     IMPRESSION: 1.  Increasing retroperitoneal and pelvic lymphadenopathy. 2.  Small-moderate volume ascites.         Data reviewed today: I reviewed all medications, new labs and imaging results over the last 24 hours. I personally reviewed no images or EKG's today.  The patient's care was discussed with the Bedside Nurse, Patient, and Patient's Family.

## 2024-10-03 NOTE — PROGRESS NOTES
[facility]    Medicine Progress Note - Hospitalist Service    Date of Admission:  9/28/2024    Assessment & Plan   84 year old female with PMHx of ESRD on PD, chronic low back pain, possible PMR, Afib on AC, ulcerative colitis, gout who presents with 2 days of acute onset generalized weakness, diffuse abdominal pain, and and nausea with vomiting. She was admitted for concern for possible bacterial peritonitis.She also notes over past week SUAREZ and intermit vaginal bleeding       #Sepsis there was concern for possible intra-abdominal infection, present on admission  -Secondary bacterial peritonitis with PD catheter -however negative cx   Presented with 2 days of abdominal pain, N/V, weakness. Found to be tachycardic with RVR, WBC 16.9. Diffuse abdominal tenderness on exam with PD catheter in place. CT C/A/P without contrast showed RP and pelvic lymphadenopathy. Clinical presentation most consistent with infectious peritonitis. UTI is also possible, patient unable to give sample initially as she makes more limited urine, though seems slightly less likely with normal appearing bladder/kidneys on non-con CT.  - ID following, vancomycin/Zosyn discontinued 9/30, per ID.  Low WBCs at 3 had fluid drawn from catheter.  PD cultures growing gram-positive bacilli, positive culture reported on 10/1.  ID made aware.  -pain is improved    #Positive blood culture with Staph epidermidis.  Repeat cultures on 9/29 without growth.  Likely contaminant.  Antibiotic discontinued as above.    #Fungal dermatitis, lower abdomen rash.  On clotrimazole cream.    #Increasing retroperitoneal and pelvic lymphadenopathy incidental finding on CT abdomen.   -this could be related to reaction to PD, infection, but with vag bleeding I am also concerned possible malignancy   -send smear, LDH  -gyn working up vaginal bleeding as much as pt will allow  -address PD issues      #Right second toe ulcer.  Concerning for cellulitis.  -  ID requested right foot x-ray negative for osteomyelitis  - Monitor off antibiotics  - Concern for right third toe osteomyelitis, per x-ray.  MRI negative for osteomyelitis  - Podiatry consult    #Lactic acidosis  -Anion-gap metabolic acidosis  Lactic acid 4.8 on admission in setting of sepsis  - S/p 500cc, down to 3.3, monitor  - Use IVF judiciously with ESRD     #ESRD on PD  -Chronic HFpEF without acute exacerbation  -Elevated NTpro-BNP due to hypervolemia  PD has been going well at home. Started 5/2024. Follows with Dr. Finn.  - Renal following  - PD per Renal  -Continue home torsemide 100mg daily,spironolactone 25mg daily  -seems to be tolerating PD here ok     #Permanent atrial fibrillation on chronic AC  -Rapid ventricular response  -170 on admission. Not on rate controlling agents as OP. Chart mentions intentionally not using BB. But reviewed and it was stopped for hypotension some years ago, not an acute issue here. Patient has no recollection o having issues with metoprolol.  - Stopped dilt gtt  - Start metoprolol 25mg q6h, titrate now to 50 mg bid   - Continue home Eliquis  - TTE 11/2023: EF 50-55%  -she notes SUAREZ this past week which may have been RVR-cards seeing      #Non-ischemic myocardial injury due to sepsis  HS tropoin 128 down to 118 on re-check. The patient denies CP, SOB, jaw/arm pain. Is nauseous with abdominal pain, but clinical picture fits abdominal infection more than ACS.  - EKG  Afib with RVR; narrow complex; TWI in II and aVF, no ST segment depression or elevation  -cards seeing      #R>L chronic LE edema  In setting of ESRD. Patient reports chronic and stable. Has had multiple joint surgeries on the right leg. On chronic AC.  -check ultrasound to be sure not clotting on DOAC--negative     #Blood in briefs--she is fairly sure this is vaginal  Patient reports 1 month of noticing consistent pink fluid in briefs, sometimes darker red. Doesn't make much urine.   - Pelvic US not able to  "visualize well  -GYN consult did see and did examine today when she agreed and notes today blood in vaginal vault--rec transvag ultrasound, she declined  -at this time I agree with gyn on differential-->\"atrophy, vulvar dermatologic conditions, cervical or uterine cancer.\"  -however, pt declining work up     #Possible polymyalgia rheumatica  Mentioned in PCP notes.  - Continue home prednisone 5mg PO BID  - Consider stress dose steroids if develops hypotension, but hypertensive on admission     #Chronic low back pain  -Chronic opioid use  Has been a long-term issue affecting mobility.  - Continue prednisone as above  - Continue home tramadol 50mg BID     #Gout  Was on febuxostat in the past. No acute flares right now.  - Continue home prednisone as above     #Ulcerative colitis  Noted in prior PCP notes, but stable.  - Chemical DVT ppx  -there was concern yesterday possible rectal bleeding too--gi consulted and she declined rectal exam      #GERD  - Continue home famotidine    #Physical deconditioning.  Progressing during hospitalization.  -PT/OT evaluate for disposition needs    Code status  DNR/DNI.      -Updated daughter in room during morning rounds    Diet: Snacks/Supplements Adult: Nepro Oral Supplement; With Meals  Regular Diet Adult    DVT Prophylaxis: DOAC  Lerma Catheter: Not present  Lines: PRESENT          Cardiac Monitoring: None  Code Status: No CPR- Do NOT Intubate      Clinically Significant Risk Factors     # Anion Gap Metabolic Acidosis: Highest Anion Gap = 19 mmol/L in last 2 days, will monitor and treat as appropriate  # Hypoalbuminemia: Lowest albumin = 3 g/dL at 9/29/2024  7:56 AM, will monitor as appropriate     # Hypertension: Noted on problem list    # Chronic heart failure with reduced ejection fraction: last echo with EF <40%          # Obesity: Estimated body mass index is 30.91 kg/m  as calculated from the following:    Height as of this encounter: 1.549 m (5' 1\").    Weight as of this " encounter: 74.2 kg (163 lb 9.6 oz).        # Financial/Environmental Concerns: none         Disposition Plan     Medically Ready for Discharge: Anticipated in 2-4 Days    Jacklyn Matos MD  Hospitalist Service  [facility]  Securely message with [employer] (more info)  Text page via [employer] Paging/Directory   ______________________________________________________________________    Interval History   Patient complaining feeling weaker requiring Donte lift for transfer.  Lateral information from patient's family, care daughter at bedside, patient's mobility significantly decreased lately.  She is pending hours sitting on the couch, and walks to the bathroom once/twice a day.  Patient has chest pain, dyspnea, cardiac palpitations, abdominal pain, nausea.  Decreased p.o. intake.  Family is concerned about patient's decreased p.o. fluid intake.  Reviewed results of right foot MRI-negative osteomyelitis.  Reviewed recommendations for TCU at discharge.    Physical Exam   Vital Signs: Temp: 97.4  F (36.3  C) Temp src: Oral BP: 125/72 Pulse: 96   Resp: 20 SpO2: 92 % O2 Device: None (Room air)    Weight: 163 lbs 9.6 oz    Constitutional: awake, alert, cooperative, and no apparent distress, forgetful  Eyes: sclera clear  Respiratory: no increased work of breathing, good air exchange, no retractions, and clear to auscultation  Cardiovascular: regular rate and rhythm and normal S1 and S2  GI: normal bowel sounds, soft, non-distended, pd cath site dressing intact, and non-tender  Skin: no bruising or bleeding  Musculoskeletal: no lower extremity pitting edema present  Neurologic: Mental Status Exam:  Level of Alertness:   awake  Neuropsychiatric: General: normal, calm, and normal eye contact    Medical Decision Making       40 MINUTES SPENT BY ME on the date of service doing chart review, history, exam, documentation & further activities per the note.      Data     I have personally reviewed the following  data over the past 24 hrs:    14.7 (H)  \   14.2   / 171     137 96 (L) 30.6 (H) /  111 (H)   3.5 23 5.58 (H) \       Imaging results reviewed over the past 24 hrs:   Recent Results (from the past 24 hour(s))   MR Foot Right w/o Contrast    Narrative    EXAM: MR FOOT RIGHT W/O CONTRAST  LOCATION: [facility]  DATE: 10/2/2024    INDICATION: Evaluate for right 3rd toe osteomyelitis.    COMPARISON: Right foot radiographic exam 10/1/2024.    TECHNIQUE: Unenhanced.    FINDINGS:   JOINTS AND BONES:   -Chronic-appearing deformity at the distal margin of the middle phalanx 3rd toe, and chronic deformity at the corresponding 3rd toe distal phalangeal base. No edema-like marrow signal intensity in the corresponding bones at the remaining distal phalanx   3rd toe or remaining middle phalanx 3rd toe. Findings could be related to previous postoperative change or chronic destruction. Nothing definitive for acute 3rd toe osteomyelitis or septic arthritis currently.  -Probable ulcer at the tip of the 2nd toe. No definitive evidence for remaining toe acute osteomyelitis.  -First MTP and metatarsal sesamoid arthrosis. Degenerative change in the midfoot is moderate to severe and pronounced at the 3rd TMT joint. Advanced navicular-medial cuneiform arthrosis. Bipartite hallux tibial sesamoid. No evidence for acute right foot   fracture or classic metatarsal stress fracture.    TENDONS:   -No acute forefoot tendon injury or significant tenosynovitis. Distal peroneus longus tendon intact.     LIGAMENTS:   -Lisfranc ligament intact. No midfoot subluxation.    MUSCLES AND SOFT TISSUES:   -Moderate intrinsic foot muscle atrophy with patchy muscle edema which could represent myositis or denervation edema. Visualized plantar fascia intact.      Impression    IMPRESSION:  1.  Chronic-appearing bone loss/deformities at the distal aspect middle phalanx 3rd toe and distal phalangeal base 3rd toe centered at the DIP joint.  Findings could be related to chronic postoperative change or chronic bony destruction. No convincing   evidence for acute 3rd toe osteomyelitis.  2.  Probable ulcer at the tip of the 2nd toe without evidence for acute 2nd toe osteomyelitis.  3.  Advanced navicular-medial cuneiform and 3rd TMT joint chondromalacia. Patchy juxta-articular marrow edema and subchondral cystic change in the midfoot.  4.  First MTP and metatarsal sesamoid chondromalacia.  5.  No acute right forefoot tendon injury or significant tenosynovitis.  6.  No drainable fluid collection to suggest abscess.       This document is created with the help of Dragon dictation system. All grammatical errors are unintentional.      heel ulcer, LE swelling

## 2024-10-03 NOTE — PROGRESS NOTES
Renal progress note  CC: ESRD , PD  Assessment and Plan:  84 year old female with hx of ESRD on PD , CAD , Chronic back pain , hx of atrial fib, UC , Gout and Htn , presenting with abd pain and generalized weakness with nausea and poor intakes for 3-4days now. She has minimal UO but has been noticing blood on her briefs, uncledar vaginal or urinary, no stool with blood. PD fluid has been clear no alarms or drain pain except the slight chronic twinge she gets at end of last cycle. Nephrology is following for ESRD and co-morbidities management.     ESRD on PD under care of   OP Rx : FV 2300 ccx 4 cycles over 10hrs and then LF with1 L for Icodextrin.  OP Kt/V barely adequate.  Still with residual renal function, UO low 300-700  PD samples taken after drain icodextrin and send cellcounts and Cx after a 1 Hr dwell for fluid samples; cell count 3 and clear fluid , unlikely peritonitis  Inpatient PD Rx 6 cycles over 12 hours using 2.5% dextrose solution. No LF.  Continue the same tonight  I will order exit site care, apply mupirocin 2% cream to PD exit site and change dressing daily    Electrolytes  Hypokalemia chronic. On KCL 20meq daily PTA.Serum potassium is wnl at 3.5 on today labs. On Regular diet.     Chronic Hypomagnesemia-On mag oxide    Metabolic acidosis-Mild. Improved. CO2 is wnl and stable at 25 on today labs.      HTN/Volume status-BP is in normotensive range 115/75 mmHG this am. Patient appears euvolemic on exam.  2.5% with better UF results  TW 76.5-77kg at home. On 10/02 standing weight was 74.2 kgs. No weight checked this am  Anuric. Therefore Spironolactone and Torsemide were discontinued on 10/02  Metoprolol XL 50 mg daily with holding parameters started this admission for A.fib with RVR  Recs  Recommend no changes  Daily standing weight  Strict I/O        Anemia-Hgb is wnl in 14s range. No indications for FAUSTO.     MBD-  Not on  binders     On Regular diet.     Bacteremia-Blood  culture from 09/28 grew Strep epidermidis. Repeat blood cx from 09/29 with no growth so far.   Peritoneal fluid cx is negative as well.  H/o recurrent UTI. No urine cx collected this admission  Leucocytosis is trending down on today labs  Initially received vanco and zosyn  X ray of the right foot 10/01 showed deformity of the third middle phalanx with suggestion of erosion of the third proximal phalanx base concerning for septic arthritis and osteomyelitis. No definite erosion of the second toe. MRI right foot 10/02 showed no evidence of Osteomyelitis.   ID is following, input appreciated  Podiatry consulted     CAD  HFpEF 50-55%  Atrial fib with RVR  On Ac continued; Eliquis  Troponin leak down trend  Started on Metoprolol XL 50mg daily this admission.  Evaluated by cardiology this admission. Patient prefers to wait for ischemic testing.    Diarrhea-Having frequent painless BMs with small amount of stool.  Stool test for C.diff by PCR was negative.     Hx of PMR on Prednisone 5 mg BID. Reports increased weakness in proximal muscles of the both legs.     New issue, vaginal vs rectal  bleeding. GI and OB/GYN signed off. Patient declined TVUS.      Chronic back pan-On tramadol     Gout -controlled, on allopurinol     GERD on pepcid    We will follow     Mana Banda MD  [facility]  [address] [place] [address], [address]  [place] [address]  Phone# [phone]  Fax# [phone]        Subjective  Patient was seen at the bedside and events were reviewed with nursing.  No acute issues overnight.  This morning the patient felt very lightheaded during BM while sitting up on the bedside commode. Few drops of blood noted in depends.  Patient accompanied by daughter at the bedside.  Patient states that she is feeling not well, weakness in both legs with standing. Tolerated PD without issues. 731 ml removed with UF. Having frequent painless BMs with small amount of stool.  Patient denies: fever,  chills, dizziness, sore throat, rhinorrhea, cough, shortness of breath , chest pain, palpitations, orthopnea, nausea, vomiting,dysuria, urinary frequency, urgency, hematuria, rash.  Updated on labs. All questions answered.    Objective    Vital signs in last 24 hours  Temp:  [97.4  F (36.3  C)-97.9  F (36.6  C)] 97.4  F (36.3  C)  Pulse:  [] 86  Resp:  [18-20] 20  BP: ()/(62-81) 107/66  SpO2:  [91 %-100 %] 91 %  Weight:   [unfilled]    Intake/Output last 3 shifts  I/O last 3 completed shifts:  In: 120 [P.O.:120]  Out: 200 [Urine:200]  Intake/Output this shift:  No intake/output data recorded.    Physical Exam  Alert awake, NAD  CV: RRR without murmur or rub  Lung: clear and equal; no extra sounds  Ab: soft and NT; not distended; normal bs  Ext: no edema and well perfused  Skin; no rash  Access: PD exit side is intact. Covered with clean dressing.    Pertinent Labs   Lab Results   Component Value Date    WBC 16.9 (H) 10/02/2024    HGB 14.0 10/02/2024    HCT 42.2 10/02/2024    MCV 99 10/02/2024     (L) 10/02/2024     Lab Results   Component Value Date    BUN 29.8 (H) 10/02/2024     10/02/2024    CO2 25 10/02/2024       Lab Results   Component Value Date    ALBUMIN 3.0 (L) 09/29/2024     Lab Results   Component Value Date    PHOS 4.8 (H) 09/28/2024     I reviewed all lab results  Mana Banda MD  [facility]  [address], [address]  [place] [address]  Phone# [phone]  Fax# [phone]

## 2024-10-03 NOTE — PLAN OF CARE
Assumed care 1900 to 0730. A&O x 4. Assist x 2, team lift. Tele is A.fib. C/O back pain, scheduled Tramadol given (see MAR). Room air. Peritoneal dialysis started at 2015. Son at bedside in the evening, supportive of patient. Incontinent of stool and urine. Patient slept well overnight. Call light within reach, able to make needs known. Bed alarm on for safety.    Problem: Pain Chronic (Persistent)  Goal: Optimal Pain Control and Function  Intervention: Manage Persistent Pain  Recent Flowsheet Documentation  Taken 10/3/2024 0415 by Suma Rodriguez RN  Medication Review/Management: medications reviewed  Taken 10/3/2024 0015 by Suma Rodriguez RN  Medication Review/Management: medications reviewed  Taken 10/2/2024 2014 by Suma Rodriguez RN  Medication Review/Management: medications reviewed     Problem: Risk for Delirium  Goal: Improved Behavioral Control  Intervention: Minimize Safety Risk  Recent Flowsheet Documentation  Taken 10/3/2024 0415 by Suma Rodriguez RN  Communication Enhancement Strategies: call light answered in person  Enhanced Safety Measures: pain management  Taken 10/3/2024 0015 by Suma Rodriguez RN  Communication Enhancement Strategies: call light answered in person  Enhanced Safety Measures: pain management  Taken 10/2/2024 2014 by Suma Rodriguez RN  Communication Enhancement Strategies: call light answered in person  Enhanced Safety Measures: pain management

## 2024-10-04 NOTE — PROGRESS NOTES
Renal progress note  CC: ESRD , PD  Assessment and Plan:  84 year old female with hx of ESRD on PD , CAD , Chronic back pain , hx of atrial fib, UC , Gout and Htn , presenting with abd pain and generalized weakness with nausea and poor intakes for 3-4days now. She has minimal UO but has been noticing blood on her briefs, uncledar vaginal or urinary, no stool with blood. PD fluid has been clear no alarms or drain pain except the slight chronic twinge she gets at end of last cycle. Nephrology is following for ESRD and co-morbidities management.     ESRD on PD under care of   OP Rx : FV 2300 ccx 4 cycles over 10hrs and then LF with1 L for Icodextrin.  OP Kt/V barely adequate.  Still with residual renal function, UO low 300-700  PD samples taken after drain icodextrin and send cellcounts and Cx after a 1 Hr dwell for fluid samples; cell count 3 and clear fluid , unlikely peritonitis  Inpatient PD Rx 6 cycles over 12 hours using 2.5% dextrose solution. No LF.  Continue the same tonight  I will order exit site care, apply mupirocin 2% cream to PD exit site and change dressing daily    Electrolytes as per labs 10/03  Hypokalemia chronic. On KCL 20meq daily PTA.Serum potassium is wnl at 3.5 on today labs. On Regular diet.   Normal serum sodium of 137.  Chronic Hypomagnesemia-On mag oxide    Metabolic acidosis-Mild. Improved. CO2 was wnl at 23 on 10/03     HTN/Volume status-BP is low at 96/64 mmHG this am. Patient appears euvolemic on exam.  2.5% with better UF results  TW 76.5-77kg at home. On 10/02 standing weight was 74.2 kgs. Today weight checked in bed in likely not accurate  Anuric. Therefore Spironolactone and Torsemide were discontinued on 10/02  Metoprolol XL 50 mg daily with holding parameters started this admission for A.fib with RVR  Recs  Recommend no changes  Daily standing weight  Strict I/O        Anemia-Hgb is wnl in 14s range. No indications for FAUSTO.     MBD  Not on  binders     On  Regular diet.     Bacteremia-Blood culture from 09/28 grew Strep epidermidis. Repeat blood cx from 09/29 with no growth so far.   Peritoneal fluid cx is negative as well.  H/o recurrent UTI. No urine cx collected this admission  Leucocytosis was trending down to 14.7K on 10/03  Initially received vanco and zosyn  X ray of the right foot 10/01 showed deformity of the third middle phalanx with suggestion of erosion of the third proximal phalanx base concerning for septic arthritis and osteomyelitis. No definite erosion of the second toe. MRI right foot 10/02 showed no evidence of Osteomyelitis.  ID signed off       CAD  HFpEF 50-55%  Atrial fib with RVR  On Ac continued; Eliquis  Troponin leak down trend  Started on Metoprolol XL 50mg daily this admission.  Evaluated by cardiology this admission. Patient prefers to wait for ischemic testing.    Diarrhea-Having frequent painless BMs with small amount of stool.  Stool test for C.diff by PCR was negative.    New issues, mottling and cyanosis of both lower extremities-Duplex of LEs in 01/2024 showed no evidence of hemodynamically significant disease.  I will order Duplex of both LE's stat     Hx of PMR on Prednisone 5 mg BID. Reports increased weakness in proximal muscles of the both legs.     New issue, vaginal vs rectal  bleeding. GI and OB/GYN signed off. Patient declined TVUS.      Chronic back pan-On tramadol     Gout -controlled, on allopurinol     GERD on pepcid    Discussed with hospitalist .     We will follow     Mana Banda MD  [facility]  [address], [address]  [place] [address]  Phone# [phone]  Fax# [phone]        Subjective  Patient was seen at the bedside and events were reviewed with nursing.  No acute issues overnight.  Patient accompanied by son at the bedside.  Patient states that she tolerated PD without issues. 633 ml removed with UF. She is still having frequent painless BMs with small  amount of stool. Patient c/o decoloration of both legs noted this morning. Patient states that she never had this symptoms before.Metoprolol held this am based on holding parameters.   Patient denies: fever, chills, dizziness, sore throat, rhinorrhea, cough, shortness of breath , chest pain, palpitations, orthopnea, nausea, vomiting,dysuria, urinary frequency, urgency, hematuria, rash.  Updated on labs. All questions answered.    Objective    Vital signs in last 24 hours  Temp:  [97.6  F (36.4  C)] 97.6  F (36.4  C)  Pulse:  [64-98] 64  Resp:  [16-20] 18  BP: ()/(59-75) 96/64  SpO2:  [92 %-97 %] 97 %  Weight:   [unfilled]    Intake/Output last 3 shifts  I/O last 3 completed shifts:  In: 480 [P.O.:480]  Out: 731 [Other:731]  Intake/Output this shift:  I/O this shift:  In: 100 [P.O.:100]  Out: -     Physical Exam  Alert awake, NAD  CV: RRR without murmur or rub  Lung: clear and equal; no extra sounds  Ab: soft and NT; not distended; normal bs  Ext: cyanotic looking forefoot b/l, cold to touch, I was not able to appreciated pedal or popliteal pulses on both legs, mild left edema and well perfused  Skin; no rash, mottling of skin of both legs from groin down  Access: PD exit side is intact. Covered with clean dressing.    Pertinent Labs   Lab Results   Component Value Date    WBC 14.7 (H) 10/03/2024    HGB 14.2 10/03/2024    HCT 44.3 10/03/2024     (H) 10/03/2024     10/03/2024     Lab Results   Component Value Date    BUN 30.6 (H) 10/03/2024     10/03/2024    CO2 23 10/03/2024       Lab Results   Component Value Date    ALBUMIN 3.0 (L) 09/29/2024     Lab Results   Component Value Date    PHOS 4.8 (H) 09/28/2024     I reviewed all lab results  Mana Banda MD  [facility]  [address] [place] [address], [address]  [place] [address]  Phone# [phone]  Fax# [phone]

## 2024-10-04 NOTE — PROCEDURES
CCPD cycler set up per orders of Dr. Banda    Total volume (ml) : 13,800 (machine set for 14,000 ml unable to accept fractions)    Therapy time:12     Fill volume (ml): 2300    Cycles:6    Dextrose 2.5% volume (ml): 14,000     PD catheter visualized. Machine ready to connect. Report given to  MEGAN Mccarthy RN.    Ramona Nevarez, RN [facility]

## 2024-10-04 NOTE — PROGRESS NOTES
[facility]    Medicine Progress Note - Hospitalist Service    Date of Admission:  9/28/2024    Assessment & Plan   84 year old female with PMHx of ESRD on PD, chronic low back pain, possible PMR, Afib on AC, ulcerative colitis, gout who presents with 2 days of acute onset generalized weakness, diffuse abdominal pain, and and nausea with vomiting. She was admitted for concern for possible bacterial peritonitis.She also notes over past week SUAREZ and intermit vaginal bleeding       #Sepsis there was concern for possible intra-abdominal infection, present on admission  -Secondary bacterial peritonitis with PD catheter -however negative cx   Presented with 2 days of abdominal pain, N/V, weakness. Found to be tachycardic with RVR, WBC 16.9. Diffuse abdominal tenderness on exam with PD catheter in place. CT C/A/P without contrast showed RP and pelvic lymphadenopathy. Clinical presentation most consistent with infectious peritonitis. UTI is also possible, patient unable to give sample initially as she makes more limited urine, though seems slightly less likely with normal appearing bladder/kidneys on non-con CT.  - ID following, vancomycin/Zosyn discontinued 9/30, per ID.  Low WBCs at 3 had fluid drawn from catheter.  PD cultures growing gram-positive bacilli, positive culture reported on 10/1.  ID made aware.  -pain is improved    #Positive blood culture with Staph epidermidis.  Repeat cultures on 9/29 without growth.  Likely contaminant.  Antibiotic discontinued as above.    #Fungal dermatitis, lower abdomen rash.  On clotrimazole cream.    #Increasing retroperitoneal and pelvic lymphadenopathy incidental finding on CT abdomen.   -this could be related to reaction to PD, infection, but with vag bleeding I am also concerned possible malignancy   -send smear, LDH  -gyn working up vaginal bleeding as much as pt will allow  -address PD issues      #Right second toe ulcer.  Concerning for cellulitis.  -  ID requested right foot x-ray negative for osteomyelitis  - Monitor off antibiotics  - Concern for right third toe osteomyelitis, per x-ray.  MRI negative for osteomyelitis  - Podiatry consult    #10/4/24 onset of bilateral l coolness and skin mottling.  Ultrasound suggestive of calcific atherosclerosis and poor cardiac output.  In supine position mottling and coolness resolved.  - Vascular surgery consulted    #Lactic acidosis  -Anion-gap metabolic acidosis  Lactic acid 4.8 on admission in setting of sepsis  - S/p 500cc, down to 3.3, monitor  - Use IVF judiciously with ESRD     #ESRD on PD  -Chronic HFpEF without acute exacerbation  -Elevated NTpro-BNP due to hypervolemia  PD has been going well at home. Started 5/2024. Follows with Dr. Finn.  - Renal following  - PD per Renal  -Continue home torsemide 100mg daily,spironolactone 25mg daily  -seems to be tolerating PD here ok     #Permanent atrial fibrillation on chronic AC  -Rapid ventricular response  -170 on admission. Not on rate controlling agents as OP. Chart mentions intentionally not using BB. But reviewed and it was stopped for hypotension some years ago, not an acute issue here. Patient has no recollection o having issues with metoprolol.  - Stopped dilt gtt  - Start metoprolol 25mg q6h, titrate now to 50 mg bid   - Continue home Eliquis  - TTE 11/2023: EF 50-55%  -she notes SUAREZ this past week which may have been RVR-cards seeing      #Non-ischemic myocardial injury due to sepsis  HS tropoin 128 down to 118 on re-check. The patient denies CP, SOB, jaw/arm pain. Is nauseous with abdominal pain, but clinical picture fits abdominal infection more than ACS.  - EKG  Afib with RVR; narrow complex; TWI in II and aVF, no ST segment depression or elevation  -cards seeing      #R>L chronic LE edema  In setting of ESRD. Patient reports chronic and stable. Has had multiple joint surgeries on the right leg. On chronic AC.  -check ultrasound to be sure not clotting  "on DOAC--negative     #Blood in briefs--she is fairly sure this is vaginal  Patient reports 1 month of noticing consistent pink fluid in briefs, sometimes darker red. Doesn't make much urine.   - Pelvic US not able to visualize well  -GYN consult did see and did examine today when she agreed and notes today blood in vaginal vault--rec transvag ultrasound, she declined  -at this time I agree with gyn on differential-->\"atrophy, vulvar dermatologic conditions, cervical or uterine cancer.\"  -however, pt declining work up     #Possible polymyalgia rheumatica  Mentioned in PCP notes.  - Continue home prednisone 5mg PO BID  - Consider stress dose steroids if develops hypotension, but hypertensive on admission     #Chronic low back pain  -Chronic opioid use  Has been a long-term issue affecting mobility.  - Continue prednisone as above  - Continue home tramadol 50mg BID     #Gout  Was on febuxostat in the past. No acute flares right now.  - Continue home prednisone as above     #Ulcerative colitis  Noted in prior PCP notes, but stable.  - Chemical DVT ppx  -there was concern yesterday possible rectal bleeding too--gi consulted and she declined rectal exam      #GERD  - Continue home famotidine    #Physical deconditioning.  Progressing during hospitalization.  -PT/OT evaluate for disposition needs    Code status  DNR/DNI.      -Updated daughter in room during morning rounds    Diet: Snacks/Supplements Adult: Nepro Oral Supplement; With Meals  Regular Diet Adult    DVT Prophylaxis: DOAC  Lerma Catheter: Not present  Lines: PRESENT          Cardiac Monitoring: None  Code Status: No CPR- Do NOT Intubate      Clinically Significant Risk Factors     # Anion Gap Metabolic Acidosis: Highest Anion Gap = 19 mmol/L in last 2 days, will monitor and treat as appropriate  # Hypoalbuminemia: Lowest albumin = 3 g/dL at 9/29/2024  7:56 AM, will monitor as appropriate     # Hypertension: Noted on problem list    # Chronic heart failure with " "reduced ejection fraction: last echo with EF <40%          # Obesity: Estimated body mass index is 31.7 kg/m  as calculated from the following:    Height as of this encounter: 1.549 m (5' 1\").    Weight as of this encounter: 76.1 kg (167 lb 12.3 oz).        # Financial/Environmental Concerns: none         Disposition Plan     Medically Ready for Discharge: Anticipated in 2-4 Days    Jacklyn Matos MD  Hospitalist Service  [facility]  Securely message with [employer] (more info)  Text page via [employer] Paging/Directory   ______________________________________________________________________    Interval History   Ongoing weakness.  Patient was able to transfer from chair to bed with assist of 1.  Patient seen by nephrology earlier today, Dr. Banda admitted during her visit patient complained on sudden onset of plugging sensation in bilateral upper legs, noted bilateral cyanotic skin discoloration of upper and lower legs, lower legs coolness.  Lower extremity arterial duplex showed that his cardiac disease, without acute thrombosis.  Vascular surgery consult requested.    Physical Exam   Vital Signs: Temp: 97.6  F (36.4  C) Temp src: Oral BP: 96/64 Pulse: 64   Resp: 18 SpO2: 97 % O2 Device: None (Room air)    Weight: 167 lbs 12.32 oz    Constitutional: awake, alert, cooperative, and no apparent distress, forgetful  Eyes: sclera clear  Respiratory: no increased work of breathing, good air exchange, no retractions, and clear to auscultation  Cardiovascular: regular rate and rhythm and normal S1 and S2  GI: normal bowel sounds, soft, non-distended, pd cath site dressing intact, and non-tender  Skin: no bruising or bleeding  Musculoskeletal: no lower extremity pitting edema present  Neurologic: Mental Status Exam:  Level of Alertness:   awake  Neuropsychiatric: General: normal, calm, and normal eye contact    Medical Decision Making       40 MINUTES SPENT BY ME on the date of service doing chart " review, history, exam, documentation & further activities per the note.      Data     I have personally reviewed the following data over the past 24 hrs:    14.7 (H)  \   14.2   / 171     137 96 (L) 30.6 (H) /  111 (H)   3.5 23 5.58 (H) \       Imaging results reviewed over the past 24 hrs:   No results found for this or any previous visit (from the past 24 hour(s)).    This document is created with the help of Dragon dictation system. All grammatical errors are unintentional.

## 2024-10-04 NOTE — PROGRESS NOTES
"CLINICAL NUTRITION SERVICES - ASSESSMENT NOTE     Nutrition Prescription    RECOMMENDATIONS FOR MDs/PROVIDERS TO ORDER:  Renal multivitamin and thiamine d/t malnutrition dx and poor po intake    Malnutrition Status:    Severe in acute on chronic    Recommendations already ordered by Registered Dietitian (RD):  Yeboah Nepro-breakfast  Gel plus-2 pm snack    Future/Additional Recommendations:  Monitor intake, weight, labs, supplement tolerance     REASON FOR ASSESSMENT  Mely Alegria is a/an 84 year old female assessed by the dietitian for LOS    HPI: 84 year old female with PMHx of ESRD on PD, chronic low back pain, possible PMR, Afib on AC, ulcerative colitis, gout who presents with 2 days of acute onset generalized weakness, diffuse abdominal pain, and and nausea with vomiting. Presentation most consistent with secondary bacterial peritonitis.     NUTRITION HISTORY  Met with pt and pt's dtr. Poor appetite now and for months PTA. Since starting on dialysis has not had a good appetite and has decreased po intake. Dtr is with pt during the day (son overnight) and she tries to get pt to eat 3 meals per day but is often just eating a little bit, feels her intake has been 50% or less over the past month than what it had been. Receiving Nepro TID with meals, does not really like but will drink the berry, wants only with her breakfast trays. Discussed Gel plus and would like one for a snack. Does not follow any special diet at home d/t not eating enough to worry about renal restrictions. Dtr does encourage protein. Target weight is 172#.     CURRENT NUTRITION ORDERS  Diet: Regular  Intake/Tolerance: 25% of most meals per flowsheets    LABS  Labs reviewed    MEDICATIONS  Medications reviewed    ANTHROPOMETRICS  Height: 154.9 cm (5' 1\")  Most Recent Weight: 76.1 kg (167 lb 12.3 oz)    IBW: 47.7 kg  BMI: Obesity Grade I BMI 30-34.9  Weight History:   Wt Readings from Last 20 Encounters:   10/04/24 76.1 kg (167 lb 12.3 oz) "   08/28/24 78 kg (172 lb)   04/16/24 78 kg (172 lb)   04/09/24 79.5 kg (175 lb 3.2 oz)   02/20/24 80.3 kg (177 lb)   01/31/24 80.2 kg (176 lb 12.9 oz)   01/17/24 81 kg (178 lb 9.2 oz)   12/20/23 99.3 kg (219 lb)   11/27/23 99.3 kg (219 lb)   11/21/23 99.3 kg (219 lb)   11/15/23 99.4 kg (219 lb 3.2 oz)   11/13/23 99.8 kg (220 lb)   11/09/23 100.8 kg (222 lb 3.6 oz)   04/04/23 93.4 kg (206 lb)   06/25/21 98.2 kg (216 lb 6.4 oz)   12/03/19 100.9 kg (222 lb 7 oz)   11/30/18 100.8 kg (222 lb 4.8 oz)   11/29/17 101.6 kg (223 lb 14.4 oz)   11/10/16 98.9 kg (218 lb)   12/02/15 99.8 kg (220 lb)   Weight loss of 52# (23%) <1 year    Dosing Weight: 54.8 kg (adj wt)    ASSESSED NUTRITION NEEDS  Estimated Energy Needs: 3095-9940 kcals/day (25 - 30 kcals/kg)  Justification: Maintenance  Estimated Protein Needs: 55-66 grams protein/day (1 - 1.2 grams of pro/kg)  Justification: Dialysis  Estimated Fluid Needs: 0314-0114 mL/day (1 mL/kcal)   Justification: Maintenance (or per provider pending fluid status)    PHYSICAL FINDINGS  See malnutrition section below.  Wound-arm  Wound-perineum moisture damage    MALNUTRITION:  % Weight Loss:  > 20% in 1 year (severe malnutrition)  % Intake:  </= 50% for >/= 1 month (severe malnutrition)  Subcutaneous Fat Loss:  None observed  Muscle Loss:  None observed  Fluid Retention:  Mild feet/ankles    Malnutrition Diagnosis: Severe malnutrition  In Context of:  Acute on chronic illness or injury    NUTRITION DIAGNOSIS  Malnutrition related to poor appetite on dialysis as evidenced by weight loss of 23% < 1 month, po intake  </= 50% for >/= 1 month, and mild fluid retention.       INTERVENTIONS  Implementation  Nutrition Education: No education needs assessed at this time   Change Nepro to berry with breakfast only  Gel plus 2 pm snack     Goals  Patient to consume % of nutritionally adequate meals three times per day, or the equivalent with supplements/snacks.      Monitoring/Evaluation  Progress toward goals will be monitored and evaluated per protocol.

## 2024-10-04 NOTE — PROGRESS NOTES
BP 96/54 this AM.  Metoprolol held due to not meeting parameters.  Patient reports back pain this AM.  Scheduled Ultram given and ice pack applied.     Thigh/leg mottling while sitting up in chair. Knees cool, feet cold.  Patient denies change in feeling.  She noticed the visual change.  Patient was assisted to bed and coloring improved.  Discoloring still present however and knees, feet cool.  Patient to ultra sound at 1130.  Patient reports back pain is better while in bed.  6/10 and tolerable.    Son Tatyana at bedside since this AM.  Patient was disconnected from PD by NOC shift.  Nephrology asking volume.  Numbers written on white board. Total , drain16, fill vol 80. Drain bag was empty.

## 2024-10-04 NOTE — PLAN OF CARE
Problem: Adult Inpatient Plan of Care  Goal: Plan of Care Review  Description: The Plan of Care Review/Shift note should be completed every shift.  The Outcome Evaluation is a brief statement about your assessment that the patient is improving, declining, or no change.  This information will be displayed automatically on your shift  note.  Outcome: Progressing   Goal Outcome Evaluation:    Pt Aox4, pleasant affect, calm and cooperative with cares. PD cycle started at approx 2000. Ongoing BRB/vaginal discharge noted on pad during incontinence cares. Vitally stable on RA, non-tele status. Nursing staff will continue to monitor.

## 2024-10-04 NOTE — PLAN OF CARE
Problem: Pain Chronic (Persistent)  Goal: Optimal Pain Control and Function  Outcome: Progressing  Intervention: Develop Pain Management Plan  Recent Flowsheet Documentation  Taken 10/3/2024 2351 by Mary Ann Sheridan RN  Pain Management Interventions: medication (see MAR)  Intervention: Manage Persistent Pain  Recent Flowsheet Documentation  Taken 10/3/2024 2351 by Mary Ann Sheridan RN  Medication Review/Management: medications reviewed    Problem: Fall Injury Risk  Goal: Absence of Fall and Fall-Related Injury  Outcome: Progressing  Intervention: Identify and Manage Contributors  Recent Flowsheet Documentation  Taken 10/3/2024 2351 by Mary Ann Sheridan RN  Medication Review/Management: medications reviewed  Intervention: Promote Injury-Free Environment  Recent Flowsheet Documentation  Taken 10/3/2024 2351 by Mary Ann Sheridan RN  Safety Promotion/Fall Prevention:   activity supervised   assistive device/personal items within reach   clutter free environment maintained   increase visualization of patient   nonskid shoes/slippers when out of bed   patient and family education   room near nurse's station   supervised activity      Goal Outcome Evaluation:       Pt disoriented overnight, not knowing where she was or why her family wasn't here. Pt incontinent of bowels. Perineum area tender, bleeding. Pt reporting pain in area, tylenol given with relief. VSS on RA.    Cyclic PD done.   Total UF: 633ml  I-drain: 16ml  Fill vol: 80ml

## 2024-10-04 NOTE — PROGRESS NOTES
Care Management Follow Up    Length of Stay (days): 6    Expected Discharge Date: 10/05/2024     Concerns to be Addressed:     Care progression - discharge planning  Patient plan of care discussed at interdisciplinary rounds: Yes    Anticipated Discharge Disposition:  Transitional Care vs home with home care    Anticipated Discharge Services:  Transitional Care vs home with home care  Anticipated Discharge DME:  NA    Patient/family educated on Medicare website which has current facility and service quality ratings:  NA  Education Provided on the Discharge Plan:  Yes per team  Patient/Family in Agreement with the Plan:  Yes    Referrals Placed by CM/SW:  Yes  Private pay costs discussed: Not applicable    Discussed  Partnership in Safe Discharge Planning  document with patient/family: No     Handoff Completed: No, handoff not indicated or clinically appropriate    Additional Information:  Met with patient and her son, Tatyana. Tatyana states patient wants to return home with home care. He will discuss with provider and update final plans.    Social Hx:  Assessment: Follow. From home w/family; 24 hr care; home PD  Last Note: 10/4/24  Plan: Home with home care for RN/PT/OT/HHA - [facility]  Needs: NA  Hand off sent: NA  Transport: Family    Next Steps: RNCM to follow for medical progression, recommendations, and final discharge plan.     Jennifer Westfall RN

## 2024-10-05 NOTE — PLAN OF CARE
Problem: Adult Inpatient Plan of Care  Goal: Plan of Care Review  Description: The Plan of Care Review/Shift note should be completed every shift.  The Outcome Evaluation is a brief statement about your assessment that the patient is improving, declining, or no change.  This information will be displayed automatically on your shift  note.  Outcome: Progressing     Problem: Pain Chronic (Persistent)  Goal: Optimal Pain Control and Function  Outcome: Progressing  Intervention: Develop Pain Management Plan  Recent Flowsheet Documentation  Taken 10/4/2024 1100 by Cherelle Mccarthy RN  Pain Management Interventions: repositioned  Intervention: Manage Persistent Pain  Recent Flowsheet Documentation  Taken 10/4/2024 1600 by Cherelle Mccarthy RN  Medication Review/Management: medications reviewed  Taken 10/4/2024 0842 by Cherelle Mccarthy RN  Medication Review/Management: medications reviewed     Problem: Fall Injury Risk  Goal: Absence of Fall and Fall-Related Injury  Outcome: Progressing  Intervention: Identify and Manage Contributors  Recent Flowsheet Documentation  Taken 10/4/2024 1600 by Cherelle Mccarthy RN  Medication Review/Management: medications reviewed  Taken 10/4/2024 0842 by Cherelle Mccarthy RN  Medication Review/Management: medications reviewed  Intervention: Promote Injury-Free Environment  Recent Flowsheet Documentation  Taken 10/4/2024 1600 by Cherelle Mccarthy RN  Safety Promotion/Fall Prevention:   activity supervised   assistive device/personal items within reach   clutter free environment maintained   increase visualization of patient   nonskid shoes/slippers when out of bed   patient and family education   room near nurse's station   supervised activity  Taken 10/4/2024 0842 by Cherelle Mccarthy RN  Safety Promotion/Fall Prevention:   activity supervised   assistive device/personal items within reach   clutter free environment maintained   increase visualization of patient   nonskid  shoes/slippers when out of bed   patient and family education   room near nurse's station   supervised activity   Goal Outcome Evaluation:         Daughter at bedside this evening.  Legs color remained at baseline while patient in bed.

## 2024-10-05 NOTE — PLAN OF CARE
Problem: Adult Inpatient Plan of Care  Goal: Plan of Care Review  Outcome: Progressing  Flowsheets (Taken 10/5/2024 1403)  Plan of Care Reviewed With:   patient   family  Overall Patient Progress: improving  Reviewed POC with patient, son and dtr at bedside. All questions and concerns addressed. Possible hospice?   Son and dtr plan to bring more 1.5 PD sol'n from home later today. PD site also looks a little more red today, possible sensitivity to the Bactroban used while IP. Family reports that at home she uses Gentamicin and a specific type of drsg cover and has worked well for her at home. They will also bring in her home supply for PD drsg changes today.       Problem: Pain Chronic (Persistent)  Goal: Optimal Pain Control and Function  Outcome: Progressing  Intervention: Develop Pain Management Plan  Recent Flowsheet Documentation  Taken 10/5/2024 1035 by Gini Martin RN  Pain Management Interventions:   medication (see MAR)   repositioned   cold applied  Taken 10/5/2024 0953 by Gini Martin RN  Pain Management Interventions:   medication (see MAR)   pillow support provided  Intervention: Manage Persistent Pain  Recent Flowsheet Documentation  Taken 10/5/2024 0810 by Gini Martin RN  Medication Review/Management: medications reviewed  Intervention: Optimize Psychosocial Wellbeing  Recent Flowsheet Documentation  Taken 10/5/2024 0810 by Gini Martin RN  Supportive Measures:   active listening utilized   verbalization of feelings encouraged   decision-making supported  C/o chronic back pain. Repositioned frequently to comfort, pillow support and ice pack today. Scheduled Tramadol given this morning with no effectiveness.  Having increased severe BLE pain, more on the inner thighs radiating to the feet. Patient is new to me today. Dtr reports that there was some mottling on patient's legs yesterday but today looks worse and has rapidly progressed through this shift. There are palpable small-moderate  "purple lumps on bilateral inner thighs where patient is c/o  most pain. Pedal pulses absent with doppler, however, very thready pulses audible with doppler on posterior tibial. Vascular surgery team saw patient today, awaiting note. Not a candidate for surgical intervention and would not be a route patient/family would like to go. Dr. Bynum talked face-to-face with dtr and writer discussing goals of care. Dtr stated, \"I just don't want her to be in any more pain.\" She is waiting for brothers to visit this afternoon and will discuss amongst them. Dtr stated patient's goal is to go home on hospice vs IP hospice.        Problem: Infection  Goal: Absence of Infection Signs and Symptoms  Outcome: Progressing       Problem: Fall Injury Risk  Goal: Absence of Fall and Fall-Related Injury  Outcome: Progressing  Intervention: Identify and Manage Contributors  Recent Flowsheet Documentation  Taken 10/5/2024 0810 by Gini Martin RN  Medication Review/Management: medications reviewed  Intervention: Promote Injury-Free Environment  Recent Flowsheet Documentation  Taken 10/5/2024 0810 by Gini Martin RN  Safety Promotion/Fall Prevention:   assistive device/personal items within reach   activity supervised   clutter free environment maintained   mobility aid in reach   nonskid shoes/slippers when out of bed   patient and family education   room door open   room near nurse's station   safety round/check completed   toileting scheduled  C/o dizziness this morning when up to bedside commode. Soft BP's this morning. Nephrology started her on Midodrine tid today. Her legs are also very weak requiring heavy 2 assist with pivot transfer back to bed. Keep on bedrest d/t high falls risk. She is agreeable to use bedpan.        Problem: Tissue Perfusion Altered  Goal: Improved Tissue Perfusion  Outcome: Progressing  Rapidly increasing mottling present with c/o severe pain 10/10 BLE. IV Dilaudid started this afternoon. Awaiting " effectiveness.           Goal Outcome Evaluation:      Plan of Care Reviewed With: patient, family    Overall Patient Progress: improvingOverall Patient Progress: improving

## 2024-10-05 NOTE — PROGRESS NOTES
PD cycler off at 0830.  ml. C/o dizziness when up to the commode and sitting up in bed. Discussed with Dr. Banda over the phone. Family brought in 2 boxes of PD sol'n from home, at bedside. Son and dtr updated on POC, verbalized understanding.   BP Readings from Last 6 Encounters:   10/05/24 97/59   08/28/24 105/57   07/17/24 (!) 173/84   07/09/24 116/60   04/16/24 115/64   04/09/24 125/85   Non-tele.  AM extended release Metoprolol held per parameters.

## 2024-10-05 NOTE — CONSULTS
"Vascular Surgery Consultation    Mely Alegria MRN# [id number]   Age: 84 year old YOB: 1940     Date of Admission:  9/28/2024    Date of Consult:   10/05/24    Reason for consult: \"Evaluate for severe PVD \"       Requesting service: Medicine; requesting provider: Jacklyn Matos MD                    Assessment and Plan:   84 year old WF with multiple comorbidities with new BLE skin mottling with is not consistent with ischemic disease, however she does appear to have critical limb ischemia with tissue loss of the distal right 2nd digit. Her US and clinical exam are consistent with aortoiliac disease, however this has not yet been characterized on imaging. Since the patient is already on dialysis, I recommend obtaining a CTA A/P with runoff to the bilateral lower extremities; this will tell us if she has aortoiliac occlusive disease and give us more anatomic information about her infrainguinal disease as well. I spoke with the patient and her children about the surgical options for repair, which could range from simple iliac stenting to aortobifemoral or ax-bifem bypass. Given her cardiac history with Afib and HFrEF (EF 40%), she is at high risk for perioperative MACE. I also explained that we would not be able to safely heparinize the patient if she is having ongoing bleeding, and encouraged her to reconsider gynecology's recommendations for workup of her AUB, as that could also influence how we proceed. Fortunately, her CTLI does not appear infected, and can be closely monitored during workup of her other major issues.     - Recommend obtaining CTA A/P with BLE runoff  - Recommend close monitoring of R toe wound    Discussed with staff, Dr. Mireles.      Stephanie Yee MD  PGY-6  Vascular Surgery  Pager: [phone]    Please page me directly with any questions/concerns during regular weekday hours before 5PM. If there is no response, if it is a weekend, or if it is during evening hours, " then please use the [employer] system to page the first-call resident on call for vascular surgery.               Chief Complaint:   BLE skin mottling         History of Present Illness:   Mely Alegria is a 84 year old WF with PMH of ESRD (on PD QD), Afib (on Eliquis), UC, and gout who presented to the hospital on 9/28/2024 with generalized weakness, abdominal pain, and N/V, was found to have secondary bacterial peritonitis. She also developed intermittent vaginal bleeding and dyspnea over this hospitalization, however per her children she is refusing gynecologic evaluation due to the prospect of discomfort during gyn examination. She has a history of PAD with reported claudication, although she does not ambulate much anymore and typically stops due to fatigue and SUAREZ before onset of leg pain. She normally ambulates with a walker. She reports that the majority of her claudication symptoms are in her thighs rather than her calves, denies buttock claudication. Vascular surgery was consulted for mottling of the bilateral lower extremities which was noticed by her nurse on 10/4/24 when she was sitting up in a chair, but apparently resolved when she got back into bed. Per her nurse, the mottling returned this AM. However on discussion with her two adult children today, it appears that she has had some BLE mottling of her lower calves that started a week ago before she was brought into the hospital. She denies any previous similar episodes, but reports continued intermittent vaginal bleeding and wounds in her perianal region that cause her a lot of discomfort during bowel movements. Her Eliquis is being held due to vaginal bleeding.           Past Medical History:     Past Medical History:   Diagnosis Date    Acute midline low back pain without sciatica     Acute renal failure superimposed on chronic kidney disease, unspecified acute renal failure type, unspecified CKD stage (H)     Anemia     Cellulitis of left foot      CHF (congestive heart failure) (H)     Chronic atrial fibrillation (H)     Chronic atrial fibrillation (H)     Chronic back pain     Chronic heart failure, unspecified heart failure type (H)     Dialysis patient (H)     M,W,F    Diaphragmatic hernia without obstruction and without gangrene     End stage renal disease (H)     Gout     Helicobacter pylori gastritis     Hypertension     Hypothyroidism due to acquired atrophy of thyroid     Obesity     Opioid type dependence, episodic (H)     Pure hypercholesterolemia     Ulcer of left foot, limited to breakdown of skin (H)     Ulcerative pancolitis without complication (H)     Uric acid arthropathy     Vitamin D deficiency              Past Surgical History:     Past Surgical History:   Procedure Laterality Date    HC DILATION/CURETTAGE DIAG/THER NON OB      Description: Dilation And Curettage;  Recorded: 01/18/2010;    IR CVC TUNNEL PLACEMENT > 5 YRS OF AGE  10/30/2023    IR CVC TUNNEL PLACEMENT > 5 YRS OF AGE  1/17/2024    IR CVC TUNNEL REMOVAL RIGHT  7/17/2024    LAPAROSCOPIC INSERTION CATHETER PERITONEAL DIALYSIS N/A 4/16/2024    Procedure: LAPAROSCOPIC PLACEMENT OF PERITONEAL DIALYSIS CATHETER WITH;  Surgeon: August Myers MD;  Location: [facility] OR    LAPAROSCOPIC LYSIS ADHESIONS N/A 4/16/2024    Procedure: LYSIS OF ADHESIONS;  Surgeon: August Myers MD;  Location: [facility] OR    LAPAROSCOPIC OMENTECTOMY N/A 4/16/2024    Procedure: OMENTOPEXY;  Surgeon: August Myers MD;  Location: [facility] OR    PICC TRIPLE LUMEN PLACEMENT  10/30/2023    ZC COLOSTOMY      Description: Colostomy;  Recorded: 11/05/2012;    ZZC PART REMOVAL COLON W ANASTOMOSIS      Description: Partial Colectomy;  Recorded: 11/05/2012;  Comments: with colostomy    [facility] TOTAL HIP ARTHROPLASTY      Description: Total Hip Replacement;  Recorded: 01/18/2010;             Social History:     Social History     Tobacco Use    Smoking status: Never    Smokeless tobacco: Never  "  Substance Use Topics    Alcohol use: Not on file             Family History:     Family History   Problem Relation Age of Onset    Diabetes Mother                 Allergies:     Allergies   Allergen Reactions    Allopurinol Diarrhea    Latex     Lisinopril Cough     initiated 5/14/1996 and stopped      Percocet [Oxycodone-Acetaminophen] Other (See Comments)     Difficulty swallowing   \" gets stuck in throat\"             Medications:     Current Facility-Administered Medications   Medication Dose Route Frequency Provider Last Rate Last Admin    acetaminophen (TYLENOL) tablet 975 mg  975 mg Oral TID PRN August Delgado MD   975 mg at 10/05/24 0551    apixaban ANTICOAGULANT (ELIQUIS) tablet 2.5 mg  2.5 mg Oral BID August Delgado MD   2.5 mg at 10/05/24 0953    calcium carbonate (TUMS) chewable tablet 1,000 mg  1,000 mg Oral 4x Daily PRN August Delgado MD        clotrimazole (LOTRIMIN) 1 % cream   Topical BID Michael Vela MD   Given at 10/05/24 1006    dianeal PD LOW calcium-1.5% dex (calcium 2.5 mEq/L) 6,000 mL with heparin (porcine) PERITONEAL DIALYSATE   Dialysis [facility] Supplied PD Mana Banda MD        dianeal PD LOW calcium-2.5% dex (calcium 2.5 mEq/L) 12,000 mL with heparin (porcine) 500 Units/L PERITONEAL DIALYSATE   Dialysis [facility] Supplied PD Mana Banda MD        And    dianeal PD LOW calcium-2.5% dex (calcium 2.5 mEq/L) 2,500 mL with heparin (porcine) 500 Units/L PERITONEAL DIALYSATE   Dialysis [facility] Supplied PD Mana Banda MD        dianeal PD LOW calcium-2.5% dex (calcium 2.5 mEq/L) 3,000 mL with heparin (porcine) 500 Units/L PERITONEAL DIALYSATE   Dialysis PERITONEAL DIALYSIS Mana Banda MD        dianeal PD LOW calcium-2.5% dex (calcium 2.5 mEq/L) 6,000 mL with heparin (porcine) 500 Units/L PERITONEAL DIALYSATE   Dialysis [facility] Supplied PD Mana Banda MD        famotidine (PEPCID) tablet 20 mg  20 mg Oral Every Other Day Shawna, " Jacklyn GORDON MD   20 mg at 10/04/24 0842    folic acid (FOLVITE) tablet 1 mg  1 mg Oral Daily August Delgado MD   1 mg at 10/05/24 0953    gentamicin (GARAMYCIN) 0.1 % cream   Topical Daily Mana Banda MD        gentamicin (GARAMYCIN) 0.1 % cream   Topical Daily PRN Mana Banda MD        HYDROmorphone (DILAUDID) injection 0.2 mg  0.2 mg Intravenous Q4H PRN Yumiko Bynum MD        HYDROmorphone (DILAUDID) injection 0.2 mg  0.2 mg Intravenous Once Yumiko Bynum MD        HYDROmorphone (DILAUDID) injection 0.4 mg  0.4 mg Intravenous Q4H PRN Yumiko Bynum MD        lidocaine (LMX4) cream   Topical Q1H PRN August Delgado MD        lidocaine 1 % 0.1-1 mL  0.1-1 mL Other Q1H PRN August Delgado MD        magnesium oxide (MAG-OX) tablet 400 mg  400 mg Oral Daily Keri Finn MD   400 mg at 10/05/24 0953    melatonin tablet 3 mg  3 mg Oral At Bedtime PRN August Delgado MD   3 mg at 10/03/24 2351    menthol-zinc oxide (CALMOSEPTINE) 0.44-20.6 % ointment OINT   Topical 4x Daily PRN Veda Silverio MD   Given at 10/02/24 2015    metoprolol succinate ER (TOPROL XL) 24 hr tablet 50 mg  50 mg Oral Daily Jacklyn Matos MD   50 mg at 10/03/24 1044    midodrine (PROAMATINE) tablet 2.5 mg  2.5 mg Oral TID w/meals Mana Banda MD   2.5 mg at 10/05/24 1250    mupirocin (BACTROBAN) 2 % ointment   Topical Daily Mana Banda MD   Given at 10/05/24 1006    naloxone (NARCAN) injection 0.2 mg  0.2 mg Intravenous Q2 Min PRN Jacklyn Matos MD        Or    naloxone (NARCAN) injection 0.4 mg  0.4 mg Intravenous Q2 Min PRN Jacklyn Matos MD        Or    naloxone (NARCAN) injection 0.2 mg  0.2 mg Intramuscular Q2 Min PRN Jacklyn Matos MD        Or    naloxone (NARCAN) injection 0.4 mg  0.4 mg Intramuscular Q2 Min PRN Jacklyn Matos MD        ondansetron (ZOFRAN ODT) ODT tab 4 mg  4 mg Oral Q6H PRN August Delgado MD        Or    ondansetron (ZOFRAN)  "injection 4 mg  4 mg Intravenous Q6H PRN August Delgado MD        polyethylene glycol (MIRALAX) Packet 17 g  17 g Oral BID PRN August Delgado MD        predniSONE (DELTASONE) tablet 5 mg  5 mg Oral BID August Delgado MD   5 mg at 10/05/24 0953    senna-docusate (SENOKOT-S/PERICOLACE) 8.6-50 MG per tablet 1 tablet  1 tablet Oral BID PRN August Delgado MD        Or    senna-docusate (SENOKOT-S/PERICOLACE) 8.6-50 MG per tablet 2 tablet  2 tablet Oral BID PRN August Delgado MD        sodium chloride (PF) 0.9% PF flush 3 mL  3 mL Intracatheter Q8H August Delgado MD   3 mL at 10/05/24 0952    sodium chloride (PF) 0.9% PF flush 3 mL  3 mL Intracatheter q1 min prn August Delgado MD        traMADol (ULTRAM) tablet 50 mg  50 mg Oral BID August Delgado MD   50 mg at 10/05/24 0953           Physical Exam:   /71   Pulse 83   Temp 97.5  F (36.4  C) (Oral)   Resp 18   Ht 1.549 m (5' 1\")   Wt 74.4 kg (164 lb 0.4 oz)   LMP  (LMP Unknown)   SpO2 95%   BMI 30.99 kg/m      Intake/Output Summary (Last 24 hours) at 10/5/2024 1517  Last data filed at 10/5/2024 0952  Gross per 24 hour   Intake 243 ml   Output 0 ml   Net 243 ml       GEN: A&Ox3, uncomfortable but cooperative  NEURO: CN II-XII grossly intact. No gross neurologic deficits.  NECK: trachea midline  HEENT: No scleral icterus.  RESP: Nonlabored breathing on 2L NC  CV: RRR by radial pulse, noncyanotic   ABD: soft, obese, non-tender, nondistended. No guarding or rebound tenderness. PDC in place.   MSK: Duskiness of multiple lower extremity digits bilaterally, with a dry ischemic wound of the distal right 2nd digit. Moves all extremities independently. Normal active range of motion.   SKIN: Reticular, non-blanching rash extending from the bilateral lower extremities to the buttocks.   PULSES:   - RLE: 1+ fem, monophasic pop/PT, non-dop DP  - LLE: 1+ fem, monophasic pop/PT, non-dop DP                                Data:   All " laboratory data reviewed    Results:  BMP  Recent Labs   Lab 10/04/24  1418 10/03/24  1325 10/02/24  0454 10/01/24  0427    137 138 137   POTASSIUM 3.5 3.5 3.5 3.4   CHLORIDE 95* 96* 97* 97*   CO2 22 23 25 22   BUN 33.9* 30.6* 29.8* 29.9*   CR 5.88* 5.58* 5.30* 5.56*   * 111* 134* 132*     CBC  Recent Labs   Lab 10/04/24  1418 10/03/24  1325 10/02/24  0454 10/01/24  0427   WBC 19.1* 14.7* 16.9* 17.9*   HGB 14.3 14.2 14.0 13.1    171 134* 150     LFT  Recent Labs   Lab 09/29/24  0756   AST 53*   ALT 46   ALKPHOS 126   BILITOTAL 0.7   ALBUMIN 3.0*     Recent Labs   Lab 10/04/24  1418 10/03/24  1325 10/02/24  0454 10/01/24  0427 09/30/24  0702 09/29/24  0756   * 111* 134* 132* 140* 147*       Imaging:  US Lower Extremity Arterial Duplex Bilateral    Result Date: 10/4/2024  EXAM: US LOWER EXTREMITY ARTERIAL DUPLEX BILATERAL, US IGNETTE WITH PPG W/O EXERCISE BILAT LOCATION: [facility] DATE: 10/4/2024 INDICATION: cyanosis of lower extremities, mottling of skin,  unable to appreciate poplitial  pulses b l COMPARISON: US 1/4/2024. TECHNIQUE: Duplex utilizing 2D gray-scale imaging, Doppler interrogation with color-flow and spectral waveform analysis. GINETTE FINDINGS: RIGHT                                               Brachial: 121 Ankle (PT): Non-compressible Ankle (DP): Non-compressible Digit: Non-measurable LEFT Brachial: Not taken due to IV in AC fossa Ankle (PT): Non-compressible Ankle (DP): Non-compressible Digit: Non-measurable RIGHT LOWER EXTREMITY ARTERIAL ASSESSMENT: External iliac artery 66 cm/s Common femoral artery: 45 cm/s Profunda femoris artery: 30 cm/s SFA (proximal): 52 cm/s SFA (mid): 30 cm/s SFA (distal): 38 cm/s Popliteal artery: 27 cm/s Posterior tibial artery: 12 cm/s Anterior tibial artery: 17 cm/s Dorsalis pedis artery: 10 cm/s LEFT LOWER EXTREMITY ARTERIAL ASSESSMENT: External iliac artery 64 cm/s Common femoral artery: 39 cm/s Profunda femoris artery: 45  cm/s SFA (proximal): 50 cm/s SFA (mid): 52 cm/s SFA (distal): 32 cm/s Popliteal artery: 28 cm/s Posterior tibial artery: 8 cm/s Anterior tibial artery: 16 cm/s Dorsalis pedis artery: 16 cm/s     IMPRESSION: 1.  Noncompressible tibial arteries bilaterally, suggestive of calcific atherosclerosis. 2.  Unmeasurable digital pressures bilaterally, suggestive of poor wound healing potential. 3.  Patent bilateral lower extremity arterial vasculature, however with diffusely low velocities which taper further distally and transition to monophasic waveforms. Overall findings suggestive of poor cardiac output versus aortic inflow disease.     US GINETTE with PPG wo Exercise    Result Date: 10/4/2024  EXAM: US LOWER EXTREMITY ARTERIAL DUPLEX BILATERAL, US GINETTE WITH PPG W/O EXERCISE BILAT LOCATION: [facility] DATE: 10/4/2024 INDICATION: cyanosis of lower extremities, mottling of skin,  unable to appreciate poplitial  pulses b l COMPARISON: US 1/4/2024. TECHNIQUE: Duplex utilizing 2D gray-scale imaging, Doppler interrogation with color-flow and spectral waveform analysis. GINETTE FINDINGS: RIGHT                                               Brachial: 121 Ankle (PT): Non-compressible Ankle (DP): Non-compressible Digit: Non-measurable LEFT Brachial: Not taken due to IV in AC fossa Ankle (PT): Non-compressible Ankle (DP): Non-compressible Digit: Non-measurable RIGHT LOWER EXTREMITY ARTERIAL ASSESSMENT: External iliac artery 66 cm/s Common femoral artery: 45 cm/s Profunda femoris artery: 30 cm/s SFA (proximal): 52 cm/s SFA (mid): 30 cm/s SFA (distal): 38 cm/s Popliteal artery: 27 cm/s Posterior tibial artery: 12 cm/s Anterior tibial artery: 17 cm/s Dorsalis pedis artery: 10 cm/s LEFT LOWER EXTREMITY ARTERIAL ASSESSMENT: External iliac artery 64 cm/s Common femoral artery: 39 cm/s Profunda femoris artery: 45 cm/s SFA (proximal): 50 cm/s SFA (mid): 52 cm/s SFA (distal): 32 cm/s Popliteal artery: 28 cm/s Posterior tibial  artery: 8 cm/s Anterior tibial artery: 16 cm/s Dorsalis pedis artery: 16 cm/s     IMPRESSION: 1.  Noncompressible tibial arteries bilaterally, suggestive of calcific atherosclerosis. 2.  Unmeasurable digital pressures bilaterally, suggestive of poor wound healing potential. 3.  Patent bilateral lower extremity arterial vasculature, however with diffusely low velocities which taper further distally and transition to monophasic waveforms. Overall findings suggestive of poor cardiac output versus aortic inflow disease.     MR Foot Right w/o Contrast    Result Date: 10/2/2024  EXAM: MR FOOT RIGHT W/O CONTRAST LOCATION: [facility] DATE: 10/2/2024 INDICATION: Evaluate for right 3rd toe osteomyelitis. COMPARISON: Right foot radiographic exam 10/1/2024. TECHNIQUE: Unenhanced. FINDINGS: JOINTS AND BONES: -Chronic-appearing deformity at the distal margin of the middle phalanx 3rd toe, and chronic deformity at the corresponding 3rd toe distal phalangeal base. No edema-like marrow signal intensity in the corresponding bones at the remaining distal phalanx 3rd toe or remaining middle phalanx 3rd toe. Findings could be related to previous postoperative change or chronic destruction. Nothing definitive for acute 3rd toe osteomyelitis or septic arthritis currently. -Probable ulcer at the tip of the 2nd toe. No definitive evidence for remaining toe acute osteomyelitis. -First MTP and metatarsal sesamoid arthrosis. Degenerative change in the midfoot is moderate to severe and pronounced at the 3rd TMT joint. Advanced navicular-medial cuneiform arthrosis. Bipartite hallux tibial sesamoid. No evidence for acute right foot fracture or classic metatarsal stress fracture. TENDONS: -No acute forefoot tendon injury or significant tenosynovitis. Distal peroneus longus tendon intact. LIGAMENTS: -Lisfranc ligament intact. No midfoot subluxation. MUSCLES AND SOFT TISSUES: -Moderate intrinsic foot muscle atrophy with  patchy muscle edema which could represent myositis or denervation edema. Visualized plantar fascia intact.     IMPRESSION: 1.  Chronic-appearing bone loss/deformities at the distal aspect middle phalanx 3rd toe and distal phalangeal base 3rd toe centered at the DIP joint. Findings could be related to chronic postoperative change or chronic bony destruction. No convincing evidence for acute 3rd toe osteomyelitis. 2.  Probable ulcer at the tip of the 2nd toe without evidence for acute 2nd toe osteomyelitis. 3.  Advanced navicular-medial cuneiform and 3rd TMT joint chondromalacia. Patchy juxta-articular marrow edema and subchondral cystic change in the midfoot. 4.  First MTP and metatarsal sesamoid chondromalacia. 5.  No acute right forefoot tendon injury or significant tenosynovitis. 6.  No drainable fluid collection to suggest abscess.     XR Foot Right G/E 3 Views    Result Date: 10/1/2024  EXAM: XR FOOT RIGHT G/E 3 VIEWS LOCATION: [facility] DATE: 10/1/2024 INDICATION: Ulcer of the tip of the R second toe. Look for osteomyelitis COMPARISON: None.     IMPRESSION: There is deformity of the third middle phalanx with suggestion of erosion of the third proximal phalanx base concerning for septic arthritis and osteomyelitis. No definite erosion of the second toe. Moderate midfoot osteoarthritis and first metatarsophalangeal joint osteoarthritis. Bipartite tibial great toe sesamoid. Plantar calcaneal spur. Achilles enthesophyte. Vascular calcifications.    Echocardiogram Complete    Result Date: 2024  [id number]^REMI^[place] [id number]  Name: LUKE AGUILERA MRN: [id number] : 1940 Study Date: 2024 07:25 AM Age: 84 yrs Gender: Female Patient Location: [facility] Reason For Study: Atrial Fibrillation Ordering Physician: MINOR KHALIL Performed By: CORINA  BSA: 1.8 m2 Height: 61 in Weight: 171 lb  HR: 94 ______________________________________________________________________________ Procedure Complete Portable Echo Adult. Definity (NDC #82896-338) given intravenously. No hemodynamically significant valvular abnormalities on 2D or color flow imaging. Compared to the prior study dated 11/6/2023, there have been no changes. ______________________________________________________________________________ Interpretation Summary  1.Left ventricular function is decreased. The ejection fraction is 30-35% (moderately reduced). 2.Mildly decreased right ventricular systolic function 3.The left atrium is moderately dilated. 4.No hemodynamically significant valvular abnormalities on 2D or color flow imaging. Compared to the prior study dated 11/6/2023, the LV EF looks lower and the pulmonic pressures are estimated lower. ______________________________________________________________________________ I      WMSI = 2.00     % Normal = 0  X - Cannot   0 -                      (2) - Mildly 2 -          Segments  Size Interpret    Hyperkinetic 1 - Normal  Hypokinetic  Hypokinetic  1-2     small                                                    7 -          3-5    moderate 3 - Akinetic 4 -          5 -         6 - Akinetic Dyskinetic   6-14    large              Dyskinetic   Aneurysmal  w/scar       w/scar       15-16   diffuse  Left Ventricle Left ventricular function is decreased. The ejection fraction is 30-35% (moderately reduced). There is normal left ventricular wall thickness. There is borderline concentric left ventricular hypertrophy. Left ventricular diastolic function is not assessable. No regional wall motion abnormalities noted.  Right Ventricle The right ventricle is normal size. TAPSE is abnormal, which is consistent with abnormal right ventricular systolic function. Mildly decreased right ventricular systolic function.  Atria The left atrium is moderately dilated. Right atrial size is normal. There is no color  Doppler evidence of an atrial shunt.  Mitral Valve There is mild mitral annular calcification. There is trace mitral regurgitation. There is no mitral valve stenosis.  Tricuspid Valve The tricuspid valve is not well visualized, but is grossly normal. Right ventricle systolic pressure estimate normal. There is trace to mild tricuspid regurgitation. There is no tricuspid stenosis.  Aortic Valve Aortic valve leaflets appear normal. There is no evidence of aortic stenosis or clinically significant aortic regurgitation. The aortic valve is trileaflet. No aortic regurgitation is present. No aortic stenosis is present.  Pulmonic Valve The pulmonic valve is not well seen, but is grossly normal. This degree of valvular regurgitation is within normal limits. There is no pulmonic valvular stenosis.  Vessels The aorta root is normal. Normal size ascending aorta. IVC diameter <2.1 cm collapsing >50% with sniff suggests a normal RA pressure of 3 mmHg.  Pericardium There is no pericardial effusion.  Rhythm The rhythm was atrial fibrillation.  ______________________________________________________________________________ MMode/2D Measurements & Calculations IVSd: 1.2 cm LVIDd: 4.3 cm LVIDs: 3.6 cm LVPWd: 1.2 cm  FS: 17.4 % LV mass(C)d: 189.4 grams LV mass(C)dI: 107.2 grams/m2 Ao root diam: 2.9 cm asc Aorta Diam: 3.6 cm LVOT diam: 2.0 cm LVOT area: 3.1 cm2 Ao root diam index Ht(cm/m): 1.8 Ao root diam index BSA (cm/m2): 1.6 Asc Ao diam index BSA (cm/m2): 2.0 Asc Ao diam index Ht(cm/m): 2.3 EF Biplane: 33.4 % LA Volume (BP): 64.9 ml  LA Volume Index (BP): 36.7 ml/m2 LA Volume Indexed (AL/bp): 38.7 ml/m2 RV Base: 3.8 cm RWT: 0.56  Doppler Measurements & Calculations MV E max anmol: 68.5 cm/sec MV dec slope: 432.7 cm/sec2 MV dec time: 0.16 sec  Ao V2 max: 96.5 cm/sec Ao max P.0 mmHg Ao V2 mean: 75.9 cm/sec Ao mean PG: 3.0 mmHg Ao V2 VTI: 20.0 cm GABRIEL(I,D): 1.4 cm2 GABRIEL(V,D): 2.1 cm2 LV V1 max P.6 mmHg LV V1 max: 63.6 cm/sec LV V1  VTI: 8.8 cm SV(LVOT): 27.7 ml SI(LVOT): 15.7 ml/m2 PA V2 max: 57.8 cm/sec PA max P.3 mmHg PA acc time: 0.07 sec PI end-d herb: 133.8 cm/sec TR max herb: 264.0 cm/sec TR max P.9 mmHg AV Herb Ratio (DI): 0.66 GABRIEL Index (cm2/m2): 0.78 E/E' av.4 Lateral E/e': 14.6 Medial E/e': 12.1 RV S Herb: 9.5 cm/sec  ______________________________________________________________________________ Report approved by: Brown Waldrop 2024 09:52 AM       US Lower Extremity Venous Duplex Bilateral    Result Date: 2024  EXAM: US LOWER EXTREMITY VENOUS DUPLEX BILATERAL LOCATION: [facility] DATE: 2024 INDICATION: edema legs , on DOAC, make sure no clots COMPARISON: None. TECHNIQUE: Venous Duplex ultrasound of bilateral lower extremities with and without compression, augmentation and duplex. Color flow and spectral Doppler with waveform analysis performed. FINDINGS: Exam includes the common femoral, femoral, popliteal veins as well as segmentally visualized deep calf veins and greater saphenous vein. RIGHT: No deep vein thrombosis. No superficial thrombophlebitis. No popliteal cyst. LEFT: No deep vein thrombosis. No superficial thrombophlebitis. No popliteal cyst.     IMPRESSION: No deep venous thrombosis in the bilateral lower extremities.    US Pelvic Limited    Result Date: 2024  EXAM: US PELVIC LIMITED LOCATION: [facility] DATE: 2024 INDICATION: vag bleeding x 1 month COMPARISON: None. TECHNIQUE: Transabdominal scans were performed. Endovaginal ultrasound was declined by the patient. FINDINGS: Uterus and ovaries not identified. No significant free fluid.     IMPRESSION: Uterus and ovaries unable to be identified due to technical factors and body habitus.     CT Chest Abdomen Pelvis w/o Contrast    Result Date: 2024  EXAM: CT CHEST ABDOMEN PELVIS W/O CONTRAST LOCATION: [facility] DATE: 2024 INDICATION: gen weakness  and feels unwell COMPARISON: 11/5/2023 TECHNIQUE: CT scan of the chest, abdomen, and pelvis was performed without IV contrast. Multiplanar reformats were obtained. Dose reduction techniques were used. CONTRAST: None. FINDINGS: Mild motion artifact. LUNGS AND PLEURA: Mild emphysema. Stable mild diffuse bronchial wall thickening. Elevated right hemidiaphragm. Right basilar atelectasis. No pleural effusion. Calcified granuloma. A few stable benign solid pulmonary nodules with the largest measuring 0.3  cm in the left upper lobe, image 81:4. MEDIASTINUM/AXILLAE: Stable enlarged multinodular thyroid. No lymphadenopathy. Trace air in the main pulmonary artery and right subclavian vein are most likely secondary to intravenous line placement. CORONARY ARTERY CALCIFICATION: Moderate. HEPATOBILIARY: Cholelithiasis. PANCREAS: Normal. SPLEEN: Normal. ADRENAL GLANDS: Normal. KIDNEYS/BLADDER: A simple cyst midpole right kidney, no follow-up required. Bilateral renal atrophy. BOWEL: No obstruction or inflammatory change. Appendix not visualized. LYMPH NODES: Increasing retroperitoneal lymphadenopathy with the largest being a left para-aortic node measuring 1.4 cm in short axis, previously 0.8 cm, image 183:3. A 2.6 x 1.6 cm left external iliac node, previously not visualized, image 224:3. A 2.4 x 1.7 cm right external iliac node, previously 1.0 x 0.6 cm, image 226:3. VASCULATURE: Moderately severe atherosclerosis. Stable ectasia of the abdominal aorta. PELVIC ORGANS: Peritoneal dialysis catheter. Small-moderate volume ascites. MUSCULOSKELETAL: Left hip arthroplasty. Degenerative changes. Severe osseous demineralization.     IMPRESSION: 1.  Increasing retroperitoneal and pelvic lymphadenopathy. 2.  Small-moderate volume ascites.         Vascular surgery attending staff (virtual consultation): Case discussed with our fellow and imaging reviewed.  I agree with the documentation by our fellow, Dr. Yee.  Patient is an 84-year-old  female who we are asked to evaluate for peripheral arterial disease.  We will start with getting a CT angiogram to get a baseline idea of her arterial anatomy.  Presently no concerns for acute limb ischemia.    ONUR BARBA M.D.

## 2024-10-05 NOTE — PROGRESS NOTES
Renal progress note  CC: ESRD , PD  Assessment and Plan:  84 year old female with hx of ESRD on PD , CAD , Chronic back pain , hx of atrial fib, UC , Gout and Htn , presenting with abd pain and generalized weakness with nausea and poor intakes for 3-4days now. She has minimal UO but has been noticing blood on her briefs, uncledar vaginal or urinary, no stool with blood. PD fluid has been clear no alarms or drain pain except the slight chronic twinge she gets at end of last cycle. Nephrology is following for ESRD and co-morbidities management.     ESRD on PD under care of   OP Rx : FV 2300 ccx 4 cycles over 10hrs and then LF with1 L for Icodextrin.  OP Kt/V barely adequate.  Patient has been anuric here since admission  PD samples taken after drain icodextrin and send cellcounts and Cx after a 1 Hr dwell for fluid samples; cell count 3 and clear fluid , unlikely peritonitis  Inpatient PD Rx 6 cycles over 12 hours using 2.5% dextrose solution. No LF. -900cc.  She has been symptomatically hypotensive with systolic blood pressure in 90s.  Oral intake remains poor.  Tonight We will do PD RX cycles over 12 hours using mix 1.5 and 2.5 dextrose solution.  No last fill.    Access: PD exit side with mild erythema, no drainage, no pain over the PD trac. ? Irritation from exit site care antiseptic solution. Covered with clean dressing.  Patient's family is willing to bring supplies from home and do exit site care on daily basis.    Electrolytes as per labs 10/04  Hypokalemia chronic. On KCL 20meq daily PTA.Serum potassium  wnl at 3.5  .On Regular diet.  Normal serum sodium of 137.  Chronic Hypomagnesemia-On mag oxide    Metabolic acidosis-Mild. Improved. CO2 was wnl at 22 on 10/04     BP/Volume status-BP is low at 97/59 mmHG this am. Patient appears euvolemic on exam.  2.5% with better UF results  TW 76.5-77kg at home. On 10/02 standing weight was 74.2 kgs. Today weight 74.4 kgs. Weight checked in bed in  last two days and likely not accurate  Anuric. Therefore Spironolactone and Torsemide were discontinued on 10/02  Metoprolol XL 50 mg daily with holding parameters started this admission for A.fib with RVR but has not been given in the last 2 days given ongoing symptomatic hypotension  Recs  Check cortisol level in a.m.  Ok to start Start on midodrine 2.5 mg 3 times daily, hold if systolic blood pressure 120 or higher as per discussion with  vascular surgery team at the bedside.  Daily standing weight  Strict I/O        Anemia-Hgb is wnl in 14s range. No indications for FAUSTO.     MBD  Not on  binders     On Regular diet.     Bacteremia-Blood culture from 09/28 grew Strep epidermidis. Repeat blood cx from 09/29 with no growth so far.   Peritoneal fluid cx is negative as well.  H/o recurrent UTI. No urine cx collected this admission  Leucocytosis was trending up to 19.1K.  Patient is afebrile.  Initially received vanco and zosyn  X ray of the right foot 10/01 showed deformity of the third middle phalanx with suggestion of erosion of the third proximal phalanx base concerning for septic arthritis and osteomyelitis. No definite erosion of the second toe. MRI right foot 10/02 showed no evidence of Osteomyelitis.  ID signed off       CAD  LVEF decreased to 30-35% when compared to his previous study dated 11/06/2023.  Atrial fib with RVR  On Ac continued; Eliquis  Troponin leak down trend  Started on Metoprolol XL 50mg daily this admission.  Evaluated by cardiology this admission. Patient prefers to wait for ischemic testing.    Diarrhea-Having frequent painless BMs with small amount of stool.  Stool test for C.diff by PCR was negative.    New issues, mottling and cyanosis of both lower extremities-Duplex of LEs/ GINETTE 10/04 showed noncompressible tibial arteries bilaterally, suggestive of calcific atherosclerosis. Unmeasurable digital pressures bilaterally, suggestive of poor wound healing potential. Patent bilateral  lower extremity arterial vasculature, however with diffusely low velocities which taper further distally and transition to monophasic waveforms. Overall findings suggestive of poor cardiac output versus aortic inflow disease.   Vascular Surgery consulted and seen the patient at the bedside during my evaluation.      Hx of PMR on Prednisone 5 mg BID. Reports increased weakness in proximal muscles of the both legs.     New issue, vaginal vs rectal  bleeding. GI and OB/GYN signed off. Patient declined TVUS.     Malnutrition-albumin was 3.0 on 09/29/24.  Patient reports poor appetite.  Protein supplements has been ordered.  Encouraged the patient to increase oral intake.     Chronic back pan-On tramadol     Gout -controlled, on allopurinol     GERD -on pepcid    Goals of care-the patient with multiple comorbidities and frailty.  Declined ischemic testing and work up for bleeding. Consider palliative care consult to discuss goals of care.    Discussed with hospitalist .    We will follow     Mana Banda MD  [facility]  [address], [address]  [place] [address]  Phone# [phone]  Fax# [phone]        Subjective  Patient was seen at the bedside and events were reviewed with nursing.  No acute issues overnight.  Patient accompanied by son and daughter at the bedside.  Patient states that she tolerated PD without issues. 485 ml removed with UF. She is still having frequent painless BMs with small amount of stool. Patient c/o fatigue, poor appetite, worsening of decoloration of both legs noted this morning.   Patient denies: fever, chills, dizziness, sore throat, rhinorrhea, cough, shortness of breath , chest pain, palpitations, orthopnea, nausea, vomiting,dysuria, urinary frequency, urgency, hematuria, rash.  Updated on labs. All questions answered.    Objective    Vital signs in last 24 hours  Temp:  [97.4  F (36.3  C)-97.8  F (36.6  C)] 97.5  F (36.4  C)  Pulse:   [62-92] 83  Resp:  [16-20] 18  BP: ()/(59-73) 97/59  SpO2:  [94 %-96 %] 95 %  Weight:   [unfilled]    Intake/Output last 3 shifts  I/O last 3 completed shifts:  In: 340 [P.O.:340]  Out: 0   Intake/Output this shift:  No intake/output data recorded.    Physical Exam  Alert awake, NAD, frail, elderly, chronically ill looking  CV: RRR without murmur or rub  Lung: clear and equal; no extra sounds  Ab: soft and NT; not distended; normal bs  Ext: cyanotic looking forefoot b/l, cold to touch, I was not able to appreciated pedal or popliteal pulses on both legs, mild left edema and well perfused.  Per bedside RN report, her tibial pulses were appreciated on both legs with Doppler's morning  Skin; no rash, severe mottling of skin of both legs from groin down  Access: PD exit side with mild erythema, no drainage, no pain over the PD trac. Covered with clean dressing.    Pertinent Labs   Lab Results   Component Value Date    WBC 19.1 (H) 10/04/2024    HGB 14.3 10/04/2024    HCT 43.8 10/04/2024     10/04/2024     10/04/2024     Lab Results   Component Value Date    BUN 33.9 (H) 10/04/2024     10/04/2024    CO2 22 10/04/2024       Lab Results   Component Value Date    ALBUMIN 3.0 (L) 09/29/2024     Lab Results   Component Value Date    PHOS 4.8 (H) 09/28/2024     I reviewed all lab results    ~ 50 Minutes today spent in exam, POC, education regarding renal disease and management, counseling and/or discussion with patient's care team.    Mana Banda MD  [facility]  [address] [place] [address], [address]  [place] [address]  Phone# [phone]  Fax# [phone]

## 2024-10-05 NOTE — PROGRESS NOTES
[facility]    Medicine Progress Note - Hospitalist Service    Date of Admission:  9/28/2024    Assessment & Plan   84 year old female with PMHx of ESRD on PD, chronic low back pain, possible PMR, Afib on AC, ulcerative colitis, gout who presents with 2 days of acute onset generalized weakness, diffuse abdominal pain, and and nausea with vomiting. She was admitted for concern for possible bacterial peritonitis.She also notes over past week SUAREZ and intermit vaginal bleeding         Sepsis there was concern for possible intra-abdominal infection, present on admission  -Secondary bacterial peritonitis with PD catheter -however negative cx   Presented with 2 days of abdominal pain, N/V, weakness. Found to be tachycardic with RVR, WBC 16.9. Diffuse abdominal tenderness on exam with PD catheter in place. CT C/A/P without contrast showed RP and pelvic lymphadenopathy. Clinical presentation most consistent with infectious peritonitis. UTI is also possible, patient unable to give sample initially as she makes more limited urine, though seems slightly less likely with normal appearing bladder/kidneys on non-con CT.  - Seen by ID, was on vancomycin/Zosyn discontinued 9/30.  PD cultures growing gram-positive bacilli, positive culture reported on 10/1.  ID signed off 10/03, recommend monitor off abx  - Leukocytosis increased, will monitor  -pain is improved    Skin mottling and cool b/l LE  Concern for critical limb ischemia  - Distal right second digit tissue loss  - Ultrasound suggestive of calcific atherosclerosis and poor cardiac output.  In supine position mottling and coolness resolved.  - Seen by vascular surgery, appreciate recommendation  - CTA abdomen pelvis with bilateral lower extremity runoff pending, to evaluate for aortoiliac occlusive disease  - Vascular surgery discussed with patient's family about not being able to heparinize due to ongoing vaginal bleeding  - IV Dilaudid for pain  management     Positive blood culture with Staph epidermidis.  Repeat cultures on 9/29 without growth.  Likely contaminant.  Antibiotic discontinued as above.     Fungal dermatitis, lower abdomen rash.  On clotrimazole cream.     Increasing retroperitoneal and pelvic lymphadenopathy incidental finding on CT abdomen.   -this could be related to reaction to PD, infection, but with vag bleeding I am also concerned possible malignancy   -send smear, LDH  -gyn working up vaginal bleeding as much as pt will allow  -address PD issues     Right second toe ulcer.  Concerning for cellulitis.  - ID requested right foot x-ray negative for osteomyelitis  - Monitor off antibiotics  - Concern for right third toe osteomyelitis, per x-ray.  MRI negative for osteomyelitis  - Podiatry consult     ESRD on PD  -Chronic HFpEF without acute exacerbation  -Elevated NTpro-BNP due to hypervolemia  PD has been going well at home. Started 5/2024. Follows with Dr. Finn.  - Echo with EF 30-35%  - Renal following  - PD per Renal  - Check a.m. cortisol, started on midodrine 2.5 mg 3 times daily  -Continue home torsemide 100mg daily,spironolactone 25mg daily  -seems to be tolerating PD here ok     Permanent atrial fibrillation on chronic AC  - Rapid ventricular response  -170 on admission. Not on rate controlling agents as OP. Chart mentions intentionally not using BB. But reviewed and it was stopped for hypotension some years ago, not an acute issue here. Patient has no recollection of having issues with metoprolol.  - Started Metoprolol  - Continue home Eliquis  - TTE 11/2023: EF 50-55%     Non-ischemic myocardial injury due to sepsis  - Troponin downtrended  - EKG  Afib with RVR; narrow complex; TWI in II and aVF, no ST segment depression or elevation     R>L chronic LE edema  - In setting of ESRD. Patient reports chronic and stable. Has had multiple joint surgeries on the right leg. On chronic AC  - US neg for DVT     Blood in briefs,  "suspect Vaginal bleeding  - Patient reports 1 month of noticing consistent pink fluid in briefs, sometimes darker red. Doesn't make much urine.   - Pelvic US not able to visualize well  -GYN consulted, notes blood in vaginal vault--rec transvag ultrasound, she declined  -Differential \"atrophy, vulvar dermatologic conditions, cervical or uterine cancer.\"  -however, pt declining work up     Possible polymyalgia rheumatica  - Continue home prednisone 5mg PO BID  - Will check cortisol level with hypotension     Chronic low back pain  Chronic opioid use  - Has been a long-term issue affecting mobility.  - Continue prednisone as above  - Continue home tramadol 50mg BID     Gout  - Was on febuxostat in the past. No acute flares right now.  - Continue home prednisone as above     Ulcerative colitis  -Appears to be stable.  -there was concern for possible rectal bleeding too--gi consulted and she declined rectal exam      GERD  - Continue home famotidine     Physical deconditioning.  Progressing during hospitalization.  -PT/OT evaluate for disposition needs     Goals of care:  -CODE STATUS DNR/DNI  -Discussed with patient's family at the bedside with patient's consent.  With son Pardeep Joe and daughter Leta.   -Family very pleasant, discussed ongoing clinical course and answered all questions  -At this time they would like patient to be comfortable, awaiting results of CTA and possible options of intervention by vascular surgery  -Discussed with family the possibility of malignancy with ongoing vaginal bleeding, also having retroperitoneal lymphadenopathy and possible occlusive clot  -Also leaning towards home with hospice once we have more information          Diet: Regular Diet Adult  Snacks/Supplements Adult: Nepro Oral Supplement; With Meals  Snacks/Supplements Adult: Gelatein Plus; Between Meals    DVT Prophylaxis: DOAC  Lerma Catheter: Not present  Lines: PRESENT             Cardiac Monitoring: None  Code Status: " "No CPR- Do NOT Intubate      Clinically Significant Risk Factors             # Anion Gap Metabolic Acidosis: Highest Anion Gap = 20 mmol/L in last 2 days, will monitor and treat as appropriate  # Hypoalbuminemia: Lowest albumin = 3 g/dL at 9/29/2024  7:56 AM, will monitor as appropriate     # Hypertension: Noted on problem list  # Chronic heart failure with reduced ejection fraction: last echo with EF <40%          # Obesity: Estimated body mass index is 30.38 kg/m  as calculated from the following:    Height as of this encounter: 1.549 m (5' 1\").    Weight as of this encounter: 72.9 kg (160 lb 12.8 oz).   # Severe Malnutrition: based on nutrition assessment    # Financial/Environmental Concerns: none         Disposition Plan     Medically Ready for Discharge: Anticipated in 2-4 Days             Yumiko Bynum MD  Hospitalist Service  [facility]  Securely message with [facility] (more info)  Text page via [employer] Paging/Directory   ______________________________________________________________________    Interval History   Patient was examined at the bedside, new to me today.  Family present at the bedside, very supportive.  Pending CTA for further evaluation for concern for critical limb bilaterally.  Vascular surgery discussed that patient unable to be heparinized due to ongoing vaginal bleeding  Family would like to await results before making any further decision    Physical Exam   Vital Signs: Temp: 97.5  F (36.4  C) Temp src: Oral BP: 97/59 ([email or web address]) Pulse: 83   Resp: 18 SpO2: 95 % O2 Device: None (Room air)    Weight: 160 lbs 12.8 oz    Constitutional: awake, alert, cooperative, moderate distress due to pain  Respiratory: no increased work of breathing, good air exchange, no retractions, and clear to auscultation  Cardiovascular: regular rate and rhythm and normal S1 and S2  GI: normal bowel sounds, soft, non-distended, pd cath site dressing intact, and non-tender  Skin: B/L lower " extremities with mottling of skin, reticular nonblanching rash extending LE to lower extremities to the buttocks  Msk: dry ischemic wound of distal right second toe, bilateral extremities cold to touch, pulses not palpable  Neurologic: No gross focal deficits    Medical Decision Making       60 MINUTES SPENT BY ME on the date of service doing chart review, history, exam, documentation & further activities per the note.      Data         Imaging results reviewed over the past 24 hrs:   No results found for this or any previous visit (from the past 24 hour(s)).

## 2024-10-05 NOTE — PLAN OF CARE
Problem: Adult Inpatient Plan of Care  Goal: Absence of Hospital-Acquired Illness or Injury  Intervention: Prevent Skin Injury  Recent Flowsheet Documentation  Taken 10/5/2024 0243 by Leda Suggs RN  Body Position:   turned   right  Taken 10/5/2024 0048 by Leda Suggs RN  Body Position:   turned   left  Taken 10/5/2024 0000 by Leda Suggs RN  Skin Protection: adhesive use limited  Device Skin Pressure Protection: absorbent pad utilized/changed  Taken 10/4/2024 2333 by Leda Suggs RN  Body Position: position maintained  Taken 10/4/2024 2230 by Leda Suggs RN  Body Position:   turned   legs elevated  Taken 10/4/2024 2015 by Leda Suggs RN  Body Position:   turned   position maintained   legs elevated  Taken 10/4/2024 2000 by Leda Suggs RN  Skin Protection: adhesive use limited  Device Skin Pressure Protection: absorbent pad utilized/changed     Problem: Adult Inpatient Plan of Care  Goal: Absence of Hospital-Acquired Illness or Injury  Intervention: Identify and Manage Fall Risk  Recent Flowsheet Documentation  Taken 10/5/2024 0000 by Leda Suggs RN  Safety Promotion/Fall Prevention:   activity supervised   nonskid shoes/slippers when out of bed   safety round/check completed   clutter free environment maintained  Taken 10/4/2024 2000 by Leda Suggs RN  Safety Promotion/Fall Prevention:   activity supervised   nonskid shoes/slippers when out of bed   safety round/check completed   clutter free environment maintained     Problem: Adult Inpatient Plan of Care  Goal: Optimal Comfort and Wellbeing  Outcome: Progressing  Intervention: Monitor Pain and Promote Comfort  Recent Flowsheet Documentation  Taken 10/4/2024 2000 by Leda Suggs RN  Pain Management Interventions:   medication (see MAR)   pillow support provided     Problem: Pain Chronic (Persistent)  Goal: Optimal Pain Control and Function  Intervention: Manage Persistent Pain  Recent Flowsheet Documentation  Taken 10/5/2024  0000 by Leda Suggs, RN  Medication Review/Management: medications reviewed  Taken 10/4/2024 2000 by Leda Suggs RN  Medication Review/Management: medications reviewed   Goal Outcome Evaluation:      A/O x4. Reported right leg pain 7/10, schedule tramadol given with relief. Pt incontinent of stool. Had some urine overnight. Perineum area bleed and tender. Calmoseptine cream applied. Assist one to two with gait to bedside commode.       0550: Pt reported 8/10 on right leg with movement, prn tylenol given with some relief.      PD cyclic started at 2030.

## 2024-10-06 NOTE — PLAN OF CARE
- Alert and orientedx3 but now with the time. Had 1 BP of  79/52 with MAP of 61 asymptomatic. Rechecked with BP of 93/55 with MAP of 69. On room air. 02 sats >92%. Hook up to peritoneal dialysis and tolerated it well. Denies of pain. Small Incontinent of stoolx1. Calmoseptine applied. Slept better than yesterday. Will monitor closely.    Problem: Risk for Delirium  Goal: Optimal Coping  Intervention: Optimize Psychosocial Adjustment to Delirium  Goal: Improved Behavioral Control  Intervention: Prevent and Manage Agitation  Intervention: Minimize Safety Risk    Goal: Improved Attention and Thought Clarity  Intervention: Maximize Cognitive Function    Goal: Improved Sleep  Outcome: Progressing     Problem: Pain Chronic (Persistent)  Goal: Optimal Pain Control and Function  Outcome: Progressing  Intervention: Manage Persistent Pain  Intervention: Optimize Psychosocial Wellbeing    Problem: Dysrhythmia  Goal: Normalized Cardiac Rhythm  Outcome: Progressing

## 2024-10-06 NOTE — PROCEDURES
CCPD cycler set up per orders of Dr. Banda     Total volume (ml) : 13,800 (machine set for 14,000 ml unable to accept fractions)     Therapy time:12                Fill volume (ml): 2300     Cycles:6     Dextrose 2.5% volume (ml): 7800    Dextrose 1.5%  volume (ml) 6000     PD catheter visualized. Machine ready to connect. Report given to  NILES Jacobs RN

## 2024-10-06 NOTE — PROGRESS NOTES
Care Management Follow Up    Length of Stay (days): 8    Expected Discharge Date: 10/08/2024     Concerns to be Addressed:       Patient plan of care discussed at interdisciplinary rounds: No    Anticipated Discharge Disposition:       Home with home care- potential for hospice agency to follow     Anticipated Discharge Services:  home with homecare possibility of hospice agency to follow  Anticipated Discharge DME:  none    Patient/family educated on Medicare website which has current facility and service quality ratings:    Education Provided on the Discharge Plan:    Patient/Family in Agreement with the Plan:      Referrals Placed by CM/SW:    Private pay costs discussed: Not applicable    Discussed  Partnership in Safe Discharge Planning  document with patient/family: No     Handoff Completed: No, handoff not indicated or clinically appropriate    Additional Information:  Palliative to see patient. New findings from abd CTA 10/5.    Next Steps:   Nephrology is following for ESRD and co-morbidities management.   Palliative Care referral in place.  ID has signed off      Maryjane Mendoza RN

## 2024-10-06 NOTE — PLAN OF CARE
Problem: Pain Chronic (Persistent)  Goal: Optimal Pain Control and Function  Outcome: Progressing     Problem: Infection  Goal: Absence of Infection Signs and Symptoms  Outcome: Progressing     Problem: Dysrhythmia  Goal: Normalized Cardiac Rhythm  Outcome: Progressing       Goal Outcome Evaluation:  Blood pressure soft this shift. Midodrine given with little bump in BP.   Pt is a/o x 4, but forgetful. She had a CTA Abdomen Pelvis at 1830. IMPRESSION:  1.  Scattered atherosclerosis with relatively long segment stenoses involving the popliteal arteries bilaterally, right greater than left. However, there is no evidence of critical stenosis or occlusion. Patent three-vessel runoff to the ankle   bilaterally.  2.  Relatively small caliber arterial vessels in the abdomen or pelvis, nonspecific but can be seen in the setting of shock/hypotension.  3.  Findings suspicious for cervical or uterine mass which could be source of enlarging lymphadenopathy and/or abdominopelvic ascites    Med/surg pt.     Uses bedside commode for bowl and bladder can be incontinent of stool. Requires assist of 2 to transfer with gait belt.

## 2024-10-06 NOTE — PROGRESS NOTES
[facility]    Medicine Progress Note - Hospitalist Service    Date of Admission:  9/28/2024    Assessment & Plan   84 year old female with PMHx of ESRD on PD, chronic low back pain, possible PMR, Afib on AC, ulcerative colitis, gout who presents with 2 days of acute onset generalized weakness, diffuse abdominal pain, and and nausea with vomiting. She was admitted for concern for possible bacterial peritonitis.She also notes over past week SUAREZ and intermit vaginal bleeding         Sepsis, concern for Secondary bacterial peritonitis with PD catheter -however negative cx   Leukocytosis  -Presented with abdominal pain, leukocytosis  -Imaging with retroperitoneal and pelvic lymphadenopathy, clinically was consistent with infectious peritonitis.  Could not give UA sample initially  - Seen by ID, was on vancomycin/Zosyn discontinued 9/30.  PD cultures growing gram-positive bacilli, positive culture reported on 10/1.  ID signed off 10/03, recommend monitor off abx  - Leukocytosis improved but now worsening, procal 0.55, will send blood culture, repeat CT abd neg for infectious etiology 10/05, barely makes any urine  - Discussed with ID 10/06, will continue to monitor off abx  - Leukocytosis could be secondary to suspected underlying malignancy  - pain is improved    Skin mottling and cool b/l LE  - US suggestive of calcific atherosclerosis and poor cardiac output.  - CTA abdomen pelvis 10/05 scattered atherosclerosis, no evidence of critical stenosis or occlusion.  - discussed with patient's family about not being able to heparinize due to ongoing vaginal bleeding  - IV Dilaudid for pain management  - No acute intervention by vascular surgery recommended, monitor    Hypotension  -Overnight SBP in 70s, no evidence of active infection  -Suspected secondary to cardiomyopathy, pending a.m. cortisol level  -On midodrine 5 mg TID  -Monitor blood pressure     Positive blood culture with Staph epidermidis.   "Repeat cultures on 9/29 without growth.  Likely contaminant.    Fungal dermatitis, lower abdomen rash.  On clotrimazole cream.     Right second toe ulcer  - ID requested right foot x-ray negative for osteomyelitis  - Monitor off antibiotics  - Concern for right third toe osteomyelitis, per x-ray.  MRI negative for osteomyelitis  --Follow-up outpatient with PCP/vascular surgery     ESRD on PD  -Chronic HFpEF without acute exacerbation  -Elevated NTpro-BNP due to hypervolemia  PD has been going well at home. Started 5/2024. Follows with Dr. Finn.  - Echo with EF 30-35%  - Renal following  - PD per Renal     Permanent atrial fibrillation on chronic AC  - Rapid ventricular response  -In A-fib on admission, not on rate controlling meds at home  -Started metoprolol, Eliquis held due to ongoing vaginal bleeding    Non-ischemic myocardial injury due to sepsis  - Troponin downtrended  - EKG  Afib with RVR; narrow complex; TWI in II and aVF, no ST segment depression or elevation     R>L chronic LE edema  - In setting of ESRD. Patient reports chronic and stable. Has had multiple joint surgeries on the right leg. On chronic AC - held  - US neg for DVT     Suspect Vaginal bleeding  Uterine/cervical mass  Retroperitoneal and pelvic lymphadenopathy  - Patient reports 1 month of noticing consistent pink fluid in briefs, sometimes darker red. Doesn't make much urine.   - Pelvic US not able to visualize well  -GYN consulted, notes blood in vaginal vault--rec transvag ultrasound, pt declined  -Differential \"atrophy, vulvar dermatologic conditions, cervical or uterine cancer.\"  -however, pt declining work up  -Imaging 10/05 shows suspicious for cervical or uterine mass, source of enlarging lymphadenopathy and abdominal pelvic ascites  -Patient yet to decide if she would like to pursue workup     Possible polymyalgia rheumatica  - Continue home prednisone 5mg PO BID  - Will check cortisol level with hypotension     Chronic low back " "pain  Chronic opioid use  - Has been a long-term issue affecting mobility.  - Continue prednisone as above  - Continue home tramadol 50mg BID     Gout  - Was on febuxostat in the past. No acute flares right now.  - Continue home prednisone as above     Ulcerative colitis  -Appears to be stable.  -there was concern for possible rectal bleeding too--gi consulted and she declined rectal exam      GERD  - Continue home famotidine     Physical deconditioning.  Progressing during hospitalization.  -PT/OT evaluate for disposition needs     Goals of care:  -CODE STATUS DNR/DNI  -Discussed with patient's family at the bedside with patient's consent.  With son Pardeep Joe and daughter Leta on 10/05  -Aris Joe is POA  -Family very pleasant, discussed ongoing clinical course and answered all questions  -At this time they would like patient to be comfortable, yet to decide if patient wants to pursue further work up for possible malignancy  -Palliative consulted for further assistance with GOC, if PD will be an option if they chose hospice at home          Diet: Regular Diet Adult  Snacks/Supplements Adult: Nepro Oral Supplement; With Meals  Snacks/Supplements Adult: Gelatein Plus; Between Meals    DVT Prophylaxis: DOAC - held  Lerma Catheter: Not present  Lines: PRESENT             Cardiac Monitoring: None  Code Status: No CPR- Do NOT Intubate      Clinically Significant Risk Factors              # Hypoalbuminemia: Lowest albumin = 3 g/dL at 9/29/2024  7:56 AM, will monitor as appropriate     # Hypertension: Noted on problem list  # Chronic heart failure with reduced ejection fraction: last echo with EF <40%          # Obesity: Estimated body mass index is 31.32 kg/m  as calculated from the following:    Height as of this encounter: 1.549 m (5' 1\").    Weight as of this encounter: 75.2 kg (165 lb 12.6 oz).   # Severe Malnutrition: based on nutrition assessment    # Financial/Environmental Concerns: none         Disposition " Plan     Medically Ready for Discharge: Anticipated in 2-4 Days             Yumiko Bynum MD  Hospitalist Service  [facility]  Securely message with Mau (more info)  Text page via [employer] Paging/Directory   ______________________________________________________________________    Interval History   Patient was examined at the bedside, denies any new complaints today.  Discussed with daughter Rhoda who was at the bedside.  Patient yet to decide if she would like to pursue further workup or would like to go ahead with hospice care.  Palliative consulted for assistance  Overnight event of hypotension noted    Physical Exam   Vital Signs: Temp: 98.4  F (36.9  C) Temp src: Oral BP: 96/71 Pulse: 81   Resp: 16 SpO2: 97 % O2 Device: None (Room air)    Weight: 165 lbs 12.57 oz    Constitutional: awake, alert, cooperative, mild distress due to pain  Respiratory: no increased work of breathing, good air exchange, no retractions, and clear to auscultation  Cardiovascular: regular rate and rhythm and normal S1 and S2  GI: normal bowel sounds, soft, non-distended, pd cath site dressing intact, and non-tender  Skin: B/L lower extremities with mottling of skin, reticular nonblanching rash extending LE to lower extremities to the buttocks  Msk: dry ischemic wound of distal right second toe, bilateral extremities cold to touch, pulses not palpable  Neurologic: No gross focal deficits    Medical Decision Making       60 MINUTES SPENT BY ME on the date of service doing chart review, history, exam, documentation & further activities per the note.      Data     I have personally reviewed the following data over the past 24 hrs:    21.3 (H)  \   15.5   / 222     N/A N/A N/A /  N/A   N/A N/A N/A \     Procal: 0.55 (H) CRP: N/A Lactic Acid: N/A         Imaging results reviewed over the past 24 hrs:   Recent Results (from the past 24 hour(s))   CTA Abdomen Pelvis Runoff w Contrast    Narrative    EXAM: CTA  ABDOMEN PELVIS RUNOFF W CONTRAST  LOCATION: [facility]  DATE: 10/5/2024    INDICATION: Skin mottling with cool left lower extremity with BLE run off, with signs of critical limb ischemia  COMPARISON: Lower extremity ultrasound 10/4/2024  TECHNIQUE: Helical acquisition through the abdomen, pelvis, and bilateral lower extremities was performed during the arterial phase of contrast enhancement using IV Contrast. 2D and 3D reconstructions were performed by the CT technologist. Dose reduction   techniques were used.   CONTRAST: isovue 370, 90ml    FINDINGS:  AORTA: Mild calcified and noncalcified atherosclerosis. There is focal, somewhat saccular ectasia of the infrarenal abdominal aorta without olga aneurysmal dilation. The celiac, superior mesenteric, bilateral renal and inferior mesenteric arteries are   very small in caliber but there is no evidence of focal stenosis or occlusion. No evidence of active peritoneal or retroperitoneal hemorrhage.    RIGHT LEG: The common and external iliac arteries are widely patent without evidence of significant stenosis. The common femoral and superficial femoral arteries are also patent. There is likely moderate long segment narrowing at the level of the   popliteal artery, although no focal stenosis or occlusion is visualized. Patent tibioperoneal trunk. Patent three-vessel runoff is noted to the level of the ankle and foot.    LEFT LEG: The common and external iliac arteries are widely patent without evidence of significant stenosis. The common femoral and superficial femoral arteries are also patent. There is likely mild to moderate long segment narrowing at the level of the   popliteal artery, although no focal stenosis or occlusion is visualized. Patent tibioperoneal trunk. Patent three-vessel runoff is noted to the level of the ankle and foot.    LUNG BASES: No airspace consolidation or pleural effusion visualized.    ABDOMEN: Cholelithiasis without  evidence of acute cholecystitis or biliary obstruction. Pancreas, spleen and adrenal glands are unremarkable. Kidneys are atrophic without hydronephrosis. Likely right renal cyst, no specific follow-up recommended. No   evidence of bowel obstruction or acute inflammation.    PELVIS: Ill-defined masslike soft tissue attenuation centered around the cervix.lower uterine segment measuring up to 8.2 x 8.1 cm (series 4 image 172). This is new or increased in comparison to prior CT in November 2023. Abdominopelvic lymphadenopathy   is again noted with representative left external iliac node measuring 2.1 cm in short axis (series 4 image 143). Small to moderate volume ascites is similar to most recent CT. Peritoneal dialysis catheter in place. Urinary bladder is decompressed and not   well-visualized. No acute bony abnormality. Left hip arthroplasty.      Impression    IMPRESSION:  1.  Scattered atherosclerosis with relatively long segment stenoses involving the popliteal arteries bilaterally, right greater than left. However, there is no evidence of critical stenosis or occlusion. Patent three-vessel runoff to the ankle   bilaterally.  2.  Relatively small caliber arterial vessels in the abdomen or pelvis, nonspecific but can be seen in the setting of shock/hypotension.  3.  Findings suspicious for cervical or uterine mass which could be source of enlarging lymphadenopathy and/or abdominopelvic ascites.

## 2024-10-06 NOTE — PROGRESS NOTES
Renal progress note  CC: ESRD , PD  Assessment and Plan:  84 year old female with hx of ESRD on PD , CAD , Chronic back pain , hx of atrial fib, UC , Gout and Htn , presenting with abd pain and generalized weakness with nausea and poor intakes for 3-4days now. She has minimal UO but has been noticing blood on her briefs, uncledar vaginal or urinary, no stool with blood. PD fluid has been clear no alarms or drain pain except the slight chronic twinge she gets at end of last cycle. Nephrology is following for ESRD and co-morbidities management.     ESRD on PD under care of   OP Rx : FV 2300 ccx 4 cycles over 10hrs and then LF with1 L for Icodextrin.  OP Kt/V barely adequate.  Patient has been anuric here since admission  PD samples taken after drain icodextrin and send cellcounts and Cx after a 1 Hr dwell for fluid samples; cell count 3 and clear fluid , unlikely peritonitis  Inpatient PD Rx 6 cycles over 12 hours using 2.5% dextrose solution. No LF. -900cc.  She has been symptomatically hypotensive with systolic blood pressure in 90s.  Oral intake remains poor.  Last night PD fluid changed to mixed of 1.5% and 2.5% dextrose. I will continue the same tonight.     Access: PD exit side with resolved erythema, no drainage, no tenderness. Continue exit site care as per patient's family.     Electrolytes as per labs 10/04  Hypokalemia chronic. On KCL 20meq daily PTA. Serum potassium  wnl at 3.5  .On Regular diet.  Normal serum sodium of 137.  Chronic Hypomagnesemia-On mag oxide    Metabolic acidosis-Mild. Improved. CO2 was wnl at 22 on 10/04     BP/Volume status-BP is soft at 97/70 mmHG this am. ? 2/2 Ischemic cardiomyopathy, LVEF 30-35%  Patient appears euvolemic on exam.  AM cortisol level is pending this am  TW 76.5-77kg at home. On 10/02 standing weight was 74.2 kgs. Today weight 75.2 kgs. Weight checked in bed in last three days . ? accuracy  Anuric. Therefore Spironolactone and Torsemide were  discontinued on 10/02  Metoprolol XL 50 mg daily with holding parameters started this admission for A.fib with RVR but has not been given in the last 3 days given ongoing symptomatic hypotension  Started on midodrine 2.5 mg 3 times daily, hold if systolic blood pressure 120 or higher as per discussion with  vascular surgery team at the bedside.  Recs:  Midodrine dose to 5 mg 3 times daily hold if systolic blood pressure greater than 120.  Daily standing weight if possible  Strict I/O        Anemia-Hgb is wnl. No indications for FAUSTO.     MBD  Not on  binders     On Regular diet.     Bacteremia-Blood culture from 09/28 grew Strep epidermidis. Repeat blood cx from 09/29 with no growth so far.   Peritoneal fluid cx is negative as well.  H/o recurrent UTI. No urine cx collected this admission  Leucocytosis was trending up to 21.3K.  Patient is afebrile.  Initially received vanco and zosyn  X ray of the right foot 10/01 showed deformity of the third middle phalanx with suggestion of erosion of the third proximal phalanx base concerning for septic arthritis and osteomyelitis. No definite erosion of the second toe. MRI right foot 10/02 showed no evidence of Osteomyelitis.  ID signed off       CAD  LVEF decreased to 30-35% when compared to his previous study dated 11/06/2023.  Atrial fib with RVR  On Ac continued; Eliquis  Troponin leak down trend  Started on Metoprolol XL 50mg daily this admission.  Evaluated by cardiology this admission. Patient prefers to wait for ischemic testing.    Diarrhea-Having frequent painless BMs with small amount of stool.  Stool test for C.diff by PCR was negative.  Improved    Mottling and cyanosis of both lower extremities-Duplex of LEs/ GINETTE 10/04 showed noncompressible tibial arteries bilaterally, suggestive of calcific atherosclerosis. Unmeasurable digital pressures bilaterally, suggestive of poor wound healing potential. Patent bilateral lower extremity arterial vasculature, however  with diffusely low velocities which taper further distally and transition to monophasic waveforms. Overall findings suggestive of poor cardiac output versus aortic inflow disease.   Seen by Vascular Surgery.  CTA abd/pelvis showed  no evidence of critical stenosis or occlusion. Patent three-vessel runoff to the ankle bilaterally but findings suspicious for cervical or uterine mass which could be source of enlarging lymphadenopathy and/or abdominopelvic ascites.  Discussed in length with the patient and her family that she likely will be not a candidate for surgery or chemotherapy given her frailty. The patient and family are open to discuss goals of care with palliative  care team        Vaginal vs rectal  bleeding. GI and OB/GYN signed off. Patient declined TVUS.     Hx of PMR on Prednisone 5 mg BID. Reports increased weakness in proximal muscles of the both legs.     Malnutrition-albumin was 3.0 on 09/29/24.  Patient reports poor appetite.  Protein supplements has been ordered.  Encouraged the patient to increase oral intake.     Chronic back pan-On tramadol     Gout -controlled, on allopurinol     GERD -on pepcid    Goals of care-the patient with multiple comorbidities and frailty.  Declined ischemic testing and work up for bleeding. Now with suspicious mass of cervix or uterus seen on CTA. Consider palliative care consult to discuss goals of care.    Discussed with hospitalist .    We will follow     Mana Banda MD  [facility], [address], [address]  [place] [address]  Phone# [phone]  Fax# [phone]        Subjective  Patient was seen at the bedside and events were reviewed with nursing.  No acute issues overnight.  Patient accompanied by son and daughter at the bedside.  Patient states that she tolerated PD without issues. 731 ml removed with UF.   Reports less frequent painless BMs.  Patient c/o fatigue, poor appetite, persistent decoloration of both  legs.  Patient denies: fever, chills, dizziness, sore throat, rhinorrhea, cough, shortness of breath , chest pain, palpitations, orthopnea, nausea, vomiting,dysuria, urinary frequency, urgency, hematuria, rash.  Updated on labs and results of CT abdomen pelvis . Goals of care. All questions answered.    Objective    Vital signs in last 24 hours  Temp:  [97.3  F (36.3  C)-98.7  F (37.1  C)] 98.4  F (36.9  C)  Pulse:  [73-84] 81  Resp:  [16-18] 16  BP: ()/(52-71) 97/70  SpO2:  [93 %-97 %] 97 %  Weight:   [unfilled]    Intake/Output last 3 shifts  I/O last 3 completed shifts:  In: 132 [P.O.:120; I.V.:12]  Out: -   Intake/Output this shift:  I/O this shift:  In: 400 [P.O.:400]  Out: -     Physical Exam  Alert awake, NAD, frail, elderly, chronically ill looking  CV: RRR without murmur or rub  Lung: clear and equal; no extra sounds  Ab: soft and NT; not distended; normal bs  Ext: cyanotic looking forefoot b/l, cold to touch, I was not able to appreciated pedal or popliteal pulses on both legs, mild left edema and well perfused.  Per bedside RN report, her tibial pulses were appreciated on both legs with Doppler's morning  Skin; no rash, severe mottling of skin of both legs from groin down  Access: PD exit side with resolved erythema, no drainage, no pain over the PD trac. Covered with clean dressing.    Pertinent Labs   Lab Results   Component Value Date    WBC 21.3 (H) 10/06/2024    HGB 15.5 10/06/2024    HCT 47.5 (H) 10/06/2024     10/06/2024     10/06/2024     Lab Results   Component Value Date    BUN 33.9 (H) 10/04/2024     10/04/2024    CO2 22 10/04/2024       Lab Results   Component Value Date    ALBUMIN 3.0 (L) 09/29/2024     Lab Results   Component Value Date    PHOS 4.8 (H) 09/28/2024     I reviewed all lab results    ~ 50 Minutes today spent in exam, POC, education regarding renal disease and management, counseling and/or discussion with patient's care team.    Mana Banda,  MD  [facility]  [address], [address]  [place] [address]  Phone# [phone]  Fax# [phone]

## 2024-10-06 NOTE — PROGRESS NOTES
Progress Note    Time of event: 9:06 PM October 5, 2024    Description of event:  Paged by nursing staff for concern of hypotension with a blood pressure of 77/52 (MAP 60). The patient had been started on midodrine earlier today due to hypotension. She is a peritoneal dialysis patient here for concern of PD catheter associated intraabdominal infection. Received vac/Zosyn which was discontinued 9/30. ID recommended following off antibiotics. Notably, the patient's WBC is elevated today compared to the last 3 days. However, on evaluation the patient is asymptomatic and afebrile. She denies subjective fever, head ache, dizziness, CP, abdominal pain or SOB. She reports feeling fatigued from a long day but otherwise fine.     Recycled blood pressure while in the room returning one reading with systolic of 110 and second with a blood pressure of 107/63. Per chart review, the patient has had softer pressures for the last several days.     Plan:  Continue to monitor vitals routinely and assess patient status   Notify provider if MAP <65 or the patient experiences new symptoms  May consider 500 mL bolus if unable to consistently maintain MAP >65   - May also consider repeating CBC and lactic if unable to maintain MAP     Discussed with: bedside nurse    Sejal Diez DO

## 2024-10-06 NOTE — PROGRESS NOTES
Vascular Surgery Note    Patient s/e again today. Reticular rash on BLE is stable in appearance, BL forefoot duskiness and R 2nd toe wound also stable without signs of active infection. CTA yesterday showed 3 vessel runoff to the feet bilaterally, with mild to moderate multifocal stenotic disease bilaterally but no occlusive disease. No aortoiliac occlusive disease present.     Even upon further questioning, it is still unclear how long the right 2nd toe wound has been present, but it has likely been there for around 1 month. The CTA also showed a cervical soft tissue mass which is concerning in the setting of her AUB. I spoke to the patient and her children about the findings from a vascular standpoint, and encouraged the patient to proceed with further evaluation by gynecology which she had previously refused. She appears more amenable to examination and possible biopsy if necessary.     At this time, given the patient's possible gynecologic neoplasm and other more acute medical issues as well as the fact that she has 3-vessel runoff to the foot on CTA and the wound on her right foot is dry and has been present for only a few weeks, I recommend no acute vascular surgical intervention. Recommend close monitoring of the wound for non-healing, and if the wound does not heal or if it progresses to wet gangrene, please call vascular surgery back for further evaluation. She can be referred to vascular surgery outpatient clinic on discharge or follow up with her PCP for the foot wound.     Vascular Surgery signing off at this time. Thank you for the opportunity to participate in this patient's care. Please call or page with any questions or concerns.     D/w Dr. Mireles.      Stephanie Yee MD  PGY-6  Vascular Surgery  Pager: [phone]    Please page me directly with any questions/concerns during regular weekday hours before 5PM. If there is no response, if it is a weekend, or if it is during evening hours, then  please use the [facility] system to page the first-call resident on call for vascular surgery.

## 2024-10-06 NOTE — PLAN OF CARE
Problem: Adult Inpatient Plan of Care  Goal: Plan of Care Review  Outcome: Progressing  Flowsheets (Taken 10/6/2024 1408)  Plan of Care Reviewed With:   patient   family  Overall Patient Progress: improving  POC reviewed with patient and family. All questions and concerns addressed.   New findings from abd CTA 10/5, see results. Patient and family receptive to meeting with palliative to discuss goals of care. Consult ordered.   Poor appetite. Only eats a few bites throughout the day.   Eliquis was put on hold this morning d/t vaginal bleeding.       Problem: Pain Chronic (Persistent)  Goal: Optimal Pain Control and Function  Outcome: Progressing  Intervention: Develop Pain Management Plan  Recent Flowsheet Documentation  Taken 10/6/2024 0952 by Gini Martin RN  Pain Management Interventions:   medication (see MAR)   pillow support provided   repositioned   rest  Intervention: Manage Persistent Pain  Recent Flowsheet Documentation  Taken 10/6/2024 0800 by Gini Martin RN  Medication Review/Management: medications reviewed  Intervention: Optimize Psychosocial Wellbeing  Recent Flowsheet Documentation  Taken 10/6/2024 0800 by Gini Martin RN  Supportive Measures:   active listening utilized   verbalization of feelings encouraged   decision-making supported  PRN IV Dilaudid 0.4 mg q4h effective for pain management. C/o 10/10 chronic back pain, R hip, and legs.        Problem: Infection  Goal: Absence of Infection Signs and Symptoms  Outcome: Progressing  A-febrile.   WBC 21.3, trending up from 19.1 on 10/4.  Procalcitonin 0.55.  PD site C/D/I. SonTatyana, changed her drsg last night and will change again tonight per home regimen with home supplies.  ID s/o 10/3. Monitoring off IV abx. Vanco and Zosyn discontinued 9/30.      Problem: Chronic Kidney Disease  Goal: Fluid Balance  Outcome: Progressing  Intervention: Monitor and Manage Hypervolemia  Recent Flowsheet Documentation  Taken 10/6/2024 0800 by Gini Martin  S, RN  Skin Protection: adhesive use limited  PD overnight. UF around 850. Nephrology following, see note.    Cr 5.88.  K 3.5  Anuric.   Wt Readings from Last 3 Encounters:   10/06/24 75.2 kg (165 lb 12.6 oz)   08/28/24 78 kg (172 lb)   04/16/24 78 kg (172 lb)   Bed wts. Unable to obtain standing wts d/t BLE weakness and c/o dizziness when up.   BP Readings from Last 6 Encounters:   10/06/24 96/71   08/28/24 105/57   07/17/24 (!) 173/84   07/09/24 116/60   04/16/24 115/64   04/09/24 125/85   TID Midodrine will increase from 2.5 mg to 5 mg this afternoon.   Goal MAP > 65.      Problem: Tissue Perfusion Altered  Goal: Improved Tissue Perfusion  Outcome: Progressing  Intervention: Optimize Tissue Perfusion  Recent Flowsheet Documentation  Taken 10/6/2024 0800 by Gini Martin, RN  Pressure Reduction Techniques:   frequent weight shift encouraged   heels elevated off bed   positioned off wounds   pressure points protected   weight shift assistance provided  Mottling still present BLE. Thready posterior tibial pulses audible with doppler.   See results for abd CTA and BLE ultrasound.  EF 30-35%.       Goal Outcome Evaluation:      Plan of Care Reviewed With: patient, family    Overall Patient Progress: improvingOverall Patient Progress: improving

## 2024-10-06 NOTE — PROCEDURES
[employer] cycler set up per orders of Dr. Banda     Total volume (ml) : 13,800 (machine set for 14,000 ml unable to accept fractions)     Therapy time:12                Fill volume (ml): 2300     Cycles:6    Dextrose 1.5% volume (ml) 6000     Dextrose 2.5% volume (ml): 7800     PD catheter visualized. Machine ready to connect. Report given to  Charge RN

## 2024-10-06 NOTE — PLAN OF CARE
Problem: Pain Chronic (Persistent)  Goal: Optimal Pain Control and Function  10/6/2024 1826 by Chioma Benson, RN  Outcome: Progressing  10/6/2024 1655 by Chioma Benson RN  Outcome: Progressing     Problem: Chronic Kidney Disease  Goal: Optimal Coping with Chronic Illness  10/6/2024 1826 by Chioma Benson, RN  Outcome: Progressing  10/6/2024 1655 by Chioma Benson, RN  Outcome: Progressing  Goal: Electrolyte Balance  10/6/2024 1826 by Chioma Benson, RN  Outcome: Progressing  10/6/2024 1655 by Chioma Benson, RN  Outcome: Progressing  Goal: Fluid Balance  10/6/2024 1826 by Chioma Benson, RN  Outcome: Progressing  10/6/2024 1655 by Chioma Benson, RN  Outcome: Progressing  Goal: Optimal Functional Ability  10/6/2024 1826 by Chioma Benson, RN  Outcome: Progressing  10/6/2024 1655 by Chioma Benson, RN  Outcome: Progressing  Goal: Absence of Anemia Signs and Symptoms  10/6/2024 1826 by Chioma Benson, RN  Outcome: Progressing  10/6/2024 1655 by Chioma Benson, RN  Outcome: Progressing  Goal: Optimal Oral Intake  10/6/2024 1826 by Chioma Benson, RN  Outcome: Progressing  10/6/2024 1655 by Chioma Benson, RN  Outcome: Progressing  Goal: Acceptable Pain Control  10/6/2024 1826 by Chioma Benson, RN  Outcome: Progressing  10/6/2024 1655 by Chioma Benson, RN  Outcome: Progressing  Goal: Minimize Renal Failure Effects  10/6/2024 1826 by Chioma Benson, RN  Outcome: Progressing  10/6/2024 1655 by Chioma Benson, RN  Outcome: Progressing       Goal Outcome Evaluation:    Pt is alert and oriented x 3, forgetful. VSS BP better today with an increase in midodrine today to 5 mg. She did not require the 1700 dose as BP was over 120 Systolic.     Dialysis was connected at 2030 per home cycling.     Pt is c/o pain to left and right thighs. There are lumps in her thighs with accompanying bruising though to be mottling as of a few days ago.     On call hospitalist was  updated about the pain and tenderness to lumps in thighs under bruising.        IV hydromorphone PRN given once for 9/10 pain to pelvic region and right thigh.      Pt had one episode of small bloody discharge from her vagina.  This is not new.

## 2024-10-07 NOTE — PROGRESS NOTES
RENAL DAILY PROGRESS NOTE    ASSESSMENT/PLAN:    Mely Alegria is a 84 year old female w/ ESKD/PD admitted on 9/28/2024 with abdominal pain, initial concern for PD associated peritonitis, ultimately found to have cervical/uterine mass.     ESKD/PD.  Follows with Dr. Finn at [facility]. Home script is 4c, 2.3L, 10 hr, ico last fill with apparently marginal Kt/V's as outpatient. Inpatient script has been 6c, 2.3L, 12 hr, no last fill (set by Dr. Finn). Will check RFP with tomorrow's labs.  From a logistic standpoint, the patient will not be able to continue peritoneal dialysis if discharged to a TCU.  Upon discussion with the patient and her children today, they would prefer discharge to home and continuing PD rather than switching to in center hemodialysis.  Discussed with palliative care LEROY, who will discuss with hospitalist.  Hypotension.  Started on midodrine while inpatient, currently on 5 mg 3 times daily.  Can hopefully wean off, as patient's diet improves.  Continue 1.5/2.5 while inpatient with limited p.o. intake.  Hypokalemia.  Continue home KCl 20 mEq daily (ordered).  Sepsis.  Patient did NOT have PD-associated peritonitis on presentation with clear effluent and 3 TNC. Suspect Streptomyces on peritoneal culture was contaminant.  Uterine/cervical malignancy.  Patient initially declined TVUS, unsure of how much diagnostic workup she would like to pursue.  Palliative care is engaged, I assume that these conversations can continue as an outpatient.  In discussion with the patient and the family today, they would like to revisit with GynOnc to discuss next steps.  Mottling of lower extremities.  Much improved today, by family's report.  CTA with three-vessel runoff.  Unsure etiology of this, but I am glad it seemed to have improved.  PMR. On home prednisone 5 bid.      Please reach out via [employer] Secure Chat with any questions or concerns.    Ata Zavala MD  [facility]    Subjective     Discussed with patient, palliative care LEROY, family.  The patient and her family feel that she is doing much better and is hopeful to discharge home soon.  They have no issues with PD.         Objective     Vitals:    10/04/24 1506 10/05/24 1111 10/06/24 0900   Weight: 72.9 kg (160 lb 12.8 oz) 74.4 kg (164 lb 0.4 oz) 75.2 kg (165 lb 12.6 oz)     Vital Signs with Ranges  Temp:  [97.3  F (36.3  C)-97.9  F (36.6  C)] 97.6  F (36.4  C)  Pulse:  [] 60  Resp:  [16-20] 20  BP: ()/(61-85) 130/78  SpO2:  [94 %-97 %] 97 %  I/O last 3 completed shifts:  In: 800 [P.O.:800]  Out: 0   Resp: 20    Physical Exam  No acute distress      Labs reviewed with interpretations in A&P.  Imaging reviewed with interpretations in A&P.

## 2024-10-07 NOTE — PLAN OF CARE
Problem: Pain Chronic (Persistent)  Goal: Optimal Pain Control and Function  Outcome: Progressing  Intervention: Manage Persistent Pain  Recent Flowsheet Documentation  Taken 10/6/2024 2348 by Ever Suggs RN  Medication Review/Management: medications reviewed  Intervention: Optimize Psychosocial Wellbeing  Recent Flowsheet Documentation  Taken 10/6/2024 2348 by Ever Suggs RN  Supportive Measures:   active listening utilized   verbalization of feelings encouraged   relaxation techniques promoted     Problem: Infection  Goal: Absence of Infection Signs and Symptoms  Outcome: Progressing     Problem: Chronic Kidney Disease  Goal: Electrolyte Balance  Outcome: Progressing     Problem: Chronic Kidney Disease  Goal: Fluid Balance  Outcome: Progressing     Problem: Tissue Perfusion Altered  Goal: Improved Tissue Perfusion  Outcome: Progressing  Intervention: Optimize Tissue Perfusion  Recent Flowsheet Documentation  Taken 10/6/2024 2348 by Ever Suggs RN  Pressure Reduction Techniques: frequent weight shift encouraged   Goal Outcome Evaluation:  Patient disoriented to situation. Slept in between cares. Denied pain. Repositioning as patient allowed. This AM patient had One loose yellow/brownish color incontinent stool, medium in size. Scant bloody vaginal bleeding noted when cleaning incontinent BM.   Tolerating PD.  Palliative care consult today.

## 2024-10-07 NOTE — PLAN OF CARE
Problem: Adult Inpatient Plan of Care  Goal: Plan of Care Review  Description: The Plan of Care Review/Shift note should be completed every shift.  The Outcome Evaluation is a brief statement about your assessment that the patient is improving, declining, or no change.  This information will be displayed automatically on your shift  note.  Outcome: Progressing   Goal Outcome Evaluation:               Family at bedside, do most of her cares, patient has been turning with assist.

## 2024-10-07 NOTE — PROGRESS NOTES
CLINICAL NUTRITION SERVICES - REASSESSMENT NOTE     Nutrition Prescription    RECOMMENDATIONS FOR MDs/PROVIDERS TO ORDER:  None    Malnutrition Status:    Sever malnutrition previously noted    Recommendations already ordered by Registered Dietitian (RD):  Expedite for wound healing    Future/Additional Recommendations:  Will monitor progress towards goals     EVALUATION OF THE PROGRESS TOWARD GOALS   Diet: Regular  Nutrition Support: Nepro daily, gelatein plus was ordered in [facility] but not in [facility] so pt did not receive  Intake: Pt ordered 1016 - 1556 kcal with 52 g pro on 10/6 and intake was poor with 25%  1015 - 1188 kcal with 55 g pro on 10/5 and intake was poor with 25%     NEW FINDINGS   Spoke to daughter and she said that she didn't have time to discuss pt's intake and nutrition recommendations.       ANTHROPOMETRICS  Admission wt: 77.6 kg (171 lb)  Most Recent Weight: 75.2 kg (165 lb 12.6 oz)  IBW: 47.7 kg (105 lb 6.1 oz)  BMI: Obesity Grade I BMI 30-34.9    Date/Time Weight Weight   Method  10/06/24 75.2 kg (165 lb 12.6 oz) Bed scale  10/05/24  74.4 kg (164 lb 0.4 oz) Bed scale  10/04/24  72.9 kg (160 lb 12.8 oz) Standing scale  10/04/24  76.1 kg (167 lb 12.3 oz) Bed scale  10/02/24  74.2 kg (163 lb 9.6 oz) Standing scale  10/01/24 73.5 kg (162 lb)  Standing scale  10/01/24  74 kg (163 lb 2.3 oz)  Bed scale  09/30/24  78 kg (171 lb 15.3 oz)  Bed scale  09/29/24  78 kg (171 lb 15.3 oz)  Bed scale  09/28/24  77.6 kg (171 lb)  08/28/24 78 kg (172 lb)  04/16/24 78 kg (172 lb)  04/09/24 79.5 kg (175 lb 3.2 oz)  02/20/24 80.3 kg (177 lb)  01/31/24 80.2 kg (176 lb 12.9 oz)  01/17/24 81 kg (178 lb 9.2 oz)  12/20/23 99.3 kg (219 lb)  11/27/23 99.3 kg (219 lb)  11/21/23 99.3 kg (219 lb)  11/15/23 99.4 kg (219 lb 3.2 oz)   11/13/23 99.8 kg (220 lb)   11/09/23 100.8 kg (222 lb 3.6 oz)        Wt loss of 25% <1 year    PHYSICAL FINDINGS  Per flowsheet:   Edema- LE 1+ (trace)  GI- fecal incontinence, loose stools,  LBM 10/6/24  Skin- Sacrum/coccyx, buttocks, right toe (scab)    LABS  GFR 7 (L)  Creatinine (5.88)  Glucose 111 - 147 (current 118)    MEDICATIONS  Pepcid  Folic acid  Magnesium oxide    MALNUTRITION:   Previously diagnosed with severe malnutrition in the context of acute on chronic illness or injury       NUTRITION DIAGNOSIS  Malnutrition related to poor appetite on dialysis as evidenced by weight loss of 23% < 1 month, po intake  </= 50% for >/= 1 month, and mild fluid retention.     Evaluation - Ongoing    Increase nutrient needs related to wounds as evidence by nonhealing wounds - NEW    Previous Goals:  Patient to consume % of nutritionally adequate meals three times per day, or the equivalent with supplements/snacks. - Progressing    Goals:  Patient to consume % of nutritionally adequate meals three times per day, or the equivalent with supplements/snacks.     Promote healing by consuming supplements and meeting PRO goals (1 - 1.2 g pro / 54.8 kg)    INTERVENTIONS  Implementation  Expedite       Monitoring/Evaluation  Progress toward goals will be monitored and evaluated per protocol.

## 2024-10-07 NOTE — CONSULTS
[facility]    Please see consult completed by Dr. Rodriguez on 9/29/2024.  We were re-consulted to see the patient this evening.     Repeat CT scan 10/5 showed:  PELVIS: Ill-defined masslike soft tissue attenuation centered around the cervix.lower uterine segment measuring up to 8.2 x 8.1 cm (series 4 image 172). This is new or increased in comparison to prior CT in November 2023. Abdominopelvic lymphadenopathy   is again noted with representative left external iliac node measuring 2.1 cm in short axis (series 4 image 143). Small to moderate volume ascites is similar to most recent CT. Peritoneal dialysis catheter in place. Urinary bladder is decompressed and not   well-visualized. No acute bony abnormality. Left hip arthroplasty.      Aris Joe was in the room and stated they they again decided that they do not want to have any further workup for now. She went for US of her lower extremities and it was too painful.    Reviewed possibility of GYN malignancy and treatment with surgery/ chemo/ radiation pending what was found. They have been told by nephrology that she could not undergo surgery or chemotherapy. They have met with palliative care and hope to discharge home and keep doing IP dialysis.    Reviewed we are available if they again change their minds.  Tatyana and Mely expressed their thanks and understanding.      Radha Nova MD 10/7/2024 8:22 PM        I spent a total of 30 minutes providing care for this patient including: preparing to see the patient, obtaining a medical history, completing a medically appropriate physical exam, completing documentation of visit information and plans in the EMR, counseling the patient and/or caregiver regarding her diagnosis, treatment options and follow up plans, as well as any necessary communication of subsequent test results to the patient.

## 2024-10-07 NOTE — PLAN OF CARE
Problem: Wound  Goal: Optimal Coping  Outcome: Progressing  Intervention: Support Patient and Family Response  Recent Flowsheet Documentation  Taken 10/7/2024 1549 by Mariana Jacobs RN  Supportive Measures:   active listening utilized   verbalization of feelings encouraged   relaxation techniques promoted  Goal: Optimal Functional Ability  Outcome: Progressing  Intervention: Optimize Functional Ability  Recent Flowsheet Documentation  Taken 10/7/2024 1549 by Mariana Jacobs RN  Activity Management: up to bedside commode  Activity Assistance Provided: assistance, 2 people  Goal: Absence of Infection Signs and Symptoms  Outcome: Progressing  Goal: Improved Oral Intake  Outcome: Progressing  Goal: Optimal Pain Control and Function  Outcome: Progressing  Goal: Skin Health and Integrity  Outcome: Progressing  Intervention: Optimize Skin Protection  Recent Flowsheet Documentation  Taken 10/7/2024 1549 by Mariana Jacobs RN  Pressure Reduction Techniques: frequent weight shift encouraged  Activity Management: up to bedside commode  Head of Bed (HOB) Positioning: HOB at 20-30 degrees  Goal: Optimal Wound Healing  Outcome: Progressing   Goal Outcome Evaluation:             Buttock red denuded tissue, painful with cares, frequent cleansing and cream applied to skin, legs purple and bruising areas

## 2024-10-07 NOTE — PROGRESS NOTES
"Care Management Follow Up    Length of Stay (days): 9    Expected Discharge Date: 10/08/2024     Concerns to be Addressed:     Care progression - discharge planning  Patient plan of care discussed at interdisciplinary rounds: Yes    Anticipated Discharge Disposition:  Transitional Care vs home with home care    Anticipated Discharge Services:  Transitional Care vs home with home care  Anticipated Discharge DME:  NA    Patient/family educated on Medicare website which has current facility and service quality ratings:  NA  Education Provided on the Discharge Plan:  Yes per team  Patient/Family in Agreement with the Plan:  Yes    Referrals Placed by CM/SW:  Yes  Private pay costs discussed: Not applicable    Discussed  Partnership in Safe Discharge Planning  document with patient/family: No     Handoff Completed: No, handoff not indicated or clinically appropriate    Additional Information:  Met with patient's son, Tatyana. Tatyana questioned when palliative was coming to see them?  Informed Tatyana a consult was placed yesterday, so palliative maybe coming by today.  Discuss discharge plans and if patient plans to go to TCU, will need more TCU choices.  Tatyana said, \"it's still up in the air.\"    Social Hx:  Assessment: Follow. From home w/family; 24 hr care; home PD  Last Note: 10/7/24  Plan: PT rec Transitional care vs OT rec home care. Patient prefer home care. Accepted to [facility] for RN/PT/OT/HHA.   Needs: f/up on family decision. Palliative consult.  Hand off sent: NA  Transport: Family    Next Steps: RNCM to follow for medical progression, recommendations, and final discharge plan.     Jennifer Westfall RN     Per provider, Dr. Bynum, patient is not ready. Waiting for palliative consult. Family needs to decide if they want further work up, go home with home care or go to TCU.    "

## 2024-10-07 NOTE — PROGRESS NOTES
Mely was soundly sleeping. Did not stir to my voice.    Spiritual Care will attempt visit another day.    KAUR Mata.  Palliative Care Team

## 2024-10-07 NOTE — CONSULTS
"  Palliative Care Consultation Note  [facility]      Patient: Mely Alegria  Date of Admission:  9/28/2024    Requesting Clinician / Team: Dr. Bynum  Reason for consult: Goals of care  \"Family would like to discuss postential hospice at home options, polly regarding Peritoneal dialysis, Thank you\"       Recommendations & Counseling     GOALS OF CARE:   Life prolonging goals, with limits-DNR/DNI.  Wants to continue peritoneal dialysis.    Patient's primary goal is to return home.  Her son and daughter have been caring for her at home, and feel they can meet her care needs.  They are aware she is requiring a 2 assist with transfers.  Would need a hospital bed.  Appreciate assistance from care management with discharge planning.  Reportedly there is a question re: whether patient would qualify for hospice if she continues with PD.  If patient has a terminal diagnosis that is not ESRD, she may be able to continue with dialysis while be enrolled in hospice.  This would be decided by hospice medical director.  May be challenging to qualify without a more clear diagnosis and/or prognosis from GYN oncology perspective.    Patient/family state they would need more information on her possible cancer before making decisions such as pursuing cancer treatments, interventions and/or stopping dialysis/considering hospice.  She is still deciding whether to pursue transvaginal ultrasound, which.    Discussed the above with hospitalist.      ADVANCE CARE PLANNING:  Per family, patient has a health care directive and marva Joe is primary health care agent.  He will bring a copy to the hospital.  There is no POLST form on file, recommend to complete prior to DC.  Code status: No CPR- Do NOT Intubate-Continue to monitor    MEDICAL MANAGEMENT:   We are not actively managing symptoms at this time.    PSYCHOSOCIAL/SPIRITUAL SUPPORT:  3 children, 2 grandchildren and 2 great grandchildren  Patient lives with and " is cared for by marva Joe.  Daughter and other son also provide caregiver support.     Palliative Care will continue to follow. Thank you for the consult and allowing us to aid in the care of Mely Alegria.    These recommendations have been discussed with Dr. Bynum.    Katherin José, CNS  [facility], Palliative Care  Securely message with the [employer] (learn more here) or  Text page via [facility] Paging/Directory         Assessment      Mely Alegria is a 84 year old female with PMHx of ESRD on PD, CHF, chronic low back pain, possible PMR, Afib on AC, ulcerative colitis, gout who presented on 9/28/24 with 2 days of acute onset generalized weakness, diffuse abdominal pain, and and nausea with vomiting.  Admitted with sepsis due to intra-abdominal infection, bacterial peritoneal peritonitis with PD catheter present, lactic acidosis, non ischemic myocardial injury.    Since admission, patient seen by GYN for vaginal bleeding, imaging 10/5 shows suspicious for cervical or uterine mass, source of enlarging lymphadenopthy and abdominal pelvic ascites.  Has declined transvaginal ultrasound.  Vascular surgery consulted due to lower extremity mottling and concern for PVD.  Peritonitis has been ruled out and patient of antibiotics at this point.   Continues on peritoneal dialysis.      ID, renal, cardiology, GI, GYN, vascular surgery     Today, the patient was seen for:  Goals of care, introductory palliative care visit     History of Present Illness   Met with patient, marva Joe, daughter Mariana.   Dr. Zavala with renal also present for much of visit.   Introduced the role of palliative care as an interdisciplinary team that cares for patients with serious illness to help support symptom management, communication, coping for patients and their families as well as support with medical decision making.    Prognosis, Goals, & Planning:   Functional Status just prior to this current hospitalization:  ECOG3 (Capable  "of only limited self-care; needs help with ADLs; in bed/chair >50% of waking hours)  Per family walking short distances in the home.  Has been getting weaker in the past few months.     Prognosis, Goals, and/or Advance Care Planning:  We discussed general treatment options (full/restorative, selective/conservatives, and comfort only/hospice). We then discussed how these specifically apply to Mely.  Based on this discussion, patient has decided to continue with selective treatments.  She is still considering whether to take the next of step of exploring cancer work up, which would be transvaginal ultrasound.     Code Status was addressed today:   Confirmed DNR/DNI    Patient's decision making preferences: shared with support from loved ones        Patient has decision-making capacity today for complex decisions: Intact          Coping, Meaning, & Spirituality:   Mood, coping, and/or meaning in the context of serious illness were addressed today: Yes  Patient describes herself as \"indecisive\".  Difficult for her to make decisions and needs time to process.    Social:   Living situation:lives with son  Important relationships/caregivers:.  2 sons Tatyana and Kraig, 1 daughter Mariana.  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     ROS:  Comprehensive ROS is reviewed and is negative except as here & per HPI: Reports poor appetite, food doesn't taste good.  Some intermittent nausea, no vomiting.  Sometimes feels like pills get \"stuck\" when swallowing.    She has pain in her lower abdomen that is relieved with pain medication.  Son reported some bloody vaginal drainage last evening when she was getting nursing cares.     Physical Exam   Vital Signs with Ranges  Temp:  [97.3  F (36.3  C)-97.9  F (36.6  C)] 97.6  F (36.4 "  C)  Pulse:  [] 60  Resp:  [16-20] 20  BP: ()/(61-85) 130/78  SpO2:  [94 %-97 %] 97 %  Wt Readings from Last 10 Encounters:   10/06/24 75.2 kg (165 lb 12.6 oz)   08/28/24 78 kg (172 lb)   04/16/24 78 kg (172 lb)   04/09/24 79.5 kg (175 lb 3.2 oz)   02/20/24 80.3 kg (177 lb)   01/31/24 80.2 kg (176 lb 12.9 oz)   01/17/24 81 kg (178 lb 9.2 oz)   12/20/23 99.3 kg (219 lb)   11/27/23 99.3 kg (219 lb)   11/21/23 99.3 kg (219 lb)     165 lbs 12.57 oz    PHYSICAL EXAM:  Constitutional: alert and no distress   Respiratory: Breathing nonlabored at rest  NEURO: Oriented X 4    Data reviewed:  Results for orders placed or performed during the hospital encounter of 09/28/24 (from the past 24 hour(s))   Blood Culture Arm, Left    Specimen: Arm, Left; Blood   Result Value Ref Range    Culture No growth after 12 hours     Narrative    Only an Aerobic Blood Culture Bottle was collected, interpret results with caution.      WBC count   Result Value Ref Range    WBC Count 21.0 (H) 4.0 - 11.0 10e3/uL     10/5/24 CTA abdomen pelvis                                                                   IMPRESSION:  1.  Scattered atherosclerosis with relatively long segment stenoses involving the popliteal arteries bilaterally, right greater than left. However, there is no evidence of critical stenosis or occlusion. Patent three-vessel runoff to the ankle   bilaterally.  2.  Relatively small caliber arterial vessels in the abdomen or pelvis, nonspecific but can be seen in the setting of shock/hypotension.  3.  Findings suspicious for cervical or uterine mass which could be source of enlarging lymphadenopathy and/or abdominopelvic ascites.        95 MINUTES SPENT BY ME on the date of service doing chart review, history, exam, documentation & further activities per the note.

## 2024-10-07 NOTE — PROGRESS NOTES
[facility]    Medicine Progress Note - Hospitalist Service    Date of Admission:  9/28/2024    Assessment & Plan   84 year old female with PMHx of ESRD on PD, chronic low back pain, possible PMR, Afib on AC, ulcerative colitis, gout who presents with 2 days of acute onset generalized weakness, diffuse abdominal pain, and and nausea with vomiting. She was admitted for concern for possible bacterial peritonitis.She also notes over past week SUAREZ and intermit vaginal bleeding         Sepsis, concern for Secondary bacterial peritonitis with PD catheter -however negative cx   Leukocytosis  -Presented with abdominal pain, leukocytosis  -Imaging with retroperitoneal and pelvic lymphadenopathy, clinically was consistent with infectious peritonitis.  Could not give UA sample initially  - Seen by ID, was on vancomycin/Zosyn discontinued 9/30.  PD cultures growing gram-positive bacilli, positive culture reported on 10/1.  ID signed off 10/03, recommend monitor off abx  - Leukocytosis improved but now worsening, procal 0.55, repeat CT abd neg for infectious etiology 10/05, barely makes any urine  - Discussed with ID 10/06, will continue to monitor off abx  - Leukocytosis could be secondary to suspected underlying malignancy vs unknown etiology  - Send UA if possible, follow blood culture  - pain is improved    Skin mottling and cool b/l LE - improving  - US suggestive of calcific atherosclerosis and poor cardiac output.  - CTA abdomen pelvis 10/05 scattered atherosclerosis, no evidence of critical stenosis or occlusion.  - Dilaudid for pain management  - No acute intervention by vascular surgery recommended, monitor    Suspect Vaginal bleeding  Uterine/cervical mass  Retroperitoneal and pelvic lymphadenopathy  - Patient reports 1 month of noticing consistent pink fluid in briefs, sometimes darker red. Doesn't make much urine.   - Pelvic US not able to visualize well  -GYN consulted, notes blood in  "vaginal vault--rec transvag ultrasound, pt declined  -Differential \"atrophy, vulvar dermatologic conditions, cervical or uterine cancer.\"  -however, pt declining work up 09/30  -Imaging 10/05 shows suspicious for cervical or uterine mass, source of enlarging lymphadenopathy and abdominal pelvic ascites  -Patient and family decided to proceed with further workup with TVUS  -GYN paged to follow up, appreciate assistance    Hypotension  -Overnight SBP in 70s, no evidence of active infection  -Suspected secondary to cardiomyopathy  -AM cortisol normal  -On midodrine 5 mg TID  -Monitor blood pressure    Toxic/metabolic encephalopathy  -Appears to be a little confused  -Suspect multifactorial with multiple co-morbs, on opioids. Hospital delirium could be contributing  -Will minimize use of opioids  -Pending UA, blood cultures, off abx  -Delirium precautions     Positive blood culture with Staph epidermidis.  Repeat cultures on 9/29 without growth.  Likely contaminant.    Fungal dermatitis, lower abdomen rash.  On clotrimazole cream.     Right second toe ulcer  - ID requested right foot x-ray negative for osteomyelitis  - Monitor off antibiotics  - Concern for right third toe osteomyelitis, per x-ray.  MRI negative for osteomyelitis  --Follow-up outpatient with PCP/vascular surgery     ESRD on PD  -Chronic HFpEF without acute exacerbation  -Elevated NTpro-BNP due to hypervolemia  PD has been going well at home. Started 5/2024. Follows with Dr. Finn.  - Echo with EF 30-35%  - Renal following  - PD per Renal     Permanent atrial fibrillation on chronic AC  - Rapid ventricular response  -In A-fib on admission, not on rate controlling meds at home  -Started metoprolol, Eliquis held due to ongoing vaginal bleeding    Non-ischemic myocardial injury due to sepsis  - Troponin downtrended  - EKG  Afib with RVR; narrow complex; TWI in II and aVF, no ST segment depression or elevation     R>L chronic LE edema  - In setting of ESRD. " Patient reports chronic and stable. Has had multiple joint surgeries on the right leg. On chronic AC - held  - US neg for DVT  - c/o prsistent pain in LE b/l, will repeat US r/o DVT     Possible polymyalgia rheumatica  - Continue home prednisone 5mg PO BID  - Will check cortisol level with hypotension     Chronic low back pain  Chronic opioid use  - Has been a long-term issue affecting mobility.  - Continue prednisone as above  - Continue home tramadol 50mg BID     Gout  - Was on febuxostat in the past. No acute flares right now.  - Continue home prednisone as above     Ulcerative colitis  -Appears to be stable.  -there was concern for possible rectal bleeding too--gi consulted and she declined rectal exam      GERD  - Continue home famotidine  - On Tums, added Maalox prn     Physical deconditioning.  Progressing during hospitalization.  -PT/OT recommend TCU, but family is willing to care at home with home care. At this time patient is assist x 2     Goals of care:  -CODE STATUS DNR/DNI  -Discussed with patient's family at the bedside with patient's consent.  With son Pardeep Joe and daughter Leta on 10/05  -Aris Joe is POA  -Family very pleasant, discussed ongoing clinical course and answered all questions  -Seen by Palliative, appreciate assistance  -Family has decided to proceed with further workup          Diet: Regular Diet Adult  Snacks/Supplements Adult: Nepro Oral Supplement; With Meals  Snacks/Supplements Adult: Gelatein Plus; Between Meals  Snacks/Supplements Adult: Expedite Bottle; With Meals    DVT Prophylaxis: VTE Prophylaxis contraindicated due to ongoing vaginal bleeding  Lerma Catheter: Not present  Lines: PRESENT             Cardiac Monitoring: None  Code Status: No CPR- Do NOT Intubate      Clinically Significant Risk Factors              # Hypoalbuminemia: Lowest albumin = 3 g/dL at 9/29/2024  7:56 AM, will monitor as appropriate     # Hypertension: Noted on problem list  # Chronic heart  "failure with reduced ejection fraction: last echo with EF <40%          # Obesity: Estimated body mass index is 31.32 kg/m  as calculated from the following:    Height as of this encounter: 1.549 m (5' 1\").    Weight as of this encounter: 75.2 kg (165 lb 12.6 oz).   # Severe Malnutrition: based on nutrition assessment    # Financial/Environmental Concerns: none         Disposition Plan     Medically Ready for Discharge: Anticipated in 2-4 Days             Yumiko Bynum MD  Hospitalist Service  [facility]  Securely message with [employer] (more info)  Text page via [facility] Paging/Directory   ______________________________________________________________________    Interval History   Patient was seen at the bedside in the morning, discussed with both patient and son Tatyana.  Patient has some epigastric pain, added Maalox. Still deciding if they would like to do TVUS, appreciate Palliative care assistance.  -Later had family conference meeting with daughter Mariana, son Taytana patient Mely, decided to go with further workup by GYN    Physical Exam   Vital Signs: Temp: 97.6  F (36.4  C) Temp src: Oral BP: 90/54 Pulse: 60   Resp: 20 SpO2: 95 % O2 Device: None (Room air)    Weight: 165 lbs 12.57 oz    Constitutional: awake, alert, mildly confused  Respiratory: no increased work of breathing  Cardiovascular: regular rate and rhythm and normal S1 and S2  GI: normal bowel sounds, soft, non-distended, pd cath site dressing intact, and non-tender  Skin: B/L lower extremities with mottling of skin, reticular nonblanching rash extending LE to lower extremities to the buttocks - improved significantly  Msk: dry ischemic wound of distal right second toe, bilateral extremities cold, 1+ peripheral pulses  Neurologic: No gross focal deficits    Medical Decision Making       70 MINUTES SPENT BY ME on the date of service doing chart review, history, exam, documentation & further activities per the note.      Data "     I have personally reviewed the following data over the past 24 hrs:    21.0 (H)  \   N/A   / N/A     N/A N/A N/A /  N/A   N/A N/A N/A \       Imaging results reviewed over the past 24 hrs:   No results found for this or any previous visit (from the past 24 hour(s)).

## 2024-10-08 NOTE — PROGRESS NOTES
RENAL DAILY PROGRESS NOTE    ASSESSMENT/PLAN:    Mely Alegria is a 84 year old female w/ ESKD/PD admitted on 9/28/2024 with abdominal pain, initial concern for PD associated peritonitis, ultimately found to have cervical/uterine mass.     ESKD/PD.  Follows with Dr. Finn at [facility]. Home script is 4c, 2.3L, 10 hr, ico last fill with apparently marginal Kt/V's as outpatient. Inpatient script has been 6c, 2.3L, 12 hr, no last fill (set by Dr. Finn).  Tolerating PD well while inpatient.  The family is very invested in continuing PD, even as they accept the patient is unlikely to be a good candidate for cancer-directed treatment.  In fact, when I told him that the patient would need to pause PD and switch to HD to rest her abdomen after any abdominal surgery including a hysterectomy, they identified that is a major reason to not pursue any cancer directed therapy.  I am unsure whether the patient will be able to continue PD with a hospice agency; the  will reach out to potential hospice agencies to discuss this possibility.  Discussed with her outpatient nephrologist.  Hypotension.  Started on midodrine while inpatient, currently on 5 mg 3 times daily.  Can hopefully wean off, as patient's diet improves.  Continue 1.5/2.5 while inpatient with limited p.o. intake.  Hypokalemia.  K at 3.4; with PD we are targeting a K greater than 4 to reduce peritonitis risk.  Increase K-Chlor supplementation to 40 mill equivalents daily.  Sepsis.  Patient did NOT have PD-associated peritonitis on presentation with clear effluent and 3 TNC. Suspect Streptomyces on peritoneal culture was contaminant.  Uterine/cervical malignancy.  Patient and family intend to forego cancer directed therapy.  They are aligned with a focus on comfort, but the patient may not enroll in hospice at discharge if we cannot identify a hospice agency who can provide coverage for PD.  Mottling of lower extremities.  Much improved today,  by family's report.  CTA with three-vessel runoff.  Unsure etiology of this, but I am glad it seemed to have improved.  PMR. On home prednisone 5 bid.      Please reach out via [employer] Secure Chat with any questions or concerns.    Ata Zavala MD  [facility]    Subjective     Extensive conversation with patient, palliative care LEROY, and children Tatyana, Leta, and Pardeep.  Given how poorly Mely tolerated a lower extremity ultrasound, they do not think that she will tolerate a TVUS for potential curative therapies for her malignancy.  There were conversations about focusing on comfort.  They had questions for me regarding the safest way to discharge home and potential rehabilitative therapy, that I defer to PT/OT.         Objective     Vitals:    10/04/24 1506 10/05/24 1111 10/06/24 0900   Weight: 72.9 kg (160 lb 12.8 oz) 74.4 kg (164 lb 0.4 oz) 75.2 kg (165 lb 12.6 oz)     Vital Signs with Ranges  Temp:  [97.3  F (36.3  C)-97.6  F (36.4  C)] 97.4  F (36.3  C)  Pulse:  [60-99] 94  Resp:  [18-20] 18  BP: ()/(54-72) 108/72  SpO2:  [92 %-97 %] 95 %  I/O last 3 completed shifts:  In: 200 [P.O.:200]  Out: 582 [Other:582]  Resp: 18    Physical Exam  No acute distress but seems more fatigued today      Labs reviewed with interpretations in A&P.  Imaging reviewed with interpretations in A&P.

## 2024-10-08 NOTE — PROCEDURES
[facility] cycler set up per orders of Dr. Zavala    Total volume (ml) : 13,800 (machine set for 14,000 ml, unable to accept fractions)    Therapy time:12 hrs     Fill volume (ml): 2,300    Cycles: 6    Dextrose 1.5% volume (ml): 6,000    Dextrose 2.5% volume (ml): 9,000    Verbal order from Dr. Zavala to discontinue heparin infused into PD solutions.     Pt. Supplies PD solutions. Pt. Family and POA expressed how important it is to receive PD every day despite hospice status.     PD catheter visualized. Machine ready to connect. Report given to SANTY Renee RN.    NIKOS Suggs RN [facility]

## 2024-10-08 NOTE — PROGRESS NOTES
[facility]    Medicine Progress Note - Hospitalist Service    Date of Admission:  9/28/2024    Assessment & Plan   84 year old female with PMHx of ESRD on PD, chronic low back pain, possible PMR, Afib on AC, ulcerative colitis, gout who presents with 2 days of acute onset generalized weakness, diffuse abdominal pain, and and nausea with vomiting. She was admitted for concern for possible bacterial peritonitis.She also notes over past week SUAREZ and intermit vaginal bleeding         Sepsis, concern for Secondary bacterial peritonitis with PD catheter -however negative cx   Leukocytosis  -Presented with abdominal pain, leukocytosis  -Imaging with retroperitoneal and pelvic lymphadenopathy, clinically was consistent with infectious peritonitis.  Could not give UA sample initially  - Seen by ID, was on vancomycin/Zosyn discontinued 9/30.  PD cultures growing gram-positive bacilli, positive culture reported on 10/1.  ID signed off 10/03, recommend monitor off abx  - Leukocytosis improved but now worsening, procal 0.55, repeat CT abd neg for infectious etiology 10/05, barely makes any urine  - Discussed with ID 10/08, no indication for antibiotics  - Leukocytosis could be secondary to suspected underlying malignancy vs other etiology  - Send UA if possible, follow blood culture  - pain is improved    Skin mottling and cool b/l LE - improving  - Suspect likely secondary to hypotensive episodes  - US suggestive of calcific atherosclerosis and poor cardiac output.  - CTA abdomen pelvis 10/05 scattered atherosclerosis, no evidence of critical stenosis or occlusion.  - Dilaudid for pain management  - No acute intervention by vascular surgery recommended, monitor    Suspect Vaginal bleeding  Uterine/cervical mass  Retroperitoneal and pelvic lymphadenopathy  - Patient reports 1 month of noticing consistent pink fluid in briefs, sometimes darker red. Doesn't make much urine.   - Pelvic US not able to  "visualize well  -GYN consulted, notes blood in vaginal vault--rec transvag ultrasound, pt declined  -Differential \"atrophy, vulvar dermatologic conditions, cervical or uterine cancer.\"  -however, pt declining work up 09/30  -Imaging 10/05 shows suspicious for cervical or uterine mass, source of enlarging lymphadenopathy and abdominal pelvic ascites  -Patient and family decided to proceed with further workup with TVUS 10/07, family later decided not to proceed with workup  -Would like to go ahead with home hospice with dialysis    Hypotension - resolved  -Overnight SBP in 70s, no evidence of active infection  -Suspected secondary to cardiomyopathy  -AM cortisol normal  -On midodrine 5 mg TID  -Monitor blood pressure    Toxic/metabolic encephalopathy  -Appears to be a little confused  -Suspect multifactorial with multiple co-morbs, on opioids. Hospital delirium could be contributing  -Will minimize use of opioids  -Pending UA, blood cultures, off abx  -Delirium precautions    Dysphagia  -Reported patient had difficulty swallowing pills  -Seen by SLP, appreciate recs  -Purée and thin liquid  -VFSS tomorrow     Positive blood culture with Staph epidermidis.  Repeat cultures on 9/29 without growth.  Likely contaminant.    Fungal dermatitis, lower abdomen rash.  On clotrimazole cream.     Right second toe ulcer  - ID requested right foot x-ray negative for osteomyelitis  - Monitor off antibiotics  - Concern for right third toe osteomyelitis, per x-ray.  MRI negative for osteomyelitis  --Follow-up outpatient with PCP/vascular surgery     ESRD on PD  -Chronic HFpEF without acute exacerbation  -Elevated NTpro-BNP due to hypervolemia  PD has been going well at home. Started 5/2024. Follows with Dr. Finn.  - Echo with EF 30-35%  - Renal following  - PD per Renal     Permanent atrial fibrillation on chronic AC  - Rapid ventricular response  -In A-fib on admission, not on rate controlling meds at home  -Started metoprolol, " Eliquis held due to ongoing vaginal bleeding    Non-ischemic myocardial injury due to sepsis  - Troponin downtrended  - EKG  Afib with RVR; narrow complex; TWI in II and aVF, no ST segment depression or elevation     R>L chronic LE edema  - In setting of ESRD. Patient reports chronic and stable. Has had multiple joint surgeries on the right leg. On chronic AC - held  - US neg for DVT  - c/o prsistent pain in LE b/l, will repeat US neg for DVT     Possible polymyalgia rheumatica  - Continue home prednisone 5mg PO BID  - Will check cortisol level with hypotension     Chronic low back pain  Chronic opioid use  - Has been a long-term issue affecting mobility.  - Continue prednisone as above  - Continue home tramadol 50mg BID     Gout  - Was on febuxostat in the past. No acute flares right now.  - Continue home prednisone as above     Ulcerative colitis  -Appears to be stable.  -there was concern for possible rectal bleeding too--gi consulted and she declined rectal exam      GERD  - Continue home famotidine  - On Tums, added Maalox prn     Physical deconditioning.  Progressing during hospitalization.  -PT/OT recommend TCU, but family is willing to care at home with home care. At this time patient is assist x 2     Goals of care:  -CODE STATUS DNR/DNI  -Discussed with patient's family at the bedside with patient's consent.  With son Pardeep Joe and daughter Leta on 10/05  -Aris Joe is POA  -Family very pleasant, discussed ongoing clinical course and answered all questions  -Seen by Palliative, appreciate assistance  -Decision has been made to go ahead with home hospice with dialysis          Diet: Snacks/Supplements Adult: Nepro Oral Supplement; With Meals  Snacks/Supplements Adult: Gelatein Plus; Between Meals  Snacks/Supplements Adult: Expedite Bottle; With Meals  Combination Diet Pureed Diet (level 4); Thin Liquids (level 0)    DVT Prophylaxis: VTE Prophylaxis contraindicated due to vaginal bleeding and mottling of  "legs  Lerma Catheter: Not present  Lines: PRESENT             Cardiac Monitoring: None  Code Status: No CPR- Do NOT Intubate      Clinically Significant Risk Factors             # Anion Gap Metabolic Acidosis: Highest Anion Gap = 21 mmol/L in last 2 days, will monitor and treat as appropriate  # Hypoalbuminemia: Lowest albumin = 2.9 g/dL at 10/8/2024  7:25 AM, will monitor as appropriate     # Hypertension: Noted on problem list  # Chronic heart failure with reduced ejection fraction: last echo with EF <40%          # Obesity: Estimated body mass index is 31.32 kg/m  as calculated from the following:    Height as of this encounter: 1.549 m (5' 1\").    Weight as of this encounter: 75.2 kg (165 lb 12.6 oz).   # Severe Malnutrition: based on nutrition assessment    # Financial/Environmental Concerns: none         Disposition Plan     Medically Ready for Discharge: Anticipated Tomorrow             Yumiko Bynum MD  Hospitalist Service  [facility]  Securely message with [email or web address] (more info)  Text page via [employer] Paging/Directory   ______________________________________________________________________    Interval History   Patient was examined at the bedside, lying in the bed.  Appears frail and weak.  Denies any new complaints today.  States has some heartburn, suggested to take Tums and Maalox as needed  Discussed with family son Tatyana and Pardeep, and daughter Leta, looking for home hospice with dialysis at this time.  Provided emotional support    Physical Exam   Vital Signs: Temp: 97.7  F (36.5  C) Temp src: Oral BP: 130/53 Pulse: 93   Resp: 18 SpO2: 99 % O2 Device: None (Room air)    Weight: 165 lbs 12.57 oz    Constitutional: awake, alert, mildly confused  Respiratory: no increased work of breathing  Cardiovascular: regular rate and rhythm and normal S1 and S2  GI: normal bowel sounds, soft, non-distended, pd cath site dressing intact, and non-tender  Skin: b/l reticular rash " improving  Msk: dry ischemic wound of distal right second toe, bilateral extremities cold, 1+ peripheral pulses  Neurologic: No gross focal deficits    Medical Decision Making       55 MINUTES SPENT BY ME on the date of service doing chart review, history, exam, documentation & further activities per the note.      Data     I have personally reviewed the following data over the past 24 hrs:    21.0 (H)  \   15.9 (H)   / 217     135 92 (L) 34.6 (H) /  111 (H)   3.4 22 5.99 (H) \     ALT: N/A AST: N/A AP: N/A TBILI: N/A   ALB: 2.9 (L) TOT PROTEIN: N/A LIPASE: N/A     Procal: 1.89 (H) CRP: N/A Lactic Acid: N/A         Imaging results reviewed over the past 24 hrs:   Recent Results (from the past 24 hour(s))   US Lower Extremity Venous Duplex Bilateral    Narrative    EXAM: US LOWER EXTREMITY VENOUS DUPLEX BILATERAL  LOCATION: [facility]  DATE: 10/7/2024    INDICATION: EDEMA  COMPARISON: 9/29/24.  TECHNIQUE: Venous Duplex ultrasound of bilateral lower extremities with and without compression, augmentation and duplex. Color flow and spectral Doppler with waveform analysis performed.    FINDINGS: Exam includes the common femoral, femoral, popliteal veins as well as segmentally visualized deep calf veins and greater saphenous vein.     RIGHT: No deep vein thrombosis. No superficial thrombophlebitis. No popliteal cyst.    LEFT: No deep vein thrombosis. No superficial thrombophlebitis. No popliteal cyst.      Impression    IMPRESSION:  1.  No deep venous thrombosis in the bilateral lower extremities.

## 2024-10-08 NOTE — PLAN OF CARE
Problem: Adult Inpatient Plan of Care  Goal: Absence of Hospital-Acquired Illness or Injury  Intervention: Prevent Skin Injury  Recent Flowsheet Documentation  Taken 10/8/2024 0330 by Bharti Kowalski RN  Body Position:   turned   left   log-rolled  Taken 10/8/2024 0045 by Bharti Kowalski RN  Body Position: side-lying 30 degrees  Taken 10/7/2024 2328 by Bharti Kowalski RN  Body Position:   position maintained   refuses positioning  Device Skin Pressure Protection:   absorbent pad utilized/changed   pressure points protected  Taken 10/7/2024 1955 by Bharti Kowalski RN  Body Position:   log-rolled   turned  Device Skin Pressure Protection:   absorbent pad utilized/changed   pressure points protected     Problem: Risk for Delirium  Goal: Improved Sleep  Outcome: Progressing     Problem: Wound  Goal: Optimal Coping  Outcome: Progressing  Intervention: Support Patient and Family Response  Recent Flowsheet Documentation  Taken 10/7/2024 2328 by Bharti Kowalski RN  Supportive Measures: active listening utilized  Taken 10/7/2024 1955 by Bharti Kowalski RN  Supportive Measures: active listening utilized     Problem: Wound  Goal: Optimal Pain Control and Function  Outcome: Progressing   Goal Outcome Evaluation:      Plan of Care Reviewed With: patient    Overall Patient Progress: no changeOverall Patient Progress: no change         A & O. VSS on RA. Had two loose stools on this shift. Repositioned as tolerated. Peritoneal dialysis ongoing. Pt son mentioned that they do not want any other ultrasounds for pt.

## 2024-10-08 NOTE — PROGRESS NOTES
Care Management Follow Up    Length of Stay (days): 10    Expected Discharge Date: 10/09/2024    Anticipated Discharge Plan:  Home, Hospice    Transportation: Anticipate Stretcher. Transportation costs discussed? No    PT Recommendations: Transitional Care Facility  OT Recommendations:  home with home care occupational therapy     Barriers to Discharge: medical stability    Prior Living Situation: house with child(rosenda), adult (marva - Tatyana)    Discussed  Partnership in Safe Discharge Planning  document with patient/family: No     Handoff Completed: No, handoff not indicated or clinically appropriate    Patient/Spokesperson Updated: No    Additional Information:  See below     Next Steps: continue to follow     Mary Ann Saenz RN        Spoke to Edel from palliative care. Says patient/family is interested in hospice but want to continue with peritoneal dialysis. Need a hospice agency that would allow that. Referrals sent to [facility] and [facility] to see if that would be an option. Awaiting call back     1:17 PM  Spoke to Lorie from [facility]. They would be able to accept patient. Lorie will reach out to marva Joe to set up time for consult     2:07 PM  Tali Belle from [facility] is here to meet with family     3:17 PM  Tali from hospice met with family . Plan is to hopefully discharge home on Thursday. Tali will order equipment. Family is requesting that patients PD port be changed as it is due to be changed. They usually change it at [facility] but would like it changed here if possible. Message left for dialysis nurse

## 2024-10-08 NOTE — PROGRESS NOTES
PALLIATIVE CARE PROGRESS NOTE  [facility]     Patient Name: Mely Alegria  Date of Admission: 9/28/2024   Today the patient was seen for: Goals of care     Recommendations & Counseling       GOALS OF CARE:   Life prolonging goals, with limits-DNR/DNI, no further work up or interventions for likely malignancy.  Comfort and quality of life are most important to patient and in patient/family opinion, this includes continuing dialysis.  Wants to continue peritoneal dialysis.  They verbalized understanding that as her condition declines, there likely will be a time when burden of dialysis becomes greater than the benefit, and may choose to stop.   Patient's primary goal is to return home.  Her son and daughter have been caring for her at home, and feel they can meet her care needs.  They are aware she is requiring a 2 assist with transfers.  Would need a hospital bed.  Appreciate assistance from care management with discharge planning.  Family interested in meeting with hospice and understanding if she would qualify while still continuing dialysis. Discussed with care management and referrals sent.   Will continue to follow.      ADVANCE CARE PLANNING:  Per family, patient has a health care directive and marva Joe is primary health care agent.  He will bring a copy to the hospital.  There is no POLST form on file, recommend to complete prior to DC.  Code status: No CPR- Do NOT Intubate-Continue to monitor     MEDICAL MANAGEMENT:   We are not actively managing symptoms at this time.     PSYCHOSOCIAL/SPIRITUAL SUPPORT:  3 children, 2 grandchildren and 2 great grandchildren  Patient lives with and is cared for by marva Joe.  Daughter and other son also provide caregiver support.      Palliative Care will continue to follow. Thank you for the consult and allowing us to aid in the care of Mely Alegria.    Discussed with Dr. Bynum, Dr. Zavala, Martine Saenz RN Care Manager.     Katherin José,  CNS  [facility], Palliative Care  Securely message with the [employer] Web Console (learn more here) or  Text page via [facility] Paging/Directory        Assessment          Mely Alegria is a 84 year old female with PMHx of ESRD on PD, CHF, chronic low back pain, possible PMR, Afib on AC, ulcerative colitis, gout who presented on 9/28/24 with 2 days of acute onset generalized weakness, diffuse abdominal pain, and and nausea with vomiting.  Admitted with sepsis due to intra-abdominal infection, bacterial peritoneal peritonitis with PD catheter present, lactic acidosis, non ischemic myocardial injury.     Since admission, patient seen by GYN for vaginal bleeding, imaging 10/5 shows suspicious for cervical or uterine mass, source of enlarging lymphadenopthy and abdominal pelvic ascites.  Has declined transvaginal ultrasound.  Vascular surgery consulted due to lower extremity mottling and concern for PVD.  Peritonitis has been ruled out and patient of antibiotics at this point.   Continues on peritoneal dialysis.        ID, renal, cardiology, GI, GYN, vascular surgery consulted        Interval History:     Multidisciplinary collaboration:  -Chart reviewed.  -Discussed with Dr. Bynum.  WBC elevated and plans to discuss with ID.  Patient/family now declining transvaginal ultrasound.  Initially agreed and then changed their mind a couple of hours later.     Patient/family narrative  Met in room with patient, children (Tatyana, Mariana, Kraig) and Dr. Zavala with renal.   Patient more withdrawn today.  Having lower abdominal pain.   I clarified the role of palliative care specialty and the difference between hospice and palliative care.   Reviewed options moving forward including continuing with restorative/life prolonging goals as well as the option of comfort focused care.   Family confirm that plan is to continue dialysis.   They felt that going through transvaginal ultrasound would be too uncomfortable for patient.  Also heard  from renal that if pursuing aggressive treatments, she likely would need abdominal surgery (hysterectomy) and in this case she would need to switch to hemodialysis during her recovery/healing time.  Family very clear that it is important to continue PD and not switch to HD.  Ultimately in agreement that they will not pursue further cancer work up/interventions.   Discussed hospice philosophy and services.  Family is interested in meeting with hospice to have questions answered and determine if she would qualify for hospice if she continues PD.      Review of Systems:     Besides above, ROS was reviewed and is unremarkable        Physical Exam:   Temp:  [97.3  F (36.3  C)-97.6  F (36.4  C)] 97.4  F (36.3  C)  Pulse:  [60-99] 94  Resp:  [18-20] 18  BP: ()/(54-72) 108/72  SpO2:  [92 %-97 %] 95 %  165 lbs 12.57 oz    Physical Exam  Constitutional: alert and no distress   Respiratory: Breathing nonlabored at rest  NEURO: Oriented X 3             Data Reviewed:     Results for orders placed or performed during the hospital encounter of 09/28/24 (from the past 24 hour(s))   US Lower Extremity Venous Duplex Bilateral    Narrative    EXAM: US LOWER EXTREMITY VENOUS DUPLEX BILATERAL  LOCATION: [facility]  DATE: 10/7/2024    INDICATION: EDEMA  COMPARISON: 9/29/24.  TECHNIQUE: Venous Duplex ultrasound of bilateral lower extremities with and without compression, augmentation and duplex. Color flow and spectral Doppler with waveform analysis performed.    FINDINGS: Exam includes the common femoral, femoral, popliteal veins as well as segmentally visualized deep calf veins and greater saphenous vein.     RIGHT: No deep vein thrombosis. No superficial thrombophlebitis. No popliteal cyst.    LEFT: No deep vein thrombosis. No superficial thrombophlebitis. No popliteal cyst.      Impression    IMPRESSION:  1.  No deep venous thrombosis in the bilateral lower extremities.   Renal panel   Result Value Ref  Range    Sodium 135 135 - 145 mmol/L    Potassium 3.4 3.4 - 5.3 mmol/L    Chloride 92 (L) 98 - 107 mmol/L    Carbon Dioxide (CO2) 22 22 - 29 mmol/L    Anion Gap 21 (H) 7 - 15 mmol/L    Glucose 111 (H) 70 - 99 mg/dL    Urea Nitrogen 34.6 (H) 8.0 - 23.0 mg/dL    Creatinine 5.99 (H) 0.51 - 0.95 mg/dL    GFR Estimate 6 (L) >60 mL/min/1.73m2    Calcium 9.5 8.8 - 10.4 mg/dL    Albumin 2.9 (L) 3.5 - 5.2 g/dL    Phosphorus 5.7 (H) 2.5 - 4.5 mg/dL   Procalcitonin   Result Value Ref Range    Procalcitonin 1.89 (H) <0.50 ng/mL   CBC with platelets   Result Value Ref Range    WBC Count 21.0 (H) 4.0 - 11.0 10e3/uL    RBC Count 4.81 3.80 - 5.20 10e6/uL    Hemoglobin 15.9 (H) 11.7 - 15.7 g/dL    Hematocrit 47.1 (H) 35.0 - 47.0 %    MCV 98 78 - 100 fL    MCH 33.1 (H) 26.5 - 33.0 pg    MCHC 33.8 31.5 - 36.5 g/dL    RDW 15.9 (H) 10.0 - 15.0 %    Platelet Count 217 150 - 450 10e3/uL             65 MINUTES SPENT BY ME on the date of service doing chart review, history, exam, documentation & further activities per the note.

## 2024-10-08 NOTE — CONSULTS
"SPIRITUAL HEALTH SERVICES Progress Note  [facility], P3    Saw pt Mely Alegria per consult order. Mely's three children also present.     Patient/Family Understanding of Illness and Goals of Care - Mely was lying in bed and shared about the series of \"bad news\" she has received in recent days, notably that she has cancer. She also shared about her back pain and the need for frequent repositioning.     Distress and Loss - She stated that this has been very hard to process and that it is a bit overwhelming. She shared about the loss of family members and that her sister is currently on hospice care.     Strengths, Coping, and Resources - Family is source of support and her children are very attentive to her.     Meaning, Beliefs, and Spirituality - Patient comes from Orthodox ignacia background and derives meaning, purpose, and comfort from ignacia. She has history with Yarsanism tradition. She would like to speak individually with , but not at this time. Asked for  to return at later time/tomorrow.     Plan of Care - A  will continue to visit as able or per request by patient/family/staff.      Mikel Burgess MDiv, [facility]  /Manager Spiritual Health Services  [phone]       Spiritual Health Services is available 24/7 for emergent requests and consults, either by paging the on-call  or by entering an ASAP/STAT consult in [facility], which will also page the on-call .    "

## 2024-10-08 NOTE — PLAN OF CARE
Goal Outcome Evaluation:    Assumed care at 1500H.  Pt a&o4. VSS. Denied SOB and chest pain.  Family at bedside helping with cares. Pt comfortable on bed.  On RA. Plan is video swallow study tomorrow after PD.  On pureed and thin liquids. Call light within reach. PRN dilaudid 1 mg PO given for abd'l pain (9/10).      Problem: Adult Inpatient Plan of Care  Goal: Absence of Hospital-Acquired Illness or Injury  Intervention: Identify and Manage Fall Risk  Recent Flowsheet Documentation  Taken 10/8/2024 1630 by Rey Cai, RN  Safety Promotion/Fall Prevention:   activity supervised   assistive device/personal items within reach   clutter free environment maintained   increased rounding and observation   increase visualization of patient   nonskid shoes/slippers when out of bed   patient and family education   room organization consistent   safety round/check completed  Intervention: Prevent Skin Injury  Recent Flowsheet Documentation  Taken 10/8/2024 1630 by Rey Cai, RN  Body Position:   position maintained   refuses positioning  Device Skin Pressure Protection:   absorbent pad utilized/changed   pressure points protected

## 2024-10-08 NOTE — PROGRESS NOTES
SPEECH PATHOLOGY CLINICAL SWALLOW EVALUATION       10/08/24 1400   Appointment Info   Signing Clinician's Name / Credentials (SLP) Raad Pablo MA [facility] SLP   General Information   Onset of Illness/Injury or Date of Surgery 10/07/24   Referring Physician Yumiko Bynum MD   Patient/Family Therapy Goal Statement (SLP) to eat better   Pertinent History of Current Problem 84 year old female with PMHx of ESRD on PD, chronic low back pain, possible PMR, Afib on AC, ulcerative colitis, gout who presents with 2 days of acute onset generalized weakness, diffuse abdominal pain, and and nausea with vomiting. She was admitted for concern for possible bacterial peritonitis.She also notes over past week SUAREZ and intermit vaginal bleeding. Noted to have difficulty swallowing food and pills. Hx GERD, belching. BSS  done last year without concern for oral pharyngeal dysphagia, likely esophageal symptoms.   General Observations Pt awake, her 3 children are present. She reports pills and food get stuck or won't go down, pointing t her laryngeal area. Also says sometimes she just can't swallow. Family reported she's been having food and pills found left in her mouth for a long period of time after taking it.   Type of Evaluation   Type of Evaluation Swallow Evaluation   Oral Motor   Oral Musculature generally intact   Structural Abnormalities none present   Mucosal Quality dry   Dentition (Oral Motor)   Comment, Dentition (Oral Motor) does not have lower dentures in/here   Dentition (Oral Motor) dental appliance/dentures   Facial Symmetry (Oral Motor)   Facial Symmetry (Oral Motor) WNL   Lip Function (Oral Motor)   Lip Range of Motion (Oral Motor) WNL   Lip Strength (Oral Motor) WFL   Comment, Lip Function (Oral Motor) min generalized weakness   Tongue Function (Oral Motor)   Tongue Strength (Oral Motor) strength decreased   Tongue Coordination/Speed (Oral Motor) reduced rate   Tongue ROM (Oral Motor) WNL   Comment, Tongue  Function (Oral Motor) mion generalized weakness   Jaw Function (Oral Motor)   Jaw Function (Oral Motor) WNL   Cough/Swallow/Gag Reflex (Oral Motor)   Volitional Throat Clear/Cough (Oral Motor) WNL   Vocal Quality/Secretion Management (Oral Motor)   Vocal Quality (Oral Motor) WFL   Secretion Management (Oral Motor) WNL   General Swallowing Observations   Past History of Dysphagia GERD, belching   Respiratory Support room air   Current Diet/Method of Nutritional Intake (General Swallowing Observations, NIS) NPO   Swallowing Evaluation Clinical swallow evaluation   Clinical Swallow Evaluation   Feeding Assistance frequent cues/help required   Clinical Swallow Evaluation Textures Trialed thin liquids;pureed;soft & bite-sized   Clinical Swallow Eval: Thin Liquid Texture Trial   Mode of Presentation, Thin Liquids straw   Volume of Liquid or Food Presented 5 oz   Oral Phase of Swallow WFL   Pharyngeal Phase of Swallow intact   Diagnostic Statement no overt s/s aspiration with single sips and consecutive swallows. Inconsistent belching after swallows   Clinical Swallow Evaluation: Puree Solid Texture Trial   Mode of Presentation, Puree spoon;fed by clinician   Volume of Puree Presented 5 bites   Oral Phase, Puree delayed AP movement;effortful AP movement   Pharyngeal Phase, Puree other (see comments)  (appears delayed)   Clinical Swallow Eval: Soft & Bite Sized   Mode of Presentation spoon;fed by clinician   Volume Presented 3 bites diced peaches   Oral Phase impaired mastication;delayed AP movement;effortful AP movement;residue in oral cavity   Oral Residue mid posterior tongue   Pharyngeal Phase other (see comments)  (appears delayed)   Successful Strategies Trialed During Procedure Other (see comments)  (liquid wash)   Diagnostic Statement pronlonged mastication, effortful/delayed AP transit, pieces remaining after swallow, and after sip of liquid. Spit out remaining pieces.   Esophageal Phase of Swallow   Patient  reports or presents with symptoms of esophageal dysphagia Yes   Esophageal comments belching, food sticking   Swallowing Recommendations   Diet Consistency Recommendations thin liquids (level 0);pureed (level 4)   Supervision Level for Intake 1:1 supervision needed   Mode of Delivery Recommendations bolus size, small;slow rate of intake   Medication Administration Recommendations, Swallowing (SLP) crushed in apple sauce if possible   Instrumental Assessment Recommendations VFSS (videofluoroscopic swallowing study)   Comment, Swallowing Recommendations Poor oral control and effortful bolus management, swallow appears delayed. Residual stasis in the oral cavity of solids. Recommend video swallow study, puree w/thin liquids for now.   Clinical Impression   Criteria for Skilled Therapeutic Interventions Met (SLP Eval) Yes, treatment indicated   SLP Diagnosis dysphagia   Risks & Benefits of therapy have been explained evaluation/treatment results reviewed;care plan/treatment goals reviewed;risks/benefits reviewed;current/potential barriers reviewed;participants voiced agreement with care plan;participants included;patient;son;daughter   Clinical Impression Comments Pt presents with oral dysphagia, effortful mastication, control, and AP transit of soft solid and puree. Does better with thin liquids by straw. She has oral residual of soft solid only partially swallowing the bolus, repeatedly chewing and moving the material around in her mouth, unable to clear with liquid wash. She spit it out when prompted. No residual with puree. Swallow appears delayed with soft solid and puree. No overt s/s aspiration with any consistency but does have belching after swallows of all textures, sensation of food not going down well in her throat.   SLP Total Evaluation Time   Eval: oral/pharyngeal swallow function, clinical swallow Minutes (57787) 20   SLP Goals   Therapy Frequency (SLP Eval) 5 times/week   SLP Predicted Duration/Target  Date for Goal Attainment 10/14/24   SLP Goals Swallow   SLP: Safely tolerate diet without signs/symptoms of aspiration Soft & bite sized diet;Thin liquids;With use of compensatory swallow strategies   SLP Discharge Planning   SLP Plan VSS Wed, ?esoph component, oral dysphagia, establish diet and strategies as approp.   SLP Rationale for DC Rec do not anticipate ongoing SLP at discharge   SLP Brief overview of current status  Poor oral control and effortful bolus management, swallow appears delayed. Residual stasis in the oral cavity of solids. Recommend video swallow study, puree w/thin liquids for now

## 2024-10-08 NOTE — PLAN OF CARE
Problem: Pain Chronic (Persistent)  Goal: Optimal Pain Control and Function  Outcome: Progressing  Intervention: Manage Persistent Pain  Recent Flowsheet Documentation  Taken 10/8/2024 0930 by Jazmin Renee RN  Medication Review/Management: medications reviewed  Intervention: Optimize Psychosocial Wellbeing  Recent Flowsheet Documentation  Taken 10/8/2024 0930 by Jazmin Renee RN  Supportive Measures: active listening utilized     Problem: Infection  Goal: Absence of Infection Signs and Symptoms  Outcome: Progressing     Problem: Dysrhythmia  Goal: Normalized Cardiac Rhythm  Outcome: Progressing     Problem: Wound  Goal: Optimal Coping  Outcome: Progressing  Intervention: Support Patient and Family Response  Recent Flowsheet Documentation  Taken 10/8/2024 0930 by Jazmin Renee RN  Supportive Measures: active listening utilized     Problem: Wound  Goal: Optimal Functional Ability  Outcome: Progressing  Intervention: Optimize Functional Ability  Recent Flowsheet Documentation  Taken 10/8/2024 0930 by Jazmin Renee RN  Activity Management: activity adjusted per tolerance  Activity Assistance Provided: assistance, 2 people   Goal Outcome Evaluation:       Pt is AOX4 and denies pain. PD completed this AM. Medsurg. VSS. RA. Pt repositioned periodically. Loose BM this AM. Calmoseptine on perineum and sacrum PRN. Family at bedside.       -Pt noted to cough after drinking water. Pt's family had concerns about Pt's recent ability to swallow, MD notified. Speech eval completed this afternoon. Plan for video swallow tomorrow.

## 2024-10-09 NOTE — PROGRESS NOTES
RENAL DAILY PROGRESS NOTE    ASSESSMENT/PLAN:    Mely Alegria is a 84 year old female w/ ESKD/PD admitted on 9/28/2024 with abdominal pain, initial concern for PD associated peritonitis, ultimately found to have cervical/uterine mass.     ESKD/PD.  Follows with Dr. Finn at [facility]. Home script is 4c, 2.3L, 10 hr, ico last fill with  marginal kT/Vs as outpatient. Inpatient script has been 6c, 2.3L, 12 hr, no last fill (set by Dr. Finn).  Tolerating PD well while inpatient.   Hypotension.  Started on midodrine while inpatient, currently on 5 mg 3 times daily.  Can hopefully wean off, as patient's diet improves, but will likely need to discharge on continued midodrine.  Continue 1.5/2.5 while inpatient with limited p.o. intake.  Hypokalemia.  K at 3.4 10/8; on KCl 40 mEq every day (goal K ~4.5).  Sepsis.  Patient did NOT have PD-associated peritonitis on presentation with clear effluent and 3 TNC. Suspect Streptomyces on peritoneal culture was contaminant.  Uterine/cervical malignancy.  Patient and family intend to forego cancer directed therapy.  She will discharge home with hospice (and continuing PD) tomorrow.  PMR. On home prednisone 5 bid.    Discussed w/ Palliative Care CNS [facility].    Please reach out via [employer] Secure Chat with any questions or concerns.    Ata Zavala MD  [facility]    Subjective     Went for speech swallow earlier today. Diet still limited while inpatient. Discussed w/ daughter Leta. Tatyana is setting up the home in anticipation to discharge with home hospice tomorrow.         Objective     Vitals:    10/05/24 1111 10/06/24 0900 10/09/24 0356   Weight: 74.4 kg (164 lb 0.4 oz) 75.2 kg (165 lb 12.6 oz) 71.4 kg (157 lb 6.5 oz)     Vital Signs with Ranges  Temp:  [97.3  F (36.3  C)-97.7  F (36.5  C)] 97.4  F (36.3  C)  Pulse:  [61-93] 64  Resp:  [18-20] 18  BP: ()/(53-85) 100/73  SpO2:  [96 %-99 %] 99 %  I/O last 3 completed shifts:  In: 120  [P.O.:120]  Out: -   Resp: 18    Physical Exam  No acute distress  Trace peripheral edema      Labs reviewed with interpretations in A&P.  Imaging reviewed with interpretations in A&P.

## 2024-10-09 NOTE — PROGRESS NOTES
[facility]    Medicine Progress Note - Hospitalist Service    Date of Admission:  9/28/2024    Assessment & Plan   84 year old female with PMHx of ESRD on PD, chronic low back pain, possible PMR, Afib on AC, ulcerative colitis, gout who presents with 2 days of acute onset generalized weakness, diffuse abdominal pain, and and nausea with vomiting. She was admitted for concern for possible bacterial peritonitis.She also notes over past week SUAREZ and intermit vaginal bleeding         Sepsis - ruled out , concern for Secondary bacterial peritonitis with PD catheter -however negative cx - ruled out  Leukocytosis  -Presented with abdominal pain, leukocytosis  -Imaging with retroperitoneal and pelvic lymphadenopathy, clinically was consistent with infectious peritonitis.  Could not give UA sample initially  - Seen by ID, was on vancomycin/Zosyn discontinued 9/30.  PD cultures growing gram-positive bacilli, positive culture reported on 10/1.  ID signed off 10/03, recommend monitor off abx  - Leukocytosis improved but now worsening, procal 0.55, repeat CT abd neg for infectious etiology 10/05, barely makes any urine  - Discussed with ID 10/08, no indication for antibiotics  - Leukocytosis could be secondary to suspected underlying malignancy vs other etiology  - Send UA if possible, follow blood culture  - pain is improved    Skin mottling and cool b/l LE - improving  - Suspect likely secondary to hypotensive episodes  - US suggestive of calcific atherosclerosis and poor cardiac output.  - CTA abdomen pelvis 10/05 scattered atherosclerosis, no evidence of critical stenosis or occlusion.  - Dilaudid for pain management  - No acute intervention by vascular surgery recommended, monitor    Suspect Vaginal bleeding  Uterine/cervical mass  Retroperitoneal and pelvic lymphadenopathy  - Patient reports 1 month of noticing consistent pink fluid in briefs, sometimes darker red. Doesn't make much urine.   -  "Pelvic US not able to visualize well  -GYN consulted, notes blood in vaginal vault--rec transvag ultrasound, pt declined  -Differential \"atrophy, vulvar dermatologic conditions, cervical or uterine cancer.\"  -however, pt declining work up 09/30  -Imaging 10/05 shows suspicious for cervical or uterine mass, source of enlarging lymphadenopathy and abdominal pelvic ascites  -Patient and family decided to proceed with further workup with TVUS 10/07, family later decided not to proceed with workup  -Would like to go ahead with home hospice with dialysis    Hypotension - resolved  -Overnight SBP in 70s, no evidence of active infection  -Suspected secondary to cardiomyopathy  -AM cortisol normal  -On midodrine 5 mg TID (Qtc 459)  -Monitor blood pressure    Toxic/metabolic encephalopathy  -Appears to be a little confused  -Suspect multifactorial with multiple co-morbs, on opioids. Hospital delirium could be contributing  -Will minimize use of opioids  -Pending UA, blood cultures, off abx  -Delirium precautions    Oropharyngeal Dysphagia  -Reported patient had difficulty swallowing pills  -CXR neg for asp pna  -Seen by SLP, appreciate recs  -Continue purée and thin liquid  -VFSS done 10/09, no aspiration, study discontinued as patient had emesis     Positive blood culture with Staph epidermidis.  Repeat cultures on 9/29 without growth.  Likely contaminant.    Fungal dermatitis, lower abdomen rash.  On clotrimazole cream.     Right second toe ulcer  - ID requested right foot x-ray negative for osteomyelitis  - Monitor off antibiotics  - Concern for right third toe osteomyelitis, per x-ray.  MRI negative for osteomyelitis  --Follow-up outpatient with PCP/vascular surgery     ESRD on PD  -Chronic HFpEF without acute exacerbation  -Elevated NTpro-BNP due to hypervolemia  PD has been going well at home. Started 5/2024. Follows with Dr. Finn.  - Echo with EF 30-35%  - Renal following  - On KCl 40 mEq every day  - PD per Renal   "   Permanent atrial fibrillation on chronic AC  - Rapid ventricular response  -In A-fib on admission, not on rate controlling meds at home  -Started metoprolol, Eliquis held due to ongoing vaginal bleeding    Non-ischemic myocardial injury due to sepsis  - Troponin downtrended  - EKG  Afib with RVR; narrow complex; TWI in II and aVF, no ST segment depression or elevation     R>L chronic LE edema  - In setting of ESRD. Patient reports chronic and stable. Has had multiple joint surgeries on the right leg. On chronic AC - held  - US neg for DVT  - c/o prsistent pain in LE b/l, will repeat US neg for DVT     Possible polymyalgia rheumatica  - Continue home prednisone 5mg PO BID  - Will check cortisol level with hypotension     Chronic low back pain  Chronic opioid use  - Has been a long-term issue affecting mobility.  - Continue prednisone as above  - Continue home tramadol 50mg BID     Gout  - Was on febuxostat in the past. No acute flares right now.  - Continue home prednisone as above     Ulcerative colitis  -Appears to be stable.  -there was concern for possible rectal bleeding too--gi consulted and she declined rectal exam      GERD  - Continue home famotidine  - On Tums, added Maalox prn     Physical deconditioning.  Progressing during hospitalization.  -PT/OT recommend TCU, but family is willing to care at home with home care. At this time patient is assist x 2     Goals of care:  -CODE STATUS DNR/DNI  -Discussed with patient's family at the bedside with patient's consent.  With son Pardeep Joe and daughter Leta on 10/05  -Aris Joe is POA  -Family very pleasant, discussed ongoing clinical course and answered all questions  -Seen by Palliative, appreciate assistance  -Decision has been made to go ahead with home hospice with dialysis - plan for discharge tomorrow          Diet: Snacks/Supplements Adult: Nepro Oral Supplement; With Meals  Snacks/Supplements Adult: Gelatein Plus; Between Meals  Snacks/Supplements  "Adult: Expedite Bottle; With Meals  Combination Diet Pureed Diet (level 4); Thin Liquids (level 0)    DVT Prophylaxis: VTE Prophylaxis contraindicated due to vaginal bleeding  Lerma Catheter: Not present  Lines: PRESENT             Cardiac Monitoring: None  Code Status: No CPR- Do NOT Intubate      Clinically Significant Risk Factors          # Hypochloremia: Lowest Cl = 92 mmol/L in last 2 days, will monitor as appropriate     # Anion Gap Metabolic Acidosis: Highest Anion Gap = 21 mmol/L in last 2 days, will monitor and treat as appropriate  # Hypoalbuminemia: Lowest albumin = 2.9 g/dL at 10/8/2024  7:25 AM, will monitor as appropriate     # Hypertension: Noted on problem list  # Chronic heart failure with reduced ejection fraction: last echo with EF <40%          # Overweight: Estimated body mass index is 29.74 kg/m  as calculated from the following:    Height as of this encounter: 1.549 m (5' 1\").    Weight as of this encounter: 71.4 kg (157 lb 6.5 oz).   # Severe Malnutrition: based on nutrition assessment    # Financial/Environmental Concerns: none         Disposition Plan     Medically Ready for Discharge: Anticipated Tomorrow             Yumiko Bynum MD  Hospitalist Service  [facility]  Securely message with [employer] (more info)  Text page via [employer] MD Paging/Directory   ______________________________________________________________________    Interval History   Patient was examined at the bedside, denies any new complaints today.  Continues to have abdominal pain  Discharge is being set up for tomorrow with home hospice with ongoing peritoneal dialysis  -Discussed with daughter Leta at the bedside, answered all questions    Physical Exam   Vital Signs: Temp: 97.4  F (36.3  C) Temp src: Oral BP: 110/63 Pulse: 64   Resp: 18 SpO2: 99 % O2 Device: None (Room air)    Weight: 157 lbs 6.54 oz    Constitutional: awake, alert, mildly confused  Respiratory: no increased work of " breathing  Cardiovascular: regular rate and rhythm and normal S1 and S2  GI: normal bowel sounds, soft, non-distended, pd cath site dressing intact, and moderate diffuse tender +, no peritoneal signs  Skin: b/l reticular rash improving  Msk: dry ischemic wound of distal right second toe  Neurologic: No gross focal deficits    Medical Decision Making       50 MINUTES SPENT BY ME on the date of service doing chart review, history, exam, documentation & further activities per the note.      Data         Imaging results reviewed over the past 24 hrs:   Recent Results (from the past 24 hour(s))   XR Chest Port 1 View    Narrative    EXAM: XR CHEST PORT 1 VIEW  LOCATION: [facility]  DATE: 10/9/2024    INDICATION: Abdominal pain, nausea and vomiting, sepsis, leukocytosis, end-stage renal disease, atrial fibrillation  COMPARISON: CT 9/28/2024      Impression    IMPRESSION: Low lung volumes. Stable right basilar atelectasis/scarring with blunting of the costophrenic angle. The left lung is clear. No pneumonic consolidation and no definite pleural effusion. Normal heart size.   XR Video Swallow with SLP or OT    Narrative    EXAM: XR VIDEO SWALLOW WITH SLP OR OT  LOCATION: [facility]  DATE: 10/9/2024    INDICATION: Difficulty swallowing.  COMPARISON: None.    TECHNIQUE: Routine swallow study with speech pathology using multiple barium thicknesses.    RADIATION DOSE: Total Air Kerma 9.7 mGy    FINDINGS:   Swallow study with Speech Pathology using multiple barium thicknesses.     There was deep penetration with cup and straw sips of thin barium. No definite aspiration.    There was trace penetration with mildly thick barium. No aspiration.    The patient had an episode of emesis after the administration of pudding consistency. The exam was then terminated.    No significant stasis within the vallecula or piriform sinuses. Normal epiglottic inversion.      Impression     IMPRESSION:  1.  Deep penetration with thin barium and trace penetration with mildly thick barium. No aspiration.

## 2024-10-09 NOTE — PLAN OF CARE
Goal Outcome Evaluation:      Plan of Care Reviewed With: patient    Overall Patient Progress: no changeOverall Patient Progress: no change         Vitals:    10/08/24 0902 10/08/24 1555 10/08/24 2016 10/08/24 2057   BP: 108/72 130/53 107/67 118/60   BP Location:  Left arm     Pulse:  93 86 92   Resp:  18 18 20   Temp:  97.7  F (36.5  C) 97.3  F (36.3  C) 97.3  F (36.3  C)   TempSrc:  Oral Oral Oral   SpO2:  99% 96%    Weight:       Height:        Has chronic pain. Scheduled tramadol and prn dilaudid given  with relief. PD cycler started. Son was here at bedside. Having loose BM. Lungs diminished. Skin is very frail. Alert and oriented but forgetful at times. A-2 with cares. Periarea is very raw creams applied. Addis Ceja RN

## 2024-10-09 NOTE — PROGRESS NOTES
"Speech-Language Pathology: Video Swallow Study     10/09/24 1100   Appointment Info   Signing Clinician's Name / Credentials (SLP) Sarah Baez, MS, CCC-SLP   General Information   Onset of Illness/Injury or Date of Surgery 10/07/24   Referring Physician Yumiko Bynum MD   Pertinent History of Current Problem Per EMR, \"84 year old female with PMHx of ESRD on PD, CHF, chronic low back pain, possible PMR, Afib on AC, ulcerative colitis, gout who presented on 9/28/24 with 2 days of acute onset generalized weakness, diffuse abdominal pain, and and nausea with vomiting.  Admitted with sepsis due to intra-abdominal infection, bacterial peritoneal peritonitis with PD catheter present, lactic acidosis, non ischemic myocardial injury.     Since admission, patient seen by GYN for vaginal bleeding, imaging 10/5 shows suspicious for cervical or uterine mass, source of enlarging lymphadenopthy and abdominal pelvic ascites.  Has declined transvaginal ultrasound.  Vascular surgery consulted due to lower extremity mottling and concern for PVD.  Peritonitis has been ruled out and patient of antibiotics at this point.   Continues on peritoneal dialysis.\" Noted to have difficulty swallowing food and pills. Hx GERD, belching. Video swallow study completed per MD orders.   General Observations Pt alert & cooperative. Sitting upright in fluro chair. Pt's daughter present. Pt agreeable to evaluation.   Type of Evaluation   Type of Evaluation Swallow Evaluation   General Swallowing Observations   Past History of Dysphagia Per EMR review, pt with CSE completed 11/4/2023 with recommendations for regular diet and thin liquids with GERD & safe swallowing precautions. SLP recommended consideration for esophagram with concerns for esophageal dysphagia at that time. Esophagram was never completed per EMR review. Clinical swallow evaluation completed 10/8/2024 revealed no overt s/s of aspiration, but frequent belching and pt sensating the " "food \"not going down well in her throat\", which prompted VFSS.   Comment, General Swallowing Observations Pt & pt's daughter endorsed good tolerance of limited PO intake.   Respiratory Support room air   Current Diet/Method of Nutritional Intake (General Swallowing Observations, NIS) pureed (dysphagia pureed) (level 4);thin liquids (level 0)   Swallowing Evaluation Videofluoroscopic swallow study (VFSS)   VFSS Evaluation   Radiologist Dr. Noble   Views Taken left lateral   Physical Location of Procedure [facility]   VFSS Textures Trialed thin liquids;mildly thick liquids;pureed   VFSS Eval: Thin Liquid Texture Trial   Mode of Presentation, Thin Liquid spoon;cup;straw;self-fed;fed by clinician   Order of Presentation 1, 2, 3, 4   Preparatory Phase prolonged bolus preparation;poor bolus control   Oral Phase, Thin Liquid impaired AP movement;residue in oral cavity;premature pharyngeal entry   Bolus Location When Swallow Triggered pyriforms   Pharyngeal Phase, Thin Liquid impaired hyolaryngeal excursion   Rosenbek's Penetration Aspiration Scale: Thin Liquid Trial Results 5 - contrast contacts vocal cords, visible residue remains (penetration)   Strategies and Compensations reduce bolus size  (no significant difference)   Diagnostic Statement No aspiration. Moderately deep penetration via spoon & cup single cup sip. Deep pen to cords with sequential cup sips and with straw. Noted residue on vocal folds prior to presenting mildly thickened liquid trials, from residue from thin trials. No pharyngeal residuals immediately after the swallow, but then present during onset of next trial presented, which strongly suspect is from oral residuals after the swallow falling into vallecula.   VFSS Eval: Mildly Thick Liquids   Mode of Presentation cup;straw;self-fed   Order of Presentation 5, 6   Preparatory Phase prolonged bolus preparation;poor bolus control   Oral Phase impaired AP movement;residue in oral " cavity;premature pharyngeal entry   Bolus Location When Swallow Triggered pyriforms   Pharyngeal Phase impaired hyolaryngel excursion   Rosenbek's Penetration Aspiration Scale 2 - contrast enters airway, remains above the vocal cords, no residue remains (penetration)   Diagnostic Statement No aspiration. Shallow flash penetration via cup or straw sip single sips. Oral residue noted after initial swallow of bolus.   VFSS Evaluation: Puree Solid Texture Trial   Diagnostic Statement Attempted to present puree trial, but prior to presenting, pt holding up hand and then began to have bout of emesis. No trials presented.   Esophageal Phase of Swallow   Patient reports or presents with symptoms of esophageal dysphagia Yes   Esophageal sweep performed during today s vidofluoroscopic exam  No  (evaluation discontinued as pt began to have emesis)   Esophageal comments Pt with hx of belching & endorsing sensation of food sticking. Pt with emesis during evaluation, which didn't endorse nausea during onset of evaluation.   Swallowing Recommendations   Diet Consistency Recommendations pureed (level 4);mildly thick liquids (level 2)  Or thin liquids (IDDSI 0) pending discussion with pt & pt's family   Supervision Level for Intake 1:1 supervision needed   Mode of Delivery Recommendations bolus size, small;slow rate of intake   Postural Recommendations none   Swallowing Maneuver Recommendations alternate food and liquid intake;extra swallow   Monitoring/Assistance Required (Eating/Swallowing) check mouth frequently for oral residue/pocketing;stop eating activities when fatigue is present;monitor for cough or change in vocal quality with intake   Recommended Feeding/Eating Techniques (Swallow Eval) maintain upright posture during/after eating for 30 minutes;minimize distractions during oral intake;provide assist with feeding;provide oral hygiene prior to intake;provide 6 smaller meals throughout day;set-up and prepare tray    Medication Administration Recommendations, Swallowing (SLP) crushed in applesauce   General Therapy Interventions   Planned Therapy Interventions Dysphagia Treatment   Dysphagia treatment Modified diet education;Instruction of safe swallow strategies;Compensatory strategies for swallowing   Clinical Impression   Criteria for Skilled Therapeutic Interventions Met (SLP Ronen) Yes, treatment indicated   SLP Diagnosis Oropharyngeal dysphagia   Risks & Benefits of therapy have been explained evaluation/treatment results reviewed;care plan/treatment goals reviewed;risks/benefits reviewed;current/potential barriers reviewed;participants included;patient;daughter;participants voiced agreement with care plan   Clinical Impression Comments Video Swallow Study completed per MD orders. Patient presents with mild-moderate oropharyngeal dysphagia. Patient had no aspiration across thin liquids or mildly thickened liquids. Moderately deep to deep penetration with thin liquids. Depth of penetration was reduced with use of thin by spoon or single sip of thin. Oral phase is c/b poor bolus control, oral residue after the swallow, and delayed AP transit with premature spillage. Tongue base retraction is WFL. Swallow response was initiated at the pyriforms. Epiglottic inversion was complete. No stasis occurred with thin liquids or mildly thickened liquids after the initial swallow, but then suspect oral residue resulted in delayed vallecular residuals when fluro was turned back on prior to the next trial. Hyolaryngeal elevation and hyolaryngeal excursion were reduced. Noted x1 during thin liquid trials of sequential sips, mild reflux noted in mid esophagus, which fully cleared prior to next trial. No retrograde flow into upper esophagus. Prominent cricopharyngeus noted. Evaluation was discontinued as prior to the puree trial presentation, pt had bout of 50cc of emesis. Recommend pt continues pureed diet and further discussion with pt &  pt's family regarding thin liquids vs. Mildly thickened liquids. Patient continues to warrant 1:1 supervision/feeding assist with meals. Patient must be sitting fully upright & alert for the duration of the meal & 30-60 minutes after, take small bites/sips at a slow rate, ensure pt swallows & clears oral residue before presenting an additional bite, and alternate solids/liquids. Pt may benefit from GI consult for suspicion for esophageal dysphagia. SLP will continue to follow.   SLP Total Evaluation Time   Evaluation, videofluoroscopic eval of swallow function Minutes (43372) 12   SLP Discharge Planning   SLP Plan Wed 0/5; VFSS results & recs; train swallowing strats; determine thin vs. mildly thick; trial advanced textures   SLP Discharge Recommendation Transitional Care Facility   SLP Rationale for [place] Rec Oropharyngeal dysphagia. Ongoing SLP needs TBD at [place]   SLP Brief overview of current status  VFSS 10/9 with no aspiration, deep penetration to cords with thin. Shallow flash pen with mild. Study discontinued as pt with emesis, limiting evaluation. Poor oral control and effortful bolus management, swallow appears delayed. Residual stasis in the oral cavity of solids. Recommend pt continues pureed diet and further discussion with pt & pt's family regarding thin liquids vs. Mildly thickened liquids. Patient continues to warrant 1:1 supervision/feeding assist with meals. Patient must be sitting fully upright & alert for the duration of the meal & 30-60 minutes after, take small bites/sips at a slow rate, ensure pt swallows & clears oral residue before presenting an additional bite, and alternate solids/liquids. Pt may benefit from GI consult for suspicion for esophageal dysphagia. SLP will continue to follow.   Total Session Time   Total Session Time (sum of timed and untimed services) 12

## 2024-10-09 NOTE — PLAN OF CARE
Problem: Adult Inpatient Plan of Care  Goal: Plan of Care Review  Description: The Plan of Care Review/Shift note should be completed every shift.  The Outcome Evaluation is a brief statement about your assessment that the patient is improving, declining, or no change.  This information will be displayed automatically on your shift  note.  Outcome: Progressing     Problem: Pain Chronic (Persistent)  Goal: Optimal Pain Control and Function  Outcome: Progressing     Problem: Infection  Goal: Absence of Infection Signs and Symptoms  Outcome: Progressing     Problem: Wound  Goal: Optimal Coping  Outcome: Progressing     Problem: Adult Inpatient Plan of Care  Goal: Absence of Hospital-Acquired Illness or Injury  Intervention: Identify and Manage Fall Risk  Recent Flowsheet Documentation  Taken 10/9/2024 0800 by Cherelle Mccarthy RN  Safety Promotion/Fall Prevention:   activity supervised   assistive device/personal items within reach   clutter free environment maintained   increased rounding and observation   increase visualization of patient   nonskid shoes/slippers when out of bed   patient and family education   room organization consistent   safety round/check completed  Intervention: Prevent Skin Injury  Recent Flowsheet Documentation  Taken 10/9/2024 0800 by Cherelle Mccarthy RN  Device Skin Pressure Protection:   absorbent pad utilized/changed   pressure points protected     Problem: Fall Injury Risk  Goal: Absence of Fall and Fall-Related Injury  Intervention: Promote Injury-Free Environment  Recent Flowsheet Documentation  Taken 10/9/2024 0800 by Cherelle Mccarthy RN  Safety Promotion/Fall Prevention:   activity supervised   assistive device/personal items within reach   clutter free environment maintained   increased rounding and observation   increase visualization of patient   nonskid shoes/slippers when out of bed   patient and family education   room organization consistent   safety round/check  completed   Goal Outcome Evaluation:         Patient to video swallow study this morning.  Transferred with ceiling to video chair.  Thin liquids with puree food. Txt message sent to Hospitalist requesting metoprolol XL be switched to a crushable form since Patient had difficulty swallowing this pill whole this AM.  She said she got it down but then about an hour later, a pill was found on the floor that was most likely her AM metoprolol.  Dialysis run without incident.  I drain was 109ml, Total , Dwell time 1:33, Add dwell :27.

## 2024-10-09 NOTE — PROGRESS NOTES
Care Management Follow Up    Length of Stay (days): 11    Expected Discharge Date: 10/10/2024     Concerns to be Addressed:     Care progression - discharge planning  Patient plan of care discussed at interdisciplinary rounds: Yes    Anticipated Discharge Disposition: Home, Hospice - [facility]    Anticipated Discharge Services:   Hospice - [facility]  Anticipated Discharge DME:  NA    Patient/family educated on Medicare website which has current facility and service quality ratings:  NA  Education Provided on the Discharge Plan:  Yes per team  Patient/Family in Agreement with the Plan:  Yes    Referrals Placed by CM/SW:  Yes  Private pay costs discussed: Not applicable    Discussed  Partnership in Safe Discharge Planning  document with patient/family: Yes: patient's sonTatyana     Handoff Completed: No, handoff not indicated or clinically appropriate    Additional Information:  Brittni from [facility] dialysis called and said they will not change the PD port here. Patient's son, Tatyana, spoke with Ksenia [facility] and patient will go to the clinic to change the PD port.    Social Hx:  Assessment: Follow. From home w/family; 24 hr care; home PD  Last Note: 10/9/24  Plan: home with [facility] on Thursday.   Needs: NA  Transport: anticipate will need stretcher     Next Steps: RNCM to follow for medical progression, recommendations, and final discharge plan.     Jennifer Westfall RN     Rec'd a call from Tali with [facility]. Tali questioned why [facility] couldn't change the PD transfer set? Patient is not safe to transport back and forth from dialysis. Patient's son, Tatyana, said he spoke with Ksenia yesterday and the plan is to discharge from the hospital, go to the dialysis, then home. Tali prefer transport between 11 AM - 1 PM. [facility] will admit tomorrow at 1 PM.    0955 called patient's son, Tatyana. Tatyana said he spoke with Zaheer at [facility] [phone] and he will change the PD  "transfer set, because the hospital didn't know how to do it.   Called Ksenia and spoke with Rufino. Rufino said Zaheer is not available, but will give Zaheer a message to call. Rufino is not sure if this is a scheduled for PD transfer set change every six months or if the transfer set needs to be change due to an infection. Rufino is also not sure if this is urgent or not. Rufino will have Zaheer discuss house call options. Rufino will have Zaheer call back.    Called to update Tali     0927 called Zaheer. Zaheer states the PD transfer set is only a schedule change, \"Not urgent and he is not able to do house at this time due to short staffing.\" He call will Tatyana to discuss options.     Called Tali to update. Tali request 3 days comfort meds filled here.    Message sent to update Dr. Bynum    Called Tatyana to discuss the above. Tatyana said he and his brother will transport. They have a WC. He prefer patient discharge from the hospital and they will transport patient to the [facility] dialysis to get the PD transfer set change, then go home, \"Zaheer said it'll only take like 15 minutes. I just need to call him ahead.\" He received the hospital bed today.    Called and left a message to update Tali  "

## 2024-10-09 NOTE — PLAN OF CARE
"  Problem: Adult Inpatient Plan of Care  Goal: Plan of Care Review  Description: The Plan of Care Review/Shift note should be completed every shift.  The Outcome Evaluation is a brief statement about your assessment that the patient is improving, declining, or no change.  This information will be displayed automatically on your shift  note.  10/9/2024 1851 by Cherelle Mccarthy RN  Outcome: Progressing  10/9/2024 1452 by Cherelle Mccarthy RN  Outcome: Progressing     Problem: Pain Chronic (Persistent)  Goal: Optimal Pain Control and Function  10/9/2024 1851 by Cherelle Mccarthy RN  Outcome: Progressing  10/9/2024 1452 by Cherelle Mccarthy RN  Outcome: Progressing     Problem: Infection  Goal: Absence of Infection Signs and Symptoms  Outcome: Progressing     Problem: Chronic Kidney Disease  Goal: Optimal Coping with Chronic Illness  Outcome: Progressing     Problem: Wound  Goal: Optimal Coping  10/9/2024 1851 by Cherelle Mccarthy RN  Outcome: Progressing  10/9/2024 1452 by Cherelle Mccarthy RN  Outcome: Progressing   Goal Outcome Evaluation:        Pain controlled with PRN dilaudid.  Reclined in chair this evening.  Patient stating \"help me, help me\" but not being able to state how we could help.  Repositioned and pain med given and patient then rested.  Family at bedside all day.  Patient took small pills one at a time this morning.  This afternoon she pocketed the bigger ones and needed more reminders to swallow them in pudding.  Calmoseptine to perineum, foam dressing to skin tear on right forearm.  Extremities cool with patchy discoloration.  Plan for home with Hospice tomorrow.  PD set up for this evening.                 "

## 2024-10-09 NOTE — PROGRESS NOTES
SPIRITUAL HEALTH SERVICES Progress Note  [facility], P3    Saw pt Mely Alegria per follow up visit. Mely's daughter, Leta, present. Leta shared about the plan to discharge to home tomorrow with hospice care and continue dialysis. Family is helping get the house setup for Mely's return there. Mely was awake and slightly confused at time of visit, so was minimally conversant. They report doing well at the moment and no particular needs right now. They both expressed appreciation for the follow up visit.     Mikel Burgess MDiv, [facility]  /Manager Spiritual Health Services  [phone]       Spiritual Health Services is available 24/7 for emergent requests and consults, either by paging the on-call  or by entering an ASAP/STAT consult in BPT, which will also page the on-call .

## 2024-10-09 NOTE — PLAN OF CARE
Occupational Therapy Discharge Summary    Reason for therapy discharge:    Plan to transition to hospice at discharge.    Court Qureshi MOT, OTR/L, CLT 10/9/2024 , 7:59 AM

## 2024-10-09 NOTE — PROCEDURES
[facility] cycler set up per orders of Dr. Zavala     Total volume (ml) : 13,800 (machine set for 14,000 ml unable to accept fractions)     Therapy time:12                Fill volume (ml): 2300     Cycles:6     Dextrose 2.5% volume (ml): 9000     Dextrose 1.5%  volume (ml) 6000     PD catheter visualized. Machine ready to connect. Report given to LAVELLE Jacobs RN               PD set up by NIKOS Suggs, Dialysis RN

## 2024-10-09 NOTE — PROGRESS NOTES
PALLIATIVE CARE PROGRESS NOTE  [facility]     Patient Name: Mely Alegria  Date of Admission: 9/28/2024   Today the patient was seen for: Goals of care     Recommendations & Counseling       GOALS OF CARE:   Goals are comfort focused.  Comfort and quality of life are most important to patient and in patient/family opinion, this includes continuing dialysis.  Wants to continue peritoneal dialysis.  They verbalized understanding that as her condition declines, there likely will be a time when burden of dialysis becomes greater than the benefit, and may choose to stop.   Patient's primary goal is to return home.  Her son and daughter have been caring for her at home, and feel they can meet her care needs.  They are aware she is requiring a 2 assist with transfers.  Would need a hospital bed.  Appreciate assistance from care management with discharge planning.  Plan is to sign on with [facility] at discharge, likely will discharge tomorrow  Will continue to follow.      ADVANCE CARE PLANNING:  Per family, patient has a health care directive and marva Joe is primary health care agent.  He will bring a copy to the hospital.  There is no POLST form on file, recommend to complete prior to discontinue.  Marva Joe not present today to sign.  If not able to complete prior to discharge then defer to hospice.   Code status: No CPR- Do NOT Intubate-Continue to monitor     MEDICAL MANAGEMENT:   We are not actively managing symptoms at this time.     PSYCHOSOCIAL/SPIRITUAL SUPPORT:  3 children, 2 grandchildren and 2 great grandchildren  Patient lives with and is cared for by marva Joe.  Daughter and other son also provide caregiver support.      Palliative Care will continue to follow. Thank you for the consult and allowing us to aid in the care of Mely Alegria.    Discussed with Dr. Bynum, Dr. Zavala.      Katherin José, [facility]  [facility], Palliative Care  Securely message with the Vocera Web  Console (learn more here) or  Text page via [facility] Paging/Directory        Assessment          Mely Alegria is a 84 year old female with PMHx of ESRD on PD, CHF, chronic low back pain, possible PMR, Afib on AC, ulcerative colitis, gout who presented on 9/28/24 with 2 days of acute onset generalized weakness, diffuse abdominal pain, and and nausea with vomiting.  Admitted with sepsis due to intra-abdominal infection, bacterial peritoneal peritonitis with PD catheter present, lactic acidosis, non ischemic myocardial injury.     Since admission, patient seen by GYN for vaginal bleeding, imaging 10/5 shows suspicious for cervical or uterine mass, source of enlarging lymphadenopthy and abdominal pelvic ascites.  Has declined transvaginal ultrasound.  Vascular surgery consulted due to lower extremity mottling and concern for PVD.  Peritonitis has been ruled out and patient of antibiotics at this point.   Continues on peritoneal dialysis.        ID, renal, cardiology, GI, GYN, vascular surgery consulted        Interval History:     Multidisciplinary collaboration:  -Chart reviewed.  - Has had 100 mg tramadol and 7 mg po dilaudid in the past 24 hours.  MME=35 + 100 mg tramadol  -Discussed with Dr. Suggs and patient would need to go to dialysis clinic to have tubing exchanged.  Cannot be done in the hospital.    -CXR negative for pneumonia today  -VSS with no aspiration    Patient/family narrative  Spoke with daughter at bedside.  They are very much looking forward to getting patient home with [facility].  She states it is important that patient have piece of dialysis catheter exchanged in order for PD to continue to work properly.  She thinks they could transport her to the clinic tomorrow on their way to home.  No other questions at this time.   Patient reports pain in lower abdomen 8/10, but improved with pain medication.  No current nausea or vomiting.  Appetite is poor.  Had no trouble taking pills today.  Intermittently confused.     Review of Systems:     Besides above, ROS was reviewed and is unremarkable        Physical Exam:   Temp:  [97.3  F (36.3  C)-97.7  F (36.5  C)] 97.4  F (36.3  C)  Pulse:  [61-93] 61  Resp:  [18-20] 18  BP: ()/(53-85) 135/85  SpO2:  [96 %-99 %] 99 %  157 lbs 6.54 oz    Physical Exam  Constitutional: alert and no distress   Respiratory: Breathing nonlabored at rest  NEURO: Oriented X 3             Data Reviewed:     No results found for this or any previous visit (from the past 24 hour(s)).            65 MINUTES SPENT BY ME on the date of service doing chart review, history, exam, documentation & further activities per the note.

## 2024-10-09 NOTE — PLAN OF CARE
Goal Outcome Evaluation:      Plan of Care Reviewed With: patient    Overall Patient Progress: no changeOverall Patient Progress: no change         Vitals:    10/08/24 2057 10/08/24 2343 10/09/24 0356 10/09/24 0357   BP: 118/60 93/66  135/85   BP Location:  Left arm  Left arm   Patient Position:  Semi-Castano's  Semi-Castano's   Cuff Size:  Adult Small  Adult Small   Pulse: 92 75  61   Resp: 20 20  18   Temp: 97.3  F (36.3  C) 97.3  F (36.3  C)  97.4  F (36.3  C)   TempSrc: Oral Oral  Oral   SpO2:  96%  99%   Weight:   71.4 kg (157 lb 6.5 oz)    Height:        Patient has some discomfort of back, abdomen, kesha area. Prn dilaudid given. Room air. PD cycler going with no complication. Lungs diminished. +1 edema. Plan is to go home on hospice Thursday. Addis Ceja RN

## 2024-10-09 NOTE — PLAN OF CARE
Physical Therapy Discharge Summary    Reason for therapy discharge:    Goals transitioned to comfort focused per palliative note. Plan to sign on with hospice at discharge.

## 2024-10-10 PROBLEM — B37.9 CANDIDA INFECTION: Status: ACTIVE | Noted: 2024-01-01

## 2024-10-10 PROBLEM — R10.13 DYSPEPSIA: Status: ACTIVE | Noted: 2024-01-01

## 2024-10-10 NOTE — PROGRESS NOTES
Care Management Follow Up    Length of Stay (days): 12    Expected Discharge Date: 10/10/2024     Concerns to be Addressed:     Care progression - discharge planning  Patient plan of care discussed at interdisciplinary rounds: Yes    Anticipated Discharge Disposition: Home, Hospice - [facility]    Anticipated Discharge Services:   [facility]ospice - [facility]  Anticipated Discharge DME:  NA    Patient/family educated on Medicare website which has current facility and service quality ratings:  NA  Education Provided on the Discharge Plan:  Yes per team  Patient/Family in Agreement with the Plan:  Yes    Referrals Placed by CM/SW:  Yes  Private pay costs discussed: Transportation costs    Discussed  Partnership in Safe Discharge Planning  document with patient/family: Yes: patient's sonTatyana     Handoff Completed: No, handoff not indicated or clinically appropriate    Additional Information:  Paged to update bedside nurse, Cherelle, of discharge today.   Cherelle said patient is more appropriate for stretcher transport. Patient has not been out of bed. Has been using ceiling kevin for transfers.    Met with patient's son, Tatyana, to discuss the above. He would like to talk with bedside nurse, but did agreed for the stretcher transport.  Discussed out of pocket cost of [employer] medical transportation by stretcher with patient and or family member, Tatyana. Patient/family member agreed with the plan to have transportation arranged by [employer] transport.      Paged to update Cherelle    RNCM called [employer] [facility] to set up a stretcher ride for today between [id number].  Completed the PCS form    Sent a message to update Dr. Fletcher Younger Hx:  Assessment: Follow. From home w/family; 24 hr care; home PD  Last Note: 10/10/24  Plan: home with [facility]   Needs: NA  Transport: stretcher transport    Next Steps: RNCM to follow for medical progression, recommendations, and final discharge plan.     Jennifer  BONNIE Westfall

## 2024-10-10 NOTE — PLAN OF CARE
Goal Outcome Evaluation:      Plan of Care Reviewed With: patient    Overall Patient Progress: improvingOverall Patient Progress: improving         Vitals:    10/09/24 1800 10/09/24 2053 10/09/24 2351 10/10/24 0417   BP: 96/67 96/59 121/72    BP Location:   Left arm    Patient Position:   Semi-Castano's    Cuff Size:   Adult Small    Pulse:  90 67    Resp:  18 18 18   Temp:  97.3  F (36.3  C) 97  F (36.1  C) 96.8  F (36  C)   TempSrc:  Axillary Tympanic Tympanic   SpO2:  95% 94%    Weight:    70.2 kg (154 lb 12.2 oz)   Height:        Patients temp is on the lower end. One BM  during the night. Room air. PD cycler. Repositioned. Lungs diminished. Skin is very fragile. Barrier applied. Plan is to finish dialysis and discharge to home. Addis Ceja RN

## 2024-10-10 NOTE — PROGRESS NOTES
SPIRITUAL HEALTH SERVICES Progress Note  [facility], P3    Saw Mely briefly prior to her discharge to offer well wishes and blessing for her transition to home hospice care.     Mikel Burgess MDiv, [facility]  /Manager Spiritual Health Services  [phone]       Spiritual Health Services is available 24/7 for emergent requests and consults, either by paging the on-call  or by entering an ASAP/STAT consult in [employer], which will also page the on-call .

## 2024-10-10 NOTE — PROGRESS NOTES
Speech Language Therapy Discharge Summary    Reason for therapy discharge:    Patient discharging on hospice.   Patient's diet can be advanced to regular textures and thin liquids, as pt & family wishes on hospice care.

## 2024-10-10 NOTE — PROGRESS NOTES
Care Management Discharge Note    Discharge Date: 10/10/2024       Discharge Disposition: Home, [facility]ospice - [facility]    Discharge Services: Transportation Services, [facility]ospice [facility]    Discharge DME: None    Discharge Transportation: [employer] stretcher ride between [phone].     Private pay costs discussed: transportation costs    Does the patient's insurance plan have a 3 day qualifying hospital stay waiver?  Yes     Which insurance plan 3 day waiver is available? Alternative insurance waiver    Will the waiver be used for post-acute placement? No    PAS Confirmation Code: NA  Patient/family educated on Medicare website which has current facility and service quality ratings: NA    Education Provided on the Discharge Plan: Yes per team  Persons Notified of Discharge Plans: Patient/son Tatyana  Patient/Family in Agreement with the Plan: yes    Handoff Referral Completed: No, handoff not indicated or clinically appropriate    Additional Information:  Patient discharge to home with [facility] via [employer] stretcher ride between [id number].     Orders sent to [facility]    Jennifer Westfall RN

## 2024-10-10 NOTE — DISCHARGE SUMMARY
[facility]    Hospitalist Discharge Summary       Date of Admission:  9/28/2024  Date of Discharge:  10/10/2024 11:26 AM  Discharging Provider: Yumiko Bynum MD      Discharge Diagnoses   Abdominal pain -suspect uterine/cervical malignancy, Vaginal bleeding  Hypotension  Oropharyngeal dysphagia  Right second toe ulcer  ESRD on PD  Chronic HFpEF  A-fib on chronic anticoagulation  Polymyalgia rheumatica  Chronic low back pain  Chronic opioid use  Gout  Ulcerative colitis  GERD    Hospice care      Follow-ups Needed After Discharge   Follow-up Appointments     Follow Up ([facility])      Follow-up with in vascular surgery clinic in 2-4 weeks.    The vascular surgery clinic coordinator will call you within 3 business   days after leaving the hospital to schedule your appointment.      With general vascular surgery questions, concerns, or to request/change an   appointment, please call the [facility]:    [facility]  Phone: [phone]    [address] [place], [place], [address]        Follow-up and recommended labs and tests       Follow up with Hospice care provider, within 3 days for hospital follow-   up.  Follow up with Nephrology with ongoing peritoneal dialysis  Follow up with vascular surgery for Right toe wound, appointment will be   made  Follow up with Cardiology for A.fib, appointment will be made    Call our scheduling line at [phone] to make an   appointment, if you do not already have one scheduled            Unresulted Labs Ordered in the Past 30 Days of this Admission       Date and Time Order Name Status Description    10/6/2024  3:41 PM Blood Culture Arm, Left Preliminary         These results will be followed up by [facility]    Hospital Course   84 year old female with PMHx of ESRD on PD, chronic low back pain, possible PMR, Afib on AC, ulcerative colitis, gout who presents with 2 days  "of acute onset generalized weakness, diffuse abdominal pain, and and nausea with vomiting. She was admitted for concern for possible bacterial peritonitis, was ruled out.     Suspect malignancy with Vaginal bleeding  Uterine/cervical mass  Retroperitoneal and pelvic lymphadenopathy  - Reports 1 month of noticing consistent pink fluid in briefs, sometimes darker red. Doesn't make much urine.   - Pelvic US not able to visualize well  - GYN consulted, notes blood in vaginal vault--rec transvag ultrasound, pt declined  -Differential \"atrophy, vulvar dermatologic conditions, cervical or uterine cancer.\"  -Imaging 10/05 shows suspicious for cervical or uterine mass, source of enlarging lymphadenopathy and abdominal pelvic ascites  -Patient and family decided not to work up  -Would like to go ahead with home hospice with dialysis    Abdominal pain, suspected if related to uterine/cervical mass and lymphadenopathy  Sepsis ruled out  Leukocytosis  -Has retroperitoneal and pelvic lymphadenopathy  -Was on antibiotics earlier for concern for SBP, seen by ID.  Culture negative, off antibiotics 9/30  -Positive blood culture with Staph epidermidis, likely contaminant  -Repeat CT and other infectious workup remain negative  -Persistent leukocytosis could be secondary to suspected underlying malignancy  -Dilaudid for abdominal pain, suspect if cancer related    Skin mottling and cool b/l LE - improved  Right second toe ulcer  - Suspect mottling likely secondary to hypotensive episodes  - US suggestive of calcific atherosclerosis and poor cardiac output.  - CTA abdomen pelvis 10/05 scattered atherosclerosis, no evidence of critical stenosis or occlusion.  - X-ray and MRI negative for osteomyelitis  - Dilaudid for pain management  - No acute intervention by vascular surgery recommended, Follow up outpaitent    Hypotension - resolved  -Overnight SBP in 70s, no evidence of active infection  -Suspected secondary to cardiomyopathy, " multifactorial  -Held PTA torsemide, spironolactone  -AM cortisol normal  -On midodrine 5 mg TID (Qtc 459)    ESRD on PD  Chronic HFpEF without acute exacerbation  -Continue peritoneal dialysis at home on hospice  - Echo with EF 30-35%  - Seen by nephro  - On KCl 40 mEq every day    Toxic/metabolic encephalopathy - resolved  -Suspect multifactorial with multiple co-morbs, on opioids. Hospital delirium could be contributing  -minimize use of opioids    Oropharyngeal Dysphagia  -Reported patient had difficulty swallowing pills  -CXR neg for asp pna  -Seen by SLP,  VFSS done 10/09, no aspiration, study discontinued as patient had emesis  -Continue purée and thin liquid  -Further evaluation for esophageal dysphagia not done as per GOC, discussed with family    Fungal dermatitis, lower abdomen rash.  On clotrimazole cream.     Permanent atrial fibrillation on chronic AC  - Rapid ventricular response  -Started metoprolol, Eliquis continued as per family decision though has vaginal bleeding    R>L chronic LE edema  - In setting of ESRD. Patient reports chronic and stable. Has had multiple joint surgeries on the right leg. On chronic AC   - US neg for DVT    Possible polymyalgia rheumatica  - Continue home prednisone 5mg PO BID    Chronic low back pain  Chronic opioid use  - Has been a long-term issue affecting mobility.  - Continue prednisone as above  - Continue home tramadol 50mg BID    Gout  - Was on febuxostat in the past  - Continue home prednisone as above    Ulcerative colitis  -Appears to be stable.    GERD  - Continue home famotidine, added Maalox prn     Physical deconditioning.  -PT/OT recommend TCU, but family is willing to care at home.    Goals of care:  -DNR/DNI  -Palliative care involved, patient discharged home on home hospice with ongoing peritoneal dialysis    Consultations This Hospital Stay   PHARMACY TO DOSE VANCO  NEPHROLOGY IP CONSULT  INFECTIOUS DISEASES IP CONSULT  PHARMACY TO DOSE VANCO  PHYSICAL  THERAPY ADULT IP CONSULT  OCCUPATIONAL THERAPY ADULT IP CONSULT  CARE MANAGEMENT / SOCIAL WORK IP CONSULT  OB GYN IP CONSULT  CARDIOLOGY IP CONSULT  GASTROENTEROLOGY IP CONSULT  OCCUPATIONAL THERAPY ADULT IP CONSULT  PHYSICAL THERAPY ADULT IP CONSULT  VASCULAR SURGERY IP CONSULT  PALLIATIVE CARE ADULT IP CONSULT  PHARMACY IP CONSULT  SPEECH LANGUAGE PATH ADULT IP CONSULT  SPIRITUAL HEALTH SERVICES IP CONSULT    Code Status   No CPR- Do NOT Intubate    Time Spent on this Encounter   I,Yumiko Bynum, personally saw the patient today and spent approximately 35 minutes discharging this patient.       Yumiko Bynum MD  [facility]  ______________________________________________________________________    Physical Exam   Vital Signs: Temp: 97.5  F (36.4  C) Temp src: Axillary BP: 92/75 Pulse: 80   Resp: 14 SpO2: 94 % O2 Device: None (Room air)    Weight: 154 lbs 12.21 oz    Physical Exam      Constitutional: awake, alert  Respiratory: no increased work of breathing  Cardiovascular: regular rate and rhythm and normal S1 and S2  GI: normal bowel sounds, soft, non-distended, pd cath site dressing intact, and mild diffuse tender +,  no peritoneal signs  Skin: b/l reticular rash improved  Msk: dry ischemic wound of distal right second toe, 3rd toe appears bluish  Neurologic: No gross focal deficits    Primary Care Physician   Oscar Hopper    Discharge Disposition   Discharged to home hospice  Condition at discharge: Poor    Significant Results and Procedures   Most Recent 3 CBC's:  Recent Labs   Lab Test 10/08/24  0725 10/07/24  0716 10/06/24  0816 10/04/24  1418   WBC 21.0* 21.0* 21.3* 19.1*   HGB 15.9*  --  15.5 14.3   MCV 98  --  100 100     --  222 174     Most Recent 3 BMP's:  Recent Labs   Lab Test 10/08/24  0725 10/04/24  1418 10/03/24  1325    137 137   POTASSIUM 3.4 3.5 3.5   CHLORIDE 92* 95* 96*   CO2 22 22 23   BUN 34.6* 33.9* 30.6*   CR 5.99* 5.88* 5.58*   ANIONGAP  21* 20* 18*   SANDIP 9.5 9.2 8.9   * 118* 111*     Most Recent 2 LFT's:  Recent Labs   Lab Test 09/29/24  0756 11/08/23  0512   AST 53* 23   ALT 46 10   ALKPHOS 126 100   BILITOTAL 0.7 0.7     Most Recent 3 INR's:No lab results found.  Most Recent 3 Troponin's:No lab results found.  Most Recent 3 BNP's:  Recent Labs   Lab Test 09/28/24  1107 10/30/23  1535 04/04/23  1504   NTBNPI 13,314* 12,726*  --    NTBNP  --   --  9,157*     Most Recent D-dimer:  Recent Labs   Lab Test 11/07/23  2314   DD 1.19*     Most Recent Cholesterol Panel:  Recent Labs   Lab Test 09/29/24  0756   CHOL 177   LDL 96   HDL 52   TRIG 144       Results for orders placed or performed during the hospital encounter of 09/28/24   CT Chest Abdomen Pelvis w/o Contrast    Narrative    EXAM: CT CHEST ABDOMEN PELVIS W/O CONTRAST  LOCATION: [facility]  DATE: 9/28/2024    INDICATION: gen weakness and feels unwell  COMPARISON: 11/5/2023  TECHNIQUE: CT scan of the chest, abdomen, and pelvis was performed without IV contrast. Multiplanar reformats were obtained. Dose reduction techniques were used.   CONTRAST: None.    FINDINGS: Mild motion artifact.    LUNGS AND PLEURA: Mild emphysema. Stable mild diffuse bronchial wall thickening. Elevated right hemidiaphragm. Right basilar atelectasis. No pleural effusion. Calcified granuloma. A few stable benign solid pulmonary nodules with the largest measuring 0.3   cm in the left upper lobe, image 81:4.    MEDIASTINUM/AXILLAE: Stable enlarged multinodular thyroid. No lymphadenopathy. Trace air in the main pulmonary artery and right subclavian vein are most likely secondary to intravenous line placement.    CORONARY ARTERY CALCIFICATION: Moderate.    HEPATOBILIARY: Cholelithiasis.    PANCREAS: Normal.    SPLEEN: Normal.    ADRENAL GLANDS: Normal.    KIDNEYS/BLADDER: A simple cyst midpole right kidney, no follow-up required. Bilateral renal atrophy.    BOWEL: No obstruction or inflammatory  change. Appendix not visualized.    LYMPH NODES: Increasing retroperitoneal lymphadenopathy with the largest being a left para-aortic node measuring 1.4 cm in short axis, previously 0.8 cm, image 183:3. A 2.6 x 1.6 cm left external iliac node, previously not visualized, image 224:3. A 2.4   x 1.7 cm right external iliac node, previously 1.0 x 0.6 cm, image 226:3.    VASCULATURE: Moderately severe atherosclerosis. Stable ectasia of the abdominal aorta.    PELVIC ORGANS: Peritoneal dialysis catheter. Small-moderate volume ascites.    MUSCULOSKELETAL: Left hip arthroplasty. Degenerative changes. Severe osseous demineralization.      Impression    IMPRESSION:  1.  Increasing retroperitoneal and pelvic lymphadenopathy.  2.  Small-moderate volume ascites.   US Lower Extremity Venous Duplex Bilateral    Narrative    EXAM: US LOWER EXTREMITY VENOUS DUPLEX BILATERAL  LOCATION: [facility]  DATE: 9/29/2024    INDICATION: edema legs , on DOAC, make sure no clots  COMPARISON: None.  TECHNIQUE: Venous Duplex ultrasound of bilateral lower extremities with and without compression, augmentation and duplex. Color flow and spectral Doppler with waveform analysis performed.    FINDINGS: Exam includes the common femoral, femoral, popliteal veins as well as segmentally visualized deep calf veins and greater saphenous vein.     RIGHT: No deep vein thrombosis. No superficial thrombophlebitis. No popliteal cyst.    LEFT: No deep vein thrombosis. No superficial thrombophlebitis. No popliteal cyst.      Impression    IMPRESSION:  No deep venous thrombosis in the bilateral lower extremities.   US Pelvic Limited    Narrative    EXAM: US PELVIC LIMITED  LOCATION: [facility]  DATE: 9/29/2024    INDICATION: vag bleeding x 1 month  COMPARISON: None.  TECHNIQUE: Transabdominal scans were performed. Endovaginal ultrasound was declined by the patient.    FINDINGS:    Uterus and ovaries not identified.  No significant free fluid.      Impression    IMPRESSION:  Uterus and ovaries unable to be identified due to technical factors and body habitus.     XR Foot Right G/E 3 Views    Narrative    EXAM: XR FOOT RIGHT G/E 3 VIEWS  LOCATION: [facility]  DATE: 10/1/2024    INDICATION: Ulcer of the tip of the R second toe. Look for osteomyelitis  COMPARISON: None.      Impression    IMPRESSION: There is deformity of the third middle phalanx with suggestion of erosion of the third proximal phalanx base concerning for septic arthritis and osteomyelitis. No definite erosion of the second toe.    Moderate midfoot osteoarthritis and first metatarsophalangeal joint osteoarthritis. Bipartite tibial great toe sesamoid. Plantar calcaneal spur. Achilles enthesophyte. Vascular calcifications.   MR Foot Right w/o Contrast    Narrative    EXAM: MR FOOT RIGHT W/O CONTRAST  LOCATION: [facility]  DATE: 10/2/2024    INDICATION: Evaluate for right 3rd toe osteomyelitis.    COMPARISON: Right foot radiographic exam 10/1/2024.    TECHNIQUE: Unenhanced.    FINDINGS:   JOINTS AND BONES:   -Chronic-appearing deformity at the distal margin of the middle phalanx 3rd toe, and chronic deformity at the corresponding 3rd toe distal phalangeal base. No edema-like marrow signal intensity in the corresponding bones at the remaining distal phalanx   3rd toe or remaining middle phalanx 3rd toe. Findings could be related to previous postoperative change or chronic destruction. Nothing definitive for acute 3rd toe osteomyelitis or septic arthritis currently.  -Probable ulcer at the tip of the 2nd toe. No definitive evidence for remaining toe acute osteomyelitis.  -First MTP and metatarsal sesamoid arthrosis. Degenerative change in the midfoot is moderate to severe and pronounced at the 3rd TMT joint. Advanced navicular-medial cuneiform arthrosis. Bipartite hallux tibial sesamoid. No evidence for acute right foot    fracture or classic metatarsal stress fracture.    TENDONS:   -No acute forefoot tendon injury or significant tenosynovitis. Distal peroneus longus tendon intact.     LIGAMENTS:   -Lisfranc ligament intact. No midfoot subluxation.    MUSCLES AND SOFT TISSUES:   -Moderate intrinsic foot muscle atrophy with patchy muscle edema which could represent myositis or denervation edema. Visualized plantar fascia intact.      Impression    IMPRESSION:  1.  Chronic-appearing bone loss/deformities at the distal aspect middle phalanx 3rd toe and distal phalangeal base 3rd toe centered at the DIP joint. Findings could be related to chronic postoperative change or chronic bony destruction. No convincing   evidence for acute 3rd toe osteomyelitis.  2.  Probable ulcer at the tip of the 2nd toe without evidence for acute 2nd toe osteomyelitis.  3.  Advanced navicular-medial cuneiform and 3rd TMT joint chondromalacia. Patchy juxta-articular marrow edema and subchondral cystic change in the midfoot.  4.  First MTP and metatarsal sesamoid chondromalacia.  5.  No acute right forefoot tendon injury or significant tenosynovitis.  6.  No drainable fluid collection to suggest abscess.     US Lower Extremity Arterial Duplex Bilateral    Narrative    EXAM: US LOWER EXTREMITY ARTERIAL DUPLEX BILATERAL, US GINETTE WITH PPG W/O EXERCISE BILAT  LOCATION: [facility]  DATE: 10/4/2024    INDICATION: cyanosis of lower extremities, mottling of skin,  unable to appreciate poplitial  pulses b l  COMPARISON: US 1/4/2024.  TECHNIQUE: Duplex utilizing 2D gray-scale imaging, Doppler interrogation with color-flow and spectral waveform analysis.    GINETTE FINDINGS:     RIGHT                                                 Brachial: 121  Ankle (PT): Non-compressible  Ankle (DP): Non-compressible  Digit: Non-measurable    LEFT  Brachial: Not taken due to IV in AC fossa  Ankle (PT): Non-compressible  Ankle (DP): Non-compressible  Digit:  Non-measurable    RIGHT LOWER EXTREMITY ARTERIAL ASSESSMENT:  External iliac artery 66 cm/s  Common femoral artery: 45 cm/s  Profunda femoris artery: 30 cm/s  SFA (proximal): 52 cm/s  SFA (mid): 30 cm/s  SFA (distal): 38 cm/s  Popliteal artery: 27 cm/s  Posterior tibial artery: 12 cm/s  Anterior tibial artery: 17 cm/s  Dorsalis pedis artery: 10 cm/s    LEFT LOWER EXTREMITY ARTERIAL ASSESSMENT:  External iliac artery 64 cm/s  Common femoral artery: 39 cm/s  Profunda femoris artery: 45 cm/s  SFA (proximal): 50 cm/s  SFA (mid): 52 cm/s  SFA (distal): 32 cm/s  Popliteal artery: 28 cm/s  Posterior tibial artery: 8 cm/s  Anterior tibial artery: 16 cm/s  Dorsalis pedis artery: 16 cm/s      Impression    IMPRESSION:  1.  Noncompressible tibial arteries bilaterally, suggestive of calcific atherosclerosis.   2.  Unmeasurable digital pressures bilaterally, suggestive of poor wound healing potential.   3.  Patent bilateral lower extremity arterial vasculature, however with diffusely low velocities which taper further distally and transition to monophasic waveforms. Overall findings suggestive of poor cardiac output versus aortic inflow disease.    US GINETTE with PPG wo Exercise    Narrative    EXAM: US LOWER EXTREMITY ARTERIAL DUPLEX BILATERAL, US GINETTE WITH PPG W/O EXERCISE BILAT  LOCATION: [facility]  DATE: 10/4/2024    INDICATION: cyanosis of lower extremities, mottling of skin,  unable to appreciate poplitial  pulses b l  COMPARISON: US 1/4/2024.  TECHNIQUE: Duplex utilizing 2D gray-scale imaging, Doppler interrogation with color-flow and spectral waveform analysis.    GINETTE FINDINGS:     RIGHT                                                 Brachial: 121  Ankle (PT): Non-compressible  Ankle (DP): Non-compressible  Digit: Non-measurable    LEFT  Brachial: Not taken due to IV in AC fossa  Ankle (PT): Non-compressible  Ankle (DP): Non-compressible  Digit: Non-measurable    RIGHT LOWER EXTREMITY ARTERIAL  ASSESSMENT:  External iliac artery 66 cm/s  Common femoral artery: 45 cm/s  Profunda femoris artery: 30 cm/s  SFA (proximal): 52 cm/s  SFA (mid): 30 cm/s  SFA (distal): 38 cm/s  Popliteal artery: 27 cm/s  Posterior tibial artery: 12 cm/s  Anterior tibial artery: 17 cm/s  Dorsalis pedis artery: 10 cm/s    LEFT LOWER EXTREMITY ARTERIAL ASSESSMENT:  External iliac artery 64 cm/s  Common femoral artery: 39 cm/s  Profunda femoris artery: 45 cm/s  SFA (proximal): 50 cm/s  SFA (mid): 52 cm/s  SFA (distal): 32 cm/s  Popliteal artery: 28 cm/s  Posterior tibial artery: 8 cm/s  Anterior tibial artery: 16 cm/s  Dorsalis pedis artery: 16 cm/s      Impression    IMPRESSION:  1.  Noncompressible tibial arteries bilaterally, suggestive of calcific atherosclerosis.   2.  Unmeasurable digital pressures bilaterally, suggestive of poor wound healing potential.   3.  Patent bilateral lower extremity arterial vasculature, however with diffusely low velocities which taper further distally and transition to monophasic waveforms. Overall findings suggestive of poor cardiac output versus aortic inflow disease.    CTA Abdomen Pelvis Runoff w Contrast    Narrative    EXAM: CTA ABDOMEN PELVIS RUNOFF W CONTRAST  LOCATION: [facility]  DATE: 10/5/2024    INDICATION: Skin mottling with cool left lower extremity with BLE run off, with signs of critical limb ischemia  COMPARISON: Lower extremity ultrasound 10/4/2024  TECHNIQUE: Helical acquisition through the abdomen, pelvis, and bilateral lower extremities was performed during the arterial phase of contrast enhancement using IV Contrast. 2D and 3D reconstructions were performed by the CT technologist. Dose reduction   techniques were used.   CONTRAST: isovue 370, 90ml    FINDINGS:  AORTA: Mild calcified and noncalcified atherosclerosis. There is focal, somewhat saccular ectasia of the infrarenal abdominal aorta without olga aneurysmal dilation. The celiac, superior  mesenteric, bilateral renal and inferior mesenteric arteries are   very small in caliber but there is no evidence of focal stenosis or occlusion. No evidence of active peritoneal or retroperitoneal hemorrhage.    RIGHT LEG: The common and external iliac arteries are widely patent without evidence of significant stenosis. The common femoral and superficial femoral arteries are also patent. There is likely moderate long segment narrowing at the level of the   popliteal artery, although no focal stenosis or occlusion is visualized. Patent tibioperoneal trunk. Patent three-vessel runoff is noted to the level of the ankle and foot.    LEFT LEG: The common and external iliac arteries are widely patent without evidence of significant stenosis. The common femoral and superficial femoral arteries are also patent. There is likely mild to moderate long segment narrowing at the level of the   popliteal artery, although no focal stenosis or occlusion is visualized. Patent tibioperoneal trunk. Patent three-vessel runoff is noted to the level of the ankle and foot.    LUNG BASES: No airspace consolidation or pleural effusion visualized.    ABDOMEN: Cholelithiasis without evidence of acute cholecystitis or biliary obstruction. Pancreas, spleen and adrenal glands are unremarkable. Kidneys are atrophic without hydronephrosis. Likely right renal cyst, no specific follow-up recommended. No   evidence of bowel obstruction or acute inflammation.    PELVIS: Ill-defined masslike soft tissue attenuation centered around the cervix.lower uterine segment measuring up to 8.2 x 8.1 cm (series 4 image 172). This is new or increased in comparison to prior CT in November 2023. Abdominopelvic lymphadenopathy   is again noted with representative left external iliac node measuring 2.1 cm in short axis (series 4 image 143). Small to moderate volume ascites is similar to most recent CT. Peritoneal dialysis catheter in place. Urinary bladder is  decompressed and not   well-visualized. No acute bony abnormality. Left hip arthroplasty.      Impression    IMPRESSION:  1.  Scattered atherosclerosis with relatively long segment stenoses involving the popliteal arteries bilaterally, right greater than left. However, there is no evidence of critical stenosis or occlusion. Patent three-vessel runoff to the ankle   bilaterally.  2.  Relatively small caliber arterial vessels in the abdomen or pelvis, nonspecific but can be seen in the setting of shock/hypotension.  3.  Findings suspicious for cervical or uterine mass which could be source of enlarging lymphadenopathy and/or abdominopelvic ascites.   US Lower Extremity Venous Duplex Bilateral    Narrative    EXAM: US LOWER EXTREMITY VENOUS DUPLEX BILATERAL  LOCATION: [facility]  DATE: 10/7/2024    INDICATION: EDEMA  COMPARISON: 9/29/24.  TECHNIQUE: Venous Duplex ultrasound of bilateral lower extremities with and without compression, augmentation and duplex. Color flow and spectral Doppler with waveform analysis performed.    FINDINGS: Exam includes the common femoral, femoral, popliteal veins as well as segmentally visualized deep calf veins and greater saphenous vein.     RIGHT: No deep vein thrombosis. No superficial thrombophlebitis. No popliteal cyst.    LEFT: No deep vein thrombosis. No superficial thrombophlebitis. No popliteal cyst.      Impression    IMPRESSION:  1.  No deep venous thrombosis in the bilateral lower extremities.   XR Video Swallow with SLP or OT    Narrative    EXAM: XR VIDEO SWALLOW WITH SLP OR OT  LOCATION: [facility]  DATE: 10/9/2024    INDICATION: Difficulty swallowing.  COMPARISON: None.    TECHNIQUE: Routine swallow study with speech pathology using multiple barium thicknesses.    RADIATION DOSE: Total Air Kerma 9.7 mGy    FINDINGS:   Swallow study with Speech Pathology using multiple barium thicknesses.     There was deep penetration with  cup and straw sips of thin barium. No definite aspiration.    There was trace penetration with mildly thick barium. No aspiration.    The patient had an episode of emesis after the administration of pudding consistency. The exam was then terminated.    No significant stasis within the vallecula or piriform sinuses. Normal epiglottic inversion.      Impression    IMPRESSION:  1.  Deep penetration with thin barium and trace penetration with mildly thick barium. No aspiration.   XR Chest Port 1 View    Narrative    EXAM: XR CHEST PORT 1 VIEW  LOCATION: [facility]  DATE: 10/9/2024    INDICATION: Abdominal pain, nausea and vomiting, sepsis, leukocytosis, end-stage renal disease, atrial fibrillation  COMPARISON: CT 2024      Impression    IMPRESSION: Low lung volumes. Stable right basilar atelectasis/scarring with blunting of the costophrenic angle. The left lung is clear. No pneumonic consolidation and no definite pleural effusion. Normal heart size.   Echocardiogram Complete     Value    LVEF  30-35% (moderately reduced)    Narrative    [id number]  [id number]  [id number]  [id number]^REMI^MINOR^[place] [address]     Name: LUKE AGUILERA  MRN: [id number]  : 1940  Study Date: 2024 07:25 AM  Age: 84 yrs  Gender: Female  Patient Location: [facility]  Reason For Study: Atrial Fibrillation  Ordering Physician: MINOR KHALIL  Performed By: CORINA     BSA: 1.8 m2  Height: 61 in  Weight: 171 lb  HR: 94  ______________________________________________________________________________  Procedure  Complete Portable Echo Adult. Definity (NDC #71637-735) given intravenously.  No hemodynamically significant valvular abnormalities on 2D or color flow  imaging. Compared to the prior study dated 2023, there have been no  changes.  ______________________________________________________________________________  Interpretation Summary      1.Left ventricular function is decreased. The ejection fraction is 30-35%  (moderately reduced).  2.Mildly decreased right ventricular systolic function  3.The left atrium is moderately dilated.  4.No hemodynamically significant valvular abnormalities on 2D or color flow  imaging.  Compared to the prior study dated 11/6/2023, the LV EF looks lower and the  pulmonic pressures are estimated lower.  ______________________________________________________________________________  I      WMSI = 2.00     % Normal = 0     X - Cannot   0 -                      (2) - Mildly 2 -          Segments  Size  Interpret    Hyperkinetic 1 - Normal  Hypokinetic  Hypokinetic  1-2     small                                                     7 -          3-5      moderate  3 - Akinetic 4 -          5 -         6 - Akinetic Dyskinetic   6-14    large               Dyskinetic   Aneurysmal  w/scar       w/scar       15-16   diffuse     Left Ventricle  Left ventricular function is decreased. The ejection fraction is 30-35%  (moderately reduced). There is normal left ventricular wall thickness. There  is borderline concentric left ventricular hypertrophy. Left ventricular  diastolic function is not assessable. No regional wall motion abnormalities  noted.     Right Ventricle  The right ventricle is normal size. TAPSE is abnormal, which is consistent  with abnormal right ventricular systolic function. Mildly decreased right  ventricular systolic function.     Atria  The left atrium is moderately dilated. Right atrial size is normal. There is  no color Doppler evidence of an atrial shunt.     Mitral Valve  There is mild mitral annular calcification. There is trace mitral  regurgitation. There is no mitral valve stenosis.     Tricuspid Valve  The tricuspid valve is not well visualized, but is grossly normal. Right  ventricle systolic pressure estimate normal. There is trace to mild tricuspid  regurgitation. There is no tricuspid stenosis.      Aortic Valve  Aortic valve leaflets appear normal. There is no evidence of aortic stenosis  or clinically significant aortic regurgitation. The aortic valve is  trileaflet. No aortic regurgitation is present. No aortic stenosis is present.     Pulmonic Valve  The pulmonic valve is not well seen, but is grossly normal. This degree of  valvular regurgitation is within normal limits. There is no pulmonic valvular  stenosis.     Vessels  The aorta root is normal. Normal size ascending aorta. IVC diameter <2.1 cm  collapsing >50% with sniff suggests a normal RA pressure of 3 mmHg.     Pericardium  There is no pericardial effusion.     Rhythm  The rhythm was atrial fibrillation.     ______________________________________________________________________________  MMode/2D Measurements & Calculations  IVSd: 1.2 cm  LVIDd: 4.3 cm  LVIDs: 3.6 cm  LVPWd: 1.2 cm     FS: 17.4 %  LV mass(C)d: 189.4 grams  LV mass(C)dI: 107.2 grams/m2  Ao root diam: 2.9 cm  asc Aorta Diam: 3.6 cm  LVOT diam: 2.0 cm  LVOT area: 3.1 cm2  Ao root diam index Ht(cm/m): 1.8  Ao root diam index BSA (cm/m2): 1.6  Asc Ao diam index BSA (cm/m2): 2.0  Asc Ao diam index Ht(cm/m): 2.3  EF Biplane: 33.4 %  LA Volume (BP): 64.9 ml     LA Volume Index (BP): 36.7 ml/m2  LA Volume Indexed (AL/bp): 38.7 ml/m2  RV Base: 3.8 cm  RWT: 0.56     Doppler Measurements & Calculations  MV E max herb: 68.5 cm/sec  MV dec slope: 432.7 cm/sec2  MV dec time: 0.16 sec     Ao V2 max: 96.5 cm/sec  Ao max P.0 mmHg  Ao V2 mean: 75.9 cm/sec  Ao mean PG: 3.0 mmHg  Ao V2 VTI: 20.0 cm  GABRIEL(I,D): 1.4 cm2  GABRIEL(V,D): 2.1 cm2  LV V1 max P.6 mmHg  LV V1 max: 63.6 cm/sec  LV V1 VTI: 8.8 cm  SV(LVOT): 27.7 ml  SI(LVOT): 15.7 ml/m2  PA V2 max: 57.8 cm/sec  PA max P.3 mmHg  PA acc time: 0.07 sec  PI end-d herb: 133.8 cm/sec  TR max herb: 264.0 cm/sec  TR max P.9 mmHg  AV Herb Ratio (DI): 0.66  GABRIEL Index (cm2/m2): 0.78  E/E' av.4  Lateral E/e': 14.6  Medial E/e': 12.1  RV S Herb:  9.5 cm/sec     ______________________________________________________________________________  Report approved by: Brown Waldrop 09/30/2024 09:52 AM             Discharge Orders      Primary Care - Care Coordination Referral      Follow-Up with Cardiology      Follow Up ([facility])    Follow-up with in vascular surgery clinic in 2-4 weeks.    The vascular surgery clinic coordinator will call you within 3 business days after leaving the hospital to schedule your appointment.      With general vascular surgery questions, concerns, or to request/change an appointment, please call the [facility] V[facility] Surgery Clinic:    [facility]  Phone: [phone]    [address]  [address] [place], [place], [address]     Reason for your hospital stay    Abdominal pain - suspected malignancy with Uterine/cervical mass  Hypotension     Follow-up and recommended labs and tests     Follow up with Hospice care provider, within 3 days for hospital follow- up.  Follow up with Nephrology with ongoing peritoneal dialysis  Follow up with vascular surgery for Right toe wound, appointment will be made  Follow up with Cardiology for A.fib, appointment will be made    Call our scheduling line at [phone] to make an   appointment, if you do not already have one scheduled     Activity    Your activity upon discharge: activity as tolerated     Diet    Follow this diet upon discharge:      Snacks/Supplements Adult: Nepro Oral Supplement; With Meals      Snacks/Supplements Adult: Gelatein Plus; Between Meals      Snacks/Supplements Adult: Expedite Bottle; With Meals      Combination Diet Pureed Diet (level 4); Thin Liquids (level 0)     Discharge Medications   Current Discharge Medication List        START taking these medications    Details   acetaminophen (TYLENOL) 325 MG tablet Take 3 tablets (975 mg) by mouth 3 times daily as needed for mild pain, fever or headaches.  Qty: 30 tablet,  Refills: 1    Associated Diagnoses: General weakness      alum & mag hydroxide-simethicone (MAALOX) 200-200-20 MG/5ML SUSP suspension Take 30 mLs by mouth every 4 hours as needed for indigestion.  Qty: 769 mL, Refills: 0    Associated Diagnoses: Dyspepsia      clotrimazole (LOTRIMIN) 1 % external cream Apply topically 2 times daily.  Qty: 30 g, Refills: 0    Associated Diagnoses: Candida infection      HYDROmorphone (DILAUDID) 2 MG tablet Take 0.5-1 tablets (1-2 mg) by mouth every 4 hours as needed for moderate to severe pain.  Qty: 10 tablet, Refills: 0    Associated Diagnoses: Hospice care patient      menthol-zinc oxide (CALMOSEPTINE) 0.44-20.6 % OINT ointment Apply topically 4 times daily as needed for skin protection.  Qty: 71 g, Refills: 0    Associated Diagnoses: Hospice care patient      metoprolol succinate ER (TOPROL XL) 50 MG 24 hr tablet Take 1 tablet (50 mg) by mouth daily.  Qty: 30 tablet, Refills: 0    Associated Diagnoses: Rapid atrial fibrillation (H)      midodrine (PROAMATINE) 5 MG tablet Take 1 tablet (5 mg) by mouth 3 times daily (with meals).  Qty: 60 tablet, Refills: 0    Associated Diagnoses: Hypotension, unspecified hypotension type      polyethylene glycol (MIRALAX) 17 GM/Dose powder Take 17 g by mouth daily as needed for constipation.  Qty: 510 g, Refills: 0    Associated Diagnoses: Hospice care patient      potassium chloride montse ER (KLOR-CON M20) 20 MEQ CR tablet Take 2 tablets (40 mEq) by mouth daily.  Qty: 60 tablet, Refills: 0    Associated Diagnoses: Peritoneal dialysis status (H)           CONTINUE these medications which have CHANGED    Details   apixaban ANTICOAGULANT (ELIQUIS) 2.5 MG tablet Take 1 tablet (2.5 mg) by mouth 2 times daily.    Associated Diagnoses: Rapid atrial fibrillation (H)           CONTINUE these medications which have NOT CHANGED    Details   famotidine (PEPCID) 20 MG tablet Take 20 mg by mouth 2 times daily.      folic acid (FOLVITE) 1 MG tablet Take 1  "tablet (1 mg) by mouth daily  Qty: 90 tablet, Refills: 3    Associated Diagnoses: ESRD (end stage renal disease) on dialysis (H)      predniSONE (DELTASONE) 5 MG tablet Take 5 mg by mouth 2 times daily.      traMADol (ULTRAM) 50 MG tablet Take 50 mg by mouth 2 times daily.      !! ACE/ARB/ARNI NOT PRESCRIBED (INTENTIONAL) Please choose reason not prescribed from choices below.    Associated Diagnoses: ESRD (end stage renal disease) on dialysis (H)      !! BETA BLOCKER NOT PRESCRIBED (INTENTIONAL) Please choose reason not prescribed from choices below.    Associated Diagnoses: ESRD (end stage renal disease) on dialysis (H)       !! - Potential duplicate medications found. Please discuss with provider.        STOP taking these medications       spironolactone (ALDACTONE) 25 MG tablet Comments:   Reason for Stopping:         torsemide (DEMADEX) 100 MG tablet Comments:   Reason for Stopping:             Allergies   Allergies   Allergen Reactions    Allopurinol Diarrhea    Latex     Lisinopril Cough     initiated 5/14/1996 and stopped      Percocet [Oxycodone-Acetaminophen] Other (See Comments)     Difficulty swallowing   \" gets stuck in throat\"       "

## 2024-10-10 NOTE — PLAN OF CARE
Goal Outcome Evaluation:      Plan of Care Reviewed With: patient    Overall Patient Progress: no changeOverall Patient Progress: no change         Vitals:    10/09/24 0924 10/09/24 1238 10/09/24 1800 10/09/24 2053   BP: 100/73 110/63 96/67 96/59   BP Location:       Patient Position:       Cuff Size:       Pulse:  64  90   Resp:    18   Temp:    97.3  F (36.3  C)   TempSrc:    Axillary   SpO2:    95%   Weight:       Height:        Continues to have discomfort, scheduled tramadol given, with relief. Pd cycler was accessed and started. Room air. Skin is very frail and denuded. Barrier applied to all areas of break down. Repositioned per tolerance. Lungs diminished. Alert and oriented X3. Plan is to discharge to home with hospice. Addis Ceja RN  gentamicin was applied at exit site of PD cath, there is a sore that has been there prior to admission.

## 2024-10-11 NOTE — TELEPHONE ENCOUNTER
LVMTCB with son to schedule 1 month follow up visit in the vascular clinic with either Lilia Chaney or the [facility] clinic.  At the time of the call, the only openings where in the [facility] clinic.  Patient's home number blocks our outgoing call, but the son is listed on the consent to communicate form. [phone]      Ana Quiroz, RN  Kassandra Orozco  fellow          Previous Messages       ----- Message -----  From: Kassandra Orozco  Sent: 10/11/2024   8:41 AM CDT  To: [facility] Support Pool    Lilia is booked out until January.  Please advise if I should schedule this in the fellow clinic (a few spots left) or with Dr. BOUDREAUX or Dr. Lemus.  ----- Message -----  From: Berhane Milner MD  Sent: 10/7/2024   7:18 AM CDT  To: *    Please schedule with Lilia Chaney in 1 month. No new imaging. Thanks. Berhane.

## 2024-10-11 NOTE — PROGRESS NOTES
Clinic Care Coordination Contact  Care Coordination Clinician Chart Review    Situation: Patient chart reviewed by care coordinator.    Background: Clinic Care Coordination Referral received from inpatient care team for transition handoff communication following hospital admission.    Assessment: Upon chart review, patient is not a candidate for Primary Care Clinic Care Coordination enrollment due to reason stated below:  Patient enrolled into hospice.    Plan/Recommendations: Clinic Care Coordination Referral/order cancelled. RN/SW CC will perform no further monitoring/outreaches at this time and will remain available as needed. If new needs arise, a new Care Coordination Referral may be placed.    David Myhre, RN   [facility] RN  [phone]    
[Time Spent: ___ minutes] : I have spent [unfilled] minutes of time on the encounter.

## 2024-10-14 ENCOUNTER — TELEPHONE (OUTPATIENT)
Dept: INTERNAL MEDICINE | Facility: CLINIC | Age: 84
End: 2024-10-14
Payer: COMMERCIAL

## 2024-10-14 NOTE — TELEPHONE ENCOUNTER
Please check and complete your email online for [place] Dept of health for death certificate. Sign and return this encounter to me so I can print death certificate  and send to barbara chart .

## 2024-10-14 NOTE — TELEPHONE ENCOUNTER
FYI - Status Update    Who is Calling: nurseChioma    Update: [facility] called and stated that patient has passed away on 10/11/24 at 11:37 pm     Does caller want a call/response back: No

## 2024-10-15 ENCOUNTER — TELEPHONE (OUTPATIENT)
Dept: INTERNAL MEDICINE | Facility: CLINIC | Age: 84
End: 2024-10-15
Payer: COMMERCIAL

## 2024-10-15 NOTE — TELEPHONE ENCOUNTER
October 15, 2024    Verbal Physician Initial Certification of Terminal Illness was received via fax for Dr. Hopper.  Patient label was attached to paperwork and placed in provider's inbox to be signed.    Dee Dee Bates

## 2024-10-23 NOTE — TELEPHONE ENCOUNTER
October 23, 2024    Verbal Physician Initial Certification of Terminal Illness was picked up from outbox of Dr. Hopper and sent via fax to [phone].    Dee Dee Bates

## 2024-10-24 ENCOUNTER — MEDICAL CORRESPONDENCE (OUTPATIENT)
Dept: HEALTH INFORMATION MANAGEMENT | Facility: CLINIC | Age: 84
End: 2024-10-24
Payer: COMMERCIAL

## 2024-10-28 ENCOUNTER — TELEPHONE (OUTPATIENT)
Dept: INTERNAL MEDICINE | Facility: CLINIC | Age: 84
End: 2024-10-28
Payer: COMMERCIAL

## 2024-10-28 NOTE — TELEPHONE ENCOUNTER
October 28, 2024    [facility] Physician Initial Cert of Terminal Illness was received via fax for Dr. Hopper.  Patient label was attached to paperwork and placed in provider's inbox to be signed.    Dee Dee Bates

## 2024-10-28 NOTE — TELEPHONE ENCOUNTER
October 28, 2024    [facility] Initial Plan of Care was received via fax for Dr. Hopper.  Patient label was attached to paperwork and placed in provider's inbox to be signed.    Dee Dee Bates

## 2024-10-30 NOTE — TELEPHONE ENCOUNTER
October 30, 2024    [facility] Initial Plan of Care was picked up from outbox of Dr. Hopper and sent via fax to [phone].    Dee Dee Bates

## 2024-10-30 NOTE — TELEPHONE ENCOUNTER
October 30, 2024    [facility] Initial Cert of Terminal Illness was picked up from outbox of Dr. Hopper and sent via fax to [phone].    Dee Dee Bates

## 2025-05-12 NOTE — PROGRESS NOTES
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 Nephrology consulted for HAMMAD and anuria.     ASSESSMENT & PLAN:     HAMMAD on CKD 5/ESRD: Likely ESRD. Baseline with progressive disease. Prior baseline Cr 1.6-2.0 (eGFR ~25%), on  4/2023 Cr 3.1-3.7, with eGFR 11-12. Pt reports no prior nephrology follow ups. Renal imaging 10/12/23 R Kidney 6.5, L 8.2, with cortical thinning and likely advanced renal disease, simple cysts. UA active/Cx Ecoli.  CKD with unclear etiology, appears progressive with nephrosclerosis on imaging, Bx likely not beneficial with such advanced disease. Serologies: + IgG kappa LC, with small Mspike, FLC Ratio acceptable with ESRD. Tunneled CVC placed 10/30/23 and initiated HD. MWF HD scheduled while here. HEp B and CXR/QGOLD collected as part of [facility] Admission process. SW consulted for out patient dialysis Unit coordination upon discharge.     Hypervolemia: s/p CHF exacerbation and poor renal function. S/P aggressive Diuresis with little UO. Now UF with HD, monitor.     Hyperkalemia: 2/2 renal failure. On renal diet. Follow with HD.     Metabolic acidosis: Resolved. Follow with HD.     Acute Metabolic encephalopathy: ?Possible delirium likely 2/2 UTI/Uremia.   Management per Primary. Follow with HD.     Hypoalbuminemia: ONS, follow.     Anemia: Baseline Hg ~12-13. HG 9.5 with drop in PLTs. Iron okay.     MGUS: Monoclonal peak 0.9, with IgG Kappa LC elevation. ONC following.     UTI: +Ecoli UC, BC -ve, Resp Cx staph. On ABX.     H/O A-fib with RVR: on digoxin per Cardiology. Will need to monitor levels , especially with renal failure. On Chronic AC/Xarelto.       SUBJECTIVE:    Pt laying in bed, resting comfortable.   Pt son and brother are at bedside  Plan  for HD this afternoon  Tolerating HD therapy well, with 3L UF last Tx  Per son and brother generalized edema looks slightly better after HD  I discussed labs, level of renal disease, and likely ESRD status with pt and family.   Denies n, v, c, sob, fever, rash or CP  Denies pain  + lua  VSS, Labs stable  Wt down trending with dialysis  Plan is for TCU, son is hoping for one near [place] near his home.   Pt reports loose stools, improving after imodium     OBJECTIVE:  Physical Exam   Temp: 97.6  F (36.4  C) Temp src: Oral BP: 104/61 Pulse: 79   Resp: 20 SpO2: 96 % O2 Device: None (Room air)    Vitals:    11/04/23 1033 11/05/23 0631 11/06/23 0323   Weight: 107.5 kg (236 lb 15.9 oz) 108.1 kg (238 lb 5.1 oz) 108.2 kg (238 lb 8.6 oz)     Vital Signs with Ranges  Temp:  [97.5  F (36.4  C)-97.9  F (36.6  C)] 97.6  F (36.4  C)  Pulse:  [78-92] 79  Resp:  [18-20] 20  BP: (104-146)/(61-93) 104/61  SpO2:  [92 %-97 %] 96 %  I/O last 3 completed shifts:  In: 60 [P.O.:60]  Out: 50 [Urine:50]    Patient Vitals for the past 72 hrs:   Weight   11/06/23 0323 108.2 kg (238 lb 8.6 oz)   11/05/23 0631 108.1 kg (238 lb 5.1 oz)   11/04/23 1033 107.5 kg (236 lb 15.9 oz)   11/04/23 0459 112 kg (246 lb 14.6 oz)     Intake/Output Summary (Last 24 hours) at 11/6/2023 0935  Last data filed at 11/6/2023 0700  Gross per 24 hour   Intake 60 ml   Output 150 ml   Net -90 ml       PHYSICAL EXAM:  GEN: NAD, elderly   CV: RRR   Lung: clear and equal, on RA  Ab: soft and NT  Ext: +3 LLE, + Upper extremity edema BL R>L  and well perfused  Skin: no rash  : + lua  Psych: mood WNL  Neuro: grossly intact  Access:tunneled CVC C/D/I.      LABORATORY STUDIES:     Recent Labs   Lab 11/06/23  0551 11/05/23  0631 11/04/23  1512 11/04/23  0514 11/03/23  0557 10/31/23  0902   WBC 9.3 9.3 8.2 7.5 8.5 10.7   RBC 3.06* 3.13* 3.02* 3.06* 3.03* 3.03*   HGB 9.8* 9.9* 9.7* 9.6* 9.5* 9.6*   HCT 28.1* 28.9* 27.7* 27.9* 27.4* 27.3*   * 114* 110* 101* 96* 111*        Basic Metabolic Panel:  Recent Labs   Lab 11/06/23  0720 11/06/23  0702 11/06/23  0551 11/05/23  1510 11/05/23  0631 11/04/23  2206 11/04/23  0703 11/04/23  0514 11/03/23  0705 11/03/23  0557 11/02/23  1228 11/01/23  2106 11/01/23  1459   NA  --   --  140  --  137  --   --  137  --  135  --  137 137   POTASSIUM  --   --  3.7  --  3.7  --   --  3.9  --  4.1  --  3.9 3.8   CHLORIDE  --   --  97*  --  96*  --   --  97*  --  96*  --  98 98   CO2  --   --  25  --  27  --   --  27  --  25  --  25 25   BUN  --   --  31.8*  --  28.3*  --   --  23.3*  --  40.5*  --  33.6* 30.2*   CR  --   --  3.70*  --  3.26*  --   --  2.71*  --  3.62*  --  2.97* 2.55*   GLC 89 69* 79 88 86 93   < > 74   < > 67*   < > 171* 92   SANDIP  --   --  9.4  --  9.0  --   --  8.8  --  8.1*  --  8.1* 8.6*    < > = values in this interval not displayed.       INRNo lab results found in last 7 days.     Recent Labs   Lab Test 11/06/23  0551 11/05/23 0631   WBC 9.3 9.3   HGB 9.8* 9.9*   * 114*       Personally reviewed current labs    Ros LO-BC  [facility]  [phone]     Unknown

## (undated) DEVICE — CUSTOM PACK LAP CHOLE SBA5BLCHEA

## (undated) DEVICE — SU ENDO TIE KNOT PLACEMENT RELOAD  030510

## (undated) DEVICE — DEVICE TI KNOT TK5 030404

## (undated) DEVICE — SOL NACL 0.9% IRRIG 1000ML BOTTLE 2F7124

## (undated) DEVICE — ENDO TROCAR SHIELDED BLADED KII Z-THRD 05X100MM CTB03

## (undated) DEVICE — DEVICE ENDO STITCH APPLIER 10MM 173016

## (undated) DEVICE — SU DERMABOND ADVANCED .7ML DNX12

## (undated) DEVICE — ESU PENCIL SMOKE EVAC W/ROCKER SWITCH 0703-047-000

## (undated) DEVICE — PREP CHLORAPREP 26ML TINTED HI-LITE ORANGE 930815

## (undated) DEVICE — ANTIFOG SOLUTION SEE SHARP 150M TROCAR SWABS 30978

## (undated) DEVICE — ENDO TROCAR SHIELDED BLADED KII Z-THRD 11X100MM CTB33

## (undated) DEVICE — DRSG TEGADERM 4X4 3/4" 1626W

## (undated) DEVICE — ESU LIGASURE MARYLAND LAPAROSCOPIC SLR/DVDR 5MMX37CM LF1937

## (undated) DEVICE — PACK MINOR SINGLE BASIN SSK3001

## (undated) DEVICE — ESU LIGASURE LAPAROSCOPIC BLUNT TIP SEALER 5MMX37CM LF1837

## (undated) DEVICE — SU VICRYL+ 0 27 UR6 VLT VCP603H

## (undated) DEVICE — DECANTER VIAL 2006S

## (undated) DEVICE — BAG PRESSURE INFUSION 1000ML COLORED GA 8808

## (undated) DEVICE — WECK EFX CONES AND SUTURE PASSER EFXCT1

## (undated) DEVICE — SOL WATER IRRIG 1000ML BOTTLE 2F7114

## (undated) DEVICE — TUBING SOLUTION SET CONTINU-FLO CLEARLINK 110" 2C8537

## (undated) DEVICE — ATTACHMENT DEVICE DRAIN/TUBE 9781

## (undated) DEVICE — TUBING IV EXTENSION SET ANESTHESIA 34" MLL 2C6227

## (undated) DEVICE — DRSG TEGADERM 8X12" 1629

## (undated) DEVICE — DECANTER BAG 2002S

## (undated) DEVICE — SU ENDO STITCH SURGIDAC 2-0 ES-9 48"  173023

## (undated) DEVICE — SU ENDO STITCH SURGIDAC 2-0 ES-9 TAPER 48"  170044

## (undated) DEVICE — GOWN IMPERVIOUS BREATHABLE SMART LG 89015

## (undated) DEVICE — SOL NACL 0.9% 100ML BAG 2B1302

## (undated) DEVICE — SOL NACL 0.9% INJ 1000ML BAG 2B1324X

## (undated) DEVICE — SUCTION MANIFOLD NEPTUNE 2 SYS 1 PORT 702-025-000

## (undated) DEVICE — TUBING SUCTION MEDI-VAC 1/4"X20' N620A

## (undated) DEVICE — TUBING SMOKE EVAC PNEUMOCLEAR HIGH FLOW 0620050250

## (undated) DEVICE — GLOVE BIOGEL PI ULTRATOUCH G SZ 7.5 42175

## (undated) DEVICE — ESU GROUND PAD ADULT REM W/15' CORD E7507DB

## (undated) DEVICE — SUTURE VICRYL+ 4-0 UNDYED PS-2 VCP496H

## (undated) DEVICE — NDL INSUFFLATION 13GA 120MM C2201

## (undated) DEVICE — ENDO TROCAR SLEEVE KII Z-THREADED 05X100MM CTS02

## (undated) RX ORDER — BUPIVACAINE HYDROCHLORIDE 5 MG/ML
INJECTION, SOLUTION EPIDURAL; INTRACAUDAL
Status: DISPENSED
Start: 2024-01-01

## (undated) RX ORDER — MIDODRINE HYDROCHLORIDE 5 MG/1
TABLET ORAL
Status: DISPENSED
Start: 2023-01-01

## (undated) RX ORDER — HEPARIN SODIUM 1000 [USP'U]/ML
INJECTION, SOLUTION INTRAVENOUS; SUBCUTANEOUS
Status: DISPENSED
Start: 2024-01-01

## (undated) RX ORDER — HEPARIN SODIUM 1000 [USP'U]/ML
INJECTION, SOLUTION INTRAVENOUS; SUBCUTANEOUS
Status: DISPENSED
Start: 2023-01-01

## (undated) RX ORDER — FENTANYL CITRATE 50 UG/ML
INJECTION, SOLUTION INTRAMUSCULAR; INTRAVENOUS
Status: DISPENSED
Start: 2024-01-01

## (undated) RX ORDER — LIDOCAINE HYDROCHLORIDE 10 MG/ML
INJECTION, SOLUTION INFILTRATION; PERINEURAL
Status: DISPENSED
Start: 2024-01-01

## (undated) RX ORDER — CEFAZOLIN SODIUM/WATER 2 G/20 ML
SYRINGE (ML) INTRAVENOUS
Status: DISPENSED
Start: 2024-01-01

## (undated) RX ORDER — FENTANYL CITRATE 50 UG/ML
INJECTION, SOLUTION INTRAMUSCULAR; INTRAVENOUS
Status: DISPENSED
Start: 2023-01-01

## (undated) RX ORDER — METOPROLOL TARTRATE 1 MG/ML
INJECTION, SOLUTION INTRAVENOUS
Status: DISPENSED
Start: 2024-01-01

## (undated) RX ORDER — LIDOCAINE HYDROCHLORIDE AND EPINEPHRINE 10; 10 MG/ML; UG/ML
INJECTION, SOLUTION INFILTRATION; PERINEURAL
Status: DISPENSED
Start: 2023-01-01

## (undated) RX ORDER — CEFAZOLIN SODIUM/WATER 2 G/20 ML
SYRINGE (ML) INTRAVENOUS
Status: DISPENSED
Start: 2023-01-01